# Patient Record
Sex: MALE | Race: WHITE | NOT HISPANIC OR LATINO | Employment: OTHER | URBAN - METROPOLITAN AREA
[De-identification: names, ages, dates, MRNs, and addresses within clinical notes are randomized per-mention and may not be internally consistent; named-entity substitution may affect disease eponyms.]

---

## 2018-10-29 ENCOUNTER — TELEPHONE (OUTPATIENT)
Dept: FAMILY MEDICINE CLINIC | Facility: CLINIC | Age: 48
End: 2018-10-29

## 2018-10-29 DIAGNOSIS — E11.9 CONTROLLED TYPE 2 DIABETES MELLITUS WITHOUT COMPLICATION, WITH LONG-TERM CURRENT USE OF INSULIN (HCC): Primary | ICD-10-CM

## 2018-10-29 DIAGNOSIS — Z79.4 CONTROLLED TYPE 2 DIABETES MELLITUS WITHOUT COMPLICATION, WITH LONG-TERM CURRENT USE OF INSULIN (HCC): Primary | ICD-10-CM

## 2018-10-30 RX ORDER — SYRINGE-NEEDLE,INSULIN,0.5 ML 31 GX5/16"
SYRINGE, EMPTY DISPOSABLE MISCELLANEOUS 2 TIMES DAILY
Qty: 100 EACH | Refills: 0 | Status: SHIPPED | OUTPATIENT
Start: 2018-10-30 | End: 2019-07-06 | Stop reason: ALTCHOICE

## 2018-10-30 RX ORDER — SYRINGE-NEEDLE,INSULIN,0.5 ML 31 GX5/16"
SYRINGE, EMPTY DISPOSABLE MISCELLANEOUS
Refills: 0 | COMMUNITY
Start: 2018-09-03 | End: 2018-10-30 | Stop reason: SDUPTHER

## 2018-10-31 NOTE — TELEPHONE ENCOUNTER
Called PT to verify what glucose strips PT is currently using and needs  PT did not answer, left VM

## 2019-02-04 ENCOUNTER — HOSPITAL ENCOUNTER (EMERGENCY)
Facility: HOSPITAL | Age: 49
Discharge: HOME | End: 2019-02-04
Attending: EMERGENCY MEDICINE
Payer: COMMERCIAL

## 2019-02-04 VITALS
SYSTOLIC BLOOD PRESSURE: 112 MMHG | HEART RATE: 83 BPM | RESPIRATION RATE: 18 BRPM | OXYGEN SATURATION: 97 % | TEMPERATURE: 98.1 F | WEIGHT: 190 LBS | DIASTOLIC BLOOD PRESSURE: 76 MMHG

## 2019-02-04 DIAGNOSIS — F41.9 ANXIETY: Primary | ICD-10-CM

## 2019-02-04 LAB
AMPHETAMINES SERPL QL SCN: NEGATIVE
BARBITURATES UR QL: NEGATIVE
BENZODIAZ UR QL: NEGATIVE
COCAINE UR QL: NEGATIVE
ETHANOL EXG-MCNC: 0 MG/DL
GLUCOSE SERPL-MCNC: 380 MG/DL (ref 65–140)
METHADONE UR QL: NEGATIVE
OPIATES UR QL SCN: NEGATIVE
PCP UR QL: NEGATIVE
THC UR QL: NEGATIVE

## 2019-02-04 PROCEDURE — 82948 REAGENT STRIP/BLOOD GLUCOSE: CPT

## 2019-02-04 PROCEDURE — 99285 EMERGENCY DEPT VISIT HI MDM: CPT

## 2019-02-04 PROCEDURE — 80307 DRUG TEST PRSMV CHEM ANLYZR: CPT | Performed by: EMERGENCY MEDICINE

## 2019-02-04 PROCEDURE — 82075 ASSAY OF BREATH ETHANOL: CPT | Performed by: EMERGENCY MEDICINE

## 2019-02-05 NOTE — ED PROVIDER NOTES
History  Chief Complaint   Patient presents with    Suicidal     pt states "im having suicidal thoughts", denies any recent stressors, pt reports he does not have a plan, denies HI/VH/AH, states "i want to go back to [facility]"     42-year-old male presenting for psych evaluation  Patient has psychiatric history and comes in voluntarily  He reports that he is depressed/reports a vague suicidal thought without any plan and is requesting inpatient treatment  He reports that he was last inpatient in November 2018  He denies any self-harm  Denies any alcohol or drug use  Denies any homicidal ideation  Denied any auditory or visual hallucinations  Offers no physical complaints  Patient is an insulin-dependent diabetic reports Accu-Cheks at home have been in the 100s, also history of asthma without exacerbation  Reports taking all his meds as prescribed  Patient has been calm and cooperative throughout his ED stay  He was evaluated by crisis and now denies any suicidal thoughts  He reports that earlier he was feeling overwhelmed and had a panic attack  He would like to be discharged home where he lives with his cousin  He feels safe there  He ultimately would like to move back to [place] where he was living with his uncle and has a doctor  He reports that he has all of his medications and does not need any refills  He denies any suicidal/homicidal ideation, auditory or visual hallucinations  Prior to Admission Medications   Prescriptions Last Dose Informant Patient Reported? Taking? B-D INS SYRINGE 0 5CC/31GX5/16 31G X 5/16" 0 5 ML MISC   No No   Sig: Inject under the skin 2 (two) times a day      Facility-Administered Medications: None       Past Medical History:   Diagnosis Date    Asthma     Bipolar 1 disorder ([facility] )     Diabetes mellitus ([facility] )     Psychiatric disorder        History reviewed  No pertinent surgical history  History reviewed  No pertinent family history    I have reviewed and agree with the history as documented  Social History   Substance Use Topics    Smoking status: Current Some Day Smoker    Smokeless tobacco: Not on file    Alcohol use No        Review of Systems   Constitutional: Negative for chills, fever and unexpected weight change  HENT: Negative for ear pain, rhinorrhea and sore throat  Eyes: Negative for pain and visual disturbance  Respiratory: Negative for cough and shortness of breath  Cardiovascular: Negative for chest pain and leg swelling  Gastrointestinal: Negative for abdominal pain, constipation, diarrhea, nausea and vomiting  Endocrine: Negative for polydipsia, polyphagia and polyuria  Genitourinary: Negative for dysuria, frequency, hematuria and urgency  Musculoskeletal: Negative for back pain, myalgias and neck pain  Skin: Negative for color change and rash  Allergic/Immunologic: Negative for environmental allergies and immunocompromised state  Neurological: Negative for dizziness, weakness, light-headedness, numbness and headaches  Hematological: Negative for adenopathy  Does not bruise/bleed easily  Psychiatric/Behavioral: Negative for agitation and confusion  All other systems reviewed and are negative  Physical Exam  Physical Exam   Constitutional: He is oriented to person, place, and time  He appears well-developed and well-nourished  HENT:   Head: Normocephalic and atraumatic  Nose: Nose normal    Mouth/Throat: Oropharynx is clear and moist    Eyes: Conjunctivae and EOM are normal    Neck: Normal range of motion  Neck supple  Cardiovascular: Normal rate, regular rhythm, normal heart sounds and intact distal pulses  Pulmonary/Chest: Effort normal and breath sounds normal  No stridor  No respiratory distress  He has no wheezes  He has no rales  He exhibits no tenderness  Abdominal: Soft  He exhibits no distension  There is no tenderness  There is no rebound and no guarding     Musculoskeletal: He exhibits no edema or deformity  Neurological: He is alert and oriented to person, place, and time  He exhibits normal muscle tone  Coordination normal    Skin: Skin is warm and dry  No rash noted  Psychiatric: He has a normal mood and affect  His speech is normal and behavior is normal  Judgment and thought content normal  He is not actively hallucinating  Thought content is not paranoid and not delusional  Cognition and memory are normal  He expresses no homicidal ideation  He expresses no suicidal plans and no homicidal plans  He is attentive  Nursing note and vitals reviewed        Vital Signs  ED Triage Vitals   Temperature Pulse Respirations Blood Pressure SpO2   02/04/19 1957 02/04/19 1957 02/04/19 1957 02/04/19 1957 02/04/19 1957   98 °F (36 7 °C) 98 18 155/84 98 %      Temp Source Heart Rate Source Patient Position - Orthostatic VS BP Location FiO2 (%)   02/04/19 2245 02/04/19 2245 02/04/19 2245 02/04/19 2245 --   Oral Monitor Sitting Right arm       Pain Score       02/04/19 1957       No Pain           Vitals:    02/04/19 1957 02/04/19 2245   BP: 155/84 112/76   Pulse: 98 83   Patient Position - Orthostatic VS:  Sitting       Visual Acuity      ED Medications  Medications - No data to display    Diagnostic Studies  Results Reviewed     Procedure Component Value Units Date/Time    Fingerstick Glucose (POCT) [[id number]]  (Abnormal) Collected:  02/04/19 2203    Lab Status:  Final result Updated:  02/04/19 2205     POC Glucose 380 (H) mg/dl     POCT alcohol breath test [[id number]]  (Normal) Resulted:  02/04/19 2124    Lab Status:  Final result Updated:  02/04/19 2124     EXTBreath Alcohol 0 00    Rapid drug screen, urine [[id number]]  (Normal) Collected:  02/04/19 2034    Lab Status:  Final result Specimen:  Urine from Urine, Clean Catch Updated:  02/04/19 2111     Amph/Meth UR Negative     Barbiturate Ur Negative     Benzodiazepine Urine Negative     Cocaine Urine Negative     Methadone Urine Negative Opiate Urine Negative     PCP Ur Negative     THC Urine Negative    Narrative:         FOR MEDICAL PURPOSES ONLY  IF CONFIRMATION NEEDED PLEASE CONTACT THE LAB WITHIN 5 DAYS  Drug Screen Cutoff Levels:  AMPHETAMINE/METHAMPHETAMINES  1000 ng/mL  BARBITURATES     200 ng/mL  BENZODIAZEPINES     200 ng/mL  COCAINE      300 ng/mL  METHADONE      300 ng/mL  OPIATES      300 ng/mL  PHENCYCLIDINE     25 ng/mL  THC       50 ng/mL                 No orders to display              Procedures  Procedures       Phone Contacts  ED Phone Contact    ED Course  ED Course as of Feb 06 2242 Mon Feb 04, 2019 2233 Patient was evaluated by Mallory Chauhan from crisis  He denies any suicidal, homicidal thoughts  He would like to go home  He reports that he will go home where he lives with his cousin right now  His ultimate plan is to get back to [place] and live with his uncle and see his doctor who was there  He reports that he has enough medications to take in the meantime  He feels safe with being discharged home                                MDM  Number of Diagnoses or Management Options  Anxiety:   Diagnosis management comments: 52 yo M presenting with what he describes as an anxiety attack/feeling overwhelmed, feels better now  Denies any SI/HI/AH/VH, wants to go home where he lives with his cousin  Ultimate plan is to go live with his uncle in [place] where his pyschiatrist/doctors are   Return precautions discussed and pt expressed understanding       Amount and/or Complexity of Data Reviewed  Tests in the medicine section of CPT®: ordered and reviewed  Discuss the patient with other providers: yes (Leobardo Mark))        Disposition  Final diagnoses:   Anxiety     Time reflects when diagnosis was documented in both MDM as applicable and the Disposition within this note     Time User Action Codes Description Comment    2/4/2019 10:35 PM Gabino Linton Add [F41 9] Anxiety       ED Disposition     ED Disposition Condition Date/Time Comment    Discharge  Mon Feb 4, 2019 10:35 PM Kylie Tyson discharge to home/self care  Condition at discharge: Stable        MD Documentation      Most Recent Value   Sending MD Dr Olena Mondragon up With Specialties Details Why Contact Info        Please follow up with your doctor in [place]  If you are unable to get back there, please use resources provided to you  Return to the ED/call 911 if any thoughts to harm yourself/others or other concerns          Discharge Medication List as of 2/4/2019 10:36 PM      CONTINUE these medications which have NOT CHANGED    Details   B-D INS SYRINGE 0 5CC/31GX5/16 31G X 5/16" 0 5 ML MISC Inject under the skin 2 (two) times a day, Starting Tue 10/30/2018, Print           No discharge procedures on file      ED Provider  Electronically Signed by           Jeancarlos Staton DO  02/06/19 4032

## 2019-02-05 NOTE — ED NOTES
ED crisis worker at bedside to review resources and discharge paperwork with patient        Celeste Stahl RN  02/04/19 0711

## 2019-02-05 NOTE — DISCHARGE INSTRUCTIONS
Anxiety   WHAT YOU NEED TO KNOW:   What do I need to know about anxiety? Anxiety is a condition that causes you to feel extremely worried or nervous  The feelings are so strong that they can cause problems with your daily activities or sleep  Anxiety may be triggered by something you fear, or it may happen without a cause  Family or work stress, smoking, caffeine, and alcohol can increase your risk for anxiety  Certain medicines or health conditions can also increase your risk  Anxiety can become a long-term condition if it is not managed or treated  What other common signs and symptoms may occur with anxiety? · Fatigue or muscle tightness     · Shaking, restlessness, or irritability     · Problems focusing     · Trouble sleeping     · Feeling jumpy, easily startled, or dizzy     · Rapid heartbeat or shortness of breath  What do I need to tell my healthcare provider about my anxiety? Tell your healthcare provider when your symptoms began and what triggers them  Tell your provider if anxiety affects your daily activities  Your provider will also ask about your medical history and if you have family members with a similar condition  Tell your provider about your past and present alcohol, nicotine, or drug use  What can I do to manage anxiety? You may get medicines to help you feel calm and relaxed, and to decrease your symptoms  Medicines are usually given together with therapy or other treatments  The following can help you manage anxiety:  · Talk to someone about your anxiety  Your healthcare provider may suggest counseling  Cognitive behavioral therapy can help you understand and change how you react to events that trigger your symptoms  You might feel more comfortable talking with a friend or family member about your anxiety  Choose someone you know will be supportive and encouraging  · Find ways to relax  Activities such as exercise, meditation, or listening to music can help you relax   Spend time with friends, or do things you enjoy  · Practice deep breathing  Deep breathing can help you relax when you feel anxious  Focus on taking slow, deep breaths several times a day, or during an anxiety attack  Breathe in through your nose and out through your mouth  · Create a regular sleep routine  Regular sleep can help you feel calmer during the day  Go to sleep and wake up at the same times every day  Do not watch television or use the computer right before bed  Your room should be comfortable, dark, and quiet  · Eat a variety of healthy foods  Healthy foods include fruits, vegetables, low-fat dairy products, lean meats, fish, whole-grain breads, and cooked beans  Healthy foods can help you feel less anxious and have more energy  · Exercise regularly  Exercise can increase your energy level  Exercise may also lift your mood and help you sleep better  Your healthcare provider can help you create an exercise plan  · Do not smoke  Nicotine and other chemicals in cigarettes and cigars can increase anxiety  Ask your healthcare provider for information if you currently smoke and need help to quit  E-cigarettes or smokeless tobacco still contain nicotine  Talk to your healthcare provider before you use these products  · Do not have caffeine  Caffeine can make your symptoms worse  Do not have foods or drinks that are meant to increase your energy level  · Limit or do not drink alcohol  Ask your healthcare provider if alcohol is safe for you  You may not be able to drink alcohol if you take certain anxiety or depression medicines  Limit alcohol to 1 drink per day if you are a woman  Limit alcohol to 2 drinks per day if you are a man  A drink of alcohol is 12 ounces of beer, 5 ounces of wine, or 1½ ounces of liquor  · Do not use drugs  Drugs can make your anxiety worse  It can also make anxiety hard to manage  Talk to your healthcare provider if you use drugs and want help to quit    Call 911 if:   · You have chest pain, tightness, or heaviness that may spread to your shoulders, arms, jaw, neck, or back  · You feel like hurting yourself or someone else  When should I contact my healthcare provider? · Your symptoms get worse or do not get better with treatment  · Your anxiety keeps you from doing your regular daily activities  · You have new symptoms since your last visit  · You have questions or concerns about your condition or care  CARE AGREEMENT:   You have the right to help plan your care  Learn about your health condition and how it may be treated  Discuss treatment options with your caregivers to decide what care you want to receive  You always have the right to refuse treatment  The above information is an  only  It is not intended as medical advice for individual conditions or treatments  Talk to your doctor, nurse or pharmacist before following any medical regimen to see if it is safe and effective for you  © 2017 [employer] Information is for End User's use only and may not be sold, redistributed or otherwise used for commercial purposes  All illustrations and images included in CareNotes® are the copyrighted property of A [employer]  or Joshua Ospina

## 2019-02-05 NOTE — ED NOTES
Patients belongings returned at this time  Patient showed to bathroom to change        Siri Nicole RN  02/04/19 3956

## 2019-02-05 NOTE — ED NOTES
Pt unsure of daily mediations  Pt states that he goes to the [facility] in [facility], unsure of which one  Dr Hazel Rosas aware        Venkatesh Ibarra, RN  02/04/19 2729

## 2019-02-05 NOTE — ED NOTES
Dr Mariposa Blancas at [facility], [address] [place] [address]  02/04/19 6261

## 2019-02-05 NOTE — ED NOTES
Patient states "I want to go back to [place]"  Patient states he has been depressed however denies suicidal and homicidal ideations  Patient denies any stressors and says he is safe at his cousins home and will talk to his uncle about getting back to [place] since his uncle lives there  Patient requesting discharge and  wants to go home

## 2019-02-05 NOTE — ED NOTES
Crisis worker Floyd Tang called, will be in department shortly        Miguelina Holland RN  02/04/19 2051

## 2019-05-29 ENCOUNTER — HOSPITAL ENCOUNTER (EMERGENCY)
Facility: HOSPITAL | Age: 49
Discharge: HOME | End: 2019-05-29
Attending: EMERGENCY MEDICINE | Admitting: EMERGENCY MEDICINE
Payer: COMMERCIAL

## 2019-05-29 VITALS
WEIGHT: 133.82 LBS | HEART RATE: 72 BPM | OXYGEN SATURATION: 99 % | DIASTOLIC BLOOD PRESSURE: 77 MMHG | RESPIRATION RATE: 20 BRPM | SYSTOLIC BLOOD PRESSURE: 112 MMHG | TEMPERATURE: 97.7 F

## 2019-05-29 DIAGNOSIS — F31.9 BIPOLAR DISORDER (HCC): Primary | ICD-10-CM

## 2019-05-29 DIAGNOSIS — R44.1 VISUAL HALLUCINATIONS: ICD-10-CM

## 2019-05-29 DIAGNOSIS — R44.0 AUDITORY HALLUCINATIONS: ICD-10-CM

## 2019-05-29 LAB
AMPHETAMINES SERPL QL SCN: NEGATIVE
BARBITURATES UR QL: NEGATIVE
BENZODIAZ UR QL: NEGATIVE
COCAINE UR QL: NEGATIVE
ETHANOL EXG-MCNC: 0 MG/DL
METHADONE UR QL: NEGATIVE
OPIATES UR QL SCN: NEGATIVE
PCP UR QL: NEGATIVE
THC UR QL: NEGATIVE

## 2019-05-29 PROCEDURE — 99283 EMERGENCY DEPT VISIT LOW MDM: CPT | Performed by: EMERGENCY MEDICINE

## 2019-05-29 PROCEDURE — 82075 ASSAY OF BREATH ETHANOL: CPT | Performed by: EMERGENCY MEDICINE

## 2019-05-29 PROCEDURE — 99284 EMERGENCY DEPT VISIT MOD MDM: CPT

## 2019-05-29 PROCEDURE — 80307 DRUG TEST PRSMV CHEM ANLYZR: CPT | Performed by: EMERGENCY MEDICINE

## 2019-05-30 ENCOUNTER — HOSPITAL ENCOUNTER (EMERGENCY)
Facility: HOSPITAL | Age: 49
Discharge: HOME | End: 2019-05-30
Attending: EMERGENCY MEDICINE
Payer: COMMERCIAL

## 2019-05-30 VITALS
OXYGEN SATURATION: 100 % | DIASTOLIC BLOOD PRESSURE: 78 MMHG | RESPIRATION RATE: 16 BRPM | HEART RATE: 59 BPM | TEMPERATURE: 96.9 F | SYSTOLIC BLOOD PRESSURE: 124 MMHG

## 2019-05-30 DIAGNOSIS — R07.89 ATYPICAL CHEST PAIN: Primary | ICD-10-CM

## 2019-05-30 DIAGNOSIS — F41.9 ANXIETY: ICD-10-CM

## 2019-05-30 LAB
ALBUMIN SERPL BCP-MCNC: 3.9 G/DL (ref 3–5.2)
ALP SERPL-CCNC: 119 U/L (ref 43–122)
ALT SERPL W P-5'-P-CCNC: 19 U/L (ref 9–52)
ANION GAP SERPL CALCULATED.3IONS-SCNC: 9 MMOL/L (ref 5–14)
AST SERPL W P-5'-P-CCNC: 19 U/L (ref 17–59)
ATRIAL RATE: 57 BPM
BASOPHILS # BLD AUTO: 0 THOUSANDS/ΜL (ref 0–0.1)
BASOPHILS NFR BLD AUTO: 1 % (ref 0–1)
BILIRUB SERPL-MCNC: 0.3 MG/DL
BUN SERPL-MCNC: 9 MG/DL (ref 5–25)
CALCIUM SERPL-MCNC: 9.1 MG/DL (ref 8.4–10.2)
CHLORIDE SERPL-SCNC: 102 MMOL/L (ref 97–108)
CO2 SERPL-SCNC: 30 MMOL/L (ref 22–30)
CREAT SERPL-MCNC: 0.71 MG/DL (ref 0.7–1.5)
EOSINOPHIL # BLD AUTO: 0.1 THOUSAND/ΜL (ref 0–0.4)
EOSINOPHIL NFR BLD AUTO: 3 % (ref 0–6)
ERYTHROCYTE [DISTWIDTH] IN BLOOD BY AUTOMATED COUNT: 12.9 %
GFR SERPL CREATININE-BSD FRML MDRD: 112 ML/MIN/1.73SQ M
GLUCOSE SERPL-MCNC: 308 MG/DL (ref 70–99)
HCT VFR BLD AUTO: 42.4 % (ref 41–53)
HGB BLD-MCNC: 14.4 G/DL (ref 13.5–17.5)
LIPASE SERPL-CCNC: 82 U/L (ref 23–300)
LYMPHOCYTES # BLD AUTO: 1.9 THOUSANDS/ΜL (ref 0.5–4)
LYMPHOCYTES NFR BLD AUTO: 37 % (ref 25–45)
MCH RBC QN AUTO: 29.7 PG (ref 26–34)
MCHC RBC AUTO-ENTMCNC: 33.9 G/DL (ref 31–36)
MCV RBC AUTO: 88 FL (ref 80–100)
MONOCYTES # BLD AUTO: 0.5 THOUSAND/ΜL (ref 0.2–0.9)
MONOCYTES NFR BLD AUTO: 11 % (ref 1–10)
NEUTROPHILS # BLD AUTO: 2.5 THOUSANDS/ΜL (ref 1.8–7.8)
NEUTS SEG NFR BLD AUTO: 49 % (ref 45–65)
P AXIS: 34 DEGREES
PLATELET # BLD AUTO: 153 THOUSANDS/UL (ref 150–450)
PMV BLD AUTO: 9.5 FL (ref 8.9–12.7)
POTASSIUM SERPL-SCNC: 3.8 MMOL/L (ref 3.6–5)
PR INTERVAL: 118 MS
PROT SERPL-MCNC: 6.9 G/DL (ref 5.9–8.4)
QRS AXIS: 68 DEGREES
QRSD INTERVAL: 94 MS
QT INTERVAL: 432 MS
QTC INTERVAL: 420 MS
RBC # BLD AUTO: 4.84 MILLION/UL (ref 4.5–5.9)
SODIUM SERPL-SCNC: 141 MMOL/L (ref 137–147)
T WAVE AXIS: 50 DEGREES
TROPONIN I SERPL-MCNC: <0.01 NG/ML (ref 0–0.03)
VALPROATE SERPL-MCNC: <10 UG/ML (ref 50–120)
VENTRICULAR RATE: 57 BPM
WBC # BLD AUTO: 5 THOUSAND/UL (ref 4.5–11)

## 2019-05-30 PROCEDURE — 83690 ASSAY OF LIPASE: CPT | Performed by: EMERGENCY MEDICINE

## 2019-05-30 PROCEDURE — 84484 ASSAY OF TROPONIN QUANT: CPT | Performed by: EMERGENCY MEDICINE

## 2019-05-30 PROCEDURE — 80053 COMPREHEN METABOLIC PANEL: CPT | Performed by: EMERGENCY MEDICINE

## 2019-05-30 PROCEDURE — 36415 COLL VENOUS BLD VENIPUNCTURE: CPT | Performed by: EMERGENCY MEDICINE

## 2019-05-30 PROCEDURE — 99283 EMERGENCY DEPT VISIT LOW MDM: CPT | Performed by: EMERGENCY MEDICINE

## 2019-05-30 PROCEDURE — 80164 ASSAY DIPROPYLACETIC ACD TOT: CPT | Performed by: EMERGENCY MEDICINE

## 2019-05-30 PROCEDURE — 99285 EMERGENCY DEPT VISIT HI MDM: CPT

## 2019-05-30 PROCEDURE — 93005 ELECTROCARDIOGRAM TRACING: CPT

## 2019-05-30 PROCEDURE — 85025 COMPLETE CBC W/AUTO DIFF WBC: CPT | Performed by: EMERGENCY MEDICINE

## 2019-05-30 PROCEDURE — 93010 ELECTROCARDIOGRAM REPORT: CPT | Performed by: INTERNAL MEDICINE

## 2019-05-30 RX ORDER — ACETAMINOPHEN 325 MG/1
650 TABLET ORAL ONCE
Status: DISCONTINUED | OUTPATIENT
Start: 2019-05-30 | End: 2019-05-30 | Stop reason: HOSPADM

## 2019-06-18 ENCOUNTER — HOSPITAL ENCOUNTER (EMERGENCY)
Facility: HOSPITAL | Age: 49
Discharge: HOME | End: 2019-06-18
Attending: EMERGENCY MEDICINE | Admitting: EMERGENCY MEDICINE
Payer: COMMERCIAL

## 2019-06-18 VITALS
WEIGHT: 133.82 LBS | DIASTOLIC BLOOD PRESSURE: 70 MMHG | RESPIRATION RATE: 20 BRPM | OXYGEN SATURATION: 98 % | HEART RATE: 105 BPM | SYSTOLIC BLOOD PRESSURE: 123 MMHG | TEMPERATURE: 98.1 F

## 2019-06-18 DIAGNOSIS — E86.0 DEHYDRATION: ICD-10-CM

## 2019-06-18 DIAGNOSIS — R81 GLUCOSURIA: ICD-10-CM

## 2019-06-18 DIAGNOSIS — F32.A DEPRESSION: Primary | ICD-10-CM

## 2019-06-18 DIAGNOSIS — R73.9 HYPERGLYCEMIA: ICD-10-CM

## 2019-06-18 DIAGNOSIS — F31.31 BIPOLAR AFFECTIVE DISORDER, CURRENTLY DEPRESSED, MILD (HCC): ICD-10-CM

## 2019-06-18 LAB
ALBUMIN SERPL BCP-MCNC: 4.7 G/DL (ref 3–5.2)
ALP SERPL-CCNC: 59 U/L (ref 43–122)
ALT SERPL W P-5'-P-CCNC: 15 U/L (ref 9–52)
AMPHETAMINES SERPL QL SCN: NEGATIVE
ANION GAP SERPL CALCULATED.3IONS-SCNC: 14 MMOL/L (ref 5–14)
AST SERPL W P-5'-P-CCNC: 17 U/L (ref 17–59)
BACTERIA UR QL AUTO: ABNORMAL /HPF
BARBITURATES UR QL: NEGATIVE
BASOPHILS # BLD AUTO: 0 THOUSANDS/ΜL (ref 0–0.1)
BASOPHILS NFR BLD AUTO: 1 % (ref 0–1)
BENZODIAZ UR QL: NEGATIVE
BILIRUB SERPL-MCNC: 0.7 MG/DL
BILIRUB UR QL STRIP: NEGATIVE
BUN SERPL-MCNC: 15 MG/DL (ref 5–25)
CALCIUM SERPL-MCNC: 9.7 MG/DL (ref 8.4–10.2)
CHLORIDE SERPL-SCNC: 101 MMOL/L (ref 97–108)
CLARITY UR: CLEAR
CO2 SERPL-SCNC: 27 MMOL/L (ref 22–30)
COCAINE UR QL: NEGATIVE
COLOR UR: ABNORMAL
CREAT SERPL-MCNC: 0.88 MG/DL (ref 0.7–1.5)
EOSINOPHIL # BLD AUTO: 0 THOUSAND/ΜL (ref 0–0.4)
EOSINOPHIL NFR BLD AUTO: 0 % (ref 0–6)
ERYTHROCYTE [DISTWIDTH] IN BLOOD BY AUTOMATED COUNT: 13.3 %
ETHANOL EXG-MCNC: NEGATIVE MG/DL
GFR SERPL CREATININE-BSD FRML MDRD: 102 ML/MIN/1.73SQ M
GLUCOSE SERPL-MCNC: 270 MG/DL (ref 70–99)
GLUCOSE UR STRIP-MCNC: ABNORMAL MG/DL
HCT VFR BLD AUTO: 49 % (ref 41–53)
HGB BLD-MCNC: 16.6 G/DL (ref 13.5–17.5)
HGB UR QL STRIP.AUTO: NEGATIVE
KETONES UR STRIP-MCNC: ABNORMAL MG/DL
LEUKOCYTE ESTERASE UR QL STRIP: NEGATIVE
LYMPHOCYTES # BLD AUTO: 1.7 THOUSANDS/ΜL (ref 0.5–4)
LYMPHOCYTES NFR BLD AUTO: 22 % (ref 25–45)
MAGNESIUM SERPL-MCNC: 1.6 MG/DL (ref 1.6–2.3)
MCH RBC QN AUTO: 29.2 PG (ref 26–34)
MCHC RBC AUTO-ENTMCNC: 33.9 G/DL (ref 31–36)
MCV RBC AUTO: 86 FL (ref 80–100)
METHADONE UR QL: NEGATIVE
MONOCYTES # BLD AUTO: 0.5 THOUSAND/ΜL (ref 0.2–0.9)
MONOCYTES NFR BLD AUTO: 7 % (ref 1–10)
MUCOUS THREADS UR QL AUTO: ABNORMAL
NEUTROPHILS # BLD AUTO: 5.6 THOUSANDS/ΜL (ref 1.8–7.8)
NEUTS SEG NFR BLD AUTO: 71 % (ref 45–65)
NITRITE UR QL STRIP: NEGATIVE
NON-SQ EPI CELLS URNS QL MICRO: ABNORMAL /HPF
OPIATES UR QL SCN: NEGATIVE
PCP UR QL: NEGATIVE
PH UR STRIP.AUTO: 5 [PH]
PLATELET # BLD AUTO: 219 THOUSANDS/UL (ref 150–450)
PMV BLD AUTO: 9.7 FL (ref 8.9–12.7)
POTASSIUM SERPL-SCNC: 4.2 MMOL/L (ref 3.6–5)
PROT SERPL-MCNC: 8.2 G/DL (ref 5.9–8.4)
PROT UR STRIP-MCNC: ABNORMAL MG/DL
RBC # BLD AUTO: 5.7 MILLION/UL (ref 4.5–5.9)
RBC #/AREA URNS AUTO: ABNORMAL /HPF
SODIUM SERPL-SCNC: 142 MMOL/L (ref 137–147)
SP GR UR STRIP.AUTO: 1.02 (ref 1–1.04)
THC UR QL: NEGATIVE
UROBILINOGEN UA: NEGATIVE MG/DL
VALPROATE SERPL-MCNC: <10 UG/ML (ref 50–120)
WBC # BLD AUTO: 7.9 THOUSAND/UL (ref 4.5–11)
WBC #/AREA URNS AUTO: ABNORMAL /HPF

## 2019-06-18 PROCEDURE — 99282 EMERGENCY DEPT VISIT SF MDM: CPT | Performed by: EMERGENCY MEDICINE

## 2019-06-18 PROCEDURE — 85025 COMPLETE CBC W/AUTO DIFF WBC: CPT | Performed by: EMERGENCY MEDICINE

## 2019-06-18 PROCEDURE — 81001 URINALYSIS AUTO W/SCOPE: CPT | Performed by: EMERGENCY MEDICINE

## 2019-06-18 PROCEDURE — 83735 ASSAY OF MAGNESIUM: CPT | Performed by: EMERGENCY MEDICINE

## 2019-06-18 PROCEDURE — 80164 ASSAY DIPROPYLACETIC ACD TOT: CPT | Performed by: EMERGENCY MEDICINE

## 2019-06-18 PROCEDURE — 36415 COLL VENOUS BLD VENIPUNCTURE: CPT | Performed by: EMERGENCY MEDICINE

## 2019-06-18 PROCEDURE — NC001 PR NO CHARGE: Performed by: EMERGENCY MEDICINE

## 2019-06-18 PROCEDURE — 80307 DRUG TEST PRSMV CHEM ANLYZR: CPT | Performed by: EMERGENCY MEDICINE

## 2019-06-18 PROCEDURE — 82075 ASSAY OF BREATH ETHANOL: CPT | Performed by: EMERGENCY MEDICINE

## 2019-06-18 PROCEDURE — 99283 EMERGENCY DEPT VISIT LOW MDM: CPT

## 2019-06-18 PROCEDURE — 80053 COMPREHEN METABOLIC PANEL: CPT | Performed by: EMERGENCY MEDICINE

## 2019-06-18 PROCEDURE — 93005 ELECTROCARDIOGRAM TRACING: CPT

## 2019-06-19 LAB
ATRIAL RATE: 90 BPM
P AXIS: 70 DEGREES
PR INTERVAL: 122 MS
QRS AXIS: 69 DEGREES
QRSD INTERVAL: 78 MS
QT INTERVAL: 350 MS
QTC INTERVAL: 428 MS
T WAVE AXIS: 48 DEGREES
VENTRICULAR RATE: 90 BPM

## 2019-06-19 PROCEDURE — 93010 ELECTROCARDIOGRAM REPORT: CPT | Performed by: INTERNAL MEDICINE

## 2019-06-26 LAB
LEFT EYE DIABETIC RETINOPATHY: NORMAL
RIGHT EYE DIABETIC RETINOPATHY: NORMAL

## 2019-06-29 ENCOUNTER — HOSPITAL ENCOUNTER (EMERGENCY)
Facility: HOSPITAL | Age: 49
Discharge: HOME | End: 2019-06-29
Attending: EMERGENCY MEDICINE | Admitting: EMERGENCY MEDICINE
Payer: COMMERCIAL

## 2019-06-29 VITALS
WEIGHT: 133.82 LBS | HEART RATE: 69 BPM | SYSTOLIC BLOOD PRESSURE: 84 MMHG | OXYGEN SATURATION: 97 % | DIASTOLIC BLOOD PRESSURE: 57 MMHG | TEMPERATURE: 97.8 F | RESPIRATION RATE: 20 BRPM

## 2019-06-29 DIAGNOSIS — R10.9 ABDOMINAL PAIN: Primary | ICD-10-CM

## 2019-06-29 DIAGNOSIS — K59.00 CONSTIPATION: ICD-10-CM

## 2019-06-29 LAB
ALBUMIN SERPL BCP-MCNC: 3.5 G/DL (ref 3.5–5)
ALP SERPL-CCNC: 87 U/L (ref 46–116)
ALT SERPL W P-5'-P-CCNC: 19 U/L (ref 12–78)
ANION GAP SERPL CALCULATED.3IONS-SCNC: 8 MMOL/L (ref 4–13)
AST SERPL W P-5'-P-CCNC: 17 U/L (ref 5–45)
BASOPHILS # BLD AUTO: 0.02 THOUSANDS/ΜL (ref 0–0.1)
BASOPHILS NFR BLD AUTO: 0 % (ref 0–1)
BILIRUB SERPL-MCNC: 0.22 MG/DL (ref 0.2–1)
BILIRUB UR QL STRIP: NEGATIVE
BUN SERPL-MCNC: 24 MG/DL (ref 5–25)
CALCIUM SERPL-MCNC: 9.2 MG/DL (ref 8.3–10.1)
CHLORIDE SERPL-SCNC: 104 MMOL/L (ref 100–108)
CLARITY UR: CLEAR
CLARITY, POC: CLEAR
CO2 SERPL-SCNC: 31 MMOL/L (ref 21–32)
COLOR UR: YELLOW
COLOR, POC: YELLOW
CREAT SERPL-MCNC: 1.06 MG/DL (ref 0.6–1.3)
EOSINOPHIL # BLD AUTO: 0.1 THOUSAND/ΜL (ref 0–0.61)
EOSINOPHIL NFR BLD AUTO: 1 % (ref 0–6)
ERYTHROCYTE [DISTWIDTH] IN BLOOD BY AUTOMATED COUNT: 12.3 % (ref 11.6–15.1)
GFR SERPL CREATININE-BSD FRML MDRD: 83 ML/MIN/1.73SQ M
GLUCOSE SERPL-MCNC: 287 MG/DL (ref 65–140)
GLUCOSE UR STRIP-MCNC: ABNORMAL MG/DL
HCT VFR BLD AUTO: 41.2 % (ref 36.5–49.3)
HGB BLD-MCNC: 13.8 G/DL (ref 12–17)
HGB UR QL STRIP.AUTO: NEGATIVE
IMM GRANULOCYTES # BLD AUTO: 0.01 THOUSAND/UL (ref 0–0.2)
IMM GRANULOCYTES NFR BLD AUTO: 0 % (ref 0–2)
KETONES UR STRIP-MCNC: NEGATIVE MG/DL
LEUKOCYTE ESTERASE UR QL STRIP: NEGATIVE
LIPASE SERPL-CCNC: 195 U/L (ref 73–393)
LYMPHOCYTES # BLD AUTO: 2.68 THOUSANDS/ΜL (ref 0.6–4.47)
LYMPHOCYTES NFR BLD AUTO: 39 % (ref 14–44)
MCH RBC QN AUTO: 29.4 PG (ref 26.8–34.3)
MCHC RBC AUTO-ENTMCNC: 33.5 G/DL (ref 31.4–37.4)
MCV RBC AUTO: 88 FL (ref 82–98)
MONOCYTES # BLD AUTO: 0.52 THOUSAND/ΜL (ref 0.17–1.22)
MONOCYTES NFR BLD AUTO: 8 % (ref 4–12)
NEUTROPHILS # BLD AUTO: 3.62 THOUSANDS/ΜL (ref 1.85–7.62)
NEUTS SEG NFR BLD AUTO: 52 % (ref 43–75)
NITRITE UR QL STRIP: NEGATIVE
NRBC BLD AUTO-RTO: 0 /100 WBCS
PH UR STRIP.AUTO: 7.5 [PH] (ref 4.5–8)
PLATELET # BLD AUTO: 191 THOUSANDS/UL (ref 149–390)
PMV BLD AUTO: 11.3 FL (ref 8.9–12.7)
POTASSIUM SERPL-SCNC: 3.7 MMOL/L (ref 3.5–5.3)
PROT SERPL-MCNC: 6.9 G/DL (ref 6.4–8.2)
PROT UR STRIP-MCNC: NEGATIVE MG/DL
RBC # BLD AUTO: 4.69 MILLION/UL (ref 3.88–5.62)
SODIUM SERPL-SCNC: 143 MMOL/L (ref 136–145)
SP GR UR STRIP.AUTO: 1.02 (ref 1–1.03)
UROBILINOGEN UR QL STRIP.AUTO: 4 E.U./DL
WBC # BLD AUTO: 6.95 THOUSAND/UL (ref 4.31–10.16)

## 2019-06-29 PROCEDURE — 81003 URINALYSIS AUTO W/O SCOPE: CPT

## 2019-06-29 PROCEDURE — 99283 EMERGENCY DEPT VISIT LOW MDM: CPT | Performed by: EMERGENCY MEDICINE

## 2019-06-29 PROCEDURE — 99284 EMERGENCY DEPT VISIT MOD MDM: CPT

## 2019-06-29 PROCEDURE — 83690 ASSAY OF LIPASE: CPT | Performed by: EMERGENCY MEDICINE

## 2019-06-29 PROCEDURE — 96360 HYDRATION IV INFUSION INIT: CPT

## 2019-06-29 PROCEDURE — 80053 COMPREHEN METABOLIC PANEL: CPT | Performed by: EMERGENCY MEDICINE

## 2019-06-29 PROCEDURE — 36415 COLL VENOUS BLD VENIPUNCTURE: CPT | Performed by: EMERGENCY MEDICINE

## 2019-06-29 PROCEDURE — 85025 COMPLETE CBC W/AUTO DIFF WBC: CPT | Performed by: EMERGENCY MEDICINE

## 2019-06-29 RX ORDER — POLYETHYLENE GLYCOL 3350 17 G/17G
17 POWDER, FOR SOLUTION ORAL DAILY
Qty: 14 EACH | Refills: 0 | Status: SHIPPED | OUTPATIENT
Start: 2019-06-29 | End: 2019-08-23 | Stop reason: ALTCHOICE

## 2019-06-29 RX ORDER — LISINOPRIL 10 MG/1
10 TABLET ORAL DAILY
Status: ON HOLD | COMMUNITY
Start: 2018-11-19 | End: 2019-10-13 | Stop reason: SDUPTHER

## 2019-06-29 RX ADMIN — SODIUM CHLORIDE 1000 ML: 0.9 INJECTION, SOLUTION INTRAVENOUS at 21:55

## 2019-06-30 ENCOUNTER — HOSPITAL ENCOUNTER (EMERGENCY)
Facility: HOSPITAL | Age: 49
Discharge: HOME | End: 2019-06-30
Attending: EMERGENCY MEDICINE | Admitting: EMERGENCY MEDICINE
Payer: COMMERCIAL

## 2019-06-30 VITALS
RESPIRATION RATE: 20 BRPM | DIASTOLIC BLOOD PRESSURE: 51 MMHG | SYSTOLIC BLOOD PRESSURE: 95 MMHG | TEMPERATURE: 97.8 F | OXYGEN SATURATION: 99 % | HEART RATE: 64 BPM

## 2019-06-30 DIAGNOSIS — F31.9 BIPOLAR DISORDER (HCC): Primary | ICD-10-CM

## 2019-06-30 LAB — ETHANOL EXG-MCNC: 0 MG/DL

## 2019-06-30 PROCEDURE — 99283 EMERGENCY DEPT VISIT LOW MDM: CPT | Performed by: EMERGENCY MEDICINE

## 2019-06-30 PROCEDURE — 99284 EMERGENCY DEPT VISIT MOD MDM: CPT

## 2019-06-30 PROCEDURE — 82075 ASSAY OF BREATH ETHANOL: CPT | Performed by: EMERGENCY MEDICINE

## 2019-06-30 NOTE — ED PROVIDER NOTES
History  Chief Complaint   Patient presents with    Abdominal Pain     patient reports sharp abd pain that began this morning on the left lower quadrant and radiates to the right lower quadrant  patient jessica any abdominal surgeries or kidney stones  History provided by:  Patient   used: No    Abdominal Pain   Pain location:  LLQ  Pain quality: aching    Pain radiates to:  Does not radiate  Pain severity:  Moderate  Onset quality:  Gradual  Duration:  12 hours  Timing:  Intermittent  Progression:  Waxing and waning  Chronicity:  New  Context: not previous surgeries    Relieved by:  Nothing  Worsened by:  Nothing  Ineffective treatments:  None tried  Associated symptoms: no chest pain, no chills, no cough, no diarrhea, no dysuria, no fever, no nausea, no shortness of breath, no sore throat and no vomiting    Risk factors: has not had multiple surgeries        Prior to Admission Medications   Prescriptions Last Dose Informant Patient Reported? Taking? B-D INS SYRINGE 0 5CC/31GX5/16 31G X 5/16" 0 5 ML MISC   No No   Sig: Inject under the skin 2 (two) times a day   insulin glargine (LANTUS SOLOSTAR) 100 units/mL injection pen   Yes Yes   Sig: Inject 40 Units under the skin   lisinopril (ZESTRIL) 10 mg tablet   Yes Yes   Sig: Take 10 mg by mouth daily   metFORMIN (GLUCOPHAGE) 1000 MG tablet   Yes Yes   Sig: Take 1,000 mg by mouth      Facility-Administered Medications: None       Past Medical History:   Diagnosis Date    Asthma     Bipolar 1 disorder ([facility] )     Diabetes mellitus ([facility] )     Psychiatric disorder        History reviewed  No pertinent surgical history  History reviewed  No pertinent family history  I have reviewed and agree with the history as documented      Social History     Tobacco Use    Smoking status: Former Smoker    Smokeless tobacco: Never Used   Substance Use Topics    Alcohol use: No    Drug use: No        Review of Systems   Constitutional: Negative for chills and fever  HENT: Negative for facial swelling, sore throat and trouble swallowing  Eyes: Negative for pain and visual disturbance  Respiratory: Negative for cough and shortness of breath  Cardiovascular: Negative for chest pain and leg swelling  Gastrointestinal: Positive for abdominal pain  Negative for blood in stool, diarrhea, nausea and vomiting  Genitourinary: Negative for dysuria and flank pain  Musculoskeletal: Negative for back pain, neck pain and neck stiffness  Skin: Negative for pallor and rash  Allergic/Immunologic: Negative for environmental allergies and immunocompromised state  Neurological: Negative for dizziness and headaches  Hematological: Negative for adenopathy  Does not bruise/bleed easily  Psychiatric/Behavioral: Negative for agitation and behavioral problems  All other systems reviewed and are negative  Physical Exam  Physical Exam   Constitutional: He is oriented to person, place, and time  He appears well-developed and well-nourished  No distress  HENT:   Head: Normocephalic and atraumatic  Eyes: EOM are normal    Neck: Normal range of motion  Neck supple  Cardiovascular: Normal rate, regular rhythm, normal heart sounds and intact distal pulses  Pulmonary/Chest: Effort normal and breath sounds normal    Abdominal: Soft  Bowel sounds are normal  There is tenderness (mild) in the left lower quadrant  There is no rebound and no guarding  Musculoskeletal: Normal range of motion  Neurological: He is alert and oriented to person, place, and time  Skin: Skin is warm and dry  Psychiatric: He has a normal mood and affect  Nursing note and vitals reviewed        Vital Signs  ED Triage Vitals   Temperature Pulse Respirations Blood Pressure SpO2   06/29/19 2123 06/29/19 2123 06/29/19 2123 06/29/19 2125 06/29/19 2123   97 8 °F (36 6 °C) 72 20 105/59 97 %      Temp Source Heart Rate Source Patient Position - Orthostatic VS BP Location FiO2 (%) 06/29/19 2123 06/29/19 2123 06/29/19 2123 06/29/19 2123 --   Oral Monitor Sitting Right arm       Pain Score       06/29/19 2123       Worst Possible Pain           Vitals:    06/29/19 2123 06/29/19 2125 06/29/19 2303   BP:  105/59 (!) 84/57   Pulse: 72  69   Patient Position - Orthostatic VS: Sitting  Lying         Visual Acuity      ED Medications  Medications   sodium chloride 0 9 % bolus 1,000 mL (0 mL Intravenous Stopped 6/29/19 2246)       Diagnostic Studies  Results Reviewed     Procedure Component Value Units Date/Time    Comprehensive metabolic panel [[id number]]  (Abnormal) Collected:  06/29/19 2153    Lab Status:  Final result Specimen:  Blood from Arm, Left Updated:  06/29/19 2230     Sodium 143 mmol/L      Potassium 3 7 mmol/L      Chloride 104 mmol/L      CO2 31 mmol/L      ANION GAP 8 mmol/L      BUN 24 mg/dL      Creatinine 1 06 mg/dL      Glucose 287 mg/dL      Calcium 9 2 mg/dL      AST 17 U/L      ALT 19 U/L      Alkaline Phosphatase 87 U/L      Total Protein 6 9 g/dL      Albumin 3 5 g/dL      Total Bilirubin 0 22 mg/dL      eGFR 83 ml/min/1 73sq m     Narrative:       [place] guidelines for Chronic Kidney Disease (CKD):     Stage 1 with normal or high GFR (GFR > 90 mL/min/1 73 square meters)    Stage 2 Mild CKD (GFR = 60-89 mL/min/1 73 square meters)    Stage 3A Moderate CKD (GFR = 45-59 mL/min/1 73 square meters)    Stage 3B Moderate CKD (GFR = 30-44 mL/min/1 73 square meters)    Stage 4 Severe CKD (GFR = 15-29 mL/min/1 73 square meters)    Stage 5 End Stage CKD (GFR <15 mL/min/1 73 square meters)  Note: GFR calculation is accurate only with a steady state creatinine    Lipase [[id number]]  (Normal) Collected:  06/29/19 2153    Lab Status:  Final result Specimen:  Blood from Arm, Left Updated:  06/29/19 2230     Lipase 195 u/L     CBC and differential [[id number]] Collected:  06/29/19 2153    Lab Status:  Final result Specimen:  Blood from Arm, Left Updated: 06/29/19 2212     WBC 6 95 Thousand/uL      RBC 4 69 Million/uL      Hemoglobin 13 8 g/dL      Hematocrit 41 2 %      MCV 88 fL      MCH 29 4 pg      MCHC 33 5 g/dL      RDW 12 3 %      MPV 11 3 fL      Platelets 718 Thousands/uL      nRBC 0 /100 WBCs      Neutrophils Relative 52 %      Immat GRANS % 0 %      Lymphocytes Relative 39 %      Monocytes Relative 8 %      Eosinophils Relative 1 %      Basophils Relative 0 %      Neutrophils Absolute 3 62 Thousands/µL      Immature Grans Absolute 0 01 Thousand/uL      Lymphocytes Absolute 2 68 Thousands/µL      Monocytes Absolute 0 52 Thousand/µL      Eosinophils Absolute 0 10 Thousand/µL      Basophils Absolute 0 02 Thousands/µL     POCT urinalysis dipstick [[id number]]  (Normal) Resulted:  06/29/19 2142    Lab Status:  Final result Specimen:  Urine Updated:  06/29/19 2142     Color, UA yellow     Clarity, UA clear    ED Urine Macroscopic [[id number]]  (Abnormal) Collected:  06/29/19 2150    Lab Status:  Final result Specimen:  Urine Updated:  06/29/19 2140     Color, UA Yellow     Clarity, UA Clear     pH, UA 7 5     Leukocytes, UA Negative     Nitrite, UA Negative     Protein, UA Negative mg/dl      Glucose,  (1/2%) mg/dl      Ketones, UA Negative mg/dl      Urobilinogen, UA 4 0 E U /dl      Bilirubin, UA Negative     Blood, UA Negative     Specific Gravity, UA 1 020    Narrative:       CLINITEK RESULT                 No orders to display              Procedures  Procedures       ED Course  ED Course as of Jun 29 2329   Sat Jun 29, 2019   2143 Leukocytes, UA: Negative   2143 Nitrite, UA: Negative   2143 Glucose, UA(!): 500 (1/2%)   2143 SL AMB POCT UROBILINOGEN(!): 4 0   2143 Urine negative for infection/blood  Blood, UA: Negative   2242 WBC: 6 95   2243 Hemoglobin: 13 8   2243 Sodium: 143   2243 Potassium: 3 7   2243 Labs reviewed, CBC, CMP, lipase show no acute abnormality     Lipase: 195   2250 Patient re-evaluated, sleeping comfortably; woke up, re-examined, abdomen soft, non-distended, mild LLQ tenderness, no RLQ tenderness; normal vitals, afebrile, normal labs; patient does report constipation; we will give Miralax, advised follow up with PCP, RTED for worsening symptoms  MDM  Number of Diagnoses or Management Options  Abdominal pain: new and requires workup  Constipation:   Diagnosis management comments: Patient is a 26-year-old male, history of diabetes, bipolar disorder, comes in with complaints of left lower abdominal pain, started today morning, denies nausea, vomiting, fever, diarrhea  On exam no acute distress:  Vital signs stable, abdomen is soft, mild tenderness left lower quadrant  Differential diagnosis:  Ureteric colic, diverticulitis, UTI  Will check labs, urine  Amount and/or Complexity of Data Reviewed  Clinical lab tests: ordered and reviewed  Tests in the medicine section of CPT®: reviewed and ordered        Disposition  Final diagnoses:   Abdominal pain   Constipation     Time reflects when diagnosis was documented in both MDM as applicable and the Disposition within this note     Time User Action Codes Description Comment    6/29/2019  9:43 PM Yennifer Pod Add [R10 9] Abdominal pain     6/29/2019 10:55 PM Yennifer Pod Add [K59 00] Constipation       ED Disposition     ED Disposition Condition Date/Time Comment    Discharge Stable Sat Jun 29, 2019 10:54 PM Elaine Garcia discharge to home/self care              Follow-up Information     Follow up With Specialties Details Why Contact Info Additional [address] [facility] [address] [facility] [address] Emergency Department Emergency Medicine  If symptoms worsen [place] [id number] [phone] [place] ED, [address] [facility] [place], [place], [address]          Discharge Medication List as of 6/29/2019 10:55 PM      CONTINUE these medications which have NOT CHANGED    Details   insulin glargine (LANTUS SOLOSTAR) 100 units/mL injection pen Inject 40 Units under the skin, Starting Fri 1/13/2017, Historical Med      lisinopril (ZESTRIL) 10 mg tablet Take 10 mg by mouth daily, Starting Mon 11/19/2018, Historical Med      metFORMIN (GLUCOPHAGE) 1000 MG tablet Take 1,000 mg by mouth, Starting Thu 10/27/2016, Historical Med      B-D INS SYRINGE 0 5CC/31GX5/16 31G X 5/16" 0 5 ML MISC Inject under the skin 2 (two) times a day, Starting Tue 10/30/2018, Print           No discharge procedures on file      ED Provider  Electronically Signed by           Faustino Richter MD  06/29/19 4420

## 2019-06-30 NOTE — ED NOTES
Voicemail left at weekend case management phone number  Patient safe for discharge per ED attending, however consult placed in hopes of arrange high utilizer services for the patient, in addition to assistance with his ICM/possible group home placement         Rigo Matthews RN  06/30/19 2016

## 2019-06-30 NOTE — ED NOTES
Patient was just seen in the Emergency Room for abdominal pain, when patient went home to lay down he states he started having thoughts of suicide but no plan  Patient has an ICM through   [facility], which he states he was unable to reach  Patient states he has been eating and drinking fine  Patient jessica auditory and visual hallucinations  Patient states his suicidal thoughts come and go  Patient states the thoughts are going away and he would like to go home  We discussed some coping skills the patient can use when he feels this way and he states he enjoys coloring  Patient also has an ICM worker  Patient states that he has not called them before coming to the emergency room  Patient currently denies suicidal ideation, homicidal ideation, auditory hallucinations, and visual hallucinations  Phone call placed to [facility] ICM hasmukh to inform them of patients multiple visits in the Emergency Department  The number for Hellen Cook is [phone]  Patient could be listed under [facility] or [facility] for their programs  Oncall worker could not confirm members participation in the program   As of February Patient's  was named Nora Ballesteros,  Patient also had/or may still have a supports coordinator through [employer]   Last known  was named Rodrick Kat

## 2019-07-03 DIAGNOSIS — Z71.89 COMPLEX CARE COORDINATION: Primary | ICD-10-CM

## 2019-07-06 ENCOUNTER — HOSPITAL ENCOUNTER (EMERGENCY)
Facility: HOSPITAL | Age: 49
Discharge: HOME | End: 2019-07-06
Attending: EMERGENCY MEDICINE | Admitting: EMERGENCY MEDICINE
Payer: COMMERCIAL

## 2019-07-06 VITALS
HEART RATE: 85 BPM | RESPIRATION RATE: 18 BRPM | WEIGHT: 130.4 LBS | SYSTOLIC BLOOD PRESSURE: 102 MMHG | DIASTOLIC BLOOD PRESSURE: 58 MMHG | OXYGEN SATURATION: 98 % | TEMPERATURE: 97.8 F

## 2019-07-06 DIAGNOSIS — F32.A DEPRESSION, UNSPECIFIED DEPRESSION TYPE: Primary | ICD-10-CM

## 2019-07-06 LAB
AMPHETAMINES SERPL QL SCN: NEGATIVE
BARBITURATES UR QL: NEGATIVE
BENZODIAZ UR QL: NEGATIVE
COCAINE UR QL: NEGATIVE
ETHANOL EXG-MCNC: 0 MG/DL
METHADONE UR QL: NEGATIVE
OPIATES UR QL SCN: NEGATIVE
PCP UR QL: NEGATIVE
THC UR QL: NEGATIVE
VALPROATE SERPL-MCNC: 49.1 UG/ML (ref 50–120)

## 2019-07-06 PROCEDURE — 82075 ASSAY OF BREATH ETHANOL: CPT | Performed by: EMERGENCY MEDICINE

## 2019-07-06 PROCEDURE — 80307 DRUG TEST PRSMV CHEM ANLYZR: CPT | Performed by: EMERGENCY MEDICINE

## 2019-07-06 PROCEDURE — 36415 COLL VENOUS BLD VENIPUNCTURE: CPT | Performed by: EMERGENCY MEDICINE

## 2019-07-06 PROCEDURE — 80164 ASSAY DIPROPYLACETIC ACD TOT: CPT | Performed by: EMERGENCY MEDICINE

## 2019-07-06 PROCEDURE — 99284 EMERGENCY DEPT VISIT MOD MDM: CPT

## 2019-07-06 PROCEDURE — 99283 EMERGENCY DEPT VISIT LOW MDM: CPT | Performed by: EMERGENCY MEDICINE

## 2019-07-06 RX ORDER — DIVALPROEX SODIUM 500 MG/1
500 TABLET, DELAYED RELEASE ORAL 2 TIMES DAILY
COMMUNITY
End: 2019-09-13

## 2019-07-06 RX ORDER — DIVALPROEX SODIUM 500 MG/1
500 TABLET, DELAYED RELEASE ORAL 2 TIMES DAILY
COMMUNITY
Start: 2019-07-05 | End: 2019-07-06 | Stop reason: SDUPTHER

## 2019-07-06 RX ORDER — HYDROXYZINE HYDROCHLORIDE 25 MG/1
25 TABLET, FILM COATED ORAL EVERY 8 HOURS PRN
Qty: 12 TABLET | Refills: 0 | Status: SHIPPED | OUTPATIENT
Start: 2019-07-06 | End: 2019-10-13 | Stop reason: HOSPADM

## 2019-07-06 NOTE — ED PROVIDER NOTES
History  Chief Complaint   Patient presents with    Depression     pt states it everything  pt states that he just cannot get anything right  pt states that he thought he could wait till monday when he saw his doctor  pt states that he needs more meds  pt denies SI or HI  pt denies AV or VH  pt stated that he want meds for his depression      Patient is a 50-year-old male with a history of bipolar depression who presents with vague suicide ideation  Patient was without any plan  No homicidal ideation no auditory visual hallucinations  Patient was just released from [facility] lumbar kick yesterday  States he want the pharmacy to  his medication but only his Depakote was available  Unknown when his other meds are going to be available per discharge summary of last visit  Patient has a psychiatrist appointment on 7/7 and then a med appointment on 07/17  Again per discharge summary patient was not on or prescribed any antidepressants  Only Depakote  Remaining meds for medical issues  Prior to Admission Medications   Prescriptions Last Dose Informant Patient Reported?  Taking?   divalproex sodium (DEPAKOTE) 500 mg EC tablet   Yes Yes   Sig: Take 500 mg by mouth 2 (two) times a day   insulin glargine (LANTUS SOLOSTAR) 100 units/mL injection pen Not Taking at Unknown time  Yes No   Sig: Inject 40 Units under the skin   lisinopril (ZESTRIL) 10 mg tablet Not Taking at Unknown time  Yes No   Sig: Take 10 mg by mouth daily   metFORMIN (GLUCOPHAGE) 1000 MG tablet Not Taking at Unknown time  Yes No   Sig: Take 1,000 mg by mouth   polyethylene glycol (MIRALAX) 17 g packet Not Taking at Unknown time  No No   Sig: Take 17 g by mouth daily   Patient not taking: Reported on 7/6/2019      Facility-Administered Medications: None       Past Medical History:   Diagnosis Date    Asthma     [facility]     Depression     [facility]     Psychiatric disorder        Past Surgical History:   Procedure Laterality Date    APPENDECTOMY         History reviewed  No pertinent family history  I have reviewed and agree with the history as documented  Social History     Tobacco Use    Smoking status: Former Smoker    Smokeless tobacco: Never Used   Substance Use Topics    Alcohol use: No    Drug use: No        Review of Systems   Constitutional: Negative  HENT: Negative  Eyes: Negative  Respiratory: Negative  Cardiovascular: Negative  Gastrointestinal: Negative  Endocrine: Negative  Genitourinary: Negative  Musculoskeletal: Negative  Skin: Negative  Allergic/Immunologic: Negative  Neurological: Negative  Hematological: Negative  Psychiatric/Behavioral: Positive for suicidal ideas  All other systems reviewed and are negative  Physical Exam  Physical Exam   Constitutional: He is oriented to person, place, and time  He appears well-developed and well-nourished  HENT:   Head: Normocephalic  Nose: Nose normal    Eyes: Pupils are equal, round, and reactive to light  Conjunctivae are normal    Neck: Normal range of motion  Neck supple  Cardiovascular: Normal rate, regular rhythm, normal heart sounds and intact distal pulses  Pulmonary/Chest: Effort normal and breath sounds normal    Abdominal: Soft  Bowel sounds are normal    Musculoskeletal: Normal range of motion  Neurological: He is alert and oriented to person, place, and time  Skin: Skin is warm and dry  Psychiatric: His speech is normal  His affect is blunt  He is slowed  Cognition and memory are normal  He expresses impulsivity  He expresses suicidal ideation  He expresses no suicidal plans  Nursing note and vitals reviewed        Vital Signs  ED Triage Vitals [07/06/19 0523]   Temperature Pulse Respirations Blood Pressure SpO2   97 8 °F (36 6 °C) 85 18 102/58 98 %      Temp Source Heart Rate Source Patient Position - Orthostatic VS BP Location FiO2 (%)   Tympanic Monitor Sitting Right arm --      Pain Score       --           Vitals:    07/06/19 0523   BP: 102/58   Pulse: 85   Patient Position - Orthostatic VS: Sitting         Visual Acuity      ED Medications  Medications - No data to display    Diagnostic Studies  Results Reviewed     Procedure Component Value Units Date/Time    Valproic acid level, total [[id number]]     Lab Status:  No result Specimen:  Blood     Rapid drug screen, urine [[id number]]     Lab Status:  No result Specimen:  Urine     POCT alcohol breath test [[id number]]     Lab Status:  No result                  No orders to display              Procedures  Procedures       ED Course  ED Course as of Jul 06 0637   Sat Jul 06, 2019   0607 Rapid drug screen, urine   0636 Calm comfortable  This time can discharge with p r n  Atarax follow-up as scheduled  MDM  Number of Diagnoses or Management Options  Depression, unspecified depression type:      Amount and/or Complexity of Data Reviewed  Review and summarize past medical records: yes        Disposition  Final diagnoses:   None     ED Disposition     None      Follow-up Information    None         Patient's Medications   Discharge Prescriptions    No medications on file     No discharge procedures on file      ED Provider  Electronically Signed by           Tere Toribio MD  07/06/19 6596

## 2019-07-07 ENCOUNTER — HOSPITAL ENCOUNTER (EMERGENCY)
Facility: HOSPITAL | Age: 49
Discharge: HOME | End: 2019-07-07
Attending: EMERGENCY MEDICINE
Payer: COMMERCIAL

## 2019-07-07 ENCOUNTER — HOSPITAL ENCOUNTER (EMERGENCY)
Facility: HOSPITAL | Age: 49
Discharge: HOME | End: 2019-07-07
Attending: EMERGENCY MEDICINE | Admitting: EMERGENCY MEDICINE
Payer: COMMERCIAL

## 2019-07-07 VITALS
WEIGHT: 134.8 LBS | SYSTOLIC BLOOD PRESSURE: 112 MMHG | HEART RATE: 84 BPM | RESPIRATION RATE: 16 BRPM | OXYGEN SATURATION: 95 % | TEMPERATURE: 97.2 F | DIASTOLIC BLOOD PRESSURE: 66 MMHG

## 2019-07-07 VITALS
HEART RATE: 77 BPM | RESPIRATION RATE: 18 BRPM | TEMPERATURE: 97.5 F | OXYGEN SATURATION: 99 % | DIASTOLIC BLOOD PRESSURE: 56 MMHG | WEIGHT: 136.4 LBS | SYSTOLIC BLOOD PRESSURE: 100 MMHG

## 2019-07-07 DIAGNOSIS — T14.8XXA ABRASION: Primary | ICD-10-CM

## 2019-07-07 DIAGNOSIS — Z76.0 MEDICATION REFILL: Primary | ICD-10-CM

## 2019-07-07 PROCEDURE — 99281 EMR DPT VST MAYX REQ PHY/QHP: CPT

## 2019-07-07 PROCEDURE — 99283 EMERGENCY DEPT VISIT LOW MDM: CPT | Performed by: EMERGENCY MEDICINE

## 2019-07-07 PROCEDURE — 99282 EMERGENCY DEPT VISIT SF MDM: CPT | Performed by: PHYSICIAN ASSISTANT

## 2019-07-07 PROCEDURE — 99283 EMERGENCY DEPT VISIT LOW MDM: CPT

## 2019-07-07 RX ORDER — HYDROXYZINE HYDROCHLORIDE 25 MG/1
25 TABLET, FILM COATED ORAL EVERY 6 HOURS
Qty: 12 TABLET | Refills: 0 | Status: SHIPPED | OUTPATIENT
Start: 2019-07-07 | End: 2019-10-13 | Stop reason: HOSPADM

## 2019-07-07 RX ORDER — BACITRACIN, NEOMYCIN, POLYMYXIN B 400; 3.5; 5 [USP'U]/G; MG/G; [USP'U]/G
1 OINTMENT TOPICAL ONCE
Status: COMPLETED | OUTPATIENT
Start: 2019-07-07 | End: 2019-07-07

## 2019-07-07 RX ADMIN — BACITRACIN ZINC, NEOMYCIN SULFATE, AND POLYMYXIN B SULFATE 1 SMALL APPLICATION: 400; 3.5; 5 OINTMENT TOPICAL at 15:41

## 2019-07-07 NOTE — ED PROVIDER NOTES
History  Chief Complaint   Patient presents with    Foot Pain     states that he scratched his rt  foot open becasue it was itchy        History provided by:  Patient   used: No    Medical Problem   Location:  Pt with right ankle itching and scratching   pt now with superficial open wound   Severity:  Mild  Onset quality:  Gradual  Duration:  1 day  Timing:  Constant  Progression:  Unchanged  Chronicity:  New  Associated symptoms: no abdominal pain, no chest pain, no congestion, no cough, no diarrhea, no ear pain, no fatigue, no fever, no headaches, no loss of consciousness, no myalgias, no nausea, no rash, no rhinorrhea, no shortness of breath, no sore throat, no vomiting and no wheezing        Prior to Admission Medications   Prescriptions Last Dose Informant Patient Reported? Taking?   divalproex sodium (DEPAKOTE) 500 mg EC tablet   Yes No   Sig: Take 500 mg by mouth 2 (two) times a day   hydrOXYzine HCL (ATARAX) 25 mg tablet   No No   Sig: Take 1 tablet (25 mg total) by mouth every 8 (eight) hours as needed for anxiety   hydrOXYzine HCL (ATARAX) 25 mg tablet   No No   Sig: Take 1 tablet (25 mg total) by mouth every 6 (six) hours   insulin glargine (LANTUS SOLOSTAR) 100 units/mL injection pen   Yes No   Sig: Inject 40 Units under the skin   lisinopril (ZESTRIL) 10 mg tablet   Yes No   Sig: Take 10 mg by mouth daily   metFORMIN (GLUCOPHAGE) 1000 MG tablet   Yes No   Sig: Take 1,000 mg by mouth   polyethylene glycol (MIRALAX) 17 g packet   No No   Sig: Take 17 g by mouth daily   Patient not taking: Reported on 7/6/2019      Facility-Administered Medications: None       Past Medical History:   Diagnosis Date    Asthma     [facility]     Depression     [facility] ([facility] )     Psychiatric disorder        Past Surgical History:   Procedure Laterality Date    APPENDECTOMY         History reviewed  No pertinent family history    I have reviewed and agree with the history as documented  Social History     Tobacco Use    Smoking status: Former Smoker    Smokeless tobacco: Never Used   Substance Use Topics    Alcohol use: No    Drug use: No        Review of Systems   Constitutional: Negative  Negative for fatigue and fever  HENT: Negative  Negative for congestion, ear pain, rhinorrhea and sore throat  Eyes: Negative  Respiratory: Negative  Negative for cough, shortness of breath and wheezing  Cardiovascular: Negative  Negative for chest pain  Gastrointestinal: Negative  Negative for abdominal pain, diarrhea, nausea and vomiting  Endocrine: Negative  Genitourinary: Negative  Musculoskeletal: Negative  Negative for myalgias  Skin: Negative  Negative for rash  Allergic/Immunologic: Negative  Neurological: Negative  Negative for loss of consciousness and headaches  Hematological: Negative  Psychiatric/Behavioral: Negative  All other systems reviewed and are negative  Physical Exam  Physical Exam   Constitutional: He is oriented to person, place, and time  He appears well-developed and well-nourished  HENT:   Head: Normocephalic and atraumatic  Right Ear: External ear normal    Left Ear: External ear normal    Nose: Nose normal    Mouth/Throat: Oropharynx is clear and moist    Eyes: Pupils are equal, round, and reactive to light  Conjunctivae and EOM are normal    Neck: Normal range of motion  Neck supple  Cardiovascular: Normal rate, regular rhythm and normal heart sounds  Pulmonary/Chest: Effort normal and breath sounds normal    Abdominal: Soft  Bowel sounds are normal    Musculoskeletal: Normal range of motion  Neurological: He is alert and oriented to person, place, and time  Skin: Skin is warm  Right ankle lateral malleolus  2cmx 1cm abrasion   Not tender    Psychiatric: He has a normal mood and affect  His behavior is normal    Nursing note and vitals reviewed        Vital Signs  ED Triage Vitals [07/07/19 1509] Temperature Pulse Respirations Blood Pressure SpO2   (!) 97 2 °F (36 2 °C) 84 16 112/66 95 %      Temp Source Heart Rate Source Patient Position - Orthostatic VS BP Location FiO2 (%)   Tympanic Monitor Lying Left arm --      Pain Score       --           Vitals:    07/07/19 1509   BP: 112/66   Pulse: 84   Patient Position - Orthostatic VS: Lying         Visual Acuity      ED Medications  Medications   neomycin-bacitracin-polymyxin b (NEOSPORIN) ointment 1 small application (1 small application Topical Given 7/7/19 1541)       Diagnostic Studies  Results Reviewed     None                 No orders to display              Procedures  Procedures       ED Course                               MDM    Disposition  Final diagnoses:   Abrasion     Time reflects when diagnosis was documented in both MDM as applicable and the Disposition within this note     Time User Action Codes Description Comment    7/7/2019  3:28 PM Raffaele Landrum [T14  8XXA] Abrasion       ED Disposition     ED Disposition Condition Date/Time Comment    Discharge Stable Sun Jul 7, 2019  3:28 PM Brooklynn Olguin discharge to home/self care              Follow-up Information     Follow up With Specialties Details Why [address]  [phone]            Discharge Medication List as of 7/7/2019  3:29 PM      CONTINUE these medications which have NOT CHANGED    Details   divalproex sodium (DEPAKOTE) 500 mg EC tablet Take 500 mg by mouth 2 (two) times a day, Historical Med      !! hydrOXYzine HCL (ATARAX) 25 mg tablet Take 1 tablet (25 mg total) by mouth every 8 (eight) hours as needed for anxiety, Starting Sat 7/6/2019, Print      !! hydrOXYzine HCL (ATARAX) 25 mg tablet Take 1 tablet (25 mg total) by mouth every 6 (six) hours, Starting Sun 7/7/2019, Normal      insulin glargine (LANTUS SOLOSTAR) 100 units/mL injection pen Inject 40 Units under the skin, Starting Fri 1/13/2017, Historical Med      lisinopril (ZESTRIL) 10 mg tablet Take 10 mg by mouth daily, Starting Mon 11/19/2018, Historical Med      metFORMIN (GLUCOPHAGE) 1000 MG tablet Take 1,000 mg by mouth, Starting Thu 10/27/2016, Historical Med      polyethylene glycol (MIRALAX) 17 g packet Take 17 g by mouth daily, Starting Sat 6/29/2019, Print       !! - Potential duplicate medications found  Please discuss with provider  No discharge procedures on file      ED Provider  Electronically Signed by           Daphne Elizalde PA-C  07/07/19 6430

## 2019-07-07 NOTE — ED PROVIDER NOTES
Patient having issues feeling his medication, patient brings in a prescription for Atarax which we was given History  Chief Complaint   Patient presents with    Medication Refill     pt states that he tried taking his rx to [facility] and told him they need to fax the rx over  pt states that emilia jim states that they would fill the rx but they would rather have the Rx faxed over      45-year-old male having issues obtaining medication from the pharmacy as he was told that the medication needs to be faxed in, he brings in a prescription for Atarax that he was given by previous provider during his evaluation yesterday  Patient requests this to be faxed to the pharmacy  Took the printed prescription and instead faxed the prescription to pharmacy as requested  Patient has no other complaints          Prior to Admission Medications   Prescriptions Last Dose Informant Patient Reported? Taking?   divalproex sodium (DEPAKOTE) 500 mg EC tablet   Yes No   Sig: Take 500 mg by mouth 2 (two) times a day   hydrOXYzine HCL (ATARAX) 25 mg tablet   No No   Sig: Take 1 tablet (25 mg total) by mouth every 8 (eight) hours as needed for anxiety   insulin glargine (LANTUS SOLOSTAR) 100 units/mL injection pen   Yes No   Sig: Inject 40 Units under the skin   lisinopril (ZESTRIL) 10 mg tablet   Yes No   Sig: Take 10 mg by mouth daily   metFORMIN (GLUCOPHAGE) 1000 MG tablet   Yes No   Sig: Take 1,000 mg by mouth   polyethylene glycol (MIRALAX) 17 g packet   No No   Sig: Take 17 g by mouth daily   Patient not taking: Reported on 7/6/2019      Facility-Administered Medications: None       Past Medical History:   Diagnosis Date    Asthma     [facility]     Depression     Diabetes mellitus ([facility] )     Psychiatric disorder        Past Surgical History:   Procedure Laterality Date    APPENDECTOMY         History reviewed  No pertinent family history  I have reviewed and agree with the history as documented      Social History Tobacco Use    Smoking status: Former Smoker    Smokeless tobacco: Never Used   Substance Use Topics    Alcohol use: No    Drug use: No        Review of Systems   Constitutional: Negative for chills and fever  HENT: Negative for rhinorrhea and sore throat  Respiratory: Negative for cough and shortness of breath  Cardiovascular: Negative for chest pain and palpitations  Gastrointestinal: Negative for abdominal pain, nausea and vomiting  Genitourinary: Negative for dysuria, frequency and urgency  Physical Exam  Physical Exam   Constitutional: He is oriented to person, place, and time  He appears well-developed and well-nourished  HENT:   Head: Normocephalic and atraumatic  Cardiovascular: Normal rate, regular rhythm and normal heart sounds  Pulmonary/Chest: Effort normal and breath sounds normal    Abdominal: Soft  He exhibits no distension  There is no tenderness  Neurological: He is alert and oriented to person, place, and time  Skin: Skin is warm and dry  Psychiatric: He has a normal mood and affect  Nursing note and vitals reviewed        Vital Signs  ED Triage Vitals [07/07/19 0145]   Temperature Pulse Respirations Blood Pressure SpO2   97 5 °F (36 4 °C) 77 18 100/56 99 %      Temp Source Heart Rate Source Patient Position - Orthostatic VS BP Location FiO2 (%)   Tympanic Monitor Sitting Left arm --      Pain Score       --           Vitals:    07/07/19 0145   BP: 100/56   Pulse: 77   Patient Position - Orthostatic VS: Sitting         Visual Acuity      ED Medications  Medications - No data to display    Diagnostic Studies  Results Reviewed     None                 No orders to display              Procedures  Procedures       ED Course                               MDM    Disposition  Final diagnoses:   Medication refill     Time reflects when diagnosis was documented in both MDM as applicable and the Disposition within this note     Time User Action Codes Description Comment    7/7/2019  1:48 AM [place] Mic Add [Z76 0] Medication refill       ED Disposition     ED Disposition Condition Date/Time Comment    Discharge Stable Sun Jul 7, 2019  1:48 AM Cricket Lang discharge to home/self care  Follow-up Information     Follow up With Specialties Details [facility] [facility] Emergency Medicine  If symptoms worsen [address] [address] [phone]          Discharge Medication List as of 7/7/2019  1:51 AM      START taking these medications    Details   !! hydrOXYzine HCL (ATARAX) 25 mg tablet Take 1 tablet (25 mg total) by mouth every 6 (six) hours, Starting Sun 7/7/2019, Normal       !! - Potential duplicate medications found  Please discuss with provider  CONTINUE these medications which have NOT CHANGED    Details   divalproex sodium (DEPAKOTE) 500 mg EC tablet Take 500 mg by mouth 2 (two) times a day, Historical Med      !! hydrOXYzine HCL (ATARAX) 25 mg tablet Take 1 tablet (25 mg total) by mouth every 8 (eight) hours as needed for anxiety, Starting Sat 7/6/2019, Print      insulin glargine (LANTUS SOLOSTAR) 100 units/mL injection pen Inject 40 Units under the skin, Starting Fri 1/13/2017, Historical Med      lisinopril (ZESTRIL) 10 mg tablet Take 10 mg by mouth daily, Starting Mon 11/19/2018, Historical Med      metFORMIN (GLUCOPHAGE) 1000 MG tablet Take 1,000 mg by mouth, Starting Thu 10/27/2016, Historical Med      polyethylene glycol (MIRALAX) 17 g packet Take 17 g by mouth daily, Starting Sat 6/29/2019, Print       !! - Potential duplicate medications found  Please discuss with provider  No discharge procedures on file      ED Provider  Electronically Signed by           Schuyler Tamez MD  07/07/19 7901

## 2019-07-08 ENCOUNTER — HOSPITAL ENCOUNTER (EMERGENCY)
Facility: HOSPITAL | Age: 49
Discharge: HOME | End: 2019-07-08
Attending: EMERGENCY MEDICINE
Payer: COMMERCIAL

## 2019-07-08 VITALS
OXYGEN SATURATION: 97 % | HEART RATE: 88 BPM | TEMPERATURE: 96.6 F | SYSTOLIC BLOOD PRESSURE: 114 MMHG | WEIGHT: 135.14 LBS | RESPIRATION RATE: 18 BRPM | DIASTOLIC BLOOD PRESSURE: 71 MMHG

## 2019-07-08 DIAGNOSIS — T14.8XXA ABRASION: Primary | ICD-10-CM

## 2019-07-08 PROCEDURE — 99282 EMERGENCY DEPT VISIT SF MDM: CPT | Performed by: EMERGENCY MEDICINE

## 2019-07-08 PROCEDURE — 99282 EMERGENCY DEPT VISIT SF MDM: CPT

## 2019-07-08 NOTE — ED PROVIDER NOTES
History  Chief Complaint   Patient presents with    Abrasion     Patient was seen in the ED yesterday for a scrape behind his right ankle  Patient states it keeps itching and it started to bleed again  Not currently bleeding  Patient is a 53year old male well known to me who presents with a continued abrasion to right lateral ankle  Was see yesterday for same  Bleeding again today  Wearing hospital socks which do not cover area and high top sneakers  No fever sweats or chills  No redness or streaking up the leg  Patient states has continued itching in b/l feet  Only took one atarax  No relief  No numbness or tingling  Walking makes it worse  Nothing makes it better  Prior to Admission Medications   Prescriptions Last Dose Informant Patient Reported? Taking?   divalproex sodium (DEPAKOTE) 500 mg EC tablet   Yes No   Sig: Take 500 mg by mouth 2 (two) times a day   hydrOXYzine HCL (ATARAX) 25 mg tablet   No No   Sig: Take 1 tablet (25 mg total) by mouth every 8 (eight) hours as needed for anxiety   hydrOXYzine HCL (ATARAX) 25 mg tablet   No No   Sig: Take 1 tablet (25 mg total) by mouth every 6 (six) hours   insulin glargine (LANTUS SOLOSTAR) 100 units/mL injection pen   Yes No   Sig: Inject 40 Units under the skin   lisinopril (ZESTRIL) 10 mg tablet   Yes No   Sig: Take 10 mg by mouth daily   metFORMIN (GLUCOPHAGE) 1000 MG tablet   Yes No   Sig: Take 1,000 mg by mouth   polyethylene glycol (MIRALAX) 17 g packet   No No   Sig: Take 17 g by mouth daily   Patient not taking: Reported on 7/6/2019      Facility-Administered Medications: None       Past Medical History:   Diagnosis Date    Asthma     [facility]     Depression     Diabetes mellitus ([facility] )     Psychiatric disorder        Past Surgical History:   Procedure Laterality Date    APPENDECTOMY         History reviewed  No pertinent family history  I have reviewed and agree with the history as documented      Social History     Tobacco Use    Smoking status: Former Smoker    Smokeless tobacco: Never Used   Substance Use Topics    Alcohol use: No    Drug use: No        Review of Systems   Constitutional: Negative  HENT: Negative  Eyes: Negative  Respiratory: Negative  Cardiovascular: Negative  Gastrointestinal: Negative  Endocrine: Negative  Genitourinary: Negative  Musculoskeletal: Negative  Skin: Positive for wound  Allergic/Immunologic: Negative  Neurological: Negative  Hematological: Negative  Psychiatric/Behavioral: Negative  All other systems reviewed and are negative  Physical Exam  Physical Exam   Constitutional: He is oriented to person, place, and time  He appears well-developed and well-nourished  Neck: Normal range of motion  Neck supple  Cardiovascular: Normal rate, regular rhythm, normal heart sounds and intact distal pulses  Pulmonary/Chest: Effort normal and breath sounds normal    Abdominal: Soft  Bowel sounds are normal    Musculoskeletal: Normal range of motion  Neurological: He is alert and oriented to person, place, and time  Skin: Skin is warm  2x3 cm abrasion superior to right lateral mal   No bleeding  No warmth, no drainage  Nursing note and vitals reviewed        Vital Signs  ED Triage Vitals [07/08/19 1517]   Temperature Pulse Respirations Blood Pressure SpO2   (!) 96 6 °F (35 9 °C) 88 18 114/71 97 %      Temp Source Heart Rate Source Patient Position - Orthostatic VS BP Location FiO2 (%)   Tympanic Monitor Sitting Left arm --      Pain Score       --           Vitals:    07/08/19 1517   BP: 114/71   Pulse: 88   Patient Position - Orthostatic VS: Sitting         Visual Acuity      ED Medications  Medications - No data to display    Diagnostic Studies  Results Reviewed     None                 No orders to display              Procedures  Procedures       ED Course                               MDM  Number of Diagnoses or Management Options  Abrasion:   Diagnosis management comments: Dressing applied by me, vasaline gauze, 4x4 and tristan  Amount and/or Complexity of Data Reviewed  Review and summarize past medical records: yes        Disposition  Final diagnoses:   Abrasion     Time reflects when diagnosis was documented in both MDM as applicable and the Disposition within this note     Time User Action Codes Description Comment    7/8/2019  3:27 PM Riaz Segura  8XXA] Abrasion       ED Disposition     ED Disposition Condition Date/Time Comment    Discharge Stable Mon Jul 8, 2019  3:27 PM Roseline Fajardo discharge to home/self care  Follow-up Information     Follow up With Specialties Details [facility]  [address]  [phone]            Patient's Medications   Discharge Prescriptions    No medications on file     No discharge procedures on file      ED Provider  Electronically Signed by           Letha Blum MD  07/08/19 3940

## 2019-07-10 ENCOUNTER — HOSPITAL ENCOUNTER (EMERGENCY)
Facility: HOSPITAL | Age: 49
Discharge: HOME | End: 2019-07-10
Attending: EMERGENCY MEDICINE | Admitting: EMERGENCY MEDICINE
Payer: COMMERCIAL

## 2019-07-10 VITALS
HEART RATE: 80 BPM | WEIGHT: 136.8 LBS | OXYGEN SATURATION: 97 % | SYSTOLIC BLOOD PRESSURE: 113 MMHG | DIASTOLIC BLOOD PRESSURE: 65 MMHG | RESPIRATION RATE: 16 BRPM

## 2019-07-10 DIAGNOSIS — R51.9 HEADACHE: Primary | ICD-10-CM

## 2019-07-10 PROCEDURE — 99283 EMERGENCY DEPT VISIT LOW MDM: CPT | Performed by: EMERGENCY MEDICINE

## 2019-07-10 PROCEDURE — 99283 EMERGENCY DEPT VISIT LOW MDM: CPT

## 2019-07-10 RX ORDER — ACETAMINOPHEN 325 MG/1
325 TABLET ORAL ONCE
Status: COMPLETED | OUTPATIENT
Start: 2019-07-10 | End: 2019-07-10

## 2019-07-10 RX ORDER — BUTALBITAL, ACETAMINOPHEN AND CAFFEINE 50; 325; 40 MG/1; MG/1; MG/1
2 TABLET ORAL ONCE
Status: COMPLETED | OUTPATIENT
Start: 2019-07-10 | End: 2019-07-10

## 2019-07-10 RX ADMIN — BUTALBITAL, ACETAMINOPHEN, AND CAFFEINE 2 TABLET: 50; 325; 40 TABLET ORAL at 03:02

## 2019-07-10 RX ADMIN — ACETAMINOPHEN 325 MG: 325 TABLET ORAL at 03:02
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
## 2019-08-06 ENCOUNTER — PATIENT OUTREACH (OUTPATIENT)
Dept: CASE MANAGEMENT | Facility: OTHER | Age: 49
End: 2019-08-06

## 2019-08-06 ENCOUNTER — PATIENT OUTREACH (OUTPATIENT)
Dept: FAMILY MEDICINE CLINIC | Facility: CLINIC | Age: 49
End: 2019-08-06

## 2019-08-09 ENCOUNTER — PATIENT OUTREACH (OUTPATIENT)
Dept: FAMILY MEDICINE CLINIC | Facility: CLINIC | Age: 49
End: 2019-08-09

## 2019-08-20 ENCOUNTER — PATIENT OUTREACH (OUTPATIENT)
Dept: FAMILY MEDICINE CLINIC | Facility: CLINIC | Age: 49
End: 2019-08-20

## 2019-08-23 ENCOUNTER — HOSPITAL ENCOUNTER (EMERGENCY)
Facility: HOSPITAL | Age: 49
Discharge: HOME | End: 2019-08-23
Attending: EMERGENCY MEDICINE
Payer: COMMERCIAL

## 2019-08-23 VITALS
WEIGHT: 136.69 LBS | DIASTOLIC BLOOD PRESSURE: 79 MMHG | SYSTOLIC BLOOD PRESSURE: 137 MMHG | TEMPERATURE: 97.8 F | OXYGEN SATURATION: 97 % | HEART RATE: 110 BPM | RESPIRATION RATE: 18 BRPM

## 2019-08-23 DIAGNOSIS — Z13.9 ENCOUNTER FOR MEDICAL SCREENING EXAMINATION: Primary | ICD-10-CM

## 2019-08-23 PROCEDURE — 99284 EMERGENCY DEPT VISIT MOD MDM: CPT

## 2019-08-23 PROCEDURE — 99283 EMERGENCY DEPT VISIT LOW MDM: CPT | Performed by: EMERGENCY MEDICINE
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
## 2019-08-27 ENCOUNTER — PATIENT OUTREACH (OUTPATIENT)
Dept: FAMILY MEDICINE CLINIC | Facility: CLINIC | Age: 49
End: 2019-08-27

## 2019-08-27 NOTE — LETTER
Date: 08/27/19    Dear Camelia Gonzalez,   My name is Bailey Almanzar; I am a registered nurse care manager working with [employer]  I have not been able to reach you and would like to set a time that I can talk with you over the phone or in-person  My work is to help patients that have complex medical conditions get the care they need  This includes patients who may have been in the hospital or emergency room  I have enclosed information for you  Please call me with any questions you may have  I look forward to meeting with you    Sincerely,  Felicia Guzman RN  Outpatient care manager  [phone]

## 2019-08-29 ENCOUNTER — HOSPITAL ENCOUNTER (EMERGENCY)
Facility: HOSPITAL | Age: 49
Discharge: HOME | End: 2019-08-29
Attending: EMERGENCY MEDICINE | Admitting: EMERGENCY MEDICINE
Payer: COMMERCIAL

## 2019-08-29 VITALS
DIASTOLIC BLOOD PRESSURE: 75 MMHG | RESPIRATION RATE: 20 BRPM | WEIGHT: 135.2 LBS | TEMPERATURE: 96.8 F | OXYGEN SATURATION: 99 % | HEART RATE: 80 BPM | SYSTOLIC BLOOD PRESSURE: 153 MMHG

## 2019-08-29 DIAGNOSIS — E11.9 DIABETES (HCC): ICD-10-CM

## 2019-08-29 DIAGNOSIS — R81 GLUCOSURIA: ICD-10-CM

## 2019-08-29 DIAGNOSIS — Z00.8 ENCOUNTER FOR PSYCHOLOGICAL EVALUATION: Primary | ICD-10-CM

## 2019-08-29 DIAGNOSIS — F41.9 ANXIETY: ICD-10-CM

## 2019-08-29 DIAGNOSIS — Z76.0 MEDICATION REFILL: ICD-10-CM

## 2019-08-29 LAB
AMPHETAMINES SERPL QL SCN: NEGATIVE
ANION GAP SERPL CALCULATED.3IONS-SCNC: 8 MMOL/L (ref 5–14)
BACTERIA UR QL AUTO: ABNORMAL /HPF
BARBITURATES UR QL: NEGATIVE
BENZODIAZ UR QL: NEGATIVE
BILIRUB UR QL STRIP: NEGATIVE
BUN SERPL-MCNC: 13 MG/DL (ref 5–25)
CALCIUM SERPL-MCNC: 9.4 MG/DL (ref 8.4–10.2)
CHLORIDE SERPL-SCNC: 101 MMOL/L (ref 97–108)
CLARITY UR: CLEAR
CO2 SERPL-SCNC: 31 MMOL/L (ref 22–30)
COCAINE UR QL: NEGATIVE
COLOR UR: YELLOW
CREAT SERPL-MCNC: 0.86 MG/DL (ref 0.7–1.5)
ETHANOL EXG-MCNC: 0 MG/DL
GFR SERPL CREATININE-BSD FRML MDRD: 103 ML/MIN/1.73SQ M
GLUCOSE SERPL-MCNC: 324 MG/DL (ref 65–140)
GLUCOSE SERPL-MCNC: 345 MG/DL (ref 70–99)
GLUCOSE UR STRIP-MCNC: ABNORMAL MG/DL
HGB UR QL STRIP.AUTO: NEGATIVE
KETONES UR STRIP-MCNC: NEGATIVE MG/DL
LEUKOCYTE ESTERASE UR QL STRIP: NEGATIVE
METHADONE UR QL: NEGATIVE
NITRITE UR QL STRIP: NEGATIVE
NON-SQ EPI CELLS URNS QL MICRO: ABNORMAL /HPF
OPIATES UR QL SCN: NEGATIVE
PCP UR QL: NEGATIVE
PH UR STRIP.AUTO: 7 [PH]
POTASSIUM SERPL-SCNC: 4.2 MMOL/L (ref 3.6–5)
PROT UR STRIP-MCNC: NEGATIVE MG/DL
RBC #/AREA URNS AUTO: ABNORMAL /HPF
SODIUM SERPL-SCNC: 140 MMOL/L (ref 137–147)
SP GR UR STRIP.AUTO: 1.01 (ref 1–1.04)
THC UR QL: NEGATIVE
UROBILINOGEN UA: 1 MG/DL
VALPROATE SERPL-MCNC: <10 UG/ML (ref 50–120)
WBC #/AREA URNS AUTO: ABNORMAL /HPF

## 2019-08-29 PROCEDURE — 80164 ASSAY DIPROPYLACETIC ACD TOT: CPT | Performed by: EMERGENCY MEDICINE

## 2019-08-29 PROCEDURE — 36415 COLL VENOUS BLD VENIPUNCTURE: CPT | Performed by: EMERGENCY MEDICINE

## 2019-08-29 PROCEDURE — 80307 DRUG TEST PRSMV CHEM ANLYZR: CPT | Performed by: EMERGENCY MEDICINE

## 2019-08-29 PROCEDURE — 81001 URINALYSIS AUTO W/SCOPE: CPT | Performed by: EMERGENCY MEDICINE

## 2019-08-29 PROCEDURE — 80048 BASIC METABOLIC PNL TOTAL CA: CPT | Performed by: EMERGENCY MEDICINE

## 2019-08-29 PROCEDURE — 99284 EMERGENCY DEPT VISIT MOD MDM: CPT | Performed by: EMERGENCY MEDICINE

## 2019-08-29 PROCEDURE — 99283 EMERGENCY DEPT VISIT LOW MDM: CPT

## 2019-08-29 PROCEDURE — 82948 REAGENT STRIP/BLOOD GLUCOSE: CPT

## 2019-08-29 PROCEDURE — 82075 ASSAY OF BREATH ETHANOL: CPT | Performed by: EMERGENCY MEDICINE

## 2019-08-29 RX ORDER — LORAZEPAM 0.5 MG/1
1 TABLET ORAL ONCE
Status: COMPLETED | OUTPATIENT
Start: 2019-08-29 | End: 2019-08-29

## 2019-08-29 RX ADMIN — LORAZEPAM 1 MG: 0.5 TABLET ORAL at 15:12

## 2019-08-29 NOTE — ED PROVIDER NOTES
History  Chief Complaint   Patient presents with    Psychiatric Evaluation     c/o of frustration with psych meds - ' they aren't working" was at [facility] yesterday for same  Has appt  on Sept 4th with psych - recently lowered a dose of one of his medications -      Patient is a 81-year-old male with a history of bipolar disorder, diabetes, depression and asthma coming in today requesting medication adjustment  Patient states that his psychiatrist had changed his medication approximately 1 month ago  He had decrease the dosage and patient feels unsteady on these medications  He has no current suicidal ideation, homicidal ideation, paranoid delusions  He has no visual hallucinations or auditory hallucinations  He denies any self-mutilation  He states he is just here to help with medication adjustment  Patient unsure the name of the medication      History provided by:  Patient   used: No    Psychiatric Evaluation   Presenting symptoms: no aggressive behavior, no depression, no disorganized speech, no disorganized thought process, no hallucinations, no paranoid behavior, no suicidal thoughts, no suicidal threats and no suicide attempt    Degree of incapacity (severity): Unable to specify  Onset quality:  Gradual  Timing:  Intermittent  Progression:  Waxing and waning  Chronicity:  Recurrent  Context: recent medication change    Context: not alcohol use, not drug abuse, not medication, not noncompliant and not stressful life event    Treatment compliance:   All of the time  Relieved by:  None tried  Worsened by:  Nothing  Ineffective treatments:  None tried  Associated symptoms: anxiety and irritability    Associated symptoms: no abdominal pain, no anhedonia, no chest pain, not distractible, no euphoric mood, no fatigue, no feelings of worthlessness, no hyperventilation, no insomnia, no school problems and no weight change    Risk factors: hx of mental illness        Prior to Admission Medications   Prescriptions Last Dose Informant Patient Reported? Taking?   divalproex sodium (DEPAKOTE) 500 mg EC tablet   Yes No   Sig: Take 500 mg by mouth 2 (two) times a day   hydrOXYzine HCL (ATARAX) 25 mg tablet   No No   Sig: Take 1 tablet (25 mg total) by mouth every 8 (eight) hours as needed for anxiety   hydrOXYzine HCL (ATARAX) 25 mg tablet   No No   Sig: Take 1 tablet (25 mg total) by mouth every 6 (six) hours   insulin glargine (LANTUS SOLOSTAR) 100 units/mL injection pen Not Taking at Unknown time  Yes No   Sig: Inject 40 Units under the skin   lisinopril (ZESTRIL) 10 mg tablet 8/29/2019 at Unknown time  Yes Yes   Sig: Take 10 mg by mouth daily   metFORMIN (GLUCOPHAGE) 1000 MG tablet 8/29/2019 at Unknown time  Yes Yes   Sig: Take 1,000 mg by mouth      Facility-Administered Medications: None       Past Medical History:   Diagnosis Date    Asthma     Bipolar 1 disorder ([place] [address] )     Depression     Diabetes mellitus ([place] [address] )     Psychiatric disorder        Past Surgical History:   Procedure Laterality Date    APPENDECTOMY         History reviewed  No pertinent family history  I have reviewed and agree with the history as documented  Social History     Tobacco Use    Smoking status: Current Some Day Smoker    Smokeless tobacco: Never Used   Substance Use Topics    Alcohol use: No    Drug use: No        Review of Systems   Constitutional: Positive for irritability  Negative for diaphoresis, fatigue and fever  HENT: Negative for ear pain and sore throat  Eyes: Negative for visual disturbance  Respiratory: Negative for chest tightness and shortness of breath  Cardiovascular: Negative for chest pain and palpitations  Gastrointestinal: Negative for abdominal pain, nausea and vomiting  Genitourinary: Negative for difficulty urinating and dysuria  Musculoskeletal: Negative for back pain and neck pain  Skin: Negative for rash  Neurological: Negative for weakness  Psychiatric/Behavioral: Negative for confusion, hallucinations, paranoia and suicidal ideas  The patient is nervous/anxious  The patient does not have insomnia  All other systems reviewed and are negative  Physical Exam  Physical Exam   Constitutional: He is oriented to person, place, and time  He appears well-developed  No distress  Patient appears older than stated age  HENT:   Head: Normocephalic and atraumatic  Mouth/Throat: Oropharynx is clear and moist    Patient maintaining airway maintaining secretions   Eyes: Pupils are equal, round, and reactive to light  Conjunctivae and EOM are normal    Neck: Normal range of motion  Neck supple  Cardiovascular: Normal rate, regular rhythm, normal heart sounds and intact distal pulses  No murmur heard  Pulmonary/Chest: Effort normal and breath sounds normal  No stridor  No respiratory distress  Abdominal: Soft  Bowel sounds are normal  He exhibits no distension  There is no tenderness  Musculoskeletal: Normal range of motion  He exhibits no edema  Neurological: He is alert and oriented to person, place, and time  No cranial nerve deficit  No slurred speech  No facial asymmetry  No ataxia  Skin: Skin is warm  Capillary refill takes less than 2 seconds  He is not diaphoretic  Psychiatric: His mood appears anxious  Thought content is not paranoid and not delusional  He expresses no homicidal and no suicidal ideation  He expresses no suicidal plans and no homicidal plans  Flat affect  Nursing note and vitals reviewed        Vital Signs  ED Triage Vitals [08/29/19 1400]   Temperature Pulse Respirations Blood Pressure SpO2   (!) 96 8 °F (36 °C) 94 16 131/66 99 %      Temp Source Heart Rate Source Patient Position - Orthostatic VS BP Location FiO2 (%)   Tympanic Monitor Sitting Left arm --      Pain Score       --           Vitals:    08/29/19 1400 08/29/19 1511   BP: 131/66 153/75   Pulse: 94 80   Patient Position - Orthostatic VS: Sitting Sitting         Visual Acuity      ED Medications  Medications   LORazepam (ATIVAN) tablet 1 mg (1 mg Oral Given 8/29/19 1512)       Diagnostic Studies  Results Reviewed     Procedure Component Value Units Date/Time    Valproic acid level, total [[id number]]  (Abnormal) Collected:  08/29/19 1445    Lab Status:  Final result Specimen:  Blood from Arm, Right Updated:  08/29/19 1516     Valproic Acid, Total <10 0 ug/mL     Rapid drug screen, urine [[id number]]  (Normal) Collected:  08/29/19 1436    Lab Status:  Final result Specimen:  Urine, Clean Catch Updated:  08/29/19 1514     Amph/Meth UR Negative     Barbiturate Ur Negative     Benzodiazepine Urine Negative     Cocaine Urine Negative     Methadone Urine Negative     Opiate Urine Negative     PCP Ur Negative     THC Urine Negative    Narrative:       FOR MEDICAL PURPOSES ONLY  IF CONFIRMATION NEEDED PLEASE CONTACT THE LAB WITHIN 5 DAYS      Drug Screen Cutoff Levels:  AMPHETAMINE/METHAMPHETAMINES  1000 ng/mL  BARBITURATES     200 ng/mL  BENZODIAZEPINES     200 ng/mL  COCAINE      300 ng/mL  METHADONE      300 ng/mL  OPIATES      300 ng/mL  PHENCYCLIDINE     25 ng/mL  THC       50 ng/mL      Basic metabolic panel [[id number]]  (Abnormal) Collected:  08/29/19 1445    Lab Status:  Final result Specimen:  Blood from Arm, Right Updated:  08/29/19 1513     Sodium 140 mmol/L      Potassium 4 2 mmol/L      Chloride 101 mmol/L      CO2 31 mmol/L      ANION GAP 8 mmol/L      BUN 13 mg/dL      Creatinine 0 86 mg/dL      Glucose 345 mg/dL      Calcium 9 4 mg/dL      eGFR 103 ml/min/1 73sq m     Narrative:       [place] guidelines for Chronic Kidney Disease (CKD):     Stage 1 with normal or high GFR (GFR > 90 mL/min/1 73 square meters)    Stage 2 Mild CKD (GFR = 60-89 mL/min/1 73 square meters)    Stage 3A Moderate CKD (GFR = 45-59 mL/min/1 73 square meters)    Stage 3B Moderate CKD (GFR = 30-44 mL/min/1 73 square meters)    Stage 4 Severe CKD (GFR = 15-29 mL/min/1 73 square meters)    Stage 5 End Stage CKD (GFR <15 mL/min/1 73 square meters)  Note: GFR calculation is accurate only with a steady state creatinine    UA w Reflex to Microscopic [[id number]]  (Abnormal) Collected:  08/29/19 1436    Lab Status:  Final result Specimen:  Urine, Clean Catch Updated:  08/29/19 1502     Color, UA Yellow     Clarity, UA Clear     Specific Gravity, UA 1 010     pH, UA 7 0     Leukocytes, UA Negative     Nitrite, UA Negative     Protein, UA Negative mg/dl      Glucose, UA >=1000 (1%) mg/dl      Ketones, UA Negative mg/dl      Bilirubin, UA Negative     Blood, UA Negative     UROBILINOGEN UA 1 0 mg/dL     Urine Microscopic [[id number]] Collected:  08/29/19 1436    Lab Status: In process Specimen:  Urine, Clean Catch Updated:  08/29/19 1456    Fingerstick Glucose (POCT) [[id number]]  (Abnormal) Collected:  08/29/19 1438    Lab Status:  Final result Updated:  08/29/19 1449     POC Glucose 324 mg/dl     POCT alcohol breath test [[id number]]  (Normal) Resulted:  08/29/19 1437    Lab Status:  Final result Updated:  08/29/19 1437     EXTBreath Alcohol 0 000                 No orders to display              Procedures  Procedures       ED Course  ED Course as of Aug 29 1529   Thu Aug 29, 2019   1412 Patient is a 42-year-old male coming in today with complaints that he feels "not steady" on his medications  Will check breath alcohol, urine drug screen as well as fingerstick  Will have crisis help assess patient evaluate for further medication adjustment  Portions of the record may have been created with voice recognition software  Occasional wrong word or "sound a like" substitutions may have occurred due to the inherent limitations of voice recognition software  Read the chart carefully and recognize, using context, where substitutions have occurred  1418 Patient unable to verify name of psychiatric medications   Patient uses [facility] [address] [facility] at  [phone] for verification  Last documented medication was Depakote - last documented level and our system was in July with level less than 30  At [facility] in the beginning of July was level less than 10  Will check BMP  Per pharmacist patient has been feeling prescription of Depakote 500 mg b i d  As well as hydroxyzine 25 mg  No other medications besides his diabetes, hypertension meds      1455 ICM called and message left by Crisis  Patient told crisis that he did call his ICM marker but then for got to recall her  I did review [place]  and no repeat doses of benzodiazepine him  Will give Ativan 1st dose here and discharged home with 3-4 doses of Ativan as needed  He was instructed to call his act worker as well as follow up  Patient does have appointment with his psychiatrist on September 4th  Patient can be safely discharged home is he is not suicidal, homicidal and he has good insight  1514 Patient does have glucose in his urine without any ketones  He does have elevated glucose in his labs without evidence of anion gap acidosis  MDM    Disposition  Final diagnoses:   Encounter for psychological evaluation   Medication refill   Anxiety   Diabetes ([facility] [address] )   Glucosuria     Time reflects when diagnosis was documented in both MDM as applicable and the Disposition within this note     Time User Action Codes Description Comment    8/29/2019  2:58 PM Thiago DIAL Add [Z00 8] Encounter for psychological evaluation     8/29/2019  2:58 PM Chris Oakes Add [Z76 0] Medication refill     8/29/2019  2:59 PM Akiko Villa Add [F41 9] Anxiety     8/29/2019  3:05 PM Rhonda Oakes Add [E11 9] Diabetes ([facility] [address] )     8/29/2019  3:05 PM Violette [address] [place] [address]       ED Disposition     ED Disposition Condition Date/Time Comment    Discharge Stable Thu Aug 29, 2019  2:58 PM Aubrey Monzon discharge to home/self care              Follow-up Information Follow up With Specialties Details Why [place] Schedule an appointment as soon as possible for a visit in 1 day  [place] [id number] [address]  [phone]            Discharge Medication List as of 8/29/2019  3:20 PM      CONTINUE these medications which have NOT CHANGED    Details   lisinopril (ZESTRIL) 10 mg tablet Take 10 mg by mouth daily, Starting Mon 11/19/2018, Historical Med      metFORMIN (GLUCOPHAGE) 1000 MG tablet Take 1,000 mg by mouth, Starting Thu 10/27/2016, Historical Med      divalproex sodium (DEPAKOTE) 500 mg EC tablet Take 500 mg by mouth 2 (two) times a day, Historical Med      !! hydrOXYzine HCL (ATARAX) 25 mg tablet Take 1 tablet (25 mg total) by mouth every 8 (eight) hours as needed for anxiety, Starting Sat 7/6/2019, Print      !! hydrOXYzine HCL (ATARAX) 25 mg tablet Take 1 tablet (25 mg total) by mouth every 6 (six) hours, Starting Sun 7/7/2019, Normal      insulin glargine (LANTUS SOLOSTAR) 100 units/mL injection pen Inject 40 Units under the skin, Starting Fri 1/13/2017, Historical Med       !! - Potential duplicate medications found  Please discuss with provider  No discharge procedures on file      ED Provider  Electronically Signed by           Cori Mukherjee DO  08/29/19 6841

## 2019-08-29 NOTE — ED NOTES
Patient will be discharged to outpatient with his current ICM services through [facility] [place]  Encouraged the patient to continue to try to reach his ICM and gave him the office number so he can reach an alternate worker to provide support

## 2019-08-29 NOTE — DISCHARGE INSTRUCTIONS
Make sure you call your act/ICM worker today for further assistance    Keep her appointment with her psychiatrist/psychologist on September 4th    You are supposed to be taking Depakote 500 mg twice a day  You have glucose in your urine    Please call your family doctor for further follow-up

## 2019-09-01 ENCOUNTER — APPOINTMENT (EMERGENCY)
Dept: CT IMAGING | Facility: HOSPITAL | Age: 49
End: 2019-09-01
Payer: COMMERCIAL

## 2019-09-01 ENCOUNTER — HOSPITAL ENCOUNTER (EMERGENCY)
Facility: HOSPITAL | Age: 49
Discharge: HOME | End: 2019-09-01
Admitting: EMERGENCY MEDICINE
Payer: COMMERCIAL

## 2019-09-01 VITALS
SYSTOLIC BLOOD PRESSURE: 114 MMHG | OXYGEN SATURATION: 99 % | HEART RATE: 88 BPM | TEMPERATURE: 97.6 F | DIASTOLIC BLOOD PRESSURE: 69 MMHG | RESPIRATION RATE: 12 BRPM | WEIGHT: 138.01 LBS

## 2019-09-01 DIAGNOSIS — R51.9 HEADACHE: Primary | ICD-10-CM

## 2019-09-01 DIAGNOSIS — R73.9 HYPERGLYCEMIA: ICD-10-CM

## 2019-09-01 LAB
ALBUMIN SERPL BCP-MCNC: 3.6 G/DL (ref 3.5–5)
ALP SERPL-CCNC: 84 U/L (ref 46–116)
ALT SERPL W P-5'-P-CCNC: 18 U/L (ref 12–78)
ANION GAP SERPL CALCULATED.3IONS-SCNC: 8 MMOL/L (ref 4–13)
AST SERPL W P-5'-P-CCNC: 20 U/L (ref 5–45)
BASOPHILS # BLD AUTO: 0.03 THOUSANDS/ΜL (ref 0–0.1)
BASOPHILS NFR BLD AUTO: 1 % (ref 0–1)
BILIRUB SERPL-MCNC: 0.32 MG/DL (ref 0.2–1)
BILIRUB UR QL STRIP: NEGATIVE
BUN SERPL-MCNC: 25 MG/DL (ref 5–25)
CALCIUM SERPL-MCNC: 9 MG/DL (ref 8.3–10.1)
CHLORIDE SERPL-SCNC: 103 MMOL/L (ref 100–108)
CLARITY UR: CLEAR
CLARITY, POC: CLEAR
CO2 SERPL-SCNC: 29 MMOL/L (ref 21–32)
COLOR UR: YELLOW
COLOR, POC: YELLOW
CREAT SERPL-MCNC: 1.19 MG/DL (ref 0.6–1.3)
EOSINOPHIL # BLD AUTO: 0.14 THOUSAND/ΜL (ref 0–0.61)
EOSINOPHIL NFR BLD AUTO: 2 % (ref 0–6)
ERYTHROCYTE [DISTWIDTH] IN BLOOD BY AUTOMATED COUNT: 12.5 % (ref 11.6–15.1)
GFR SERPL CREATININE-BSD FRML MDRD: 72 ML/MIN/1.73SQ M
GLUCOSE SERPL-MCNC: 317 MG/DL (ref 65–140)
GLUCOSE SERPL-MCNC: 392 MG/DL (ref 65–140)
GLUCOSE SERPL-MCNC: 417 MG/DL (ref 65–140)
GLUCOSE SERPL-MCNC: 421 MG/DL (ref 65–140)
GLUCOSE SERPL-MCNC: 432 MG/DL (ref 65–140)
GLUCOSE UR STRIP-MCNC: ABNORMAL MG/DL
HCT VFR BLD AUTO: 40.4 % (ref 36.5–49.3)
HGB BLD-MCNC: 13.5 G/DL (ref 12–17)
HGB UR QL STRIP.AUTO: NEGATIVE
IMM GRANULOCYTES # BLD AUTO: 0.01 THOUSAND/UL (ref 0–0.2)
IMM GRANULOCYTES NFR BLD AUTO: 0 % (ref 0–2)
KETONES UR STRIP-MCNC: NEGATIVE MG/DL
LEUKOCYTE ESTERASE UR QL STRIP: NEGATIVE
LYMPHOCYTES # BLD AUTO: 1.95 THOUSANDS/ΜL (ref 0.6–4.47)
LYMPHOCYTES NFR BLD AUTO: 32 % (ref 14–44)
MCH RBC QN AUTO: 30.5 PG (ref 26.8–34.3)
MCHC RBC AUTO-ENTMCNC: 33.4 G/DL (ref 31.4–37.4)
MCV RBC AUTO: 91 FL (ref 82–98)
MONOCYTES # BLD AUTO: 0.68 THOUSAND/ΜL (ref 0.17–1.22)
MONOCYTES NFR BLD AUTO: 11 % (ref 4–12)
NEUTROPHILS # BLD AUTO: 3.28 THOUSANDS/ΜL (ref 1.85–7.62)
NEUTS SEG NFR BLD AUTO: 54 % (ref 43–75)
NITRITE UR QL STRIP: NEGATIVE
NRBC BLD AUTO-RTO: 0 /100 WBCS
PH UR STRIP.AUTO: 5 [PH] (ref 4.5–8)
PLATELET # BLD AUTO: 211 THOUSANDS/UL (ref 149–390)
PMV BLD AUTO: 11.2 FL (ref 8.9–12.7)
POTASSIUM SERPL-SCNC: 3.9 MMOL/L (ref 3.5–5.3)
PROT SERPL-MCNC: 7.1 G/DL (ref 6.4–8.2)
PROT UR STRIP-MCNC: NEGATIVE MG/DL
RBC # BLD AUTO: 4.42 MILLION/UL (ref 3.88–5.62)
SODIUM SERPL-SCNC: 140 MMOL/L (ref 136–145)
SP GR UR STRIP.AUTO: <=1.005 (ref 1–1.03)
UROBILINOGEN UR QL STRIP.AUTO: 0.2 E.U./DL
WBC # BLD AUTO: 6.09 THOUSAND/UL (ref 4.31–10.16)

## 2019-09-01 PROCEDURE — 85025 COMPLETE CBC W/AUTO DIFF WBC: CPT | Performed by: PHYSICIAN ASSISTANT

## 2019-09-01 PROCEDURE — 96361 HYDRATE IV INFUSION ADD-ON: CPT

## 2019-09-01 PROCEDURE — 99284 EMERGENCY DEPT VISIT MOD MDM: CPT | Performed by: PHYSICIAN ASSISTANT

## 2019-09-01 PROCEDURE — 96374 THER/PROPH/DIAG INJ IV PUSH: CPT

## 2019-09-01 PROCEDURE — 80053 COMPREHEN METABOLIC PANEL: CPT | Performed by: PHYSICIAN ASSISTANT

## 2019-09-01 PROCEDURE — 36415 COLL VENOUS BLD VENIPUNCTURE: CPT | Performed by: PHYSICIAN ASSISTANT

## 2019-09-01 PROCEDURE — 99284 EMERGENCY DEPT VISIT MOD MDM: CPT

## 2019-09-01 PROCEDURE — 70450 CT HEAD/BRAIN W/O DYE: CPT

## 2019-09-01 PROCEDURE — 82948 REAGENT STRIP/BLOOD GLUCOSE: CPT

## 2019-09-01 PROCEDURE — 81003 URINALYSIS AUTO W/O SCOPE: CPT

## 2019-09-01 RX ORDER — DIPHENHYDRAMINE HYDROCHLORIDE 50 MG/ML
25 INJECTION INTRAMUSCULAR; INTRAVENOUS ONCE
Status: DISCONTINUED | OUTPATIENT
Start: 2019-09-01 | End: 2019-09-01 | Stop reason: HOSPADM

## 2019-09-01 RX ORDER — METOCLOPRAMIDE HYDROCHLORIDE 5 MG/ML
10 INJECTION INTRAMUSCULAR; INTRAVENOUS ONCE
Status: DISCONTINUED | OUTPATIENT
Start: 2019-09-01 | End: 2019-09-01 | Stop reason: HOSPADM

## 2019-09-01 RX ORDER — ACETAMINOPHEN 325 MG/1
650 TABLET ORAL ONCE
Status: COMPLETED | OUTPATIENT
Start: 2019-09-01 | End: 2019-09-01

## 2019-09-01 RX ADMIN — ACETAMINOPHEN 650 MG: 325 TABLET ORAL at 07:37

## 2019-09-01 RX ADMIN — INSULIN HUMAN 10 UNITS: 100 INJECTION, SOLUTION PARENTERAL at 08:49

## 2019-09-01 RX ADMIN — SODIUM CHLORIDE 1000 ML: 0.9 INJECTION, SOLUTION INTRAVENOUS at 07:07

## 2019-09-01 RX ADMIN — SODIUM CHLORIDE 1000 ML: 0.9 INJECTION, SOLUTION INTRAVENOUS at 07:38

## 2019-09-01 NOTE — ED NOTES
Asked pt to provide urine sample at this time    Pt was unable to void      Shantal Humphreysade [place]  09/01/19 4824

## 2019-09-01 NOTE — ED NOTES
White powder noted on pts face   Per pt " I had funnel cake at the fair last night      Marcia Torres RN  09/01/19 1922

## 2019-09-01 NOTE — DISCHARGE INSTRUCTIONS
DISCHARGE INSTRUCTIONS:    FOLLOW UP WITH YOUR PRIMARY CARE PROVIDER OR THE [address] [place] [address]  MAKE AN APPOINTMENT TO BE SEEN  TAKE TYLENOL OR MOTRIN FOR PAIN  REST AND DRINK PLENTY OF FLUIDS  IF SYMPTOMS WORSEN OR NEW SYMPTOMS ARISE, RETURN TO THE ER TO BE SEEN

## 2019-09-01 NOTE — ED PROVIDER NOTES
History  Chief Complaint   Patient presents with    Headache     pt reports headache since yesterday, worse today       48y  o male with PMH of anxiety, asthma, bipolar, depression and DM presents to the ER for headache since last night  Patient took Tylenol for pain without relief  He describes his pain as throbbing and radiating from the front of his head to the back of his head  He rates his pain 9/10 and states it comes and goes  He does not have a history of migraines  He denies fever, chills, vision changes, chest pain, dyspnea, N/V/D, abdominal pain, weakness, paresthesias, neck pain or back pain  History provided by:  Patient   used: No        Prior to Admission Medications   Prescriptions Last Dose Informant Patient Reported? Taking?   divalproex sodium (DEPAKOTE) 500 mg EC tablet   Yes No   Sig: Take 500 mg by mouth 2 (two) times a day   hydrOXYzine HCL (ATARAX) 25 mg tablet   No No   Sig: Take 1 tablet (25 mg total) by mouth every 8 (eight) hours as needed for anxiety   hydrOXYzine HCL (ATARAX) 25 mg tablet   No No   Sig: Take 1 tablet (25 mg total) by mouth every 6 (six) hours   insulin glargine (LANTUS SOLOSTAR) 100 units/mL injection pen   Yes No   Sig: Inject 40 Units under the skin   lisinopril (ZESTRIL) 10 mg tablet   Yes No   Sig: Take 10 mg by mouth daily   metFORMIN (GLUCOPHAGE) 1000 MG tablet   Yes No   Sig: Take 1,000 mg by mouth      Facility-Administered Medications: None       Past Medical History:   Diagnosis Date    Anxiety     Asthma     Bipolar 1 disorder ([facility] )     Depression     Diabetes mellitus ([facility] )     Psychiatric disorder        Past Surgical History:   Procedure Laterality Date    APPENDECTOMY         History reviewed  No pertinent family history  I have reviewed and agree with the history as documented      Social History     Tobacco Use    Smoking status: Current Some Day Smoker    Smokeless tobacco: Never Used   Substance Use Topics    Alcohol use: No    Drug use: No        Review of Systems   Constitutional: Negative for chills and fever  Eyes: Negative for photophobia and visual disturbance  Respiratory: Negative for shortness of breath  Cardiovascular: Negative for chest pain  Gastrointestinal: Negative for abdominal pain, diarrhea, nausea and vomiting  Musculoskeletal: Negative for back pain, neck pain and neck stiffness  Skin: Negative for rash  Allergic/Immunologic: Negative for food allergies  Neurological: Positive for headaches  Negative for weakness and numbness  Physical Exam  Physical Exam   Constitutional:  Non-toxic appearance  No distress  HENT:   Head: Normocephalic and atraumatic  Right Ear: Tympanic membrane, external ear and ear canal normal  No drainage, swelling or tenderness  No foreign bodies  Tympanic membrane is not erythematous  No hemotympanum  Left Ear: Tympanic membrane, external ear and ear canal normal  No drainage, swelling or tenderness  No foreign bodies  Tympanic membrane is not erythematous  No hemotympanum  Nose: Nose normal    Mouth/Throat: Uvula is midline and oropharynx is clear and moist  Mucous membranes are dry  No uvula swelling  No posterior oropharyngeal edema, posterior oropharyngeal erythema or tonsillar abscesses  No tonsillar exudate  Eyes: Pupils are equal, round, and reactive to light  Conjunctivae and EOM are normal    Neck: Normal range of motion and phonation normal  Neck supple  No tracheal deviation present  Cardiovascular: Normal rate, regular rhythm, S1 normal, S2 normal and normal heart sounds  Exam reveals no gallop and no friction rub  No murmur heard  Pulmonary/Chest: Effort normal and breath sounds normal  No respiratory distress  He has no decreased breath sounds  He has no wheezes  He has no rhonchi  He has no rales  He exhibits no tenderness  Musculoskeletal: Normal range of motion  He exhibits no edema or deformity          Cervical back: Normal  Thoracic back: Normal         Lumbar back: Normal    Neurological: He is alert  He has normal strength  No cranial nerve deficit or sensory deficit  He exhibits normal muscle tone  Gait normal  GCS eye subscore is 4  GCS verbal subscore is 5  GCS motor subscore is 6  Skin: Skin is warm and dry  No rash noted  Psychiatric: He has a normal mood and affect  Nursing note and vitals reviewed        Vital Signs  ED Triage Vitals   Temperature Pulse Respirations Blood Pressure SpO2   09/01/19 0550 09/01/19 0550 09/01/19 0550 09/01/19 0550 09/01/19 0550   97 6 °F (36 4 °C) 73 16 119/66 98 %      Temp Source Heart Rate Source Patient Position - Orthostatic VS BP Location FiO2 (%)   09/01/19 0550 09/01/19 0550 09/01/19 0550 09/01/19 0550 --   Oral Monitor Lying Right arm       Pain Score       09/01/19 0839       No Pain           Vitals:    09/01/19 0550 09/01/19 0839 09/01/19 0845   BP: 119/66 114/69 114/69   Pulse: 73 88    Patient Position - Orthostatic VS: Lying Lying          Visual Acuity      ED Medications  Medications   metoclopramide (REGLAN) injection 10 mg (0 mg Intravenous Hold 9/1/19 0700)   diphenhydrAMINE (BENADRYL) injection 25 mg (0 mg Intravenous Hold 9/1/19 0700)   sodium chloride 0 9 % bolus 1,000 mL (0 mL Intravenous Stopped 9/1/19 0807)   acetaminophen (TYLENOL) tablet 650 mg (650 mg Oral Given 9/1/19 0737)   sodium chloride 0 9 % bolus 1,000 mL (0 mL Intravenous Stopped 9/1/19 0838)   insulin regular (HumuLIN R,NovoLIN R) injection 10 Units (10 Units Intravenous Given 9/1/19 0849)       Diagnostic Studies  Results Reviewed     Procedure Component Value Units Date/Time    Fingerstick Glucose (POCT) [[id number]]  (Abnormal) Collected:  09/01/19 1003    Lab Status:  Final result Updated:  09/01/19 1004     POC Glucose 317 mg/dl     Fingerstick Glucose (POCT) [[id number]]  (Abnormal) Collected:  09/01/19 0912    Lab Status:  Final result Updated:  09/01/19 0913     POC Glucose 392 mg/dl     POCT urinalysis dipstick [[id number]]  (Normal) Resulted:  09/01/19 0836    Lab Status:  Final result Specimen:  Urine Updated:  09/01/19 0837     Color, UA yellow     Clarity, UA clear    Fingerstick Glucose (POCT) [[id number]]  (Abnormal) Collected:  09/01/19 0834    Lab Status:  Final result Updated:  09/01/19 0836     POC Glucose 432 mg/dl     ED Urine Macroscopic [[id number]]  (Abnormal) Collected:  09/01/19 0831    Lab Status:  Final result Specimen:  Urine Updated:  09/01/19 0832     Color, UA Yellow     Clarity, UA Clear     pH, UA 5 0     Leukocytes, UA Negative     Nitrite, UA Negative     Protein, UA Negative mg/dl      Glucose,  (1/2%) mg/dl      Ketones, UA Negative mg/dl      Urobilinogen, UA 0 2 E U /dl      Bilirubin, UA Negative     Blood, UA Negative     Specific Gravity, UA <=1 005    Narrative:       CLINITEK RESULT    Fingerstick Glucose (POCT) [[id number]]  (Abnormal) Collected:  09/01/19 0722    Lab Status:  Final result Updated:  09/01/19 0829     POC Glucose 417 mg/dl     Comprehensive metabolic panel [[id number]]  (Abnormal) Collected:  09/01/19 0706    Lab Status:  Final result Specimen:  Blood from Arm, Right Updated:  09/01/19 0730     Sodium 140 mmol/L      Potassium 3 9 mmol/L      Chloride 103 mmol/L      CO2 29 mmol/L      ANION GAP 8 mmol/L      BUN 25 mg/dL      Creatinine 1 19 mg/dL      Glucose 421 mg/dL      Calcium 9 0 mg/dL      AST 20 U/L      ALT 18 U/L      Alkaline Phosphatase 84 U/L      Total Protein 7 1 g/dL      Albumin 3 6 g/dL      Total Bilirubin 0 32 mg/dL      eGFR 72 ml/min/1 73sq m     Narrative:       [place] guidelines for Chronic Kidney Disease (CKD):     Stage 1 with normal or high GFR (GFR > 90 mL/min/1 73 square meters)    Stage 2 Mild CKD (GFR = 60-89 mL/min/1 73 square meters)    Stage 3A Moderate CKD (GFR = 45-59 mL/min/1 73 square meters)    Stage 3B Moderate CKD (GFR = 30-44 mL/min/1 73 square meters)    Stage 4 Severe CKD (GFR = 15-29 mL/min/1 73 square meters)    Stage 5 End Stage CKD (GFR <15 mL/min/1 73 square meters)  Note: GFR calculation is accurate only with a steady state creatinine    CBC and differential [[id number]] Collected:  09/01/19 0706    Lab Status:  Final result Specimen:  Blood from Arm, Right Updated:  09/01/19 0715     WBC 6 09 Thousand/uL      RBC 4 42 Million/uL      Hemoglobin 13 5 g/dL      Hematocrit 40 4 %      MCV 91 fL      MCH 30 5 pg      MCHC 33 4 g/dL      RDW 12 5 %      MPV 11 2 fL      Platelets 274 Thousands/uL      nRBC 0 /100 WBCs      Neutrophils Relative 54 %      Immat GRANS % 0 %      Lymphocytes Relative 32 %      Monocytes Relative 11 %      Eosinophils Relative 2 %      Basophils Relative 1 %      Neutrophils Absolute 3 28 Thousands/µL      Immature Grans Absolute 0 01 Thousand/uL      Lymphocytes Absolute 1 95 Thousands/µL      Monocytes Absolute 0 68 Thousand/µL      Eosinophils Absolute 0 14 Thousand/µL      Basophils Absolute 0 03 Thousands/µL                  CT head without contrast   Final Result by Eleanor Jesus DO (09/01 1784)   Mild bilateral maxillary sinus disease  No acute intracranial abnormality  Workstation performed: [id number]                    Procedures  Procedures       ED Course  ED Course as of Sep 01 1006   Sun Sep 01, 2019   9349 Awaiting urine  After fluid administration, will recheck glucose  4947 Will recheck sugars again  MDM  Number of Diagnoses or Management Options  Headache: new and requires workup  Hyperglycemia: new and requires workup  Diagnosis management comments: DDX consists of but not limited to: migraine, tension headache, intracranial abnormality, hyperglycemia, DKA    Will check CBC, CMP, urine and CT head  Will give fluids, Tylenol, Reglan and Benadryl  1728 Awaiting urine  After fluid administration, will recheck glucose  Glucose still elevated  No ketones in urine  Will give insulin  5232 Will recheck sugars again  Sugars are coming down  Will discharge  At discharge, I instructed the patient to:  -follow up with pcp  -take Tylenol or Motrin for pain  -rest and drink plenty of fluids  -return to the ER if symptoms worsened or new symptoms arose  Patient agreed to this plan and was stable at time of discharge  Amount and/or Complexity of Data Reviewed  Clinical lab tests: ordered and reviewed  Tests in the radiology section of CPT®: ordered and reviewed    Patient Progress  Patient progress: stable      Disposition  Final diagnoses:   Headache   Hyperglycemia     Time reflects when diagnosis was documented in both MDM as applicable and the Disposition within this note     Time User Action Codes Description Comment    9/1/2019 10:04 AM Julianna PIERRE Add [R51] Headache     9/1/2019 10:04 AM Julianna PIERRE Add [R73 9] Hyperglycemia       ED Disposition     ED Disposition Condition Date/Time Comment    Discharge Stable Sun Sep 1, 2019 10:04 AM Aubrey Monzon discharge to home/self care  Follow-up Information     Follow up With Specialties Details Why Contact Info Additional [address] [facility] Schedule an appointment as soon as possible for a visit   [address] [place] [address], [address] [facility] [id number]  [address] [place], [place], [address]          Patient's Medications   Discharge Prescriptions    No medications on file     No discharge procedures on file      ED Provider  Electronically Signed by           Gustavo Rueda PA-C  09/01/19 1011

## 2019-09-01 NOTE — ED NOTES
Per Kyree vick give pt tylenol and check for improvement before administering IV meds      Barbi Brandt RN  09/01/19 3594

## 2019-09-04 ENCOUNTER — APPOINTMENT (EMERGENCY)
Dept: RADIOLOGY | Facility: HOSPITAL | Age: 49
End: 2019-09-04
Payer: COMMERCIAL

## 2019-09-04 ENCOUNTER — HOSPITAL ENCOUNTER (EMERGENCY)
Facility: HOSPITAL | Age: 49
Discharge: HOME | End: 2019-09-04
Attending: EMERGENCY MEDICINE | Admitting: EMERGENCY MEDICINE
Payer: COMMERCIAL

## 2019-09-04 ENCOUNTER — APPOINTMENT (EMERGENCY)
Dept: CT IMAGING | Facility: HOSPITAL | Age: 49
End: 2019-09-04
Payer: COMMERCIAL

## 2019-09-04 VITALS
TEMPERATURE: 97 F | BODY MASS INDEX: 21.97 KG/M2 | HEIGHT: 66 IN | OXYGEN SATURATION: 98 % | DIASTOLIC BLOOD PRESSURE: 70 MMHG | SYSTOLIC BLOOD PRESSURE: 116 MMHG | RESPIRATION RATE: 16 BRPM | HEART RATE: 72 BPM | WEIGHT: 136.69 LBS

## 2019-09-04 DIAGNOSIS — R73.9 HYPERGLYCEMIA: ICD-10-CM

## 2019-09-04 DIAGNOSIS — R51.9 HEADACHE: Primary | ICD-10-CM

## 2019-09-04 DIAGNOSIS — Z91.14 NONCOMPLIANCE WITH MEDICATIONS: ICD-10-CM

## 2019-09-04 LAB
ALBUMIN SERPL BCP-MCNC: 4 G/DL (ref 3–5.2)
ALP SERPL-CCNC: 112 U/L (ref 43–122)
ALT SERPL W P-5'-P-CCNC: 21 U/L (ref 9–52)
ANION GAP SERPL CALCULATED.3IONS-SCNC: 7 MMOL/L (ref 5–14)
AST SERPL W P-5'-P-CCNC: 19 U/L (ref 17–59)
BASE EX.OXY STD BLDV CALC-SCNC: 94.6 %
BASE EXCESS BLDV CALC-SCNC: 2.3 MMOL/L (ref -2.1–2.1)
BASOPHILS # BLD AUTO: 0.1 THOUSANDS/ΜL (ref 0–0.1)
BASOPHILS NFR BLD AUTO: 1 % (ref 0–1)
BETA-HYDROXYBUTYRATE: 0.1 MMOL/L
BILIRUB SERPL-MCNC: 0.2 MG/DL
BUN SERPL-MCNC: 16 MG/DL (ref 5–25)
CALCIUM SERPL-MCNC: 9.1 MG/DL (ref 8.4–10.2)
CHLORIDE SERPL-SCNC: 102 MMOL/L (ref 97–108)
CO2 SERPL-SCNC: 30 MMOL/L (ref 22–30)
CREAT SERPL-MCNC: 0.77 MG/DL (ref 0.7–1.5)
EOSINOPHIL # BLD AUTO: 0.1 THOUSAND/ΜL (ref 0–0.4)
EOSINOPHIL NFR BLD AUTO: 2 % (ref 0–6)
ERYTHROCYTE [DISTWIDTH] IN BLOOD BY AUTOMATED COUNT: 13.5 %
GFR SERPL CREATININE-BSD FRML MDRD: 108 ML/MIN/1.73SQ M
GLUCOSE SERPL-MCNC: 157 MG/DL (ref 65–140)
GLUCOSE SERPL-MCNC: 368 MG/DL (ref 65–140)
GLUCOSE SERPL-MCNC: 470 MG/DL (ref 65–140)
GLUCOSE SERPL-MCNC: 477 MG/DL (ref 65–140)
GLUCOSE SERPL-MCNC: 481 MG/DL (ref 70–99)
HCO3 BLDV-SCNC: 28.8 MMOL/L (ref 23–28)
HCT VFR BLD AUTO: 39.6 % (ref 41–53)
HGB BLD-MCNC: 13.5 G/DL (ref 13.5–17.5)
LYMPHOCYTES # BLD AUTO: 1.4 THOUSANDS/ΜL (ref 0.5–4)
LYMPHOCYTES NFR BLD AUTO: 20 % (ref 25–45)
MCH RBC QN AUTO: 30.2 PG (ref 26–34)
MCHC RBC AUTO-ENTMCNC: 34.1 G/DL (ref 31–36)
MCV RBC AUTO: 89 FL (ref 80–100)
MONOCYTES # BLD AUTO: 0.7 THOUSAND/ΜL (ref 0.2–0.9)
MONOCYTES NFR BLD AUTO: 11 % (ref 1–10)
NEUTROPHILS # BLD AUTO: 4.7 THOUSANDS/ΜL (ref 1.8–7.8)
NEUTS SEG NFR BLD AUTO: 66 % (ref 45–65)
O2 CT BLDV-SCNC: 18.8 ML/DL
PCO2 BLDV: 51 MM HG (ref 41–51)
PH BLDV: 7.36 [PH] (ref 7.35–7.45)
PLATELET # BLD AUTO: 227 THOUSANDS/UL (ref 150–450)
PMV BLD AUTO: 9.3 FL (ref 8.9–12.7)
PO2 BLDV: 89 MM HG
POTASSIUM SERPL-SCNC: 3.9 MMOL/L (ref 3.6–5)
PROT SERPL-MCNC: 7.1 G/DL (ref 5.9–8.4)
RBC # BLD AUTO: 4.47 MILLION/UL (ref 4.5–5.9)
SODIUM SERPL-SCNC: 139 MMOL/L (ref 137–147)
TROPONIN I SERPL-MCNC: <0.01 NG/ML (ref 0–0.03)
VALPROATE SERPL-MCNC: <10 UG/ML (ref 50–120)
WBC # BLD AUTO: 7.1 THOUSAND/UL (ref 4.5–11)

## 2019-09-04 PROCEDURE — 82948 REAGENT STRIP/BLOOD GLUCOSE: CPT

## 2019-09-04 PROCEDURE — 36415 COLL VENOUS BLD VENIPUNCTURE: CPT | Performed by: EMERGENCY MEDICINE

## 2019-09-04 PROCEDURE — 96372 THER/PROPH/DIAG INJ SC/IM: CPT

## 2019-09-04 PROCEDURE — 99285 EMERGENCY DEPT VISIT HI MDM: CPT | Performed by: EMERGENCY MEDICINE

## 2019-09-04 PROCEDURE — 82805 BLOOD GASES W/O2 SATURATION: CPT | Performed by: EMERGENCY MEDICINE

## 2019-09-04 PROCEDURE — 71046 X-RAY EXAM CHEST 2 VIEWS: CPT

## 2019-09-04 PROCEDURE — 70498 CT ANGIOGRAPHY NECK: CPT

## 2019-09-04 PROCEDURE — 99285 EMERGENCY DEPT VISIT HI MDM: CPT

## 2019-09-04 PROCEDURE — 85025 COMPLETE CBC W/AUTO DIFF WBC: CPT | Performed by: EMERGENCY MEDICINE

## 2019-09-04 PROCEDURE — 80164 ASSAY DIPROPYLACETIC ACD TOT: CPT | Performed by: EMERGENCY MEDICINE

## 2019-09-04 PROCEDURE — 93005 ELECTROCARDIOGRAM TRACING: CPT

## 2019-09-04 PROCEDURE — 80053 COMPREHEN METABOLIC PANEL: CPT | Performed by: EMERGENCY MEDICINE

## 2019-09-04 PROCEDURE — 70496 CT ANGIOGRAPHY HEAD: CPT

## 2019-09-04 PROCEDURE — 82010 KETONE BODYS QUAN: CPT | Performed by: EMERGENCY MEDICINE

## 2019-09-04 PROCEDURE — 96360 HYDRATION IV INFUSION INIT: CPT

## 2019-09-04 PROCEDURE — 84484 ASSAY OF TROPONIN QUANT: CPT | Performed by: EMERGENCY MEDICINE

## 2019-09-04 RX ORDER — MECLIZINE HCL 12.5 MG/1
25 TABLET ORAL ONCE
Status: COMPLETED | OUTPATIENT
Start: 2019-09-04 | End: 2019-09-04

## 2019-09-04 RX ORDER — ONDANSETRON 4 MG/1
4 TABLET, ORALLY DISINTEGRATING ORAL ONCE
Status: COMPLETED | OUTPATIENT
Start: 2019-09-04 | End: 2019-09-04

## 2019-09-04 RX ORDER — ACETAMINOPHEN 325 MG/1
975 TABLET ORAL ONCE
Status: COMPLETED | OUTPATIENT
Start: 2019-09-04 | End: 2019-09-04

## 2019-09-04 RX ADMIN — SODIUM CHLORIDE 1000 ML: 0.9 INJECTION, SOLUTION INTRAVENOUS at 04:07

## 2019-09-04 RX ADMIN — INSULIN LISPRO 10 UNITS: 100 INJECTION, SOLUTION INTRAVENOUS; SUBCUTANEOUS at 02:45

## 2019-09-04 RX ADMIN — MECLIZINE HYDROCHLORIDE 25 MG: 12.5 TABLET ORAL at 01:20

## 2019-09-04 RX ADMIN — ACETAMINOPHEN 975 MG: 325 TABLET ORAL at 01:20

## 2019-09-04 RX ADMIN — IOHEXOL 100 ML: 350 INJECTION, SOLUTION INTRAVENOUS at 04:11

## 2019-09-04 RX ADMIN — ONDANSETRON 4 MG: 4 TABLET, ORALLY DISINTEGRATING ORAL at 01:20

## 2019-09-04 RX ADMIN — SODIUM CHLORIDE 1000 ML: 0.9 INJECTION, SOLUTION INTRAVENOUS at 01:39

## 2019-09-04 RX ADMIN — INSULIN HUMAN 10 UNITS: 100 INJECTION, SOLUTION PARENTERAL at 03:45

## 2019-09-04 NOTE — ED NOTES
Woke pt up at this time, asked him if he could urinate, pt opened eyes then closed them again not answering the question or moving       Dk Founds  09/04/19 7070

## 2019-09-04 NOTE — ED PROVIDER NOTES
History  Chief Complaint   Patient presents with    Dizziness     Headache and dizziness off and on since yesterday  27-year-old male past medical history bipolar anxiety, frequent ER visits presents for evaluation of  Dizziness and headache which is frontal, radiates to the back, squeezing associated with seeing stars  Patient states that he has been having the symptoms on and off for quite some time however this evening he was having trouble sleeping decided to walk around outside and started having the symptoms again  He states that he also is feeling dizziness which he describes as sensation of movement around him worse when he looks to the left  Patient was seen 7 times in the last 2 weeks form different complaints, 2 of which were for headache and dizziness with symptoms that he states are is exactly the same as what he is having today he had a full workup including  2 negative CT heads , EKG, blood work  He was diagnosed with BPPV  of was given meclizine which he did not take this evening  He does state that he followed up with his primary doctor but did not follow up with a neurologist   No new symptoms, no chest pain, no shortness of breath, no current blurry vision or double vision  Patient ambulatory to the emergency department  Did not take any medications prior to arrival          Prior to Admission Medications   Prescriptions Last Dose Informant Patient Reported?  Taking?   divalproex sodium (DEPAKOTE) 500 mg EC tablet   Yes No   Sig: Take 500 mg by mouth 2 (two) times a day   hydrOXYzine HCL (ATARAX) 25 mg tablet   No No   Sig: Take 1 tablet (25 mg total) by mouth every 8 (eight) hours as needed for anxiety   hydrOXYzine HCL (ATARAX) 25 mg tablet   No No   Sig: Take 1 tablet (25 mg total) by mouth every 6 (six) hours   insulin glargine (LANTUS SOLOSTAR) 100 units/mL injection pen   Yes No   Sig: Inject 40 Units under the skin   lisinopril (ZESTRIL) 10 mg tablet   Yes No   Sig: Take 10 mg by mouth daily   metFORMIN (GLUCOPHAGE) 1000 MG tablet   Yes No   Sig: Take 1,000 mg by mouth      Facility-Administered Medications: None       Past Medical History:   Diagnosis Date    Anxiety     Asthma     [facility]ipolar 1 disorder ([email or web address] )     Depression     Diabetes mellitus ([email or web address] 75 )     Psychiatric disorder        Past Surgical History:   Procedure Laterality Date    APPENDECTOMY         History reviewed  No pertinent family history  I have reviewed and agree with the history as documented  Social History     Tobacco Use    Smoking status: Current Some Day Smoker    Smokeless tobacco: Never Used   Substance Use Topics    Alcohol use: No    Drug use: No        Review of Systems   Constitutional: Negative for appetite change, chills and fever  HENT: Negative for rhinorrhea and sore throat  Eyes: Negative for photophobia and visual disturbance  Respiratory: Negative for cough and shortness of breath  Cardiovascular: Negative for chest pain and palpitations  Gastrointestinal: Negative for abdominal pain and diarrhea  Genitourinary: Negative for dysuria, frequency and urgency  Skin: Negative for rash  Neurological: Positive for dizziness and headaches  Negative for weakness  All other systems reviewed and are negative  Physical Exam  Physical Exam   Constitutional: He is oriented to person, place, and time  He appears well-developed and well-nourished  HENT:   Head: Normocephalic and atraumatic  Right Ear: External ear normal    Left Ear: External ear normal    Mouth/Throat: Oropharynx is clear and moist    Eyes: Pupils are equal, round, and reactive to light  Conjunctivae and EOM are normal    Pupils equal round and reactive, fundoscopic exam unremarkable   Neck: Normal range of motion  Neck supple  No JVD present  No tracheal deviation present  Cardiovascular: Normal rate, regular rhythm and normal heart sounds  Exam reveals no gallop and no friction rub     No murmur heard   Pulmonary/Chest: Effort normal  No stridor  No respiratory distress  He has no wheezes  He has no rales  Abdominal: Soft  He exhibits no distension and no mass  There is no tenderness  There is no rebound and no guarding  Musculoskeletal: Normal range of motion  He exhibits no edema  Neurological: He is alert and oriented to person, place, and time  No cranial nerve deficit  Normal speech, normal gait , extraocular movements intact without nystagmus    Patient somewhat unsteady on his feet only when tandem gait is tested though is able to right himself  Muscle strength 5/5 bilateral upper lower extremities   Skin: Skin is warm and dry  No rash noted  No erythema  No pallor  Psychiatric: He has a normal mood and affect  Nursing note and vitals reviewed        Vital Signs  ED Triage Vitals   Temperature Pulse Respirations Blood Pressure SpO2   09/04/19 0105 09/04/19 0105 09/04/19 0105 09/04/19 0105 09/04/19 0105   (!) 97 °F (36 1 °C) 79 18 105/79 97 %      Temp Source Heart Rate Source Patient Position - Orthostatic VS BP Location FiO2 (%)   09/04/19 0105 09/04/19 0105 09/04/19 0105 09/04/19 0105 --   Tympanic Monitor Sitting Right arm       Pain Score       09/04/19 0117       9           Vitals:    09/04/19 0105 09/04/19 0330 09/04/19 0405   BP: 105/79 112/64 115/66   Pulse: 79 73 69   Patient Position - Orthostatic VS: Sitting Lying Lying         Visual Acuity  Visual Acuity      Most Recent Value   L Pupil Size (mm)  2   R Pupil Size (mm)  2          ED Medications  Medications   acetaminophen (TYLENOL) tablet 975 mg (975 mg Oral Given 9/4/19 0120)   ondansetron (ZOFRAN-ODT) dispersible tablet 4 mg (4 mg Oral Given 9/4/19 0120)   meclizine (ANTIVERT) tablet 25 mg (25 mg Oral Given 9/4/19 0120)   sodium chloride 0 9 % bolus 1,000 mL (0 mL Intravenous Stopped 9/4/19 0215)   insulin lispro (HumaLOG) 100 units/mL subcutaneous injection 10 Units (10 Units Subcutaneous Given 9/4/19 0245)   insulin regular (HumuLIN R,NovoLIN R) injection 10 Units (10 Units Subcutaneous Given 9/4/19 0345)   sodium chloride 0 9 % bolus 1,000 mL (0 mL Intravenous Stopped 9/4/19 0449)   iohexol (OMNIPAQUE) 350 MG/ML injection (MULTI-DOSE) 100 mL (100 mL Intravenous Given 9/4/19 0411)       Diagnostic Studies  Results Reviewed     Procedure Component Value Units Date/Time    Fingerstick Glucose (POCT) [[id number]]  (Abnormal) Collected:  09/04/19 0452    Lab Status:  Final result Updated:  09/04/19 0453     POC Glucose 157 mg/dl     Fingerstick Glucose (POCT) [[id number]]  (Abnormal) Collected:  09/04/19 0333    Lab Status:  Final result Updated:  09/04/19 0335     POC Glucose 368 mg/dl     Fingerstick Glucose (POCT) [[id number]]  (Abnormal) Collected:  09/04/19 0233    Lab Status:  Final result Updated:  09/04/19 0332     POC Glucose 477 mg/dl     Troponin I [[id number]]  (Normal) Collected:  09/04/19 0134    Lab Status:  Final result Specimen:  Blood from Arm, Right Updated:  09/04/19 0203     Troponin I <0 01 ng/mL     Valproic acid level, total [[id number]]  (Abnormal) Collected:  09/04/19 0134    Lab Status:  Final result Specimen:  Blood from Arm, Right Updated:  09/04/19 0157     Valproic Acid, Total <10 0 ug/mL     Comprehensive metabolic panel [[id number]]  (Abnormal) Collected:  09/04/19 0134    Lab Status:  Final result Specimen:  Blood from Arm, Right Updated:  09/04/19 0151     Sodium 139 mmol/L      Potassium 3 9 mmol/L      Chloride 102 mmol/L      CO2 30 mmol/L      ANION GAP 7 mmol/L      BUN 16 mg/dL      Creatinine 0 77 mg/dL      Glucose 481 mg/dL      Calcium 9 1 mg/dL      AST 19 U/L      ALT 21 U/L      Alkaline Phosphatase 112 U/L      Total Protein 7 1 g/dL      Albumin 4 0 g/dL      Total Bilirubin 0 20 mg/dL      eGFR 108 ml/min/1 73sq m     Narrative:       [place] guidelines for Chronic Kidney Disease (CKD):     Stage 1 with normal or high GFR (GFR > 90 mL/min/1 73 square meters)    Stage 2 Mild CKD (GFR = 60-89 mL/min/1 73 square meters)    Stage 3A Moderate CKD (GFR = 45-59 mL/min/1 73 square meters)    Stage 3B Moderate CKD (GFR = 30-44 mL/min/1 73 square meters)    Stage 4 Severe CKD (GFR = 15-29 mL/min/1 73 square meters)    Stage 5 End Stage CKD (GFR <15 mL/min/1 73 square meters)  Note: GFR calculation is accurate only with a steady state creatinine    Beta Hydroxybutyrate [[id number]]  (Normal) Collected:  09/04/19 0134    Lab Status:  Final result Specimen:  Blood from Arm, Right Updated:  09/04/19 0148     BETA-HYDROXYBUTYRATE 0 1 mmol/L     Blood gas, venous [[id number]]  (Abnormal) Collected:  09/04/19 0134    Lab Status:  Final result Specimen:  Blood from Arm, Right Updated:  09/04/19 0148     pH, Yvon 7 360     pCO2, Yvon 51 0 mm Hg      pO2, Yvon 89 0 mm Hg      HCO3, Yvon 28 8 mmol/L      Base Excess, Yvon 2 3 mmol/L      O2 Content, Yvon 18 8 ml/dL      O2 HGB, VENOUS 94 6 %     CBC and differential [[id number]]  (Abnormal) Collected:  09/04/19 0134    Lab Status:  Final result Specimen:  Blood from Arm, Right Updated:  09/04/19 0148     WBC 7 10 Thousand/uL      RBC 4 47 Million/uL      Hemoglobin 13 5 g/dL      Hematocrit 39 6 %      MCV 89 fL      MCH 30 2 pg      MCHC 34 1 g/dL      RDW 13 5 %      MPV 9 3 fL      Platelets 649 Thousands/uL      Neutrophils Relative 66 %      Lymphocytes Relative 20 %      Monocytes Relative 11 %      Eosinophils Relative 2 %      Basophils Relative 1 %      Neutrophils Absolute 4 70 Thousands/µL      Lymphocytes Absolute 1 40 Thousands/µL      Monocytes Absolute 0 70 Thousand/µL      Eosinophils Absolute 0 10 Thousand/µL      Basophils Absolute 0 10 Thousands/µL     UA w Reflex to Microscopic w Reflex to Culture [[id number]]     Lab Status:  No result Specimen:  Urine     Rapid drug screen, urine [[id number]]     Lab Status:  No result Specimen:  Urine     Fingerstick Glucose (POCT) [[id number]]  (Abnormal) Collected:  09/04/19 0122 Lab Status:  Final result Updated:  09/04/19 0124     POC Glucose 470 mg/dl                  CTA head and neck with and without contrast   Final Result by Liam Kirby DO (09/04 0505)      No acute intracranial process is seen  No occlusion, severe stenosis or aneurysm of the major arteries of the head and neck  No significant stenosis within either internal carotid artery  Diminutive left vertebral artery, bilateral vertebral arteries are patent  Sinus disease as described and other findings as above  Findings discussed with Dr Ranjana Sampson by Dr Rachael Ramires at 5:00 AM on 9/4/2019  Workstation performed: [id number]         XR chest 2 views    (Results Pending)              Procedures  Procedures       ED Course  ED Course as of Sep 04 0510   Wed Sep 04, 2019   0127 Procedure Note: EKG  Date/Time: 09/04/19 1:27 AM   Performed by: Narinder Caceres  Authorized by: Narinder Caceres  Indications / Diagnosis: Dizziness  ECG reviewed by me, the ED Provider: yes   The EKG demonstrates:  Rhythm: normal sinus  Intervals: normal intervals  Axis: normal axis  QRS/Blocks: normal QRS  ST Changes: No acute ST Changes, no STD/DEJAH           0130 Patient is diabetic on metformin and insulin, will check lab work , will treat hyperglycemia   POC Glucose(!): 470   0455 Resting comfortably, no current complaints, blood sugar 157                                  MDM  Number of Diagnoses or Management Options  Diagnosis management comments: 60-year-old male, history of psychiatric disorder with frequent ER visits for multiple complaints presents for evaluation of Dizziness, headache  Of note this is patient's 7th visit in the last 2 week, mostly for psychiatric complaints however was also evaluated for headache and dizziness during this time at 2 separate visits  Patient had a normal CT head at both of those and counters, normal EKG, diagnosed with BPPV and discharged on meclizine       Will treat symptomatically and re-evaluate  Discussed importance of following up with Neurology for further care       Amount and/or Complexity of Data Reviewed  Review and summarize past medical records: yes        Disposition  Final diagnoses:   Headache   Hyperglycemia   Noncompliance with medications     Time reflects when diagnosis was documented in both MDM as applicable and the Disposition within this note     Time User Action Codes Description Comment    9/4/2019  5:06 AM Enrigue Marines Add [R51] Headache     9/4/2019  5:06 AM Enrigue Marines Add [R73 9] Hyperglycemia     9/4/2019  5:07 AM Enrigue Marines Add [Z91 14] Noncompliance with medications       ED Disposition     ED Disposition Condition Date/Time Comment    Discharge Stable Wed Sep 4, 2019  5:06 AM Daniela Snowden discharge to home/self care  Follow-up Information     Follow up With Specialties Details Why [facility] Emergency Department Emergency Medicine  If symptoms worsen [address]  [facility]  [address]  [address] [facility] Primary Family Medicine Schedule an appointment as soon as possible for a visit   [address] [facility] [address] [place] [address]            Patient's Medications   Discharge Prescriptions    No medications on file     No discharge procedures on file      ED Provider  Electronically Signed by           Alison Khan MD  09/04/19 9764

## 2019-09-04 NOTE — DISCHARGE INSTRUCTIONS
Your blood sugar was elevated today's visit, please follow-up with the primary care provider for re-evaluation of your current medication regiment    If you developed worsening symptoms please return to the emergency department for further care otherwise please follow-up with the primary care provider for further care

## 2019-09-04 NOTE — ED NOTES
Called pts name-will open eyes after a few times then close them again  Pt not answering questions at this time       Zhang Session  09/04/19 3853

## 2019-09-04 NOTE — ED NOTES
Patient transported to X-ray via wheel chair   Prior to xray pt informed a urine sample was needed and left with a urinal      Selalexey Granda  09/04/19 0143

## 2019-09-06 LAB
ATRIAL RATE: 63 BPM
P AXIS: 10 DEGREES
PR INTERVAL: 122 MS
QRS AXIS: 57 DEGREES
QRSD INTERVAL: 84 MS
QT INTERVAL: 416 MS
QTC INTERVAL: 425 MS
T WAVE AXIS: 48 DEGREES
VENTRICULAR RATE: 63 BPM

## 2019-09-06 PROCEDURE — 93010 ELECTROCARDIOGRAM REPORT: CPT | Performed by: INTERNAL MEDICINE

## 2019-09-08 ENCOUNTER — HOSPITAL ENCOUNTER (EMERGENCY)
Facility: HOSPITAL | Age: 49
Discharge: HOME | End: 2019-09-08
Attending: EMERGENCY MEDICINE
Payer: COMMERCIAL

## 2019-09-08 VITALS
SYSTOLIC BLOOD PRESSURE: 134 MMHG | TEMPERATURE: 97.6 F | OXYGEN SATURATION: 99 % | DIASTOLIC BLOOD PRESSURE: 76 MMHG | HEART RATE: 77 BPM | RESPIRATION RATE: 18 BRPM

## 2019-09-08 DIAGNOSIS — R42 VERTIGO: Primary | ICD-10-CM

## 2019-09-08 LAB
ATRIAL RATE: 75 BPM
P AXIS: 66 DEGREES
PR INTERVAL: 124 MS
QRS AXIS: 80 DEGREES
QRSD INTERVAL: 84 MS
QT INTERVAL: 376 MS
QTC INTERVAL: 419 MS
T WAVE AXIS: 39 DEGREES
VENTRICULAR RATE: 75 BPM

## 2019-09-08 PROCEDURE — 99284 EMERGENCY DEPT VISIT MOD MDM: CPT

## 2019-09-08 PROCEDURE — 93005 ELECTROCARDIOGRAM TRACING: CPT

## 2019-09-08 PROCEDURE — 99282 EMERGENCY DEPT VISIT SF MDM: CPT | Performed by: EMERGENCY MEDICINE

## 2019-09-08 PROCEDURE — 93010 ELECTROCARDIOGRAM REPORT: CPT | Performed by: INTERNAL MEDICINE

## 2019-09-08 RX ORDER — MECLIZINE HYDROCHLORIDE 25 MG/1
25 TABLET ORAL 3 TIMES DAILY PRN
Qty: 30 TABLET | Refills: 0 | Status: SHIPPED | OUTPATIENT
Start: 2019-09-08 | End: 2019-10-13 | Stop reason: HOSPADM

## 2019-09-08 RX ORDER — MECLIZINE HCL 12.5 MG/1
25 TABLET ORAL ONCE
Status: COMPLETED | OUTPATIENT
Start: 2019-09-08 | End: 2019-09-08

## 2019-09-08 RX ADMIN — MECLIZINE 25 MG: 12.5 TABLET ORAL at 03:22

## 2019-09-08 NOTE — ED PROVIDER NOTES
History  Chief Complaint   Patient presents with    Dizziness     Patient arrives after walking to police station to report that he feels lightheaded and dizzy  Worsens after opening eyes  Reports "chills"  No CP/SOB  Pt is a 53year old male with a PMH of Type II DM, Bipolar I, depression and anxiety presenting with dizziness  Pt states he constantly has dizziness that is worse with movements  He has been seen for this issue numerous times with negative CT 8/18/19 and negative CTA 9/4/19  He has not yet followed up  Denies fever, headache, changes in vision, lightheadedness, syncope, head injury, numbness, weakness, chest pain, SOB, n/v/d  Prior to Admission Medications   Prescriptions Last Dose Informant Patient Reported? Taking?   divalproex sodium (DEPAKOTE) 500 mg EC tablet   Yes No   Sig: Take 500 mg by mouth 2 (two) times a day   hydrOXYzine HCL (ATARAX) 25 mg tablet   No No   Sig: Take 1 tablet (25 mg total) by mouth every 8 (eight) hours as needed for anxiety   hydrOXYzine HCL (ATARAX) 25 mg tablet   No No   Sig: Take 1 tablet (25 mg total) by mouth every 6 (six) hours   insulin glargine (LANTUS SOLOSTAR) 100 units/mL injection pen   Yes No   Sig: Inject 40 Units under the skin   lisinopril (ZESTRIL) 10 mg tablet   Yes No   Sig: Take 10 mg by mouth daily   metFORMIN (GLUCOPHAGE) 1000 MG tablet   Yes No   Sig: Take 1,000 mg by mouth      Facility-Administered Medications: None       Past Medical History:   Diagnosis Date    Anxiety     Asthma     Bipolar 1 disorder ([facility] )     Depression     Diabetes mellitus ([facility] )     Psychiatric disorder        Past Surgical History:   Procedure Laterality Date    APPENDECTOMY         History reviewed  No pertinent family history  I have reviewed and agree with the history as documented      Social History     Tobacco Use    Smoking status: Current Some Day Smoker    Smokeless tobacco: Never Used   Substance Use Topics    Alcohol use: No    Drug use: No        Review of Systems   Constitutional: Negative for activity change, appetite change, chills, diaphoresis, fatigue and fever  Respiratory: Negative for cough, chest tightness and shortness of breath  Cardiovascular: Negative for chest pain  Gastrointestinal: Negative for abdominal pain, constipation, diarrhea, nausea and vomiting  Genitourinary: Negative for decreased urine volume and difficulty urinating  Neurological: Positive for dizziness  Negative for weakness, light-headedness and headaches  Physical Exam  Physical Exam   Constitutional: He is oriented to person, place, and time  He appears well-developed and well-nourished  No distress  HENT:   Head: Normocephalic and atraumatic  Right Ear: External ear normal    Left Ear: External ear normal    Nose: Nose normal    Mouth/Throat: Oropharynx is clear and moist  No oropharyngeal exudate  Eyes: Pupils are equal, round, and reactive to light  Conjunctivae and EOM are normal    Neck: Normal range of motion  Neck supple  Cardiovascular: Normal rate, regular rhythm, normal heart sounds and intact distal pulses  Pulmonary/Chest: Effort normal and breath sounds normal    Abdominal: Soft  Bowel sounds are normal  He exhibits no distension  There is no tenderness  Musculoskeletal: Normal range of motion  Neurological: He is alert and oriented to person, place, and time  He has normal strength  No cranial nerve deficit or sensory deficit  He exhibits normal muscle tone  He displays a negative Romberg sign  Coordination and gait normal  GCS eye subscore is 4  GCS verbal subscore is 5  GCS motor subscore is 6  Non-focal neuro exam    Skin: Skin is warm and dry  Capillary refill takes less than 2 seconds  He is not diaphoretic         Vital Signs  ED Triage Vitals [09/08/19 0248]   Temperature Pulse Respirations Blood Pressure SpO2   97 6 °F (36 4 °C) 77 18 134/76 99 %      Temp Source Heart Rate Source Patient Position - Orthostatic VS BP Location FiO2 (%)   Oral Monitor Lying Right arm --      Pain Score       No Pain           Vitals:    09/08/19 0248   BP: 134/76   Pulse: 77   Patient Position - Orthostatic VS: Lying         Visual Acuity      ED Medications  Medications   meclizine (ANTIVERT) tablet 25 mg (has no administration in time range)       Diagnostic Studies  Results Reviewed     None                 No orders to display              Procedures  ECG 12 Lead Documentation Only  Date/Time: 9/8/2019 3:18 AM  Performed by: Barbi Villafana PA-C  Authorized by: Barbi Villafana PA-C     ECG reviewed by me, the ED Provider: yes    Patient location:  ED  Previous ECG:     Previous ECG:  Compared to current    Similarity:  No change    Comparison to cardiac monitor: No    Interpretation:     Interpretation: normal    Rate:     ECG rate:  75    ECG rate assessment: normal    Rhythm:     Rhythm: sinus rhythm    Ectopy:     Ectopy: none    QRS:     QRS axis:  Normal    QRS intervals:  Normal  Conduction:     Conduction: normal    ST segments:     ST segments:  Normal  T waves:     T waves: normal             ED Course  ED Course as of Sep 08 0322   Sun Sep 08, 2019   0318 Patient having acute on chronic vertigo symptoms  He has been worked up numerous times in the ED for the same complaint  He has not followed up with anyone  He has had recent negative workup and CTA 9/4/19  His EKG is normal  He has no gait abnormalities  Non-focal neuro exam  Denies head injury  Educated on return precautions  Follow up with PT or neurology                                    MDM    Disposition  Final diagnoses:   Vertigo     Time reflects when diagnosis was documented in both MDM as applicable and the Disposition within this note     Time User Action Codes Description Comment    9/8/2019  3:19 AM Glen Dahl Add [R42] Vertigo       ED Disposition     ED Disposition Condition Date/Time Comment    Discharge Good Sun Sep 8, 2019  3:20 AM Aurora Cantor Strause discharge to home/self care  Follow-up Information     Follow up With Specialties Details Why Contact Info Additional [address] [facility] Family Medicine Schedule an appointment as soon as possible for a visit in 1 day  [address] [facility] [address]  [address] [facility],[address] Floor [place],   [address], [facility], [place], [address]    Neurology [facility] Neurology Schedule an appointment as soon as possible for a visit  As needed [address] [facility] [address] [facility] [address] [phone] Neurology [facility], [address] [place] [address], [address] [facility] Neurology Schedule an appointment as soon as possible for a visit  As needed [address] [place] [address]  [address] [facility], [facility], [address] [place] [address]    [employer]  Call in 1 day Physical therapy offices [phone]             Patient's Medications   Discharge Prescriptions    MECLIZINE (ANTIVERT) 25 MG TABLET    Take 1 tablet (25 mg total) by mouth 3 (three) times a day as needed for dizziness       Start Date: 9/8/2019  End Date: --       Order Dose: 25 mg       Quantity: 30 tablet    Refills: 0     No discharge procedures on file      ED Provider  Electronically Signed by           Bernadette Morin PA-C  09/08/19 5538

## 2019-09-10 ENCOUNTER — HOSPITAL ENCOUNTER (EMERGENCY)
Facility: HOSPITAL | Age: 49
Discharge: HOME | End: 2019-09-10
Attending: EMERGENCY MEDICINE | Admitting: EMERGENCY MEDICINE
Payer: COMMERCIAL

## 2019-09-10 ENCOUNTER — APPOINTMENT (EMERGENCY)
Dept: ULTRASOUND IMAGING | Facility: HOSPITAL | Age: 49
End: 2019-09-10
Payer: COMMERCIAL

## 2019-09-10 VITALS
TEMPERATURE: 96.1 F | DIASTOLIC BLOOD PRESSURE: 55 MMHG | HEART RATE: 58 BPM | OXYGEN SATURATION: 99 % | WEIGHT: 137 LBS | BODY MASS INDEX: 22.11 KG/M2 | RESPIRATION RATE: 12 BRPM | SYSTOLIC BLOOD PRESSURE: 123 MMHG

## 2019-09-10 DIAGNOSIS — R10.9 ABDOMINAL PAIN: Primary | ICD-10-CM

## 2019-09-10 DIAGNOSIS — R73.9 HYPERGLYCEMIA: ICD-10-CM

## 2019-09-10 LAB
ALBUMIN SERPL BCP-MCNC: 4.3 G/DL (ref 3–5.2)
ALP SERPL-CCNC: 146 U/L (ref 43–122)
ALT SERPL W P-5'-P-CCNC: 15 U/L (ref 9–52)
ANION GAP SERPL CALCULATED.3IONS-SCNC: 10 MMOL/L (ref 5–14)
AST SERPL W P-5'-P-CCNC: 24 U/L (ref 17–59)
BACTERIA UR QL AUTO: ABNORMAL /HPF
BASOPHILS # BLD AUTO: 0 THOUSANDS/ΜL (ref 0–0.1)
BASOPHILS NFR BLD AUTO: 1 % (ref 0–1)
BILIRUB SERPL-MCNC: 0.5 MG/DL
BILIRUB UR QL STRIP: NEGATIVE
BUN SERPL-MCNC: 15 MG/DL (ref 5–25)
CALCIUM SERPL-MCNC: 9.5 MG/DL (ref 8.4–10.2)
CHLORIDE SERPL-SCNC: 95 MMOL/L (ref 97–108)
CLARITY UR: CLEAR
CO2 SERPL-SCNC: 29 MMOL/L (ref 22–30)
COLOR UR: ABNORMAL
CREAT SERPL-MCNC: 0.74 MG/DL (ref 0.7–1.5)
EOSINOPHIL # BLD AUTO: 0.1 THOUSAND/ΜL (ref 0–0.4)
EOSINOPHIL NFR BLD AUTO: 2 % (ref 0–6)
ERYTHROCYTE [DISTWIDTH] IN BLOOD BY AUTOMATED COUNT: 13.6 %
GFR SERPL CREATININE-BSD FRML MDRD: 110 ML/MIN/1.73SQ M
GLUCOSE SERPL-MCNC: 287 MG/DL (ref 65–140)
GLUCOSE SERPL-MCNC: 391 MG/DL (ref 65–140)
GLUCOSE SERPL-MCNC: 526 MG/DL (ref 70–99)
GLUCOSE UR STRIP-MCNC: ABNORMAL MG/DL
HCT VFR BLD AUTO: 43.8 % (ref 41–53)
HGB BLD-MCNC: 15 G/DL (ref 13.5–17.5)
HGB UR QL STRIP.AUTO: NEGATIVE
KETONES UR STRIP-MCNC: NEGATIVE MG/DL
LEUKOCYTE ESTERASE UR QL STRIP: NEGATIVE
LIPASE SERPL-CCNC: 152 U/L (ref 23–300)
LYMPHOCYTES # BLD AUTO: 2.2 THOUSANDS/ΜL (ref 0.5–4)
LYMPHOCYTES NFR BLD AUTO: 33 % (ref 25–45)
MCH RBC QN AUTO: 30.7 PG (ref 26–34)
MCHC RBC AUTO-ENTMCNC: 34.2 G/DL (ref 31–36)
MCV RBC AUTO: 90 FL (ref 80–100)
MONOCYTES # BLD AUTO: 0.6 THOUSAND/ΜL (ref 0.2–0.9)
MONOCYTES NFR BLD AUTO: 9 % (ref 1–10)
NEUTROPHILS # BLD AUTO: 3.8 THOUSANDS/ΜL (ref 1.8–7.8)
NEUTS SEG NFR BLD AUTO: 56 % (ref 45–65)
NITRITE UR QL STRIP: NEGATIVE
NON-SQ EPI CELLS URNS QL MICRO: ABNORMAL /HPF
PH UR STRIP.AUTO: 6 [PH]
PLATELET # BLD AUTO: 237 THOUSANDS/UL (ref 150–450)
PMV BLD AUTO: 9.9 FL (ref 8.9–12.7)
POTASSIUM SERPL-SCNC: 4.5 MMOL/L (ref 3.6–5)
PROT SERPL-MCNC: 7.5 G/DL (ref 5.9–8.4)
PROT UR STRIP-MCNC: NEGATIVE MG/DL
RBC # BLD AUTO: 4.89 MILLION/UL (ref 4.5–5.9)
RBC #/AREA URNS AUTO: ABNORMAL /HPF
SODIUM SERPL-SCNC: 134 MMOL/L (ref 137–147)
SP GR UR STRIP.AUTO: 1.01 (ref 1–1.04)
UROBILINOGEN UA: NEGATIVE MG/DL
VALPROATE SERPL-MCNC: <10 UG/ML (ref 50–120)
WBC # BLD AUTO: 6.7 THOUSAND/UL (ref 4.5–11)
WBC #/AREA URNS AUTO: ABNORMAL /HPF

## 2019-09-10 PROCEDURE — 99283 EMERGENCY DEPT VISIT LOW MDM: CPT | Performed by: EMERGENCY MEDICINE

## 2019-09-10 PROCEDURE — 83690 ASSAY OF LIPASE: CPT | Performed by: EMERGENCY MEDICINE

## 2019-09-10 PROCEDURE — NC001 PR NO CHARGE: Performed by: EMERGENCY MEDICINE

## 2019-09-10 PROCEDURE — 96372 THER/PROPH/DIAG INJ SC/IM: CPT

## 2019-09-10 PROCEDURE — 80164 ASSAY DIPROPYLACETIC ACD TOT: CPT | Performed by: EMERGENCY MEDICINE

## 2019-09-10 PROCEDURE — 80053 COMPREHEN METABOLIC PANEL: CPT | Performed by: EMERGENCY MEDICINE

## 2019-09-10 PROCEDURE — 36415 COLL VENOUS BLD VENIPUNCTURE: CPT | Performed by: EMERGENCY MEDICINE

## 2019-09-10 PROCEDURE — 96361 HYDRATE IV INFUSION ADD-ON: CPT

## 2019-09-10 PROCEDURE — 85025 COMPLETE CBC W/AUTO DIFF WBC: CPT | Performed by: EMERGENCY MEDICINE

## 2019-09-10 PROCEDURE — 96374 THER/PROPH/DIAG INJ IV PUSH: CPT

## 2019-09-10 PROCEDURE — 81001 URINALYSIS AUTO W/SCOPE: CPT | Performed by: EMERGENCY MEDICINE

## 2019-09-10 PROCEDURE — 82948 REAGENT STRIP/BLOOD GLUCOSE: CPT

## 2019-09-10 PROCEDURE — 99284 EMERGENCY DEPT VISIT MOD MDM: CPT

## 2019-09-10 PROCEDURE — 76705 ECHO EXAM OF ABDOMEN: CPT

## 2019-09-10 PROCEDURE — 81003 URINALYSIS AUTO W/O SCOPE: CPT | Performed by: EMERGENCY MEDICINE

## 2019-09-10 RX ORDER — ACETAMINOPHEN 325 MG/1
975 TABLET ORAL ONCE
Status: COMPLETED | OUTPATIENT
Start: 2019-09-10 | End: 2019-09-10

## 2019-09-10 RX ORDER — SUCRALFATE ORAL 1 G/10ML
1 SUSPENSION ORAL 4 TIMES DAILY
Qty: 420 ML | Refills: 0 | Status: SHIPPED | OUTPATIENT
Start: 2019-09-10 | End: 2019-09-13

## 2019-09-10 RX ORDER — INSULIN GLARGINE 100 [IU]/ML
10 INJECTION, SOLUTION SUBCUTANEOUS ONCE
Status: DISCONTINUED | OUTPATIENT
Start: 2019-09-10 | End: 2019-09-10

## 2019-09-10 RX ORDER — DICYCLOMINE HCL 20 MG
20 TABLET ORAL 2 TIMES DAILY
Qty: 20 TABLET | Refills: 0 | Status: ON HOLD | OUTPATIENT
Start: 2019-09-10 | End: 2019-11-07 | Stop reason: SDUPTHER

## 2019-09-10 RX ORDER — FAMOTIDINE 20 MG/1
20 TABLET, FILM COATED ORAL 2 TIMES DAILY
Qty: 14 TABLET | Refills: 0 | Status: ON HOLD | OUTPATIENT
Start: 2019-09-10 | End: 2019-11-07 | Stop reason: SDUPTHER

## 2019-09-10 RX ORDER — DICYCLOMINE HCL 20 MG
20 TABLET ORAL ONCE
Status: COMPLETED | OUTPATIENT
Start: 2019-09-10 | End: 2019-09-10

## 2019-09-10 RX ORDER — MAGNESIUM HYDROXIDE/ALUMINUM HYDROXICE/SIMETHICONE 120; 1200; 1200 MG/30ML; MG/30ML; MG/30ML
30 SUSPENSION ORAL ONCE
Status: COMPLETED | OUTPATIENT
Start: 2019-09-10 | End: 2019-09-10

## 2019-09-10 RX ADMIN — ACETAMINOPHEN 975 MG: 325 TABLET ORAL at 03:55

## 2019-09-10 RX ADMIN — SODIUM CHLORIDE 1000 ML: 0.9 INJECTION, SOLUTION INTRAVENOUS at 04:55

## 2019-09-10 RX ADMIN — INSULIN HUMAN 10 UNITS: 100 INJECTION, SOLUTION PARENTERAL at 05:31

## 2019-09-10 RX ADMIN — INSULIN HUMAN 10 UNITS: 100 INJECTION, SOLUTION PARENTERAL at 06:30

## 2019-09-10 RX ADMIN — DICYCLOMINE HYDROCHLORIDE 20 MG: 20 TABLET ORAL at 03:55

## 2019-09-10 RX ADMIN — FAMOTIDINE 20 MG: 10 INJECTION, SOLUTION INTRAVENOUS at 03:56

## 2019-09-10 RX ADMIN — SODIUM CHLORIDE 1000 ML: 0.9 INJECTION, SOLUTION INTRAVENOUS at 03:59

## 2019-09-10 RX ADMIN — ALUMINUM HYDROXIDE, MAGNESIUM HYDROXIDE, AND SIMETHICONE 30 ML: 200; 200; 20 SUSPENSION ORAL at 03:55

## 2019-09-10 NOTE — ED PROVIDER NOTES
History  Chief Complaint   Patient presents with    Abdominal Pain     started yesterday  denies N/V/D       52-year-old male with multiple previous nonspecific ER visit presents for evaluation of 1 day of upper abdominal right upper quadrant abdominal pain without any associated nausea vomiting or diarrhea  There are no relieving exacerbating factors, the pain is not associated with food intake  No shortness of breath , chest pain, no diaphoresis  Patient sleeping comfortably on initial evaluation in no acute distress  Patient did not take any medications prior to arrival, no similar pain in the past, no history of abdominal surgeries, no history of kidney stones or gallbladder issues  Prior to Admission Medications   Prescriptions Last Dose Informant Patient Reported? Taking?   divalproex sodium (DEPAKOTE) 500 mg EC tablet   Yes No   Sig: Take 500 mg by mouth 2 (two) times a day   hydrOXYzine HCL (ATARAX) 25 mg tablet   No No   Sig: Take 1 tablet (25 mg total) by mouth every 8 (eight) hours as needed for anxiety   hydrOXYzine HCL (ATARAX) 25 mg tablet   No No   Sig: Take 1 tablet (25 mg total) by mouth every 6 (six) hours   insulin glargine (LANTUS SOLOSTAR) 100 units/mL injection pen   Yes No   Sig: Inject 40 Units under the skin   lisinopril (ZESTRIL) 10 mg tablet   Yes No   Sig: Take 10 mg by mouth daily   meclizine (ANTIVERT) 25 mg tablet   No No   Sig: Take 1 tablet (25 mg total) by mouth 3 (three) times a day as needed for dizziness   metFORMIN (GLUCOPHAGE) 1000 MG tablet   Yes No   Sig: Take 1,000 mg by mouth      Facility-Administered Medications: None       Past Medical History:   Diagnosis Date    Anxiety     Asthma     Bipolar 1 disorder ([facility] )     Depression     Diabetes mellitus ([facility] )     Psychiatric disorder        Past Surgical History:   Procedure Laterality Date    APPENDECTOMY         History reviewed  No pertinent family history    I have reviewed and agree with the history as documented  Social History     Tobacco Use    Smoking status: Former Smoker     Types: Cigarettes    Smokeless tobacco: Never Used   Substance Use Topics    Alcohol use: No    Drug use: No        Review of Systems   Constitutional: Negative for appetite change, chills and fever  HENT: Negative for rhinorrhea and sore throat  Eyes: Negative for photophobia and visual disturbance  Respiratory: Negative for cough and shortness of breath  Cardiovascular: Negative for chest pain and palpitations  Gastrointestinal: Positive for abdominal pain  Negative for diarrhea  Genitourinary: Negative for dysuria, frequency and urgency  Skin: Negative for rash  Neurological: Negative for dizziness and weakness  All other systems reviewed and are negative  Physical Exam  Physical Exam   Constitutional: He is oriented to person, place, and time  He appears well-developed and well-nourished  HENT:   Head: Normocephalic and atraumatic  Right Ear: External ear normal    Left Ear: External ear normal    Mouth/Throat: Oropharynx is clear and moist    Eyes: Pupils are equal, round, and reactive to light  Conjunctivae and EOM are normal    Neck: Normal range of motion  Neck supple  No JVD present  No tracheal deviation present  Cardiovascular: Normal rate, regular rhythm and normal heart sounds  Exam reveals no gallop and no friction rub  No murmur heard  Pulmonary/Chest: Effort normal  No stridor  No respiratory distress  He has no wheezes  He has no rales  Abdominal: Soft  He exhibits no distension and no mass  There is tenderness in the right upper quadrant and epigastric area  There is no rebound and no guarding  Musculoskeletal: Normal range of motion  He exhibits no edema  Neurological: He is alert and oriented to person, place, and time  No cranial nerve deficit  Skin: Skin is warm and dry  No rash noted  No erythema  No pallor  Psychiatric: He has a normal mood and affect     Nursing note and vitals reviewed        Vital Signs  ED Triage Vitals   Temperature Pulse Respirations Blood Pressure SpO2   09/10/19 0222 09/10/19 0222 09/10/19 0222 09/10/19 0222 09/10/19 0222   (!) 97 4 °F (36 3 °C) 73 14 121/83 99 %      Temp Source Heart Rate Source Patient Position - Orthostatic VS BP Location FiO2 (%)   09/10/19 0222 09/10/19 0222 09/10/19 0222 09/10/19 0222 --   Tympanic Monitor Sitting Right arm       Pain Score       09/10/19 0844       No Pain           Vitals:    09/10/19 0222 09/10/19 0844   BP: 121/83 123/55   Pulse: 73 58   Patient Position - Orthostatic VS: Sitting Lying         Visual Acuity      ED Medications  Medications   acetaminophen (TYLENOL) tablet 975 mg (975 mg Oral Given 9/10/19 0355)   famotidine (PEPCID) injection 20 mg (20 mg Intravenous Given 9/10/19 0356)   aluminum-magnesium hydroxide-simethicone (MYLANTA) 200-200-20 mg/5 mL oral suspension 30 mL (30 mL Oral Given 9/10/19 0355)   dicyclomine (BENTYL) tablet 20 mg (20 mg Oral Given 9/10/19 0355)   sodium chloride 0 9 % bolus 1,000 mL (0 mL Intravenous Stopped 9/10/19 0456)   sodium chloride 0 9 % bolus 1,000 mL (0 mL Intravenous Stopped 9/10/19 0600)   insulin regular (HumuLIN R,NovoLIN R) injection 10 Units (10 Units Subcutaneous Given 9/10/19 0531)   insulin regular (HumuLIN R,NovoLIN R) injection 10 Units (10 Units Subcutaneous Given 9/10/19 0630)       Diagnostic Studies  Results Reviewed     Procedure Component Value Units Date/Time    Fingerstick Glucose (POCT) [[id number]]  (Abnormal) Collected:  09/10/19 0721    Lab Status:  Final result Updated:  09/10/19 0723     POC Glucose 287 mg/dl     Fingerstick Glucose (POCT) [[id number]]  (Abnormal) Collected:  09/10/19 0617    Lab Status:  Final result Updated:  09/10/19 0618     POC Glucose 391 mg/dl     Comprehensive metabolic panel [[id number]]  (Abnormal) Collected:  09/10/19 0354    Lab Status:  Final result Specimen:  Blood from Line, Venous Updated:  09/10/19 2073 Sodium 134 mmol/L      Potassium 4 5 mmol/L      Chloride 95 mmol/L      CO2 29 mmol/L      ANION GAP 10 mmol/L      BUN 15 mg/dL      Creatinine 0 74 mg/dL      Glucose 526 mg/dL      Calcium 9 5 mg/dL      AST 24 U/L      ALT 15 U/L      Alkaline Phosphatase 146 U/L      Total Protein 7 5 g/dL      Albumin 4 3 g/dL      Total Bilirubin 0 50 mg/dL      eGFR 110 ml/min/1 73sq m     Narrative:       Hemolysis  National Kidney Disease Foundation guidelines for Chronic Kidney Disease (CKD):     Stage 1 with normal or high GFR (GFR > 90 mL/min/1 73 square meters)    Stage 2 Mild CKD (GFR = 60-89 mL/min/1 73 square meters)    Stage 3A Moderate CKD (GFR = 45-59 mL/min/1 73 square meters)    Stage 3B Moderate CKD (GFR = 30-44 mL/min/1 73 square meters)    Stage 4 Severe CKD (GFR = 15-29 mL/min/1 73 square meters)    Stage 5 End Stage CKD (GFR <15 mL/min/1 73 square meters)  Note: GFR calculation is accurate only with a steady state creatinine    Urine Microscopic [[id number]]  (Abnormal) Collected:  09/10/19 0416    Lab Status:  Final result Specimen:  Urine, Clean Catch Updated:  09/10/19 0444     RBC, UA None Seen /hpf      WBC, UA 0-1 /hpf      Epithelial Cells None Seen /hpf      Bacteria, UA None Seen /hpf     Lipase [[id number]]  (Normal) Collected:  09/10/19 0354    Lab Status:  Final result Specimen:  Blood from Line, Venous Updated:  09/10/19 0442     Lipase 152 u/L     Narrative:       Hemolysis    Valproic acid level, total [[id number]]  (Abnormal) Collected:  09/10/19 0354    Lab Status:  Final result Specimen:  Blood from Arm, Left Updated:  09/10/19 0442     Valproic Acid, Total <10 0 ug/mL     CBC and differential [[id number]]  (Normal) Collected:  09/10/19 0354    Lab Status:  Final result Specimen:  Blood from Line, Venous Updated:  09/10/19 0432     WBC 6 70 Thousand/uL      RBC 4 89 Million/uL      Hemoglobin 15 0 g/dL      Hematocrit 43 8 %      MCV 90 fL      MCH 30 7 pg      MCHC 34 2 g/dL      RDW 13 6 %      MPV 9 9 fL      Platelets 520 Thousands/uL      Neutrophils Relative 56 %      Lymphocytes Relative 33 %      Monocytes Relative 9 %      Eosinophils Relative 2 %      Basophils Relative 1 %      Neutrophils Absolute 3 80 Thousands/µL      Lymphocytes Absolute 2 20 Thousands/µL      Monocytes Absolute 0 60 Thousand/µL      Eosinophils Absolute 0 10 Thousand/µL      Basophils Absolute 0 00 Thousands/µL     UA w Reflex to Microscopic w Reflex to Culture [[id number]]  (Abnormal) Collected:  09/10/19 0416    Lab Status:  Final result Specimen:  Urine, Clean Catch Updated:  09/10/19 0429     Color, UA Straw     Clarity, UA Clear     Specific Gravity, UA 1 010     pH, UA 6 0     Leukocytes, UA Negative     Nitrite, UA Negative     Protein, UA Negative mg/dl      Glucose, UA >=1000 (1%) mg/dl      Ketones, UA Negative mg/dl      Bilirubin, UA Negative     Blood, UA Negative     UROBILINOGEN UA Negative mg/dL                  US gallbladder   Final Result by Alexia Amaya MD (09/10 0820)      Normal       Workstation performed: [id number]                    Procedures  Procedures       ED Course                               MDM  Number of Diagnoses or Management Options  Diagnosis management comments: 81-year-old male with upper abdominal pain without any obvious distress, exam only notable for mild tenderness in the right upper quadrant, epigastrium, no overlying skin lesions,  No nausea no vomiting, no fevers    Will check blood work, will treat symptomatically and re-evaluate      Disposition  Final diagnoses:   Abdominal pain   Hyperglycemia     Time reflects when diagnosis was documented in both MDM as applicable and the Disposition within this note     Time User Action Codes Description Comment    9/10/2019  6:31 AM Enrigue Marines Add [R10 9] Abdominal pain     9/10/2019  8:37 AM Lemond Elva Add [R73 9] Hyperglycemia       ED Disposition     ED Disposition Condition Date/Time Comment    Discharge Stable Tue Sep 10, 2019  8:37 AM Vianey Long discharge to home/self care              Follow-up Information     Follow up With Specialties Details Why 9 [facility] Emergency Medicine  If symptoms worsen [address] [id number] 252 [facility] Family Medicine Schedule an appointment as soon as possible for a visit   [address] [facility] [address] [facility]       [address]   [address]  [address] [facility]  [place] [address] [facility]  [phone]            Discharge Medication List as of 9/10/2019  8:38 AM      START taking these medications    Details   dicyclomine (BENTYL) 20 mg tablet Take 1 tablet (20 mg total) by mouth 2 (two) times a day, Starting Tue 9/10/2019, Print      famotidine (PEPCID) 20 mg tablet Take 1 tablet (20 mg total) by mouth 2 (two) times a day, Starting Tue 9/10/2019, Print      sucralfate (CARAFATE) 1 g/10 mL suspension Take 10 mL (1 g total) by mouth 4 (four) times a day, Starting Tue 9/10/2019, Print         CONTINUE these medications which have NOT CHANGED    Details   divalproex sodium (DEPAKOTE) 500 mg EC tablet Take 500 mg by mouth 2 (two) times a day, Historical Med      !! hydrOXYzine HCL (ATARAX) 25 mg tablet Take 1 tablet (25 mg total) by mouth every 8 (eight) hours as needed for anxiety, Starting Sat 7/6/2019, Print      !! hydrOXYzine HCL (ATARAX) 25 mg tablet Take 1 tablet (25 mg total) by mouth every 6 (six) hours, Starting Sun 7/7/2019, Normal      insulin glargine (LANTUS SOLOSTAR) 100 units/mL injection pen Inject 40 Units under the skin, Starting Fri 1/13/2017, Historical Med      lisinopril (ZESTRIL) 10 mg tablet Take 10 mg by mouth daily, Starting Mon 11/19/2018, Historical Med      meclizine (ANTIVERT) 25 mg tablet Take 1 tablet (25 mg total) by mouth 3 (three) times a day as needed for dizziness, Starting Sun 9/8/2019, Print metFORMIN (GLUCOPHAGE) 1000 MG tablet Take 1,000 mg by mouth, Starting Thu 10/27/2016, Historical Med       !! - Potential duplicate medications found  Please discuss with provider  No discharge procedures on file      ED Provider  Electronically Signed by           Stephanie Renee MD  09/10/19 2498

## 2019-09-10 NOTE — ED PROVIDER NOTES
History  Chief Complaint   Patient presents with    Abdominal Pain     started yesterday  denies N/V/D  HPI    Prior to Admission Medications   Prescriptions Last Dose Informant Patient Reported? Taking?   divalproex sodium (DEPAKOTE) 500 mg EC tablet   Yes No   Sig: Take 500 mg by mouth 2 (two) times a day   hydrOXYzine HCL (ATARAX) 25 mg tablet   No No   Sig: Take 1 tablet (25 mg total) by mouth every 8 (eight) hours as needed for anxiety   hydrOXYzine HCL (ATARAX) 25 mg tablet   No No   Sig: Take 1 tablet (25 mg total) by mouth every 6 (six) hours   insulin glargine (LANTUS SOLOSTAR) 100 units/mL injection pen   Yes No   Sig: Inject 40 Units under the skin   lisinopril (ZESTRIL) 10 mg tablet   Yes No   Sig: Take 10 mg by mouth daily   meclizine (ANTIVERT) 25 mg tablet   No No   Sig: Take 1 tablet (25 mg total) by mouth 3 (three) times a day as needed for dizziness   metFORMIN (GLUCOPHAGE) 1000 MG tablet   Yes No   Sig: Take 1,000 mg by mouth      Facility-Administered Medications: None       Past Medical History:   Diagnosis Date    Anxiety     Asthma     Bipolar 1 disorder ([facility] )     Depression     Diabetes mellitus ([facility] )     Psychiatric disorder        Past Surgical History:   Procedure Laterality Date    APPENDECTOMY         History reviewed  No pertinent family history  I have reviewed and agree with the history as documented      Social History     Tobacco Use    Smoking status: Former Smoker     Types: Cigarettes    Smokeless tobacco: Never Used   Substance Use Topics    Alcohol use: No    Drug use: No        Review of Systems    Physical Exam  Physical Exam    Vital Signs  ED Triage Vitals   Temperature Pulse Respirations Blood Pressure SpO2   09/10/19 0222 09/10/19 0222 09/10/19 0222 09/10/19 0222 09/10/19 0222   (!) 97 4 °F (36 3 °C) 73 14 121/83 99 %      Temp Source Heart Rate Source Patient Position - Orthostatic VS BP Location FiO2 (%)   09/10/19 0222 09/10/19 0222 09/10/19 0222 09/10/19 0222 --   Tympanic Monitor Sitting Right arm       Pain Score       09/10/19 0844       No Pain           Vitals:    09/10/19 0222 09/10/19 0844   BP: 121/83 123/55   Pulse: 73 58   Patient Position - Orthostatic VS: Sitting Lying         Visual Acuity      ED Medications  Medications   acetaminophen (TYLENOL) tablet 975 mg (975 mg Oral Given 9/10/19 0355)   famotidine (PEPCID) injection 20 mg (20 mg Intravenous Given 9/10/19 0356)   aluminum-magnesium hydroxide-simethicone (MYLANTA) 200-200-20 mg/5 mL oral suspension 30 mL (30 mL Oral Given 9/10/19 0355)   dicyclomine (BENTYL) tablet 20 mg (20 mg Oral Given 9/10/19 0355)   sodium chloride 0 9 % bolus 1,000 mL (0 mL Intravenous Stopped 9/10/19 0456)   sodium chloride 0 9 % bolus 1,000 mL (0 mL Intravenous Stopped 9/10/19 0600)   insulin regular (HumuLIN R,NovoLIN R) injection 10 Units (10 Units Subcutaneous Given 9/10/19 0531)   insulin regular (HumuLIN R,NovoLIN R) injection 10 Units (10 Units Subcutaneous Given 9/10/19 0630)       Diagnostic Studies  Results Reviewed     Procedure Component Value Units Date/Time    Fingerstick Glucose (POCT) [[id number]]  (Abnormal) Collected:  09/10/19 0721    Lab Status:  Final result Updated:  09/10/19 0723     POC Glucose 287 mg/dl     Fingerstick Glucose (POCT) [[id number]]  (Abnormal) Collected:  09/10/19 0617    Lab Status:  Final result Updated:  09/10/19 0618     POC Glucose 391 mg/dl     Comprehensive metabolic panel [[id number]]  (Abnormal) Collected:  09/10/19 0354    Lab Status:  Final result Specimen:  Blood from Line, Venous Updated:  09/10/19 0448     Sodium 134 mmol/L      Potassium 4 5 mmol/L      Chloride 95 mmol/L      CO2 29 mmol/L      ANION GAP 10 mmol/L      BUN 15 mg/dL      Creatinine 0 74 mg/dL      Glucose 526 mg/dL      Calcium 9 5 mg/dL      AST 24 U/L      ALT 15 U/L      Alkaline Phosphatase 146 U/L      Total Protein 7 5 g/dL      Albumin 4 3 g/dL      Total Bilirubin 0 50 mg/dL      eGFR 110 ml/min/1 73sq m     Narrative:       Hemolysis  National Kidney Disease Foundation guidelines for Chronic Kidney Disease (CKD):     Stage 1 with normal or high GFR (GFR > 90 mL/min/1 73 square meters)    Stage 2 Mild CKD (GFR = 60-89 mL/min/1 73 square meters)    Stage 3A Moderate CKD (GFR = 45-59 mL/min/1 73 square meters)    Stage 3B Moderate CKD (GFR = 30-44 mL/min/1 73 square meters)    Stage 4 Severe CKD (GFR = 15-29 mL/min/1 73 square meters)    Stage 5 End Stage CKD (GFR <15 mL/min/1 73 square meters)  Note: GFR calculation is accurate only with a steady state creatinine    Urine Microscopic [[id number]]  (Abnormal) Collected:  09/10/19 0416    Lab Status:  Final result Specimen:  Urine, Clean Catch Updated:  09/10/19 0444     RBC, UA None Seen /hpf      WBC, UA 0-1 /hpf      Epithelial Cells None Seen /hpf      Bacteria, UA None Seen /hpf     Lipase [[id number]]  (Normal) Collected:  09/10/19 0354    Lab Status:  Final result Specimen:  Blood from Line, Venous Updated:  09/10/19 0442     Lipase 152 u/L     Narrative:       Hemolysis    Valproic acid level, total [[id number]]  (Abnormal) Collected:  09/10/19 0354    Lab Status:  Final result Specimen:  Blood from Arm, Left Updated:  09/10/19 0442     Valproic Acid, Total <10 0 ug/mL     CBC and differential [[id number]]  (Normal) Collected:  09/10/19 0354    Lab Status:  Final result Specimen:  Blood from Line, Venous Updated:  09/10/19 0432     WBC 6 70 Thousand/uL      RBC 4 89 Million/uL      Hemoglobin 15 0 g/dL      Hematocrit 43 8 %      MCV 90 fL      MCH 30 7 pg      MCHC 34 2 g/dL      RDW 13 6 %      MPV 9 9 fL      Platelets 923 Thousands/uL      Neutrophils Relative 56 %      Lymphocytes Relative 33 %      Monocytes Relative 9 %      Eosinophils Relative 2 %      Basophils Relative 1 %      Neutrophils Absolute 3 80 Thousands/µL      Lymphocytes Absolute 2 20 Thousands/µL      Monocytes Absolute 0 60 Thousand/µL      Eosinophils Absolute 0 10 Thousand/µL      Basophils Absolute 0 00 Thousands/µL     UA w Reflex to Microscopic w Reflex to Culture [[id number]]  (Abnormal) Collected:  09/10/19 0416    Lab Status:  Final result Specimen:  Urine, Clean Catch Updated:  09/10/19 0429     Color, UA Straw     Clarity, UA Clear     Specific Gravity, UA 1 010     pH, UA 6 0     Leukocytes, UA Negative     Nitrite, UA Negative     Protein, UA Negative mg/dl      Glucose, UA >=1000 (1%) mg/dl      Ketones, UA Negative mg/dl      Bilirubin, UA Negative     Blood, UA Negative     UROBILINOGEN UA Negative mg/dL                  US gallbladder   Final Result by Nataliia Enciso MD (09/10 0820)      Normal       Workstation performed: [id number]                    Procedures  Procedures       ED Course  ED Course as of Sep 10 0846   Tue Sep 10, 2019   0834 Patient resting comfortably  One over results ultrasound negative  Her repeat Accu-Chek 287  Sugars down  Will discharge this time  Patient states he does have medicine and is taking  MDM  Number of Diagnoses or Management Options  Abdominal pain:   Hyperglycemia:      Amount and/or Complexity of Data Reviewed  Clinical lab tests: reviewed  Tests in the radiology section of CPT®: reviewed  Review and summarize past medical records: yes  Discuss the patient with other providers: yes        Disposition  Final diagnoses:   Abdominal pain   Hyperglycemia     Time reflects when diagnosis was documented in both MDM as applicable and the Disposition within this note     Time User Action Codes Description Comment    9/10/2019  6:31 AM Katelynn Monroe Add [R10 9] Abdominal pain     9/10/2019  8:37 AM Rosanna Borja Add [R73 9] Hyperglycemia       ED Disposition     ED Disposition Condition Date/Time Comment    Discharge Stable Tue Sep 10, 2019  8:37 AM Bulmaro Jovel discharge to home/self care              Follow-up Information     Follow up With Specialties Details Why [id number] [facility]  [facility] Emergency Department Emergency Medicine  If symptoms worsen [address] [address] [address] [facility] Primary Family Medicine Schedule an appointment as soon as possible for a visit   [address] [place] [address] [address]   [address]  1000 [facility]  Gurvinder DUFFY  49  [phone]            Patient's Medications   Discharge Prescriptions    DICYCLOMINE (BENTYL) 20 MG TABLET    Take 1 tablet (20 mg total) by mouth 2 (two) times a day       Start Date: 9/10/2019 End Date: --       Order Dose: 20 mg       Quantity: 20 tablet    Refills: 0    FAMOTIDINE (PEPCID) 20 MG TABLET    Take 1 tablet (20 mg total) by mouth 2 (two) times a day       Start Date: 9/10/2019 End Date: --       Order Dose: 20 mg       Quantity: 14 tablet    Refills: 0    SUCRALFATE (CARAFATE) 1 G/10 ML SUSPENSION    Take 10 mL (1 g total) by mouth 4 (four) times a day       Start Date: 9/10/2019 End Date: --       Order Dose: 1 g       Quantity: 420 mL    Refills: 0     No discharge procedures on file      ED Provider  Electronically Signed by           Niru Clemente MD  09/10/19 6697       Niru Clemente MD  09/10/19 5050

## 2019-09-10 NOTE — DISCHARGE INSTRUCTIONS
Please follow-up with your primary care provider for further care, if symptoms worsen please return to the emergency department

## 2019-09-13 ENCOUNTER — HOSPITAL ENCOUNTER (EMERGENCY)
Facility: HOSPITAL | Age: 49
Discharge: HOME | End: 2019-09-13
Attending: EMERGENCY MEDICINE
Payer: COMMERCIAL

## 2019-09-13 VITALS
SYSTOLIC BLOOD PRESSURE: 132 MMHG | BODY MASS INDEX: 21.79 KG/M2 | HEART RATE: 79 BPM | WEIGHT: 135 LBS | DIASTOLIC BLOOD PRESSURE: 96 MMHG | OXYGEN SATURATION: 98 % | RESPIRATION RATE: 16 BRPM | TEMPERATURE: 96.3 F

## 2019-09-13 DIAGNOSIS — F31.9 BIPOLAR DISORDER (HCC): Primary | ICD-10-CM

## 2019-09-13 LAB
ETHANOL EXG-MCNC: 0 MG/DL
GLUCOSE SERPL-MCNC: 279 MG/DL (ref 65–140)

## 2019-09-13 PROCEDURE — 82948 REAGENT STRIP/BLOOD GLUCOSE: CPT

## 2019-09-13 PROCEDURE — 99284 EMERGENCY DEPT VISIT MOD MDM: CPT

## 2019-09-13 PROCEDURE — 99283 EMERGENCY DEPT VISIT LOW MDM: CPT | Performed by: EMERGENCY MEDICINE

## 2019-09-13 PROCEDURE — 82075 ASSAY OF BREATH ETHANOL: CPT | Performed by: EMERGENCY MEDICINE

## 2019-09-13 RX ORDER — FLUTICASONE PROPIONATE 110 UG/1
1 AEROSOL, METERED RESPIRATORY (INHALATION) 2 TIMES DAILY
Status: ON HOLD | COMMUNITY
End: 2019-11-07 | Stop reason: SDUPTHER

## 2019-09-13 RX ORDER — TRAZODONE HYDROCHLORIDE 50 MG/1
TABLET ORAL
Status: ON HOLD | COMMUNITY
Start: 2019-07-17 | End: 2019-11-07 | Stop reason: SDUPTHER

## 2019-09-13 RX ORDER — THIOTHIXENE 1 MG/1
1 CAPSULE ORAL DAILY
COMMUNITY
End: 2019-11-01 | Stop reason: SDUPTHER

## 2019-09-13 RX ORDER — MECLIZINE HYDROCHLORIDE 25 MG/1
25 TABLET ORAL EVERY 8 HOURS PRN
COMMUNITY
Start: 2019-08-18 | End: 2019-10-13 | Stop reason: HOSPADM

## 2019-09-13 RX ORDER — FENOFIBRATE 48 MG/1
48 TABLET, COATED ORAL
Status: ON HOLD | COMMUNITY
Start: 2017-12-12 | End: 2019-11-07 | Stop reason: SDUPTHER

## 2019-09-13 NOTE — ED NOTES
Patient is eating  breakfast provided by [employer], and is resting quietly and comfortably  Patient was seen by Dr Ross Giles and Shaka Morejon, crisis worker  Patient can be discharged       Curly Barragan RN  09/13/19 8353

## 2019-09-13 NOTE — ED TRIAGE NOTES
Per patient he has had suicidal thoughts off and on for the past couple of days, no specific plan  Patient state's that he has been participating in his [employer], his day program and he called his mentor yesterday afternoon  Patient does not have a specific plan to hurt himself  His blood sugar at home was 135 by accu check

## 2019-09-13 NOTE — ED NOTES
Patient reports that he called his  yesterday afternoon who told him to come to the hospital if his symptoms got worse  Patient states that his suicidal thoughts have increased, but that he has no plan and no intention to act  Patient is cooperative with care measures and ready to follow instructions       Heron Mcelroy RN  09/13/19 0376

## 2019-09-13 NOTE — ED PROVIDER NOTES
History  Chief Complaint   Patient presents with    Psychiatric Evaluation     Patient brought in with [employer], having suicidal thoughts off and on for the past couple of days  Patient is a 55-year-old male with a history of bipolar as were all note was office apartment who presents via a PD was suicidal ideations for the last 4 days  Patient states feeling off and on suicidal thoughts for last 2 days but does not want to act upon them  States that he called his mentor yesterday afternoon was told that it feels worse she go to the hospital   Family decided to call 911 from the 07/11 tonight  Patient denies any homicidal ideations but states will see black shadows every once a while  States he is compliant with medication and goes to provide measures for his mental health  Denies any recent alcohol as set drug use  States his sugars been running well  Patient was recently seen for hyperglycemia  Prior to Admission Medications   Prescriptions Last Dose Informant Patient Reported?  Taking?   dicyclomine (BENTYL) 20 mg tablet   No No   Sig: Take 1 tablet (20 mg total) by mouth 2 (two) times a day   famotidine (PEPCID) 20 mg tablet   No No   Sig: Take 1 tablet (20 mg total) by mouth 2 (two) times a day   fenofibrate (TRICOR) 48 mg tablet   Yes Yes   Sig: Take 48 mg by mouth   fluticasone (FLOVENT HFA) 110 MCG/ACT inhaler   Yes No   Sig: Inhale 1 puff   hydrOXYzine HCL (ATARAX) 25 mg tablet   No No   Sig: Take 1 tablet (25 mg total) by mouth every 8 (eight) hours as needed for anxiety   hydrOXYzine HCL (ATARAX) 25 mg tablet   No No   Sig: Take 1 tablet (25 mg total) by mouth every 6 (six) hours   insulin glargine (LANTUS SOLOSTAR) 100 units/mL injection pen Not Taking at Unknown time  Yes No   Sig: Inject 40 Units under the skin   insulin regular (HUMULIN R) 100 units/mL injection   Yes Yes   Sig: Inject under the skin   lisinopril (ZESTRIL) 10 mg tablet   Yes No   Sig: Take 10 mg by mouth daily meclizine (ANTIVERT) 25 mg tablet   No No   Sig: Take 1 tablet (25 mg total) by mouth 3 (three) times a day as needed for dizziness   meclizine (ANTIVERT) 25 mg tablet   Yes Yes   Sig: Take 25 mg by mouth every 8 (eight) hours as needed   metFORMIN (GLUCOPHAGE) 1000 MG tablet   Yes No   Sig: Take 1,000 mg by mouth   thiothixene (NAVANE) 1 mg capsule   Yes No   Sig: Take 1 mg by mouth   traZODone (DESYREL) 50 mg tablet   Yes Yes   Sig: TAKE 1 TABLET BY MOUTH EVERYDAY AT BEDTIME      Facility-Administered Medications: None       Past Medical History:   Diagnosis Date    Anxiety     Asthma     Bipolar 1 disorder ([facility] )     Depression     Diabetes mellitus ([facility] )     Psychiatric disorder        Past Surgical History:   Procedure Laterality Date    APPENDECTOMY         History reviewed  No pertinent family history  I have reviewed and agree with the history as documented  Social History     Tobacco Use    Smoking status: Former Smoker     Types: Cigarettes    Smokeless tobacco: Never Used   Substance Use Topics    Alcohol use: No    Drug use: No        Review of Systems   Constitutional: Negative  HENT: Negative  Eyes: Negative  Respiratory: Negative  Cardiovascular: Negative  Gastrointestinal: Negative  Endocrine: Negative  Genitourinary: Negative  Musculoskeletal: Negative  Skin: Negative  Allergic/Immunologic: Negative  Neurological: Negative  Hematological: Negative  Psychiatric/Behavioral: Positive for suicidal ideas  All other systems reviewed and are negative  Physical Exam  Physical Exam   Constitutional: He is oriented to person, place, and time  He appears well-developed and well-nourished  HENT:   Head: Normocephalic  Right Ear: External ear normal    Left Ear: External ear normal    Nose: Nose normal    Eyes: Pupils are equal, round, and reactive to light  Conjunctivae are normal    Neck: Normal range of motion  Neck supple     Cardiovascular: Normal rate, regular rhythm, normal heart sounds and intact distal pulses  Pulmonary/Chest: Effort normal and breath sounds normal    Abdominal: Soft  Bowel sounds are normal    Musculoskeletal: Normal range of motion  Neurological: He is alert and oriented to person, place, and time  Skin: Skin is warm and dry  Capillary refill takes less than 2 seconds  Psychiatric: His speech is normal  Judgment normal  His affect is blunt  He is slowed and actively hallucinating  Cognition and memory are normal  He expresses suicidal ideation  He is attentive  Nursing note and vitals reviewed  Vital Signs  ED Triage Vitals [09/13/19 0526]   Temperature Pulse Respirations Blood Pressure SpO2   (!) 96 3 °F (35 7 °C) 79 16 132/96 98 %      Temp Source Heart Rate Source Patient Position - Orthostatic VS BP Location FiO2 (%)   Axillary Monitor Sitting Left arm --      Pain Score       No Pain           Vitals:    09/13/19 0526   BP: 132/96   Pulse: 79   Patient Position - Orthostatic VS: Sitting         Visual Acuity      ED Medications  Medications - No data to display    Diagnostic Studies  Results Reviewed     Procedure Component Value Units Date/Time    POCT alcohol breath test [[id number]]  (Normal) Resulted:  09/13/19 0536    Lab Status:  Final result Updated:  09/13/19 0536     EXTBreath Alcohol 0 000    Fingerstick Glucose (POCT) [[id number]]  (Abnormal) Collected:  09/13/19 0526    Lab Status:  Final result Updated:  09/13/19 0531     POC Glucose 279 mg/dl     Rapid drug screen, urine [[id number]]     Lab Status:  No result Specimen:  Urine                  No orders to display              Procedures  Procedures       ED Course  ED Course as of Sep 13 0611   Fri Sep 13, 2019   0610 Seen by crisis  Denies any actual suicide on sore right now  Will follow up as therapist on Monday                                    MDM  Number of Diagnoses or Management Options  Bipolar disorder [facility]):      Amount and/or Complexity of Data Reviewed  Obtain history from someone other than the patient: yes  Review and summarize past medical records: yes        Disposition  Final diagnoses:   Bipolar disorder [facility])     Time reflects when diagnosis was documented in both MDM as applicable and the Disposition within this note     Time User Action Codes Description Comment    9/13/2019  6:10 AM Angeilna Aden Add [F31 9] Bipolar disorder [facility])       ED Disposition     ED Disposition Condition Date/Time Comment    Discharge Stable Fri Sep 13, 2019  6:10 AM Daniella Reeves discharge to home/self care  Follow-up Information    None         Patient's Medications   Discharge Prescriptions    No medications on file     No discharge procedures on file      ED Provider  Electronically Signed by           Makenna Diaz MD  09/13/19 8871

## 2019-09-13 NOTE — ED NOTES
[facility]  Patient stated he was feeling suicidal but he does not want to hurt himself  Patient stated that he was at home just laying around and having racing thoughts about how he had applied for a lot of jobs and doesn't know what's going to happen  Patient walked through his day with crisis and patient realized that he has a lot of positive resources and is able to complete all aspects of his treatment plan on his own  Patient participates in therapy, last appt Tuesday  He has seen his psychiatrist for medications and medications are working well for him  Patient stated he wanted to contract for safety  When asked what that meant patient stated that he was not going to hurt himself  Patient was asked what his worker has said to him in the past during their sessions  Patient stated that they tell him not to go to the emergency room unless its an actual emergency  Patient stated he should have just waited until Monday to see his worker like they planned yesterday afternoon  Patient denied suicidal ideations, homicidal ideations, auditory and visual hallucinations  Patient was encouraged to rest for a little to help relieve his stress he was feeling   Doctor informed of conversation and patient will be discharged at [facility]

## 2019-09-13 NOTE — ED NOTES
Pt given meal tray and is currently resting with no other needs @ this time  All safety precautions are in place including 1:1 @ bedside       Elkin Swift  09/13/19 0606

## 2019-10-12 ENCOUNTER — HOSPITAL ENCOUNTER (INPATIENT)
Facility: HOSPITAL | Age: 49
LOS: 1 days | Discharge: HOME | DRG: 420 | End: 2019-10-13
Attending: EMERGENCY MEDICINE | Admitting: FAMILY MEDICINE
Payer: COMMERCIAL

## 2019-10-12 DIAGNOSIS — R51.9 HEADACHE: Primary | ICD-10-CM

## 2019-10-12 DIAGNOSIS — R53.1 WEAKNESS: ICD-10-CM

## 2019-10-12 DIAGNOSIS — E10.9 TYPE 1 DIABETES MELLITUS (HCC): ICD-10-CM

## 2019-10-12 DIAGNOSIS — I10 HTN (HYPERTENSION): ICD-10-CM

## 2019-10-12 DIAGNOSIS — R73.9 HYPERGLYCEMIA: ICD-10-CM

## 2019-10-12 PROBLEM — F32.9 MAJOR DEPRESSIVE DISORDER WITH CURRENT ACTIVE EPISODE: Status: ACTIVE | Noted: 2019-10-12

## 2019-10-12 PROBLEM — E11.01 HHNC (HYPERGLYCEMIC HYPEROSMOLAR NONKETOTIC COMA) (HCC): Status: ACTIVE | Noted: 2019-10-12

## 2019-10-12 PROBLEM — E78.5 HLD (HYPERLIPIDEMIA): Status: ACTIVE | Noted: 2019-10-12

## 2019-10-12 LAB
ANION GAP SERPL CALCULATED.3IONS-SCNC: 11 MMOL/L (ref 5–14)
BASOPHILS # BLD AUTO: 0 THOUSANDS/ΜL (ref 0–0.1)
BASOPHILS NFR BLD AUTO: 1 % (ref 0–1)
BUN SERPL-MCNC: 16 MG/DL (ref 5–25)
CALCIUM SERPL-MCNC: 8.8 MG/DL (ref 8.4–10.2)
CHLORIDE SERPL-SCNC: 96 MMOL/L (ref 97–108)
CO2 SERPL-SCNC: 27 MMOL/L (ref 22–30)
CREAT SERPL-MCNC: 0.88 MG/DL (ref 0.7–1.5)
EOSINOPHIL # BLD AUTO: 0.1 THOUSAND/ΜL (ref 0–0.4)
EOSINOPHIL NFR BLD AUTO: 2 % (ref 0–6)
ERYTHROCYTE [DISTWIDTH] IN BLOOD BY AUTOMATED COUNT: 12.6 %
EST. AVERAGE GLUCOSE BLD GHB EST-MCNC: 280 MG/DL
GFR SERPL CREATININE-BSD FRML MDRD: 102 ML/MIN/1.73SQ M
GLUCOSE SERPL-MCNC: 159 MG/DL (ref 65–140)
GLUCOSE SERPL-MCNC: 287 MG/DL (ref 65–140)
GLUCOSE SERPL-MCNC: 296 MG/DL (ref 65–140)
GLUCOSE SERPL-MCNC: 492 MG/DL (ref 65–140)
GLUCOSE SERPL-MCNC: 728 MG/DL (ref 70–99)
GLUCOSE SERPL-MCNC: >500 MG/DL (ref 65–140)
HBA1C MFR BLD: 11.4 % (ref 4.2–6.3)
HCT VFR BLD AUTO: 42.6 % (ref 41–53)
HGB BLD-MCNC: 14.1 G/DL (ref 13.5–17.5)
LACTATE SERPL-SCNC: 1.5 MMOL/L (ref 0.7–2)
LYMPHOCYTES # BLD AUTO: 1.5 THOUSANDS/ΜL (ref 0.5–4)
LYMPHOCYTES NFR BLD AUTO: 26 % (ref 25–45)
MCH RBC QN AUTO: 30.2 PG (ref 26–34)
MCHC RBC AUTO-ENTMCNC: 33.1 G/DL (ref 31–36)
MCV RBC AUTO: 91 FL (ref 80–100)
MONOCYTES # BLD AUTO: 0.5 THOUSAND/ΜL (ref 0.2–0.9)
MONOCYTES NFR BLD AUTO: 9 % (ref 1–10)
NEUTROPHILS # BLD AUTO: 3.6 THOUSANDS/ΜL (ref 1.8–7.8)
NEUTS SEG NFR BLD AUTO: 63 % (ref 45–65)
OSMOLALITY UR/SERPL-RTO: 308 MMOL/KG (ref 282–298)
OSMOLALITY UR/SERPL-RTO: 312 MMOL/KG (ref 282–298)
PLATELET # BLD AUTO: 210 THOUSANDS/UL (ref 150–450)
PLATELET # BLD AUTO: 224 THOUSANDS/UL (ref 150–450)
PMV BLD AUTO: 9.3 FL (ref 8.9–12.7)
POTASSIUM SERPL-SCNC: 4.1 MMOL/L (ref 3.6–5)
RBC # BLD AUTO: 4.67 MILLION/UL (ref 4.5–5.9)
SODIUM SERPL-SCNC: 134 MMOL/L (ref 137–147)
TSH SERPL DL<=0.05 MIU/L-ACNC: 2 UIU/ML (ref 0.47–4.68)
WBC # BLD AUTO: 5.8 THOUSAND/UL (ref 4.5–11)

## 2019-10-12 PROCEDURE — 84443 ASSAY THYROID STIM HORMONE: CPT | Performed by: EMERGENCY MEDICINE

## 2019-10-12 PROCEDURE — 99223 1ST HOSP IP/OBS HIGH 75: CPT | Performed by: FAMILY MEDICINE

## 2019-10-12 PROCEDURE — 96375 TX/PRO/DX INJ NEW DRUG ADDON: CPT

## 2019-10-12 PROCEDURE — 96361 HYDRATE IV INFUSION ADD-ON: CPT

## 2019-10-12 PROCEDURE — 99291 CRITICAL CARE FIRST HOUR: CPT | Performed by: EMERGENCY MEDICINE

## 2019-10-12 PROCEDURE — 80048 BASIC METABOLIC PNL TOTAL CA: CPT | Performed by: EMERGENCY MEDICINE

## 2019-10-12 PROCEDURE — 82948 REAGENT STRIP/BLOOD GLUCOSE: CPT

## 2019-10-12 PROCEDURE — 96374 THER/PROPH/DIAG INJ IV PUSH: CPT

## 2019-10-12 PROCEDURE — 83930 ASSAY OF BLOOD OSMOLALITY: CPT | Performed by: FAMILY MEDICINE

## 2019-10-12 PROCEDURE — 85049 AUTOMATED PLATELET COUNT: CPT | Performed by: NURSE PRACTITIONER

## 2019-10-12 PROCEDURE — 99285 EMERGENCY DEPT VISIT HI MDM: CPT

## 2019-10-12 PROCEDURE — 83930 ASSAY OF BLOOD OSMOLALITY: CPT | Performed by: NURSE PRACTITIONER

## 2019-10-12 PROCEDURE — 83605 ASSAY OF LACTIC ACID: CPT | Performed by: NURSE PRACTITIONER

## 2019-10-12 PROCEDURE — 93005 ELECTROCARDIOGRAM TRACING: CPT

## 2019-10-12 PROCEDURE — 85025 COMPLETE CBC W/AUTO DIFF WBC: CPT | Performed by: EMERGENCY MEDICINE

## 2019-10-12 PROCEDURE — 36415 COLL VENOUS BLD VENIPUNCTURE: CPT | Performed by: EMERGENCY MEDICINE

## 2019-10-12 PROCEDURE — 83036 HEMOGLOBIN GLYCOSYLATED A1C: CPT | Performed by: NURSE PRACTITIONER

## 2019-10-12 RX ORDER — HYDROXYZINE HYDROCHLORIDE 25 MG/1
25 TABLET, FILM COATED ORAL EVERY 8 HOURS PRN
Status: DISCONTINUED | OUTPATIENT
Start: 2019-10-12 | End: 2019-10-13 | Stop reason: HOSPADM

## 2019-10-12 RX ORDER — ACETAMINOPHEN 325 MG/1
650 TABLET ORAL EVERY 6 HOURS PRN
Status: DISCONTINUED | OUTPATIENT
Start: 2019-10-12 | End: 2019-10-13 | Stop reason: HOSPADM

## 2019-10-12 RX ORDER — THIOTHIXENE 1 MG/1
1 CAPSULE ORAL DAILY
Status: DISCONTINUED | OUTPATIENT
Start: 2019-10-12 | End: 2019-10-12

## 2019-10-12 RX ORDER — INSULIN GLARGINE 100 [IU]/ML
40 INJECTION, SOLUTION SUBCUTANEOUS
Status: DISCONTINUED | OUTPATIENT
Start: 2019-10-12 | End: 2019-10-12

## 2019-10-12 RX ORDER — INSULIN GLARGINE 100 [IU]/ML
15 INJECTION, SOLUTION SUBCUTANEOUS
COMMUNITY
End: 2019-10-13 | Stop reason: HOSPADM

## 2019-10-12 RX ORDER — DEXAMETHASONE SODIUM PHOSPHATE 4 MG/ML
10 INJECTION, SOLUTION INTRA-ARTICULAR; INTRALESIONAL; INTRAMUSCULAR; INTRAVENOUS; SOFT TISSUE ONCE
Status: COMPLETED | OUTPATIENT
Start: 2019-10-12 | End: 2019-10-12

## 2019-10-12 RX ORDER — FAMOTIDINE 20 MG/1
20 TABLET, FILM COATED ORAL 2 TIMES DAILY
Status: DISCONTINUED | OUTPATIENT
Start: 2019-10-12 | End: 2019-10-13 | Stop reason: HOSPADM

## 2019-10-12 RX ORDER — SODIUM CHLORIDE 9 MG/ML
125 INJECTION, SOLUTION INTRAVENOUS CONTINUOUS
Status: DISCONTINUED | OUTPATIENT
Start: 2019-10-12 | End: 2019-10-13

## 2019-10-12 RX ORDER — LISINOPRIL 10 MG/1
10 TABLET ORAL DAILY
Status: DISCONTINUED | OUTPATIENT
Start: 2019-10-12 | End: 2019-10-13 | Stop reason: HOSPADM

## 2019-10-12 RX ORDER — MAGNESIUM SULFATE HEPTAHYDRATE 40 MG/ML
2 INJECTION, SOLUTION INTRAVENOUS ONCE
Status: COMPLETED | OUTPATIENT
Start: 2019-10-12 | End: 2019-10-12

## 2019-10-12 RX ORDER — MECLIZINE HCL 12.5 MG/1
25 TABLET ORAL 3 TIMES DAILY PRN
Status: DISCONTINUED | OUTPATIENT
Start: 2019-10-12 | End: 2019-10-13 | Stop reason: HOSPADM

## 2019-10-12 RX ORDER — INSULIN GLARGINE 100 [IU]/ML
20 INJECTION, SOLUTION SUBCUTANEOUS
Status: DISCONTINUED | OUTPATIENT
Start: 2019-10-12 | End: 2019-10-13 | Stop reason: HOSPADM

## 2019-10-12 RX ORDER — FLUTICASONE PROPIONATE 110 UG/1
1 AEROSOL, METERED RESPIRATORY (INHALATION) EVERY 12 HOURS SCHEDULED
Status: DISCONTINUED | OUTPATIENT
Start: 2019-10-12 | End: 2019-10-13 | Stop reason: HOSPADM

## 2019-10-12 RX ORDER — FENOFIBRATE 48 MG/1
48 TABLET, COATED ORAL DAILY
Status: DISCONTINUED | OUTPATIENT
Start: 2019-10-12 | End: 2019-10-13 | Stop reason: HOSPADM

## 2019-10-12 RX ORDER — HEPARIN SODIUM 5000 [USP'U]/ML
5000 INJECTION, SOLUTION INTRAVENOUS; SUBCUTANEOUS EVERY 8 HOURS SCHEDULED
Status: DISCONTINUED | OUTPATIENT
Start: 2019-10-12 | End: 2019-10-13 | Stop reason: HOSPADM

## 2019-10-12 RX ORDER — DICYCLOMINE HCL 20 MG
20 TABLET ORAL 2 TIMES DAILY
Status: DISCONTINUED | OUTPATIENT
Start: 2019-10-12 | End: 2019-10-13 | Stop reason: HOSPADM

## 2019-10-12 RX ORDER — METOCLOPRAMIDE HYDROCHLORIDE 5 MG/ML
10 INJECTION INTRAMUSCULAR; INTRAVENOUS ONCE
Status: COMPLETED | OUTPATIENT
Start: 2019-10-12 | End: 2019-10-12

## 2019-10-12 RX ORDER — DIPHENHYDRAMINE HYDROCHLORIDE 50 MG/ML
25 INJECTION INTRAMUSCULAR; INTRAVENOUS ONCE
Status: COMPLETED | OUTPATIENT
Start: 2019-10-12 | End: 2019-10-12

## 2019-10-12 RX ADMIN — FAMOTIDINE 20 MG: 20 TABLET ORAL at 17:00

## 2019-10-12 RX ADMIN — METFORMIN HYDROCHLORIDE 1000 MG: 500 TABLET, FILM COATED ORAL at 08:44

## 2019-10-12 RX ADMIN — SODIUM CHLORIDE 125 ML/HR: 0.9 INJECTION, SOLUTION INTRAVENOUS at 10:56

## 2019-10-12 RX ADMIN — INSULIN LISPRO 10 UNITS: 100 INJECTION, SOLUTION INTRAVENOUS; SUBCUTANEOUS at 12:30

## 2019-10-12 RX ADMIN — DICYCLOMINE HYDROCHLORIDE 20 MG: 20 TABLET ORAL at 11:11

## 2019-10-12 RX ADMIN — HEPARIN SODIUM 5000 UNITS: 5000 INJECTION, SOLUTION INTRAVENOUS; SUBCUTANEOUS at 11:09

## 2019-10-12 RX ADMIN — HEPARIN SODIUM 5000 UNITS: 5000 INJECTION, SOLUTION INTRAVENOUS; SUBCUTANEOUS at 15:07

## 2019-10-12 RX ADMIN — LISINOPRIL 10 MG: 10 TABLET ORAL at 11:11

## 2019-10-12 RX ADMIN — FAMOTIDINE 20 MG: 20 TABLET ORAL at 11:12

## 2019-10-12 RX ADMIN — FENOFIBRATE 48 MG: 48 TABLET, FILM COATED ORAL at 11:12

## 2019-10-12 RX ADMIN — FLUTICASONE PROPIONATE 1 PUFF: 110 AEROSOL, METERED RESPIRATORY (INHALATION) at 21:30

## 2019-10-12 RX ADMIN — HEPARIN SODIUM 5000 UNITS: 5000 INJECTION, SOLUTION INTRAVENOUS; SUBCUTANEOUS at 22:31

## 2019-10-12 RX ADMIN — INSULIN LISPRO 3 UNITS: 100 INJECTION, SOLUTION INTRAVENOUS; SUBCUTANEOUS at 22:30

## 2019-10-12 RX ADMIN — METOCLOPRAMIDE 10 MG: 5 INJECTION, SOLUTION INTRAMUSCULAR; INTRAVENOUS at 05:39

## 2019-10-12 RX ADMIN — SODIUM CHLORIDE 1000 ML: 0.9 INJECTION, SOLUTION INTRAVENOUS at 06:45

## 2019-10-12 RX ADMIN — DEXAMETHASONE SODIUM PHOSPHATE 10 MG: 4 INJECTION, SOLUTION INTRAMUSCULAR; INTRAVENOUS at 05:39

## 2019-10-12 RX ADMIN — SODIUM CHLORIDE 1000 ML: 0.9 INJECTION, SOLUTION INTRAVENOUS at 05:41

## 2019-10-12 RX ADMIN — FLUTICASONE PROPIONATE 1 PUFF: 110 AEROSOL, METERED RESPIRATORY (INHALATION) at 11:12

## 2019-10-12 RX ADMIN — INSULIN HUMAN 10 UNITS: 100 INJECTION, SOLUTION PARENTERAL at 08:13

## 2019-10-12 RX ADMIN — INSULIN GLARGINE 20 UNITS: 100 INJECTION, SOLUTION SUBCUTANEOUS at 22:30

## 2019-10-12 RX ADMIN — SODIUM CHLORIDE 125 ML/HR: 0.9 INJECTION, SOLUTION INTRAVENOUS at 19:52

## 2019-10-12 RX ADMIN — INSULIN LISPRO 3 UNITS: 100 INJECTION, SOLUTION INTRAVENOUS; SUBCUTANEOUS at 12:28

## 2019-10-12 RX ADMIN — DICYCLOMINE HYDROCHLORIDE 20 MG: 20 TABLET ORAL at 17:00

## 2019-10-12 RX ADMIN — DIPHENHYDRAMINE HYDROCHLORIDE 25 MG: 50 INJECTION INTRAMUSCULAR; INTRAVENOUS at 05:40

## 2019-10-12 RX ADMIN — MAGNESIUM SULFATE IN WATER 2 G: 40 INJECTION, SOLUTION INTRAVENOUS at 10:57

## 2019-10-12 RX ADMIN — INSULIN LISPRO 1 UNITS: 100 INJECTION, SOLUTION INTRAVENOUS; SUBCUTANEOUS at 16:59

## 2019-10-12 NOTE — NURSING NOTE
Admitted to 411  with hyperglycemia  -SD2 level  Alert oriented  - cooperative  Placed on monitor - SR  No complaints at this time

## 2019-10-12 NOTE — ASSESSMENT & PLAN NOTE
No results found for: HGBA1C    Recent Labs     10/12/19  0635   POCGLU >500*       Blood Sugar Average: Last 72 hrs:    · Patient has a history of noncompliance  · Admission blood sugar 726  · Patient only complaint is feeling "jittery  He states he has not had any change in appetite, and denies any nausea or vomiting    · Patient given 10 units of regular insulin in the ER as well as Decadron 10 mg IV for headache  · Will recheck blood sugar now  · CM diet  · Will attempt to place patient on sliding scale coverage however patient may need an insulin drip  · Continue metformin 1000 mg daily  · Restart Lantus 40 units HS

## 2019-10-12 NOTE — PLAN OF CARE
Problem: Potential for Falls  Goal: Patient will remain free of falls  Description  INTERVENTIONS:  - Assess patient frequently for physical needs  -  Identify cognitive and physical deficits and behaviors that affect risk of falls  -  [place] fall precautions as indicated by assessment   - Educate patient/family on patient safety including physical limitations  - Instruct patient to call for assistance with activity based on assessment  - Modify environment to reduce risk of injury  - Consider OT/PT consult to assist with strengthening/mobility  Outcome: Progressing     Problem: Nutrition/Hydration-ADULT  Goal: Nutrient/Hydration intake appropriate for improving, restoring or maintaining nutritional needs  Description  Monitor and assess patient's nutrition/hydration status for malnutrition  Collaborate with interdisciplinary team and initiate plan and interventions as ordered  Monitor patient's weight and dietary intake as ordered or per policy  Utilize nutrition screening tool and intervene as necessary  Determine patient's food preferences and provide high-protein, high-caloric foods as appropriate       INTERVENTIONS:  - Monitor oral intake, urinary output, labs, and treatment plans  - Assess nutrition and hydration status and recommend course of action  - Evaluate amount of meals eaten  - Assist patient with eating if necessary   - Allow adequate time for meals  - Recommend/ encourage appropriate diets, oral nutritional supplements, and vitamin/mineral supplements  - Order, calculate, and assess calorie counts as needed  - Recommend, monitor, and adjust tube feedings and TPN/PPN based on assessed needs  - Assess need for intravenous fluids  - Provide specific nutrition/hydration education as appropriate  - Include patient/family/caregiver in decisions related to nutrition  Outcome: Progressing     Problem: NEUROSENSORY - ADULT  Goal: Achieves stable or improved neurological status  Description  INTERVENTIONS  - Monitor and report changes in neurological status  - Monitor vital signs such as temperature, blood pressure, glucose, and any other labs ordered   - Initiate measures to prevent increased intracranial pressure  - Monitor for seizure activity and implement precautions if appropriate      Outcome: Progressing  Goal: Remains free of injury related to seizures activity  Description  INTERVENTIONS  - Maintain airway, patient safety  and administer oxygen as ordered  - Monitor patient for seizure activity, document and report duration and description of seizure to physician/advanced practitioner  - If seizure occurs,  ensure patient safety during seizure  - Reorient patient post seizure  - Seizure pads on all 4 side rails  - Instruct patient/family to notify RN of any seizure activity including if an aura is experienced  - Instruct patient/family to call for assistance with activity based on nursing assessment  - Administer anti-seizure medications if ordered    Outcome: Progressing  Goal: Achieves maximal functionality and self care  Description  INTERVENTIONS  - Monitor swallowing and airway patency with patient fatigue and changes in neurological status  - Encourage and assist patient to increase activity and self care     - Encourage visually impaired, hearing impaired and aphasic patients to use assistive/communication devices  Outcome: Progressing     Problem: CARDIOVASCULAR - ADULT  Goal: Maintains optimal cardiac output and hemodynamic stability  Description  INTERVENTIONS:  - Monitor I/O, vital signs and rhythm  - Monitor for S/S and trends of decreased cardiac output  - Administer and titrate ordered vasoactive medications to optimize hemodynamic stability  - Assess quality of pulses, skin color and temperature  - Assess for signs of decreased coronary artery perfusion  - Instruct patient to report change in severity of symptoms  Outcome: Progressing  Goal: Absence of cardiac dysrhythmias or at baseline rhythm  Description  INTERVENTIONS:  - Continuous cardiac monitoring, vital signs, obtain 12 lead EKG if ordered  - Administer antiarrhythmic and heart rate control medications as ordered  - Monitor electrolytes and administer replacement therapy as ordered  Outcome: Progressing     Problem: METABOLIC, FLUID AND ELECTROLYTES - ADULT  Goal: Electrolytes maintained within normal limits  Description  INTERVENTIONS:  - Monitor labs and assess patient for signs and symptoms of electrolyte imbalances  - Administer electrolyte replacement as ordered  - Monitor response to electrolyte replacements, including repeat lab results as appropriate  - Instruct patient on fluid and nutrition as appropriate  Outcome: Progressing  Goal: Fluid balance maintained  Description  INTERVENTIONS:  - Monitor labs   - Monitor I/O and WT  - Instruct patient on fluid and nutrition as appropriate  - Assess for signs & symptoms of volume excess or deficit  Outcome: Progressing  Goal: Glucose maintained within target range  Description  INTERVENTIONS:  - Monitor Blood Glucose as ordered  - Assess for signs and symptoms of hyperglycemia and hypoglycemia  - Administer ordered medications to maintain glucose within target range  - Assess nutritional intake and initiate nutrition service referral as needed  Outcome: Progressing     Problem: DISCHARGE PLANNING - CARE MANAGEMENT  Goal: Discharge to post-acute care or home with appropriate resources  Description  INTERVENTIONS:  - Conduct assessment to determine patient/family and health care team treatment goals, and need for post-acute services based on payer coverage, community resources, and patient preferences, and barriers to discharge  - Address psychosocial, clinical, and financial barriers to discharge as identified in assessment in conjunction with the patient/family and health care team  - Arrange appropriate level of post-acute services according to patients   needs and preference and payer coverage in collaboration with the physician and health care team  - Communicate with and update the patient/family, physician, and health care team regarding progress on the discharge plan  - Arrange appropriate transportation to post-acute venues  Outcome: Progressing     Problem: SAFETY ADULT  Goal: Maintain or return to baseline ADL function  Description  INTERVENTIONS:  -  Assess patient's ability to carry out ADLs; assess patient's baseline for ADL function and identify physical deficits which impact ability to perform ADLs (bathing, care of mouth/teeth, toileting, grooming, dressing, etc )  - Assess/evaluate cause of self-care deficits   - Assess range of motion  - Assess patient's mobility; develop plan if impaired  - Assess patient's need for assistive devices and provide as appropriate  - Encourage maximum independence but intervene and supervise when necessary  - Involve family in performance of ADLs  - Assess for home care needs following discharge   - Consider OT consult to assist with ADL evaluation and planning for discharge  - Provide patient education as appropriate  Outcome: Progressing  Goal: Maintain or return mobility status to optimal level  Description  INTERVENTIONS:  - Assess patient's baseline mobility status (ambulation, transfers, stairs, etc )    - Identify cognitive and physical deficits and behaviors that affect mobility  - Identify mobility aids required to assist with transfers and/or ambulation (gait belt, sit-to-stand, lift, walker, cane, etc )  - [place] fall precautions as indicated by assessment  - Record patient progress and toleration of activity level on Mobility SBAR; progress patient to next Phase/Stage  - Instruct patient to call for assistance with activity based on assessment  - Consider rehabilitation consult to assist with strengthening/weightbearing, etc   Outcome: Progressing     Problem: Knowledge Deficit  Goal: Patient/family/caregiver demonstrates understanding of disease process, treatment plan, medications, and discharge instructions  Description  Complete learning assessment and assess knowledge base    Interventions:  - Provide teaching at level of understanding  - Provide teaching via preferred learning methods  Outcome: Progressing

## 2019-10-12 NOTE — ASSESSMENT & PLAN NOTE
No results found for: HGBA1C    Recent Labs     10/12/19  0635   POCGLU >500*       Blood Sugar Average: Last 72 hrs:    · Patient presented with sugar 726  He is a known type 1 diabetic with a history of noncompliance  He states he last took his insulin 24 hours ago but has been feeling weak and jittery" for the last 5 days  In the ER he complained of a headache and was given Decadron 10 mg IV  Patient was given 10 units of regular insulin in the ER  He denies any nausea or vomiting  · Will give CC DM diet  · Check blood glucose now  · Check hemoglobin A1c, serum Osmo and lactic acid  · If blood sugar continues to trend down this morning, initiate sliding-scale coverage  If patient does not eat or his blood sugar does not improve with the recheck blood sugar, the patient will require an insulin infusion      · Start Lantus 40 units HS  · Restart metformin 1000 mg daily per home medication list  · Continue to trend BMPs and monitor anion gap, currently no anion gap present

## 2019-10-12 NOTE — ED PROVIDER NOTES
History  Chief Complaint   Patient presents with    Headache     pt states that for 3 days he has had a HA, chills and bodyaches  pt denies taking anything OTC meds for this  HPI    This is a 23-year-old gentle presents to the emergency department with feeling tired fatigued mild headache, chills, body aches  Patient did not take any medications prior to arrival   Patient does have a history of having chronic headaches  Patient also has extensive psychiatric history specifically suicide attempts in the past bipolar disorder  Patient had a CT of the head on September 1, 2019 which showed no acute disease except some mild bilateral maxillary sinus disease  Patient has a history of headaches and vertigo and recently was seen in the emergency department 4th September 2019 where he had a CTA of the head and neck with and without contrast   Patient was found to have no intracranial process was seen  There was no occlusion or severe stenosis or aneurysm the major arteries in the head and neck  Given the patient's psychiatric history he was asked whether he is suicidal homicidal patient adamantly denies this  Prior to Admission Medications   Prescriptions Last Dose Informant Patient Reported?  Taking?   dicyclomine (BENTYL) 20 mg tablet   No No   Sig: Take 1 tablet (20 mg total) by mouth 2 (two) times a day   famotidine (PEPCID) 20 mg tablet   No No   Sig: Take 1 tablet (20 mg total) by mouth 2 (two) times a day   fenofibrate (TRICOR) 48 mg tablet   Yes No   Sig: Take 48 mg by mouth   fluticasone (FLOVENT HFA) 110 MCG/ACT inhaler   Yes No   Sig: Inhale 1 puff   hydrOXYzine HCL (ATARAX) 25 mg tablet   No No   Sig: Take 1 tablet (25 mg total) by mouth every 8 (eight) hours as needed for anxiety   hydrOXYzine HCL (ATARAX) 25 mg tablet   No No   Sig: Take 1 tablet (25 mg total) by mouth every 6 (six) hours   insulin glargine (LANTUS SOLOSTAR) 100 units/mL injection pen   Yes No   Sig: Inject 40 Units under the skin insulin regular (HUMULIN R) 100 units/mL injection   Yes No   Sig: Inject under the skin   lisinopril (ZESTRIL) 10 mg tablet   Yes No   Sig: Take 10 mg by mouth daily   meclizine (ANTIVERT) 25 mg tablet   No No   Sig: Take 1 tablet (25 mg total) by mouth 3 (three) times a day as needed for dizziness   meclizine (ANTIVERT) 25 mg tablet   Yes No   Sig: Take 25 mg by mouth every 8 (eight) hours as needed   metFORMIN (GLUCOPHAGE) 1000 MG tablet   Yes No   Sig: Take 1,000 mg by mouth   thiothixene (NAVANE) 1 mg capsule   Yes No   Sig: Take 1 mg by mouth   traZODone (DESYREL) 50 mg tablet   Yes No   Sig: TAKE 1 TABLET BY MOUTH EVERYDAY AT BEDTIME      Facility-Administered Medications: None       Past Medical History:   Diagnosis Date    Anxiety     Asthma     Bipolar 1 disorder ([facility] )     Depression     Diabetes mellitus ([place] [address] )     Psychiatric disorder        Past Surgical History:   Procedure Laterality Date    APPENDECTOMY         History reviewed  No pertinent family history  I have reviewed and agree with the history as documented  Social History     Tobacco Use    Smoking status: Former Smoker     Types: Cigarettes    Smokeless tobacco: Never Used   Substance Use Topics    Alcohol use: No    Drug use: No        Review of Systems   Constitutional: Negative  HENT: Negative  Eyes: Negative  Respiratory: Negative  Cardiovascular: Negative  Gastrointestinal: Negative  Endocrine: Negative  Genitourinary: Negative  Musculoskeletal: Negative  Allergic/Immunologic: Negative  Neurological: Negative  Hematological: Negative  Psychiatric/Behavioral: Negative  Physical Exam  Physical Exam   Constitutional: He appears well-developed and well-nourished  HENT:   Head: Normocephalic and atraumatic     Right Ear: External ear normal    Left Ear: External ear normal    Nose: Nose normal    Mouth/Throat: Oropharynx is clear and moist    Eyes: Pupils are equal, round, and reactive to light  Conjunctivae and EOM are normal    Neck: Normal range of motion  Neck supple  Cardiovascular: Normal rate, regular rhythm, normal heart sounds and intact distal pulses  Pulmonary/Chest: Effort normal    Abdominal: Soft  Bowel sounds are normal    Musculoskeletal: Normal range of motion  Neurological: He is alert  Skin: Skin is warm and dry  Capillary refill takes less than 2 seconds  Psychiatric: He has a normal mood and affect  Nursing note and vitals reviewed  Vital Signs  ED Triage Vitals [10/12/19 0437]   Temperature Pulse Respirations Blood Pressure SpO2   (!) 96 9 °F (36 1 °C) (!) 107 18 108/52 97 %      Temp Source Heart Rate Source Patient Position - Orthostatic VS BP Location FiO2 (%)   Tympanic Monitor Sitting Left arm --      Pain Score       3           Vitals:    10/12/19 0437 10/12/19 0649   BP: 108/52 122/71   Pulse: (!) 107 (!) 106   Patient Position - Orthostatic VS: Sitting Lying         Visual Acuity      ED Medications  Medications   insulin regular (HumuLIN R,NovoLIN R) injection 10 Units (has no administration in time range)   dexamethasone (DECADRON) injection 10 mg (10 mg Intravenous Given 10/12/19 0539)   sodium chloride 0 9 % bolus 1,000 mL (1,000 mL Intravenous New Bag 10/12/19 0541)   diphenhydrAMINE (BENADRYL) injection 25 mg (25 mg Intravenous Given 10/12/19 0540)   metoclopramide (REGLAN) injection 10 mg (10 mg Intravenous Given 10/12/19 0539)   sodium chloride 0 9 % bolus 1,000 mL (1,000 mL Intravenous New Bag 10/12/19 0645)       Diagnostic Studies  Results Reviewed     Procedure Component Value Units Date/Time    Osmolality-"If this is regarding a toxic alcohol, STOP  Test is not routinely indicated   Please consult medical  on call for further guidance " [[id number]]     Lab Status:  No result Specimen:  Blood     TSH [[id number]]  (Normal) Collected:  10/12/19 0537    Lab Status:  Final result Specimen:  Blood from Arm, Left Updated: 10/12/19 0640     TSH 3RD GENERATON 2 000 uIU/mL     Narrative:       Patients undergoing fluorescein dye angiography may retain small amounts of fluorescein in the body for 48-72 hours post procedure  Samples containing fluorescein can produce falsely depressed TSH values  If the patient had this procedure,a specimen should be resubmitted post fluorescein clearance        Fingerstick Glucose (POCT) [[id number]]  (Abnormal) Collected:  10/12/19 0635    Lab Status:  Final result Updated:  10/12/19 0636     POC Glucose >500 mg/dl     Basic metabolic panel [[id number]]  (Abnormal) Collected:  10/12/19 0537    Lab Status:  Final result Specimen:  Blood from Arm, Left Updated:  10/12/19 0618     Sodium 134 mmol/L      Potassium 4 1 mmol/L      Chloride 96 mmol/L      CO2 27 mmol/L      ANION GAP 11 mmol/L      BUN 16 mg/dL      Creatinine 0 88 mg/dL      Glucose 728 mg/dL      Calcium 8 8 mg/dL      eGFR 102 ml/min/1 73sq m     Narrative:       [place] guidelines for Chronic Kidney Disease (CKD):     Stage 1 with normal or high GFR (GFR > 90 mL/min/1 73 square meters)    Stage 2 Mild CKD (GFR = 60-89 mL/min/1 73 square meters)    Stage 3A Moderate CKD (GFR = 45-59 mL/min/1 73 square meters)    Stage 3B Moderate CKD (GFR = 30-44 mL/min/1 73 square meters)    Stage 4 Severe CKD (GFR = 15-29 mL/min/1 73 square meters)    Stage 5 End Stage CKD (GFR <15 mL/min/1 73 square meters)  Note: GFR calculation is accurate only with a steady state creatinine    CBC and differential [[id number]]  (Normal) Collected:  10/12/19 0537    Lab Status:  Final result Specimen:  Blood from Arm, Left Updated:  10/12/19 0556     WBC 5 80 Thousand/uL      RBC 4 67 Million/uL      Hemoglobin 14 1 g/dL      Hematocrit 42 6 %      MCV 91 fL      MCH 30 2 pg      MCHC 33 1 g/dL      RDW 12 6 %      MPV 9 3 fL      Platelets 340 Thousands/uL      Neutrophils Relative 63 %      Lymphocytes Relative 26 %      Monocytes Relative 9 %      Eosinophils Relative 2 %      Basophils Relative 1 %      Neutrophils Absolute 3 60 Thousands/µL      Lymphocytes Absolute 1 50 Thousands/µL      Monocytes Absolute 0 50 Thousand/µL      Eosinophils Absolute 0 10 Thousand/µL      Basophils Absolute 0 00 Thousands/µL                  No orders to display              Procedures  ECG 12 Lead Documentation Only  Date/Time: 10/12/2019 5:27 AM  Performed by: Amarilis Cutler III, DO  Authorized by: Amarilis Cutler III, DO     Indications / Diagnosis:  Weakness  Comments:      I personally reviewed this EKG is performed October 12, 2019 at 5:27 a m  EKG was performed at 5:24 a m   Normal sinus rhythm with a ventricular rate of 97 beats per minute  A p r n  Oval 140 milliseconds  QRS duration 82 milliseconds  The QT interval of 344 milliseconds with a QTC of 436 milliseconds  Please note there is no acute ST abnormalities  CriticalCare Time  Performed by: Jeovanny Alexander DO  Authorized by: Amarilis Cutler III, DO     Critical care provider statement:     Critical care time (minutes):  30    Critical care start time:  10/12/2019 5:47 AM    Critical care end time:  10/12/2019 7:47 AM           ED Course  ED Course as of Oct 12 0747   Sat Oct 12, 2019   3639 Lyle Justice paged  [address] with [place] Host, KRISH, will admit to hospitalist       3446 Patient is re-evaluated by the admitting staff  Patient's blood sugars trending downward therefore we will proceed with IV fluid hydration and we will start the patient on a sliding scale insulin instead of an insulin drip  MDM  Number of Diagnoses or Management Options  Headache:   Hyperglycemia:   Weakness:   Diagnosis management comments: This is a 50-year-old white male who has a history of diabetes presents with generalized weakness some mild headache which is chronic in nature    Patient noted to have a blood sugar greater than 700 without evidence of a gap   With 2 L of fluid patient's blood sugar came down to mid 500 range  Patient was initially to be started on insulin drip but after discussion with the hospital service is determine the patient can of sliding scale insulin  Patient be admitted to the hospital for observation secondary to severe hyperglycemia  medical non compliance  Disposition  Final diagnoses:   Headache   Weakness   Hyperglycemia     Time reflects when diagnosis was documented in both MDM as applicable and the Disposition within this note     Time User Action Codes Description Comment    10/12/2019  7:45 AM Koki Peña Add [R51] Headache     10/12/2019  7:45 AM Koki Peña Add [R53 1] Weakness     10/12/2019  7:46 AM Koki Peña Add [R73 9] Hyperglycemia       ED Disposition     ED Disposition Condition Date/Time Comment    Admit Stable Sat Oct 12, 2019  7:45 AM Case was discussed with KRISH Braun and the patient's admission status was agreed to be Admission Status: inpatient status to the service of Dr Winter Expose   Follow-up Information    None         Patient's Medications   Discharge Prescriptions    No medications on file     No discharge procedures on file      ED Provider  Electronically Signed by           Annette Jovel III, DO  10/12/19 8361

## 2019-10-12 NOTE — H&P
H&P- Mack Barthel 1970, 53 y o  male MRN: [id number]    Unit/Bed#: ED 03 Encounter: [id number]    Primary Care Provider: No primary care provider on file  Date and time admitted to hospital: 10/12/2019  4:33 AM        * HHNC (hyperglycemic hyperosmolar nonketotic coma) ([facility] )  Assessment & Plan  No results found for: HGBA1C    Recent Labs     10/12/19  0635   POCGLU >500*       Blood Sugar Average: Last 72 hrs:    · Patient presented with sugar 726  He is a known type 1 diabetic with a history of noncompliance  He states he last took his insulin 24 hours ago but has been feeling weak and jittery" for the last 5 days  In the ER he complained of a headache and was given Decadron 10 mg IV  Patient was given 10 units of regular insulin in the ER  He denies any nausea or vomiting  · Will give CC DM diet  · Check blood glucose now  · Check hemoglobin A1c, serum Osmo and lactic acid  · If blood sugar continues to trend down this morning, initiate sliding-scale coverage  If patient does not eat or his blood sugar does not improve with the recheck blood sugar, the patient will require an insulin infusion  · Start Lantus 40 units HS  · Restart metformin 1000 mg daily per home medication list  · Continue to trend BMPs and monitor anion gap, currently no anion gap present      Type 1 diabetes mellitus ([facility] 75 )  Assessment & Plan  No results found for: HGBA1C    Recent Labs     10/12/19  0635   POCGLU >500*       Blood Sugar Average: Last 72 hrs:    · Patient has a history of noncompliance  · Admission blood sugar 726  · Patient only complaint is feeling "jittery  He states he has not had any change in appetite, and denies any nausea or vomiting    · Patient given 10 units of regular insulin in the ER as well as Decadron 10 mg IV for headache  · Will recheck blood sugar now  · CM diet  · Will attempt to place patient on sliding scale coverage however patient may need an insulin drip  · Continue metformin 1000 mg daily  · Restart Lantus 40 units HS      HTN (hypertension)  Assessment & Plan  · Continue home dose lisinopril    HLD (hyperlipidemia)  Assessment & Plan  · Continue home dose of Tricor    Major depressive disorder with current active episode  Assessment & Plan  · Continue home dose of Navane      VTE Prophylaxis: Heparin  / sequential compression device   Code Status: Full code   POLST: POLST form is not discussed and not completed at this time  Discussion with family: no family present     Anticipated Length of Stay:  Patient will be admitted on an Inpatient basis with an anticipated length of stay of  Greater than 2 midnights  Justification for Hospital Stay: [facility]    Total Time for Visit, including Counseling / Coordination of Care: 30 minutes  Greater than 50% of this total time spent on direct patient counseling and coordination of care  Chief Complaint:   "I have a headache and felt jittery  I knew my sugar was high"    History of Present Illness:    Ravin Guevara is a 53 y o  male who presents with a past medical history of noncompliant type 1 diabetic, hypertension, hyperlipidemia and depression  He states he has been feeling jittery" for the last 5 days and is to was related to his blood sugar  He states his last dose of insulin was 10/11/2019 in the a m  And at that time he states his blood sugar was 138  He admits to eating dinner last night normally but he states he did not take any insulin when he ate  He denies any pain, nausea, or vomiting  He states his bowel and bladder have been normal   Although he does not currently have a place to live and has been staying with friends, he does have a job at the [employer]  In the ER, the patient was given Decadron 10 mg IV and his blood sugar was 726  No anion gap was present in the labs  He was given 2 L normal saline and 10 units regular insulin    On exam, patient was found sitting on the stretcher with no signs or symptoms of distress noted  He was hemodynamically stable and alert orient x4  He followed commands appropriately and denied any pain at that time including headache  When told that his blood sugar was 726, he stated that is crazy common I last to my insulin yesterday morning "  He states he is compliant with his other oral medications and follows with the clinic at [address]  Patient will be admitted to step-down level two and will require greater than 2 midnights  Review of Systems:    Review of Systems   Constitutional: Negative  HENT: Negative  Eyes: Negative  Respiratory: Negative  Cardiovascular: Negative  Gastrointestinal: Negative for abdominal pain, nausea and vomiting  Endocrine: Negative  Genitourinary: Negative  Musculoskeletal: Negative  Skin: Negative  Allergic/Immunologic: Negative  Neurological: Positive for headaches  Hematological: Negative  Psychiatric/Behavioral: Negative  Past Medical and Surgical History:     Past Medical History:   Diagnosis Date    Anxiety     Asthma     [facility] )     Depression     Diabetes mellitus [facility] )     Psychiatric disorder        Past Surgical History:   Procedure Laterality Date    APPENDECTOMY         Meds/Allergies:    Prior to Admission medications    Medication Sig Start Date End Date Taking?  Authorizing Provider   dicyclomine (BENTYL) 20 mg tablet Take 1 tablet (20 mg total) by mouth 2 (two) times a day 9/10/19   Navya Pugh MD   famotidine (PEPCID) 20 mg tablet Take 1 tablet (20 mg total) by mouth 2 (two) times a day 9/10/19   Navya Pugh MD   fenofibrate (TRICOR) 48 mg tablet Take 48 mg by mouth 12/12/17   Historical Provider, MD   fluticasone (FLOVENT HFA) 110 MCG/ACT inhaler Inhale 1 puff    Historical Provider, MD   hydrOXYzine HCL (ATARAX) 25 mg tablet Take 1 tablet (25 mg total) by mouth every 8 (eight) hours as needed for anxiety 7/6/19   Val Wallis MD   hydrOXYzine HCL (ATARAX) 25 mg tablet Take 1 tablet (25 mg total) by mouth every 6 (six) hours 7/7/19   Anderson Gates MD   insulin glargine (LANTUS SOLOSTAR) 100 units/mL injection pen Inject 40 Units under the skin 1/13/17   Historical Provider, MD   insulin regular (HUMULIN R) 100 units/mL injection Inject under the skin 11/19/18   Historical Provider, MD   lisinopril (ZESTRIL) 10 mg tablet Take 10 mg by mouth daily 11/19/18   Historical Provider, MD   meclizine (ANTIVERT) 25 mg tablet Take 1 tablet (25 mg total) by mouth 3 (three) times a day as needed for dizziness 9/8/19   Sukhdev Coronado PA-C   meclizine (ANTIVERT) 25 mg tablet Take 25 mg by mouth every 8 (eight) hours as needed 8/18/19   Aishwarya Provider, MD   metFORMIN (GLUCOPHAGE) 1000 MG tablet Take 1,000 mg by mouth 10/27/16   Historical Provider, MD   thiothixene (NAVANE) 1 mg capsule Take 1 mg by mouth    Historical Provider, MD   traZODone (DESYREL) 50 mg tablet TAKE 1 TABLET BY MOUTH EVERYDAY AT BEDTIME 7/17/19   Historical Provider, MD     I have reviewed home medications with patient personally  Allergies:    Allergies   Allergen Reactions    Ketorolac Other (See Comments)     Pt states he has mood disturbances, agitation if he takes too much      Toradol [Ketorolac Tromethamine]     Latex Rash       Social History:     Marital Status: Single     Substance Use History:   Social History     Substance and Sexual Activity   Alcohol Use No     Social History     Tobacco Use   Smoking Status Former Smoker    Types: Cigarettes   Smokeless Tobacco Never Used     Social History     Substance and Sexual Activity   Drug Use No       Family History:    non-contributory    Physical Exam:     Vitals:   Blood Pressure: 122/71 (10/12/19 0649)  Pulse: (!) 106 (10/12/19 0649)  Temperature: (!) 97 °F (36 1 °C) (10/12/19 0649)  Temp Source: Tympanic (10/12/19 0649)  Respirations: 18 (10/12/19 0437)  SpO2: 97 % (10/12/19 0649)    Physical Exam   Constitutional: He is oriented to person, place, and time  He appears well-developed and well-nourished  HENT:   Head: Normocephalic and atraumatic  Right Ear: External ear normal    Left Ear: External ear normal    Nose: Nose normal    Mouth/Throat: Oropharynx is clear and moist    Eyes: Pupils are equal, round, and reactive to light  Conjunctivae and EOM are normal    Neck: Normal range of motion  Neck supple  Cardiovascular: Normal rate, regular rhythm, normal heart sounds and intact distal pulses  Pulmonary/Chest: Effort normal    B/L breath sounds diminished    Abdominal: Soft  Bowel sounds are normal    Musculoskeletal: Normal range of motion  Neurological: He is alert and oriented to person, place, and time  Skin: Skin is warm and dry  Capillary refill takes less than 2 seconds  Psychiatric: He has a normal mood and affect  His behavior is normal        Additional Data:     Lab Results: I have personally reviewed pertinent reports  Results from last 7 days   Lab Units 10/12/19  0537   WBC Thousand/uL 5 80   HEMOGLOBIN g/dL 14 1   HEMATOCRIT % 42 6   PLATELETS Thousands/uL 210   NEUTROS PCT % 63   LYMPHS PCT % 26   MONOS PCT % 9   EOS PCT % 2     Results from last 7 days   Lab Units 10/12/19  0537   SODIUM mmol/L 134*   POTASSIUM mmol/L 4 1   CHLORIDE mmol/L 96*   CO2 mmol/L 27   BUN mg/dL 16   CREATININE mg/dL 0 88   ANION GAP mmol/L 11   CALCIUM mg/dL 8 8   GLUCOSE RANDOM mg/dL 728*         Results from last 7 days   Lab Units 10/12/19  0635   POC GLUCOSE mg/dl >500*               Imaging: I have personally reviewed pertinent reports        No orders to display       EKG, Pathology, and Other Studies Reviewed on Admission:   · EKG: NSR     Allscripts / Epic Records Reviewed: Yes     ** Please Note: This note has been constructed using a voice recognition syste

## 2019-10-13 VITALS
RESPIRATION RATE: 20 BRPM | BODY MASS INDEX: 22.04 KG/M2 | HEIGHT: 66 IN | TEMPERATURE: 97.6 F | SYSTOLIC BLOOD PRESSURE: 96 MMHG | HEART RATE: 76 BPM | OXYGEN SATURATION: 96 % | DIASTOLIC BLOOD PRESSURE: 60 MMHG | WEIGHT: 137.13 LBS

## 2019-10-13 LAB
ANION GAP SERPL CALCULATED.3IONS-SCNC: 4 MMOL/L (ref 5–14)
ATRIAL RATE: 97 BPM
BUN SERPL-MCNC: 14 MG/DL (ref 5–25)
CALCIUM SERPL-MCNC: 8.2 MG/DL (ref 8.4–10.2)
CHLORIDE SERPL-SCNC: 107 MMOL/L (ref 97–108)
CO2 SERPL-SCNC: 27 MMOL/L (ref 22–30)
CREAT SERPL-MCNC: 0.7 MG/DL (ref 0.7–1.5)
GFR SERPL CREATININE-BSD FRML MDRD: 112 ML/MIN/1.73SQ M
GLUCOSE SERPL-MCNC: 184 MG/DL (ref 65–140)
GLUCOSE SERPL-MCNC: 217 MG/DL (ref 65–140)
GLUCOSE SERPL-MCNC: 220 MG/DL (ref 70–99)
P AXIS: 61 DEGREES
POTASSIUM SERPL-SCNC: 3.6 MMOL/L (ref 3.6–5)
PR INTERVAL: 140 MS
QRS AXIS: 52 DEGREES
QRSD INTERVAL: 82 MS
QT INTERVAL: 344 MS
QTC INTERVAL: 436 MS
SODIUM SERPL-SCNC: 138 MMOL/L (ref 137–147)
T WAVE AXIS: 46 DEGREES
VENTRICULAR RATE: 97 BPM

## 2019-10-13 PROCEDURE — 99239 HOSP IP/OBS DSCHRG MGMT >30: CPT | Performed by: FAMILY MEDICINE

## 2019-10-13 PROCEDURE — 93010 ELECTROCARDIOGRAM REPORT: CPT | Performed by: INTERNAL MEDICINE

## 2019-10-13 PROCEDURE — 82948 REAGENT STRIP/BLOOD GLUCOSE: CPT

## 2019-10-13 PROCEDURE — 80048 BASIC METABOLIC PNL TOTAL CA: CPT | Performed by: FAMILY MEDICINE

## 2019-10-13 RX ORDER — LISINOPRIL 10 MG/1
5 TABLET ORAL DAILY
Refills: 0 | Status: ON HOLD
Start: 2019-10-13 | End: 2019-11-07 | Stop reason: SDUPTHER

## 2019-10-13 RX ADMIN — FAMOTIDINE 20 MG: 20 TABLET ORAL at 08:29

## 2019-10-13 RX ADMIN — SODIUM CHLORIDE 125 ML/HR: 0.9 INJECTION, SOLUTION INTRAVENOUS at 03:42

## 2019-10-13 RX ADMIN — FLUTICASONE PROPIONATE 1 PUFF: 110 AEROSOL, METERED RESPIRATORY (INHALATION) at 08:29

## 2019-10-13 RX ADMIN — INSULIN LISPRO 2 UNITS: 100 INJECTION, SOLUTION INTRAVENOUS; SUBCUTANEOUS at 07:17

## 2019-10-13 RX ADMIN — FENOFIBRATE 48 MG: 48 TABLET, FILM COATED ORAL at 08:28

## 2019-10-13 RX ADMIN — INSULIN LISPRO 10 UNITS: 100 INJECTION, SOLUTION INTRAVENOUS; SUBCUTANEOUS at 11:31

## 2019-10-13 RX ADMIN — INSULIN LISPRO 1 UNITS: 100 INJECTION, SOLUTION INTRAVENOUS; SUBCUTANEOUS at 11:30

## 2019-10-13 RX ADMIN — HEPARIN SODIUM 5000 UNITS: 5000 INJECTION, SOLUTION INTRAVENOUS; SUBCUTANEOUS at 05:06

## 2019-10-13 RX ADMIN — INSULIN LISPRO 10 UNITS: 100 INJECTION, SOLUTION INTRAVENOUS; SUBCUTANEOUS at 07:18

## 2019-10-13 RX ADMIN — DICYCLOMINE HYDROCHLORIDE 20 MG: 20 TABLET ORAL at 08:29

## 2019-10-13 NOTE — PLAN OF CARE
Problem: Potential for Falls  Goal: Patient will remain free of falls  Description  INTERVENTIONS:  - Assess patient frequently for physical needs  -  Identify cognitive and physical deficits and behaviors that affect risk of falls  -  [place] fall precautions as indicated by assessment   - Educate patient/family on patient safety including physical limitations  - Instruct patient to call for assistance with activity based on assessment  - Modify environment to reduce risk of injury  - Consider OT/PT consult to assist with strengthening/mobility  Outcome: Progressing     Problem: Nutrition/Hydration-ADULT  Goal: Nutrient/Hydration intake appropriate for improving, restoring or maintaining nutritional needs  Description  Monitor and assess patient's nutrition/hydration status for malnutrition  Collaborate with interdisciplinary team and initiate plan and interventions as ordered  Monitor patient's weight and dietary intake as ordered or per policy  Utilize nutrition screening tool and intervene as necessary  Determine patient's food preferences and provide high-protein, high-caloric foods as appropriate       INTERVENTIONS:  - Monitor oral intake, urinary output, labs, and treatment plans  - Assess nutrition and hydration status and recommend course of action  - Evaluate amount of meals eaten  - Assist patient with eating if necessary   - Allow adequate time for meals  - Recommend/ encourage appropriate diets, oral nutritional supplements, and vitamin/mineral supplements  - Order, calculate, and assess calorie counts as needed  - Recommend, monitor, and adjust tube feedings and TPN/PPN based on assessed needs  - Assess need for intravenous fluids  - Provide specific nutrition/hydration education as appropriate  - Include patient/family/caregiver in decisions related to nutrition  Outcome: Progressing     Problem: NEUROSENSORY - ADULT  Goal: Achieves stable or improved neurological status  Description  INTERVENTIONS  - Monitor and report changes in neurological status  - Monitor vital signs such as temperature, blood pressure, glucose, and any other labs ordered   - Initiate measures to prevent increased intracranial pressure  - Monitor for seizure activity and implement precautions if appropriate      Outcome: Progressing  Goal: Remains free of injury related to seizures activity  Description  INTERVENTIONS  - Maintain airway, patient safety  and administer oxygen as ordered  - Monitor patient for seizure activity, document and report duration and description of seizure to physician/advanced practitioner  - If seizure occurs,  ensure patient safety during seizure  - Reorient patient post seizure  - Seizure pads on all 4 side rails  - Instruct patient/family to notify RN of any seizure activity including if an aura is experienced  - Instruct patient/family to call for assistance with activity based on nursing assessment  - Administer anti-seizure medications if ordered    Outcome: Progressing  Goal: Achieves maximal functionality and self care  Description  INTERVENTIONS  - Monitor swallowing and airway patency with patient fatigue and changes in neurological status  - Encourage and assist patient to increase activity and self care     - Encourage visually impaired, hearing impaired and aphasic patients to use assistive/communication devices  Outcome: Progressing     Problem: CARDIOVASCULAR - ADULT  Goal: Maintains optimal cardiac output and hemodynamic stability  Description  INTERVENTIONS:  - Monitor I/O, vital signs and rhythm  - Monitor for S/S and trends of decreased cardiac output  - Administer and titrate ordered vasoactive medications to optimize hemodynamic stability  - Assess quality of pulses, skin color and temperature  - Assess for signs of decreased coronary artery perfusion  - Instruct patient to report change in severity of symptoms  Outcome: Progressing  Goal: Absence of cardiac dysrhythmias or at baseline rhythm  Description  INTERVENTIONS:  - Continuous cardiac monitoring, vital signs, obtain 12 lead EKG if ordered  - Administer antiarrhythmic and heart rate control medications as ordered  - Monitor electrolytes and administer replacement therapy as ordered  Outcome: Progressing     Problem: METABOLIC, FLUID AND ELECTROLYTES - ADULT  Goal: Electrolytes maintained within normal limits  Description  INTERVENTIONS:  - Monitor labs and assess patient for signs and symptoms of electrolyte imbalances  - Administer electrolyte replacement as ordered  - Monitor response to electrolyte replacements, including repeat lab results as appropriate  - Instruct patient on fluid and nutrition as appropriate  Outcome: Progressing  Goal: Fluid balance maintained  Description  INTERVENTIONS:  - Monitor labs   - Monitor I/O and WT  - Instruct patient on fluid and nutrition as appropriate  - Assess for signs & symptoms of volume excess or deficit  Outcome: Progressing  Goal: Glucose maintained within target range  Description  INTERVENTIONS:  - Monitor Blood Glucose as ordered  - Assess for signs and symptoms of hyperglycemia and hypoglycemia  - Administer ordered medications to maintain glucose within target range  - Assess nutritional intake and initiate nutrition service referral as needed  Outcome: Progressing     Problem: DISCHARGE PLANNING - CARE MANAGEMENT  Goal: Discharge to post-acute care or home with appropriate resources  Description  INTERVENTIONS:  - Conduct assessment to determine patient/family and health care team treatment goals, and need for post-acute services based on payer coverage, community resources, and patient preferences, and barriers to discharge  - Address psychosocial, clinical, and financial barriers to discharge as identified in assessment in conjunction with the patient/family and health care team  - Arrange appropriate level of post-acute services according to patients   needs and preference and payer coverage in collaboration with the physician and health care team  - Communicate with and update the patient/family, physician, and health care team regarding progress on the discharge plan  - Arrange appropriate transportation to post-acute venues  Outcome: Progressing     Problem: SAFETY ADULT  Goal: Maintain or return to baseline ADL function  Description  INTERVENTIONS:  -  Assess patient's ability to carry out ADLs; assess patient's baseline for ADL function and identify physical deficits which impact ability to perform ADLs (bathing, care of mouth/teeth, toileting, grooming, dressing, etc )  - Assess/evaluate cause of self-care deficits   - Assess range of motion  - Assess patient's mobility; develop plan if impaired  - Assess patient's need for assistive devices and provide as appropriate  - Encourage maximum independence but intervene and supervise when necessary  - Involve family in performance of ADLs  - Assess for home care needs following discharge   - Consider OT consult to assist with ADL evaluation and planning for discharge  - Provide patient education as appropriate  Outcome: Progressing  Goal: Maintain or return mobility status to optimal level  Description  INTERVENTIONS:  - Assess patient's baseline mobility status (ambulation, transfers, stairs, etc )    - Identify cognitive and physical deficits and behaviors that affect mobility  - Identify mobility aids required to assist with transfers and/or ambulation (gait belt, sit-to-stand, lift, walker, cane, etc )  - [place] fall precautions as indicated by assessment  - Record patient progress and toleration of activity level on Mobility SBAR; progress patient to next Phase/Stage  - Instruct patient to call for assistance with activity based on assessment  - Consider rehabilitation consult to assist with strengthening/weightbearing, etc   Outcome: Progressing     Problem: Knowledge Deficit  Goal: Patient/family/caregiver demonstrates understanding of disease process, treatment plan, medications, and discharge instructions  Description  Complete learning assessment and assess knowledge base    Interventions:  - Provide teaching at level of understanding  - Provide teaching via preferred learning methods  Outcome: Progressing

## 2019-10-13 NOTE — NURSING NOTE
Pt discharged, IV removed, belongings accounted for  AVS reviewed with pt, questions answered and understanding stated  Pt ambulated out with PCA

## 2019-10-13 NOTE — ASSESSMENT & PLAN NOTE
Blood pressure is on the low marginal side decrease his lisinopril as an outpatient to 5 mg p o  Daily

## 2019-10-13 NOTE — ASSESSMENT & PLAN NOTE
Lab Results   Component Value Date    HGBA1C 11 4 (H) 10/12/2019       Recent Labs     10/12/19  1055 10/12/19  1635 10/12/19  2014 10/13/19  0541   POCGLU 287* 159* 296* 217*       Blood Sugar Average: Last 72 hrs:  (P) 290 2   It has resolved  I did do adjustment of insulin yesterday I will continue home regular insulin 10 units t i d  With meals with holding of less than 160 or he is not eating 100% of his meal   Increase the Lantus at night to 22 units  He did not utilize all the 10 units yesterday secondary to being transferred in and out of the unit  A discontinue IV fluids discussed with him to record all his sugars to follow up with his primary care physician at [facility] this too weak to do any further adjustment as needed patient is not on any p o  Medications as he is presumed to be type 1 diabetic

## 2019-10-13 NOTE — ASSESSMENT & PLAN NOTE
Lab Results   Component Value Date    HGBA1C 11 4 (H) 10/12/2019       Recent Labs     10/12/19  1055 10/12/19  1635 10/12/19  2014 10/13/19  0541   POCGLU 287* 159* 296* 217*       Blood Sugar Average: Last 72 hrs:  (P) 290 2   · Patient is Lantus 15 units at night and regular insulin 7 units t i d  As reviewed his PCPs notes presumed to be a type 1 diabetic and that is why all p o  Medications were discontinued outpatient basis  Sugars have been improved continue his adjusted dose of regular insulin 10 units t i d  With holding if less than 160 and not eating 100% of meals also continue Lantus but increased to 22 units at night record his sugars and follow up with his primary care physician at [facility] to see if there is any further adjustments as needed

## 2019-10-13 NOTE — DISCHARGE SUMMARY
Discharge- Martinez Mccauley 1970, 53 y o  male MRN: [id number]    Unit/Bed#: 7T [facility] 711-01 Encounter: [id number]    Primary Care Provider: No primary care provider on file  Date and time admitted to hospital: 10/12/2019  4:33 AM        Major depressive disorder with current active episode  Assessment & Plan  · Resume home medications an outpatient    HLD (hyperlipidemia)  Assessment & Plan  · Continue Tricor    Type 1 diabetes mellitus ([address] )  Assessment & Plan  Lab Results   Component Value Date    HGBA1C 11 4 (H) 10/12/2019       Recent Labs     10/12/19  1055 10/12/19  1635 10/12/19  2014 10/13/19  0541   POCGLU 287* 159* 296* 217*       Blood Sugar Average: Last 72 hrs:  (P) 290 2   · Patient is Lantus 15 units at night and regular insulin 7 units t i d  As reviewed his PCPs notes presumed to be a type 1 diabetic and that is why all p o  Medications were discontinued outpatient basis  Sugars have been improved continue his adjusted dose of regular insulin 10 units t i d  With holding if less than 160 and not eating 100% of meals also continue Lantus but increased to 22 units at night record his sugars and follow up with his primary care physician at [facility] to see if there is any further adjustments as needed  HTN (hypertension)  Assessment & Plan  Blood pressure is on the low marginal side decrease his lisinopril as an outpatient to 5 mg p o  Daily  * HHNC (hyperglycemic hyperosmolar nonketotic coma) [facility])  Assessment & Plan  Lab Results   Component Value Date    HGBA1C 11 4 (H) 10/12/2019       Recent Labs     10/12/19  1055 10/12/19  1635 10/12/19  2014 10/13/19  0541   POCGLU 287* 159* 296* 217*       Blood Sugar Average: Last 72 hrs:  (P) 290 2   It has resolved  I did do adjustment of insulin yesterday I will continue home regular insulin 10 units t i d  With meals with holding of less than 160 or he is not eating 100% of his meal   Increase the Lantus at night to 22 units    He did not utilize all the 10 units yesterday secondary to being transferred in and out of the unit  A discontinue IV fluids discussed with him to record all his sugars to follow up with his primary care physician at [facility] this too weak to do any further adjustment as needed patient is not on any p o  Medications as he is presumed to be type 1 diabetic  Discharging Physician / Practitioner: Gerard Anna MD  PCP: No primary care provider on file  Admission Date:   Admission Orders (From admission, onward)     Ordered        10/12/19 0641  Inpatient Admission (expected length of stay for this patient Order details is greater than two midnights)  Once                   Discharge Date: 10/13/19    Resolved Problems  Date Reviewed: 10/13/2019    None          Consultations During Hospital Stay:  · None    Procedures Performed:   · None    Significant Findings / Test Results:   · Sugar greater than 700    Incidental Findings:   · None     Test Results Pending at Discharge (will require follow up): · None     Outpatient Tests Requested:  · None    Complications:  None    Reason for Admission:  [facility] as with a headache and lightheadedness    Hospital Course:     Michael Sparks is a 53 y o  male patient who originally presented to the hospital on 10/12/2019 due to having recurrent headache with dizziness  He was also found to be in [facility] as his serum osmolarity was 308 and sugars were greater than 700  He had no gap and was not acidotic  He was never started on IV insulin because he was provided with subcutaneous insulin and and IV fluid to the improvement  Patient's insulin regiment was started and increased he will be discharged Lantus 22 units at night and regular insulin 10 units t i d  He has a presumed type 1 diabetic as reviewed primary care physicians noted he has p o  Medications have been discontinued apparently he is taking medications at home    After IV hydration and sugar stabilization his dizziness and headache have resolved  Patient is deemed medically clear to be discharged home I decreased his lisinopril to 5 mg p o  Daily to avoid any hypotension  The patient, initially admitted to the hospital as inpatient, was discharged earlier than expected given the following: Improved earlier than expected  Please see above list of diagnoses and related plan for additional information  Condition at Discharge: stable     Discharge Day Visit / Exam:     Subjective:  Patient seen and examined eating breakfast denies any chest pain or shortness of breath asking to take off his SCDs  Denies any dizziness denies any headache  Vitals: Blood Pressure: 96/60 (10/13/19 0709)  Pulse: 76 (10/13/19 0709)  Temperature: 97 6 °F (36 4 °C) (10/13/19 0709)  Temp Source: Temporal (10/13/19 0709)  Respirations: 20 (10/13/19 0709)  Height: 5' 6" (167 6 cm) (10/12/19 0901)  Weight - Scale: 62 2 kg (137 lb 2 oz) (10/12/19 0901)  SpO2: 96 % (10/13/19 0709)  Exam:   Physical Exam   Constitutional: He is oriented to person, place, and time  He appears well-developed and well-nourished  HENT:   Head: Normocephalic and atraumatic  Eyes: Pupils are equal, round, and reactive to light  EOM are normal    Neck: Normal range of motion  Cardiovascular: Normal rate, regular rhythm and normal heart sounds  Pulmonary/Chest: Effort normal and breath sounds normal    Abdominal: Soft  Bowel sounds are normal    Musculoskeletal: Normal range of motion  Neurological: He is alert and oriented to person, place, and time  He has normal reflexes  cranial nerve 2-12 are normal   Normal neurological exam   Skin: Skin is warm  Psychiatric: He has a normal mood and affect  Discussion with Family:  Patient    Discharge instructions/Information to patient and family:   See after visit summary for information provided to patient and family        Provisions for Follow-Up Care:  See after visit summary for information related to follow-up care and any pertinent home health orders  Disposition:     Home    For Discharges to [facility] SNF:   · Not Applicable to this Patient - Not Applicable to this Patient    Planned Readmission: no     Discharge Statement:  I spent >35 minutes discharging the patient  This time was spent on the day of discharge  I had direct contact with the patient on the day of discharge  Greater than 50% of the total time was spent examining patient, answering all patient questions, arranging and discussing plan of care with patient as well as directly providing post-discharge instructions  Additional time then spent on discharge activities  Discharge Medications:  See after visit summary for reconciled discharge medications provided to patient and family        ** Please Note: This note has been constructed using a voice recognition system **

## 2019-10-14 NOTE — UTILIZATION REVIEW
Initial Clinical Review    Admission: Date/Time/Statement: Inpatient Admission Orders (From admission, onward)     Ordered        10/12/19 0641  Inpatient Admission (expected length of stay for this patient Order details is greater than two midnights)  Once                   Orders Placed This Encounter   Procedures    Inpatient Admission (expected length of stay for this patient Order details is greater than two midnights)     Standing Status:   Standing     Number of Occurrences:   1     Order Specific Question:   Admitting Physician     Answer:   Yoly Gunter [[id number]]     Order Specific Question:   Level of Care     Answer:   Level 1 Stepdown [13]     Order Specific Question:   Estimated length of stay     Answer:   More than 2 Midnights     Order Specific Question:   Certification     Answer:   I certify that inpatient services are medically necessary for this patient for a duration of greater than two midnights  See H&P and MD Progress Notes for additional information about the patient's course of treatment  ED Arrival Information     Expected Arrival Acuity Means of Arrival Escorted By Service Admission Type    - 10/12/2019 04:33 Urgent [facility] ([address]) General Medicine Urgent    Arrival Complaint    Cold Symptoms         Chief Complaint   Patient presents with    Headache     pt states that for 3 days he has had a HA, chills and bodyaches  pt denies taking anything OTC meds for this  Assessment/Plan: 54 y/o male presents to ED from home by EMS with fatigue, headache, chills, body aches  Also c/o feeling jittery  Hx diabetes with noncompliance  ED blood glucose 726  Received 10 units regular insulin and IV decadron for headache in ED  On exam, somnolent but arousable, falls asleep during conversation  Admitted as inpatient to Level 1 Stepdown due to [facility], DM1  Continue IVF  Continue accuchecks, SSI  Has continued headache, lightheadedness    IV Mag sulfate for headache  10/13 per attending, the patient, initially admitted to the hospital as inpatient, was discharged earlier than expected given the following: Improved earlier than expected      ED Triage Vitals [10/12/19 0437]   Temperature Pulse Respirations Blood Pressure SpO2   (!) 96 9 °F (36 1 °C) (!) 107 18 108/52 97 %      Temp Source Heart Rate Source Patient Position - Orthostatic VS BP Location FiO2 (%)   Tympanic Monitor Sitting Left arm --      Pain Score       3        Wt Readings from Last 1 Encounters:   10/12/19 62 2 kg (137 lb 2 oz)     Additional Vital Signs:   10/13/19 0709 97 6 °F (36 4 °C) 76 20 96/60 96 % None (Room air)   10/12/19 2305 97 9 °F (36 6 °C) 60 15 93/64 97 % None (Room air)   10/12/19 1400  74 14 95/58     10/12/19 1200  88 15 117/78     10/12/19 1111    114/70     10/12/19 1100  68 13 114/70     10/12/19 1000  68 15 111/66 97 %    10/12/19 0901 97 6 °F (36 4 °C) 76  125/68  None (Room air)   10/12/19 0807  86 16 123/81 98 % None (Room air)   10/12/19 0649 97 °F (36 1 °C)  106Abnormal   122/71 97 % None (Room air)       Pertinent Labs/Diagnostic Test Results:   Results from last 7 days   Lab Units 10/12/19  0928 10/12/19  0537   WBC Thousand/uL  --  5 80   HEMOGLOBIN g/dL  --  14 1   HEMATOCRIT %  --  42 6   PLATELETS Thousands/uL 224 210   NEUTROS ABS Thousands/µL  --  3 60         Results from last 7 days   Lab Units 10/13/19  0513 10/12/19  0537   SODIUM mmol/L 138 134*   POTASSIUM mmol/L 3 6 4 1   CHLORIDE mmol/L 107 96*   CO2 mmol/L 27 27   ANION GAP mmol/L 4* 11   BUN mg/dL 14 16   CREATININE mg/dL 0 70 0 88   EGFR ml/min/1 73sq m 112 102   CALCIUM mg/dL 8 2* 8 8         Results from last 7 days   Lab Units 10/13/19  1113 10/13/19  0541 10/12/19  2014 10/12/19  1635 10/12/19  1055 10/12/19  0753 10/12/19  0635   POC GLUCOSE mg/dl 184* 217* 296* 159* 287* 492* >500*     Results from last 7 days   Lab Units 10/13/19  0513 10/12/19  0537   GLUCOSE RANDOM mg/dL 220* 728*     Results from last 7 days   Lab Units 10/12/19  0928 10/12/19  0803   OSMOLALITY, SERUM mmol/* 312*     Results from last 7 days   Lab Units 10/12/19  0928   HEMOGLOBIN A1C % 11 4*   EAG mg/dl 280     Results from last 7 days   Lab Units 10/12/19  0537   TSH 3RD GENERATON uIU/mL 2 000         Results from last 7 days   Lab Units 10/12/19  0803   LACTIC ACID mmol/L 1 5     10/12 EKG:  Normal sinus rhythm    ED Treatment:   Medication Administration from 10/12/2019 0433 to 10/12/2019 0859       Date/Time Order Dose Route Action     10/12/2019 0539 dexamethasone (DECADRON) injection 10 mg 10 mg Intravenous Given     10/12/2019 0541 sodium chloride 0 9 % bolus 1,000 mL 1,000 mL Intravenous New Bag     10/12/2019 0540 diphenhydrAMINE (BENADRYL) injection 25 mg 25 mg Intravenous Given     10/12/2019 0539 metoclopramide (REGLAN) injection 10 mg 10 mg Intravenous Given     10/12/2019 0645 sodium chloride 0 9 % bolus 1,000 mL 1,000 mL Intravenous New Bag     10/12/2019 0813 insulin regular (HumuLIN R,NovoLIN R) injection 10 Units 10 Units Subcutaneous Given     10/12/2019 0844 metFORMIN (GLUCOPHAGE) tablet 1,000 mg 1,000 mg Oral Given        Past Medical History:   Diagnosis Date    Anxiety     Asthma     Bipolar 1 disorder ([place] [address] )     Depression     Diabetes mellitus ([place] [address] )     Psychiatric disorder      Present on Admission:   HHNC (hyperglycemic hyperosmolar nonketotic coma) ([place] [address] )   HTN (hypertension)   Type 1 diabetes mellitus ([place] [address] )   HLD (hyperlipidemia)      Admitting Diagnosis: Weakness [R53 1]  Hyperglycemia [R73 9]  Headache [R51]  Headache [R51]  Age/Sex: 53 y o  male     Admission Orders:  insulin glargine (LANTUS) subcutaneous injection 20 Units 0 2 mL   Dose: 20 Units  Freq: Daily at bedtime Route: SC  Start: 10/12/19 2200 End: 10/13/19 1516  insulin lispro (HumaLOG) 100 units/mL subcutaneous injection 1-5 Units   Dose: 1-5 Units  Freq: Daily at bedtime Route: SC  Start: 10/12/19 2200 End: 10/13/19 1516  insulin lispro (HumaLOG) 100 units/mL subcutaneous injection 1-5 Units   Dose: 1-5 Units  Freq: 3 times daily before meals Route: SC  Start: 10/12/19 1130 End: 10/13/19 1516  insulin lispro (HumaLOG) 100 units/mL subcutaneous injection 10 Units   Dose: 10 Units  Freq: 3 times daily with meals Route: SC  Start: 10/12/19 1200 End: 10/13/19 1516  sodium chloride 0 9 % infusion   Rate: 125 mL/hr Dose: 125 mL/hr  Freq: Continuous Route: IV  Last Dose: Stopped (10/13/19 0914)  Start: 10/12/19 1000 End: 10/13/19 0847      Diabetic diet  Serum osmolality  Accuchecks  SCDs  VS    Network Utilization Review Department  Phone: [phone]; Fax [phone]  [email or web address]  ATTENTION: Please call with any questions or concerns to [phone]  and carefully listen to the prompts so that you are directed to the right person  Send all requests for admission clinical reviews, approved or denied determinations and any other requests to fax [phone]   All voicemails are confidential

## 2019-10-14 NOTE — UTILIZATION REVIEW
Notification of Discharge  This is a Notification of Discharge from our facility [address]  Please be advised that this patient has been discharge from our facility  Below you will find the admission and discharge date and time including the patients disposition  PRESENTATION DATE: 10/12/2019  4:33 AM    IP ADMISSION DATE: 10/12/19 0640   DISCHARGE DATE: 10/13/2019  1:16 PM  DISPOSITION: Home/Self Care Home/Self Care   Admission Orders listed below:  Admission Orders (From admission, onward)     Ordered        10/12/19 0641  Inpatient Admission (expected length of stay for this patient Order details is greater than two midnights)  Once                   Please contact the UR Department if additional information is required to close this patient's authorization/case  [address]  Utilization Review Department  Phone: [phone]; Fax [phone]  [email or web address]  org  ATTENTION: Please call with any questions or concerns to [phone]  and carefully listen to the prompts so that you are directed to the right person  Send all requests for admission clinical reviews, approved or denied determinations and any other requests to fax [phone]   All voicemails are confidential

## 2019-10-15 ENCOUNTER — HOSPITAL ENCOUNTER (EMERGENCY)
Facility: HOSPITAL | Age: 49
Discharge: HOME | End: 2019-10-15
Attending: EMERGENCY MEDICINE
Payer: COMMERCIAL

## 2019-10-15 ENCOUNTER — HOSPITAL ENCOUNTER (EMERGENCY)
Facility: HOSPITAL | Age: 49
Discharge: HOME | End: 2019-10-16
Attending: EMERGENCY MEDICINE | Admitting: EMERGENCY MEDICINE
Payer: COMMERCIAL

## 2019-10-15 VITALS
TEMPERATURE: 96 F | RESPIRATION RATE: 16 BRPM | HEART RATE: 72 BPM | DIASTOLIC BLOOD PRESSURE: 71 MMHG | SYSTOLIC BLOOD PRESSURE: 130 MMHG | WEIGHT: 143.3 LBS | BODY MASS INDEX: 23.13 KG/M2 | OXYGEN SATURATION: 97 %

## 2019-10-15 DIAGNOSIS — F32.A DEPRESSION: Primary | ICD-10-CM

## 2019-10-15 DIAGNOSIS — R11.0 NAUSEA: ICD-10-CM

## 2019-10-15 DIAGNOSIS — E11.65 DIABETES MELLITUS WITH HYPERGLYCEMIA (HCC): ICD-10-CM

## 2019-10-15 DIAGNOSIS — R10.84 GENERALIZED ABDOMINAL PAIN: Primary | ICD-10-CM

## 2019-10-15 LAB
ALBUMIN SERPL BCP-MCNC: 3.5 G/DL (ref 3.5–5)
ALP SERPL-CCNC: 94 U/L (ref 46–116)
ALT SERPL W P-5'-P-CCNC: 16 U/L (ref 12–78)
AMPHETAMINES SERPL QL SCN: NEGATIVE
AMPHETAMINES SERPL QL SCN: NEGATIVE
ANION GAP SERPL CALCULATED.3IONS-SCNC: 6 MMOL/L (ref 5–14)
ANION GAP SERPL CALCULATED.3IONS-SCNC: 8 MMOL/L (ref 4–13)
APTT PPP: 25 SECONDS (ref 23–37)
AST SERPL W P-5'-P-CCNC: 17 U/L (ref 5–45)
BACTERIA UR QL AUTO: NORMAL /HPF
BARBITURATES UR QL: NEGATIVE
BARBITURATES UR QL: NEGATIVE
BASOPHILS # BLD AUTO: 0 THOUSANDS/ΜL (ref 0–0.1)
BASOPHILS # BLD AUTO: 0.02 THOUSANDS/ΜL (ref 0–0.1)
BASOPHILS NFR BLD AUTO: 0 % (ref 0–1)
BASOPHILS NFR BLD AUTO: 1 % (ref 0–1)
BENZODIAZ UR QL: NEGATIVE
BENZODIAZ UR QL: NEGATIVE
BILIRUB SERPL-MCNC: 0.3 MG/DL (ref 0.2–1)
BILIRUB UR QL STRIP: NEGATIVE
BILIRUB UR QL STRIP: NEGATIVE
BUN SERPL-MCNC: 13 MG/DL (ref 5–25)
BUN SERPL-MCNC: 13 MG/DL (ref 5–25)
CALCIUM SERPL-MCNC: 8.8 MG/DL (ref 8.3–10.1)
CALCIUM SERPL-MCNC: 9 MG/DL (ref 8.4–10.2)
CHLORIDE SERPL-SCNC: 101 MMOL/L (ref 97–108)
CHLORIDE SERPL-SCNC: 104 MMOL/L (ref 100–108)
CLARITY UR: CLEAR
CLARITY UR: CLEAR
CLARITY, POC: CLEAR
CO2 SERPL-SCNC: 29 MMOL/L (ref 22–30)
CO2 SERPL-SCNC: 30 MMOL/L (ref 21–32)
COCAINE UR QL: NEGATIVE
COCAINE UR QL: NEGATIVE
COLOR UR: ABNORMAL
COLOR UR: YELLOW
COLOR, POC: YELLOW
CREAT SERPL-MCNC: 0.66 MG/DL (ref 0.7–1.5)
CREAT SERPL-MCNC: 1.01 MG/DL (ref 0.6–1.3)
EOSINOPHIL # BLD AUTO: 0.06 THOUSAND/ΜL (ref 0–0.61)
EOSINOPHIL # BLD AUTO: 0.1 THOUSAND/ΜL (ref 0–0.4)
EOSINOPHIL NFR BLD AUTO: 1 % (ref 0–6)
EOSINOPHIL NFR BLD AUTO: 2 % (ref 0–6)
ERYTHROCYTE [DISTWIDTH] IN BLOOD BY AUTOMATED COUNT: 11.9 % (ref 11.6–15.1)
ERYTHROCYTE [DISTWIDTH] IN BLOOD BY AUTOMATED COUNT: 12.9 %
ETHANOL SERPL-MCNC: <10 MG/DL (ref 0–10)
GFR SERPL CREATININE-BSD FRML MDRD: 115 ML/MIN/1.73SQ M
GFR SERPL CREATININE-BSD FRML MDRD: 88 ML/MIN/1.73SQ M
GLUCOSE SERPL-MCNC: 287 MG/DL (ref 65–140)
GLUCOSE SERPL-MCNC: 295 MG/DL (ref 65–140)
GLUCOSE SERPL-MCNC: 300 MG/DL (ref 65–140)
GLUCOSE SERPL-MCNC: 420 MG/DL (ref 70–99)
GLUCOSE UR STRIP-MCNC: ABNORMAL MG/DL
GLUCOSE UR STRIP-MCNC: ABNORMAL MG/DL
HCT VFR BLD AUTO: 40.8 % (ref 41–53)
HCT VFR BLD AUTO: 41.7 % (ref 36.5–49.3)
HGB BLD-MCNC: 13.8 G/DL (ref 13.5–17.5)
HGB BLD-MCNC: 14.1 G/DL (ref 12–17)
HGB UR QL STRIP.AUTO: NEGATIVE
HGB UR QL STRIP.AUTO: NEGATIVE
IMM GRANULOCYTES # BLD AUTO: 0.02 THOUSAND/UL (ref 0–0.2)
IMM GRANULOCYTES NFR BLD AUTO: 0 % (ref 0–2)
INR PPP: 0.97 (ref 0.84–1.19)
KETONES UR STRIP-MCNC: NEGATIVE MG/DL
KETONES UR STRIP-MCNC: NEGATIVE MG/DL
LACTATE SERPL-SCNC: 1 MMOL/L (ref 0.5–2)
LEUKOCYTE ESTERASE UR QL STRIP: NEGATIVE
LEUKOCYTE ESTERASE UR QL STRIP: NEGATIVE
LIPASE SERPL-CCNC: 150 U/L (ref 73–393)
LYMPHOCYTES # BLD AUTO: 2 THOUSANDS/ΜL (ref 0.5–4)
LYMPHOCYTES # BLD AUTO: 2.49 THOUSANDS/ΜL (ref 0.6–4.47)
LYMPHOCYTES NFR BLD AUTO: 33 % (ref 14–44)
LYMPHOCYTES NFR BLD AUTO: 33 % (ref 25–45)
MAGNESIUM SERPL-MCNC: 1.7 MG/DL (ref 1.6–2.6)
MCH RBC QN AUTO: 30.1 PG (ref 26–34)
MCH RBC QN AUTO: 30.3 PG (ref 26.8–34.3)
MCHC RBC AUTO-ENTMCNC: 33.8 G/DL (ref 31.4–37.4)
MCHC RBC AUTO-ENTMCNC: 34 G/DL (ref 31–36)
MCV RBC AUTO: 89 FL (ref 80–100)
MCV RBC AUTO: 90 FL (ref 82–98)
METHADONE UR QL: NEGATIVE
METHADONE UR QL: NEGATIVE
MONOCYTES # BLD AUTO: 0.5 THOUSAND/ΜL (ref 0.2–0.9)
MONOCYTES # BLD AUTO: 0.71 THOUSAND/ΜL (ref 0.17–1.22)
MONOCYTES NFR BLD AUTO: 10 % (ref 4–12)
MONOCYTES NFR BLD AUTO: 9 % (ref 1–10)
NEUTROPHILS # BLD AUTO: 3.4 THOUSANDS/ΜL (ref 1.8–7.8)
NEUTROPHILS # BLD AUTO: 4.19 THOUSANDS/ΜL (ref 1.85–7.62)
NEUTS SEG NFR BLD AUTO: 56 % (ref 43–75)
NEUTS SEG NFR BLD AUTO: 56 % (ref 45–65)
NITRITE UR QL STRIP: NEGATIVE
NITRITE UR QL STRIP: NEGATIVE
NON-SQ EPI CELLS URNS QL MICRO: NORMAL /HPF
NRBC BLD AUTO-RTO: 0 /100 WBCS
OPIATES UR QL SCN: NEGATIVE
OPIATES UR QL SCN: NEGATIVE
PCP UR QL: NEGATIVE
PCP UR QL: NEGATIVE
PH UR STRIP.AUTO: 7 [PH]
PH UR STRIP.AUTO: 8 [PH] (ref 4.5–8)
PLATELET # BLD AUTO: 201 THOUSANDS/UL (ref 150–450)
PLATELET # BLD AUTO: 225 THOUSANDS/UL (ref 149–390)
PMV BLD AUTO: 11 FL (ref 8.9–12.7)
PMV BLD AUTO: 9.1 FL (ref 8.9–12.7)
POTASSIUM SERPL-SCNC: 3.9 MMOL/L (ref 3.6–5)
POTASSIUM SERPL-SCNC: 4.1 MMOL/L (ref 3.5–5.3)
PROT SERPL-MCNC: 6.8 G/DL (ref 6.4–8.2)
PROT UR STRIP-MCNC: NEGATIVE MG/DL
PROT UR STRIP-MCNC: NEGATIVE MG/DL
PROTHROMBIN TIME: 13 SECONDS (ref 11.6–14.5)
RBC # BLD AUTO: 4.59 MILLION/UL (ref 4.5–5.9)
RBC # BLD AUTO: 4.66 MILLION/UL (ref 3.88–5.62)
RBC #/AREA URNS AUTO: NORMAL /HPF
SODIUM SERPL-SCNC: 136 MMOL/L (ref 137–147)
SODIUM SERPL-SCNC: 142 MMOL/L (ref 136–145)
SP GR UR STRIP.AUTO: 1.01 (ref 1–1.03)
SP GR UR STRIP.AUTO: 1.01 (ref 1–1.04)
THC UR QL: NEGATIVE
THC UR QL: NEGATIVE
UROBILINOGEN UA: NEGATIVE MG/DL
UROBILINOGEN UR QL STRIP.AUTO: 0.2 E.U./DL
WBC # BLD AUTO: 6 THOUSAND/UL (ref 4.5–11)
WBC # BLD AUTO: 7.49 THOUSAND/UL (ref 4.31–10.16)
WBC #/AREA URNS AUTO: NORMAL /HPF

## 2019-10-15 PROCEDURE — 96361 HYDRATE IV INFUSION ADD-ON: CPT

## 2019-10-15 PROCEDURE — 93005 ELECTROCARDIOGRAM TRACING: CPT

## 2019-10-15 PROCEDURE — 96372 THER/PROPH/DIAG INJ SC/IM: CPT

## 2019-10-15 PROCEDURE — 81003 URINALYSIS AUTO W/O SCOPE: CPT

## 2019-10-15 PROCEDURE — 85025 COMPLETE CBC W/AUTO DIFF WBC: CPT | Performed by: EMERGENCY MEDICINE

## 2019-10-15 PROCEDURE — 99284 EMERGENCY DEPT VISIT MOD MDM: CPT

## 2019-10-15 PROCEDURE — 81003 URINALYSIS AUTO W/O SCOPE: CPT | Performed by: EMERGENCY MEDICINE

## 2019-10-15 PROCEDURE — 36415 COLL VENOUS BLD VENIPUNCTURE: CPT | Performed by: NURSE PRACTITIONER

## 2019-10-15 PROCEDURE — 80320 DRUG SCREEN QUANTALCOHOLS: CPT | Performed by: EMERGENCY MEDICINE

## 2019-10-15 PROCEDURE — 99284 EMERGENCY DEPT VISIT MOD MDM: CPT | Performed by: EMERGENCY MEDICINE

## 2019-10-15 PROCEDURE — 81001 URINALYSIS AUTO W/SCOPE: CPT | Performed by: EMERGENCY MEDICINE

## 2019-10-15 PROCEDURE — 83605 ASSAY OF LACTIC ACID: CPT | Performed by: NURSE PRACTITIONER

## 2019-10-15 PROCEDURE — 85610 PROTHROMBIN TIME: CPT | Performed by: NURSE PRACTITIONER

## 2019-10-15 PROCEDURE — 85025 COMPLETE CBC W/AUTO DIFF WBC: CPT | Performed by: NURSE PRACTITIONER

## 2019-10-15 PROCEDURE — 80053 COMPREHEN METABOLIC PANEL: CPT | Performed by: NURSE PRACTITIONER

## 2019-10-15 PROCEDURE — 99284 EMERGENCY DEPT VISIT MOD MDM: CPT | Performed by: NURSE PRACTITIONER

## 2019-10-15 PROCEDURE — 85730 THROMBOPLASTIN TIME PARTIAL: CPT | Performed by: NURSE PRACTITIONER

## 2019-10-15 PROCEDURE — 96374 THER/PROPH/DIAG INJ IV PUSH: CPT

## 2019-10-15 PROCEDURE — 80048 BASIC METABOLIC PNL TOTAL CA: CPT | Performed by: EMERGENCY MEDICINE

## 2019-10-15 PROCEDURE — 83690 ASSAY OF LIPASE: CPT | Performed by: NURSE PRACTITIONER

## 2019-10-15 PROCEDURE — 80307 DRUG TEST PRSMV CHEM ANLYZR: CPT | Performed by: NURSE PRACTITIONER

## 2019-10-15 PROCEDURE — 96360 HYDRATION IV INFUSION INIT: CPT

## 2019-10-15 PROCEDURE — 83735 ASSAY OF MAGNESIUM: CPT | Performed by: NURSE PRACTITIONER

## 2019-10-15 PROCEDURE — 82948 REAGENT STRIP/BLOOD GLUCOSE: CPT

## 2019-10-15 RX ORDER — ONDANSETRON 2 MG/ML
4 INJECTION INTRAMUSCULAR; INTRAVENOUS ONCE
Status: COMPLETED | OUTPATIENT
Start: 2019-10-15 | End: 2019-10-15

## 2019-10-15 RX ADMIN — SODIUM CHLORIDE 1000 ML: 0.9 INJECTION, SOLUTION INTRAVENOUS at 01:30

## 2019-10-15 RX ADMIN — INSULIN HUMAN 2 UNITS: 100 INJECTION, SOLUTION PARENTERAL at 01:45

## 2019-10-15 RX ADMIN — SODIUM CHLORIDE 1000 ML: 0.9 INJECTION, SOLUTION INTRAVENOUS at 23:58

## 2019-10-15 RX ADMIN — ONDANSETRON 4 MG: 2 INJECTION INTRAMUSCULAR; INTRAVENOUS at 01:32

## 2019-10-15 RX ADMIN — INSULIN HUMAN 10 UNITS: 100 INJECTION, SOLUTION PARENTERAL at 23:58

## 2019-10-15 NOTE — ED PROVIDER NOTES
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 who has a PMH of DMm anxiety and psychiatric history who comes to the ED with c/o that his stomach doesn't feel well  He states he has had diarrhea yesterday along with stomachache  He states he had spaghetti Monday and ate all of it  Denies vomiting   + nausea  He denies taking anything for pain  He states he checked his blood sugar today and was 138  He states he took all his meds as prescribed  Pt walked to ED       History provided by:  Medical records and patient  History limited by:  Psychiatric disorder   used: No        Prior to Admission Medications   Prescriptions Last Dose Informant Patient Reported?  Taking?   dicyclomine (BENTYL) 20 mg tablet   No Yes   Sig: Take 1 tablet (20 mg total) by mouth 2 (two) times a day   famotidine (PEPCID) 20 mg tablet   No Yes   Sig: Take 1 tablet (20 mg total) by mouth 2 (two) times a day   fenofibrate (TRICOR) 48 mg tablet   Yes Yes   Sig: Take 48 mg by mouth   fluticasone (FLOVENT HFA) 110 MCG/ACT inhaler   Yes Yes   Sig: Inhale 1 puff   insulin glargine (LANTUS SOLOSTAR) 100 units/mL injection pen   No Yes   Sig: Inject 22 Units under the skin every morning   insulin regular (HUMULIN R) 100 units/mL injection   No Yes   Sig: Inject 0 1 mL (10 Units total) under the skin 3 (three) times a day with meals Hold if sugars <160   lisinopril (ZESTRIL) 10 mg tablet   No Yes   Sig: Take 0 5 tablets (5 mg total) by mouth daily   thiothixene (NAVANE) 1 mg capsule   Yes Yes   Sig: Take 1 mg by mouth   traZODone (DESYREL) 50 mg tablet   Yes Yes   Sig: TAKE 1 TABLET BY MOUTH EVERYDAY AT BEDTIME      Facility-Administered Medications: None       Past Medical History:   Diagnosis Date    Anxiety     Asthma     Bipolar 1 disorder (HCC)     Depression     Diabetes mellitus ([address] )     Psychiatric disorder Past Surgical History:   Procedure Laterality Date    APPENDECTOMY         History reviewed  No pertinent family history  I have reviewed and agree with the history as documented  Social History     Tobacco Use    Smoking status: Former Smoker     Packs/day: 0 00     Types: Cigarettes    Smokeless tobacco: Never Used   Substance Use Topics    Alcohol use: Not Currently     Binge frequency: Never    Drug use: No        Review of Systems   Constitutional: Negative  HENT: Negative  Eyes: Negative  Respiratory: Negative  Cardiovascular: Negative  Gastrointestinal: Positive for abdominal pain, diarrhea and nausea  Endocrine: Negative  Genitourinary: Negative  Musculoskeletal: Negative  Skin: Negative  Allergic/Immunologic: Negative  Neurological: Negative  Hematological: Negative  Psychiatric/Behavioral: Negative  Physical Exam  Physical Exam   Constitutional: He is oriented to person, place, and time  He appears well-developed and well-nourished  Non-toxic appearance  He does not appear ill  No distress  HENT:   Head: Normocephalic and atraumatic  Mouth/Throat: Oropharynx is clear and moist  No oropharyngeal exudate  Eyes: Pupils are equal, round, and reactive to light  EOM are normal    Cardiovascular: Normal rate, regular rhythm and normal heart sounds  Pulmonary/Chest: Effort normal and breath sounds normal    Abdominal: Soft  Normal appearance  There is generalized tenderness  Neurological: He is alert and oriented to person, place, and time  Skin: Skin is warm and dry  Capillary refill takes less than 2 seconds  Psychiatric: He has a normal mood and affect  His behavior is normal    Nursing note and vitals reviewed        Vital Signs  ED Triage Vitals [10/15/19 0101]   Temperature Pulse Respirations Blood Pressure SpO2   (!) 96 °F (35 6 °C) 84 18 107/62 95 %      Temp Source Heart Rate Source Patient Position - Orthostatic VS BP Location FiO2 (%)   Temporal Monitor Sitting Right arm --      Pain Score       Worst Possible Pain           Vitals:    10/15/19 0101 10/15/19 0301   BP: 107/62 130/71   Pulse: 84 72   Patient Position - Orthostatic VS: Sitting Lying         Visual Acuity      ED Medications  Medications   sodium chloride 0 9 % bolus 1,000 mL (0 mL Intravenous Stopped 10/15/19 0237)   ondansetron (ZOFRAN) injection 4 mg (4 mg Intravenous Given 10/15/19 0132)   insulin regular (HumuLIN R,NovoLIN R) injection 2 Units (2 Units Subcutaneous Given 10/15/19 0145)       Diagnostic Studies  Results Reviewed     Procedure Component Value Units Date/Time    Fingerstick Glucose (POCT) [[id number]]  (Abnormal) Collected:  10/15/19 0259    Lab Status:  Final result Updated:  10/15/19 0301     POC Glucose 287 mg/dl     Lactic acid, plasma [[id number]]  (Normal) Collected:  10/15/19 0128    Lab Status:  Final result Specimen:  Blood from Arm, Right Updated:  10/15/19 0155     LACTIC ACID 1 0 mmol/L     Narrative:       Result may be elevated if tourniquet was used during collection      Comprehensive metabolic panel [[id number]]  (Abnormal) Collected:  10/15/19 0128    Lab Status:  Final result Specimen:  Blood from Arm, Right Updated:  10/15/19 0153     Sodium 142 mmol/L      Potassium 4 1 mmol/L      Chloride 104 mmol/L      CO2 30 mmol/L      ANION GAP 8 mmol/L      BUN 13 mg/dL      Creatinine 1 01 mg/dL      Glucose 295 mg/dL      Calcium 8 8 mg/dL      AST 17 U/L      ALT 16 U/L      Alkaline Phosphatase 94 U/L      Total Protein 6 8 g/dL      Albumin 3 5 g/dL      Total Bilirubin 0 30 mg/dL      eGFR 88 ml/min/1 73sq m     Narrative:       [place] guidelines for Chronic Kidney Disease (CKD):     Stage 1 with normal or high GFR (GFR > 90 mL/min/1 73 square meters)    Stage 2 Mild CKD (GFR = 60-89 mL/min/1 73 square meters)    Stage 3A Moderate CKD (GFR = 45-59 mL/min/1 73 square meters)    Stage 3B Moderate CKD (GFR = 30-44 mL/min/1 73 square meters)    Stage 4 Severe CKD (GFR = 15-29 mL/min/1 73 square meters)    Stage 5 End Stage CKD (GFR <15 mL/min/1 73 square meters)  Note: GFR calculation is accurate only with a steady state creatinine    Magnesium [[id number]]  (Normal) Collected:  10/15/19 0128    Lab Status:  Final result Specimen:  Blood from Arm, Right Updated:  10/15/19 0153     Magnesium 1 7 mg/dL     Lipase [[id number]]  (Normal) Collected:  10/15/19 0128    Lab Status:  Final result Specimen:  Blood from Arm, Right Updated:  10/15/19 0153     Lipase 150 u/L     Rapid drug screen, urine [[id number]]  (Normal) Collected:  10/15/19 0127    Lab Status:  Final result Specimen:  Urine, Clean Catch Updated:  10/15/19 0153     Amph/Meth UR Negative     Barbiturate Ur Negative     Benzodiazepine Urine Negative     Cocaine Urine Negative     Methadone Urine Negative     Opiate Urine Negative     PCP Ur Negative     THC Urine Negative    Narrative:       FOR MEDICAL PURPOSES ONLY  IF CONFIRMATION NEEDED PLEASE CONTACT THE LAB WITHIN 5 DAYS      Drug Screen Cutoff Levels:  AMPHETAMINE/METHAMPHETAMINES  1000 ng/mL  BARBITURATES     200 ng/mL  BENZODIAZEPINES     200 ng/mL  COCAINE      300 ng/mL  METHADONE      300 ng/mL  OPIATES      300 ng/mL  PHENCYCLIDINE     25 ng/mL  THC       50 ng/mL      Protime-INR [[id number]]  (Normal) Collected:  10/15/19 0128    Lab Status:  Final result Specimen:  Blood from Arm, Right Updated:  10/15/19 0146     Protime 13 0 seconds      INR 0 97    APTT [[id number]]  (Normal) Collected:  10/15/19 0128    Lab Status:  Final result Specimen:  Blood from Arm, Right Updated:  10/15/19 0146     PTT 25 seconds     CBC and differential [[id number]] Collected:  10/15/19 0128    Lab Status:  Final result Specimen:  Blood from Arm, Right Updated:  10/15/19 0136     WBC 7 49 Thousand/uL      RBC 4 66 Million/uL      Hemoglobin 14 1 g/dL      Hematocrit 41 7 %      MCV 90 fL      MCH 30 3 pg      MCHC 33 8 g/dL RDW 11 9 %      MPV 11 0 fL      Platelets 582 Thousands/uL      nRBC 0 /100 WBCs      Neutrophils Relative 56 %      Immat GRANS % 0 %      Lymphocytes Relative 33 %      Monocytes Relative 10 %      Eosinophils Relative 1 %      Basophils Relative 0 %      Neutrophils Absolute 4 19 Thousands/µL      Immature Grans Absolute 0 02 Thousand/uL      Lymphocytes Absolute 2 49 Thousands/µL      Monocytes Absolute 0 71 Thousand/µL      Eosinophils Absolute 0 06 Thousand/µL      Basophils Absolute 0 02 Thousands/µL     POCT urinalysis dipstick [[id number]]  (Normal) Resulted:  10/15/19 0127    Lab Status:  Final result Specimen:  Urine Updated:  10/15/19 0127     Color, UA yellow     Clarity, UA clear    ED Urine Macroscopic [[id number]]  (Abnormal) Collected:  10/15/19 0124    Lab Status:  Final result Specimen:  Urine Updated:  10/15/19 0126     Color, UA Yellow     Clarity, UA Clear     pH, UA 8 0     Leukocytes, UA Negative     Nitrite, UA Negative     Protein, UA Negative mg/dl      Glucose,  (1/2%) mg/dl      Ketones, UA Negative mg/dl      Urobilinogen, UA 0 2 E U /dl      Bilirubin, UA Negative     Blood, UA Negative     Specific Gravity, UA 1 015    Narrative:       CLINITEK RESULT    Fingerstick Glucose (POCT) [[id number]]  (Abnormal) Collected:  10/15/19 0118    Lab Status:  Final result Updated:  10/15/19 0123     POC Glucose 300 mg/dl                  No orders to display              Procedures  Procedures       ED Course  ED Course as of Oct 15 0542   Tue Oct 15, 2019   0137  will give 2 units SQ regular insulin  0158 LA 1 0 mg 1 7 Labs reviewed and discussed with pt  Pt states he is feeling better  Labs unremarkable - no need for CT at this time  Will let pt rest and reassess  0230 Cr 1 01 GFR 88 - flag popped for JOSH  Baseline 0 8 to 1         0300   Pt has follow up and insulin at home    Will try po challenge if pt passes will dc home       0321 Pt ate a cracker with out difficulty  Pt verbalizes understanding of dc instructions and follow up  MDM  Number of Diagnoses or Management Options  Diagnosis management comments: Differential diagnosis:  DKA, HHS, HHNK  Gastroenteritis  Colitis  Enteritis  Food poisoning     Plan  Labs  Urine  IVF  zofran         Amount and/or Complexity of Data Reviewed  Clinical lab tests: ordered and reviewed  Review and summarize past medical records: yes        Disposition  Final diagnoses:   Generalized abdominal pain   Nausea   Diabetes mellitus with hyperglycemia ([address] )     Time reflects when diagnosis was documented in both MDM as applicable and the Disposition within this note     Time User Action Codes Description Comment    10/15/2019  2:32 AM Power Formica Add [R10 84] Generalized abdominal pain     10/15/2019  2:32 AM Power Formica Add [R11 0] Nausea     10/15/2019  3:02 AM Power Formica Add [E11 65] Diabetes mellitus with hyperglycemia [facility])       ED Disposition     ED Disposition Condition Date/Time Comment    Discharge Stable Tue Oct 15, 2019  3:02 AM Maria Luisa Lorenzo discharge to home/self care              Follow-up Information    None         Discharge Medication List as of 10/15/2019  3:21 AM      CONTINUE these medications which have NOT CHANGED    Details   dicyclomine (BENTYL) 20 mg tablet Take 1 tablet (20 mg total) by mouth 2 (two) times a day, Starting Tue 9/10/2019, Print      famotidine (PEPCID) 20 mg tablet Take 1 tablet (20 mg total) by mouth 2 (two) times a day, Starting Tue 9/10/2019, Print      fenofibrate (TRICOR) 48 mg tablet Take 48 mg by mouth, Starting Tue 12/12/2017, Historical Med      fluticasone (FLOVENT HFA) 110 MCG/ACT inhaler Inhale 1 puff, Historical Med      insulin glargine (LANTUS SOLOSTAR) 100 units/mL injection pen Inject 22 Units under the skin every morning, Starting Sun 10/13/2019, No Print      insulin regular (HUMULIN R) 100 units/mL injection Inject 0 1 mL (10 Units total) under the skin 3 (three) times a day with meals Hold if sugars <160, Starting Sun 10/13/2019, No Print      lisinopril (ZESTRIL) 10 mg tablet Take 0 5 tablets (5 mg total) by mouth daily, Starting Sun 10/13/2019, No Print      thiothixene (NAVANE) 1 mg capsule Take 1 mg by mouth, Historical Med      traZODone (DESYREL) 50 mg tablet TAKE 1 TABLET BY MOUTH EVERYDAY AT BEDTIME, Historical Med           No discharge procedures on file      ED Provider  Electronically Signed by           Nay Mancia  10/15/19 8956

## 2019-10-15 NOTE — DISCHARGE INSTRUCTIONS
Your testing today was negative for an acute process  Your blood sugar was 287  You are to follow up with your PCP at [address]  Take all your medications as prescribed

## 2019-10-16 VITALS
BODY MASS INDEX: 22.95 KG/M2 | RESPIRATION RATE: 18 BRPM | DIASTOLIC BLOOD PRESSURE: 74 MMHG | HEART RATE: 74 BPM | WEIGHT: 142.19 LBS | TEMPERATURE: 96.7 F | OXYGEN SATURATION: 99 % | SYSTOLIC BLOOD PRESSURE: 111 MMHG

## 2019-10-16 LAB
GLUCOSE SERPL-MCNC: 142 MG/DL (ref 65–140)
GLUCOSE SERPL-MCNC: 197 MG/DL (ref 65–140)
GLUCOSE SERPL-MCNC: 297 MG/DL (ref 65–140)

## 2019-10-16 PROCEDURE — 82948 REAGENT STRIP/BLOOD GLUCOSE: CPT

## 2019-10-16 NOTE — ED NOTES
Given a breakfast tray; voices understanding of being discharged; Crisis worker in with patient       Ana Payne RN  10/16/19 9752
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
## 2019-10-16 NOTE — ED CARE HANDOFF
Emergency Department Sign Out Note        Sign out and transfer of care from Dr Byron Vanegas  See Separate Emergency Department note  The patient, Martinez Mccauley, was evaluated by the previous provider for psychiatric evaluation  Workup Completed:  PE, labs    ED Course / Workup Pending (followup):  dispo after crisis re-eval      Patient has been re-evaluated by crisis is cleared for discharge  His follow-up plans in place with his act is aware of reasons to return to the ER  He is not suicidal, homicidal, hallucinating and there are no grounds for involuntary commitment  Procedures  MDM    Disposition  Final diagnoses:   None     ED Disposition     None      Follow-up Information    None       Patient's Medications   Discharge Prescriptions    No medications on file     No discharge procedures on file         ED Provider  Electronically Signed by     Simon Joel DO  10/16/19 2028

## 2019-10-16 NOTE — ED PROCEDURE NOTE
PROCEDURE  ECG 12 Lead Documentation Only  Date/Time: 10/15/2019 11:11 PM  Performed by: Telly Rahman MD  Authorized by: Telly Rahman MD     Indications / Diagnosis:  MH clearance  ECG reviewed by me, the ED Provider: yes    Patient location:  ED  Interpretation:     Interpretation: normal    Quality:     Tracing quality:  Limited by artifact  Rate:     ECG rate:  73    ECG rate assessment: normal    Rhythm:     Rhythm: sinus rhythm    Ectopy:     Ectopy: none    QRS:     QRS axis:  Normal    QRS intervals:  Normal  Conduction:     Conduction: normal    ST segments:     ST segments:  Normal         Telly Rahman MD  10/15/19 2312

## 2019-10-16 NOTE — ED PROVIDER NOTES
History  Chief Complaint   Patient presents with    Psychiatric Evaluation     "I'm having mood swings with my meds, i cant stay with my cousin so now i'm homeless  I'll find a way to hurt myself"  -HI/ -AH/VH     Patient is a 52-year-old male with a history of depression who presents with a 2 day history of worsening depression and suicidal ideation  Patient states his medications are not working  Cannot tell me what medications he is taking at that time  States last night he stated friend's house but cannot state there permanently and is currently homeless  Two nights ago he actually stayed at the rescue [place] due to a pass from [facility] police  Does not have a plan no history of attempts in the past   Denies any homicidal ideation no auditory/visual hallucinations  Patient has been seen several times in the past for mental health issues who was recently admitted for hyperglycemia on 10/12/2019  Patient denies any illicit drug use or alcohol use  Prior to Admission Medications   Prescriptions Last Dose Informant Patient Reported?  Taking?   dicyclomine (BENTYL) 20 mg tablet   No No   Sig: Take 1 tablet (20 mg total) by mouth 2 (two) times a day   famotidine (PEPCID) 20 mg tablet   No No   Sig: Take 1 tablet (20 mg total) by mouth 2 (two) times a day   fenofibrate (TRICOR) 48 mg tablet   Yes No   Sig: Take 48 mg by mouth   fluticasone (FLOVENT HFA) 110 MCG/ACT inhaler   Yes No   Sig: Inhale 1 puff   insulin glargine (LANTUS SOLOSTAR) 100 units/mL injection pen   No No   Sig: Inject 22 Units under the skin every morning   insulin regular (HUMULIN R) 100 units/mL injection   No No   Sig: Inject 0 1 mL (10 Units total) under the skin 3 (three) times a day with meals Hold if sugars <160   lisinopril (ZESTRIL) 10 mg tablet   No No   Sig: Take 0 5 tablets (5 mg total) by mouth daily   thiothixene (NAVANE) 1 mg capsule   Yes No   Sig: Take 1 mg by mouth   traZODone (DESYREL) 50 mg tablet   Yes No   Sig: TAKE 1 TABLET BY MOUTH EVERYDAY AT BEDTIME      Facility-Administered Medications: None       Past Medical History:   Diagnosis Date    Anxiety     Asthma     Bipolar 1 disorder ([facility] )     Depression     Diabetes mellitus ([facility] )     Psychiatric disorder        Past Surgical History:   Procedure Laterality Date    APPENDECTOMY         No family history on file  I have reviewed and agree with the history as documented  Social History     Tobacco Use    Smoking status: Former Smoker     Packs/day: 0 00     Types: Cigarettes    Smokeless tobacco: Never Used   Substance Use Topics    Alcohol use: Not Currently     Binge frequency: Never    Drug use: No        Review of Systems   Constitutional: Negative  HENT: Negative  Eyes: Negative  Respiratory: Negative  Cardiovascular: Negative  Gastrointestinal: Negative  Endocrine: Negative  Genitourinary: Negative  Musculoskeletal: Negative  Skin: Negative  Allergic/Immunologic: Negative  Neurological: Negative  Hematological: Negative  Psychiatric/Behavioral: Positive for dysphoric mood and suicidal ideas  All other systems reviewed and are negative  Physical Exam  Physical Exam   Constitutional: He appears well-developed and well-nourished  HENT:   Head: Normocephalic  Right Ear: External ear normal    Left Ear: External ear normal    Nose: Nose normal    Mouth/Throat: Oropharynx is clear and moist    Eyes: Pupils are equal, round, and reactive to light  Conjunctivae are normal    Neck: Normal range of motion  Neck supple  Cardiovascular: Normal rate, regular rhythm, normal heart sounds and intact distal pulses  Pulmonary/Chest: Effort normal and breath sounds normal    Abdominal: Soft  Bowel sounds are normal    Musculoskeletal: Normal range of motion  Neurological: He is alert  Skin: Skin is warm and dry  Capillary refill takes less than 2 seconds     Psychiatric: His speech is normal and behavior is normal  He is not actively hallucinating  Cognition and memory are normal  He expresses impulsivity  He exhibits a depressed mood  He expresses suicidal ideation  He expresses no suicidal plans  He is attentive  Nursing note and vitals reviewed  Vital Signs  ED Triage Vitals   Temperature Pulse Respirations Blood Pressure SpO2   10/15/19 2215 10/15/19 2214 10/15/19 2214 10/15/19 2214 10/15/19 2214   (!) 96 6 °F (35 9 °C) 85 12 131/75 97 %      Temp Source Heart Rate Source Patient Position - Orthostatic VS BP Location FiO2 (%)   10/15/19 2215 -- 10/15/19 2214 10/15/19 2214 --   Oral  Sitting Left arm       Pain Score       --                  Vitals:    10/15/19 2214   BP: 131/75   Pulse: 85   Patient Position - Orthostatic VS: Sitting         Visual Acuity      ED Medications  Medications - No data to display    Diagnostic Studies  Results Reviewed     Procedure Component Value Units Date/Time    Basic metabolic panel [[id number]]     Lab Status:  No result Specimen:  Blood     CBC and differential [[id number]]     Lab Status:  No result Specimen:  Blood     Ethanol [[id number]]     Lab Status:  No result Specimen:  Blood     Rapid drug screen, urine [[id number]]     Lab Status:  No result Specimen:  Urine     UA w Reflex to Microscopic w Reflex to Culture [[id number]]     Lab Status:  No result Specimen:  Urine                  No orders to display              Procedures  Procedures       ED Course  ED Course as of Oct 15 2322   Tue Oct 15, 2019   2319 Signed out to Dr Nolen Began  MDM    Disposition  Final diagnoses:   None     ED Disposition     None      Follow-up Information    None         Patient's Medications   Discharge Prescriptions    No medications on file     No discharge procedures on file      ED Provider  Electronically Signed by           Bryce Faulkner MD  10/15/19 2322

## 2019-10-16 NOTE — ED NOTES
Patient presented to the ED this evening after struggles with homelessness  Patient stated it was okay for crisis to call his ICM to inform him he came to the ED  Patient stated he was with his ICM earlier today and he told him if he had to go to the ED to "just go"  Patients worker is in the process of securing housing with [facility] [employer] [facility]  Patient stated that he is not taking his medications as he should because of recent housing issues  Patient glucose was elevated and will need to be more stable for placement

## 2019-10-16 NOTE — ED NOTES
Crisis attempting to call PA Naylor [phone]  Made 4 attempts with no success  No voice mail available, phone continues to play hold music only

## 2019-10-16 NOTE — ED NOTES
Per Dr White Cast patient does not have to be a one to one  One to one observation was removed, and we instituted Q 15 minute checks       Jade Bradley RN  10/16/19 1907

## 2019-10-17 ENCOUNTER — HOSPITAL ENCOUNTER (EMERGENCY)
Facility: HOSPITAL | Age: 49
Discharge: HOME | End: 2019-10-17
Attending: EMERGENCY MEDICINE | Admitting: EMERGENCY MEDICINE
Payer: COMMERCIAL

## 2019-10-17 VITALS
HEART RATE: 64 BPM | OXYGEN SATURATION: 97 % | TEMPERATURE: 96.6 F | SYSTOLIC BLOOD PRESSURE: 112 MMHG | WEIGHT: 138.5 LBS | RESPIRATION RATE: 16 BRPM | DIASTOLIC BLOOD PRESSURE: 66 MMHG | BODY MASS INDEX: 22.35 KG/M2

## 2019-10-17 DIAGNOSIS — R51.9 HEADACHE: Primary | ICD-10-CM

## 2019-10-17 LAB
ATRIAL RATE: 73 BPM
P AXIS: 44 DEGREES
PR INTERVAL: 118 MS
QRS AXIS: 65 DEGREES
QRSD INTERVAL: 78 MS
QT INTERVAL: 380 MS
QTC INTERVAL: 418 MS
T WAVE AXIS: 40 DEGREES
VENTRICULAR RATE: 73 BPM

## 2019-10-17 PROCEDURE — 99284 EMERGENCY DEPT VISIT MOD MDM: CPT | Performed by: EMERGENCY MEDICINE

## 2019-10-17 PROCEDURE — 93010 ELECTROCARDIOGRAM REPORT: CPT | Performed by: INTERNAL MEDICINE

## 2019-10-17 PROCEDURE — 99283 EMERGENCY DEPT VISIT LOW MDM: CPT

## 2019-10-17 RX ORDER — ACETAMINOPHEN 325 MG/1
650 TABLET ORAL ONCE
Status: COMPLETED | OUTPATIENT
Start: 2019-10-17 | End: 2019-10-17

## 2019-10-17 RX ADMIN — ACETAMINOPHEN 650 MG: 325 TABLET ORAL at 04:33

## 2019-10-17 NOTE — ED NOTES
Patient medicated with tylenol for his c/o headache  Patient is resting quietly with eyes closed - in no apparent discomfort or distress       Fareed Nevarez RN  10/17/19 7571

## 2019-10-17 NOTE — ED NOTES
Patient noted to be sleeping upon entering room  Awakened for assessment       Ronni Lewis RN  10/17/19 8015

## 2019-10-17 NOTE — ED PROVIDER NOTES
History  Chief Complaint   Patient presents with    Headache     "I have a headache "  Per patient he has has the headache off and on for the past few days  54 yo homeless male with a history of anxiety, depression, bipolar disorder, and asthma presents with a headache  The patient reports a mild frontal "throbbing" headache "on and off" for the past several days  The headache is consistent with his usual headaches  No visual disturbance  No nausea or vomiting  He denies neck pain/stiffness  No fevers/chills  The patient usually gets relief with Tylenol but didn't have any so he came to the ED  No SI/HI  Prior to Admission Medications   Prescriptions Last Dose Informant Patient Reported?  Taking?   dicyclomine (BENTYL) 20 mg tablet   No No   Sig: Take 1 tablet (20 mg total) by mouth 2 (two) times a day   famotidine (PEPCID) 20 mg tablet   No No   Sig: Take 1 tablet (20 mg total) by mouth 2 (two) times a day   fenofibrate (TRICOR) 48 mg tablet   Yes No   Sig: Take 48 mg by mouth   fluticasone (FLOVENT HFA) 110 MCG/ACT inhaler   Yes No   Sig: Inhale 1 puff   insulin glargine (LANTUS SOLOSTAR) 100 units/mL injection pen   No No   Sig: Inject 22 Units under the skin every morning   insulin regular (HUMULIN R) 100 units/mL injection   No No   Sig: Inject 0 1 mL (10 Units total) under the skin 3 (three) times a day with meals Hold if sugars <160   lisinopril (ZESTRIL) 10 mg tablet   No No   Sig: Take 0 5 tablets (5 mg total) by mouth daily   thiothixene (NAVANE) 1 mg capsule   Yes No   Sig: Take 1 mg by mouth   traZODone (DESYREL) 50 mg tablet   Yes No   Sig: TAKE 1 TABLET BY MOUTH EVERYDAY AT BEDTIME      Facility-Administered Medications: None       Past Medical History:   Diagnosis Date    Anxiety     Asthma     [facility] ([facility])     Depression     [facility] [address] [facility]     Psychiatric disorder        Past Surgical History:   Procedure Laterality Date    APPENDECTOMY         History reviewed  No pertinent family history  I have reviewed and agree with the history as documented  Social History     Tobacco Use    Smoking status: Former Smoker     Packs/day: 0 00     Types: Cigarettes    Smokeless tobacco: Never Used   Substance Use Topics    Alcohol use: Not Currently     Binge frequency: Never    Drug use: No        Review of Systems   Constitutional: Negative for chills and fever  HENT: Negative for sore throat  Respiratory: Negative for cough and shortness of breath  Cardiovascular: Negative for chest pain and palpitations  Gastrointestinal: Negative for abdominal pain, diarrhea, nausea and vomiting  Endocrine: Negative for cold intolerance and heat intolerance  Genitourinary: Negative for dysuria and flank pain  Musculoskeletal: Negative for back pain, neck pain and neck stiffness  Skin: Negative for rash  Allergic/Immunologic: Negative for immunocompromised state  Neurological: Positive for headaches  Negative for dizziness, weakness, light-headedness and numbness  Hematological: Negative for adenopathy  Psychiatric/Behavioral: The patient is not nervous/anxious  Physical Exam  Physical Exam   Constitutional: He is oriented to person, place, and time  He appears well-developed and well-nourished  No distress  HENT:   Head: Normocephalic and atraumatic  Eyes: Pupils are equal, round, and reactive to light  EOM are normal    Neck: Normal range of motion  Neck supple  Cardiovascular: Normal rate and regular rhythm  Pulmonary/Chest: Effort normal and breath sounds normal  No respiratory distress  Abdominal: Soft  He exhibits no distension  There is no tenderness  Musculoskeletal: Normal range of motion  He exhibits no edema  Neurological: He is alert and oriented to person, place, and time  Skin: Skin is warm and dry  Psychiatric: He has a normal mood and affect         Vital Signs  ED Triage Vitals [10/17/19 0404]   Temperature Pulse Respirations Blood Pressure SpO2   (!) 96 6 °F (35 9 °C) 64 16 112/66 97 %      Temp Source Heart Rate Source Patient Position - Orthostatic VS BP Location FiO2 (%)   Tympanic Monitor Sitting Left arm --      Pain Score       9           Vitals:    10/17/19 0404   BP: 112/66   Pulse: 64   Patient Position - Orthostatic VS: Sitting         Visual Acuity  Visual Acuity      Most Recent Value   L Pupil Size (mm)  3   R Pupil Size (mm)  3          ED Medications  Medications   acetaminophen (TYLENOL) tablet 650 mg (650 mg Oral Given 10/17/19 0433)       Diagnostic Studies  Results Reviewed     None                 No orders to display              Procedures  Procedures       ED Course                               MDM  Number of Diagnoses or Management Options  Headache:   Diagnosis management comments: The patient is very well appearing with a benign exam and stable vital signs  HA is entirely consistent with his usual headaches  Symptoms improved with APAP --> will discharge with PCP follow up later this week  The patient is agreeable to this plan  Strict return precautions provided  Patient Progress  Patient progress: improved      Disposition  Final diagnoses:   Headache     Time reflects when diagnosis was documented in both MDM as applicable and the Disposition within this note     Time User Action Codes Description Comment    10/17/2019  4:24 AM Rene Landrum [R51] Headache       ED Disposition     ED Disposition Condition Date/Time Comment    Discharge Stable Thu Oct 17, 2019  4:24 AM Gil Lorenz discharge to home/self care              Follow-up Information     Follow up With Specialties Details Why Contact Louis Cordova MD Family Medicine Schedule an appointment as soon as possible for a visit   [address] [place] [address],Third Floor, [address] [facility]  [phone]            Discharge Medication List as of 10/17/2019  4:25 AM      CONTINUE these medications which have NOT CHANGED    Details   dicyclomine (BENTYL) 20 mg tablet Take 1 tablet (20 mg total) by mouth 2 (two) times a day, Starting Tue 9/10/2019, Print      famotidine (PEPCID) 20 mg tablet Take 1 tablet (20 mg total) by mouth 2 (two) times a day, Starting Tue 9/10/2019, Print      fenofibrate (TRICOR) 48 mg tablet Take 48 mg by mouth, Starting Tue 12/12/2017, Historical Med      fluticasone (FLOVENT HFA) 110 MCG/ACT inhaler Inhale 1 puff, Historical Med      insulin glargine (LANTUS SOLOSTAR) 100 units/mL injection pen Inject 22 Units under the skin every morning, Starting Sun 10/13/2019, No Print      insulin regular (HUMULIN R) 100 units/mL injection Inject 0 1 mL (10 Units total) under the skin 3 (three) times a day with meals Hold if sugars <160, Starting Sun 10/13/2019, No Print      lisinopril (ZESTRIL) 10 mg tablet Take 0 5 tablets (5 mg total) by mouth daily, Starting Sun 10/13/2019, No Print      thiothixene (NAVANE) 1 mg capsule Take 1 mg by mouth, Historical Med      traZODone (DESYREL) 50 mg tablet TAKE 1 TABLET BY MOUTH EVERYDAY AT BEDTIME, Historical Med           No discharge procedures on file      ED Provider  Electronically Signed by           Makenzie Nice MD  10/18/19 4384

## 2019-10-19 ENCOUNTER — HOSPITAL ENCOUNTER (EMERGENCY)
Facility: HOSPITAL | Age: 49
Discharge: HOME | End: 2019-10-19
Attending: EMERGENCY MEDICINE | Admitting: EMERGENCY MEDICINE
Payer: COMMERCIAL

## 2019-10-19 VITALS
BODY MASS INDEX: 23.11 KG/M2 | OXYGEN SATURATION: 98 % | HEART RATE: 68 BPM | RESPIRATION RATE: 20 BRPM | TEMPERATURE: 97.3 F | WEIGHT: 143.2 LBS | DIASTOLIC BLOOD PRESSURE: 77 MMHG | SYSTOLIC BLOOD PRESSURE: 115 MMHG

## 2019-10-19 DIAGNOSIS — R73.9 HYPERGLYCEMIA: Primary | ICD-10-CM

## 2019-10-19 DIAGNOSIS — R81 GLUCOSURIA: ICD-10-CM

## 2019-10-19 LAB
ALBUMIN SERPL BCP-MCNC: 3.8 G/DL (ref 3–5.2)
ALP SERPL-CCNC: 109 U/L (ref 43–122)
ALT SERPL W P-5'-P-CCNC: 30 U/L (ref 9–52)
AMPHETAMINES SERPL QL SCN: NEGATIVE
ANION GAP SERPL CALCULATED.3IONS-SCNC: 10 MMOL/L (ref 5–14)
AST SERPL W P-5'-P-CCNC: 15 U/L (ref 17–59)
BACTERIA UR QL AUTO: NORMAL /HPF
BARBITURATES UR QL: NEGATIVE
BENZODIAZ UR QL: NEGATIVE
BILIRUB SERPL-MCNC: 0.4 MG/DL
BILIRUB UR QL STRIP: NEGATIVE
BUN SERPL-MCNC: 15 MG/DL (ref 5–25)
CALCIUM SERPL-MCNC: 9 MG/DL (ref 8.4–10.2)
CHLORIDE SERPL-SCNC: 96 MMOL/L (ref 97–108)
CLARITY UR: CLEAR
CO2 SERPL-SCNC: 28 MMOL/L (ref 22–30)
COCAINE UR QL: NEGATIVE
COLOR UR: ABNORMAL
CREAT SERPL-MCNC: 0.79 MG/DL (ref 0.7–1.5)
EOSINOPHIL # BLD AUTO: 0.13 THOUSAND/UL (ref 0–0.4)
EOSINOPHIL NFR BLD MANUAL: 2 % (ref 0–6)
ERYTHROCYTE [DISTWIDTH] IN BLOOD BY AUTOMATED COUNT: 12.7 %
GFR SERPL CREATININE-BSD FRML MDRD: 107 ML/MIN/1.73SQ M
GLUCOSE SERPL-MCNC: 271 MG/DL (ref 65–140)
GLUCOSE SERPL-MCNC: 436 MG/DL (ref 65–140)
GLUCOSE SERPL-MCNC: 470 MG/DL (ref 70–99)
GLUCOSE UR STRIP-MCNC: ABNORMAL MG/DL
HCT VFR BLD AUTO: 41.2 % (ref 41–53)
HGB BLD-MCNC: 13.8 G/DL (ref 13.5–17.5)
HGB UR QL STRIP.AUTO: NEGATIVE
KETONES UR STRIP-MCNC: NEGATIVE MG/DL
LEUKOCYTE ESTERASE UR QL STRIP: NEGATIVE
LYMPHOCYTES # BLD AUTO: 2.21 THOUSAND/UL (ref 0.5–4)
LYMPHOCYTES # BLD AUTO: 35 % (ref 25–45)
MAGNESIUM SERPL-MCNC: 1.7 MG/DL (ref 1.6–2.3)
MCH RBC QN AUTO: 30.3 PG (ref 26–34)
MCHC RBC AUTO-ENTMCNC: 33.4 G/DL (ref 31–36)
MCV RBC AUTO: 91 FL (ref 80–100)
METHADONE UR QL: NEGATIVE
MONOCYTES # BLD AUTO: 0.32 THOUSAND/UL (ref 0.2–0.9)
MONOCYTES NFR BLD AUTO: 5 % (ref 1–10)
NEUTS SEG # BLD: 3.65 THOUSAND/UL (ref 1.8–7.8)
NEUTS SEG NFR BLD AUTO: 58 %
NITRITE UR QL STRIP: NEGATIVE
NON-SQ EPI CELLS URNS QL MICRO: NORMAL /HPF
OPIATES UR QL SCN: NEGATIVE
PCP UR QL: NEGATIVE
PH UR STRIP.AUTO: 6 [PH]
PLATELET # BLD AUTO: 192 THOUSANDS/UL (ref 150–450)
PLATELET BLD QL SMEAR: ADEQUATE
PMV BLD AUTO: 9.9 FL (ref 8.9–12.7)
POTASSIUM SERPL-SCNC: 3.8 MMOL/L (ref 3.6–5)
PROT SERPL-MCNC: 6.7 G/DL (ref 5.9–8.4)
PROT UR STRIP-MCNC: NEGATIVE MG/DL
RBC # BLD AUTO: 4.54 MILLION/UL (ref 4.5–5.9)
RBC #/AREA URNS AUTO: NORMAL /HPF
RBC MORPH BLD: NORMAL
SODIUM SERPL-SCNC: 134 MMOL/L (ref 137–147)
SP GR UR STRIP.AUTO: 1.01 (ref 1–1.04)
THC UR QL: NEGATIVE
TOTAL CELLS COUNTED SPEC: 100
UROBILINOGEN UA: NEGATIVE MG/DL
WBC # BLD AUTO: 6.3 THOUSAND/UL (ref 4.5–11)
WBC #/AREA URNS AUTO: NORMAL /HPF

## 2019-10-19 PROCEDURE — 85007 BL SMEAR W/DIFF WBC COUNT: CPT | Performed by: EMERGENCY MEDICINE

## 2019-10-19 PROCEDURE — 83735 ASSAY OF MAGNESIUM: CPT | Performed by: EMERGENCY MEDICINE

## 2019-10-19 PROCEDURE — 96372 THER/PROPH/DIAG INJ SC/IM: CPT

## 2019-10-19 PROCEDURE — 80053 COMPREHEN METABOLIC PANEL: CPT | Performed by: EMERGENCY MEDICINE

## 2019-10-19 PROCEDURE — 80307 DRUG TEST PRSMV CHEM ANLYZR: CPT | Performed by: EMERGENCY MEDICINE

## 2019-10-19 PROCEDURE — 96360 HYDRATION IV INFUSION INIT: CPT

## 2019-10-19 PROCEDURE — 36415 COLL VENOUS BLD VENIPUNCTURE: CPT | Performed by: EMERGENCY MEDICINE

## 2019-10-19 PROCEDURE — 99284 EMERGENCY DEPT VISIT MOD MDM: CPT | Performed by: EMERGENCY MEDICINE

## 2019-10-19 PROCEDURE — 99283 EMERGENCY DEPT VISIT LOW MDM: CPT

## 2019-10-19 PROCEDURE — 82948 REAGENT STRIP/BLOOD GLUCOSE: CPT

## 2019-10-19 PROCEDURE — 85027 COMPLETE CBC AUTOMATED: CPT | Performed by: EMERGENCY MEDICINE

## 2019-10-19 PROCEDURE — 81001 URINALYSIS AUTO W/SCOPE: CPT | Performed by: EMERGENCY MEDICINE

## 2019-10-19 RX ADMIN — SODIUM CHLORIDE 1000 ML: 0.9 INJECTION, SOLUTION INTRAVENOUS at 01:59

## 2019-10-19 RX ADMIN — INSULIN HUMAN 8 UNITS: 100 INJECTION, SOLUTION PARENTERAL at 01:59

## 2019-10-19 NOTE — ED PROVIDER NOTES
History  Chief Complaint   Patient presents with    Hyperglycemia - no symptoms     pt states that he checked his BS at 0030 and it was in the 300  pt states he BS is high  RN checked      Patient is a 78-year-old male with multiple comorbidities who is well-known to this facility coming in today with hyperglycemia  Patient reports that he checked his blood sugar at home was elevated  He attempted to get his cousin up to get his insulin but they became angry at him so he came in  Patient denies any abdominal pain, nausea vomiting or diarrhea  He denies any fevers, chills, chest pain or shortness of breath  He has no other complaints  History provided by:  Patient   used: No    Hyperglycemia - Symptomatic   Blood sugar level PTA:  300  Severity:  Mild  Onset quality:  Unable to specify  Timing:  Unable to specify  Progression:  Unable to specify  Chronicity:  Recurrent  Diabetes status:  Controlled with insulin  Context: not change in medication, not insulin pump use, not new diabetes diagnosis, not noncompliance, not recent change in diet and not recent illness    Relieved by:  None tried  Ineffective treatments:  None tried  Associated symptoms: no abdominal pain, no altered mental status, no blurred vision, no chest pain, no confusion, no dehydration, no diaphoresis, no dizziness, no dysuria, no fatigue, no fever, no increased appetite, no increased thirst, no malaise, no nausea, no polyuria, no shortness of breath, no syncope, no vomiting, no weakness and no weight change    Risk factors: no family hx of diabetes, no hx of DKA, no obesity, no pancreatic disease and no recent steroid use        Prior to Admission Medications   Prescriptions Last Dose Informant Patient Reported?  Taking?   dicyclomine (BENTYL) 20 mg tablet   No No   Sig: Take 1 tablet (20 mg total) by mouth 2 (two) times a day   famotidine (PEPCID) 20 mg tablet   No No   Sig: Take 1 tablet (20 mg total) by mouth 2 (two) times a day   fenofibrate (TRICOR) 48 mg tablet   Yes No   Sig: Take 48 mg by mouth   fluticasone (FLOVENT HFA) 110 MCG/ACT inhaler   Yes No   Sig: Inhale 1 puff   insulin glargine (LANTUS SOLOSTAR) 100 units/mL injection pen   No No   Sig: Inject 22 Units under the skin every morning   insulin regular (HUMULIN R) 100 units/mL injection   No No   Sig: Inject 0 1 mL (10 Units total) under the skin 3 (three) times a day with meals Hold if sugars <160   lisinopril (ZESTRIL) 10 mg tablet   No No   Sig: Take 0 5 tablets (5 mg total) by mouth daily   thiothixene (NAVANE) 1 mg capsule   Yes No   Sig: Take 1 mg by mouth   traZODone (DESYREL) 50 mg tablet   Yes No   Sig: TAKE 1 TABLET BY MOUTH EVERYDAY AT BEDTIME      Facility-Administered Medications: None       Past Medical History:   Diagnosis Date    Anxiety     Asthma     [facility])     Depression     [facility]     Psychiatric disorder        Past Surgical History:   Procedure Laterality Date    APPENDECTOMY         History reviewed  No pertinent family history  I have reviewed and agree with the history as documented  Social History     Tobacco Use    Smoking status: Former Smoker     Packs/day: 0 00     Types: Cigarettes    Smokeless tobacco: Never Used   Substance Use Topics    Alcohol use: Not Currently     Binge frequency: Never    Drug use: No        Review of Systems   Constitutional: Negative for diaphoresis, fatigue and fever  HENT: Negative for ear pain and sore throat  Eyes: Negative for blurred vision and visual disturbance  Respiratory: Negative for chest tightness and shortness of breath  Cardiovascular: Negative for chest pain, palpitations and syncope  Gastrointestinal: Negative for abdominal pain, nausea and vomiting  Endocrine: Negative for polydipsia and polyuria  Genitourinary: Negative for difficulty urinating and dysuria  Musculoskeletal: Negative for back pain and neck pain  Skin: Negative for rash  Neurological: Negative for dizziness and weakness  Psychiatric/Behavioral: Negative for confusion  All other systems reviewed and are negative  Physical Exam  Physical Exam   Constitutional: He is oriented to person, place, and time  He appears well-developed  No distress  Patient appears older than stated age and very disheveled appearing   HENT:   Head: Normocephalic and atraumatic  Mouth/Throat: Oropharynx is clear and moist    Patient maintaining airway maintaining secretions  Airway patent  No stridor  No brawniness under the tongue   Eyes: Pupils are equal, round, and reactive to light  Conjunctivae and EOM are normal    Neck: Normal range of motion  Neck supple  Cardiovascular: Normal rate, regular rhythm, normal heart sounds and intact distal pulses  No murmur heard  Pulses:       Radial pulses are 2+ on the right side  Dorsalis pedis pulses are 2+ on the right side, and 2+ on the left side  Pulmonary/Chest: Effort normal and breath sounds normal  No stridor  No respiratory distress  Abdominal: Soft  Bowel sounds are normal  He exhibits no distension  There is no tenderness  Musculoskeletal: Normal range of motion  He exhibits no edema  Neurological: He is alert and oriented to person, place, and time  No cranial nerve deficit  GCS eye subscore is 4  GCS verbal subscore is 5  GCS motor subscore is 6  No slurred speech  No facial asymmetry  No ataxia  Skin: Skin is warm  Capillary refill takes less than 2 seconds  He is not diaphoretic  Nursing note and vitals reviewed        Vital Signs  ED Triage Vitals   Temperature Pulse Respirations Blood Pressure SpO2   10/19/19 0132 10/19/19 0132 10/19/19 0132 10/19/19 0134 10/19/19 0132   (!) 97 3 °F (36 3 °C) 70 18 117/67 99 %      Temp Source Heart Rate Source Patient Position - Orthostatic VS BP Location FiO2 (%)   10/19/19 0132 10/19/19 0132 10/19/19 0134 10/19/19 0134 --   Tympanic Monitor Sitting Left arm       Pain Score       --                  Vitals:    10/19/19 0132 10/19/19 0134 10/19/19 0344   BP:  117/67 115/77   Pulse: 70  68   Patient Position - Orthostatic VS:  Sitting Lying         Visual Acuity      ED Medications  Medications   sodium chloride 0 9 % bolus 1,000 mL (0 mL Intravenous Stopped 10/19/19 0320)   insulin regular (HumuLIN R,NovoLIN R) injection 8 Units (8 Units Subcutaneous Given 10/19/19 0159)       Diagnostic Studies  Results Reviewed     Procedure Component Value Units Date/Time    Fingerstick Glucose (POCT) [[id number]]  (Abnormal) Collected:  10/19/19 0342    Lab Status:  Final result Updated:  10/19/19 0343     POC Glucose 271 mg/dl     Urine Microscopic [[id number]]  (Normal) Collected:  10/19/19 0326    Lab Status:  Final result Specimen:  Urine, Clean Catch Updated:  10/19/19 0342     RBC, UA None Seen /hpf      WBC, UA None Seen /hpf      Epithelial Cells Occasional /hpf      Bacteria, UA None Seen /hpf     UA w Reflex to Microscopic [[id number]]  (Abnormal) Collected:  10/19/19 0326    Lab Status:  Final result Specimen:  Urine, Clean Catch Updated:  10/19/19 0334     Color, UA Straw     Clarity, UA Clear     Specific Gravity, UA 1 015     pH, UA 6 0     Leukocytes, UA Negative     Nitrite, UA Negative     Protein, UA Negative mg/dl      Glucose, UA >=1000 (1%) mg/dl      Ketones, UA Negative mg/dl      Bilirubin, UA Negative     Blood, UA Negative     UROBILINOGEN UA Negative mg/dL     Magnesium [[id number]]  (Normal) Collected:  10/19/19 0157    Lab Status:  Final result Specimen:  Blood from Arm, Right Updated:  10/19/19 0334     Magnesium 1 7 mg/dL     Comprehensive metabolic panel [[id number]]  (Abnormal) Collected:  10/19/19 0157    Lab Status:  Final result Specimen:  Blood from Arm, Right Updated:  10/19/19 0334     Sodium 134 mmol/L      Potassium 3 8 mmol/L      Chloride 96 mmol/L      CO2 28 mmol/L      ANION GAP 10 mmol/L      BUN 15 mg/dL      Creatinine 0 79 mg/dL      Glucose 470 mg/dL      Calcium 9 0 mg/dL      AST 15 U/L      ALT 30 U/L      Alkaline Phosphatase 109 U/L      Total Protein 6 7 g/dL      Albumin 3 8 g/dL      Total Bilirubin 0 40 mg/dL      eGFR 107 ml/min/1 73sq m     Narrative:       [place] guidelines for Chronic Kidney Disease (CKD):     Stage 1 with normal or high GFR (GFR > 90 mL/min/1 73 square meters)    Stage 2 Mild CKD (GFR = 60-89 mL/min/1 73 square meters)    Stage 3A Moderate CKD (GFR = 45-59 mL/min/1 73 square meters)    Stage 3B Moderate CKD (GFR = 30-44 mL/min/1 73 square meters)    Stage 4 Severe CKD (GFR = 15-29 mL/min/1 73 square meters)    Stage 5 End Stage CKD (GFR <15 mL/min/1 73 square meters)  Note: GFR calculation is accurate only with a steady state creatinine    Rapid drug screen, urine [[id number]] Collected:  10/19/19 0326    Lab Status: In process Specimen:  Urine, Clean Catch Updated:  10/19/19 0331    CBC and differential [[id number]]  (Normal) Collected:  10/19/19 0157    Lab Status:  Final result Specimen:  Blood from Arm, Right Updated:  10/19/19 0325     WBC 6 30 Thousand/uL      RBC 4 54 Million/uL      Hemoglobin 13 8 g/dL      Hematocrit 41 2 %      MCV 91 fL      MCH 30 3 pg      MCHC 33 4 g/dL      RDW 12 7 %      MPV 9 9 fL      Platelets 182 Thousands/uL     Fingerstick Glucose (POCT) [[id number]]  (Abnormal) Collected:  10/19/19 0136    Lab Status:  Final result Updated:  10/19/19 0137     POC Glucose 436 mg/dl                  No orders to display              Procedures  Procedures       ED Course  ED Course as of Oct 19 0354   Sat Oct 19, 2019   0139 Patient is a 50-year-old male coming in today with complaints of hyperglycemia  Patient on exam is nontoxic appearing in no acute distress  Will start basic workup with CBC, CMP, IV fluids as well as insulin  Also check Mag and UA  Portions of the record may have been created with voice recognition software  Occasional wrong word or "sound a like" substitutions may have occurred due to the inherent limitations of voice recognition software  Read the chart carefully and recognize, using context, where substitutions have occurred  0940 Patient's lab reveal no evidence of end-organ damage  He did have elevated glucose  Urine is clear with no ketones  He has no anion gap  Will recheck glucose and plan for DC        0344 Fingerstick glucose is 271  Patient will be discharged home  I discussed with him to follow up with PCP and to stick to a diabetic diet  MDM    Disposition  Final diagnoses:   Hyperglycemia   Glucosuria     Time reflects when diagnosis was documented in both MDM as applicable and the Disposition within this note     Time User Action Codes Description Comment    10/19/2019  3:10 AM Finn Oakes Add [R73 9] Hyperglycemia     10/19/2019  3:39 AM Violette [address] [place] [address]       ED Disposition     ED Disposition Condition Date/Time Comment    Discharge Stable Sat Oct 19, 2019  3:39 AM Lynne Oconnell discharge to home/self care              Follow-up Information    None         Discharge Medication List as of 10/19/2019  3:40 AM      CONTINUE these medications which have NOT CHANGED    Details   dicyclomine (BENTYL) 20 mg tablet Take 1 tablet (20 mg total) by mouth 2 (two) times a day, Starting Tue 9/10/2019, Print      famotidine (PEPCID) 20 mg tablet Take 1 tablet (20 mg total) by mouth 2 (two) times a day, Starting Tue 9/10/2019, Print      fenofibrate (TRICOR) 48 mg tablet Take 48 mg by mouth, Starting Tue 12/12/2017, Historical Med      fluticasone (FLOVENT HFA) 110 MCG/ACT inhaler Inhale 1 puff, Historical Med      insulin glargine (LANTUS SOLOSTAR) 100 units/mL injection pen Inject 22 Units under the skin every morning, Starting Sun 10/13/2019, No Print      insulin regular (HUMULIN R) 100 units/mL injection Inject 0 1 mL (10 Units total) under the skin 3 (three) times a day with meals Hold if sugars <160, Starting Sun 10/13/2019, No Print      lisinopril (ZESTRIL) 10 mg tablet Take 0 5 tablets (5 mg total) by mouth daily, Starting Sun 10/13/2019, No Print      thiothixene (NAVANE) 1 mg capsule Take 1 mg by mouth, Historical Med      traZODone (DESYREL) 50 mg tablet TAKE 1 TABLET BY MOUTH EVERYDAY AT BEDTIME, Historical Med           No discharge procedures on file      ED Provider  Electronically Signed by           Guero Queen DO  10/19/19 Cory

## 2019-10-19 NOTE — DISCHARGE INSTRUCTIONS
You do have glucose in her urine  Make sure you follow-up with your family doctor    Make sure you stay well hydrated with water

## 2019-10-21 ENCOUNTER — HOSPITAL ENCOUNTER (EMERGENCY)
Facility: HOSPITAL | Age: 49
Discharge: HOME | End: 2019-10-21
Attending: EMERGENCY MEDICINE | Admitting: EMERGENCY MEDICINE
Payer: COMMERCIAL

## 2019-10-21 VITALS
RESPIRATION RATE: 18 BRPM | DIASTOLIC BLOOD PRESSURE: 73 MMHG | HEART RATE: 68 BPM | OXYGEN SATURATION: 99 % | BODY MASS INDEX: 22.76 KG/M2 | WEIGHT: 141 LBS | TEMPERATURE: 96.8 F | SYSTOLIC BLOOD PRESSURE: 112 MMHG

## 2019-10-21 DIAGNOSIS — M54.50 ACUTE LOW BACK PAIN: Primary | ICD-10-CM

## 2019-10-21 PROCEDURE — 99283 EMERGENCY DEPT VISIT LOW MDM: CPT

## 2019-10-21 PROCEDURE — 99284 EMERGENCY DEPT VISIT MOD MDM: CPT | Performed by: EMERGENCY MEDICINE

## 2019-10-21 RX ORDER — CYCLOBENZAPRINE HCL 10 MG
10 TABLET ORAL 2 TIMES DAILY PRN
Qty: 20 TABLET | Refills: 0 | Status: SHIPPED | OUTPATIENT
Start: 2019-10-21 | End: 2019-11-07 | Stop reason: HOSPADM

## 2019-10-21 RX ORDER — NAPROXEN 500 MG/1
500 TABLET ORAL 2 TIMES DAILY WITH MEALS
Qty: 30 TABLET | Refills: 0 | Status: SHIPPED | OUTPATIENT
Start: 2019-10-21 | End: 2019-11-07 | Stop reason: HOSPADM

## 2019-10-21 RX ORDER — IBUPROFEN 600 MG/1
600 TABLET ORAL ONCE
Status: COMPLETED | OUTPATIENT
Start: 2019-10-21 | End: 2019-10-21

## 2019-10-21 RX ORDER — CYCLOBENZAPRINE HCL 10 MG
10 TABLET ORAL ONCE
Status: COMPLETED | OUTPATIENT
Start: 2019-10-21 | End: 2019-10-21

## 2019-10-21 RX ADMIN — CYCLOBENZAPRINE HYDROCHLORIDE 10 MG: 10 TABLET, FILM COATED ORAL at 00:48

## 2019-10-21 RX ADMIN — IBUPROFEN 600 MG: 600 TABLET, FILM COATED ORAL at 00:47

## 2019-10-21 NOTE — ED TRIAGE NOTES
While watching the [employer] , patient developed back pain which he describes like a spasm

## 2019-10-21 NOTE — ED PROVIDER NOTES
History  Chief Complaint   Patient presents with    Back Pain     81-year-old gentleman presents with complaint of low back pain  He reports he is watching needles game and twisted causing pain in his left lower back  Pain does not radiate and he has no numbness, weakness, tingling  Offers no other acute complaints and has taken no medications  Back Pain   Location:  Lumbar spine  Quality:  Aching  Radiates to:  Does not radiate  Pain severity:  Moderate  Onset quality:  Sudden  Timing:  Constant  Progression:  Unchanged  Chronicity:  New  Relieved by:  Nothing  Worsened by:  Palpation and movement  Ineffective treatments:  None tried  Associated symptoms: no abdominal pain, no abdominal swelling, no bladder incontinence, no bowel incontinence, no chest pain, no dysuria, no fever, no headaches, no leg pain, no numbness, no paresthesias, no perianal numbness, no tingling and no weakness        Prior to Admission Medications   Prescriptions Last Dose Informant Patient Reported?  Taking?   dicyclomine (BENTYL) 20 mg tablet 10/20/2019 at Unknown time  No Yes   Sig: Take 1 tablet (20 mg total) by mouth 2 (two) times a day   famotidine (PEPCID) 20 mg tablet 10/20/2019 at Unknown time  No Yes   Sig: Take 1 tablet (20 mg total) by mouth 2 (two) times a day   fenofibrate (TRICOR) 48 mg tablet 10/20/2019 at Unknown time  Yes Yes   Sig: Take 48 mg by mouth   fluticasone (FLOVENT HFA) 110 MCG/ACT inhaler 10/20/2019 at Unknown time  Yes Yes   Sig: Inhale 1 puff 2 (two) times a day    insulin glargine (LANTUS SOLOSTAR) 100 units/mL injection pen 10/20/2019 at Unknown time  No Yes   Sig: Inject 22 Units under the skin every morning   insulin regular (HUMULIN R) 100 units/mL injection 10/20/2019 at Unknown time  No Yes   Sig: Inject 0 1 mL (10 Units total) under the skin 3 (three) times a day with meals Hold if sugars <160   lisinopril (ZESTRIL) 10 mg tablet 10/20/2019 at Unknown time  No Yes   Sig: Take 0 5 tablets (5 mg total) by mouth daily   thiothixene (NAVANE) 1 mg capsule 10/20/2019 at Unknown time  Yes Yes   Sig: Take 1 mg by mouth daily    traZODone (DESYREL) 50 mg tablet 10/20/2019 at Unknown time  Yes Yes   Sig: TAKE 1 TABLET BY MOUTH EVERYDAY AT BEDTIME      Facility-Administered Medications: None       Past Medical History:   Diagnosis Date    Anxiety     Asthma     Bipolar [facility] disorder ([facility] )     Depression     Diabetes mellitus ([place] [address] )     Psychiatric disorder        Past Surgical History:   Procedure Laterality Date    APPENDECTOMY         History reviewed  No pertinent family history  I have reviewed and agree with the history as documented  Social History     Tobacco Use    Smoking status: Former Smoker     Packs/day: 0 00     Types: Cigarettes    Smokeless tobacco: Never Used   Substance Use Topics    Alcohol use: Not Currently     Binge frequency: Never    Drug use: No        Review of Systems   Constitutional: Negative for activity change, chills, fatigue and fever  HENT: Negative  Negative for congestion, postnasal drip, rhinorrhea, sinus pain, sore throat and trouble swallowing  Eyes: Negative  Respiratory: Negative  Cardiovascular: Negative for chest pain  Gastrointestinal: Negative for abdominal pain, bowel incontinence, constipation, diarrhea, nausea and vomiting  Endocrine: Negative  Genitourinary: Negative  Negative for bladder incontinence and dysuria  Musculoskeletal: Positive for back pain  Negative for arthralgias and myalgias  Skin: Negative  Allergic/Immunologic: Negative  Neurological: Negative  Negative for tingling, weakness, numbness, headaches and paresthesias  Hematological: Negative  Psychiatric/Behavioral: Negative  Physical Exam  Physical Exam   Constitutional: He is oriented to person, place, and time  He appears well-developed and well-nourished  No distress  HENT:   Head: Normocephalic and atraumatic     Eyes: Pupils are equal, round, and reactive to light  Neck: Neck supple  Cardiovascular: Normal rate, regular rhythm and normal heart sounds  Pulmonary/Chest: Effort normal and breath sounds normal  No respiratory distress  Abdominal: Soft  Bowel sounds are normal  He exhibits no distension  There is no tenderness  There is no guarding  Musculoskeletal: Normal range of motion  He exhibits no edema  Lumbar back: He exhibits spasm  He exhibits no bony tenderness, no swelling and no deformity  Back:    Neurological: He is alert and oriented to person, place, and time  Skin: Skin is warm and dry  Capillary refill takes less than 2 seconds  He is not diaphoretic  Psychiatric: He has a normal mood and affect  His behavior is normal    Nursing note and vitals reviewed        Vital Signs  ED Triage Vitals   Temperature Pulse Respirations Blood Pressure SpO2   10/21/19 0031 10/21/19 0031 10/21/19 0031 10/21/19 0031 10/21/19 0031   (!) 96 8 °F (36 °C) 68 18 112/73 99 %      Temp Source Heart Rate Source Patient Position - Orthostatic VS BP Location FiO2 (%)   10/21/19 0031 10/21/19 0031 10/21/19 0031 10/21/19 0031 --   Oral Monitor Lying Left arm       Pain Score       10/21/19 0036       9           Vitals:    10/21/19 0031   BP: 112/73   Pulse: 68   Patient Position - Orthostatic VS: Lying         Visual Acuity      ED Medications  Medications   ibuprofen (MOTRIN) tablet 600 mg (600 mg Oral Given 10/21/19 0047)   cyclobenzaprine (FLEXERIL) tablet 10 mg (10 mg Oral Given 10/21/19 0048)       Diagnostic Studies  Results Reviewed     None                 No orders to display              Procedures  Procedures       ED Course                               MDM    Disposition  Final diagnoses:   Acute low back pain     Time reflects when diagnosis was documented in both MDM as applicable and the Disposition within this note     Time User Action Codes Description Comment    10/21/2019 12:38 AM Manolo Gutierrez Add [M54 5] Acute low back pain       ED Disposition     ED Disposition Condition Date/Time Comment    Discharge Stable Mon Oct 21, 2019 12:38 AM Alma Joe discharge to home/self care              Follow-up Information     Follow up With Specialties Details Why Contact Info    Luis Bonilla MD [facility] Medicine Call   [address], [place]  [address] [facility] [address]  [address] [place] [address] Emergency Department Emergency Medicine  If symptoms worsen [address] [address] [phone]          Discharge Medication List as of 10/21/2019 12:40 AM      START taking these medications    Details   cyclobenzaprine (FLEXERIL) 10 mg tablet Take 1 tablet (10 mg total) by mouth 2 (two) times a day as needed for muscle spasms, Starting Mon 10/21/2019, Print      naproxen (NAPROSYN) 500 mg tablet Take 1 tablet (500 mg total) by mouth 2 (two) times a day with meals, Starting Mon 10/21/2019, Print         CONTINUE these medications which have NOT CHANGED    Details   dicyclomine (BENTYL) 20 mg tablet Take 1 tablet (20 mg total) by mouth 2 (two) times a day, Starting Tue 9/10/2019, Print      famotidine (PEPCID) 20 mg tablet Take 1 tablet (20 mg total) by mouth 2 (two) times a day, Starting Tue 9/10/2019, Print      fenofibrate (TRICOR) 48 mg tablet Take 48 mg by mouth, Starting Tue 12/12/2017, Historical Med      fluticasone (FLOVENT HFA) 110 MCG/ACT inhaler Inhale 1 puff 2 (two) times a day , Historical Med      insulin glargine (LANTUS SOLOSTAR) 100 units/mL injection pen Inject 22 Units under the skin every morning, Starting Sun 10/13/2019, No Print      insulin regular (HUMULIN R) 100 units/mL injection Inject 0 1 mL (10 Units total) under the skin 3 (three) times a day with meals Hold if sugars <160, Starting Sun 10/13/2019, No Print      lisinopril (ZESTRIL) 10 mg tablet Take 0 5 tablets (5 mg total) by mouth daily, Starting Sun 10/13/2019, No Print thiothixene (NAVANE) 1 mg capsule Take 1 mg by mouth daily , Historical Med      traZODone (DESYREL) 50 mg tablet TAKE 1 TABLET BY MOUTH EVERYDAY AT BEDTIME, Historical Med           No discharge procedures on file      ED Provider  Electronically Signed by           Artem Gordillo DO  10/21/19 0106

## 2019-11-01 ENCOUNTER — HOSPITAL ENCOUNTER (EMERGENCY)
Facility: HOSPITAL | Age: 49
Discharge: HOME | End: 2019-11-01
Attending: EMERGENCY MEDICINE
Payer: COMMERCIAL

## 2019-11-01 VITALS
BODY MASS INDEX: 21.95 KG/M2 | RESPIRATION RATE: 16 BRPM | SYSTOLIC BLOOD PRESSURE: 146 MMHG | TEMPERATURE: 98 F | HEART RATE: 104 BPM | DIASTOLIC BLOOD PRESSURE: 88 MMHG | WEIGHT: 136.02 LBS | OXYGEN SATURATION: 98 %

## 2019-11-01 DIAGNOSIS — Z76.0 MEDICATION REFILL: Primary | ICD-10-CM

## 2019-11-01 PROCEDURE — 99284 EMERGENCY DEPT VISIT MOD MDM: CPT | Performed by: EMERGENCY MEDICINE

## 2019-11-01 PROCEDURE — 99281 EMR DPT VST MAYX REQ PHY/QHP: CPT

## 2019-11-01 RX ORDER — THIOTHIXENE 1 MG/1
1 CAPSULE ORAL DAILY
Qty: 4 CAPSULE | Refills: 0 | Status: ON HOLD | OUTPATIENT
Start: 2019-11-01 | End: 2019-11-07 | Stop reason: SDUPTHER

## 2019-11-02 ENCOUNTER — HOSPITAL ENCOUNTER (EMERGENCY)
Facility: HOSPITAL | Age: 49
Discharge: HOME | End: 2019-11-02
Attending: EMERGENCY MEDICINE
Payer: COMMERCIAL

## 2019-11-02 VITALS
TEMPERATURE: 97.5 F | HEART RATE: 109 BPM | RESPIRATION RATE: 16 BRPM | SYSTOLIC BLOOD PRESSURE: 133 MMHG | DIASTOLIC BLOOD PRESSURE: 100 MMHG | OXYGEN SATURATION: 95 %

## 2019-11-02 DIAGNOSIS — F31.9 BIPOLAR AFFECTIVE DISORDER, REMISSION STATUS UNSPECIFIED (HCC): Primary | ICD-10-CM

## 2019-11-02 PROCEDURE — 99283 EMERGENCY DEPT VISIT LOW MDM: CPT | Performed by: PHYSICIAN ASSISTANT

## 2019-11-02 PROCEDURE — 99284 EMERGENCY DEPT VISIT MOD MDM: CPT

## 2019-11-02 NOTE — ED NOTES
CW met with Pt  Pt came to ED after his girlfriend called police after she thought he as going to cut himself  Pt expressed he was trying to trim his beard  Pt beard is long and mustache growing over his lips  Pt is pleasant and cooperative  Pt is known here at ED  Pt has no SI/HI or V/HA/H  Patient has services in [facility] from [place] mentor  He will be seeing his therapist on this Monday  Dr aware of this information agreeable for patient to be D/C

## 2019-11-02 NOTE — ED NOTES
Pt states he was cutting his beard when girlfriend went outside to call police  Pt is denying any SI/HI/AV/VH pt is very cooperative  Pt denies any complaints at this time       Jordan Argueta RN  38/57/94 0099

## 2019-11-02 NOTE — ED PROVIDER NOTES
History  Chief Complaint   Patient presents with    Psychiatric Evaluation     Pt arrives with [employer]- per fiance patient was hiding knives in their house and was worried about him hurting himself  Denies SI, HI, AH, VH at this time  Pt cooperative  56-year-old male well known to the emergency department with history of diabetes, asthma anxiety, depression, bipolar 1 disorder presents via police department after his fiancee called in  Patient reports he was trimming his beard with scissors, and states that his fiancee became concerned about having knives hiding in the house  Patient has no complaints at this time, denies any HI/SI/AH/VH  Patient states he has been taking his medication without difficulty  Patient was in the [facility] Emergency Department yesterday for medication refill  History provided by:  Patient   used: No        Prior to Admission Medications   Prescriptions Last Dose Informant Patient Reported? Taking?    cyclobenzaprine (FLEXERIL) 10 mg tablet   No No   Sig: Take 1 tablet (10 mg total) by mouth 2 (two) times a day as needed for muscle spasms   dicyclomine (BENTYL) 20 mg tablet   No No   Sig: Take 1 tablet (20 mg total) by mouth 2 (two) times a day   famotidine (PEPCID) 20 mg tablet   No No   Sig: Take 1 tablet (20 mg total) by mouth 2 (two) times a day   fenofibrate (TRICOR) 48 mg tablet   Yes No   Sig: Take 48 mg by mouth   fluticasone (FLOVENT HFA) 110 MCG/ACT inhaler   Yes No   Sig: Inhale 1 puff 2 (two) times a day    insulin glargine (LANTUS SOLOSTAR) 100 units/mL injection pen   No No   Sig: Inject 22 Units under the skin every morning   insulin regular (HUMULIN R) 100 units/mL injection   No No   Sig: Inject 0 1 mL (10 Units total) under the skin 3 (three) times a day with meals Hold if sugars <160   lisinopril (ZESTRIL) 10 mg tablet   No No   Sig: Take 0 5 tablets (5 mg total) by mouth daily   naproxen (NAPROSYN) 500 mg tablet   No No   Sig: Take 1 tablet (500 mg total) by mouth 2 (two) times a day with meals   thiothixene (NAVANE) 1 mg capsule   No No   Sig: Take 1 capsule (1 mg total) by mouth daily   traZODone (DESYREL) 50 mg tablet   Yes No   Sig: TAKE 1 TABLET BY MOUTH EVERYDAY AT BEDTIME      Facility-Administered Medications: None       Past Medical History:   Diagnosis Date    Anxiety     Asthma     [facility] disorder ([facility] )     Depression     Diabetes mellitus ([place] [address] )     Psychiatric disorder        Past Surgical History:   Procedure Laterality Date    APPENDECTOMY         History reviewed  No pertinent family history  I have reviewed and agree with the history as documented  Social History     Tobacco Use    Smoking status: Current Every Day Smoker     Packs/day: 0 00     Types: Cigarettes    Smokeless tobacco: Never Used   Substance Use Topics    Alcohol use: Not Currently     Binge frequency: Never    Drug use: No        Review of Systems   Constitutional: Negative for chills and fever  HENT: Negative for congestion and sore throat  Respiratory: Negative for cough and shortness of breath  Cardiovascular: Negative for chest pain  Gastrointestinal: Negative for abdominal pain, diarrhea, nausea and vomiting  Genitourinary: Negative for dysuria, frequency and urgency  Skin: Negative for rash  Neurological: Negative for weakness and headaches  All other systems reviewed and are negative  Physical Exam  Physical Exam   Constitutional: He is oriented to person, place, and time  Vital signs are normal  He appears well-developed and well-nourished  He does not appear ill  No distress  HENT:   Head: Normocephalic and atraumatic  Right Ear: Hearing and external ear normal    Left Ear: Hearing and external ear normal    Nose: Nose normal    Mouth/Throat: Mucous membranes are normal    Eyes: Conjunctivae are normal    Neck: Full passive range of motion without pain  Neck supple     Cardiovascular: Normal rate, regular rhythm, S1 normal, S2 normal and normal heart sounds  No murmur heard  Pulmonary/Chest: Effort normal and breath sounds normal  He has no decreased breath sounds  He has no wheezes  Abdominal: Soft  Bowel sounds are normal  There is no tenderness  There is no rebound and no guarding  Musculoskeletal: Normal range of motion  Moves all four limbs without difficulty, crepitus, swelling, or deformity  Neurological: He is alert and oriented to person, place, and time  Skin: Skin is warm, dry and intact  Capillary refill takes less than 2 seconds  No rash noted  Psychiatric: He has a normal mood and affect  His speech is normal and behavior is normal  Judgment and thought content normal    Nursing note and vitals reviewed  Vital Signs  ED Triage Vitals   Temperature Pulse Respirations Blood Pressure SpO2   11/02/19 1209 11/02/19 1154 11/02/19 1154 11/02/19 1154 11/02/19 1154   97 5 °F (36 4 °C) (!) 109 16 133/100 95 %      Temp Source Heart Rate Source Patient Position - Orthostatic VS BP Location FiO2 (%)   11/02/19 1209 11/02/19 1154 11/02/19 1154 11/02/19 1154 --   Oral Monitor Sitting Right arm       Pain Score       11/02/19 1154       No Pain           Vitals:    11/02/19 1154   BP: 133/100   Pulse: (!) 109   Patient Position - Orthostatic VS: Sitting         Visual Acuity      ED Medications  Medications - No data to display    Diagnostic Studies  Results Reviewed     None                 No orders to display              Procedures  Procedures       ED Course  ED Course as of Nov 02 1434   Sat Nov 02, 2019   1248 Speaking to stas  She states she witness seeing patient with scissors and knives hold against his throat  Patient reports attempting to cut simpson  Stas states she then witnessed him attempt to cut his arm with a knife  1304 Crisis to evaluate patient      1 Crisis keen on discharge; patient has psychiatric follow up on Monday  MDM  Number of Diagnoses or Management Options  Bipolar affective disorder, remission status unspecified [facility]):   Diagnosis management comments: 43-year-old male well known to the emergency department with history of diabetes, asthma anxiety, depression, bipolar 1 disorder presents via police department after his fiancee called in  Patient reports he was trimming his beard with scissors, and states that his fiancee became concerned about having knives hiding in the house  Patient has no complaints at this time  Crisis evaluated patient, who deemed him stable for discharge  Patient has an appointment with his psychiatrist on Monday  Patient was apprised of red flag symptoms and return to emergency department precautions for any new or worsening symptoms  Patient verbalized understanding and all questions were answered  Disposition  Final diagnoses:   Bipolar affective disorder, remission status unspecified ([facility] [address] )     Time reflects when diagnosis was documented in both MDM as applicable and the Disposition within this note     Time User Action Codes Description Comment    11/2/2019  1:16 PM Moises Duty Add [F31 9] Bipolar affective disorder, remission status unspecified [facility])       ED Disposition     ED Disposition Condition Date/Time Comment    Discharge Stable Sat Nov 2, 2019  1:16 PM Mat Seats discharge to home/self care              Follow-up Information     Follow up With Specialties Details Why Contact Info    Andrea Motta MD [facility] Medicine Schedule an appointment as soon as possible for a visit in 2 days  [address] [place] [address],Third Floor, [address] [facility]  [phone]            Discharge Medication List as of 11/2/2019  1:16 PM      CONTINUE these medications which have NOT CHANGED    Details   cyclobenzaprine (FLEXERIL) 10 mg tablet Take 1 tablet (10 mg total) by mouth 2 (two) times a day as needed for muscle spasms, Starting Mon 10/21/2019, Print      dicyclomine (BENTYL) 20 mg tablet Take 1 tablet (20 mg total) by mouth 2 (two) times a day, Starting Tue 9/10/2019, Print      famotidine (PEPCID) 20 mg tablet Take 1 tablet (20 mg total) by mouth 2 (two) times a day, Starting Tue 9/10/2019, Print      fenofibrate (TRICOR) 48 mg tablet Take 48 mg by mouth, Starting Tue 12/12/2017, Historical Med      fluticasone (FLOVENT HFA) 110 MCG/ACT inhaler Inhale 1 puff 2 (two) times a day , Historical Med      insulin glargine (LANTUS SOLOSTAR) 100 units/mL injection pen Inject 22 Units under the skin every morning, Starting Sun 10/13/2019, No Print      insulin regular (HUMULIN R) 100 units/mL injection Inject 0 1 mL (10 Units total) under the skin 3 (three) times a day with meals Hold if sugars <160, Starting Sun 10/13/2019, No Print      lisinopril (ZESTRIL) 10 mg tablet Take 0 5 tablets (5 mg total) by mouth daily, Starting Sun 10/13/2019, No Print      naproxen (NAPROSYN) 500 mg tablet Take 1 tablet (500 mg total) by mouth 2 (two) times a day with meals, Starting Mon 10/21/2019, Print      thiothixene (NAVANE) 1 mg capsule Take 1 capsule (1 mg total) by mouth daily, Starting Fri 11/1/2019, Print      traZODone (DESYREL) 50 mg tablet TAKE 1 TABLET BY MOUTH EVERYDAY AT BEDTIME, Historical Med           No discharge procedures on file      ED Provider  Electronically Signed by           Albina Blas PA-C  11/02/19 4585

## 2019-11-02 NOTE — ED PROVIDER NOTES
History  Chief Complaint   Patient presents with    Medication Refill     Pt requesting refills for medications, has appointment for 11/4  Pt denies SI/HI/AH/VH  Patient is a 60-year-old male with a history of bipolar who is very well known to me from previous visits who presents stating that he is out of his bipolar medication for last 2 days  Patient states that he cannot exactly remember the name of the medication but knows he is out for last 2 days  States has plenty of his trazodone for nighttime  States he does have an appointment with his psychiatrist on 11/04/2019  Currently patient denies any suicidal homicidal ideation  No auditory visual hallucinations  States otherwise feels well  Prior to Admission Medications   Prescriptions Last Dose Informant Patient Reported? Taking?    cyclobenzaprine (FLEXERIL) 10 mg tablet   No No   Sig: Take 1 tablet (10 mg total) by mouth 2 (two) times a day as needed for muscle spasms   dicyclomine (BENTYL) 20 mg tablet   No No   Sig: Take 1 tablet (20 mg total) by mouth 2 (two) times a day   famotidine (PEPCID) 20 mg tablet   No No   Sig: Take 1 tablet (20 mg total) by mouth 2 (two) times a day   fenofibrate (TRICOR) 48 mg tablet   Yes No   Sig: Take 48 mg by mouth   fluticasone (FLOVENT HFA) 110 MCG/ACT inhaler   Yes No   Sig: Inhale 1 puff 2 (two) times a day    insulin glargine (LANTUS SOLOSTAR) 100 units/mL injection pen   No No   Sig: Inject 22 Units under the skin every morning   insulin regular (HUMULIN R) 100 units/mL injection   No No   Sig: Inject 0 1 mL (10 Units total) under the skin 3 (three) times a day with meals Hold if sugars <160   lisinopril (ZESTRIL) 10 mg tablet   No No   Sig: Take 0 5 tablets (5 mg total) by mouth daily   naproxen (NAPROSYN) 500 mg tablet   No No   Sig: Take 1 tablet (500 mg total) by mouth 2 (two) times a day with meals   thiothixene (NAVANE) 1 mg capsule   Yes No   Sig: Take 1 mg by mouth daily    thiothixene (NAVANE) 1 mg capsule   No No   Sig: Take 1 capsule (1 mg total) by mouth daily   traZODone (DESYREL) 50 mg tablet   Yes No   Sig: TAKE 1 TABLET BY MOUTH EVERYDAY AT BEDTIME      Facility-Administered Medications: None       Past Medical History:   Diagnosis Date    Anxiety     Asthma     Bipolar 1 disorder ([facility] )     Depression     Diabetes mellitus ([place] [address] )     Psychiatric disorder        Past Surgical History:   Procedure Laterality Date    APPENDECTOMY         History reviewed  No pertinent family history  I have reviewed and agree with the history as documented  Social History     Tobacco Use    Smoking status: Former Smoker     Packs/day: 0 00     Types: Cigarettes    Smokeless tobacco: Never Used   Substance Use Topics    Alcohol use: Not Currently     Binge frequency: Never    Drug use: No        Review of Systems   Constitutional: Negative  HENT: Negative  Eyes: Negative  Respiratory: Negative  Cardiovascular: Negative  Gastrointestinal: Negative  Endocrine: Negative  Genitourinary: Negative  Musculoskeletal: Negative  Skin: Negative  Allergic/Immunologic: Negative  Neurological: Negative  Hematological: Negative  Psychiatric/Behavioral: Negative  All other systems reviewed and are negative  Physical Exam  Physical Exam   Constitutional: He is oriented to person, place, and time  He appears well-developed and well-nourished  Patient looks well put together compared to multiple previous visits  HENT:   Head: Normocephalic and atraumatic  Neck: Normal range of motion  Neck supple  Cardiovascular: Normal rate, regular rhythm, normal heart sounds and intact distal pulses  Pulmonary/Chest: Effort normal and breath sounds normal    Abdominal: Soft  Bowel sounds are normal    Musculoskeletal: Normal range of motion  Neurological: He is alert and oriented to person, place, and time  Skin: Skin is warm and dry   Capillary refill takes less than 2 seconds  Psychiatric: He has a normal mood and affect  His speech is normal and behavior is normal  Thought content normal  Cognition and memory are normal  He is attentive  Nursing note and vitals reviewed  Vital Signs  ED Triage Vitals [11/01/19 1951]   Temperature Pulse Respirations Blood Pressure SpO2   98 °F (36 7 °C) 104 16 146/88 98 %      Temp Source Heart Rate Source Patient Position - Orthostatic VS BP Location FiO2 (%)   Tympanic Monitor Sitting Left arm --      Pain Score       No Pain           Vitals:    11/01/19 1951   BP: 146/88   Pulse: 104   Patient Position - Orthostatic VS: Sitting         Visual Acuity      ED Medications  Medications - No data to display    Diagnostic Studies  Results Reviewed     None                 No orders to display              Procedures  Procedures       ED Course                               MDM  Number of Diagnoses or Management Options  Medication refill:      Amount and/or Complexity of Data Reviewed  Review and summarize past medical records: yes        Disposition  Final diagnoses:   Medication refill     Time reflects when diagnosis was documented in both MDM as applicable and the Disposition within this note     Time User Action Codes Description Comment    11/1/2019  7:54 PM Jase Walker Add [Z76 0] Medication refill       ED Disposition     ED Disposition Condition Date/Time Comment    Discharge Stable Fri Nov 1, 2019  7:54 PM [place] [facility] discharge to home/self care  Follow-up Information     Follow up With Specialties Details Why Contact Info    Hunter Duffy MD Family Medicine   [facility] [place],Third Floor, [address]  [phone]          Please follow up with your psychiatrist on November 4, 2019 as scheduled            Discharge Medication List as of 11/1/2019  7:55 PM      CONTINUE these medications which have CHANGED    Details   thiothixene (NAVANE) 1 mg capsule Take 1 capsule (1 mg total) by mouth daily, Starting Fri 11/1/2019, Print         CONTINUE these medications which have NOT CHANGED    Details   cyclobenzaprine (FLEXERIL) 10 mg tablet Take 1 tablet (10 mg total) by mouth 2 (two) times a day as needed for muscle spasms, Starting Mon 10/21/2019, Print      dicyclomine (BENTYL) 20 mg tablet Take 1 tablet (20 mg total) by mouth 2 (two) times a day, Starting Tue 9/10/2019, Print      famotidine (PEPCID) 20 mg tablet Take 1 tablet (20 mg total) by mouth 2 (two) times a day, Starting Tue 9/10/2019, Print      fenofibrate (TRICOR) 48 mg tablet Take 48 mg by mouth, Starting Tue 12/12/2017, Historical Med      fluticasone (FLOVENT HFA) 110 MCG/ACT inhaler Inhale 1 puff 2 (two) times a day , Historical Med      insulin glargine (LANTUS SOLOSTAR) 100 units/mL injection pen Inject 22 Units under the skin every morning, Starting Sun 10/13/2019, No Print      insulin regular (HUMULIN R) 100 units/mL injection Inject 0 1 mL (10 Units total) under the skin 3 (three) times a day with meals Hold if sugars <160, Starting Sun 10/13/2019, No Print      lisinopril (ZESTRIL) 10 mg tablet Take 0 5 tablets (5 mg total) by mouth daily, Starting Sun 10/13/2019, No Print      naproxen (NAPROSYN) 500 mg tablet Take 1 tablet (500 mg total) by mouth 2 (two) times a day with meals, Starting Mon 10/21/2019, Print      traZODone (DESYREL) 50 mg tablet TAKE 1 TABLET BY MOUTH EVERYDAY AT BEDTIME, Historical Med           No discharge procedures on file      ED Provider  Electronically Signed by           Sonia Lyons MD  11/01/19 0474

## 2019-11-04 ENCOUNTER — HOSPITAL ENCOUNTER (EMERGENCY)
Facility: HOSPITAL | Age: 49
Discharge: HOME | End: 2019-11-04
Attending: EMERGENCY MEDICINE | Admitting: EMERGENCY MEDICINE
Payer: COMMERCIAL

## 2019-11-04 VITALS
SYSTOLIC BLOOD PRESSURE: 153 MMHG | TEMPERATURE: 98.1 F | BODY MASS INDEX: 21.31 KG/M2 | HEART RATE: 86 BPM | DIASTOLIC BLOOD PRESSURE: 89 MMHG | WEIGHT: 132 LBS | OXYGEN SATURATION: 99 % | RESPIRATION RATE: 16 BRPM

## 2019-11-04 DIAGNOSIS — Z76.0 MEDICATION REFILL: Primary | ICD-10-CM

## 2019-11-04 LAB — GLUCOSE SERPL-MCNC: 327 MG/DL (ref 65–140)

## 2019-11-04 PROCEDURE — 99281 EMR DPT VST MAYX REQ PHY/QHP: CPT | Performed by: EMERGENCY MEDICINE

## 2019-11-04 PROCEDURE — 99282 EMERGENCY DEPT VISIT SF MDM: CPT

## 2019-11-04 PROCEDURE — 82948 REAGENT STRIP/BLOOD GLUCOSE: CPT

## 2019-11-04 NOTE — ED NOTES
Pt states he does not want to be treated for his blood sugar of 327, states "I just want a new meter (glucometer) and then I want to go home   I feel fine"     John Morgan RN  11/04/19 4914

## 2019-11-04 NOTE — ED PROVIDER NOTES
History  Chief Complaint   Patient presents with    Medication Refill     pt requesting prescription for glucometer - states his broke 1 month ago - no physical complaints     Patient is a 13-year-old male very well known to the be in this department for multiple ER visits and recently seen by me on 2019 for a medication refill here again requesting a medication refill  Patient states that he needs a new Accu-Chek  glucometer  States he has been out for approximately a month  States his sister called and said the glass was broken  Patient cannot give a clear coherent story about why her how it is out but just asking for script  No other complaints this time  Prior to Admission Medications   Prescriptions Last Dose Informant Patient Reported? Taking?    cyclobenzaprine (FLEXERIL) 10 mg tablet   No No   Sig: Take 1 tablet (10 mg total) by mouth 2 (two) times a day as needed for muscle spasms   dicyclomine (BENTYL) 20 mg tablet   No No   Sig: Take 1 tablet (20 mg total) by mouth 2 (two) times a day   famotidine (PEPCID) 20 mg tablet   No No   Sig: Take 1 tablet (20 mg total) by mouth 2 (two) times a day   fenofibrate (TRICOR) 48 mg tablet   Yes No   Sig: Take 48 mg by mouth   fluticasone (FLOVENT HFA) 110 MCG/ACT inhaler   Yes No   Sig: Inhale 1 puff 2 (two) times a day    insulin glargine (LANTUS SOLOSTAR) 100 units/mL injection pen   No No   Sig: Inject 22 Units under the skin every morning   insulin regular (HUMULIN R) 100 units/mL injection   No No   Sig: Inject 0 1 mL (10 Units total) under the skin 3 (three) times a day with meals Hold if sugars <160   lisinopril (ZESTRIL) 10 mg tablet   No No   Sig: Take 0 5 tablets (5 mg total) by mouth daily   naproxen (NAPROSYN) 500 mg tablet   No No   Sig: Take 1 tablet (500 mg total) by mouth 2 (two) times a day with meals   thiothixene (NAVANE) 1 mg capsule   No No   Sig: Take 1 capsule (1 mg total) by mouth daily   traZODone (DESYREL) 50 mg tablet Yes No   Sig: TAKE 1 TABLET BY MOUTH EVERYDAY AT BEDTIME      Facility-Administered Medications: None       Past Medical History:   Diagnosis Date    Anxiety     Asthma     Bipolar 1 disorder ([facility] )     Depression     Diabetes mellitus ([facility] )     Psychiatric disorder        Past Surgical History:   Procedure Laterality Date    APPENDECTOMY         History reviewed  No pertinent family history  I have reviewed and agree with the history as documented  Social History     Tobacco Use    Smoking status: Former Smoker     Packs/day: 0 00     Types: Cigarettes     Last attempt to quit: 11/1/2019    Smokeless tobacco: Never Used   Substance Use Topics    Alcohol use: Not Currently     Binge frequency: Never    Drug use: No        Review of Systems   Constitutional: Negative  HENT: Negative  Eyes: Negative  Respiratory: Negative  Cardiovascular: Negative  Gastrointestinal: Negative  Endocrine: Negative  Genitourinary: Negative  Musculoskeletal: Negative  Skin: Negative  Allergic/Immunologic: Negative  Neurological: Negative  Hematological: Negative  Psychiatric/Behavioral: Negative  All other systems reviewed and are negative  Physical Exam  Physical Exam   Constitutional: He is oriented to person, place, and time  He appears well-developed and well-nourished  Neck: Normal range of motion  Neck supple  Cardiovascular: Normal rate, regular rhythm, normal heart sounds and intact distal pulses  Pulmonary/Chest: Effort normal and breath sounds normal    Abdominal: Soft  Bowel sounds are normal    Musculoskeletal: Normal range of motion  Neurological: He is alert and oriented to person, place, and time  Skin: Skin is warm and dry  Capillary refill takes less than 2 seconds  Nursing note and vitals reviewed        Vital Signs  ED Triage Vitals [11/04/19 1759]   Temperature Pulse Respirations Blood Pressure SpO2   98 1 °F (36 7 °C) 86 16 153/89 99 %      Temp Source Heart Rate Source Patient Position - Orthostatic VS BP Location FiO2 (%)   Tympanic Monitor Lying Right arm --      Pain Score       No Pain           Vitals:    11/04/19 1759   BP: 153/89   Pulse: 86   Patient Position - Orthostatic VS: Lying         Visual Acuity      ED Medications  Medications - No data to display    Diagnostic Studies  Results Reviewed     Procedure Component Value Units Date/Time    Fingerstick Glucose (POCT) [[id number]]  (Abnormal) Collected:  11/04/19 1804    Lab Status:  Final result Updated:  11/04/19 1806     POC Glucose 327 mg/dl                  No orders to display              Procedures  Procedures       ED Course                               MDM    Disposition  Final diagnoses:   Medication refill     Time reflects when diagnosis was documented in both MDM as applicable and the Disposition within this note     Time User Action Codes Description Comment    11/4/2019  6:22 PM Fidel Landrum [Z76 0] Medication refill       ED Disposition     ED Disposition Condition Date/Time Comment    Discharge Stable Mon Nov 4, 2019  6:22 PM [place] Hodges discharge to home/self care              Follow-up Information     Follow up With Specialties Details Why Contact Info    Alejandra Thompson MD Family Medicine   [address] [place] [address],[address]  [phone]            Discharge Medication List as of 11/4/2019  6:24 PM      CONTINUE these medications which have NOT CHANGED    Details   cyclobenzaprine (FLEXERIL) 10 mg tablet Take 1 tablet (10 mg total) by mouth 2 (two) times a day as needed for muscle spasms, Starting Mon 10/21/2019, Print      dicyclomine (BENTYL) 20 mg tablet Take 1 tablet (20 mg total) by mouth 2 (two) times a day, Starting Tue 9/10/2019, Print      famotidine (PEPCID) 20 mg tablet Take 1 tablet (20 mg total) by mouth 2 (two) times a day, Starting Tue 9/10/2019, Print      fenofibrate (TRICOR) 48 mg tablet Take 48 mg by mouth, Starting Tue 12/12/2017, Historical Med      fluticasone (FLOVENT HFA) 110 MCG/ACT inhaler Inhale 1 puff 2 (two) times a day , Historical Med      insulin glargine (LANTUS SOLOSTAR) 100 units/mL injection pen Inject 22 Units under the skin every morning, Starting Sun 10/13/2019, No Print      insulin regular (HUMULIN R) 100 units/mL injection Inject 0 1 mL (10 Units total) under the skin 3 (three) times a day with meals Hold if sugars <160, Starting Sun 10/13/2019, No Print      lisinopril (ZESTRIL) 10 mg tablet Take 0 5 tablets (5 mg total) by mouth daily, Starting Sun 10/13/2019, No Print      naproxen (NAPROSYN) 500 mg tablet Take 1 tablet (500 mg total) by mouth 2 (two) times a day with meals, Starting Mon 10/21/2019, Print      thiothixene (NAVANE) 1 mg capsule Take 1 capsule (1 mg total) by mouth daily, Starting Fri 11/1/2019, Print      traZODone (DESYREL) 50 mg tablet TAKE 1 TABLET BY MOUTH EVERYDAY AT BEDTIME, Historical Med           Outpatient Discharge Orders   Glucometer       ED Provider  Electronically Signed by           Asia Quinones MD  11/04/19 3449

## 2019-11-06 ENCOUNTER — HOSPITAL ENCOUNTER (INPATIENT)
Facility: HOSPITAL | Age: 49
LOS: 1 days | Discharge: HOME | DRG: 753 | End: 2019-11-07
Attending: EMERGENCY MEDICINE | Admitting: HOSPITALIST
Payer: COMMERCIAL

## 2019-11-06 DIAGNOSIS — F31.9 BIPOLAR 1 DISORDER (HCC): ICD-10-CM

## 2019-11-06 DIAGNOSIS — Z76.0 MEDICATION REFILL: ICD-10-CM

## 2019-11-06 DIAGNOSIS — E10.9 TYPE 1 DIABETES MELLITUS (HCC): ICD-10-CM

## 2019-11-06 DIAGNOSIS — R10.9 ABDOMINAL PAIN: ICD-10-CM

## 2019-11-06 DIAGNOSIS — E11.9 DIABETES (HCC): ICD-10-CM

## 2019-11-06 DIAGNOSIS — R45.850 HOMICIDAL THOUGHTS: Primary | ICD-10-CM

## 2019-11-06 DIAGNOSIS — F31.9 BIPOLAR DISORDER (HCC): ICD-10-CM

## 2019-11-06 DIAGNOSIS — R73.9 HYPERGLYCEMIA: ICD-10-CM

## 2019-11-06 DIAGNOSIS — I10 HTN (HYPERTENSION): ICD-10-CM

## 2019-11-06 LAB
AMPHETAMINES SERPL QL SCN: NEGATIVE
ANION GAP SERPL CALCULATED.3IONS-SCNC: 9 MMOL/L (ref 4–13)
BACTERIA UR QL AUTO: ABNORMAL /HPF
BARBITURATES UR QL: NEGATIVE
BASE EX.OXY STD BLDV CALC-SCNC: 62.3 % (ref 60–80)
BASE EXCESS BLDV CALC-SCNC: 0.7 MMOL/L
BASOPHILS # BLD AUTO: 0.03 THOUSANDS/ΜL (ref 0–0.1)
BASOPHILS NFR BLD AUTO: 0 % (ref 0–1)
BENZODIAZ UR QL: NEGATIVE
BILIRUB UR QL STRIP: NEGATIVE
BUN SERPL-MCNC: 17 MG/DL (ref 5–25)
CALCIUM SERPL-MCNC: 9.7 MG/DL (ref 8.3–10.1)
CHLORIDE SERPL-SCNC: 93 MMOL/L (ref 100–108)
CLARITY UR: CLEAR
CO2 SERPL-SCNC: 30 MMOL/L (ref 21–32)
COCAINE UR QL: NEGATIVE
COLOR UR: YELLOW
CREAT SERPL-MCNC: 1.3 MG/DL (ref 0.6–1.3)
EOSINOPHIL # BLD AUTO: 0.06 THOUSAND/ΜL (ref 0–0.61)
EOSINOPHIL NFR BLD AUTO: 1 % (ref 0–6)
ERYTHROCYTE [DISTWIDTH] IN BLOOD BY AUTOMATED COUNT: 11.5 % (ref 11.6–15.1)
ETHANOL EXG-MCNC: 0 MG/DL
GFR SERPL CREATININE-BSD FRML MDRD: 65 ML/MIN/1.73SQ M
GLUCOSE SERPL-MCNC: 360 MG/DL (ref 65–140)
GLUCOSE SERPL-MCNC: 370 MG/DL (ref 65–140)
GLUCOSE SERPL-MCNC: 629 MG/DL (ref 65–140)
GLUCOSE SERPL-MCNC: >500 MG/DL (ref 65–140)
GLUCOSE UR STRIP-MCNC: ABNORMAL MG/DL
HCO3 BLDV-SCNC: 27.9 MMOL/L (ref 24–30)
HCT VFR BLD AUTO: 46 % (ref 36.5–49.3)
HGB BLD-MCNC: 15.4 G/DL (ref 12–17)
HGB UR QL STRIP.AUTO: ABNORMAL
IMM GRANULOCYTES # BLD AUTO: 0.02 THOUSAND/UL (ref 0–0.2)
IMM GRANULOCYTES NFR BLD AUTO: 0 % (ref 0–2)
KETONES UR STRIP-MCNC: NEGATIVE MG/DL
LEUKOCYTE ESTERASE UR QL STRIP: ABNORMAL
LYMPHOCYTES # BLD AUTO: 2.04 THOUSANDS/ΜL (ref 0.6–4.47)
LYMPHOCYTES NFR BLD AUTO: 25 % (ref 14–44)
MCH RBC QN AUTO: 29.8 PG (ref 26.8–34.3)
MCHC RBC AUTO-ENTMCNC: 33.5 G/DL (ref 31.4–37.4)
MCV RBC AUTO: 89 FL (ref 82–98)
METHADONE UR QL: NEGATIVE
MONOCYTES # BLD AUTO: 0.55 THOUSAND/ΜL (ref 0.17–1.22)
MONOCYTES NFR BLD AUTO: 7 % (ref 4–12)
NEUTROPHILS # BLD AUTO: 5.48 THOUSANDS/ΜL (ref 1.85–7.62)
NEUTS SEG NFR BLD AUTO: 67 % (ref 43–75)
NITRITE UR QL STRIP: NEGATIVE
NON-SQ EPI CELLS URNS QL MICRO: ABNORMAL /HPF
NRBC BLD AUTO-RTO: 0 /100 WBCS
O2 CT BLDV-SCNC: 13.8 ML/DL
OPIATES UR QL SCN: NEGATIVE
PCO2 BLDV: 54.7 MM HG (ref 42–50)
PCP UR QL: NEGATIVE
PH BLDV: 7.33 [PH] (ref 7.3–7.4)
PH UR STRIP.AUTO: 6 [PH]
PLATELET # BLD AUTO: 265 THOUSANDS/UL (ref 149–390)
PMV BLD AUTO: 11.3 FL (ref 8.9–12.7)
PO2 BLDV: 34.1 MM HG (ref 35–45)
POTASSIUM SERPL-SCNC: 4.4 MMOL/L (ref 3.5–5.3)
PROT UR STRIP-MCNC: NEGATIVE MG/DL
RBC # BLD AUTO: 5.17 MILLION/UL (ref 3.88–5.62)
RBC #/AREA URNS AUTO: ABNORMAL /HPF
SODIUM SERPL-SCNC: 132 MMOL/L (ref 136–145)
SP GR UR STRIP.AUTO: <=1.005 (ref 1–1.03)
THC UR QL: NEGATIVE
UROBILINOGEN UR QL STRIP.AUTO: 0.2 E.U./DL
WBC # BLD AUTO: 8.18 THOUSAND/UL (ref 4.31–10.16)
WBC #/AREA URNS AUTO: ABNORMAL /HPF

## 2019-11-06 PROCEDURE — 82075 ASSAY OF BREATH ETHANOL: CPT | Performed by: EMERGENCY MEDICINE

## 2019-11-06 PROCEDURE — 96360 HYDRATION IV INFUSION INIT: CPT

## 2019-11-06 PROCEDURE — 99284 EMERGENCY DEPT VISIT MOD MDM: CPT | Performed by: EMERGENCY MEDICINE

## 2019-11-06 PROCEDURE — 80307 DRUG TEST PRSMV CHEM ANLYZR: CPT | Performed by: EMERGENCY MEDICINE

## 2019-11-06 PROCEDURE — 36415 COLL VENOUS BLD VENIPUNCTURE: CPT | Performed by: EMERGENCY MEDICINE

## 2019-11-06 PROCEDURE — 82948 REAGENT STRIP/BLOOD GLUCOSE: CPT

## 2019-11-06 PROCEDURE — 85025 COMPLETE CBC W/AUTO DIFF WBC: CPT | Performed by: EMERGENCY MEDICINE

## 2019-11-06 PROCEDURE — 99285 EMERGENCY DEPT VISIT HI MDM: CPT

## 2019-11-06 PROCEDURE — 80048 BASIC METABOLIC PNL TOTAL CA: CPT | Performed by: EMERGENCY MEDICINE

## 2019-11-06 PROCEDURE — 81001 URINALYSIS AUTO W/SCOPE: CPT | Performed by: PHYSICIAN ASSISTANT

## 2019-11-06 PROCEDURE — 99223 1ST HOSP IP/OBS HIGH 75: CPT | Performed by: PHYSICIAN ASSISTANT

## 2019-11-06 PROCEDURE — 82805 BLOOD GASES W/O2 SATURATION: CPT | Performed by: EMERGENCY MEDICINE

## 2019-11-06 RX ORDER — ACETAMINOPHEN 325 MG/1
650 TABLET ORAL EVERY 6 HOURS PRN
Status: DISCONTINUED | OUTPATIENT
Start: 2019-11-06 | End: 2019-11-07 | Stop reason: HOSPADM

## 2019-11-06 RX ORDER — TRAZODONE HYDROCHLORIDE 50 MG/1
50 TABLET ORAL
Status: DISCONTINUED | OUTPATIENT
Start: 2019-11-06 | End: 2019-11-07 | Stop reason: HOSPADM

## 2019-11-06 RX ORDER — FENOFIBRATE 48 MG/1
48 TABLET, COATED ORAL DAILY
Status: DISCONTINUED | OUTPATIENT
Start: 2019-11-07 | End: 2019-11-07 | Stop reason: HOSPADM

## 2019-11-06 RX ORDER — INSULIN GLARGINE 100 [IU]/ML
22 INJECTION, SOLUTION SUBCUTANEOUS
Status: DISCONTINUED | OUTPATIENT
Start: 2019-11-06 | End: 2019-11-07 | Stop reason: HOSPADM

## 2019-11-06 RX ORDER — ONDANSETRON 2 MG/ML
4 INJECTION INTRAMUSCULAR; INTRAVENOUS EVERY 6 HOURS PRN
Status: DISCONTINUED | OUTPATIENT
Start: 2019-11-06 | End: 2019-11-07 | Stop reason: HOSPADM

## 2019-11-06 RX ORDER — FLUTICASONE PROPIONATE 110 UG/1
1 AEROSOL, METERED RESPIRATORY (INHALATION) 2 TIMES DAILY
Status: DISCONTINUED | OUTPATIENT
Start: 2019-11-06 | End: 2019-11-07 | Stop reason: HOSPADM

## 2019-11-06 RX ORDER — SODIUM CHLORIDE 9 MG/ML
125 INJECTION, SOLUTION INTRAVENOUS CONTINUOUS
Status: DISCONTINUED | OUTPATIENT
Start: 2019-11-06 | End: 2019-11-07 | Stop reason: HOSPADM

## 2019-11-06 RX ORDER — LISINOPRIL 5 MG/1
5 TABLET ORAL DAILY
Status: DISCONTINUED | OUTPATIENT
Start: 2019-11-07 | End: 2019-11-07 | Stop reason: HOSPADM

## 2019-11-06 RX ADMIN — FLUTICASONE PROPIONATE 1 PUFF: 110 AEROSOL, METERED RESPIRATORY (INHALATION) at 22:34

## 2019-11-06 RX ADMIN — INSULIN LISPRO 4 UNITS: 100 INJECTION, SOLUTION INTRAVENOUS; SUBCUTANEOUS at 22:33

## 2019-11-06 RX ADMIN — SODIUM CHLORIDE 1000 ML: 0.9 INJECTION, SOLUTION INTRAVENOUS at 18:26

## 2019-11-06 RX ADMIN — SODIUM CHLORIDE 125 ML/HR: 0.9 INJECTION, SOLUTION INTRAVENOUS at 21:09

## 2019-11-06 RX ADMIN — INSULIN GLARGINE 22 UNITS: 100 INJECTION, SOLUTION SUBCUTANEOUS at 22:33

## 2019-11-06 RX ADMIN — SODIUM CHLORIDE 1000 ML: 0.9 INJECTION, SOLUTION INTRAVENOUS at 20:59

## 2019-11-06 RX ADMIN — TRAZODONE HYDROCHLORIDE 50 MG: 50 TABLET ORAL at 23:07

## 2019-11-06 NOTE — ED NOTES
Pt states he has not taken any of his home medications for the past x1 month       Bobby Jaffe RN  11/06/19 4398

## 2019-11-06 NOTE — ED PROVIDER NOTES
History  Chief Complaint   Patient presents with    Psychiatric Evaluation     Reports being out of psych meds for 2 months  States has attempted to call psychiatrist and left multiple messages and is not getting a phone call back  Denies SI, HI, AH, VH  However, wife states that she is willing to sign a 302  Per wife, "he said that he wanted to kill a "        History provided by:  Patient and significant other  Psychiatric Evaluation   Presenting symptoms: agitation and homicidal ideas (threatened to kill a , which he denies  g/f reports threat)    Presenting symptoms: no hallucinations and no suicidal thoughts    Patient accompanied by: gf   Degree of incapacity (severity): Unable to specify  Onset quality:  Unable to specify  Timing:  Unable to specify  Progression:  Unable to specify  Context: noncompliance    Treatment compliance:  Untreated  Time since last psychoactive medication taken:  1 month  Relieved by:  Nothing  Worsened by:  Nothing  Associated symptoms: insomnia    Associated symptoms: no abdominal pain, no appetite change, no chest pain and no fatigue    Risk factors: hx of mental illness and hx of suicide attempts        Prior to Admission Medications   Prescriptions Last Dose Informant Patient Reported? Taking?    cyclobenzaprine (FLEXERIL) 10 mg tablet More than a month at Unknown time  No No   Sig: Take 1 tablet (10 mg total) by mouth 2 (two) times a day as needed for muscle spasms   dicyclomine (BENTYL) 20 mg tablet More than a month at Unknown time  No No   Sig: Take 1 tablet (20 mg total) by mouth 2 (two) times a day   famotidine (PEPCID) 20 mg tablet More than a month at Unknown time  No No   Sig: Take 1 tablet (20 mg total) by mouth 2 (two) times a day   fenofibrate (TRICOR) 48 mg tablet More than a month at Unknown time  Yes No   Sig: Take 48 mg by mouth   fluticasone (FLOVENT HFA) 110 MCG/ACT inhaler More than a month at Unknown time  Yes No   Sig: Inhale 1 puff 2 (two) times a day    insulin glargine (LANTUS SOLOSTAR) 100 units/mL injection pen More than a month at Unknown time  No No   Sig: Inject 22 Units under the skin every morning   insulin regular (HUMULIN R) 100 units/mL injection More than a month at Unknown time  No No   Sig: Inject 0 1 mL (10 Units total) under the skin 3 (three) times a day with meals Hold if sugars <160   lisinopril (ZESTRIL) 10 mg tablet More than a month at Unknown time  No No   Sig: Take 0 5 tablets (5 mg total) by mouth daily   naproxen (NAPROSYN) 500 mg tablet More than a month at Unknown time  No No   Sig: Take 1 tablet (500 mg total) by mouth 2 (two) times a day with meals   thiothixene (NAVANE) 1 mg capsule More than a month at Unknown time  No No   Sig: Take 1 capsule (1 mg total) by mouth daily   traZODone (DESYREL) 50 mg tablet More than a month at Unknown time  Yes No   Sig: TAKE 1 TABLET BY MOUTH EVERYDAY AT BEDTIME      Facility-Administered Medications: None       Past Medical History:   Diagnosis Date    Anxiety     Asthma     Bipolar 1 disorder ([facility])     Depression     Diabetes mellitus ([facility] [address] )     Psychiatric disorder        Past Surgical History:   Procedure Laterality Date    APPENDECTOMY         History reviewed  No pertinent family history  I have reviewed and agree with the history as documented  Social History     Tobacco Use    Smoking status: Current Some Day Smoker     Packs/day: 0 00     Types: Cigarettes     Last attempt to quit: 2019     Years since quittin 0    Smokeless tobacco: Never Used   Substance Use Topics    Alcohol use: Not Currently     Binge frequency: Never    Drug use: No        Review of Systems   Constitutional: Negative for activity change, appetite change, fatigue and fever  HENT: Negative for congestion, dental problem, ear pain, rhinorrhea and sore throat  Eyes: Negative for pain and redness  Respiratory: Negative for chest tightness, shortness of breath and wheezing  Cardiovascular: Negative for chest pain and palpitations  Gastrointestinal: Negative for abdominal pain, blood in stool, constipation, diarrhea, nausea and vomiting  Endocrine: Negative for cold intolerance and heat intolerance  Genitourinary: Negative for dysuria, frequency and hematuria  Musculoskeletal: Negative for arthralgias and myalgias  Skin: Negative for color change, pallor and rash  Neurological: Negative for weakness and numbness  Hematological: Does not bruise/bleed easily  Psychiatric/Behavioral: Positive for agitation and homicidal ideas (threatened to kill a , which he denies  g/f reports threat)  Negative for hallucinations and suicidal ideas  The patient has insomnia  Physical Exam  Physical Exam   Constitutional: He is oriented to person, place, and time  He appears well-developed and well-nourished  HENT:   Mouth/Throat: No oropharyngeal exudate  TMs normal bilaterally no pharyngeal erythema no rhinorrhea nontender palpation of sinuses, normal looking turbinates   Eyes: Conjunctivae and EOM are normal    Neck: Normal range of motion  Neck supple  No meningeal signs   Cardiovascular: Normal rate, regular rhythm, normal heart sounds and intact distal pulses  Pulmonary/Chest: Effort normal and breath sounds normal  No respiratory distress  He has no wheezes  He has no rales  He exhibits no tenderness  Abdominal: Soft  Bowel sounds are normal  He exhibits no distension and no mass  There is no tenderness  No hernia  No cvat   Musculoskeletal: Normal range of motion  He exhibits no edema  Lymphadenopathy:     He has no cervical adenopathy  Neurological: He is alert and oriented to person, place, and time  No cranial nerve deficit  Skin: No rash noted  No erythema  No edema   Psychiatric: He has a normal mood and affect  His behavior is normal    Nursing note and vitals reviewed        Vital Signs  ED Triage Vitals   Temperature Pulse Respirations Blood Pressure SpO2   11/06/19 1549 11/06/19 1549 11/06/19 1549 11/06/19 1549 11/06/19 1549   98 2 °F (36 8 °C) 83 18 132/87 97 %      Temp Source Heart Rate Source Patient Position - Orthostatic VS BP Location FiO2 (%)   11/06/19 1549 11/06/19 1549 11/06/19 1549 11/06/19 1549 --   Temporal Monitor Sitting Right arm       Pain Score       11/06/19 1859       No Pain           Vitals:    11/06/19 1549 11/06/19 1758 11/06/19 1859   BP: 132/87 147/84 125/80   Pulse: 83 86 89   Patient Position - Orthostatic VS: Sitting Sitting Lying         Visual Acuity      ED Medications  Medications   insulin regular (HumuLIN R,NovoLIN R) 1 Units/mL in sodium chloride 0 9 % 100 mL infusion (has no administration in time range)   sodium chloride 0 9 % bolus 1,000 mL (0 mL Intravenous Stopped 11/6/19 1910)       Diagnostic Studies  Results Reviewed     Procedure Component Value Units Date/Time    Urinalysis with microscopic [[id number]]     Lab Status:  No result Specimen:  Urine     Basic metabolic panel [[id number]]  (Abnormal) Collected:  11/06/19 1826    Lab Status:  Final result Specimen:  Blood from Arm, Right Updated:  11/06/19 1931     Sodium 132 mmol/L      Potassium 4 4 mmol/L      Chloride 93 mmol/L      CO2 30 mmol/L      ANION GAP 9 mmol/L      BUN 17 mg/dL      Creatinine 1 30 mg/dL      Glucose 629 mg/dL      Calcium 9 7 mg/dL      eGFR 65 ml/min/1 73sq m     Narrative:       [place] guidelines for Chronic Kidney Disease (CKD):     Stage 1 with normal or high GFR (GFR > 90 mL/min/1 73 square meters)    Stage 2 Mild CKD (GFR = 60-89 mL/min/1 73 square meters)    Stage 3A Moderate CKD (GFR = 45-59 mL/min/1 73 square meters)    Stage 3B Moderate CKD (GFR = 30-44 mL/min/1 73 square meters)    Stage 4 Severe CKD (GFR = 15-29 mL/min/1 73 square meters)    Stage 5 End Stage CKD (GFR <15 mL/min/1 73 square meters)  Note: GFR calculation is accurate only with a steady state creatinine    CBC and differential [[id number]]  (Abnormal) Collected:  11/06/19 1826    Lab Status:  Final result Specimen:  Blood from Arm, Right Updated:  11/06/19 1856     WBC 8 18 Thousand/uL      RBC 5 17 Million/uL      Hemoglobin 15 4 g/dL      Hematocrit 46 0 %      MCV 89 fL      MCH 29 8 pg      MCHC 33 5 g/dL      RDW 11 5 %      MPV 11 3 fL      Platelets 897 Thousands/uL      nRBC 0 /100 WBCs      Neutrophils Relative 67 %      Immat GRANS % 0 %      Lymphocytes Relative 25 %      Monocytes Relative 7 %      Eosinophils Relative 1 %      Basophils Relative 0 %      Neutrophils Absolute 5 48 Thousands/µL      Immature Grans Absolute 0 02 Thousand/uL      Lymphocytes Absolute 2 04 Thousands/µL      Monocytes Absolute 0 55 Thousand/µL      Eosinophils Absolute 0 06 Thousand/µL      Basophils Absolute 0 03 Thousands/µL     Blood gas, venous [[id number]]  (Abnormal) Collected:  11/06/19 1826    Lab Status:  Final result Specimen:  Blood from Arm, Right Updated:  11/06/19 1856     pH, Yvon 7 326     pCO2, Yvon 54 7 mm Hg      pO2, Yvon 34 1 mm Hg      HCO3, Yvon 27 9 mmol/L      Base Excess, Yvon 0 7 mmol/L      O2 Content, Yvon 13 8 ml/dL      O2 HGB, VENOUS 62 3 %     Fingerstick Glucose (POCT) [[id number]]  (Abnormal) Collected:  11/06/19 1808    Lab Status:  Final result Updated:  11/06/19 1809     POC Glucose >500 mg/dl     Rapid drug screen, urine [[id number]]  (Normal) Collected:  11/06/19 1719    Lab Status:  Final result Specimen:  Urine, Clean Catch Updated:  11/06/19 1745     Amph/Meth UR Negative     Barbiturate Ur Negative     Benzodiazepine Urine Negative     Cocaine Urine Negative     Methadone Urine Negative     Opiate Urine Negative     PCP Ur Negative     THC Urine Negative    Narrative:       FOR MEDICAL PURPOSES ONLY  IF CONFIRMATION NEEDED PLEASE CONTACT THE LAB WITHIN 5 DAYS      Drug Screen Cutoff Levels:  AMPHETAMINE/METHAMPHETAMINES  1000 ng/mL  BARBITURATES     200 ng/mL  BENZODIAZEPINES     200 ng/mL  COCAINE      300 ng/mL  METHADONE      300 ng/mL  OPIATES      300 ng/mL  PHENCYCLIDINE     25 ng/mL  THC       50 ng/mL      POCT alcohol breath test [[id number]]  (Normal) Resulted:  11/06/19 1714    Lab Status:  Final result Updated:  11/06/19 1714     EXTBreath Alcohol 0 00                 No orders to display              Procedures  Procedures       ED Course  ED Course as of Nov 06 2002 Wed Nov 06, 2019   1818 Patient's blood sugar greater than 500  Do labs, IV fluids, medical admission  1931 Glucose, Random(!!): 629                               MDM  Number of Diagnoses or Management Options  Homicidal thoughts:   Diagnosis management comments: Homicidal threats-will consult crisis for mental health evaluation/treatment  Disposition  Final diagnoses:   Homicidal thoughts   Hyperglycemia   Diabetes ([facility] )     Time reflects when diagnosis was documented in both MDM as applicable and the Disposition within this note     Time User Action Codes Description Comment    11/6/2019  4:46 PM Easton Coffman Add [R45 850] Homicidal thoughts     11/6/2019  7:49 PM Vishal Jackson Add [E10 9] Type 1 diabetes mellitus ([facility] [address] )     11/6/2019  7:54 PM Christine Byrd Add [R73 9] Hyperglycemia     11/6/2019  7:54 PM Christine Byrd Add [E11 9] Diabetes [facility])       ED Disposition     ED Disposition Condition Date/Time Comment    Admit Stable Wed Nov 6, 2019  8:01 PM Admit to slim         MD Documentation      Most Recent Value   Sending MD [facility]       Follow-up Information    None         Patient's Medications   Discharge Prescriptions    No medications on file     No discharge procedures on file      ED Provider  Electronically Signed by           Judi Luna MD  11/06/19 Ashleigh Childs MD  11/06/19 1956       Judi Luna MD  11/06/19 2002

## 2019-11-07 VITALS
WEIGHT: 134.7 LBS | HEIGHT: 66 IN | BODY MASS INDEX: 21.65 KG/M2 | OXYGEN SATURATION: 97 % | HEART RATE: 76 BPM | DIASTOLIC BLOOD PRESSURE: 72 MMHG | RESPIRATION RATE: 16 BRPM | TEMPERATURE: 97.4 F | SYSTOLIC BLOOD PRESSURE: 117 MMHG

## 2019-11-07 LAB
ANION GAP SERPL CALCULATED.3IONS-SCNC: 9 MMOL/L (ref 4–13)
BUN SERPL-MCNC: 14 MG/DL (ref 5–25)
CALCIUM SERPL-MCNC: 8.5 MG/DL (ref 8.3–10.1)
CHLORIDE SERPL-SCNC: 107 MMOL/L (ref 100–108)
CO2 SERPL-SCNC: 27 MMOL/L (ref 21–32)
CREAT SERPL-MCNC: 0.74 MG/DL (ref 0.6–1.3)
GFR SERPL CREATININE-BSD FRML MDRD: 110 ML/MIN/1.73SQ M
GLUCOSE SERPL-MCNC: 112 MG/DL (ref 65–140)
GLUCOSE SERPL-MCNC: 116 MG/DL (ref 65–140)
GLUCOSE SERPL-MCNC: 165 MG/DL (ref 65–140)
GLUCOSE SERPL-MCNC: 201 MG/DL (ref 65–140)
GLUCOSE SERPL-MCNC: 83 MG/DL (ref 65–140)
POTASSIUM SERPL-SCNC: 2.9 MMOL/L (ref 3.5–5.3)
SODIUM SERPL-SCNC: 143 MMOL/L (ref 136–145)

## 2019-11-07 PROCEDURE — 82948 REAGENT STRIP/BLOOD GLUCOSE: CPT

## 2019-11-07 PROCEDURE — 99239 HOSP IP/OBS DSCHRG MGMT >30: CPT | Performed by: HOSPITALIST

## 2019-11-07 PROCEDURE — 80048 BASIC METABOLIC PNL TOTAL CA: CPT | Performed by: PHYSICIAN ASSISTANT

## 2019-11-07 PROCEDURE — 99222 1ST HOSP IP/OBS MODERATE 55: CPT | Performed by: NURSE PRACTITIONER

## 2019-11-07 RX ORDER — THIOTHIXENE 1 MG/1
1 CAPSULE ORAL DAILY
Qty: 10 CAPSULE | Refills: 0 | Status: SHIPPED | OUTPATIENT
Start: 2019-11-07 | End: 2019-11-16 | Stop reason: SDUPTHER

## 2019-11-07 RX ORDER — FENOFIBRATE 48 MG/1
48 TABLET, COATED ORAL DAILY
Qty: 20 TABLET | Refills: 0 | Status: SHIPPED | OUTPATIENT
Start: 2019-11-07 | End: 2019-11-16 | Stop reason: SDUPTHER

## 2019-11-07 RX ORDER — DICYCLOMINE HCL 20 MG
20 TABLET ORAL 2 TIMES DAILY
Qty: 20 TABLET | Refills: 0 | Status: SHIPPED | OUTPATIENT
Start: 2019-11-07 | End: 2019-11-16 | Stop reason: SDUPTHER

## 2019-11-07 RX ORDER — FLUTICASONE PROPIONATE 110 UG/1
1 AEROSOL, METERED RESPIRATORY (INHALATION) 2 TIMES DAILY
Qty: 12 G | Refills: 0 | Status: SHIPPED | OUTPATIENT
Start: 2019-11-07 | End: 2019-11-16 | Stop reason: SDUPTHER

## 2019-11-07 RX ORDER — LISINOPRIL 10 MG/1
5 TABLET ORAL DAILY
Qty: 30 TABLET | Refills: 0 | Status: SHIPPED | OUTPATIENT
Start: 2019-11-07 | End: 2019-11-16 | Stop reason: SDUPTHER

## 2019-11-07 RX ORDER — POTASSIUM CHLORIDE 20 MEQ/1
40 TABLET, EXTENDED RELEASE ORAL 2 TIMES DAILY
Status: DISCONTINUED | OUTPATIENT
Start: 2019-11-07 | End: 2019-11-07 | Stop reason: HOSPADM

## 2019-11-07 RX ORDER — TRAZODONE HYDROCHLORIDE 50 MG/1
50 TABLET ORAL
Qty: 10 TABLET | Refills: 0 | Status: SHIPPED | OUTPATIENT
Start: 2019-11-07 | End: 2019-11-16 | Stop reason: SDUPTHER

## 2019-11-07 RX ORDER — FAMOTIDINE 20 MG/1
20 TABLET, FILM COATED ORAL 2 TIMES DAILY
Qty: 14 TABLET | Refills: 0 | Status: SHIPPED | OUTPATIENT
Start: 2019-11-07 | End: 2019-11-16 | Stop reason: SDUPTHER

## 2019-11-07 RX ADMIN — INSULIN LISPRO 10 UNITS: 100 INJECTION, SOLUTION INTRAVENOUS; SUBCUTANEOUS at 07:55

## 2019-11-07 RX ADMIN — INSULIN LISPRO 10 UNITS: 100 INJECTION, SOLUTION INTRAVENOUS; SUBCUTANEOUS at 17:30

## 2019-11-07 RX ADMIN — INSULIN LISPRO 1 UNITS: 100 INJECTION, SOLUTION INTRAVENOUS; SUBCUTANEOUS at 17:30

## 2019-11-07 RX ADMIN — FENOFIBRATE 48 MG: 48 TABLET, FILM COATED ORAL at 09:20

## 2019-11-07 RX ADMIN — SODIUM CHLORIDE 125 ML/HR: 0.9 INJECTION, SOLUTION INTRAVENOUS at 02:46

## 2019-11-07 RX ADMIN — FLUTICASONE PROPIONATE 1 PUFF: 110 AEROSOL, METERED RESPIRATORY (INHALATION) at 17:26

## 2019-11-07 RX ADMIN — INSULIN LISPRO 10 UNITS: 100 INJECTION, SOLUTION INTRAVENOUS; SUBCUTANEOUS at 12:17

## 2019-11-07 RX ADMIN — POTASSIUM CHLORIDE 40 MEQ: 1500 TABLET, EXTENDED RELEASE ORAL at 17:25

## 2019-11-07 RX ADMIN — LISINOPRIL 5 MG: 5 TABLET ORAL at 08:07

## 2019-11-07 RX ADMIN — FLUTICASONE PROPIONATE 1 PUFF: 110 AEROSOL, METERED RESPIRATORY (INHALATION) at 08:08

## 2019-11-07 NOTE — CONSULTS
Consultation - [id number] [facility] [place] 53 y o  male MRN: [id number]  Unit/Bed#: [place] 2 -01 Encounter: [id number]    Assessment/Plan     Assessment:  Bipolar I disorder, unspecified (F31 9)    Asaf Ramos is a 72-year-old  male who presented to the hospital after being out of his psychiatric medications for approximately 1 month  Patient reported his psychiatrist at [facility] has been on vacation out of the country providing him to from getting a refill on his necessary medications  It was noted that patient had made a homicidal statement asking about killing a , however, patient stated that he did not understand with the word homicide meant and was asking if something like that would be an example of what the word meant  Patient reported increased it agitation due to not being on medications for the past month  Patient has a previous psychiatric diagnosis of bipolar I disorder  Upon psychiatric assessment, patient was pleasant cooperative upon approach displayed good eye contact throughout the encounter with a bright noted  Patient has a previous psychiatric diagnosis of bipolar disorder  Patient is reporting no symptoms at this time and none were noted at time of encounter  Patient is denying any depression or anxiety  Patient is denying any suicidal/homicidal ideations or auditory/visual hallucinations and does not appear to be responding to internal stimuli at this time  Patient appears to be mildly limited  Patient is reporting good sleep and appetite  He has been compliant with his medications since being admitted to the hospital and states he feels much better now he is back on his regular medication regimen  Patient stated the comment made about homicide was a misunderstanding as he did not understand what that word meant but everyone keeps asking him if he is having those thoughts      Patient has previous medication trials including:  Navane, Abilify, trazodone and Ativan  Patient has not been trialed on injectable medications  Patient lists being out of medications for approximately 1 month as his primary stressors that led him to be admitted to the hospital   [facility] stated [facility], Sophy Pina, and he was out of the country on vacation for several weeks resulting in patient being unable to get a refill on his medications  Patient stated his psychiatrist is back in the office and his medications will be refilled timely  Patient had a previous inpatient admit at [facility] in September of 2019 for suicidal thoughts with no plan and increased agitation  Patient also has an ICM through [facility] Dheeraj Bustamante and also attends the [employer]  Patient is currently employed on a part-time basis at [employer] on [employer] in [place]  Patient currently lives with his cousin Magalie Haley and states she is very supportive  Upon administering a mini-mental examination patient was able to successfully complete the majority of the assessment with very little difficulty  Patient did have some difficulty with with abstract thinking  Recommendation: At this time, there is no indication that patient is a danger to self or others and does not meet criteria for inpatient behavioral health admission  Patient can continue to follow up with his outpatient psychiatric providers upon discharge  Plan:   1  Follow up with current outpatient psychiatric providers upon discharge  2  Continue currently prescribed psychiatric medications  3  1:1 observation has been discontinued as patient is not a danger to self or others  4  Psychiatry will sign off at this time  Risks, benefits and possible side effects of Medications:   Risks, benefits, and possible side effects of medications explained to patient and patient verbalizes understanding        Chief Complaint:  Out of psychiatric medications x1 month    Per H&P: Misha Zimmerman is a 53 y o  male who presents with pmh of bipolar d/o 1, IDDM, htn, hld coming to hospital med refill  Pt reports he has been out of all of his meds x 1 mo  He was hospitalized at [facility] from 10/12/13 through 10/13/19 for HHNK w/bs > 600  He was d/c'd w/lantus 22 iu and humalog 10 iu tid ac  In the ED crisis worker was noted as pt's significant other noted that pt was being verbally agitated  He also allegedly had homicidal ideation stating he 'wanted to kill a  '  The pt however denies this and notes no si/hi/sib  He notes he 'asked what homicide meant' as he is 'asked this with everyone' and wanted to know if it is was 'like killing a  '  Pt is willing to sign a 201  Pt reports agitation verbally but no physical aggression  He reports he sees his psychiatrist balwinder at [phone] [facility] but has not been able to see his psychiatrist who is out of the country on vacation  He reports he has been out of his psych meds x 1 mo  Of note pt was voluntarily admitted for mood lability and SI in 09/28/19 at [facility]  He was d/c'd on abilify 10mg po daily which has since been changed navane but his psychiatrist     History of Present Illness   Physician Requesting Consult: An Ramírez DO  Reason for Consult / Principal Problem:  Schizoaffective disorder     Psychosocial Stressors:  Medication management      Inpatient consult to Psychiatry  Consult performed by: KRISH Sharma  Consult ordered by: David Mendosa PA-C      Psychiatric Review Of Systems:  sleep: no  appetite changes: no  weight changes: no  energy/anergy: no  interest/pleasure/anhedonia: no  somatic symptoms: no  anxiety/panic: no  asad: no  guilty/hopeless: no  self injurious behavior/risky behavior: no    Historical Information   Past Psychiatric History:   Therapy, Out Patient at [facility] and In Patient at [facility] in September 2019  Currently in treatment with:  [facility] and [facility] [place]  Past Suicide attempts:  Denies  Past Violent behavior:  Can be verbally aggressive  Past Psychiatric medication trial:  Wes[place], Abilify, trazodone, Ativan    Substance Abuse History:  Denies  Use of Alcohol: denied    Family Psychiatric History:  Unknown    Social History  Education: high school diploma/GED  Learning Disabilities: Denies  Marital history: single  Living arrangement, social support: Lives with cousin Margaret Burt who he states is very supportive  Occupational History:  [employer]du[employer] on [employer] [place] [facility] [place] [employer]: good support system  Other Pertinent History: None  Access to firearms:  Denies    Traumatic History:   Abuse: Denies  Other Traumatic Events: Denies    Past Medical History:   Diagnosis Date    Anxiety     Asthma     Bipolar 1 disorder ([facility] [address] )     Depression     Diabetes mellitus ([facility] )     Psychiatric disorder        Medical Review Of Systems:  Review of Systems   Constitutional: Negative for activity change, appetite change, fatigue and unexpected weight change  Psychiatric/Behavioral: Negative for agitation, behavioral problems, confusion, decreased concentration, dysphoric mood, hallucinations, self-injury, sleep disturbance and suicidal ideas  The patient is not nervous/anxious and is not hyperactive          Meds/Allergies   all current active meds have been reviewed and current meds:   Current Facility-Administered Medications   Medication Dose Route Frequency    acetaminophen (TYLENOL) tablet 650 mg  650 mg Oral Q6H PRN    fenofibrate (TRICOR) tablet 48 mg  48 mg Oral Daily    fluticasone (FLOVENT HFA) 110 MCG/ACT inhaler 1 puff  1 puff Inhalation BID    insulin glargine (LANTUS) subcutaneous injection 22 Units 0 22 mL  22 Units Subcutaneous HS    insulin lispro (HumaLOG) 100 units/mL subcutaneous injection 1-5 Units  1-5 Units Subcutaneous 4x Daily (AC & HS)    insulin lispro (HumaLOG) 100 units/mL subcutaneous injection 10 Units  10 Units Subcutaneous TID With Meals    lisinopril (ZESTRIL) tablet 5 mg  5 mg Oral Daily    nicotine (NICODERM CQ) 7 mg/24hr TD 24 hr patch 1 patch  1 patch Transdermal Daily    ondansetron (ZOFRAN) injection 4 mg  4 mg Intravenous Q6H PRN    potassium chloride (K-DUR,KLOR-CON) CR tablet 40 mEq  40 mEq Oral BID    sodium chloride 0 9 % infusion  125 mL/hr Intravenous Continuous    traZODone (DESYREL) tablet 50 mg  50 mg Oral HS     Allergies   Allergen Reactions    Ketorolac Other (See Comments)     Pt states he has mood disturbances, agitation if he takes too much      Toradol [Ketorolac Tromethamine]     Latex Rash       Objective     Vital signs in last 24 hours:  Vitals:    11/06/19 2129 11/07/19 0658 11/07/19 0736 11/07/19 0813   BP: 131/80  (!) 84/54 117/72   TempSrc: Temporal      Pulse: 77 82 76    Resp: 19 18 16    Patient Position - Orthostatic VS: Lying   Sitting   Temp: 98 1 °F (36 7 °C)  (!) 97 4 °F (36 3 °C)          Intake/Output Summary (Last 24 hours) at 11/7/2019 1454  Last data filed at 11/7/2019 0813  Gross per 24 hour   Intake 3772 91 ml   Output    Net 3772 91 ml       Mental Status Evaluation:  Appearance:  age appropriate   Behavior:  normal   Speech:  normal pitch and normal volume   Mood:  normal   Affect:  mood-congruent and Bright   Language: naming objects and repeating phrases   Thought Process:  goal directed   Thought Content:  normal   Perceptual Disturbances: None   Risk Potential: Suicidal Ideations none, Homicidal Ideations none and Potential for Aggression No   Sensorium:  person, place, time/date, situation, day of week, month of year and year   Cognition:  grossly intact   Consciousness:  alert and awake    Attention: attention span and concentration were age appropriate   Intellect: within normal limits   Fund of Knowledge: Awareness of current and past events   Insight:  age appropriate   Judgment: age appropriate   Muscle Strength and Tone: Not examined   Gait/Station: normal gait/station and normal balance   Motor Activity: no abnormal movements     Lab results:   I have personally reviewed all pertinent laboratory/tests results    Most Recent Labs:   Lab Results   Component Value Date    WBC 8 18 11/06/2019    RBC 5 17 11/06/2019    HGB 15 4 11/06/2019    HCT 46 0 11/06/2019     11/06/2019    RDW 11 5 (L) 11/06/2019    NEUTROABS 5 48 11/06/2019    SODIUM 143 11/07/2019    K 2 9 (L) 11/07/2019     11/07/2019    CO2 27 11/07/2019    BUN 14 11/07/2019    CREATININE 0 74 11/07/2019    GLUC 112 11/07/2019    CALCIUM 8 5 11/07/2019    AST 15 (L) 10/19/2019    ALT 30 10/19/2019    ALKPHOS 109 10/19/2019    TP 6 7 10/19/2019    ALB 3 8 10/19/2019    TBILI 0 40 10/19/2019    VALPROICTOT <10 0 (L) 09/10/2019    [id number] 2 000 10/12/2019    HGBA1C 11 4 (H) 10/12/2019     10/12/2019     Admission Labs:   Admission on 11/06/2019   Component Date Value    Amph/Meth UR 11/06/2019 Negative     Barbiturate Ur 11/06/2019 Negative     Benzodiazepine Urine 11/06/2019 Negative     Cocaine Urine 11/06/2019 Negative     Methadone Urine 11/06/2019 Negative     Opiate Urine 11/06/2019 Negative     PCP Ur 11/06/2019 Negative     THC Urine 11/06/2019 Negative     EXTBreath Alcohol 11/06/2019 0 00     POC Glucose 11/06/2019 >500*    Sodium 11/06/2019 132*    Potassium 11/06/2019 4 4     Chloride 11/06/2019 93*    CO2 11/06/2019 30     ANION GAP 11/06/2019 9     BUN 11/06/2019 17     Creatinine 11/06/2019 1 30     Glucose 11/06/2019 629*    Calcium 11/06/2019 9 7     eGFR 11/06/2019 65     pH, Yvon 11/06/2019 7 326     pCO2, Yvon 11/06/2019 54 7*    pO2, Yvon 11/06/2019 34 1*    HCO3, Yvon 11/06/2019 27 9     Base Excess, Yvon 11/06/2019 0 7     O2 Content, Yvon 11/06/2019 13 8     O2 HGB, VENOUS 11/06/2019 62 3     WBC 11/06/2019 8 18     RBC 11/06/2019 5 17     Hemoglobin 11/06/2019 15 4     Hematocrit 11/06/2019 46 0     MCV 11/06/2019 89     MCH 11/06/2019 29 8     MCHC 11/06/2019 33 5     RDW 11/06/2019 11 5*    MPV 11/06/2019 11 3     Platelets 66/74/4526 265     nRBC 11/06/2019 0     Neutrophils Relative 11/06/2019 67     Immat GRANS % 11/06/2019 0     Lymphocytes Relative 11/06/2019 25     Monocytes Relative 11/06/2019 7     Eosinophils Relative 11/06/2019 1     Basophils Relative 11/06/2019 0     Neutrophils Absolute 11/06/2019 5 48     Immature Grans Absolute 11/06/2019 0 02     Lymphocytes Absolute 11/06/2019 2 04     Monocytes Absolute 11/06/2019 0 55     Eosinophils Absolute 11/06/2019 0 06     Basophils Absolute 11/06/2019 0 03     Clarity, UA 11/06/2019 Clear     Color, UA 11/06/2019 Yellow     Specific Gravity, UA 11/06/2019 <=1 005     pH, UA 11/06/2019 6 0     Glucose, UA 11/06/2019 >=1000 (1%)*    Ketones, UA 11/06/2019 Negative     Blood, UA 11/06/2019 Moderate*    Protein, UA 11/06/2019 Negative     Nitrite, UA 11/06/2019 Negative     Bilirubin, UA 11/06/2019 Negative     Urobilinogen, UA 11/06/2019 0 2     Leukocytes, UA 11/06/2019 Elevated glucose may cause decreased leukocyte values   See urine microscopic for [facility] result/*    WBC, UA 11/06/2019 None Seen     RBC, UA 11/06/2019 1-2*    Bacteria, UA 11/06/2019 None Seen     Epithelial Cells 11/06/2019 None Seen     POC Glucose 11/06/2019 370*    POC Glucose 11/06/2019 360*    Sodium 11/07/2019 143     Potassium 11/07/2019 2 9*    Chloride 11/07/2019 107     CO2 11/07/2019 27     ANION GAP 11/07/2019 9     BUN 11/07/2019 14     Creatinine 11/07/2019 0 74     Glucose 11/07/2019 112     Calcium 11/07/2019 8 5     eGFR 11/07/2019 110     POC Glucose 11/07/2019 165*    POC Glucose 11/07/2019 116     POC Glucose 11/07/2019 83      CBC:   Lab Results   Component Value Date    WBC 8 18 11/06/2019    RBC 5 17 11/06/2019    HGB 15 4 11/06/2019    HCT 46 0 11/06/2019    MCV 89 11/06/2019     11/06/2019    MCH 29 8 11/06/2019    MCHC 33 5 11/06/2019    RDW 11 5 (L) 11/06/2019    MPV 11 3 11/06/2019    NRBC 0 11/06/2019    NEUTROABS 5 48 11/06/2019     BMP:   Lab Results   Component Value Date    SODIUM 143 11/07/2019    K 2 9 (L) 11/07/2019     11/07/2019    CO2 27 11/07/2019    AGAP 9 11/07/2019    BUN 14 11/07/2019    CREATININE 0 74 11/07/2019    GLUC 112 11/07/2019    CALCIUM 8 5 11/07/2019    EGFR 110 11/07/2019     CMP:   Lab Results   Component Value Date    SODIUM 143 11/07/2019    K 2 9 (L) 11/07/2019     11/07/2019    CO2 27 11/07/2019    AGAP 9 11/07/2019    BUN 14 11/07/2019    CREATININE 0 74 11/07/2019    GLUC 112 11/07/2019    CALCIUM 8 5 11/07/2019    AST 15 (L) 10/19/2019    ALT 30 10/19/2019    ALKPHOS 109 10/19/2019    TP 6 7 10/19/2019    ALB 3 8 10/19/2019    TBILI 0 40 10/19/2019    EGFR 110 11/07/2019     Lipid Profile: No results found for: CHOLESTEROL, HDL, TRIG, LDLCALC, NONHDLC  Liver Enzymes:   Lab Results   Component Value Date    AST 15 (L) 10/19/2019    ALT 30 10/19/2019    ALKPHOS 109 10/19/2019    TP 6 7 10/19/2019    ALB 3 8 10/19/2019    TBILI 0 40 10/19/2019     Thyroid Studies:   Lab Results   Component Value Date    [id number] 2 000 10/12/2019     RPR: No results found for: RPR  Hemoglobin A1C/EST AVG Glucose   Lab Results   Component Value Date    HGBA1C 11 4 (H) 10/12/2019     10/12/2019     Drug Screen:   Lab Results   Component Value Date    AMPMETHUR Negative 11/06/2019    BARBTUR Negative 11/06/2019    BDZUR Negative 11/06/2019    THCUR Negative 11/06/2019    COCAINEUR Negative 11/06/2019    METHADONEUR Negative 11/06/2019    OPIATEUR Negative 11/06/2019    PCPUR Negative 11/06/2019     Vitamin D Level No results found for: [id number], [id number]  Vitamin B12 No results found for: [id number]  Iron Study No results found for: RETIC, RETICCTPCT, FERRITIN, CONCFE, TIBC, PRIMIDONE, FOLATEHEMO, IRON  Folate No results found for: FOLATE  Magnesium   Lab Results   Component Value Date    MG 1 7 10/19/2019     Phosphorus No results found for: PHOS  Potassium   Lab Results   Component Value Date    K 2 9 (L) 11/07/2019     Prolactin No results found for: PROLACTIN  Urinalysis   Lab Results   Component Value Date    COLORU Yellow 11/06/2019    CLARITYU Clear 11/06/2019    SPECGRAV <=1 005 11/06/2019    PHUR 6 0 11/06/2019    LEUKOCYTESUR (A) 11/06/2019     Elevated glucose may cause decreased leukocyte values  See urine microscopic for [facility] result/    NITRITE Negative 11/06/2019    PROTEIN UA Negative 11/06/2019    GLUCOSEU >=1000 (1%) (A) 11/06/2019    KETONESU Negative 11/06/2019    UROBILINOGEN 0 2 11/06/2019    BILIRUBINUR Negative 11/06/2019    BLOODU Moderate (A) 11/06/2019    RBCUA 1-2 (A) 11/06/2019    WBCUA None Seen 11/06/2019    EPIS None Seen 11/06/2019    BACTERIA None Seen 11/06/2019     Ext Breath Alcohol   Lab Results   Component Value Date    BREATHALC 0 00 11/06/2019     EKG   Lab Results   Component Value Date    VENTRATE 73 10/15/2019    ATRIALRATE 73 10/15/2019    PRINT 118 10/15/2019    QRSDINT 78 10/15/2019    QTINT 380 10/15/2019    QTCINT 418 10/15/2019    PAXIS 44 10/15/2019    QRSAXIS 65 10/15/2019    TWAVEAXIS 40 10/15/2019     Imaging Studies: No results found  Recent Imaging Studies: No results found  Code Status: Level 1 - Full Code      Portions of the record may have been created with voice recognition software  Occasional wrong word or "sound a like" substitutions may have occurred due to the inherent limitations of voice recognition software  Read the chart carefully and recognize, using context, where substitutions have occurred

## 2019-11-07 NOTE — ASSESSMENT & PLAN NOTE
Recently admitted at [facility] and placed on abilify 10mg po daily than changed to navane 1mg po daily  Unfortunately has been out of his meds x 1 mo as his allegedly his psychiatrist [facility] [address] [facility] has been on vacation and he has been out of his meds x 1 mo  -d/w crisis worker, pt's significant other wanted to 302 him as the pt had made a passing comment regarding homicidal ideation  Pt denies this stating he was asking what homicide is as he is asked this often with his mental health issue and asked if it included 'killing a  ' but denies SI/HI/plan or SIB  -has verbal agitation but is undergoing counseling     -will continue on continual observation for now  Consult psych  Pt wants to sign 201 but unclear if pt qualifies at this time  At the least will benefit from regimen recommendations given nonformulary for navane

## 2019-11-07 NOTE — H&P
H&P- Rucker Michel 1970, 53 y o  male MRN: [id number]    Unit/Bed#: ED 32 Encounter: [id number]    Primary Care Provider: Polo Prieto MD   Date and time admitted to hospital: 11/6/2019  3:49 PM        * Type 1 diabetes mellitus [facility])  Assessment & Plan  Lab Results   Component Value Date    HGBA1C 11 4 (H) 10/12/2019       Recent Labs     11/04/19  1804 11/06/19  1808 11/06/19  2038   POCGLU 327* >500* 370*       Blood Sugar Average: Last 72 hrs:  (P) 370   Questionable type 1 DM per [facility] pcp  On lantus 22 iu and humalog 10 iu tid ac from recent admission  Pt has not been compliant w/f/u with pcp for med refills  BS was >600 on admission but no HHNK or DKA  After IVF hydration improved to 370  Restart lantus 22 iu qhs and humalog 7 iu tid ac  Will need new rx for insulin upon d/c    Bipolar 1 disorder ([facility] [address] )  Assessment & Plan  Recently admitted at [facility] and placed on abilify 10mg po daily than changed to navane 1mg po daily  Unfortunately has been out of his meds x 1 mo as his allegedly his psychiatrist [facility] at [address] has been on vacation and he has been out of his meds x 1 mo  -d/w crisis worker, pt's significant other wanted to 302 him as the pt had made a passing comment regarding homicidal ideation  Pt denies this stating he was asking what homicide is as he is asked this often with his mental health issue and asked if it included 'killing a  ' but denies SI/HI/plan or SIB  -has verbal agitation but is undergoing counseling     -will continue on continual observation for now  Consult psych  Pt wants to sign 201 but unclear if pt qualifies at this time  At the least will benefit from regimen recommendations given nonformulary for navane      HLD (hyperlipidemia)  Assessment & Plan  Restart tricor    HTN (hypertension)  Assessment & Plan  Restart lisinopril        VTE Prophylaxis:      None by vte screen  Code Status: fc  POLST: There is no POLST form on file for this patient (pre-hospital)  Discussion with family:     Anticipated Length of Stay:  Patient will be admitted on an Inpatient basis with an anticipated length of stay of  Greater than 2 midnights  Justification for Hospital Stay: hyperglycemia     Total Time for Visit, including Counseling / Coordination of Care: 45 minutes  Greater than 50% of this total time spent on direct patient counseling and coordination of care  Chief Complaint:   Out of his meds x 1 mo    History of Present Illness:    Verito Tate is a 53 y o  male who presents with pmh of bipolar d/o 1, IDDM, htn, hld coming to hospital med refill  Pt reports he has been out of all of his meds x 1 mo  He was hospitalized at [facility] from 10/12/13 through 10/13/19 for HHNK w/bs > 600  He was d/c'd w/lantus 22 iu and humalog 10 iu tid ac  In the ED crisis worker was noted as pt's significant other noted that pt was being verbally agitated  He also allegedly had homicidal ideation stating he 'wanted to kill a  '  The pt however denies this and notes no si/hi/sib  He notes he 'asked what homicide meant' as he is 'asked this with everyone' and wanted to know if it is was 'like killing a  '  Pt is willing to sign a 201  Pt reports agitation verbally but no physical aggression  He reports he sees his psychiatrist balwinder at [phone] but has not been able to see his psychiatrist who is out of the country on vacation  He reports he has been out of his psych meds x 1 mo  Of note pt was voluntarily admitted for mood lability and SI in 09/28/19 at [facility]  He was d/c'd on abilify 10mg po daily which has since been changed navane but his psychiatrist       Review of Systems:    Review of Systems   Constitutional: Negative for appetite change, chills and fever  Respiratory: Negative for shortness of breath  Psychiatric/Behavioral: Positive for agitation   Negative for self-injury and suicidal ideas  Past Medical and Surgical History:     Past Medical History:   Diagnosis Date    Anxiety     Asthma     Bipolar 1 disorder ([facility] [address] )     Depression     Diabetes mellitus ([facility] )     Psychiatric disorder        Past Surgical History:   Procedure Laterality Date    APPENDECTOMY         Meds/Allergies:    Prior to Admission medications    Medication Sig Start Date End Date Taking? Authorizing Provider   cyclobenzaprine (FLEXERIL) 10 mg tablet Take 1 tablet (10 mg total) by mouth 2 (two) times a day as needed for muscle spasms 10/21/19   Katharina Francis DO   dicyclomine (BENTYL) 20 mg tablet Take 1 tablet (20 mg total) by mouth 2 (two) times a day 9/10/19   Yannick Jacob MD   famotidine (PEPCID) 20 mg tablet Take 1 tablet (20 mg total) by mouth 2 (two) times a day 9/10/19   Yannick Jacob MD   fenofibrate (TRICOR) 48 mg tablet Take 48 mg by mouth 12/12/17   Historical Provider, MD   fluticasone (FLOVENT HFA) 110 MCG/ACT inhaler Inhale 1 puff 2 (two) times a day     Historical Provider, MD   insulin glargine (LANTUS SOLOSTAR) 100 units/mL injection pen Inject 22 Units under the skin every morning 10/13/19   Elissa Ware MD   insulin regular (HUMULIN R) 100 units/mL injection Inject 0 1 mL (10 Units total) under the skin 3 (three) times a day with meals Hold if sugars <160 10/13/19   Elissa Ware MD   lisinopril (ZESTRIL) 10 mg tablet Take 0 5 tablets (5 mg total) by mouth daily 10/13/19   Elissa Ware MD   naproxen (NAPROSYN) 500 mg tablet Take 1 tablet (500 mg total) by mouth 2 (two) times a day with meals 10/21/19   Katharina Francis DO   thiothixene (NAVANE) 1 mg capsule Take 1 capsule (1 mg total) by mouth daily 11/1/19   Ric Rocha MD   traZODone (DESYREL) 50 mg tablet TAKE 1 TABLET BY MOUTH EVERYDAY AT BEDTIME 7/17/19   Historical Provider, MD SALCEDO have reviewed home medications with patient personally  Allergies:    Allergies   Allergen Reactions    Ketorolac Other (See Comments)     Pt states he has mood disturbances, agitation if he takes too much      Toradol [Ketorolac Tromethamine]     Latex Rash       Social History:     Marital Status: Single   Occupation:   Patient Pre-hospital Living Situation:   Patient Pre-hospital Level of Mobility:   Patient Pre-hospital Diet Restrictions:   Substance Use History:   Social History     Substance and Sexual Activity   Alcohol Use Not Currently    Binge frequency: Never     Social History     Tobacco Use   Smoking Status Current Some Day Smoker    Packs/day: 0 00    Types: Cigarettes    Last attempt to quit: 2019    Years since quittin 0   Smokeless Tobacco Never Used     Social History     Substance and Sexual Activity   Drug Use No       Family History:    History reviewed  No pertinent family history  Physical Exam:     Vitals:   Blood Pressure: 116/76 (19)  Pulse: 79 (19)  Temperature: 97 6 °F (36 4 °C) (19)  Temp Source: Oral (19)  Respirations: 20 (19)  Weight - Scale: 61 3 kg (135 lb 2 3 oz) (19 1545)  SpO2: 95 % (19)    Physical Exam   Constitutional: He appears well-developed and well-nourished  No distress  HENT:   Head: Normocephalic and atraumatic  Right Ear: External ear normal    Left Ear: External ear normal    Eyes: Conjunctivae are normal    Neck: Normal range of motion  Cardiovascular: Normal rate, regular rhythm and normal heart sounds  Exam reveals no gallop and no friction rub  No murmur heard  Pulmonary/Chest: Effort normal  No respiratory distress  He has no wheezes  He has no rales  Abdominal: Soft  He exhibits no distension and no mass  There is no tenderness  There is no guarding  Musculoskeletal: He exhibits no edema  Neurological: He is alert  Skin: Skin is warm and dry  He is not diaphoretic  Psychiatric:   Intermittent stuttering speech  Normal rate  Normal volume  No agitation    No eye avoidance or stereotypical behaviors such as hand wringing    No si/hi   Vitals reviewed  (  Be Sure to Include Physical Exam: Delete this entire line when you have entered your exam)    Additional Data:     Lab Results: I have personally reviewed pertinent reports  Results from last 7 days   Lab Units 11/06/19  1826   WBC Thousand/uL 8 18   HEMOGLOBIN g/dL 15 4   HEMATOCRIT % 46 0   PLATELETS Thousands/uL 265   NEUTROS PCT % 67   LYMPHS PCT % 25   MONOS PCT % 7   EOS PCT % 1     Results from last 7 days   Lab Units 11/06/19  1826   SODIUM mmol/L 132*   POTASSIUM mmol/L 4 4   CHLORIDE mmol/L 93*   CO2 mmol/L 30   BUN mg/dL 17   CREATININE mg/dL 1 30   ANION GAP mmol/L 9   CALCIUM mg/dL 9 7   GLUCOSE RANDOM mg/dL 629*         Results from last 7 days   Lab Units 11/06/19  2038 11/06/19  1808 11/04/19  1804   POC GLUCOSE mg/dl 370* >500* 327*               Imaging: I have personally reviewed pertinent reports  No orders to display       EKG, Pathology, and Other Studies Reviewed on Admission:   · EKG:     Allscripts / Epic Records Reviewed: Yes     ** Please Note: This note has been constructed using a voice recognition system   **

## 2019-11-07 NOTE — UTILIZATION REVIEW
Initial Clinical Review    Admission: Date/Time/Statement: Inpatient Admission Orders (From admission, onward)     Ordered        11/06/19 1956  Inpatient Admission (expected length of stay for this patient Order details is greater than two midnights)  Once                   Orders Placed This Encounter   Procedures    Inpatient Admission (expected length of stay for this patient Order details is greater than two midnights)     Standing Status:   Standing     Number of Occurrences:   1     Order Specific Question:   Admitting Physician     Answer:   Megan Sutton     Order Specific Question:   Level of Care     Answer:   Med Surg [16]     Order Specific Question:   Estimated length of stay     Answer:   More than 2 Midnights     Order Specific Question:   Certification     Answer:   I certify that inpatient services are medically necessary for this patient for a duration of greater than two midnights  See H&P and MD Progress Notes for additional information about the patient's course of treatment  ED Arrival Information     Expected Arrival Acuity Means of Arrival Escorted By Service Admission Type    - 11/6/2019 15:37 Emergent Walk-In Family Member Hospitalist Emergency    Arrival Complaint    Psych Eval        Chief Complaint   Patient presents with   Nikita Tyson Psychiatric Evaluation     Reports being out of psych meds for 2 months  States has attempted to call psychiatrist and left multiple messages and is not getting a phone call back  Denies SI, HI, AH, VH  However, wife states that she is willing to sign a 302  Per wife, "he said that he wanted to kill a "      Assessment/Plan: 52 yo male to ED from home admitted Inpatient d/t Type 1 DM with hx of bipolar  Presented  With glu >600  On lantus 22 iu and humalog 10 iu tid ac from recent admission  has not been compliant w/f/u with pcp for med refills  IVF, lantus, humalog         ED Triage Vitals   Temperature Pulse Respirations Blood Pressure SpO2   11/06/19 1549 11/06/19 1549 11/06/19 1549 11/06/19 1549 11/06/19 1549   98 2 °F (36 8 °C) 83 18 132/87 97 %      Temp Source Heart Rate Source Patient Position - Orthostatic VS BP Location FiO2 (%)   11/06/19 1549 11/06/19 1549 11/06/19 1549 11/06/19 1549 --   Temporal Monitor Sitting Right arm       Pain Score       11/06/19 1859       No Pain        Wt Readings from Last 1 Encounters:   11/06/19 61 1 kg (134 lb 11 2 oz)     Additional Vital Signs:   11/07/19 0813        117/72        Sitting   11/07/19 07:36:33  97 4 °F (36 3 °C)Abnormal   76  16  84/54Abnormal   64  97 %                                             11/06/19 2129  98 1 °F (36 7 °C)  77  19  131/80    97 %  None (Room air)  Lying   11/06/19 2038    79  20  116/76    95 %  None (Room air)  Lying   11/06/19 1859    89  18  125/80    98 %  None (Room air)  Lying   11/06/19 1758  97 6 °F (36 4 °C)  86  18  147/84    99 %  None (Room air)  Sitting   11/06/19 1549  98 2 °F (36 8 °C)  83  18  132/87    97 %  None (Room air)  Sitting       Pertinent Labs/Diagnostic Test Results:   Results from last 7 days   Lab Units 11/06/19  1826   WBC Thousand/uL 8 18   HEMOGLOBIN g/dL 15 4   HEMATOCRIT % 46 0   PLATELETS Thousands/uL 265   NEUTROS ABS Thousands/µL 5 48         Results from last 7 days   Lab Units 11/07/19  0507 11/06/19  1826   SODIUM mmol/L 143 132*   POTASSIUM mmol/L 2 9* 4 4   CHLORIDE mmol/L 107 93*   CO2 mmol/L 27 30   ANION GAP mmol/L 9 9   BUN mg/dL 14 17   CREATININE mg/dL 0 74 1 30   EGFR ml/min/1 73sq m 110 65   CALCIUM mg/dL 8 5 9 7         Results from last 7 days   Lab Units 11/07/19  0740 11/07/19  0617 11/06/19  2225 11/06/19  2038 11/06/19  1808 11/04/19  1804   POC GLUCOSE mg/dl 116 165* 360* 370* >500* 327*     Results from last 7 days   Lab Units 11/07/19  0507 11/06/19  1826   GLUCOSE RANDOM mg/dL 112 629*           Results from last 7 days   Lab Units 11/06/19  1826   PH ANKUR  7 326   PCO2 ANKUR mm Hg 54 7*   PO2 ANKUR mm Hg 34 1*   HCO3 ANKUR mmol/L 27 9   BASE EXC ANKUR mmol/L 0 7   O2 CONTENT ANKUR ml/dL 13 8   O2 HGB, VENOUS % 62 3       Results from last 7 days   Lab Units 11/06/19  1719   CLARITY UA  Clear   COLOR UA  Yellow   SPEC GRAV UA  <=1 005   PH UA  6 0   GLUCOSE UA mg/dl >=1000 (1%)*   KETONES UA mg/dl Negative   BLOOD UA  Moderate*   PROTEIN UA mg/dl Negative   NITRITE UA  Negative   BILIRUBIN UA  Negative   UROBILINOGEN UA E U /dl 0 2   LEUKOCYTES UA  Elevated glucose may cause decreased leukocyte values   See urine microscopic for [facility] result/*   WBC UA /hpf None Seen   RBC UA /hpf 1-2*   BACTERIA UA /hpf None Seen   EPITHELIAL CELLS WET PREP /hpf None Seen         Results from last 7 days   Lab Units 11/06/19  1719   AMPH/METH  Negative   BARBITURATE UR  Negative   BENZODIAZEPINE UR  Negative   COCAINE UR  Negative   METHADONE URINE  Negative   OPIATE UR  Negative   PCP UR  Negative   THC UR  Negative       ED Treatment:   Medication Administration from 11/06/2019 1537 to 11/06/2019 2117       Date/Time Order Dose Route Action                  11/06/2019 1826 sodium chloride 0 9 % bolus 1,000 mL 1,000 mL Intravenous New Bag       11/06/2019 2052 insulin regular (HumuLIN R,NovoLIN R) 1 Units/mL in sodium chloride 0 9 % 100 mL infusion 0 Units/hr Intravenous Hold                  11/06/2019 2059 sodium chloride 0 9 % bolus 1,000 mL 1,000 mL Intravenous New Bag       11/06/2019 2109 sodium chloride 0 9 % infusion 125 mL/hr Intravenous New Bag          Past Medical History:   Diagnosis Date    Anxiety     Asthma     Bipolar 1 disorder ([place] [address] )     Depression     Diabetes mellitus ([place] [address] )     Psychiatric disorder      Present on Admission:   HLD (hyperlipidemia)   HTN (hypertension)   Type 1 diabetes mellitus ([place] [address] )      Admitting Diagnosis: Diabetes ([place] [address] ) [E11 9]  Hyperglycemia [R73 9]  Bipolar disorder ([place] [address] ) [F31 9]  Type 1 diabetes mellitus ([place] [address] ) [E10 9]  Encounter for psychological evaluation [Z00 8]  Homicidal thoughts [I33 864]  Age/Sex: 53 y o  male  Admission Orders:  Scheduled Medications:    Medications:  fenofibrate 48 mg Oral Daily   fluticasone 1 puff Inhalation BID   insulin glargine 22 Units Subcutaneous HS   insulin lispro 1-5 Units Subcutaneous 4x Daily (AC & HS)   insulin lispro 10 Units Subcutaneous TID With Meals   lisinopril 5 mg Oral Daily   nicotine 1 patch Transdermal Daily   traZODone 50 mg Oral HS     Continuous IV Infusions:    sodium chloride 125 mL/hr Intravenous Continuous     PRN Meds:    acetaminophen 650 mg Oral Q6H PRN   ondansetron 4 mg Intravenous Q6H PRN     Continual observation  Cons carb diet  oob  Suicide precautions    IP CONSULT TO ED CRISIS WORKER  IP CONSULT TO PSYCHIATRY    Network Utilization Review Department  [email or web address]  ATTENTION: Please call with any questions or concerns to [phone] and carefully listen to the prompts so that you are directed to the right person  All voicemails are confidential   Destiney Leroy all requests for admission clinical reviews, approved or denied determinations and any other requests to dedicated fax number below belonging to the campus where the patient is receiving treatment    FACILITY NAME UR FAX NUMBER   ADMISSION DENIALS (Administrative/Medical Necessity) [address] [facility] (Maternity/NICU/Pediatrics) [phone]   [facility] [address] [facility] [phone]   [address] [place] [address] 15[address] [facility] [phone]   [facility] [address] [phone]

## 2019-11-07 NOTE — DISCHARGE SUMMARY
Discharge- Cade Murphy 1970, 53 y o  male MRN: [id number]    Unit/Bed#: Estela Panchal [address] 217-01 Encounter: [id number]    Primary Care Provider: Oscar Ribeiro MD   Date and time admitted to hospital: 11/6/2019  3:49 PM        Bipolar 1 disorder [facility])  Assessment & Plan  Has run out of him medications  I refilled them  He was cleared by psychiatry to go home  * Type 1 diabetes mellitus [facility])  Assessment & Plan  Lab Results   Component Value Date    HGBA1C 11 4 (H) 10/12/2019       Recent Labs     11/06/19  2225 11/07/19  0617 11/07/19  0740 11/07/19  1109   POCGLU 360* 165* 116 83       Blood Sugar Average: Last 72 hrs:  (P) 218 8       Has been out of his insulin for one month  I explained that it is very important that he be compliant with his insulin  I gave him refills of all of his meds through   [facility]  Physician / Practitioner: Gio Rueda DO  PCP: Oscar Ribeiro MD  Admission Date:   Admission Orders (From admission, onward)     Ordered        11/06/19 1956  Inpatient Admission (expected length of stay for this patient Order details is greater than two midnights)  Once                   Discharge Date: 11/07/19    Resolved Problems  Date Reviewed: 11/6/2019    None            Consultations During Hospital Stay:  · psychiatry        Reason for Admission: ran out of meds      Hospital Course:     Cade Murphy is a 53 y o  male patient who originally presented to the hospital on 11/6/2019 due to running out of medications  He was hyperglycemic with diabetes mellitus type 1  He has been out of his insulin for 1 month  This quickly resolved with giving him insulin  Agreement gave him a prescription for insulin through the home start pharmacy so he will actually get the medications prior to leaving  Also stressed that is why only important that he be compliant with all his medications and that he not run out           There is also some concerns about home aside all thoughts  He was seen by Psychiatry  They did not think he needed commitment and they felt he was okay to go home  Patient contracts for safety    Please see above list of diagnoses and related plan for additional information  Condition at Discharge: good       Discharge Day Visit / Exam:     Subjective:  Feels well  Eating and drinking well  Wants to go home  Denies suicidal thoughts  Denies homocidal thoughts  Vitals: Blood Pressure: 117/72 (11/07/19 0813)  Pulse: 76 (11/07/19 0736)  Temperature: (!) 97 4 °F (36 3 °C) (11/07/19 0736)  Temp Source: Temporal (11/06/19 2129)  Respirations: 16 (11/07/19 0736)  Height: 5' 6" (167 6 cm) (11/06/19 2130)  Weight - Scale: 61 1 kg (134 lb 11 2 oz) (11/06/19 2130)  SpO2: 97 % (11/07/19 0736)    Exam:     Physical Exam   HENT:   Head: Normocephalic and atraumatic  Eyes: Pupils are equal, round, and reactive to light  EOM are normal    Cardiovascular: Normal rate and regular rhythm  Exam reveals no gallop and no friction rub  No murmur heard  Pulmonary/Chest: Effort normal and breath sounds normal  He has no wheezes  He has no rales  Abdominal: Soft  Bowel sounds are normal  There is no tenderness  Musculoskeletal: He exhibits no edema  Nursing note and vitals reviewed            Discharge instructions/Information to patient and family:   See after visit summary for information provided to patient and family  Provisions for Follow-Up Care:  See after visit summary for information related to follow-up care and any pertinent home health orders  Disposition:     Home       Discharge Statement:  I spent 37 minutes discharging the patient  This time was spent on the day of discharge  I had direct contact with the patient on the day of discharge   Greater than 50% of the total time was spent examining patient, answering all patient questions, arranging and discussing plan of care with patient as well as directly providing post-discharge instructions  Additional time then spent on discharge activities  Discharge Medications:  See after visit summary for reconciled discharge medications provided to patient and family        ** Please Note: This note has been constructed using a voice recognition system **

## 2019-11-07 NOTE — ASSESSMENT & PLAN NOTE
Lab Results   Component Value Date    HGBA1C 11 4 (H) 10/12/2019       Recent Labs     11/04/19  1804 11/06/19  1808 11/06/19 2038   POCGLU 327* >500* 370*       Blood Sugar Average: Last 72 hrs:  (P) 370   Questionable type 1 DM per [facility] pcp  On lantus 22 iu and humalog 7 iu tid ac from recent admission  Pt has not been compliant w/f/u with pcp for med refills  BS was >600 on admission but no HHNK or DKA    After IVF hydration improved to 370  Restart lantus 22 iu qhs and humalog 7 iu tid ac  Will need new rx for insulin upon d/c

## 2019-11-07 NOTE — PLAN OF CARE
Problem: COPING  Goal: Pt/Family able to verbalize concerns and demonstrate effective coping strategies  Description  INTERVENTIONS:  - Assist patient/family to identify coping skills, available support systems and cultural and spiritual values  - Provide emotional support, including active listening and acknowledgement of concerns of patient and caregivers  - Reduce environmental stimuli, as able  - Provide patient education  - Assess for spiritual pain/suffering and initiate spiritual care, including notification of Pastoral Care or yang based community as needed  - Assess effectiveness of coping strategies  Outcome: Progressing  Goal: Will report anxiety at manageable levels  Description  INTERVENTIONS:  - Administer medication as ordered  - Teach and encourage coping skills  - Provide emotional support  - Assess patient/family for anxiety and ability to cope  Outcome: Progressing     Problem: DECISION MAKING  Goal: Pt/Family able to effectively weigh alternatives and participate in decision making related to treatment and care  Description  INTERVENTIONS:  - Identify decision maker  - Determine when there are differences among patient's view, family's view, and healthcare provider's view of patient condition and care goals  - Facilitate patient/family articulation of goals for care  - Help patient/family identify pros/cons of alternative solutions  - Provide information as requested by patient/family  - Respect patient/family rights related to privacy and sharing information   - Serve as a liaison between patient, family and health care team  - Initiate consults as appropriate (Ethics Team, Palliative Care, Family Care Conference, etc )  Outcome: Progressing     Problem: CONFUSION/THOUGHT DISTURBANCE  Goal: Thought disturbances (confusion, delirium, depression, dementia or psychosis) are managed to maintain or return to baseline mental status and functional level  Description  INTERVENTIONS:  - Assess for possible contributors to  thought disturbance, including but not limited to medications, infection, impaired vision or hearing, underlying metabolic abnormalities, dehydration, respiratory compromise,  psychiatric diagnoses and notify attending PHYSICAN/AP  - Monitor and intervene to maintain adequate nutrition, hydration, elimination, sleep and activity  - Decrease environmental stimuli, including noise as appropriate  - Provide frequent contacts to provide refocusing, direction and reassurance as needed  Approach patient calmly with eye contact and at their level  - [place] high risk fall precautions, aspiration precautions and other safety measures, as indicated  - If delirium suspected, notify physician/AP of change in condition and request immediate in-person evaluation  - Pursue consults as appropriate including Geriatric (campus dependent), OT for cognitive evaluation/activity planning, psychiatric, pastoral care, etc   Outcome: Progressing     Problem: BEHAVIOR  Goal: Pt/Family maintain appropriate behavior and adhere to behavioral management agreement, if implemented  Description  INTERVENTIONS:  - Assess the family dynamic   - Encourage verbalization of thoughts and concerns in a socially appropriate manner  - Assess patient/family's coping skills and non-compliant behavior (including use of illegal substances)  - Utilize positive, consistent limit setting strategies supporting safety of patient, staff and others  - Initiate consult with Case Management, Spiritual Care or other ancillary services as appropriate  - If a patient's/visitor's behavior jeopardizes the safety of the patient, staff, or others, refer to organization procedure     - Notify Security of behavior or suspected illegal substances which indicate the need for search of the patient and/or belongings  - Encourage participation in the decision making process about a behavioral management agreement; implement if patient meets criteria  Outcome: Progressing

## 2019-11-07 NOTE — NURSING NOTE
Reviewed DC instructions and medications no questions at this time  Iv removed Belongings returned   Pt was walked off unit in stable condition with PCA [FreeTextEntry1] : here for Dm and HTN f/u [de-identified] : pt just returned from a month long trip to [place] and [place]. had a nice time. walked /no falls or accidents. took his meds . but was not checking his FS.\par s/p shoulder injection..no lasting benefit..s/p MRI..due to f/u ortho\par chronic LBP ..occas calf cramp ..in bed usu in morning when he stretches. not with ambulation. no LE weakness or paresthesia or incont.

## 2019-11-07 NOTE — PROGRESS NOTES
Patient stating continuous fluids causing "pain"; requesting them to be stopped  Will hold fluids for now  Dr James Hernandez notified  Will await new orders  Patient resting in bed with 1:1 at bedside  Will continue to monitor      Tyler Garza RN 11/7/2019 10:22 AM

## 2019-11-07 NOTE — ED NOTES
Pt is a 53 y o  male who was brought to the ED with   Chief Complaint   Patient presents with   [facility]     Reports being out of psych meds for 2 months  States has attempted to call psychiatrist and left multiple messages and is not getting a phone call back  Denies SI, HI, AH, VH  However, wife states that she is willing to sign a 302  Per wife, "he said that he wanted to kill a "    Pt brought to the ED by his wife with complaints of increased anger and irritablity, Pt wife reports that pt has H/I towards police, Pt reports that he did make a statement about hurting police, but reports that he would not to it  Pt reports that he has been off his meds for the past month  Pt reports that he sees a psych Dr and therapist through [facility]  but he has not been able to get his medication refilled due to the Dr being on vacation  Pt admits to H/I, Pt denies S/I,,A/H,V/H    Intake Assessment completed, Safety risk Assessment completed, CW was informed by ED nurse that due to pt medical condition, pt will be admitted to [address] [place] [address]

## 2019-11-07 NOTE — PLAN OF CARE
Problem: COPING  Goal: Pt/Family able to verbalize concerns and demonstrate effective coping strategies  Description  INTERVENTIONS:  - Assist patient/family to identify coping skills, available support systems and cultural and spiritual values  - Provide emotional support, including active listening and acknowledgement of concerns of patient and caregivers  - Reduce environmental stimuli, as able  - Provide patient education  - Assess for spiritual pain/suffering and initiate spiritual care, including notification of Pastoral Care or yang based community as needed  - Assess effectiveness of coping strategies  Outcome: Progressing  Goal: Will report anxiety at manageable levels  Description  INTERVENTIONS:  - Administer medication as ordered  - Teach and encourage coping skills  - Provide emotional support  - Assess patient/family for anxiety and ability to cope  Outcome: Progressing     Problem: DECISION MAKING  Goal: Pt/Family able to effectively weigh alternatives and participate in decision making related to treatment and care  Description  INTERVENTIONS:  - Identify decision maker  - Determine when there are differences among patient's view, family's view, and healthcare provider's view of patient condition and care goals  - Facilitate patient/family articulation of goals for care  - Help patient/family identify pros/cons of alternative solutions  - Provide information as requested by patient/family  - Respect patient/family rights related to privacy and sharing information   - Serve as a liaison between patient, family and health care team  - Initiate consults as appropriate (Ethics Team, Palliative Care, Family Care Conference, etc )  Outcome: Progressing     Problem: CONFUSION/THOUGHT DISTURBANCE  Goal: Thought disturbances (confusion, delirium, depression, dementia or psychosis) are managed to maintain or return to baseline mental status and functional level  Description  INTERVENTIONS:  - Assess for possible contributors to  thought disturbance, including but not limited to medications, infection, impaired vision or hearing, underlying metabolic abnormalities, dehydration, respiratory compromise,  psychiatric diagnoses and notify attending PHYSICAN/AP  - Monitor and intervene to maintain adequate nutrition, hydration, elimination, sleep and activity  - Decrease environmental stimuli, including noise as appropriate  - Provide frequent contacts to provide refocusing, direction and reassurance as needed  Approach patient calmly with eye contact and at their level  - [place] high risk fall precautions, aspiration precautions and other safety measures, as indicated  - If delirium suspected, notify physician/AP of change in condition and request immediate in-person evaluation  - Pursue consults as appropriate including Geriatric (campus dependent), OT for cognitive evaluation/activity planning, psychiatric, pastoral care, etc   Outcome: Progressing     Problem: BEHAVIOR  Goal: Pt/Family maintain appropriate behavior and adhere to behavioral management agreement, if implemented  Description  INTERVENTIONS:  - Assess the family dynamic   - Encourage verbalization of thoughts and concerns in a socially appropriate manner  - Assess patient/family's coping skills and non-compliant behavior (including use of illegal substances)  - Utilize positive, consistent limit setting strategies supporting safety of patient, staff and others  - Initiate consult with Case Management, Spiritual Care or other ancillary services as appropriate  - If a patient's/visitor's behavior jeopardizes the safety of the patient, staff, or others, refer to organization procedure     - Notify Security of behavior or suspected illegal substances which indicate the need for search of the patient and/or belongings  - Encourage participation in the decision making process about a behavioral management agreement; implement if patient meets criteria  Outcome: Progressing     Problem: Potential for Falls  Goal: Patient will remain free of falls  Description  INTERVENTIONS:  - Assess patient frequently for physical needs  -  Identify cognitive and physical deficits and behaviors that affect risk of falls    -  [place] fall precautions as indicated by assessment   - Educate patient/family on patient safety including physical limitations  - Instruct patient to call for assistance with activity based on assessment  - Modify environment to reduce risk of injury  - Consider OT/PT consult to assist with strengthening/mobility  Outcome: Progressing     Problem: SELF HARM/SUICIDALITY  Goal: Will have no self-injury during hospital stay  Description  INTERVENTIONS:  - Q 15 MINUTES: Routine safety checks  - Q WAKING SHIFT & PRN: Assess risk to determine if routine checks are adequate to maintain patient safety  - Encourage patient to participate actively in care by formulating a plan to combat response to suicidal ideation, identify supports and resources  Outcome: Progressing     Problem: MARCIN  Goal: Will exhibit normal sleep and speech and no impulsivity  Description  INTERVENTIONS:  - Administer medication as ordered  - Set limits on impulsive behavior  - Make attempts to decrease external stimuli as possible  Outcome: Progressing     Problem: PSYCHOSIS  Goal: Will report no hallucinations or delusions  Description  Interventions:  - Administer medication as  ordered  - Every waking shifts and PRN assess for the presence of hallucinations and or delusions  - Assist with reality testing to support increasing orientation  - Assess if patient's hallucinations or delusions are encouraging self-harm or harm to others and intervene as appropriate  Outcome: Progressing     Problem: SELF CARE DEFICIT  Goal: Return ADL status to a safe level of function  Description  INTERVENTIONS:  - Administer medication as ordered  - Assess ADL deficits and provide assistive devices as needed  - Obtain PT/OT consults as needed  - Assist and instruct patient to increase activity and self care as tolerated  Outcome: Progressing     Problem: METABOLIC, FLUID AND ELECTROLYTES - ADULT  Goal: Electrolytes maintained within normal limits  Description  INTERVENTIONS:  - Monitor labs and assess patient for signs and symptoms of electrolyte imbalances  - Administer electrolyte replacement as ordered  - Monitor response to electrolyte replacements, including repeat lab results as appropriate  - Instruct patient on fluid and nutrition as appropriate  Outcome: Progressing  Goal: Fluid balance maintained  Description  INTERVENTIONS:  - Monitor labs   - Monitor I/O and WT  - Instruct patient on fluid and nutrition as appropriate  - Assess for signs & symptoms of volume excess or deficit  Outcome: Progressing  Goal: Glucose maintained within target range  Description  INTERVENTIONS:  - Monitor Blood Glucose as ordered  - Assess for signs and symptoms of hyperglycemia and hypoglycemia  - Administer ordered medications to maintain glucose within target range  - Assess nutritional intake and initiate nutrition service referral as needed  Outcome: Progressing     Problem: PAIN - ADULT  Goal: Verbalizes/displays adequate comfort level or baseline comfort level  Description  Interventions:  - Encourage patient to monitor pain and request assistance  - Assess pain using appropriate pain scale  - Administer analgesics based on type and severity of pain and evaluate response  - Implement non-pharmacological measures as appropriate and evaluate response  - Consider cultural and social influences on pain and pain management  - Notify physician/advanced practitioner if interventions unsuccessful or patient reports new pain  Outcome: Progressing     Problem: INFECTION - ADULT  Goal: Absence or prevention of progression during hospitalization  Description  INTERVENTIONS:  - Assess and monitor for signs and symptoms of infection  - Monitor lab/diagnostic results  - Monitor all insertion sites, i e  indwelling lines, tubes, and drains  - Monitor endotracheal if appropriate and nasal secretions for changes in amount and color  - [place] appropriate cooling/warming therapies per order  - Administer medications as ordered  - Instruct and encourage patient and family to use good hand hygiene technique  - Identify and instruct in appropriate isolation precautions for identified infection/condition  Outcome: Progressing     Problem: SAFETY ADULT  Goal: Maintain or return to baseline ADL function  Description  INTERVENTIONS:  -  Assess patient's ability to carry out ADLs; assess patient's baseline for ADL function and identify physical deficits which impact ability to perform ADLs (bathing, care of mouth/teeth, toileting, grooming, dressing, etc )  - Assess/evaluate cause of self-care deficits   - Assess range of motion  - Assess patient's mobility; develop plan if impaired  - Assess patient's need for assistive devices and provide as appropriate  - Encourage maximum independence but intervene and supervise when necessary  - Involve family in performance of ADLs  - Assess for home care needs following discharge   - Consider OT consult to assist with ADL evaluation and planning for discharge  - Provide patient education as appropriate  Outcome: Progressing  Goal: Maintain or return mobility status to optimal level  Description  INTERVENTIONS:  - Assess patient's baseline mobility status (ambulation, transfers, stairs, etc )    - Identify cognitive and physical deficits and behaviors that affect mobility  - Identify mobility aids required to assist with transfers and/or ambulation (gait belt, sit-to-stand, lift, walker, cane, etc )  - [place] fall precautions as indicated by assessment  - Record patient progress and toleration of activity level on Mobility SBAR; progress patient to next Phase/Stage  - Instruct patient to call for assistance with activity based on assessment  - Consider rehabilitation consult to assist with strengthening/weightbearing, etc   Outcome: Progressing     Problem: DISCHARGE PLANNING  Goal: Discharge to home or other facility with appropriate resources  Description  INTERVENTIONS:  - Identify barriers to discharge w/patient and caregiver  - Arrange for needed discharge resources and transportation as appropriate  - Identify discharge learning needs (meds, wound care, etc )  - Arrange for interpretive services to assist at discharge as needed  - Refer to Case Management Department for coordinating discharge planning if the patient needs post-hospital services based on physician/advanced practitioner order or complex needs related to functional status, cognitive ability, or social support system  Outcome: Progressing     Problem: Knowledge Deficit  Goal: Patient/family/caregiver demonstrates understanding of disease process, treatment plan, medications, and discharge instructions  Description  Complete learning assessment and assess knowledge base    Interventions:  - Provide teaching at level of understanding  - Provide teaching via preferred learning methods  Outcome: Progressing

## 2019-11-07 NOTE — ASSESSMENT & PLAN NOTE
Lab Results   Component Value Date    HGBA1C 11 4 (H) 10/12/2019       Recent Labs     11/06/19  2225 11/07/19  0617 11/07/19  0740 11/07/19  1109   POCGLU 360* 165* 116 83       Blood Sugar Average: Last 72 hrs:  (P) 218 8       Has been out of his insulin for one month     I explained that it is very important that he be compliant with his insulin  I gave him refills of all of his meds through [phone]

## 2019-11-13 ENCOUNTER — PATIENT OUTREACH (OUTPATIENT)
Dept: CASE MANAGEMENT | Facility: OTHER | Age: 49
End: 2019-11-13

## 2019-11-13 DIAGNOSIS — Z71.89 COMPLEX CARE COORDINATION: Primary | ICD-10-CM

## 2019-11-13 NOTE — PROGRESS NOTES
Outpatient Care Management: Rising Risk  I called the patient's phone # & it was not in service at this time  I then called his contact, Adalgisa Martines & left a VM with an introduction and my phone #      [phone]  I then called [facility] and spoke with Afua Serrano who was unable to give me information  She transferred me to Sole Villegas at Intake and I placed a referral for OP services  I included an introduction & my contact information  I have also placed a referral to Ambulatory Referral to Social Work Care Management to assist in identifying appropriate resources to help this patient

## 2019-11-16 ENCOUNTER — HOSPITAL ENCOUNTER (EMERGENCY)
Facility: HOSPITAL | Age: 49
Discharge: HOME | End: 2019-11-16
Attending: EMERGENCY MEDICINE | Admitting: EMERGENCY MEDICINE
Payer: COMMERCIAL

## 2019-11-16 ENCOUNTER — APPOINTMENT (EMERGENCY)
Dept: RADIOLOGY | Facility: HOSPITAL | Age: 49
End: 2019-11-16
Payer: COMMERCIAL

## 2019-11-16 VITALS
TEMPERATURE: 97.8 F | RESPIRATION RATE: 16 BRPM | WEIGHT: 139.99 LBS | BODY MASS INDEX: 22.6 KG/M2 | SYSTOLIC BLOOD PRESSURE: 133 MMHG | DIASTOLIC BLOOD PRESSURE: 76 MMHG | OXYGEN SATURATION: 96 % | HEART RATE: 74 BPM

## 2019-11-16 DIAGNOSIS — M54.50 ACUTE LOW BACK PAIN: Primary | ICD-10-CM

## 2019-11-16 DIAGNOSIS — Z76.0 MEDICATION REFILL: ICD-10-CM

## 2019-11-16 DIAGNOSIS — E10.9 TYPE 1 DIABETES MELLITUS (HCC): ICD-10-CM

## 2019-11-16 DIAGNOSIS — F31.9 BIPOLAR 1 DISORDER (HCC): ICD-10-CM

## 2019-11-16 DIAGNOSIS — R10.9 ABDOMINAL PAIN: ICD-10-CM

## 2019-11-16 DIAGNOSIS — I10 HTN (HYPERTENSION): ICD-10-CM

## 2019-11-16 LAB — GLUCOSE SERPL-MCNC: 360 MG/DL (ref 65–140)

## 2019-11-16 PROCEDURE — 72100 X-RAY EXAM L-S SPINE 2/3 VWS: CPT

## 2019-11-16 PROCEDURE — 99283 EMERGENCY DEPT VISIT LOW MDM: CPT

## 2019-11-16 PROCEDURE — 82948 REAGENT STRIP/BLOOD GLUCOSE: CPT

## 2019-11-16 PROCEDURE — 99283 EMERGENCY DEPT VISIT LOW MDM: CPT | Performed by: EMERGENCY MEDICINE

## 2019-11-16 RX ORDER — FLUTICASONE PROPIONATE 110 UG/1
1 AEROSOL, METERED RESPIRATORY (INHALATION) 2 TIMES DAILY
Qty: 12 G | Refills: 0 | Status: SHIPPED | OUTPATIENT
Start: 2019-11-16 | End: 2019-12-03

## 2019-11-16 RX ORDER — LISINOPRIL 10 MG/1
5 TABLET ORAL DAILY
Qty: 7 TABLET | Refills: 0 | Status: ON HOLD | OUTPATIENT
Start: 2019-11-16 | End: 2019-12-10

## 2019-11-16 RX ORDER — TRAZODONE HYDROCHLORIDE 50 MG/1
50 TABLET ORAL
Qty: 14 TABLET | Refills: 0 | Status: SHIPPED | OUTPATIENT
Start: 2019-11-16 | End: 2019-12-10 | Stop reason: HOSPADM

## 2019-11-16 RX ORDER — DICYCLOMINE HCL 20 MG
20 TABLET ORAL 2 TIMES DAILY
Qty: 28 TABLET | Refills: 0 | Status: SHIPPED | OUTPATIENT
Start: 2019-11-16 | End: 2019-12-03

## 2019-11-16 RX ORDER — FENOFIBRATE 48 MG/1
48 TABLET, COATED ORAL DAILY
Qty: 14 TABLET | Refills: 0 | Status: SHIPPED | OUTPATIENT
Start: 2019-11-16 | End: 2019-12-03

## 2019-11-16 RX ORDER — THIOTHIXENE 1 MG/1
1 CAPSULE ORAL DAILY
Qty: 14 CAPSULE | Refills: 0 | Status: SHIPPED | OUTPATIENT
Start: 2019-11-16 | End: 2019-12-10 | Stop reason: HOSPADM

## 2019-11-16 RX ORDER — FAMOTIDINE 20 MG/1
20 TABLET, FILM COATED ORAL 2 TIMES DAILY
Qty: 28 TABLET | Refills: 0 | Status: SHIPPED | OUTPATIENT
Start: 2019-11-16 | End: 2019-12-03

## 2019-11-16 NOTE — ED PROVIDER NOTES
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  Taking?   dicyclomine (BENTYL) 20 mg tablet   No Yes   Sig: Take 1 tablet (20 mg total) by mouth 2 (two) times a day   dicyclomine (BENTYL) 20 mg tablet   No No   Sig: Take 1 tablet (20 mg total) by mouth 2 (two) times a day for 14 days   famotidine (PEPCID) 20 mg tablet   No Yes   Sig: Take 1 tablet (20 mg total) by mouth 2 (two) times a day   famotidine (PEPCID) 20 mg tablet   No No   Sig: Take 1 tablet (20 mg total) by mouth 2 (two) times a day for 14 days   fenofibrate (TRICOR) 48 mg tablet   No Yes   Sig: Take 1 tablet (48 mg total) by mouth daily   fenofibrate (TRICOR) 48 mg tablet   No No   Sig: Take 1 tablet (48 mg total) by mouth daily for 14 days   fluticasone (FLOVENT HFA) 110 MCG/ACT inhaler   No Yes   Sig: Inhale 1 puff 2 (two) times a day   fluticasone (FLOVENT HFA) 110 MCG/ACT inhaler   No No   Sig: Inhale 1 puff 2 (two) times a day for 14 days   insulin glargine (LANTUS SOLOSTAR) 100 units/mL injection pen   No Yes   Sig: Inject 22 Units under the skin every morning   insulin regular (HUMULIN R) 100 units/mL injection   No Yes   Sig: Inject 0 1 mL (10 Units total) under the skin 3 (three) times a day with meals Hold if sugars <160   insulin regular (HUMULIN R) 100 units/mL injection   No No   Sig: Inject 0 1 mL (10 Units total) under the skin 3 (three) times a day with meals Hold if sugars <160   lisinopril (ZESTRIL) 10 mg tablet   No Yes   Sig: Take 0 5 tablets (5 mg total) by mouth daily   lisinopril (ZESTRIL) 10 mg tablet   No No   Sig: Take 0 5 tablets (5 mg total) by mouth daily for 14 days   thiothixene (NAVANE) 1 mg capsule   No Yes   Sig: Take 1 capsule (1 mg total) by mouth daily   thiothixene (NAVANE) 1 mg capsule   No No   Sig: Take 1 capsule (1 mg total) by mouth daily for 14 days   traZODone (DESYREL) 50 mg tablet   No Yes   Sig: Take 1 tablet (50 mg total) by mouth daily at bedtime   traZODone (DESYREL) 50 mg tablet   No No   Sig: Take 1 tablet (50 mg total) by mouth daily at bedtime for 14 days      Facility-Administered Medications: None       Past Medical History:   Diagnosis Date    Anxiety     Asthma     Bipolar 1 disorder ([facility])     Depression     Diabetes mellitus ([facility] [address] )     Psychiatric disorder        Past Surgical History:   Procedure Laterality Date    APPENDECTOMY         History reviewed  No pertinent family history  I have reviewed and agree with the history as documented  Social History     Tobacco Use    Smoking status: Former Smoker     Packs/day: 0 00     Types: Cigarettes     Last attempt to quit: 2019     Years since quittin 0    Smokeless tobacco: Never Used   Substance Use Topics    Alcohol use: Not Currently     Binge frequency: Never    Drug use: No        Review of Systems   Constitutional: Negative for chills and fever  HENT: Negative for congestion, ear pain and sore throat  Eyes: Negative for pain and visual disturbance  Respiratory: Negative for cough, shortness of breath and wheezing  Cardiovascular: Negative for chest pain and leg swelling     Gastrointestinal: Negative for abdominal pain, diarrhea, nausea and vomiting  Genitourinary: Negative for dysuria, frequency, hematuria and urgency  Musculoskeletal: Negative for neck pain and neck stiffness  Skin: Negative for rash and wound  Neurological: Negative for weakness, numbness and headaches  Psychiatric/Behavioral: Negative for agitation and confusion  All other systems reviewed and are negative  Physical Exam  Physical Exam   Constitutional: He is oriented to person, place, and time  He appears well-developed and well-nourished  HENT:   Head: Normocephalic and atraumatic  Eyes: Pupils are equal, round, and reactive to light  EOM are normal    Neck: Normal range of motion  Neck supple  Cardiovascular: Normal rate and regular rhythm  Pulmonary/Chest: Effort normal and breath sounds normal    Abdominal: Soft  Bowel sounds are normal    No cva tenderness    Musculoskeletal: Normal range of motion  +midline L spine tenderness  No bony stepoffs  Neurological: He is alert and oriented to person, place, and time  No focal deficits   Skin: Skin is warm and dry  Nursing note and vitals reviewed        Vital Signs  ED Triage Vitals [11/16/19 1522]   Temperature Pulse Respirations Blood Pressure SpO2   97 8 °F (36 6 °C) 74 16 133/76 96 %      Temp Source Heart Rate Source Patient Position - Orthostatic VS BP Location FiO2 (%)   Oral Monitor Sitting Right arm --      Pain Score       9           Vitals:    11/16/19 1522   BP: 133/76   Pulse: 74   Patient Position - Orthostatic VS: Sitting         Visual Acuity      ED Medications  Medications - No data to display    Diagnostic Studies  Results Reviewed     Procedure Component Value Units Date/Time    Fingerstick Glucose (POCT) [[id number]]  (Abnormal) Collected:  11/16/19 1524    Lab Status:  Final result Updated:  11/16/19 1525     POC Glucose 360 mg/dl                  XR lumbar spine 2 or 3 views    (Results Pending)              Procedures  Procedures       ED Course MDM  Number of Diagnoses or Management Options  Acute low back pain: new and requires workup  Diagnosis management comments: Patient here for back pain and med refill  Will refill meds and get xray to r/o fracture  Patient reevaluated and feels improved  Patient updated on results of tests  Discharge instructions given including medications, follow-up, and return precautions  Patient demonstrates verbal understanding and agrees with plan         Amount and/or Complexity of Data Reviewed  Clinical lab tests: ordered and reviewed  Tests in the radiology section of CPT®: ordered and reviewed  Tests in the medicine section of CPT®: ordered and reviewed  Discussion of test results with the performing providers: yes  Decide to obtain previous medical records or to obtain history from someone other than the patient: yes  Obtain history from someone other than the patient: yes  Review and summarize past medical records: yes  Discuss the patient with other providers: yes  Independent visualization of images, tracings, or specimens: yes    Patient Progress  Patient progress: improved      Disposition  Final diagnoses:   Acute low back pain     Time reflects when diagnosis was documented in both MDM as applicable and the Disposition within this note     Time User Action Codes Description Comment    11/16/2019  5:00 PM Arthur Hayes Add [M54 5] Acute low back pain     11/16/2019  5:00 PM Arthur Hayes Add [R10 9] Abdominal pain     11/16/2019  5:01 PM Arthur Hayes Add [Z76 0] Medication refill     11/16/2019  5:01 PM Freddy, [address] Add [E10 9] Type 1 diabetes mellitus ([place] [address] )     11/16/2019  5:01 PM Arhtur Hayes Modify [E10 9] Type 1 diabetes mellitus ([facility] )     11/16/2019  5:01 PM Arthur Hayes Modify [E10 9] Type 1 diabetes mellitus ([place] [address] )     11/16/2019  5:01 PM Arthur Hayes Modify [E10 9] Type 1 diabetes mellitus ([facility] )     11/16/2019 5:01 PM Junior Hayes Add [I10] HTN (hypertension)     11/16/2019  5:02 PM Junior Hayes Modify [E10 9] Type 1 diabetes mellitus ([place] [address] )     11/16/2019  5:02 PM Junior Hayes Modify [I10] HTN (hypertension)     11/16/2019  5:02 PM Junior Hayes Modify [E10 9] Type 1 diabetes mellitus ([place] [address] )     11/16/2019  5:02 PM Junior Hayes A Modify [I10] HTN (hypertension)     11/16/2019  5:02 PM Junior Hayes Modify [E10 9] Type 1 diabetes mellitus ([place] [address] )     11/16/2019  5:02 PM Junior Hayes Modify [I10] HTN (hypertension)     11/16/2019  5:02 PM Junior Aparicio Add [F31 9] Bipolar 1 disorder ([place] [address] )     11/16/2019  5:02 PM Junior Hayes Modify [E10 9] Type 1 diabetes mellitus ([place] [address] )     11/16/2019  5:02 PM Junior Hayes Modify [I10] HTN (hypertension)     11/16/2019  5:02 PM Junior Hayes Modify [F31 9] Bipolar 1 disorder [facility])       ED Disposition     ED Disposition Condition Date/Time Comment    Discharge Stable Sat Nov 16, 2019  5:00 PM Diane Cullen discharge to home/self care  Follow-up Information     Follow up With Specialties Details Why Contact Info Additional Information    Clover Victoria MD Family Medicine Call in 1 day For follow-up within 2-3 days   [address], [address] [facility] [address]  [address] [facility] Emergency Medicine Go to  Immediately for any new or worsening symptoms [facility] [address]  [place] [address] ED, [address], [place], [address]          Discharge Medication List as of 11/16/2019  5:03 PM      CONTINUE these medications which have NOT CHANGED    Details   insulin glargine (LANTUS SOLOSTAR) 100 units/mL injection pen Inject 22 Units under the skin every morning, Starting Thu 11/7/2019, Normal      dicyclomine (BENTYL) 20 mg tablet Take 1 tablet (20 mg total) by mouth 2 (two) times a day, Starting Thu 11/7/2019, Normal      famotidine (PEPCID) 20 mg tablet Take 1 tablet (20 mg total) by mouth 2 (two) times a day, Starting Thu 11/7/2019, Normal      fenofibrate (TRICOR) 48 mg tablet Take 1 tablet (48 mg total) by mouth daily, Starting Thu 11/7/2019, Normal      fluticasone (FLOVENT HFA) 110 MCG/ACT inhaler Inhale 1 puff 2 (two) times a day, Starting Thu 11/7/2019, Normal      insulin regular (HUMULIN R) 100 units/mL injection Inject 0 1 mL (10 Units total) under the skin 3 (three) times a day with meals Hold if sugars <160, Starting Thu 11/7/2019, Normal      lisinopril (ZESTRIL) 10 mg tablet Take 0 5 tablets (5 mg total) by mouth daily, Starting Thu 11/7/2019, Normal      thiothixene (NAVANE) 1 mg capsule Take 1 capsule (1 mg total) by mouth daily, Starting Thu 11/7/2019, Normal      traZODone (DESYREL) 50 mg tablet Take 1 tablet (50 mg total) by mouth daily at bedtime, Starting Thu 11/7/2019, Normal           No discharge procedures on file      ED Provider  Electronically Signed by           Juliane Tony DO  11/16/19 4718

## 2019-11-22 ENCOUNTER — PATIENT OUTREACH (OUTPATIENT)
Dept: CASE MANAGEMENT | Facility: OTHER | Age: 49
End: 2019-11-22

## 2019-11-22 NOTE — PROGRESS NOTES
OPCM SW received message from [employer] CM  Will work with [facility] in identifying resources for patient  Will continue tofollow and be available as needed

## 2019-11-24 ENCOUNTER — HOSPITAL ENCOUNTER (EMERGENCY)
Facility: HOSPITAL | Age: 49
Discharge: HOME | End: 2019-11-24
Attending: EMERGENCY MEDICINE | Admitting: EMERGENCY MEDICINE
Payer: COMMERCIAL

## 2019-11-24 VITALS
WEIGHT: 142.86 LBS | OXYGEN SATURATION: 99 % | RESPIRATION RATE: 18 BRPM | HEART RATE: 86 BPM | DIASTOLIC BLOOD PRESSURE: 60 MMHG | SYSTOLIC BLOOD PRESSURE: 113 MMHG | TEMPERATURE: 97.4 F | BODY MASS INDEX: 23.06 KG/M2

## 2019-11-24 DIAGNOSIS — E11.65 HYPERGLYCEMIA DUE TO TYPE 2 DIABETES MELLITUS (HCC): Primary | ICD-10-CM

## 2019-11-24 LAB
ANION GAP SERPL CALCULATED.3IONS-SCNC: 4 MMOL/L (ref 4–13)
BUN SERPL-MCNC: 20 MG/DL (ref 5–25)
CALCIUM SERPL-MCNC: 9.2 MG/DL (ref 8.3–10.1)
CHLORIDE SERPL-SCNC: 102 MMOL/L (ref 100–108)
CO2 SERPL-SCNC: 30 MMOL/L (ref 21–32)
CREAT SERPL-MCNC: 1.32 MG/DL (ref 0.6–1.3)
GFR SERPL CREATININE-BSD FRML MDRD: 63 ML/MIN/1.73SQ M
GLUCOSE SERPL-MCNC: 115 MG/DL (ref 65–140)
GLUCOSE SERPL-MCNC: 404 MG/DL (ref 65–140)
GLUCOSE SERPL-MCNC: 409 MG/DL (ref 65–140)
POTASSIUM SERPL-SCNC: 4.1 MMOL/L (ref 3.5–5.3)
SODIUM SERPL-SCNC: 136 MMOL/L (ref 136–145)

## 2019-11-24 PROCEDURE — 99284 EMERGENCY DEPT VISIT MOD MDM: CPT | Performed by: EMERGENCY MEDICINE

## 2019-11-24 PROCEDURE — 82948 REAGENT STRIP/BLOOD GLUCOSE: CPT

## 2019-11-24 PROCEDURE — 80048 BASIC METABOLIC PNL TOTAL CA: CPT | Performed by: EMERGENCY MEDICINE

## 2019-11-24 PROCEDURE — 99283 EMERGENCY DEPT VISIT LOW MDM: CPT

## 2019-11-24 PROCEDURE — 96374 THER/PROPH/DIAG INJ IV PUSH: CPT

## 2019-11-24 PROCEDURE — 96361 HYDRATE IV INFUSION ADD-ON: CPT

## 2019-11-24 PROCEDURE — 36415 COLL VENOUS BLD VENIPUNCTURE: CPT | Performed by: EMERGENCY MEDICINE

## 2019-11-24 RX ADMIN — INSULIN HUMAN 8 UNITS: 100 INJECTION, SOLUTION PARENTERAL at 22:39

## 2019-11-24 RX ADMIN — SODIUM CHLORIDE 1000 ML: 0.9 INJECTION, SOLUTION INTRAVENOUS at 22:16

## 2019-11-25 NOTE — DISCHARGE INSTRUCTIONS
Type 2 Diabetes in Adults   WHAT YOU NEED TO KNOW:   Type 2 diabetes is a disease that affects how your body uses glucose (sugar)  Normally, when the blood sugar level increases, the pancreas makes more insulin  Insulin helps move sugar out of the blood so it can be used for energy  Type 2 diabetes develops because either the body cannot make enough insulin, or it cannot use the insulin correctly  After many years, your pancreas may stop making insulin  DISCHARGE INSTRUCTIONS:   Call 911 for any of the following: You have any of the following signs of a stroke:      Numbness or drooping on one side of your face     Weakness in an arm or leg    Confusion or difficulty speaking    Dizziness, a severe headache, or vision loss    You have any of the following signs of a heart attack:      Squeezing, pressure, or pain in your chest that lasts longer than 5 minutes or returns    Discomfort or pain in your back, neck, jaw, stomach, or arm     Trouble breathing    Nausea or vomiting    Lightheadedness or a sudden cold sweat, especially with chest pain or trouble breathing  Return to the emergency department if:   You have severe abdominal pain, or the pain spreads to your back  You may also be vomiting  You have trouble staying awake or focusing  You are shaking or sweating  You have blurred or double vision  Your breath has a fruity, sweet smell  Your breathing is deep and labored, or rapid and shallow  Your heartbeat is fast and weak  Contact your healthcare provider if:   You are vomiting or have diarrhea  You have an upset stomach and cannot eat the foods on your meal plan  You feel weak or more tired than usual      You feel dizzy, have headaches, or are easily irritated  Your skin is red, warm, dry, or swollen  You have a wound that does not heal      You have numbness in your arms or legs  You have trouble coping with your illness, or you feel anxious or depressed  You have questions or concerns about your condition or care  Keep track of carbohydrates (sugar and starchy foods)  Your blood sugar level can get too high if you eat too many carbohydrates  Your dietitian will help you plan meals and snacks that have the right amount of carbohydrates  Eat low-fat foods , such as skinless chicken and low-fat milk  Eat less sodium (salt)  Limit high-sodium foods, such as soy sauce, potato chips, and soup  Do not add salt to food you cook  Limit your use of table salt  You should have less than 2,300 mg of sodium per day  Eat high-fiber foods , such as vegetables, whole grain breads, and beans  Limit alcohol  Alcohol affects your blood sugar level and can make it harder to manage your diabetes  Limit alcohol to 1 drink a day if you are a woman  Limit alcohol to 2 drinks a day if you are a man  A drink of alcohol is 12 ounces of beer, 5 ounces of wine, or 1½ ounces of liquor  Check your blood pressure as directed:  Ask your healthcare provider what your blood pressure should be  Most adults with diabetes and high blood pressure should have a systolic blood pressure (first number) less than 140  Your diastolic blood pressure (second number) should be less than 90

## 2019-11-25 NOTE — ED PROVIDER NOTES
History  Chief Complaint   Patient presents with    Hyperglycemia - no symptoms     checked sugar tonight 478 "my humalog is making it go up"      50 yo male with IDDM, checked his blood sugar tonight and it was 478  He is asymptomatic but he is concerned because his blood sugars have high ever since he started using humalog in instead of lantus last week due to an insurance, and he think the insulin is making his blood go up   History provided by:  Patient  Hyperglycemia - no symptoms   Blood sugar level PTA:  478  Onset quality:  Gradual  Timing:  Sporadic  Progression:  Waxing and waning  Diabetes status:  Controlled with insulin  Current diabetic therapy:  Humalog  Context: change in medication    Associated symptoms: no abdominal pain, no chest pain, no dizziness, no dysuria, no fever, no nausea, no shortness of breath, no vomiting and no weakness        Prior to Admission Medications   Prescriptions Last Dose Informant Patient Reported?  Taking?   dicyclomine (BENTYL) 20 mg tablet   No No   Sig: Take 1 tablet (20 mg total) by mouth 2 (two) times a day for 14 days   famotidine (PEPCID) 20 mg tablet 11/24/2019 at Unknown time  No Yes   Sig: Take 1 tablet (20 mg total) by mouth 2 (two) times a day for 14 days   fenofibrate (TRICOR) 48 mg tablet 11/24/2019 at Unknown time  No Yes   Sig: Take 1 tablet (48 mg total) by mouth daily for 14 days   fluticasone (FLOVENT HFA) 110 MCG/ACT inhaler   No No   Sig: Inhale 1 puff 2 (two) times a day for 14 days   insulin glargine (LANTUS SOLOSTAR) 100 units/mL injection pen 11/24/2019 at Unknown time  No Yes   Sig: Inject 22 Units under the skin every morning   insulin regular (HUMULIN R) 100 units/mL injection 11/24/2019 at Unknown time  No Yes   Sig: Inject 0 1 mL (10 Units total) under the skin 3 (three) times a day with meals Hold if sugars <160   lisinopril (ZESTRIL) 10 mg tablet 11/24/2019 at Unknown time  No Yes   Sig: Take 0 5 tablets (5 mg total) by mouth daily for 14 days   thiothixene (NAVANE) 1 mg capsule 2019 at Unknown time  No Yes   Sig: Take 1 capsule (1 mg total) by mouth daily for 14 days   traZODone (DESYREL) 50 mg tablet 2019 at Unknown time  No Yes   Sig: Take 1 tablet (50 mg total) by mouth daily at bedtime for 14 days      Facility-Administered Medications: None       Past Medical History:   Diagnosis Date    Anxiety     Asthma     Bipolar 1 disorder ([place] [address] )     Depression     Diabetes mellitus ([place] [address] )     Psychiatric disorder        Past Surgical History:   Procedure Laterality Date    APPENDECTOMY         History reviewed  No pertinent family history  I have reviewed and agree with the history as documented  Social History     Tobacco Use    Smoking status: Former Smoker     Packs/day: 0 00     Types: Cigarettes     Last attempt to quit: 2019     Years since quittin 0    Smokeless tobacco: Never Used   Substance Use Topics    Alcohol use: Not Currently     Binge frequency: Never    Drug use: No        Review of Systems   Constitutional: Negative for appetite change, chills and fever  HENT: Negative for sore throat  Respiratory: Negative for cough, shortness of breath and wheezing  Cardiovascular: Negative for chest pain and palpitations  Gastrointestinal: Negative for abdominal pain, diarrhea, nausea and vomiting  Genitourinary: Negative for dysuria and hematuria  Musculoskeletal: Negative for neck pain  Skin: Negative for rash  Neurological: Negative for dizziness, weakness and headaches  Psychiatric/Behavioral: Negative for suicidal ideas  All other systems reviewed and are negative  Physical Exam  Physical Exam   Constitutional: He is oriented to person, place, and time  He appears well-developed and well-nourished  Non-toxic appearance  HENT:   Head: Normocephalic     Right Ear: Tympanic membrane and external ear normal    Left Ear: External ear normal    Nose: Nose normal    Mouth/Throat: Oropharynx is clear and moist    Eyes: Pupils are equal, round, and reactive to light  Conjunctivae, EOM and lids are normal    Neck: Normal range of motion  Neck supple  No JVD present  No Brudzinski's sign and no Kernig's sign noted  Cardiovascular: Normal rate, regular rhythm and normal heart sounds  No murmur heard  Pulmonary/Chest: Effort normal and breath sounds normal  No accessory muscle usage  No tachypnea  No respiratory distress  He has no wheezes  Abdominal: Soft  Normal appearance and bowel sounds are normal  He exhibits no distension and no mass  There is no tenderness  There is no rigidity, no rebound and no guarding  Musculoskeletal: Normal range of motion  Lymphadenopathy:        Head (right side): No submental, no submandibular, no preauricular and no posterior auricular adenopathy present  Head (left side): No submental, no submandibular, no preauricular and no posterior auricular adenopathy present  He has no cervical adenopathy  Neurological: He is alert and oriented to person, place, and time  He has normal strength and normal reflexes  No cranial nerve deficit or sensory deficit  Coordination normal  GCS eye subscore is 4  GCS verbal subscore is 5  GCS motor subscore is 6  Skin: Skin is warm and dry  Capillary refill takes less than 2 seconds  No rash noted  He is not diaphoretic  Psychiatric: He has a normal mood and affect  His speech is normal and behavior is normal  Thought content normal  Cognition and memory are normal    Nursing note and vitals reviewed        Vital Signs  ED Triage Vitals   Temperature Pulse Respirations Blood Pressure SpO2   11/24/19 2122 11/24/19 2122 11/24/19 2122 11/24/19 2123 11/24/19 2122   (!) 97 4 °F (36 3 °C) 78 16 103/63 97 %      Temp Source Heart Rate Source Patient Position - Orthostatic VS BP Location FiO2 (%)   11/24/19 2122 11/24/19 2324 11/24/19 2122 11/24/19 2122 --   Temporal Monitor Sitting Right arm       Pain Score 11/24/19 2122       No Pain           Vitals:    11/24/19 2122 11/24/19 2123 11/24/19 2324   BP:  103/63 113/60   Pulse: 78  86   Patient Position - Orthostatic VS: Sitting Sitting Sitting         Visual Acuity      ED Medications  Medications   sodium chloride 0 9 % bolus 1,000 mL (0 mL Intravenous Stopped 11/24/19 2256)   insulin regular (HumuLIN R,NovoLIN R) injection 8 Units (8 Units Intravenous Given 11/24/19 2239)       Diagnostic Studies  Results Reviewed     Procedure Component Value Units Date/Time    Fingerstick Glucose (POCT) [[id number]]  (Normal) Collected:  11/24/19 2329    Lab Status:  Final result Updated:  11/24/19 2330     POC Glucose 115 mg/dl     Basic metabolic panel [[id number]]  (Abnormal) Collected:  11/24/19 2216    Lab Status:  Final result Specimen:  Blood from Arm, Right Updated:  11/24/19 2231     Sodium 136 mmol/L      Potassium 4 1 mmol/L      Chloride 102 mmol/L      CO2 30 mmol/L      ANION GAP 4 mmol/L      BUN 20 mg/dL      Creatinine 1 32 mg/dL      Glucose 404 mg/dL      Calcium 9 2 mg/dL      eGFR 63 ml/min/1 73sq m     Narrative:       [place] guidelines for Chronic Kidney Disease (CKD):     Stage 1 with normal or high GFR (GFR > 90 mL/min/1 73 square meters)    Stage 2 Mild CKD (GFR = 60-89 mL/min/1 73 square meters)    Stage 3A Moderate CKD (GFR = 45-59 mL/min/1 73 square meters)    Stage 3B Moderate CKD (GFR = 30-44 mL/min/1 73 square meters)    Stage 4 Severe CKD (GFR = 15-29 mL/min/1 73 square meters)    Stage 5 End Stage CKD (GFR <15 mL/min/1 73 square meters)  Note: GFR calculation is accurate only with a steady state creatinine    Fingerstick Glucose (POCT) [[id number]]  (Abnormal) Collected:  11/24/19 2126    Lab Status:  Final result Updated:  11/24/19 2128     POC Glucose 409 mg/dl                  No orders to display              Procedures  Procedures       ED Course  ED Course as of Nov 25 0012   Sun Nov 24, 2019   2331 Glucose responded well to fluids and insulin  The drop was perhaps more rapid than I expected, so I am going to make sure he knows to keep track of it, especially if he is asymptomatic  MDM    Disposition  Final diagnoses:   Hyperglycemia due to type 2 diabetes mellitus ([address] )     Time reflects when diagnosis was documented in both MDM as applicable and the Disposition within this note     Time User Action Codes Description Comment    11/24/2019 11:33 PM Jessy DIAL Add [E11 65] Hyperglycemia due to type 2 diabetes mellitus [facility])       ED Disposition     ED Disposition Condition Date/Time Comment    Discharge Good Sun Nov 24, 2019 11:33 PM Carmelina Steven discharge to home/self care              Follow-up Information     Follow up With Specialties Details Why Contact Info    Everardo Santos MD Family Medicine Go to  For followup [address] [place] [address] Street,[address] Floor, [address] [address] [facility]  [phone]            Discharge Medication List as of 11/24/2019 11:34 PM      CONTINUE these medications which have NOT CHANGED    Details   famotidine (PEPCID) 20 mg tablet Take 1 tablet (20 mg total) by mouth 2 (two) times a day for 14 days, Starting Sat 11/16/2019, Until Sat 11/30/2019, Print      fenofibrate (TRICOR) 48 mg tablet Take 1 tablet (48 mg total) by mouth daily for 14 days, Starting Sat 11/16/2019, Until Sat 11/30/2019, Print      insulin glargine (LANTUS SOLOSTAR) 100 units/mL injection pen Inject 22 Units under the skin every morning, Starting Thu 11/7/2019, Normal      insulin regular (HUMULIN R) 100 units/mL injection Inject 0 1 mL (10 Units total) under the skin 3 (three) times a day with meals Hold if sugars <160, Starting Sat 11/16/2019, Print      lisinopril (ZESTRIL) 10 mg tablet Take 0 5 tablets (5 mg total) by mouth daily for 14 days, Starting Sat 11/16/2019, Until Sat 11/30/2019, Print      thiothixene (NAVANE) 1 mg capsule Take 1 capsule (1 mg total) by mouth daily for 14 days, Starting Sat 11/16/2019, Until Sat 11/30/2019, Print      traZODone (DESYREL) 50 mg tablet Take 1 tablet (50 mg total) by mouth daily at bedtime for 14 days, Starting Sat 11/16/2019, Until Sat 11/30/2019, Print      dicyclomine (BENTYL) 20 mg tablet Take 1 tablet (20 mg total) by mouth 2 (two) times a day for 14 days, Starting Sat 11/16/2019, Until Sat 11/30/2019, Print      fluticasone (FLOVENT HFA) 110 MCG/ACT inhaler Inhale 1 puff 2 (two) times a day for 14 days, Starting Sat 11/16/2019, Until Sat 11/30/2019, Print           No discharge procedures on file      ED Provider  Electronically Signed by           Juany Bhakta MD  11/25/19 0716

## 2019-12-03 ENCOUNTER — HOSPITAL ENCOUNTER (EMERGENCY)
Facility: HOSPITAL | Age: 49
End: 2019-12-04
Attending: EMERGENCY MEDICINE
Payer: COMMERCIAL

## 2019-12-03 ENCOUNTER — HOSPITAL ENCOUNTER (EMERGENCY)
Facility: HOSPITAL | Age: 49
Discharge: HOME | End: 2019-12-03
Attending: EMERGENCY MEDICINE
Payer: COMMERCIAL

## 2019-12-03 VITALS
DIASTOLIC BLOOD PRESSURE: 64 MMHG | WEIGHT: 138.23 LBS | SYSTOLIC BLOOD PRESSURE: 143 MMHG | HEART RATE: 83 BPM | RESPIRATION RATE: 18 BRPM | BODY MASS INDEX: 22.31 KG/M2 | TEMPERATURE: 98.7 F | OXYGEN SATURATION: 97 %

## 2019-12-03 DIAGNOSIS — E10.9 TYPE 1 DIABETES MELLITUS WITHOUT COMPLICATION (HCC): Primary | ICD-10-CM

## 2019-12-03 DIAGNOSIS — F32.A DEPRESSION WITH SUICIDAL IDEATION: Primary | ICD-10-CM

## 2019-12-03 DIAGNOSIS — F31.9 BIPOLAR DISORDER (HCC): ICD-10-CM

## 2019-12-03 DIAGNOSIS — R45.851 DEPRESSION WITH SUICIDAL IDEATION: Primary | ICD-10-CM

## 2019-12-03 DIAGNOSIS — R45.89 SUICIDAL BEHAVIOR: ICD-10-CM

## 2019-12-03 DIAGNOSIS — Z00.8 MEDICAL CLEARANCE FOR PSYCHIATRIC ADMISSION: Primary | ICD-10-CM

## 2019-12-03 DIAGNOSIS — Z76.0 MEDICATION REFILL: ICD-10-CM

## 2019-12-03 PROCEDURE — 82075 ASSAY OF BREATH ETHANOL: CPT | Performed by: EMERGENCY MEDICINE

## 2019-12-03 PROCEDURE — 80307 DRUG TEST PRSMV CHEM ANLYZR: CPT | Performed by: EMERGENCY MEDICINE

## 2019-12-03 PROCEDURE — 99285 EMERGENCY DEPT VISIT HI MDM: CPT | Performed by: EMERGENCY MEDICINE

## 2019-12-03 PROCEDURE — 99285 EMERGENCY DEPT VISIT HI MDM: CPT

## 2019-12-03 PROCEDURE — 99283 EMERGENCY DEPT VISIT LOW MDM: CPT | Performed by: PHYSICIAN ASSISTANT

## 2019-12-03 PROCEDURE — 99281 EMR DPT VST MAYX REQ PHY/QHP: CPT

## 2019-12-03 RX ORDER — ACETAMINOPHEN 325 MG/1
650 TABLET ORAL EVERY 4 HOURS PRN
Status: CANCELLED | OUTPATIENT
Start: 2019-12-03

## 2019-12-03 RX ORDER — FENOFIBRATE 48 MG/1
48 TABLET, COATED ORAL DAILY
Status: ON HOLD | COMMUNITY
End: 2019-12-10 | Stop reason: SDUPTHER

## 2019-12-03 RX ORDER — INSULIN GLARGINE 100 [IU]/ML
22 INJECTION, SOLUTION SUBCUTANEOUS EVERY MORNING
Qty: 10 ML | Refills: 0 | Status: ON HOLD | OUTPATIENT
Start: 2019-12-03 | End: 2019-12-10 | Stop reason: SDUPTHER

## 2019-12-03 RX ORDER — OLANZAPINE 10 MG/1
5 INJECTION, POWDER, LYOPHILIZED, FOR SOLUTION INTRAMUSCULAR
Status: CANCELLED | OUTPATIENT
Start: 2019-12-03

## 2019-12-03 RX ORDER — TRAZODONE HYDROCHLORIDE 50 MG/1
50 TABLET ORAL ONCE
Status: COMPLETED | OUTPATIENT
Start: 2019-12-03 | End: 2019-12-03

## 2019-12-03 RX ORDER — HYDROXYZINE 50 MG/1
50 TABLET, FILM COATED ORAL EVERY 4 HOURS PRN
Status: CANCELLED | OUTPATIENT
Start: 2019-12-03

## 2019-12-03 RX ORDER — HALOPERIDOL 5 MG/ML
5 INJECTION INTRAMUSCULAR EVERY 6 HOURS PRN
Status: CANCELLED | OUTPATIENT
Start: 2019-12-03

## 2019-12-03 RX ORDER — RISPERIDONE 1 MG/1
1 TABLET, ORALLY DISINTEGRATING ORAL
Status: CANCELLED | OUTPATIENT
Start: 2019-12-03

## 2019-12-03 RX ORDER — MAGNESIUM HYDROXIDE/ALUMINUM HYDROXICE/SIMETHICONE 120; 1200; 1200 MG/30ML; MG/30ML; MG/30ML
30 SUSPENSION ORAL EVERY 4 HOURS PRN
Status: CANCELLED | OUTPATIENT
Start: 2019-12-03

## 2019-12-03 RX ORDER — TRAZODONE HYDROCHLORIDE 50 MG/1
50 TABLET ORAL
Status: CANCELLED | OUTPATIENT
Start: 2019-12-03

## 2019-12-03 RX ORDER — ACETAMINOPHEN 325 MG/1
975 TABLET ORAL EVERY 6 HOURS PRN
Status: CANCELLED | OUTPATIENT
Start: 2019-12-03

## 2019-12-03 RX ORDER — ACETAMINOPHEN 325 MG/1
650 TABLET ORAL EVERY 6 HOURS PRN
Status: CANCELLED | OUTPATIENT
Start: 2019-12-03

## 2019-12-03 RX ORDER — BENZTROPINE MESYLATE 1 MG/ML
1 INJECTION INTRAMUSCULAR; INTRAVENOUS
Status: CANCELLED | OUTPATIENT
Start: 2019-12-03

## 2019-12-03 RX ADMIN — TRAZODONE HYDROCHLORIDE 50 MG: 50 TABLET ORAL at 23:08

## 2019-12-03 NOTE — ED PROVIDER NOTES
History  Chief Complaint   Patient presents with    Medication Refill     Patient states "I need a refill on all of my medications  I need a refill to last me a month " Denies any medical complaints at this time  52-year-old male presents emergency department seeking medication refill for his psychiatric medications as well as a refill for his diabetic medications  Patient states he only needs a refill for his diabetic insulin pen  He says that he is looking for a refill of his medications for 1 month  He states he was seen earlier today at [facility] for same  He also apparently has an appointment tomorrow with his psychiatric provider  He appears well with a positive mood and affect  He is friendly and interactive  He denies any SI or HI at this time and states he generally feels well  Allergies reviewed              Prior to Admission Medications   Prescriptions Last Dose Informant Patient Reported? Taking?   insulin regular (HUMULIN R) 100 units/mL injection   No No   Sig: Inject 0 1 mL (10 Units total) under the skin 3 (three) times a day with meals Hold if sugars <160   lisinopril (ZESTRIL) 10 mg tablet   No No   Sig: Take 0 5 tablets (5 mg total) by mouth daily for 14 days   thiothixene (NAVANE) 1 mg capsule   No No   Sig: Take 1 capsule (1 mg total) by mouth daily for 14 days   traZODone (DESYREL) 50 mg tablet   No No   Sig: Take 1 tablet (50 mg total) by mouth daily at bedtime for 14 days      Facility-Administered Medications: None       Past Medical History:   Diagnosis Date    Anxiety     Asthma     Bipolar 1 disorder ([facility] )     Depression     Diabetes mellitus ([facility] )     Psychiatric disorder        Past Surgical History:   Procedure Laterality Date    APPENDECTOMY         History reviewed  No pertinent family history  I have reviewed and agree with the history as documented      Social History     Tobacco Use    Smoking status: Former Smoker     Packs/day: 0 00 Types: Cigarettes     Last attempt to quit: 2019     Years since quittin 0    Smokeless tobacco: Never Used   Substance Use Topics    Alcohol use: Not Currently     Binge frequency: Never    Drug use: No        Review of Systems   All other systems reviewed and are negative  Physical Exam  Physical Exam   Constitutional: He is oriented to person, place, and time  Vital signs are normal  He appears well-developed and well-nourished  He is cooperative  He does not appear ill  No distress  HENT:   Head: Normocephalic and atraumatic  Right Ear: External ear normal    Left Ear: External ear normal    Nose: Nose normal    Mouth/Throat: Oropharynx is clear and moist    Eyes: Pupils are equal, round, and reactive to light  EOM are normal    Neck: Normal range of motion  Neck supple  Cardiovascular: Normal rate, regular rhythm, normal heart sounds and intact distal pulses  Exam reveals no gallop and no friction rub  No murmur heard  Pulmonary/Chest: Effort normal and breath sounds normal  No stridor  No respiratory distress  He has no wheezes  He has no rales  Abdominal: Soft  Bowel sounds are normal  He exhibits no distension  There is no tenderness  There is no guarding  Musculoskeletal: Normal range of motion  He exhibits no tenderness  Neurological: He is alert and oriented to person, place, and time  Skin: Skin is warm  Capillary refill takes less than 2 seconds  Nursing note and vitals reviewed        Vital Signs  ED Triage Vitals [19 1825]   Temperature Pulse Respirations Blood Pressure SpO2   98 7 °F (37 1 °C) 83 18 143/64 97 %      Temp Source Heart Rate Source Patient Position - Orthostatic VS BP Location FiO2 (%)   Temporal Monitor Sitting Right arm --      Pain Score       No Pain           Vitals:    19 1825   BP: 143/64   Pulse: 83   Patient Position - Orthostatic VS: Sitting         Visual Acuity      ED Medications  Medications - No data to display    Diagnostic Studies  Results Reviewed     None                 No orders to display              Procedures  Procedures         ED Course                               MDM  Number of Diagnoses or Management Options  Bipolar disorder [facility]):   Medication refill:   Type 1 diabetes mellitus without complication [facility]): minor  Diagnosis management comments: Insulin pen refill provided  Patient states he is following up with a psychiatrist tomorrow and will have his psychiatric medications refilled at that visit  He was seen earlier today at [facility] for same  At that visit they encouraged him to follow up with his psychiatric provider as well  Patient was encouraged to keep his visit with his psychiatrist tomorrow  infolink information provided as he was inquiring about obtaining a new PCP  He denies any SI or HI at this time  He is pleasant and well appearing at the time of discharge  Patient given return and follow-up instructions  Patient is understanding and in agreement with the treatment plan  There are no questions at the time of discharge         Amount and/or Complexity of Data Reviewed  Decide to obtain previous medical records or to obtain history from someone other than the patient: yes  Review and summarize past medical records: yes    Risk of Complications, Morbidity, and/or Mortality  Presenting problems: low  Diagnostic procedures: low  Management options: low    Patient Progress  Patient progress: stable        Disposition  Final diagnoses:   Type 1 diabetes mellitus without complication ([facility] )   Medication refill   Bipolar disorder ([facility] )     Time reflects when diagnosis was documented in both MDM as applicable and the Disposition within this note     Time User Action Codes Description Comment    12/3/2019  6:46 PM Jackie Bliss Add [E10 9] Type 1 diabetes mellitus without complication ([facility] )     29/7/9098  6:46 PM Jackie Bliss Modify [E10 9] Type 1 diabetes mellitus without complication ([address] )     15/5/7859  6:48 PM Eric Duarte Add [Z76 0] Medication refill     12/3/2019  6:49 PM Eric Duarte Add [F31 9] Bipolar disorder [facility])       ED Disposition     ED Disposition Condition Date/Time Comment    Discharge Stable Tue Dec 3, 2019  6:48 PM [place] Tiffany discharge to home/self care  Follow-up Information     Follow up With Specialties Details Why Contact Info    [employer]  Schedule an appointment as soon as possible for a visit  Please call to schedule an appointment with a Primary Care Physician    [phone]            Discharge Medication List as of 12/3/2019  6:51 PM      START taking these medications    Details   insulin glargine (LANTUS) 100 units/mL subcutaneous injection Inject 22 Units under the skin every morning, Starting Tue 12/3/2019, Normal         CONTINUE these medications which have NOT CHANGED    Details   insulin regular (HUMULIN R) 100 units/mL injection Inject 0 1 mL (10 Units total) under the skin 3 (three) times a day with meals Hold if sugars <160, Starting Sat 11/16/2019, Print      lisinopril (ZESTRIL) 10 mg tablet Take 0 5 tablets (5 mg total) by mouth daily for 14 days, Starting Sat 11/16/2019, Until Sat 11/30/2019, Print      thiothixene (NAVANE) 1 mg capsule Take 1 capsule (1 mg total) by mouth daily for 14 days, Starting Sat 11/16/2019, Until Sat 11/30/2019, Print      traZODone (DESYREL) 50 mg tablet Take 1 tablet (50 mg total) by mouth daily at bedtime for 14 days, Starting Sat 11/16/2019, Until Sat 11/30/2019, Print      dicyclomine (BENTYL) 20 mg tablet Take 1 tablet (20 mg total) by mouth 2 (two) times a day for 14 days, Starting Sat 11/16/2019, Until Sat 11/30/2019, Print      famotidine (PEPCID) 20 mg tablet Take 1 tablet (20 mg total) by mouth 2 (two) times a day for 14 days, Starting Sat 11/16/2019, Until Sat 11/30/2019, Print      fenofibrate (TRICOR) 48 mg tablet Take 1 tablet (48 mg total) by mouth daily for 14 days, Starting Sat 11/16/2019, Until Sat 11/30/2019, Print      fluticasone (FLOVENT HFA) 110 MCG/ACT inhaler Inhale 1 puff 2 (two) times a day for 14 days, Starting Sat 11/16/2019, Until Sat 11/30/2019, Print      insulin glargine (LANTUS SOLOSTAR) 100 units/mL injection pen Inject 22 Units under the skin every morning, Starting Thu 11/7/2019, Normal           No discharge procedures on file      ED Provider  Electronically Signed by           Tee Garcia PA-C  12/04/19 0126

## 2019-12-04 ENCOUNTER — HOSPITAL ENCOUNTER (INPATIENT)
Facility: HOSPITAL | Age: 49
LOS: 6 days | Discharge: HOME | DRG: 753 | End: 2019-12-10
Attending: PSYCHIATRY & NEUROLOGY | Admitting: PSYCHIATRY & NEUROLOGY
Payer: COMMERCIAL

## 2019-12-04 VITALS
WEIGHT: 138.23 LBS | OXYGEN SATURATION: 98 % | RESPIRATION RATE: 16 BRPM | DIASTOLIC BLOOD PRESSURE: 75 MMHG | HEART RATE: 69 BPM | BODY MASS INDEX: 22.31 KG/M2 | TEMPERATURE: 98.4 F | SYSTOLIC BLOOD PRESSURE: 109 MMHG

## 2019-12-04 DIAGNOSIS — I10 HTN (HYPERTENSION): ICD-10-CM

## 2019-12-04 DIAGNOSIS — J45.909 ASTHMA: ICD-10-CM

## 2019-12-04 DIAGNOSIS — E10.9 TYPE 1 DIABETES MELLITUS WITHOUT COMPLICATION (HCC): Primary | ICD-10-CM

## 2019-12-04 DIAGNOSIS — F31.9 BIPOLAR 1 DISORDER (HCC): ICD-10-CM

## 2019-12-04 DIAGNOSIS — Z00.8 MEDICAL CLEARANCE FOR PSYCHIATRIC ADMISSION: ICD-10-CM

## 2019-12-04 DIAGNOSIS — E78.5 HLD (HYPERLIPIDEMIA): ICD-10-CM

## 2019-12-04 PROBLEM — F31.32 BIPOLAR AFFECTIVE DISORDER, CURRENTLY DEPRESSED, MODERATE (HCC): Status: ACTIVE | Noted: 2019-10-12

## 2019-12-04 LAB
GLUCOSE SERPL-MCNC: 180 MG/DL (ref 65–140)
GLUCOSE SERPL-MCNC: 277 MG/DL (ref 65–140)
GLUCOSE SERPL-MCNC: 312 MG/DL (ref 65–140)
GLUCOSE SERPL-MCNC: 377 MG/DL (ref 65–140)

## 2019-12-04 PROCEDURE — 99222 1ST HOSP IP/OBS MODERATE 55: CPT | Performed by: PSYCHIATRY & NEUROLOGY

## 2019-12-04 PROCEDURE — 99254 IP/OBS CNSLTJ NEW/EST MOD 60: CPT | Performed by: INTERNAL MEDICINE

## 2019-12-04 PROCEDURE — 82948 REAGENT STRIP/BLOOD GLUCOSE: CPT

## 2019-12-04 PROCEDURE — 99251 PR INITL INPATIENT CONSULT NEW/ESTAB PT 20 MIN: CPT | Performed by: PHYSICIAN ASSISTANT

## 2019-12-04 RX ORDER — TRAZODONE HYDROCHLORIDE 50 MG/1
50 TABLET ORAL
Status: DISCONTINUED | OUTPATIENT
Start: 2019-12-04 | End: 2019-12-10 | Stop reason: HOSPADM

## 2019-12-04 RX ORDER — INSULIN GLARGINE 100 [IU]/ML
22 INJECTION, SOLUTION SUBCUTANEOUS EVERY MORNING
Status: DISCONTINUED | OUTPATIENT
Start: 2019-12-04 | End: 2019-12-04

## 2019-12-04 RX ORDER — BENZTROPINE MESYLATE 1 MG/ML
1 INJECTION INTRAMUSCULAR; INTRAVENOUS
Status: DISCONTINUED | OUTPATIENT
Start: 2019-12-04 | End: 2019-12-10 | Stop reason: HOSPADM

## 2019-12-04 RX ORDER — HYDROXYZINE 50 MG/1
50 TABLET, FILM COATED ORAL EVERY 4 HOURS PRN
Status: DISCONTINUED | OUTPATIENT
Start: 2019-12-04 | End: 2019-12-10 | Stop reason: HOSPADM

## 2019-12-04 RX ORDER — ARIPIPRAZOLE 5 MG/1
5 TABLET ORAL DAILY
Status: DISCONTINUED | OUTPATIENT
Start: 2019-12-04 | End: 2019-12-06

## 2019-12-04 RX ORDER — HALOPERIDOL 5 MG/ML
5 INJECTION INTRAMUSCULAR EVERY 6 HOURS PRN
Status: DISCONTINUED | OUTPATIENT
Start: 2019-12-04 | End: 2019-12-10 | Stop reason: HOSPADM

## 2019-12-04 RX ORDER — RISPERIDONE 1 MG/1
1 TABLET, ORALLY DISINTEGRATING ORAL
Status: DISCONTINUED | OUTPATIENT
Start: 2019-12-04 | End: 2019-12-10 | Stop reason: HOSPADM

## 2019-12-04 RX ORDER — FENOFIBRATE 48 MG/1
48 TABLET, COATED ORAL DAILY
Status: DISCONTINUED | OUTPATIENT
Start: 2019-12-04 | End: 2019-12-10 | Stop reason: HOSPADM

## 2019-12-04 RX ORDER — ACETAMINOPHEN 325 MG/1
650 TABLET ORAL EVERY 4 HOURS PRN
Status: DISCONTINUED | OUTPATIENT
Start: 2019-12-04 | End: 2019-12-10 | Stop reason: HOSPADM

## 2019-12-04 RX ORDER — FLUTICASONE PROPIONATE 110 UG/1
1 AEROSOL, METERED RESPIRATORY (INHALATION) EVERY 12 HOURS SCHEDULED
Status: DISCONTINUED | OUTPATIENT
Start: 2019-12-04 | End: 2019-12-10 | Stop reason: HOSPADM

## 2019-12-04 RX ORDER — ACETAMINOPHEN 325 MG/1
650 TABLET ORAL EVERY 6 HOURS PRN
Status: DISCONTINUED | OUTPATIENT
Start: 2019-12-04 | End: 2019-12-10 | Stop reason: HOSPADM

## 2019-12-04 RX ORDER — LISINOPRIL 5 MG/1
5 TABLET ORAL DAILY
Status: DISCONTINUED | OUTPATIENT
Start: 2019-12-04 | End: 2019-12-10 | Stop reason: HOSPADM

## 2019-12-04 RX ORDER — MAGNESIUM HYDROXIDE/ALUMINUM HYDROXICE/SIMETHICONE 120; 1200; 1200 MG/30ML; MG/30ML; MG/30ML
30 SUSPENSION ORAL EVERY 4 HOURS PRN
Status: DISCONTINUED | OUTPATIENT
Start: 2019-12-04 | End: 2019-12-10 | Stop reason: HOSPADM

## 2019-12-04 RX ORDER — ACETAMINOPHEN 325 MG/1
975 TABLET ORAL EVERY 6 HOURS PRN
Status: DISCONTINUED | OUTPATIENT
Start: 2019-12-04 | End: 2019-12-10 | Stop reason: HOSPADM

## 2019-12-04 RX ORDER — INSULIN GLARGINE 100 [IU]/ML
22 INJECTION, SOLUTION SUBCUTANEOUS EVERY MORNING
Status: DISCONTINUED | OUTPATIENT
Start: 2019-12-04 | End: 2019-12-10 | Stop reason: HOSPADM

## 2019-12-04 RX ORDER — OLANZAPINE 10 MG/1
5 INJECTION, POWDER, LYOPHILIZED, FOR SOLUTION INTRAMUSCULAR
Status: DISCONTINUED | OUTPATIENT
Start: 2019-12-04 | End: 2019-12-10 | Stop reason: HOSPADM

## 2019-12-04 RX ADMIN — FLUTICASONE PROPIONATE 1 PUFF: 110 AEROSOL, METERED RESPIRATORY (INHALATION) at 21:16

## 2019-12-04 RX ADMIN — INSULIN LISPRO 4 UNITS: 100 INJECTION, SOLUTION INTRAVENOUS; SUBCUTANEOUS at 17:10

## 2019-12-04 RX ADMIN — INSULIN GLARGINE 22 UNITS: 100 INJECTION, SOLUTION SUBCUTANEOUS at 09:02

## 2019-12-04 RX ADMIN — INSULIN LISPRO 3 UNITS: 100 INJECTION, SOLUTION INTRAVENOUS; SUBCUTANEOUS at 13:20

## 2019-12-04 RX ADMIN — LISINOPRIL 5 MG: 5 TABLET ORAL at 13:21

## 2019-12-04 RX ADMIN — ARIPIPRAZOLE 5 MG: 5 TABLET ORAL at 13:16

## 2019-12-04 RX ADMIN — INSULIN LISPRO 4 UNITS: 100 INJECTION, SOLUTION INTRAVENOUS; SUBCUTANEOUS at 13:20

## 2019-12-04 RX ADMIN — FLUTICASONE PROPIONATE 1 PUFF: 110 AEROSOL, METERED RESPIRATORY (INHALATION) at 13:16

## 2019-12-04 RX ADMIN — INSULIN LISPRO 1 UNITS: 100 INJECTION, SOLUTION INTRAVENOUS; SUBCUTANEOUS at 17:10

## 2019-12-04 RX ADMIN — FENOFIBRATE 48 MG: 48 TABLET ORAL at 13:16

## 2019-12-04 NOTE — PROGRESS NOTES
Held 9am insulin because order was not avail at that time  Started with 11:30 meal insulin  4u with meal and 3 Sliding Scale  Pt calm, pleasant

## 2019-12-04 NOTE — ED NOTES
Refer to paper behavioral health observation record for q15 minute checks       Milvia Chavarria RN  12/03/19 5864

## 2019-12-04 NOTE — EMTALA/ACUTE CARE TRANSFER
[address] [facility]   [address] [facility] [address]  Dept: [phone]      EMTALA TRANSFER CONSENT    NAME Dayna Marx                                         1970                              MRN [id number]    I have been informed of my rights regarding examination, treatment, and transfer   by Dr Marina Contreras DO    Benefits: Specialized equipment and/or services available at the receiving facility (Include comment)________________________(psych facility)    Risks: Increased discomfort during transfer      Consent for Transfer:  I acknowledge that my medical condition has been evaluated and explained to me by the emergency department physician or other qualified medical person and/or my attending physician, who has recommended that I be transferred to the service of  Accepting Physician: Dr David Loza at [address] [place] [address] Name, [facility] 41 : [address]  The above potential benefits of such transfer, the potential risks associated with such transfer, and the probable risks of not being transferred have been explained to me, and I fully understand them  The doctor has explained that, in my case, the benefits of transfer outweigh the risks  I agree to be transferred  I authorize the performance of emergency medical procedures and treatments upon me in both transit and upon arrival at the receiving facility  Additionally, I authorize the release of any and all medical records to the receiving facility and request they be transported with me, if possible  I understand that the safest mode of transportation during a medical emergency is an ambulance and that the Hospital advocates the use of this mode of transport  Risks of traveling to the receiving facility by car, including absence of medical control, life sustaining equipment, such as oxygen, and medical personnel has been explained to me and I fully understand them      (MARIO CORRECT BOX BELOW)  [ x ] I consent to the stated transfer and to be transported by ambulance/helicopter  [  ]  I consent to the stated transfer, but refuse transportation by ambulance and accept full responsibility for my transportation by car  I understand the risks of non-ambulance transfers and I exonerate the Hospital and its staff from any deterioration in my condition that results from this refusal     X___________________________________________    DATE  19  TIME________  Signature of patient or legally responsible individual signing on patient behalf           RELATIONSHIP TO PATIENT_________________________          Provider Certification    NAME Lanette Jimenez                                         1970                              MRN [id number]    A medical screening exam was performed on the above named patient  Based on the examination:    Condition Necessitating Transfer The primary encounter diagnosis was Depression with suicidal ideation  A diagnosis of Suicidal behavior was also pertinent to this visit      Patient Condition: The patient has been stabilized such that within reasonable medical probability, no material deterioration of the patient condition or the condition of the unborn child(aida) is likely to result from the transfer    Reason for Transfer: Level of Care needed not available at this facility    Transfer Requirements: [address]   · Space available and qualified personnel available for treatment as acknowledged by Marce Alston  [phone]  · Agreed to accept transfer and to provide appropriate medical treatment as acknowledged by       Dr Sophy Dwyer  · Appropriate medical records of the examination and treatment of the patient are provided at the time of transfer   [address] [facility], [address] _______  · Transfer will be performed by qualified personnel from [facility]  and appropriate transfer equipment as required, including the use of necessary and appropriate life support measures  Provider Certification: I have examined the patient and explained the following risks and benefits of being transferred/refusing transfer to the patient/family:  General risk, such as traffic hazards, adverse weather conditions, rough terrain or turbulence, possible failure of equipment (including vehicle or aircraft), or consequences of actions of persons outside the control of the transport personnel      Based on these reasonable risks and benefits to the patient and/or the unborn child(aida), and based upon the information available at the time of the patients examination, I certify that the medical benefits reasonably to be expected from the provision of appropriate medical treatments at another medical facility outweigh the increasing risks, if any, to the individuals medical condition, and in the case of labor to the unborn child, from effecting the transfer      X____________________________________________ DATE 12/04/19        TIME_______      ORIGINAL - SEND TO MEDICAL RECORDS   COPY - SEND WITH PATIENT DURING TRANSFER

## 2019-12-04 NOTE — ED NOTES
CW EVS'd pt and per EVS pt is MA eligible- [facility] as his primary insurance      [address] [place] [address]

## 2019-12-04 NOTE — CASE MANAGEMENT
CM outreached to [place] to advise of pt's dc  Pt's ICM will call this writer back  CM outreached to pt's cousin who he lives with  Yennifer Amezcua confirmed that pt struggles with being compliant with appointments  She reports that pt is no longer able to return to [facility] due to missed appointments but he may return home at dc  Yennifer Goldie reports that pt also struggles to keep on top of his diabetes management  CM will explore WRT options for pt prior to dc- maybe to [place]  CM will continue to follow
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
## 2019-12-04 NOTE — ED NOTES
Assumed care of pt at this time; pt resting in room comfortably; pt being monitored by a ED tech on a 1-1 visual observation which is being recorded on ED behavior health observation record       Guillaume Murphy RN  12/04/19 9018

## 2019-12-04 NOTE — ED NOTES
CW started bed search, CW spoke with Macey Penny  who informed me that pt clinical can be presented to [address] for possible admission, 201 has been faxed       [address] [facility]

## 2019-12-04 NOTE — ED NOTES
Pt is a 51 y o  male who was brought to the ED with   Chief Complaint   Patient presents with    Suicidal     Patient reports sudden depression came over him and he wanted to jump in front a moving car  Patient denies HI/AH/VH  Pt brought to the ED via [employer] with complaints of S/I with plan to run in front of a car, Pt reports increased depression, due to his mother death last year in december, Pt reports that he is not taking his medication, Pt reports feeling hopeless helpless, Pt admits S/I , pt denies H/I,A/H,V/H  Intake Assessment completed, Safety risk Assessment completed  CW met with pt and discussed the process of a BHU admission, Pt has agreed and signed a 201, CW discussed this case and pt plan with ED Physician who is in agreement with this plan    CW will start bed search and complete the Pre-Cert        [place] Crisis Worker

## 2019-12-04 NOTE — PROGRESS NOTES
Polite during interaction  Present in milieu, social with peers and attending groups  Disheveled appearance  Denies SI/HI  Elevated depression  Good appetite, patient was encouraged to hold off on breakfast until diabetic medications were ordered but continued to eat banana, drink milk and drink coffee with splenda  Compliant with medications and received diabetic breakfast tray  Reports he has been noncompliant with diabetic medications since at least 12/2  Patient notified his [place] Katty Champion that he was hospitalized and requested they fax a medication list to [facility]

## 2019-12-04 NOTE — PROGRESS NOTES
Belongings brought with pt upon arrival;    To Room;  -black tshirt  -wrangler omar button up shirt  -jeans  -1 pair socks    To Locker;  -wallet  -PA DL  -health insurance card  -knowles (laces)

## 2019-12-04 NOTE — PROGRESS NOTES
201 from [facility] for impulsively of running into traffic in front of the police  Pt states that he has never made a suicide attempt in the past & makes a safety contract now, not to harm self, as well as to alert staff if feeling suicidal  Red [place]oma admits to increasing depression, re: to his Mom demise last December  Per the ER Staff, pt  Is seen frequently in the ED & is not compliant and/or does not understand his medication regime  Pt is an insulin dependent diabetic, but it is unclear when/how he takes his medications, as when seen in the hospital at the beginning of November, he had a BS in the 400's  (was 277 prior to coming to unit [facility]) Er Nurse reports that he is always coming to the ED for medication refills, even when refills would ordinarily be ready to be refilled  His UDS is negative & BAT=0 00 (AUDIT=1) Pt also has asthma, HTN, & a hx  of Bipolar D/O  Red Lipoma currently lives with his cousin & is gainfully employed putting parts together for [employer]  He also has a fiance, that he also considers a support  When asked what his strengths are, answers, "I listen to the radio, watch TV & play Wii, as if he has difficulty processing  Is calm, cooperative & listens intently to direction & of tour of the unit, asking pertinent questions  Had a sandwich, several juices prior to retiring, but due to time of day, joined a peer in the dayroom instead  Did retire to bed within a half an hour & is sleeping at present  Will continue to monitor

## 2019-12-04 NOTE — ED NOTES
Patient changed into paper scrubs at this time  Belongings placed in locker 13       Hipolito Zabala RN  12/03/19 5231

## 2019-12-04 NOTE — ED NOTES
Patient accepted at [facility] by Dr Irish MILTON to transport around 0400  EMTALA complete  Spoke with Stacy Dahl to confirm transport time and ask for nurse to nurse report prior to transport to  at [phone]

## 2019-12-04 NOTE — ED NOTES
Per Dana from crisis, transport will not be arranged for  until at least 0330        Trip Lomas RN  12/03/19 1178

## 2019-12-04 NOTE — ED NOTES
Insurance Authorization for admission:   Phone call placed to [employer] number: [phone]  Spoke to Terrell HENDRICKS     3 days approved  Level of care: acute inpatient mental health  Review on 3rd day after admission   Authorization # will be provided upon  Admission notification        EVS (Eligibility Verification System) called - [phone]    Automated system indicates: **    Insurance Authorization for Transportation:  Not needed, [facility] transporting from [address] to [facility]

## 2019-12-04 NOTE — ED PROVIDER NOTES
History  Chief Complaint   Patient presents with    Suicidal     Patient reports sudden depression came over him and he wanted to jump in front a moving car  Patient denies HI/AH/VH  50 yo male with depression who has been grieving the loss of his mother last year in December "I think it's starting to catch up to me"  Pt has had worsening depression but also out of meds since his birthday [birth year]  Came in earlier for a refill on meds and states "I thought I could do it on my own" but returns after trying to jump out in front of a car to end his life in front of   History provided by:  Patient   used: No    Depression   Presenting symptoms: depression, suicidal thoughts and suicide attempt    Patient accompanied by:  Law enforcement  Degree of incapacity (severity): Moderate  Onset quality:  Gradual  Progression:  Waxing and waning  Chronicity:  Recurrent  Context: noncompliance and stressful life event    Time since last psychoactive medication taken:  3 weeks  Relieved by:  Nothing  Associated symptoms: anxiety, feelings of worthlessness and poor judgment    Associated symptoms: no abdominal pain, no chest pain and no headaches    Risk factors: hx of mental illness        Prior to Admission Medications   Prescriptions Last Dose Informant Patient Reported?  Taking?   fenofibrate (TRICOR) 48 mg tablet Past Month at Unknown time  Yes Yes   Sig: Take 48 mg by mouth daily   insulin glargine (LANTUS) 100 units/mL subcutaneous injection 12/3/2019 at Unknown time  No Yes   Sig: Inject 22 Units under the skin every morning   insulin regular (HUMULIN R) 100 units/mL injection 12/3/2019 at Unknown time  No Yes   Sig: Inject 0 1 mL (10 Units total) under the skin 3 (three) times a day with meals Hold if sugars <160   lisinopril (ZESTRIL) 10 mg tablet Past Month at Unknown time  No Yes   Sig: Take 0 5 tablets (5 mg total) by mouth daily for 14 days   thiothixene (NAVANE) 1 mg capsule Past Month at Unknown time  No Yes   Sig: Take 1 capsule (1 mg total) by mouth daily for 14 days   traZODone (DESYREL) 50 mg tablet Past Month at Unknown time  No Yes   Sig: Take 1 tablet (50 mg total) by mouth daily at bedtime for 14 days      Facility-Administered Medications: None       Past Medical History:   Diagnosis Date    Anxiety     Asthma     Bipolar 1 disorder ([facility] )     Depression     Diabetes mellitus ([place] [address] )     Psychiatric disorder        Past Surgical History:   Procedure Laterality Date    APPENDECTOMY         History reviewed  No pertinent family history  I have reviewed and agree with the history as documented  Social History     Tobacco Use    Smoking status: Former Smoker     Packs/day: 0 00     Types: Cigarettes     Last attempt to quit: 2019     Years since quittin 0    Smokeless tobacco: Never Used   Substance Use Topics    Alcohol use: Not Currently     Binge frequency: Never    Drug use: No        Review of Systems   Constitutional: Negative for chills and fever  HENT: Negative for congestion and sore throat  Eyes: Negative for visual disturbance  Respiratory: Negative for shortness of breath and wheezing  Cardiovascular: Negative for chest pain and palpitations  Gastrointestinal: Negative for abdominal pain, diarrhea, nausea and vomiting  Genitourinary: Negative for dysuria  Musculoskeletal: Negative for neck pain and neck stiffness  Skin: Negative for pallor and rash  Neurological: Negative for headaches  Psychiatric/Behavioral: Positive for depression and suicidal ideas  Negative for confusion  The patient is nervous/anxious  All other systems reviewed and are negative  Physical Exam  Physical Exam   Constitutional: He is oriented to person, place, and time  He appears well-developed and well-nourished  No distress  HENT:   Head: Normocephalic and atraumatic     Mouth/Throat: Oropharynx is clear and moist    Eyes: EOM are normal    Neck: Neck supple  Cardiovascular: Normal rate and regular rhythm  No murmur heard  Pulmonary/Chest: Effort normal and breath sounds normal    Abdominal: Soft  Bowel sounds are normal  He exhibits no distension  There is no tenderness  Musculoskeletal: Normal range of motion  He exhibits no edema  Neurological: He is alert and oriented to person, place, and time  Skin: Skin is warm  No rash noted  No pallor  Psychiatric:   Flat affect, suicidal - attempted to jump out in front of moving vehicle to end his life   Nursing note and vitals reviewed  Vital Signs  ED Triage Vitals [12/03/19 2110]   Temperature Pulse Respirations Blood Pressure SpO2   98 4 °F (36 9 °C) 89 20 138/70 98 %      Temp Source Heart Rate Source Patient Position - Orthostatic VS BP Location FiO2 (%)   Oral Monitor Sitting Right arm --      Pain Score       No Pain           Vitals:    12/03/19 2110 12/04/19 0111   BP: 138/70 109/75   Pulse: 89 69   Patient Position - Orthostatic VS: Sitting Lying         Visual Acuity      ED Medications  Medications   traZODone (DESYREL) tablet 50 mg (50 mg Oral Given 12/3/19 2308)       Diagnostic Studies  Results Reviewed     Procedure Component Value Units Date/Time    Rapid drug screen, urine [[id number]]  (Normal) Collected:  12/03/19 2124    Lab Status:  Final result Specimen:  Urine, Clean Catch Updated:  12/03/19 2150     Amph/Meth UR Negative     Barbiturate Ur Negative     Benzodiazepine Urine Negative     Cocaine Urine Negative     Methadone Urine Negative     Opiate Urine Negative     PCP Ur Negative     THC Urine Negative    Narrative:       FOR MEDICAL PURPOSES ONLY  IF CONFIRMATION NEEDED PLEASE CONTACT THE LAB WITHIN 5 DAYS      Drug Screen Cutoff Levels:  AMPHETAMINE/METHAMPHETAMINES  1000 ng/mL  BARBITURATES     200 ng/mL  BENZODIAZEPINES     200 ng/mL  COCAINE      300 ng/mL  METHADONE      300 ng/mL  OPIATES      300 ng/mL  PHENCYCLIDINE     25 ng/mL  THC       50 ng/mL      POCT alcohol breath test [[id number]]  (Normal) Resulted:  12/03/19 2114    Lab Status:  Final result Updated:  12/03/19 2114     EXTBreath Alcohol 0 00                 No orders to display              Procedures  Procedures         ED Course  ED Course as of Dec 04 0257   Tue Dec 03, 2019   2110 Pt seen and examined  52 yo male with depression who has been grieving the loss of his mother last year in December "I think it's starting to catch up to me"  Pt has had worsening depression but also out of meds since his birthday [birth year]  Came in earlier for a refill on meds and states "I thought I could do it on my own" but returns after trying to jump out in front of a car to end his life in front of   Denies drugs or ETOH  Will check BAT, RUDS and have ED crisis consult  Willing to sign 201       2240 Pt signed 201, given nightly dose of trazodone  SLQ reviewing his case  MDM      Disposition  Final diagnoses:   Depression with suicidal ideation   Suicidal behavior     Time reflects when diagnosis was documented in both MDM as applicable and the Disposition within this note     Time User Action Codes Description Comment    12/3/2019 10:40 PM Aparna Landrum [F32 9,  R42 851] Depression with suicidal ideation     12/3/2019 10:40 PM Aparna Landrum [R46 89] Suicidal behavior       ED Disposition     ED Disposition Condition Date/Time Comment    Transfer to [facility] Dec 3, 2019 10:40 PM Ravin Guevara should be transferred out to psych facility and has been medically cleared          MD Documentation      Most Recent Value   Patient Condition  The patient has been stabilized such that within reasonable medical probability, no material deterioration of the patient condition or the condition of the unborn child(aida) is likely to result from the transfer   Reason for Transfer  Level of Care needed not available at this facility   Benefits of Transfer  Specialized equipment and/or services available at the receiving facility (Include comment)________________________ [facility] facility]   Risks of Transfer  Increased discomfort during transfer   Accepting Physician  Dr Terry Dodson Name, Baldev Earl    (Name & Tel number)  [place]   [phone]   Transported by Anisa Crowley and Unit #)  Luther Sanderson   Sending MD Estela Balderas   Provider Certification  General risk, such as traffic hazards, adverse weather conditions, rough terrain or turbulence, possible failure of equipment (including vehicle or aircraft), or consequences of actions of persons outside the control of the transport personnel      RN Documentation      [address] Name, Baldev Earl    (Name & Tel number)  [place]   [phone]   Transported by (Company and Unit #)  [facility]      Follow-up Information    None         Patient's Medications   Discharge Prescriptions    No medications on file     No discharge procedures on file      ED Provider  Electronically Signed by           Anahi Hickman DO  12/04/19 9246

## 2019-12-04 NOTE — CONSULTS
Patient seen and examined  Patient had recent admission on 11/06/2019 [facility] [place] [facility]"" for homicidal thoughts  Presented overnight to [facility] and transferred here to [facility] for psychiatric admission for bipolar affective disorder  He denies any changes to his medical health since recent admission  I find no changes since recent admission  Continue home medications and monitor blood glucose  Medically stable for inpatient psychiatric evaluation and treatment      Merlinda Noble, PA-C  12/4/2019

## 2019-12-04 NOTE — ED NOTES
Crisis worker called [facility] earlier attempting to schedule transport for this patient  Spoke with Sina Cortez, who took the number and said he would call back when he knew when transport was available  The patient was accepted to [facility], but the unit cannot accept him until 4am or later  Just called [facility] again and there was no answer  Will try again in a few minutes

## 2019-12-04 NOTE — CONSULTS
Consultation - Paresh Pabon 51 y o  male MRN: [id number]    Unit/Bed#: [facility] 251-01 Encounter: [id number]      Assessment/Plan     Assessment/Plan:  Type 2 diabetes with hyperglycemia:  Continue Lantus 22 units every morning  Will add Humalog 4 units with each meal   Continue scale for correction  Will continue to follow and adjust regimen as needed  Monitor closely for hypoglycemia  CC: Diabetes Consult    History of Present Illness     HPI: Paresh Pabon is a 51y o  year old male with type 2 diabetes which was just recently diagnosed and started on insulin  No known complications at this time  He denied polyuria, polydipsia, nocturia to me  Does report adequate appetite  He is admitted to the behavioral health unit with worsening depression as well as suicidal ideations  Consults    Review of Systems   Constitutional: Positive for unexpected weight change (loss)  Negative for appetite change  HENT: Negative for congestion and trouble swallowing  Eyes: Negative for visual disturbance  Respiratory: Negative for shortness of breath  Cardiovascular: Negative for palpitations and leg swelling  Gastrointestinal: Negative for abdominal pain, nausea and vomiting  Endocrine: Positive for polyphagia  Negative for polydipsia and polyuria  Genitourinary: Negative for difficulty urinating and frequency  Musculoskeletal: Negative for arthralgias  Skin: Negative for rash  Neurological: Negative for dizziness and weakness     Psychiatric/Behavioral:        See h and p       Historical Information   Past Medical History:   Diagnosis Date    Anxiety     Asthma     Bipolar 1 disorder ([place] [address] )     Depression     Diabetes mellitus ([place] [address] )     Psychiatric disorder      Past Surgical History:   Procedure Laterality Date    APPENDECTOMY       Social History   Social History     Substance and Sexual Activity   Alcohol Use Not Currently    Frequency: Monthly or less    Drinks per session: 1 or 2    Binge frequency: Never    Comment: States has had no alcohol in at least a year     Social History     Substance and Sexual Activity   Drug Use No     Social History     Tobacco Use   Smoking Status Current Every Day Smoker    Packs/day: 0 25    Types: Cigarettes    Last attempt to quit: 2019    Years since quittin 0   Smokeless Tobacco Never Used   Tobacco Comment    Smokes less radha 1/4 pack/day Would like to quit     Family History: History reviewed  No pertinent family history      Meds/Allergies   Current Facility-Administered Medications   Medication Dose Route Frequency Provider Last Rate Last Dose    acetaminophen (TYLENOL) tablet 650 mg  650 mg Oral Q6H PRGORDO Medrano MD        acetaminophen (TYLENOL) tablet 650 mg  650 mg Oral Q4H AMBROSE Medrano MD        acetaminophen (TYLENOL) tablet 975 mg  975 mg Oral Q6H PRGORDO Medrano MD        aluminum-magnesium hydroxide-simethicone (MYLANTA) 200-200-20 mg/5 mL oral suspension 30 mL  30 mL Oral Q4H AMBROSE Medrano MD        ARIPiprazole (ABILIFY) tablet 5 mg  5 mg Oral Daily Blue Bronson        benztropine (COGENTIN) injection 1 mg  1 mg Intramuscular Q1H PRGORDO Medrano MD        fenofibrate (TRICOR) tablet 48 mg  48 mg Oral Daily Uriel Johnston PA-C        fluticasone (FLOVENT HFA) 110 MCG/ACT inhaler 1 puff  1 puff Inhalation Q12H [address] Uriel Johnston PA-C        haloperidol lactate (HALDOL) injection 5 mg  5 mg Intramuscular Q6H PRGORDO Medrano MD        hydrOXYzine HCL (ATARAX) tablet 50 mg  50 mg Oral Q4H PRGORDO Medrano MD        insulin glargine (LANTUS) subcutaneous injection 22 Units 0 22 mL  22 Units Subcutaneous QAM Uriel Johnston PA-C   22 Units at 19 0902    insulin lispro (HumaLOG) 100 units/mL subcutaneous injection 1-5 Units  1-5 Units Subcutaneous TID With Meals Uriel Johnston PA-C        lisinopril (ZESTRIL) tablet 5 mg  5 mg Oral Daily Uriel Johnston JASVIR        magnesium hydroxide (MILK OF MAGNESIA) 400 mg/5 mL oral suspension 30 mL  30 mL Oral Daily PRN Main Tomas MD        OLANZapine (ZyPREXA) IM injection 5 mg  5 mg Intramuscular Q3H PRN Yessi Alfredo MD        risperiDONE (RisperDAL M-TABS) dispersible tablet 1 mg  1 mg Oral Q3H PRN Yessi Alfredo MD        traZODone (DESYREL) tablet 50 mg  50 mg Oral HS PRN Yessi Alfredo MD         Allergies   Allergen Reactions    Ketorolac Other (See Comments)     Pt states he has mood disturbances, agitation if he takes too much      Toradol [Ketorolac Tromethamine]     Latex Rash       Objective   Vitals: Blood pressure 111/71, pulse 75, temperature (!) 97 1 °F (36 2 °C), temperature source Tympanic, resp  rate 18, height 5' 6" (1 676 m), weight 62 2 kg (137 lb 2 oz), SpO2 96 %  No intake or output data in the 24 hours ending 12/04/19 1226  Invasive Devices     None                 Physical Exam   Constitutional: He is oriented to person, place, and time  He appears well-developed and well-nourished  No distress  HENT:   Head: Normocephalic and atraumatic  Neck: Normal range of motion  Neck supple  Cardiovascular: Normal rate and regular rhythm  Pulmonary/Chest: Effort normal and breath sounds normal  No respiratory distress  Abdominal: Soft  Bowel sounds are normal    Musculoskeletal: Normal range of motion  He exhibits no edema  Neurological: He is alert and oriented to person, place, and time  He exhibits normal muscle tone  Skin: Skin is warm and dry  No rash noted  He is not diaphoretic  Psychiatric: He has a normal mood and affect  His behavior is normal    Vitals reviewed  The history was obtained from the review of the chart, patient      Lab Results:       Lab Results   Component Value Date    WBC 8 18 11/06/2019    HGB 15 4 11/06/2019    HCT 46 0 11/06/2019    MCV 89 11/06/2019     11/06/2019     Lab Results   Component Value Date/Time    BUN 20 11/24/2019 10:16 PM    K 4 1 11/24/2019 10:16 PM     11/24/2019 10:16 PM    CO2 30 11/24/2019 10:16 PM    CREATININE 1 32 (H) 11/24/2019 10:16 PM    AST 15 (L) 10/19/2019 01:57 AM    ALT 30 10/19/2019 01:57 AM    ALB 3 8 10/19/2019 01:57 AM     No results for input(s): CHOL, HDL, LDL, TRIG, VLDL in the last 72 hours  No results found for: Bryn Yoder  POC Glucose (mg/dl)   Date Value   12/04/2019 312 (H)   12/04/2019 377 (H)   12/04/2019 277 (H)   11/24/2019 115   11/24/2019 409 (H)   11/16/2019 360 (H)   11/07/2019 201 (H)   11/07/2019 83   11/07/2019 116   11/07/2019 165 (H)       Imaging Studies: I have personally reviewed pertinent reports  XR lumbar spine 2 or 3 views [[id number]] Collected: 11/17/19 1121   Order Status: Completed Updated: 11/17/19 1150   Narrative:     LUMBAR SPINE    INDICATION:   r/o fracture   Patient presents for medication refill and back pain   Hit in back one day ago with a forklift   Denies falling  COMPARISON:  None    VIEWS:  XR SPINE LUMBAR 2 OR 3 VIEWS INJURY  Images: 4    FINDINGS:  There are 5 non rib bearing lumbar vertebral bodies  There is no evidence of acute fracture or destructive osseous lesion   Old ununited fracture right transverse process L1  Alignment is unremarkable  Age-appropriate lumbar degenerative changes are seen  The pedicles appear intact  Soft tissues are unremarkable  Impression:     Mild degenerative changes  Portions of the record may have been created with voice recognition software  Occasional wrong word or "sound a like" substitutions may have occurred due to the inherent limitations of voice recognition software  Read the chart carefully and recognize, using context, where substitutions have occurred

## 2019-12-04 NOTE — CASE MANAGEMENT
CM met with pt to discuss reasons for admission, dc planning and treatment goals  Pt is a [place] from [address] [place] [address] for  with plan to walk in front of traffic  Pt cites stressors as being the anniversary of his mother's death in December of 2018  Pt states that he is currently employed at [employer]  Pt reports that he is in treatment with [place] [facility] and [facility]  Next appointments are in the new year  CM will bring forward these appointments before dc  Pt reports that he uses [employer] [place] [employer] and [employer] to travel to work and to get to appointments  Pt received SSI and gets approximately $750  Pt lives with his cousin and has stable housing  Pt reports that he has a 15year old daughter who is taken care of by his Aunt  Pt reports that he raised her most of his life after the daughter's mother walked out on the relationship  Pt reports that her daughter is well  Pt reports that he has had several inpatient hospitalizations over the last several years including [facility] as well [facility]  Pt has also had numerous encounters in the ED  CM will investigate a possible referral to [facility]      Pt denies legal hx  [facility]  Pt states that he has family members who are still alive and they are supportive  Pt signed UZIEL's for the following people:    Thien Fried (cousin) [phone]; [facility] ([phone]), [facility] ([phone])

## 2019-12-04 NOTE — TREATMENT PLAN
TREATMENT PLAN REVIEW - [facility] Ynes 51 y o  1970 male MRN: [id number]    [address] [place] [address] Room / Bed: [facility] 251/[facility] 251-01 Encounter: [id number]        Admit Date/Time:  12/4/2019  4:36 AM    Treatment Team: Attending Provider: Brittany Frausto; Charge Nurse: Radha Christine RN; Patient Care Technician: Melissa Hardin; Registered Nurse: Yas Elizabeth RN; Care Manager: Jonah Llamas RN; Charge Nurse: Connor Hudson RN; Patient Care Technician: Joeann Bosworth;  Patient Care Technician: Jazmin Israel; Medications RN: Melyssa Valle RN; Occupational Therapy Assistant: LUCILLE Funez; : Dank Merlos    Diagnosis: Principal Problem:    Bipolar affective disorder, currently depressed, moderate ([facility] )  Active Problems:    HTN (hypertension)    Type 1 diabetes mellitus ([facility] )    HLD (hyperlipidemia)    Patient Strengths/Assets: cooperative, good past treatment response, motivation for treatment/growth, patient is on a voluntary commitment      Patient Barriers/Limitations: limited motivation, noncompliant with medication    Short Term Goals: decrease in depressive symptoms, decrease in anxiety symptoms, decrease in suicidal thoughts    Long Term Goals: improvement in depression, improvement in anxiety, stabilization of mood, free of suicidal thoughts, acceptance of need for psychiatric medications, acceptance of need for psychiatric treatment, acceptance of need for psychiatric follow up after discharge    Progress Towards Goals: starting psychiatric medications as prescribed    Recommended Treatment: medication management, patient medication education, group therapy, milieu therapy, continued Behavioral Health psychiatric evaluation/assessment process     Treatment Frequency: daily medication monitoring, group and milieu therapy daily, monitoring through interdisciplinary rounds, monitoring through weekly patient care conferences    Expected Discharge Date: 7 days - 12/11/2019    Discharge Plan: referral for outpatient medication management with a psychiatrist, referral for outpatient psychotherapy    Treatment Plan Created/Updated By: Darlene Sy

## 2019-12-05 LAB
GLUCOSE SERPL-MCNC: 145 MG/DL (ref 65–140)
GLUCOSE SERPL-MCNC: 192 MG/DL (ref 65–140)
GLUCOSE SERPL-MCNC: 228 MG/DL (ref 65–140)

## 2019-12-05 PROCEDURE — 82948 REAGENT STRIP/BLOOD GLUCOSE: CPT

## 2019-12-05 PROCEDURE — 99232 SBSQ HOSP IP/OBS MODERATE 35: CPT | Performed by: PSYCHIATRY & NEUROLOGY

## 2019-12-05 RX ADMIN — INSULIN LISPRO 1 UNITS: 100 INJECTION, SOLUTION INTRAVENOUS; SUBCUTANEOUS at 11:35

## 2019-12-05 RX ADMIN — FLUTICASONE PROPIONATE 1 PUFF: 110 AEROSOL, METERED RESPIRATORY (INHALATION) at 20:33

## 2019-12-05 RX ADMIN — ARIPIPRAZOLE 5 MG: 5 TABLET ORAL at 09:08

## 2019-12-05 RX ADMIN — INSULIN GLARGINE 22 UNITS: 100 INJECTION, SOLUTION SUBCUTANEOUS at 09:07

## 2019-12-05 RX ADMIN — INSULIN LISPRO 4 UNITS: 100 INJECTION, SOLUTION INTRAVENOUS; SUBCUTANEOUS at 09:06

## 2019-12-05 RX ADMIN — FLUTICASONE PROPIONATE 1 PUFF: 110 AEROSOL, METERED RESPIRATORY (INHALATION) at 09:08

## 2019-12-05 RX ADMIN — INSULIN LISPRO 4 UNITS: 100 INJECTION, SOLUTION INTRAVENOUS; SUBCUTANEOUS at 11:35

## 2019-12-05 RX ADMIN — FENOFIBRATE 48 MG: 48 TABLET ORAL at 09:07

## 2019-12-05 RX ADMIN — INSULIN LISPRO 2 UNITS: 100 INJECTION, SOLUTION INTRAVENOUS; SUBCUTANEOUS at 09:06

## 2019-12-05 RX ADMIN — INSULIN LISPRO 4 UNITS: 100 INJECTION, SOLUTION INTRAVENOUS; SUBCUTANEOUS at 16:52

## 2019-12-05 NOTE — PROGRESS NOTES
Progress Note - [id number] [facility] [place] 51 y o  male MRN: [id number]  Unit/Bed#: [facility] 251-01 Encounter: [id number]    Assessment/Plan   Principal Problem:    Bipolar affective disorder, currently depressed, moderate ([facility] [address] )  Active Problems:    HTN (hypertension)    Type 1 diabetes mellitus ([facility] [address] )    HLD (hyperlipidemia)    Subjective:  Patient is compliant with medications with no acute side effects  Patient is showing gradual improvement in mood and anxiety with reduction and depression and passive death wishes  Patient talked about death of his mother 1 year ago close to [place]  Patient acknowledged the grief process but understand that medications are helping him with underlying mood symptoms but understand the importance of participating in therapy on discharge  Patient was seen out in milieu today attending groups and is currently consenting for safety      Current Medications:    Current Facility-Administered Medications:  acetaminophen 650 mg Oral Q6H PRN Deric Reeder MD   acetaminophen 650 mg Oral Q4H PRN Main Tomas MD   acetaminophen 975 mg Oral Q6H PRN Main Tomas MD   aluminum-magnesium hydroxide-simethicone 30 mL Oral Q4H PRN Deric Reeder MD   ARIPiprazole 5 mg Oral Daily [facility]   benztropine 1 mg Intramuscular Q1H PRN Deric Reeder MD   fenofibrate 48 mg Oral Daily Caitlin Dillon PA-C   fluticasone 1 puff Inhalation Q12H [address] Caitlin Dillon PA-C   haloperidol lactate 5 mg Intramuscular Q6H PRN Main Tomas MD   hydrOXYzine HCL 50 mg Oral Q4H PRN Main Tomas MD   insulin glargine 22 Units Subcutaneous QAM Caitlin Dillon PA-C   insulin lispro 1-5 Units Subcutaneous TID With Meals Caitlin Dillon PA-C   insulin lispro 4 Units Subcutaneous TID With Meals Andrea Kilpatrick DO   lisinopril 5 mg Oral Daily Caitlin Dillon PA-C   magnesium hydroxide 30 mL Oral Daily PRN Main Tomas MD   OLANZapine 5 mg Intramuscular Q3H PRN Asad Chin MD   risperiDONE 1 mg Oral Q3H PRN Asad Chin MD   traZODone 50 mg Oral HS PRN Asad Chin MD       Behavioral Health Medications: all current active meds have been reviewed  Vital signs in last 24 hours:  Temp:  [97 °F (36 1 °C)-97 5 °F (36 4 °C)] 97 5 °F (36 4 °C)  HR:  [75-97] 97  Resp:  [16-18] 16  BP: ()/(57-71) 95/57    Laboratory results:    I have personally reviewed all pertinent laboratory/tests results  Labs in last 72 hours: No results for input(s): WBC, RBC, HGB, HCT, PLT, RDW, NEUTROABS, SODIUM, K, CL, CO2, BUN, CREATININE, GLUCOSE, GLUC, GLUF, CALCIUM, AST, ALT, ALKPHOS, TP, ALB, TBILI, CHOLESTEROL, HDL, TRIG, LDLCALC, VALPROICTOT, CARBAMAZEPIN, LITHIUM, AMMONIA, [id number], FREET4, T3FREE, PREGTESTUR, PREGSERUM, HCG, HCGQUANT, RPR in the last 72 hours      Invalid input(s):  RBC  Admission Labs:   Admission on 12/04/2019   Component Date Value    POC Glucose 12/04/2019 377*    POC Glucose 12/04/2019 312*    POC Glucose 12/04/2019 180*    POC Glucose 12/05/2019 228*       Psychiatric Review of Systems:  Behavior over the last 24 hours:  Slow improvement  Sleep: normal  Appetite: normal  Medication side effects: No  ROS: no complaints    Mental Status Evaluation:  Appearance:  casually dressed   Behavior:  guarded   Speech:  soft   Mood:  anxious and depressed   Affect:  constricted   Language naming objects   Thought Process:  circumstantial   Thought Content:  obsessions   Perceptual Disturbances: None   Risk Potential: Suicidal Ideations without plan, Homicidal Ideations none and Potential for Aggression No   Sensorium:  person, place and time/date   Cognition:  grossly intact   Consciousness:  awake    Attention: attention span appeared shorter than expected for age   Insight:  limited   Judgment: limited   Intellect fair   Gait/Station: normal gait/station   Motor Activity: no abnormal movements     Memory: Short and long term memory  fair     Progress Toward Goals: progressing    Recommended Treatment:   Continue with group therapy, milieu therapy and occupational therapy  Continue following current medications:   Current Facility-Administered Medications:  acetaminophen 650 mg Oral Q6H PRN Deric Reeder MD   acetaminophen 650 mg Oral Q4H PRN Deric Reeder MD   acetaminophen 975 mg Oral Q6H PRN Main Tomas MD   aluminum-magnesium hydroxide-simethicone 30 mL Oral Q4H PRN Deric Reeder MD   ARIPiprazole 5 mg Oral Daily [facility]   benztropine 1 mg Intramuscular Q1H PRN Deric Reeder MD   fenofibrate 48 mg Oral Daily Caitlin Dillon PA-C   fluticasone 1 puff Inhalation Q12H [address] Caitlin Dillon PA-C   haloperidol lactate 5 mg Intramuscular Q6H PRN Deric Reeder MD   hydrOXYzine HCL 50 mg Oral Q4H PRN Main Tomas MD   insulin glargine 22 Units Subcutaneous QAM Caitlin Dillon PA-C   insulin lispro 1-5 Units Subcutaneous TID With Meals Caitlin Dillon PA-C   insulin lispro 4 Units Subcutaneous TID With Meals Andrea Kilpatrick DO   lisinopril 5 mg Oral Daily Caitlin Dillon PA-C   magnesium hydroxide 30 mL Oral Daily PRN Main Tomas MD   OLANZapine 5 mg Intramuscular Q3H PRN Main Tomas MD   risperiDONE 1 mg Oral Q3H PRN Deric Reeder MD   traZODone 50 mg Oral HS PRN Deric Reeder MD       Risks, benefits and possible side effects of Medications:   Risks, benefits, and possible side effects of medications explained to patient and patient verbalizes understanding  This note has been constructed using a voice recognition system  There may be translation, syntax,  or grammatical errors  If you have any questions, please contact the dictating provider

## 2019-12-05 NOTE — PROGRESS NOTES
Pt childlike  Walking halls joking with staff and peers  Pt telling staff multiple times that his cousin misses him at home  Called cousin to tell her what he ate today  Pt said that physician told him he could leave next week and asked writer if physician sticks to his word  Reassured pt  Pt denies SI  Compliant with meds throughout day and attended groups

## 2019-12-05 NOTE — PROGRESS NOTES
Pt pleasant and with bright affect this evening  Visible on unit and social with peers and joking with staff  Attended wrap up group and participated appropriately  Denies thoughts of self harm

## 2019-12-05 NOTE — PROGRESS NOTES
12/05/19 0734   Team Meeting   Meeting Type Daily Rounds   Team Members Present   Team Members Present Physician;Nurse;;Occupational Therapist   Physician Team Member Dr Roselia Carcamo, NO   Nursing Team Member [facility] Management Team Member Luis Eduardo/ [address]   OT Team Member Kristopher   Patient/Family Present   Patient Present No   Patient's Family Present No     Pt quite childlike  Reports depression but denies SI  Abilify started  [place] will be visiting sometime this week  Can return to live with his cousin  Endo came yesterday to see pt

## 2019-12-05 NOTE — PROGRESS NOTES
12/05/19 8451   Team Meeting   Meeting Type Tx Team Meeting   Team Members Present   Team Members Present Physician;Nurse;   Physician Team Member Dr Marietta Noe Team Member [facility] Management Team Member Luis Eduardo   Patient/Family Present   Patient Present Yes   Patient's Family Present No     TX team discussed diagnosis of Bipolar affective disorder  Pt is on Abilify  Likely dc early next week  Plan to return to [place]  Pt cannot return to [employer] due to non compliance  Will need outpatient provider  Pt identifies good family support, stable housing and a job he enjoys as his primary strengths

## 2019-12-05 NOTE — PROGRESS NOTES
Pt is pleasant and polite  Childlike  Slowly wandering the hallway  Pt denies SI/HI/AVH  Denies anxiety and depression  Says he is happy with planned discharge date of next Monday/Tuesday  He states that someone will help him with his insulin following discharge and is happy about it  Plans to attend groups today  Enjoyed seeing therapy dogs yesterday

## 2019-12-06 LAB
GLUCOSE SERPL-MCNC: 130 MG/DL (ref 65–140)
GLUCOSE SERPL-MCNC: 190 MG/DL (ref 65–140)
GLUCOSE SERPL-MCNC: 69 MG/DL (ref 65–140)
GLUCOSE SERPL-MCNC: 90 MG/DL (ref 65–140)

## 2019-12-06 PROCEDURE — 99232 SBSQ HOSP IP/OBS MODERATE 35: CPT | Performed by: PSYCHIATRY & NEUROLOGY

## 2019-12-06 PROCEDURE — 82948 REAGENT STRIP/BLOOD GLUCOSE: CPT

## 2019-12-06 RX ORDER — ARIPIPRAZOLE 10 MG/1
10 TABLET ORAL DAILY
Status: DISCONTINUED | OUTPATIENT
Start: 2019-12-06 | End: 2019-12-10 | Stop reason: HOSPADM

## 2019-12-06 RX ADMIN — FENOFIBRATE 48 MG: 48 TABLET ORAL at 09:28

## 2019-12-06 RX ADMIN — FLUTICASONE PROPIONATE 1 PUFF: 110 AEROSOL, METERED RESPIRATORY (INHALATION) at 09:30

## 2019-12-06 RX ADMIN — INSULIN LISPRO 4 UNITS: 100 INJECTION, SOLUTION INTRAVENOUS; SUBCUTANEOUS at 09:35

## 2019-12-06 RX ADMIN — INSULIN LISPRO 1 UNITS: 100 INJECTION, SOLUTION INTRAVENOUS; SUBCUTANEOUS at 12:49

## 2019-12-06 RX ADMIN — INSULIN LISPRO 4 UNITS: 100 INJECTION, SOLUTION INTRAVENOUS; SUBCUTANEOUS at 12:49

## 2019-12-06 RX ADMIN — ARIPIPRAZOLE 10 MG: 10 TABLET ORAL at 09:28

## 2019-12-06 RX ADMIN — FLUTICASONE PROPIONATE 1 PUFF: 110 AEROSOL, METERED RESPIRATORY (INHALATION) at 21:07

## 2019-12-06 RX ADMIN — INSULIN GLARGINE 22 UNITS: 100 INJECTION, SOLUTION SUBCUTANEOUS at 09:33

## 2019-12-06 NOTE — PROGRESS NOTES
12/06/19 0714   Team Meeting   Meeting Type Daily Rounds   Team Members Present   Team Members Present Physician;Nurse;;Occupational Therapist   Physician Team Member Dr David Bhat, Dr Audrey MOTTAC   Nursing Team Member [facility] Management Team Member Luis Eduardo/ [address]   OT Team Member Kristopher   Patient/Family Present   Patient Present No   Patient's Family Present No     Pleasant  Expected dc of Tuesday  Blood sugar is 130  Abilify will be increased to 10 mg due pt being hypomanic

## 2019-12-06 NOTE — PROGRESS NOTES
Pt pleasant on approach  Awake and walking halls at start of shift  Pt denies SI  Denies all symptoms  Reports doing well on medications  Slept well overnight  No questions/concerns

## 2019-12-06 NOTE — PROGRESS NOTES
Progress Note - [address] [facility] [place] 51 y o  male MRN: [id number]  Unit/Bed#: [facility] 251-01 Encounter: [id number]    Assessment/Plan   Principal Problem:    Bipolar affective disorder, currently depressed, moderate ([facility] [address] )  Active Problems:    HTN (hypertension)    Type 1 diabetes mellitus ([facility] [address] )    HLD (hyperlipidemia)      Subjective: Patient is compliant with psychiatric and medical medications  Denies any Medication side effects  Sleeping at night and eating well during the day  On interview affect is labile and patient goes from elated and smiling to tearful at times  Abilify increased to 10mg daily to provide additional mood stabilization  Participates in groups and is looking forward to discharge  Will continue to monitor for mood stability over the weekend and continue planning for discharge        Current Medications:    Current Facility-Administered Medications:  acetaminophen 650 mg Oral Q6H PRN Deric Reeder MD   acetaminophen 650 mg Oral Q4H PRN Main Tomas MD   acetaminophen 975 mg Oral Q6H PRN Main Toams MD   aluminum-magnesium hydroxide-simethicone 30 mL Oral Q4H PRN Deric Reeder MD   ARIPiprazole 10 mg Oral Daily [facility]   benztropine 1 mg Intramuscular Q1H PRN Deric Reeder MD   fenofibrate 48 mg Oral Daily Caitlin Dillon PA-C   fluticasone 1 puff Inhalation Q12H [address] Caitlin Dillon PA-C   haloperidol lactate 5 mg Intramuscular Q6H PRN Main Tomas MD   hydrOXYzine HCL 50 mg Oral Q4H PRN Main Tomas MD   insulin glargine 22 Units Subcutaneous QAM Caitlin Dillon PA-C   insulin lispro 1-5 Units Subcutaneous TID With Meals Caitlin Dillon PA-C   insulin lispro 4 Units Subcutaneous TID With Meals Andrea Kilpatrick DO   lisinopril 5 mg Oral Daily Caitlin Dillon PA-C   magnesium hydroxide 30 mL Oral Daily PRN Main Tomas MD   OLANZapine 5 mg Intramuscular Q3H PRN Deric Reeder MD   risperiDONE 1 mg Oral Q3H PRN Main PIERRE MD Genoveva   traZODone 50 mg Oral HS PRN Chirag Coronado MD       Behavioral Health Medications: all current active meds have been reviewed  Vital signs in last 24 hours:  Temp:  [97 7 °F (36 5 °C)] 97 7 °F (36 5 °C)  HR:  [65-96] 93  Resp:  [16-17] 16  BP: ()/(53-68) 105/55    Laboratory results:  I have personally reviewed all pertinent laboratory/tests results  Psychiatric Review of Systems:  Behavior over the last 24 hours:  improved  Sleep: normal  Appetite: normal  Medication side effects: No  ROS: no complaints    Mental Status Evaluation:  Appearance:  older than stated age   Behavior:  restless and fidgety   Speech:  normal pitch and normal volume   Mood:  euphoric   Affect:  labile   Language naming objects   Thought Process:  normal   Thought Content:  normal   Perceptual Disturbances: None   Risk Potential: Suicidal Ideations none   Sensorium:  person, place and time/date   Cognition:  grossly intact   Consciousness:  alert    Attention: attention span and concentration were age appropriate   Insight:  limited   Judgment: limited   Intellect    Gait/Station: normal gait/station   Motor Activity: no abnormal movements     Memory: Short and long term memory  Intact     Progress Toward Goals: Significant improvements in mood, monitor for emergence of manic/hypomanic symptoms  Recommended Treatment:     Continue with group therapy, milieu therapy and occupational therapy      Continue following current medications:   Current Facility-Administered Medications:  acetaminophen 650 mg Oral Q6H PRN Main Tomas MD   acetaminophen 650 mg Oral Q4H PRN Main Tomas MD   acetaminophen 975 mg Oral Q6H PRN Main Tomas MD   aluminum-magnesium hydroxide-simethicone 30 mL Oral Q4H PRN Main Tomas MD   ARIPiprazole 10 mg Oral Daily [facility]   benztropine 1 mg Intramuscular Q1H PRN Chirag Coronado MD   fenofibrate 48 mg Oral Daily Jose Esteban PA-C   fluticasone 1 puff Inhalation Q12H [facility] Millicent Schultz PA-C   haloperidol lactate 5 mg Intramuscular Q6H PRN Stormy Dandy, MD   hydrOXYzine HCL 50 mg Oral Q4H PRN Main Tomas MD   insulin glargine 22 Units Subcutaneous QAM Millicent Schultz PA-C   insulin lispro 1-5 Units Subcutaneous TID With Meals Millicent Schultz PA-C   insulin lispro 4 Units Subcutaneous TID With Meals Madyson First, DO   lisinopril 5 mg Oral Daily Millicent Schultz PA-C   magnesium hydroxide 30 mL Oral Daily PRN Main Tomas MD   OLANZapine 5 mg Intramuscular Q3H PRN Main Tomas MD   risperiDONE 1 mg Oral Q3H PRN Stormy Dandy, MD   traZODone 50 mg Oral HS PRN Stormy Dandy, MD       Risks, benefits and possible side effects of Medications:   Risks, benefits, and possible side effects of medications explained to patient and patient verbalizes understanding

## 2019-12-07 LAB
GLUCOSE SERPL-MCNC: 107 MG/DL (ref 65–140)
GLUCOSE SERPL-MCNC: 118 MG/DL (ref 65–140)
GLUCOSE SERPL-MCNC: 143 MG/DL (ref 65–140)

## 2019-12-07 PROCEDURE — 99232 SBSQ HOSP IP/OBS MODERATE 35: CPT | Performed by: PSYCHIATRY & NEUROLOGY

## 2019-12-07 PROCEDURE — 82948 REAGENT STRIP/BLOOD GLUCOSE: CPT

## 2019-12-07 RX ADMIN — INSULIN LISPRO 4 UNITS: 100 INJECTION, SOLUTION INTRAVENOUS; SUBCUTANEOUS at 16:50

## 2019-12-07 RX ADMIN — INSULIN GLARGINE 22 UNITS: 100 INJECTION, SOLUTION SUBCUTANEOUS at 09:10

## 2019-12-07 RX ADMIN — INSULIN LISPRO 4 UNITS: 100 INJECTION, SOLUTION INTRAVENOUS; SUBCUTANEOUS at 12:12

## 2019-12-07 RX ADMIN — FLUTICASONE PROPIONATE 1 PUFF: 110 AEROSOL, METERED RESPIRATORY (INHALATION) at 22:04

## 2019-12-07 RX ADMIN — FENOFIBRATE 48 MG: 48 TABLET ORAL at 09:08

## 2019-12-07 RX ADMIN — ARIPIPRAZOLE 10 MG: 10 TABLET ORAL at 08:21

## 2019-12-07 RX ADMIN — INSULIN LISPRO 4 UNITS: 100 INJECTION, SOLUTION INTRAVENOUS; SUBCUTANEOUS at 09:11

## 2019-12-07 RX ADMIN — FLUTICASONE PROPIONATE 1 PUFF: 110 AEROSOL, METERED RESPIRATORY (INHALATION) at 09:08

## 2019-12-07 NOTE — PROGRESS NOTES
Pt lingering near RN station this AM, asks when doctor will be here but denies having any specific concerns  Childlike, pleasant  No SI  Reports doing well on medicines  No questions/concerns

## 2019-12-07 NOTE — PROGRESS NOTES
Pt calm and cooperative  Child-like  At times, hangs around the nurse's desk  Easily redirectable  Denies SI, HI and AVH

## 2019-12-07 NOTE — PROGRESS NOTES
Progress Note - [address] [facility] [place] 51 y o  male MRN: [id number]  Unit/Bed#: [facility] 251-01 Encounter: [id number]    Assessment/Plan   Principal Problem:    Bipolar affective disorder, currently depressed, moderate ([facility] [address] )  Active Problems:    HTN (hypertension)    Type 1 diabetes mellitus ([facility] [address] )    HLD (hyperlipidemia)    Subjective:  Patient is compliant with medications with no acute side effects  Patient is showing gradual improvement in mood and anxiety symptoms with reduction in hypomanic symptoms  Patient is less intrusive and more easily redirectable  No signs of side effects from increased dose of Abilify noted  Patient is child-like during evaluation but is currently consenting for safety on the unit  He agreed with plan of not titrating medications dosing further and awaiting for medication response and planning disposition accordingly      Current Medications:    Current Facility-Administered Medications:  acetaminophen 650 mg Oral Q6H PRN Domo Esparza MD   acetaminophen 650 mg Oral Q4H PRN Main Tomas MD   acetaminophen 975 mg Oral Q6H PRN Main Tomas MD   aluminum-magnesium hydroxide-simethicone 30 mL Oral Q4H PRN Domo Esparza MD   ARIPiprazole 10 mg Oral Daily [facility]   benztropine 1 mg Intramuscular Q1H PRN Domo Esparza MD   fenofibrate 48 mg Oral Daily Isabel Sanderson PA-C   fluticasone 1 puff Inhalation Q12H [facility] Isabel Sanderson PA-C   haloperidol lactate 5 mg Intramuscular Q6H PRN Main Tomsa MD   hydrOXYzine HCL 50 mg Oral Q4H PRN Main Tomas MD   insulin glargine 22 Units Subcutaneous QAM Isabel Sanderson PA-C   insulin lispro 1-5 Units Subcutaneous TID With Meals Isabel Sanderson PA-C   insulin lispro 4 Units Subcutaneous TID With Meals Roseline Rothman DO   lisinopril 5 mg Oral Daily Isabel Sanderson PA-C   magnesium hydroxide 30 mL Oral Daily PRN Main Tomas MD   OLANZapine 5 mg Intramuscular Q3H PRN Main PIERRE MD Genove[place]   risperiDONE 1 mg Oral Q3H PRN Vipul Arechiga MD   traZODone 50 mg Oral HS PRN Vipul Arechiga MD       Behavioral Health Medications: all current active meds have been reviewed  Vital signs in last 24 hours:  Temp:  [96 7 °F (35 9 °C)-97 4 °F (36 3 °C)] 97 4 °F (36 3 °C)  HR:  [72-77] 77  Resp:  [16-17] 17  BP: ()/(55-66) 94/55    Laboratory results:    I have personally reviewed all pertinent laboratory/tests results  Labs in last 72 hours: No results for input(s): WBC, RBC, HGB, HCT, PLT, RDW, NEUTROABS, SODIUM, K, CL, CO2, BUN, CREATININE, GLUCOSE, GLUC, GLUF, CALCIUM, AST, ALT, ALKPHOS, TP, ALB, TBILI, CHOLESTEROL, HDL, TRIG, LDLCALC, VALPROICTOT, CARBAMAZEPIN, LITHIUM, AMMONIA, [id number], FREET4, T3FREE, PREGTESTUR, PREGSERUM, HCG, HCGQUANT, RPR in the last 72 hours      Invalid input(s):  RBC  Admission Labs:   Admission on 12/04/2019   Component Date Value    POC Glucose 12/04/2019 377*    POC Glucose 12/04/2019 312*    POC Glucose 12/04/2019 180*    POC Glucose 12/05/2019 228*    POC Glucose 12/05/2019 192*    POC Glucose 12/05/2019 145*    POC Glucose 12/06/2019 130     POC Glucose 12/06/2019 190*    POC Glucose 12/06/2019 69     POC Glucose 12/06/2019 90     POC Glucose 12/07/2019 118        Psychiatric Review of Systems:  Behavior over the last 24 hours:  improving  Sleep: normal  Appetite: normal  Medication side effects: No  ROS: no complaints    Mental Status Evaluation:  Appearance:  casually dressed   Behavior:  guarded   Speech:  soft   Mood:  anxious   Affect:  increased in range   Language naming objects   Thought Process:  circumstantial   Thought Content:  obsessions   Perceptual Disturbances: None   Risk Potential: Suicidal Ideations without plan, Homicidal Ideations none and Potential for Aggression No   Sensorium:  person and place   Cognition:  grossly intact   Consciousness:  awake    Attention: attention span appeared shorter than expected for age Insight:  limited   Judgment: limited   Intellect limited   Gait/Station: normal gait/station   Motor Activity: no abnormal movements     Memory: Short and long term memory  fair     Progress Toward Goals: progressing    Recommended Treatment:     Continue with group therapy, milieu therapy and occupational therapy  Continue following current medications:   Current Facility-Administered Medications:  acetaminophen 650 mg Oral Q6H PRN Asad Chin MD   acetaminophen 650 mg Oral Q4H PRN Asad Chin MD   acetaminophen 975 mg Oral Q6H PRN Main Tomas MD   aluminum-magnesium hydroxide-simethicone 30 mL Oral Q4H PRN Asad Chin MD   ARIPiprazole 10 mg Oral Daily [facility]   benztropine 1 mg Intramuscular Q1H PRN Asad Chin MD   fenofibrate 48 mg Oral Daily Mishel Eric, PA-C   fluticasone 1 puff Inhalation Q12H [address] Mishel Eric, PA-C   haloperidol lactate 5 mg Intramuscular Q6H PRN Asad Chin MD   hydrOXYzine HCL 50 mg Oral Q4H PRN Main Tomas MD   insulin glargine 22 Units Subcutaneous QAM Mishelfrancisco Reyes, PA-C   insulin lispro 1-5 Units Subcutaneous TID With Meals Mishel Reyes, PA-C   insulin lispro 4 Units Subcutaneous TID With Meals Charis Ng DO   lisinopril 5 mg Oral Daily Mishel Eric, PA-C   magnesium hydroxide 30 mL Oral Daily PRN Main Tomas MD   OLANZapine 5 mg Intramuscular Q3H PRN Main Tomas MD   risperiDONE 1 mg Oral Q3H PRN Asad Chin MD   traZODone 50 mg Oral HS PRN Asad Chin MD       Risks, benefits and possible side effects of Medications:   Risks, benefits, and possible side effects of medications explained to patient and patient verbalizes understanding  This note has been constructed using a voice recognition system  There may be translation, syntax,  or grammatical errors  If you have any questions, please contact the dictating provider

## 2019-12-07 NOTE — PROGRESS NOTES
Pt calm, pleasant, and cooperative, denying symptoms  Pt hopeful for discharge soon  Out in milieu for much of shift

## 2019-12-08 LAB
GLUCOSE SERPL-MCNC: 121 MG/DL (ref 65–140)
GLUCOSE SERPL-MCNC: 131 MG/DL (ref 65–140)
GLUCOSE SERPL-MCNC: 174 MG/DL (ref 65–140)

## 2019-12-08 PROCEDURE — 99232 SBSQ HOSP IP/OBS MODERATE 35: CPT | Performed by: PSYCHIATRY & NEUROLOGY

## 2019-12-08 PROCEDURE — 82948 REAGENT STRIP/BLOOD GLUCOSE: CPT

## 2019-12-08 RX ADMIN — ARIPIPRAZOLE 10 MG: 10 TABLET ORAL at 08:20

## 2019-12-08 RX ADMIN — FENOFIBRATE 48 MG: 48 TABLET ORAL at 08:21

## 2019-12-08 RX ADMIN — INSULIN GLARGINE 22 UNITS: 100 INJECTION, SOLUTION SUBCUTANEOUS at 08:22

## 2019-12-08 RX ADMIN — INSULIN LISPRO 4 UNITS: 100 INJECTION, SOLUTION INTRAVENOUS; SUBCUTANEOUS at 08:24

## 2019-12-08 RX ADMIN — INSULIN LISPRO 1 UNITS: 100 INJECTION, SOLUTION INTRAVENOUS; SUBCUTANEOUS at 13:01

## 2019-12-08 RX ADMIN — INSULIN LISPRO 4 UNITS: 100 INJECTION, SOLUTION INTRAVENOUS; SUBCUTANEOUS at 16:02

## 2019-12-08 RX ADMIN — FLUTICASONE PROPIONATE 1 PUFF: 110 AEROSOL, METERED RESPIRATORY (INHALATION) at 08:22

## 2019-12-08 RX ADMIN — INSULIN LISPRO 4 UNITS: 100 INJECTION, SOLUTION INTRAVENOUS; SUBCUTANEOUS at 13:01

## 2019-12-08 RX ADMIN — LISINOPRIL 5 MG: 5 TABLET ORAL at 09:22

## 2019-12-08 RX ADMIN — FLUTICASONE PROPIONATE 1 PUFF: 110 AEROSOL, METERED RESPIRATORY (INHALATION) at 21:29

## 2019-12-08 NOTE — PROGRESS NOTES
Pt awake at start of shift  Walking halls  Pleasant and smiling  Reports sleeping well  Denies all symptoms  Looks forward to upcoming discharge  No questions/concerns

## 2019-12-08 NOTE — PROGRESS NOTES
Progress Note - [address] [facility] [place] 51 y o  male MRN: [id number]  Unit/Bed#: [facility] 251-01 Encounter: [id number]    Assessment/Plan   Principal Problem:    Bipolar affective disorder, currently depressed, moderate ([facility] [address] )  Active Problems:    HTN (hypertension)    Type 1 diabetes mellitus ([facility] [address] )    HLD (hyperlipidemia)    Subjective:  Patient is compliant with medications with no acute side effects  He is tolerating recent increase in Abilify dosing with no acute side effects  He does report slow improvement in mood and anxiety symptoms with patient more easily redirectable, less labile and improvement in euphoric affect  Patient remained child-like but agreed with importance of taking medications on discharge and importance of outpatient follow-up once stable  He is seen more out in milieu attending groups and is currently consenting for safety on the unit      Current Medications:    Current Facility-Administered Medications:  acetaminophen 650 mg Oral Q6H PRN Thien Gonzalez MD   acetaminophen 650 mg Oral Q4H PRN Main Tomas MD   acetaminophen 975 mg Oral Q6H PRN Main Tomas MD   aluminum-magnesium hydroxide-simethicone 30 mL Oral Q4H PRN Thien Gonzalez MD   ARIPiprazole 10 mg Oral Daily Blue Bronson   benztropine 1 mg Intramuscular Q1H PRN Thien Gonzalez MD   fenofibrate 48 mg Oral Daily Carrie Ann PA-C   fluticasone 1 puff Inhalation Q12H [address] Carrie Ann PA-C   haloperidol lactate 5 mg Intramuscular Q6H PRN Main Tomas MD   hydrOXYzine HCL 50 mg Oral Q4H PRN Main Tomas MD   insulin glargine 22 Units Subcutaneous QAM Carrie Ann PA-C   insulin lispro 1-5 Units Subcutaneous TID With Meals Carrie Ann PA-C   insulin lispro 4 Units Subcutaneous TID With Meals Es Cabral DO   lisinopril 5 mg Oral Daily Carrie Ann PA-C   magnesium hydroxide 30 mL Oral Daily PRN Main Tomas MD   OLANZapine 5 mg Intramuscular Q3H PRN Tracey Santos MD   risperiDONE 1 mg Oral Q3H PRN Tracey Santos MD   traZODone 50 mg Oral HS PRN Tracey Santos MD       Behavioral Health Medications: all current active meds have been reviewed  Vital signs in last 24 hours:  Temp:  [96 5 °F (35 8 °C)-97 3 °F (36 3 °C)] 96 5 °F (35 8 °C)  HR:  [91-96] 96  Resp:  [17] 17  BP: (91-96)/(51-61) 91/61    Laboratory results:    I have personally reviewed all pertinent laboratory/tests results  Labs in last 72 hours: No results for input(s): WBC, RBC, HGB, HCT, PLT, RDW, NEUTROABS, SODIUM, K, CL, CO2, BUN, CREATININE, GLUCOSE, GLUC, GLUF, CALCIUM, AST, ALT, ALKPHOS, TP, ALB, TBILI, CHOLESTEROL, HDL, TRIG, LDLCALC, VALPROICTOT, CARBAMAZEPIN, LITHIUM, AMMONIA, [id number], FREET4, T3FREE, PREGTESTUR, PREGSERUM, HCG, HCGQUANT, RPR in the last 72 hours      Invalid input(s):  RBC  Admission Labs:   Admission on 12/04/2019   Component Date Value    POC Glucose 12/04/2019 377*    POC Glucose 12/04/2019 312*    POC Glucose 12/04/2019 180*    POC Glucose 12/05/2019 228*    POC Glucose 12/05/2019 192*    POC Glucose 12/05/2019 145*    POC Glucose 12/06/2019 130     POC Glucose 12/06/2019 190*    POC Glucose 12/06/2019 69     POC Glucose 12/06/2019 90     POC Glucose 12/07/2019 118     POC Glucose 12/07/2019 143*    POC Glucose 12/07/2019 107     POC Glucose 12/08/2019 131        Psychiatric Review of Systems:  Behavior over the last 24 hours:  Slow improvement  Sleep: normal  Appetite: normal  Medication side effects: No  ROS: no complaints    Mental Status Evaluation:  Appearance:  casually dressed   Behavior:  guarded   Speech:  soft   Mood:  anxious   Affect:  increased in range   Language naming objects   Thought Process:  circumstantial   Thought Content:  obsessions   Perceptual Disturbances: None   Risk Potential: Suicidal Ideations without plan, Homicidal Ideations none and Potential for Aggression No   Sensorium:  person, place and time/date Cognition:  grossly intact   Consciousness:  awake    Attention: attention span appeared shorter than expected for age   Insight:  limited   Judgment: limited   Intellect fair   Gait/Station: normal gait/station and normal balance   Motor Activity: no abnormal movements     Memory: Short and long term memory  fair     Progress Toward Goals: slow progress    Recommended Treatment:   Continue with group therapy, milieu therapy and occupational therapy  Continue following current medications:   Current Facility-Administered Medications:  acetaminophen 650 mg Oral Q6H PRN Montse Painting MD   acetaminophen 650 mg Oral Q4H PRN Montse Painting MD   acetaminophen 975 mg Oral Q6H PRN Main Tomas MD   aluminum-magnesium hydroxide-simethicone 30 mL Oral Q4H PRN Montse Painting MD   ARIPiprazole 10 mg Oral Daily [facility]   benztropine 1 mg Intramuscular Q1H PRN Montse Painting MD   fenofibrate 48 mg Oral Daily Samule Cons, PA-C   fluticasone 1 puff Inhalation Q12H [address] Samule Cons, PA-C   haloperidol lactate 5 mg Intramuscular Q6H PRN Montse Painting MD   hydrOXYzine HCL 50 mg Oral Q4H PRN Main Tomas MD   insulin glargine 22 Units Subcutaneous QAM Samule Cons, PA-C   insulin lispro 1-5 Units Subcutaneous TID With Meals Samule Cons, PA-C   insulin lispro 4 Units Subcutaneous TID With Meals Almeta Raring, DO   lisinopril 5 mg Oral Daily Samule Cons, PA-C   magnesium hydroxide 30 mL Oral Daily PRN Main Tomas MD   OLANZapine 5 mg Intramuscular Q3H PRN Main Tomas MD   risperiDONE 1 mg Oral Q3H PRN Montse Painting MD   traZODone 50 mg Oral HS PRN Montse Painting MD       Risks, benefits and possible side effects of Medications:   Risks, benefits, and possible side effects of medications explained to patient and patient verbalizes understanding  This note has been constructed using a voice recognition system      There may be translation, syntax,  or grammatical errors  If you have any questions, please contact the dictating provider

## 2019-12-09 LAB
GLUCOSE SERPL-MCNC: 142 MG/DL (ref 65–140)
GLUCOSE SERPL-MCNC: 172 MG/DL (ref 65–140)
GLUCOSE SERPL-MCNC: 260 MG/DL (ref 65–140)

## 2019-12-09 PROCEDURE — 99232 SBSQ HOSP IP/OBS MODERATE 35: CPT | Performed by: PSYCHIATRY & NEUROLOGY

## 2019-12-09 PROCEDURE — 82948 REAGENT STRIP/BLOOD GLUCOSE: CPT

## 2019-12-09 RX ADMIN — FLUTICASONE PROPIONATE 1 PUFF: 110 AEROSOL, METERED RESPIRATORY (INHALATION) at 21:48

## 2019-12-09 RX ADMIN — INSULIN LISPRO 2 UNITS: 100 INJECTION, SOLUTION INTRAVENOUS; SUBCUTANEOUS at 16:07

## 2019-12-09 RX ADMIN — ARIPIPRAZOLE 10 MG: 10 TABLET ORAL at 08:17

## 2019-12-09 RX ADMIN — INSULIN LISPRO 4 UNITS: 100 INJECTION, SOLUTION INTRAVENOUS; SUBCUTANEOUS at 16:05

## 2019-12-09 RX ADMIN — INSULIN LISPRO 4 UNITS: 100 INJECTION, SOLUTION INTRAVENOUS; SUBCUTANEOUS at 12:33

## 2019-12-09 RX ADMIN — FLUTICASONE PROPIONATE 1 PUFF: 110 AEROSOL, METERED RESPIRATORY (INHALATION) at 08:15

## 2019-12-09 RX ADMIN — INSULIN LISPRO 4 UNITS: 100 INJECTION, SOLUTION INTRAVENOUS; SUBCUTANEOUS at 08:20

## 2019-12-09 RX ADMIN — INSULIN LISPRO 1 UNITS: 100 INJECTION, SOLUTION INTRAVENOUS; SUBCUTANEOUS at 08:20

## 2019-12-09 RX ADMIN — FENOFIBRATE 48 MG: 48 TABLET ORAL at 08:16

## 2019-12-09 RX ADMIN — INSULIN GLARGINE 22 UNITS: 100 INJECTION, SOLUTION SUBCUTANEOUS at 08:18

## 2019-12-09 NOTE — PROGRESS NOTES
12/09/19 0702   Team Meeting   Meeting Type Daily Rounds   Team Members Present   Team Members Present Physician;Nurse;;Occupational Therapist   Physician Team Member Dr Driss Hewitt, Dr Jesús Padron PA-C   Nursing Team Member [facility] Management Team Member [place]   OT Team Member Kristopher   Patient/Family Present   Patient Present No   Patient's Family Present No     Pt attending groups  Taking medications  Slept well  No concerns  DC tomorrow

## 2019-12-09 NOTE — PROGRESS NOTES
Pt calm, pleasant, and cooperative, denying all symptoms or concerns  Pt is looking forward to discharge and reports he feels ready  Out in milieu throughout shift

## 2019-12-09 NOTE — PROGRESS NOTES
Pt appeared to have slept throughout the night without difficulty  Woke early, remains pleasant and calm

## 2019-12-09 NOTE — PROGRESS NOTES
Pt is pleasant and polite during interaction  Denies SI  Expresses readiness for planned discharge tomorrow  Pt is looking forward to playing Wii with his cousin when he returns home  Denies having any questions or concerns

## 2019-12-09 NOTE — PROGRESS NOTES
Progress Note - [id number] [facility] [place] 51 y o  male MRN: [id number]  Unit/Bed#: [email or web address] 251-01 Encounter: [id number]    Assessment/Plan   Principal Problem:    Bipolar affective disorder, currently depressed, moderate ([facility] [address] )  Active Problems:    HTN (hypertension)    Type 1 diabetes mellitus ([facility] [address] )    HLD (hyperlipidemia)    Subjective:  Patient is compliant with medication with no acute side effects  Patient is showing gradual improvement in mood and anxiety with reduction in passive death wishes  Patient is showing improvement in euphoric affect and is more easily redirectable  Patient in agreement with not titrating medication dosing further and initiating discharge planning for tomorrow, if he continues to show signs of improvement  Patient asked appropriate questions regarding medication and outpatient follow-up  He has also seen out in milieu today attending groups and is currently consenting for safety on the unit      Current Medications:    Current Facility-Administered Medications:  acetaminophen 650 mg Oral Q6H PRN Jeremy Sorto MD   acetaminophen 650 mg Oral Q4H PRN Main Tomas MD   acetaminophen 975 mg Oral Q6H PRN Main Tomas MD   aluminum-magnesium hydroxide-simethicone 30 mL Oral Q4H PRN Jeremy Sorto MD   ARIPiprazole 10 mg Oral Daily [facility]   benztropine 1 mg Intramuscular Q1H PRN Jeremy Sorto MD   fenofibrate 48 mg Oral Daily [facility], PA-C   fluticasone 1 puff Inhalation Q12H [address] [facility], PA-C   haloperidol lactate 5 mg Intramuscular Q6H PRN Main Tomas MD   hydrOXYzine HCL 50 mg Oral Q4H PRN Main Tomas MD   insulin glargine 22 Units Subcutaneous QAM [facility], PA-C   insulin lispro 1-5 Units Subcutaneous TID With Meals [facility], PA-C   insulin lispro 4 Units Subcutaneous TID With Meals Marin Jarvis DO   lisinopril 5 mg Oral Daily [facility], PA-C   magnesium hydroxide 30 mL Oral Daily PRN Tri Chavez MD   OLANZapine 5 mg Intramuscular Q3H PRN Tri Chavez MD   risperiDONE 1 mg Oral Q3H PRN Tri Chavez MD   traZODone 50 mg Oral HS PRN Tri Chavez MD       Behavioral Health Medications: all current active meds have been reviewed  Vital signs in last 24 hours:  Temp:  [97 °F (36 1 °C)-97 3 °F (36 3 °C)] 97 3 °F (36 3 °C)  HR:  [63-93] 89  Resp:  [16-17] 16  BP: ()/(50-58) 92/50    Laboratory results:    I have personally reviewed all pertinent laboratory/tests results  Labs in last 72 hours: No results for input(s): WBC, RBC, HGB, HCT, PLT, RDW, NEUTROABS, SODIUM, K, CL, CO2, BUN, CREATININE, GLUCOSE, GLUC, GLUF, CALCIUM, AST, ALT, ALKPHOS, TP, ALB, TBILI, CHOLESTEROL, HDL, TRIG, LDLCALC, VALPROICTOT, CARBAMAZEPIN, LITHIUM, AMMONIA, [id number], FREET4, T3FREE, PREGTESTUR, PREGSERUM, HCG, HCGQUANT, RPR in the last 72 hours      Invalid input(s):  RBC  Admission Labs:   Admission on 12/04/2019   Component Date Value    POC Glucose 12/04/2019 377*    POC Glucose 12/04/2019 312*    POC Glucose 12/04/2019 180*    POC Glucose 12/05/2019 228*    POC Glucose 12/05/2019 192*    POC Glucose 12/05/2019 145*    POC Glucose 12/06/2019 130     POC Glucose 12/06/2019 190*    POC Glucose 12/06/2019 69     POC Glucose 12/06/2019 90     POC Glucose 12/07/2019 118     POC Glucose 12/07/2019 143*    POC Glucose 12/07/2019 107     POC Glucose 12/08/2019 131     POC Glucose 12/08/2019 174*    POC Glucose 12/08/2019 121     POC Glucose 12/09/2019 172*       Psychiatric Review of Systems:  Behavior over the last 24 hours:  improving  Sleep: normal  Appetite: normal  Medication side effects: No  ROS: no complaints    Mental Status Evaluation:  Appearance:  casually dressed   Behavior:  guarded   Speech:  soft   Mood:  anxious and depressed   Affect:  constricted   Language naming objects and repeating phrases   Thought Process:  circumstantial   Thought Content:  normal Perceptual Disturbances: None   Risk Potential: Suicidal Ideations none, Homicidal Ideations none and Potential for Aggression No   Sensorium:  person and place   Cognition:  grossly intact   Consciousness:  awake    Attention: attention span and concentration were age appropriate   Insight:  fair   Judgment: fair   Intellect fair   Gait/Station: normal gait/station   Motor Activity: no abnormal movements     Memory: Short and long term memory  fair     Progress Toward Goals: progressing    Recommended Treatment:   Initiate disposition planning  Continue with group therapy, milieu therapy and occupational therapy      Continue following current medications:   Current Facility-Administered Medications:  acetaminophen 650 mg Oral Q6H PRN Wendi Pascual MD   acetaminophen 650 mg Oral Q4H PRN Wendi Pascual MD   acetaminophen 975 mg Oral Q6H PRN Main Tomas MD   aluminum-magnesium hydroxide-simethicone 30 mL Oral Q4H PRN Wendi Pascual MD   ARIPiprazole 10 mg Oral Daily Blue Bronson   benztropine 1 mg Intramuscular Q1H PRN Wendi Pascual MD   fenofibrate 48 mg Oral Daily Angelica Branham PA-C   fluticasone 1 puff Inhalation Q12H [facility] Angelica Branham PA-C   haloperidol lactate 5 mg Intramuscular Q6H PRN Wendi Pascual MD   hydrOXYzine HCL 50 mg Oral Q4H PRN Main Tomas MD   insulin glargine 22 Units Subcutaneous QAM Angelica Branham PA-C   insulin lispro 1-5 Units Subcutaneous TID With Meals Angelica Branham PA-C   insulin lispro 4 Units Subcutaneous TID With Meals Jean Claude Sandoval DO   lisinopril 5 mg Oral Daily Angelica Branham PA-C   magnesium hydroxide 30 mL Oral Daily PRN Main Tomas MD   OLANZapine 5 mg Intramuscular Q3H PRN Main Tomas MD   risperiDONE 1 mg Oral Q3H PRN Wendi Pascual MD   traZODone 50 mg Oral HS PRN Wendi Pascual MD       Risks, benefits and possible side effects of Medications:   Risks, benefits, and possible side effects of medications explained to patient and patient verbalizes understanding  This note has been constructed using a voice recognition system  There may be translation, syntax,  or grammatical errors  If you have any questions, please contact the dictating provider

## 2019-12-10 VITALS
HEART RATE: 91 BPM | SYSTOLIC BLOOD PRESSURE: 103 MMHG | BODY MASS INDEX: 21.54 KG/M2 | WEIGHT: 134.04 LBS | RESPIRATION RATE: 16 BRPM | DIASTOLIC BLOOD PRESSURE: 56 MMHG | HEIGHT: 66 IN | OXYGEN SATURATION: 97 % | TEMPERATURE: 97.2 F

## 2019-12-10 LAB — GLUCOSE SERPL-MCNC: 153 MG/DL (ref 65–140)

## 2019-12-10 PROCEDURE — 99239 HOSP IP/OBS DSCHRG MGMT >30: CPT | Performed by: PSYCHIATRY & NEUROLOGY

## 2019-12-10 PROCEDURE — 82948 REAGENT STRIP/BLOOD GLUCOSE: CPT

## 2019-12-10 RX ORDER — FLUTICASONE PROPIONATE 110 UG/1
1 AEROSOL, METERED RESPIRATORY (INHALATION) EVERY 12 HOURS SCHEDULED
Qty: 1 INHALER | Refills: 0 | Status: SHIPPED | OUTPATIENT
Start: 2019-12-10 | End: 2020-11-28 | Stop reason: SDUPTHER

## 2019-12-10 RX ORDER — ARIPIPRAZOLE 10 MG/1
10 TABLET ORAL DAILY
Qty: 30 TABLET | Refills: 1 | Status: SHIPPED | OUTPATIENT
Start: 2019-12-11 | End: 2019-12-17 | Stop reason: SDUPTHER

## 2019-12-10 RX ORDER — INSULIN LISPRO 100 [IU]/ML
4 INJECTION, SOLUTION INTRAVENOUS; SUBCUTANEOUS
Qty: 5 PEN | Refills: 1 | Status: ON HOLD | OUTPATIENT
Start: 2019-12-10 | End: 2022-02-04 | Stop reason: SDUPTHER

## 2019-12-10 RX ORDER — FENOFIBRATE 48 MG/1
48 TABLET, COATED ORAL DAILY
Qty: 30 TABLET | Refills: 0 | Status: SHIPPED | OUTPATIENT
Start: 2019-12-10 | End: 2019-12-17 | Stop reason: SDUPTHER

## 2019-12-10 RX ORDER — LISINOPRIL 5 MG/1
5 TABLET ORAL DAILY
Qty: 30 TABLET | Refills: 0 | Status: SHIPPED | OUTPATIENT
Start: 2019-12-10 | End: 2019-12-17 | Stop reason: SDUPTHER

## 2019-12-10 RX ORDER — INSULIN GLARGINE 100 [IU]/ML
22 INJECTION, SOLUTION SUBCUTANEOUS EVERY MORNING
Qty: 10 ML | Refills: 0 | Status: SHIPPED | OUTPATIENT
Start: 2019-12-10 | End: 2019-12-10 | Stop reason: HOSPADM

## 2019-12-10 RX ADMIN — INSULIN LISPRO 4 UNITS: 100 INJECTION, SOLUTION INTRAVENOUS; SUBCUTANEOUS at 09:22

## 2019-12-10 RX ADMIN — FENOFIBRATE 48 MG: 48 TABLET ORAL at 08:24

## 2019-12-10 RX ADMIN — FLUTICASONE PROPIONATE 1 PUFF: 110 AEROSOL, METERED RESPIRATORY (INHALATION) at 08:25

## 2019-12-10 RX ADMIN — INSULIN GLARGINE 22 UNITS: 100 INJECTION, SOLUTION SUBCUTANEOUS at 09:02

## 2019-12-10 RX ADMIN — ARIPIPRAZOLE 10 MG: 10 TABLET ORAL at 08:24

## 2019-12-10 RX ADMIN — INSULIN LISPRO 1 UNITS: 100 INJECTION, SOLUTION INTRAVENOUS; SUBCUTANEOUS at 09:03

## 2019-12-10 NOTE — PROGRESS NOTES
Pt appears to have slept throughout night, waking briefly to use bathroom  Early awake, pleasant social, currently in Day Room

## 2019-12-10 NOTE — DISCHARGE INSTR - OTHER ORDERS
[facility]  [address], [place] [address]  Tel: [phone]  Fax:([phone])    [facility] Crisis    Crisis Intervention: [address] is a confidential 7 days/week telephone support service manned by trained mental health consumers   [employer] operates daily but is not able to [address]   · [facility] provides support, a listening ear and can provide information about available services       · [facility] specializes in the concerns of mental health consumers, their families and friends   However, we are also here for anyone who has a mental health concern, is confused about or just doesn't know anything about mental health or where to get information       To reach [place], call [phone] accepts calls between 6:00 AM to 10:00 AM and from 4:00 PM to 96:49 AM or click on the link to view additional information  The [employer] Family-to-Family Education Program is a free 12-week (2 1/2 hours/week) course for families of individuals with severe brain disorders (mental illnesses)  The classes are taught by trained family members  All course materials are furnished at no cost to you  Below are some details  To register, e-mail [email or web address] or call [phone]  The curriculum focuses on schizophrenia, bipolar disorder (manic depression), clinical depression, panic disorders and obsessive-compulsive disorder (OCD)  The course discusses the clinical treatment of these illnesses and teaches the knowledge and skills that family members need to cope more effectively    Topics Include:  Learning about feelings, learning about facts   Schizophrenia, major depression and asad: diagnosis and dealing with critical periods   Subtypes of depression and bipolar disorder, panic disorder and OCD; diagnosis and causes; sharing our stories   The biology of the brain/new research   Problem solving workshops   Medication review   Empathy workshop  what its like to have a brain disorder   Communication skills workshop   Self-care and relative groups   Rehabilitation, services available   Advocacy: fighting stigma   Review and certification ceremony    Jwbg-my-Dwnz Education Course  The [employer] Education class is a ten week  two hours per week  experiential education course on the topic of recovery for any person with serious mental illness who is interested in establishing and maintaining wellness  The course uses a combination of lecture, interactive exercises, and structural group processes  The diversity of experience among participants affords for a lively dynamic that moves the course along  [facility]s Ftqc-aa-Boak Education class is offered free of charge to people who experience mental illness  You do not need to be a member of [facility] to take the course  Courses are taught by teams of trained mentors/peer-teachers who are themselves experienced at living well with mental illness  Below are some details  To register, call [phone] or e-mail [email or web address]  Sign up today! [facility] group is for family members, caregivers, and loved ones of individuals living with the everyday challenges of mental illness  The leaders are family members in the same situation  Sessions take place in an intimate, confidential setting to allow families to share openly with each other  These support groups allow participants to learn from the experiences of other group members, share coping strategies, and offer each other encouragement and understanding  Zina Pena know that you are not alone  Drop inno registration is necessary  Here are the times and locations    [place]  Monthly: 3rd Monday, 7:00-8:30 pm  [facility] [place]  Monthly: 4th Tuesday, 7:00-8:30 pm  [address] [facility]  Monthly: 1st Monday, 7:00-8:30 pm  [facility]  [address] [facility], [address] [place] [address]         Monthly Support for Persons with Mental Illness  The Peer Support Group is a monthly meeting for individuals facing the challenges of recovering from severe and persistent mental illness  Depression, manic depression, schizophrenia, and general anxiety disorder are only a few of the diagnoses of individuals who have found a supportive place at our meetings  Our [place]  We are a fellowship of individuals who share a common goal of recovery and the ability to maintain mental and emotional stability  We help others and ourselves through sharing our experiences, strength and hope with each other  No matter how traumatic our past or how despairing our present may be, there is hope for a new day  Sessions take place in an intimate, confidential setting to allow individuals to share openly with each other  H[place] Smoker know that you are not alone  Drop inno registration is necessary  Here are the times and locations    JUAN  Monthly: 1st Monday, 7:00-8:30 pm  [facility]  [address] [facility] [place], [employer]   TRYCOPMUI  Monthly: 3rd Monday, 7:00-8:30 pm  [address]  [address] [facility]

## 2019-12-10 NOTE — BH TRANSITION RECORD
Contact Information: If you have any questions, concerns, pended studies, tests and/or procedures, or emergencies regarding your inpatient behavioral health visit  Please contact [place] behavioral health [facility] [phone] and ask to speak to a , nurse or physician  A contact is available 24 hours/ 7 days a week at this number  Summary of Procedures Performed During your Stay:  Below is a list of major procedures performed during your hospital stay and a summary of results:  - No major procedures performed  Pending Studies (From admission, onward)    None        If studies are pending at discharge, follow up with your PCP and/or referring provider

## 2019-12-10 NOTE — DISCHARGE SUMMARY
Discharge Summary - [id number] [facility] [place] 51 y o  male MRN: [id number]  Unit/Bed#: -01 Encounter: [id number]     Admission Date:   Admission Orders (From admission, onward)     Ordered        12/04/19 0447  DISCHARGE READMIT Admit Patient to [address] [place] [address] (use with Discharge Readmit Navigator in Sandip Barry 1154 Discharge Readmit scenario including from any IP unit or different campus ED to [facility])  Nurse to release order when pt  arrives to [facility] Unit  Once                         Discharge Date: 12/10/19    Attending Psychiatrist: Bhavik Nichols MD    Reason for Admission/HPI:   History of Present Illness     Patient is a 28-year-old male who presented to [facility] ED for a medication refill where he was then discharged  A few hours later patient came back and said he had sudden depression where he jumped in front of a moving car  Police saw this and brought him back to the hospital   He stated biggest stressor is grieving the loss of his mother who he lost last December  Additionally patient struggles to take prescribed medications, including insulin, outside of hospital   On initial psychiatric evaluation patient appeared to be a poor historian and needed frequent questioning to elaborate any to his symptoms  He denied symptoms of asad, psychosis, and reported increased depression recently  Reported depressive symptoms of poor sleep, lack of appetite, lack of energy, hopelessness, lack of motivation, and suicidal thoughts  He did report increased anxiety without panic attacks  Patient does have history of manic episodes  He denied delusional material   Patient has prior inpatient psychiatric hospitalizations, denied prior suicide attempts, and is in treatment with his PCP  Psychosocial Stressors: poor support system, medication noncompliance, prolonged grief      Hospital Course:   Behavioral Health Medications:   current meds:   Current Facility-Administered Medications   Medication Dose Route Frequency    acetaminophen (TYLENOL) tablet 650 mg  650 mg Oral Q6H PRN    acetaminophen (TYLENOL) tablet 650 mg  650 mg Oral Q4H PRN    acetaminophen (TYLENOL) tablet 975 mg  975 mg Oral Q6H PRN    aluminum-magnesium hydroxide-simethicone (MYLANTA) 200-200-20 mg/5 mL oral suspension 30 mL  30 mL Oral Q4H PRN    ARIPiprazole (ABILIFY) tablet 10 mg  10 mg Oral Daily    benztropine (COGENTIN) injection 1 mg  1 mg Intramuscular Q1H PRN    fenofibrate (TRICOR) tablet 48 mg  48 mg Oral Daily    fluticasone (FLOVENT HFA) 110 MCG/ACT inhaler 1 puff  1 puff Inhalation Q12H [facility]    haloperidol lactate (HALDOL) injection 5 mg  5 mg Intramuscular Q6H PRN    hydrOXYzine HCL (ATARAX) tablet 50 mg  50 mg Oral Q4H PRN    insulin glargine (LANTUS) subcutaneous injection 22 Units 0 22 mL  22 Units Subcutaneous QAM    insulin lispro (HumaLOG) 100 units/mL subcutaneous injection 1-5 Units  1-5 Units Subcutaneous TID With Meals    insulin lispro (HumaLOG) 100 units/mL subcutaneous injection 4 Units  4 Units Subcutaneous TID With Meals    lisinopril (ZESTRIL) tablet 5 mg  5 mg Oral Daily    magnesium hydroxide (MILK OF MAGNESIA) 400 mg/5 mL oral suspension 30 mL  30 mL Oral Daily PRN    OLANZapine (ZyPREXA) IM injection 5 mg  5 mg Intramuscular Q3H PRN    risperiDONE (RisperDAL M-TABS) dispersible tablet 1 mg  1 mg Oral Q3H PRN    traZODone (DESYREL) tablet 50 mg  50 mg Oral HS PRN       Patient was admitted to [address] [place] [address] inpatient psychiatric unit on voluntary 201 commitment for safety and stabilization  On admission patient was placed on Abilify 5mg QD for mood stabilization and had endocrine consult for insulin management  Abilify was titrated to 10mg QD  He did not require additional titration of medications  Trazodone 50mg HS PRN was used once for insomnia  He did not require additional PRN psychiatric medications    He tolerated medications with no acute side effects  His mood brightened over the course of his treatment, and he was seen in [facility] interacting appropriately with peers  He did not demonstrate dangerous behavior to self or others during his inpatient stay  On day of discharge patient had reduced depression, controllable anxiety, denied psychosis, did not show signs of asad, and denied suicidal/homicidal ideations  Mental Status at time of Discharge:     Appearance:  casually dressed   Behavior:  cooperative   Speech:  normal pitch and normal volume   Mood:  euthymic   Affect:  mood-congruent   Thought Process:  goal directed and logical   Thought Content:  normal   Perceptual Disturbances: None   Risk Potential: Denied SI/HI  Potential for aggression: No   Sensorium:  person, place and time/date   Cognition:  grossly intact   Consciousness:  alert and awake    Attention: attention span appeared shorter than expected for age   Insight:  fair   Judgment: fair   Gait/Station: normal gait/station and normal balance   Motor Activity: no abnormal movements       Discharge Diagnosis:   Bipolar affective disorder, currently depressed, moderate      Discharge Medications:  See after visit summary for reconciled discharge medications provided to patient and family  Discharge instructions/Information to patient and family:   See after visit summary for information provided to patient and family  Provisions for Follow-Up Care:  See after visit summary for information related to follow-up care and any pertinent home health orders  Discharge Statement   I spent 34 minutes discharging the patient  This time was spent on the day of discharge  I had direct contact with the patient on the day of discharge  On day of discharge patient had mental status exam performed, discharge instructions/medications reviewed, and outpatient planning discussed  He was given 1 month plus 1 refill of scripts        Maximo Livingston PA-C

## 2019-12-10 NOTE — PROGRESS NOTES
Pt calm, pleasant, smiling  Pt denies all symptoms and reports feeling ready for discharge  Discussed importance of medication compliance and attending outpatient appointments  No questions/concerns at this time

## 2019-12-10 NOTE — DISCHARGE INSTRUCTIONS
Bipolar Disorder   WHAT YOU NEED TO KNOW:   Bipolar disorder is a long-term chemical imbalance that causes rapid changes in mood and behavior  High moods are called asad  Low moods are called depression  Sometimes you will feel manic and sometimes you will feel depressed  You can have alternating episodes of asad and depression  This is called a mixed bipolar state  DISCHARGE INSTRUCTIONS:   Call 911 if:   · You think about hurting yourself or someone else  Contact your healthcare provider or psychiatrist if:   · You are having trouble managing your bipolar disorder  · You cannot sleep, or are sleeping all the time  · You cannot eat, or are eating more than usual     · You feel dizzy or your stomach is upset  · You cannot make it to your next meeting  · You have questions or concerns about your condition or care  Medicines:   · Medicines  may be given to help keep your mood stable, or to help you sleep  Changes in medicine are often needed as your bipolar disorder changes  · Take your medicine as directed  Contact your healthcare provider if you think your medicine is not helping or if you have side effects  Tell him or her if you are allergic to any medicine  Keep a list of the medicines, vitamins, and herbs you take  Include the amounts, and when and why you take them  Bring the list or the pill bottles to follow-up visits  Carry your medicine list with you in case of an emergency  Follow up with your healthcare provider or psychiatrist as directed:  Write down your questions so you remember to ask them during your visits  Manage bipolar disorder:  Watch for triggers of bipolar disorder symptoms, such as stress  Learn new ways to relax, such as deep breathing, to manage your stress  Tell someone if you feel a manic or depressive period might be coming on  Ask a friend or family member to help watch you for bipolar symptoms   Work to develop skills that will help you manage bipolar disorder  You may need to make lifestyle changes  Ask your healthcare provider or psychiatrist for resources  For support and more information:   · [address] [facility]), [address] [facility], [address] [facility], [address]  Karin [place] [address]   Phone: [phone]  Phone: [phone]  Web Address: [facility]  · Depression and [address] (DBSA)  [address] [place] [address], [address] [place] [address] , [address]  Phone: [phone]  Web Address: [employer]  org   © 2017 [facility] Information is for End User's use only and may not be sold, redistributed or otherwise used for commercial purposes  All illustrations and images included in CareNotes® are the copyrighted property of [employer]  or Joshua Ospina  The above information is an  only  It is not intended as medical advice for individual conditions or treatments  Talk to your doctor, nurse or pharmacist before following any medical regimen to see if it is safe and effective for you

## 2019-12-10 NOTE — PROGRESS NOTES
12/10/19 0723   Team Meeting   Meeting Type Daily Rounds   Team Members Present   Team Members Present Physician;Nurse;;Occupational Therapist   Physician Team Member Dr Sammi Bradford, Dr Rose Marie Sawant PA-C   Nursing Team Member [facility] Management Team Member Luis Eduardo/ [address]   OT Team Member Kristopher   Patient/Family Present   Patient Present No   Patient's Family Present No     Pt is for dc today  Pt will continue with [facility] [employer] [facility] [employer] in [facility]  No concerns  Pt will return to live with cousin at time of dc  Pt is on the van at 6

## 2019-12-10 NOTE — CASE MANAGEMENT
CM outreached to [facility] in [facility]  Pt has an intake appointment scheduled for 12/16/19 @ [phone]  [facility] [place]  [facility] Rahat Beaver ([phone]) to advise of this appointment and dc for today  Pt reports readiness for dc today  Pt states that he will follow up with ICM today at [facility]  Pt denies SI/HI  No further needs anticipated at time of dc  Pt will go home on the van @ 11 this morning

## 2019-12-12 ENCOUNTER — PATIENT OUTREACH (OUTPATIENT)
Dept: CASE MANAGEMENT | Facility: OTHER | Age: 49
End: 2019-12-12

## 2019-12-12 NOTE — PROGRESS NOTES
OPCM SW reviewed chart and will discuss with RN KASIA Mcguire  Patient has an [address] in [place] on 12/16/19  Patient is involved with [facility] [place] and his mentor is Flor Sorensen at [phone]  Patient does have an ICM unsure of who it is at this time  Lastly, patient will attend [facility]  Will reach out to [employer] and let her know updates

## 2019-12-17 ENCOUNTER — HOSPITAL ENCOUNTER (EMERGENCY)
Facility: HOSPITAL | Age: 49
Discharge: HOME | End: 2019-12-17
Attending: EMERGENCY MEDICINE | Admitting: EMERGENCY MEDICINE
Payer: COMMERCIAL

## 2019-12-17 VITALS
BODY MASS INDEX: 22.1 KG/M2 | DIASTOLIC BLOOD PRESSURE: 78 MMHG | HEART RATE: 110 BPM | OXYGEN SATURATION: 98 % | TEMPERATURE: 98.6 F | WEIGHT: 136.91 LBS | SYSTOLIC BLOOD PRESSURE: 139 MMHG | RESPIRATION RATE: 20 BRPM

## 2019-12-17 DIAGNOSIS — I10 HTN (HYPERTENSION): ICD-10-CM

## 2019-12-17 DIAGNOSIS — Z76.0 ENCOUNTER FOR MEDICATION REFILL: Primary | ICD-10-CM

## 2019-12-17 DIAGNOSIS — F31.9 BIPOLAR 1 DISORDER (HCC): ICD-10-CM

## 2019-12-17 DIAGNOSIS — E78.5 HLD (HYPERLIPIDEMIA): ICD-10-CM

## 2019-12-17 PROCEDURE — 99283 EMERGENCY DEPT VISIT LOW MDM: CPT | Performed by: EMERGENCY MEDICINE

## 2019-12-17 PROCEDURE — 99281 EMR DPT VST MAYX REQ PHY/QHP: CPT

## 2019-12-17 RX ORDER — ARIPIPRAZOLE 10 MG/1
10 TABLET ORAL DAILY
Qty: 7 TABLET | Refills: 0 | Status: SHIPPED | OUTPATIENT
Start: 2019-12-17 | End: 2020-02-23 | Stop reason: SDUPTHER

## 2019-12-17 RX ORDER — FENOFIBRATE 48 MG/1
48 TABLET, COATED ORAL DAILY
Qty: 7 TABLET | Refills: 0 | Status: SHIPPED | OUTPATIENT
Start: 2019-12-17 | End: 2020-03-26

## 2019-12-17 RX ORDER — THIOTHIXENE 1 MG/1
1 CAPSULE ORAL DAILY
Qty: 7 CAPSULE | Refills: 0 | Status: SHIPPED | OUTPATIENT
Start: 2019-12-17 | End: 2020-03-09 | Stop reason: HOSPADM

## 2019-12-17 RX ORDER — DICYCLOMINE HCL 20 MG
20 TABLET ORAL 2 TIMES DAILY
Qty: 14 TABLET | Refills: 0 | Status: SHIPPED | OUTPATIENT
Start: 2019-12-17 | End: 2020-01-09 | Stop reason: SDUPTHER

## 2019-12-17 RX ORDER — LISINOPRIL 5 MG/1
5 TABLET ORAL DAILY
Qty: 7 TABLET | Refills: 0 | Status: SHIPPED | OUTPATIENT
Start: 2019-12-17 | End: 2020-03-26

## 2019-12-18 NOTE — ED PROVIDER NOTES
History  Chief Complaint   Patient presents with    Medication Refill     d/c'd from [facility] last week  Is not out of meds yet but will be soon  Has appointment with pcp on Monday  25-year-old gentleman presents requesting refills of his medications  He reports that he was discharged from the hospital last week and has follow-up scheduled on Monday  He reports that he will run out of several is medications before then  Presents with original prescription bottles and denies any acute psychiatric or medical concerns  Medication Refill   Reason for request:  Medications ran out  Medications taken before: yes - see home medications    Patient has complete original prescription information: yes        Prior to Admission Medications   Prescriptions Last Dose Informant Patient Reported? Taking? ARIPiprazole (ABILIFY) 10 mg tablet   No No   Sig: Take 1 tablet (10 mg total) by mouth daily At 9am   ARIPiprazole (ABILIFY) 10 mg tablet   No No   Sig: Take 1 tablet (10 mg total) by mouth daily for 7 days At 9am   fenofibrate (TRICOR) 48 mg tablet   No No   Sig: Take 1 tablet (48 mg total) by mouth daily At 9am   fenofibrate (TRICOR) 48 mg tablet   No No   Sig: Take 1 tablet (48 mg total) by mouth daily for 7 days At 9am   fluticasone (FLOVENT HFA) 110 MCG/ACT inhaler   No No   Sig: Inhale 1 puff every 12 (twelve) hours Rinse mouth after use  insulin glargine (LANTUS SOLOSTAR) 100 units/mL injection pen   No No   Sig: Inject 22 Units under the skin daily Every morning     insulin lispro (HUMALOG KWIKPEN) 100 units/mL injection pen   No No   Sig: Inject 4 Units under the skin 3 (three) times a day with meals   lisinopril (ZESTRIL) 5 mg tablet   No No   Sig: Take 1 tablet (5 mg total) by mouth daily At 9am   lisinopril (ZESTRIL) 5 mg tablet   No No   Sig: Take 1 tablet (5 mg total) by mouth daily for 7 days At 9am      Facility-Administered Medications: None       Past Medical History:   Diagnosis Date    Anxiety     Asthma     Bipolar 1 disorder ([facility] )     Depression     Diabetes mellitus ([facility] )     Psychiatric disorder        Past Surgical History:   Procedure Laterality Date    APPENDECTOMY         History reviewed  No pertinent family history  I have reviewed and agree with the history as documented  Social History     Tobacco Use    Smoking status: Current Every Day Smoker     Packs/day: 0 25     Types: Cigarettes     Last attempt to quit: 2019     Years since quittin 1    Smokeless tobacco: Never Used    Tobacco comment: Smokes less radha 1/4 pack/day Would like to quit   Substance Use Topics    Alcohol use: Not Currently     Frequency: Monthly or less     Drinks per session: 1 or 2     Binge frequency: Never     Comment: States has had no alcohol in at least a year    Drug use: No        Review of Systems   Constitutional: Negative for activity change, chills, fatigue and fever  HENT: Negative  Negative for congestion, postnasal drip, rhinorrhea, sinus pain, sore throat and trouble swallowing  Eyes: Negative  Respiratory: Negative  Cardiovascular: Negative for chest pain  Gastrointestinal: Negative for abdominal pain, constipation, diarrhea, nausea and vomiting  Endocrine: Negative  Genitourinary: Negative  Musculoskeletal: Negative  Negative for arthralgias, back pain and myalgias  Skin: Negative  Allergic/Immunologic: Negative  Neurological: Negative  Hematological: Negative  Psychiatric/Behavioral: Negative  Physical Exam  Physical Exam   Constitutional: He is oriented to person, place, and time  He appears well-developed and well-nourished  No distress  HENT:   Head: Normocephalic and atraumatic  Mouth/Throat: Oropharynx is clear and moist    Eyes: Pupils are equal, round, and reactive to light  Conjunctivae are normal    Neck: Neck supple  Cardiovascular: Normal rate, regular rhythm and normal heart sounds     Pulmonary/Chest: Effort normal and breath sounds normal  No respiratory distress  Abdominal: Soft  Bowel sounds are normal  He exhibits no distension  There is no tenderness  There is no guarding  Musculoskeletal: Normal range of motion  He exhibits no edema  Neurological: He is alert and oriented to person, place, and time  Skin: Skin is warm and dry  Capillary refill takes less than 2 seconds  He is not diaphoretic  Psychiatric: He has a normal mood and affect  His behavior is normal    Nursing note and vitals reviewed        Vital Signs  ED Triage Vitals [12/17/19 2054]   Temperature Pulse Respirations Blood Pressure SpO2   98 6 °F (37 °C) (!) 110 20 139/78 98 %      Temp Source Heart Rate Source Patient Position - Orthostatic VS BP Location FiO2 (%)   Tympanic Monitor Sitting Right arm --      Pain Score       No Pain           Vitals:    12/17/19 2054   BP: 139/78   Pulse: (!) 110   Patient Position - Orthostatic VS: Sitting         Visual Acuity      ED Medications  Medications - No data to display    Diagnostic Studies  Results Reviewed     None                 No orders to display              Procedures  Procedures         ED Course                               MDM      Disposition  Final diagnoses:   Encounter for medication refill     Time reflects when diagnosis was documented in both MDM as applicable and the Disposition within this note     Time User Action Codes Description Comment    12/17/2019  9:11 PM Glena Primus Add [Z76 0] Encounter for medication refill     12/17/2019  9:11 PM Glena Primus Add [F31 9] Bipolar 1 disorder ([facility] )     12/17/2019  9:12 PM Yasmin Dart [F31 9] Bipolar 1 disorder ([facility] )     12/17/2019  9:12 PM Glena Primus Modify [F31 9] Bipolar 1 disorder ([facility] 75 )     12/17/2019  9:12 PM Glena Primus Add [I10] HTN (hypertension)     12/17/2019  9:12 PM Glena Primus Modify [F31 9] Bipolar 1 disorder ([facility] 75 )     12/17/2019  9:12 PM Glena Primus Modify [I10] HTN (hypertension)     12/17/2019  9:13 PM Peggi Radish Modify [F31 9] Bipolar 1 disorder ([facility] 75 )     12/17/2019  9:13 PM Peggi Radish Modify [I10] HTN (hypertension)     12/17/2019  9:13 PM Peggi Radish Add [E78 5] HLD (hyperlipidemia)     12/17/2019  9:14 PM Peggi Radish Modify [F31 9] Bipolar 1 disorder ([facility] 75 )     12/17/2019  9:14 PM Peggi Radish Modify [I10] HTN (hypertension)     12/17/2019  9:14 PM Peggi Radish Modify [E78 5] HLD (hyperlipidemia)       ED Disposition     ED Disposition Condition Date/Time Comment    Discharge Stable Tue Dec 17, 2019  9:11 PM Juve Cantor discharge to home/self care  Follow-up Information    None         Patient's Medications   Discharge Prescriptions    DICYCLOMINE (BENTYL) 20 MG TABLET    Take 1 tablet (20 mg total) by mouth 2 (two) times a day for 7 days       Start Date: 12/17/2019End Date: 12/24/2019       Order Dose: 20 mg       Quantity: 14 tablet    Refills: 0    THIOTHIXENE (NAVANE) 1 MG CAPSULE    Take 1 capsule (1 mg total) by mouth daily for 7 days       Start Date: 12/17/2019End Date: 12/24/2019       Order Dose: 1 mg       Quantity: 7 capsule    Refills: 0     No discharge procedures on file      ED Provider  Electronically Signed by           Elizabeth Maxwell DO  12/17/19 1241

## 2019-12-24 ENCOUNTER — HOSPITAL ENCOUNTER (EMERGENCY)
Facility: HOSPITAL | Age: 49
Discharge: HOME | End: 2019-12-24
Attending: EMERGENCY MEDICINE | Admitting: EMERGENCY MEDICINE
Payer: COMMERCIAL

## 2019-12-24 VITALS
HEART RATE: 75 BPM | WEIGHT: 138.45 LBS | BODY MASS INDEX: 22.25 KG/M2 | DIASTOLIC BLOOD PRESSURE: 63 MMHG | OXYGEN SATURATION: 97 % | TEMPERATURE: 96.9 F | SYSTOLIC BLOOD PRESSURE: 107 MMHG | HEIGHT: 66 IN | RESPIRATION RATE: 18 BRPM

## 2019-12-24 DIAGNOSIS — M54.50 ACUTE EXACERBATION OF CHRONIC LOW BACK PAIN: Primary | ICD-10-CM

## 2019-12-24 DIAGNOSIS — R81 GLUCOSURIA: ICD-10-CM

## 2019-12-24 DIAGNOSIS — G89.29 ACUTE EXACERBATION OF CHRONIC LOW BACK PAIN: Primary | ICD-10-CM

## 2019-12-24 LAB
BACTERIA UR QL AUTO: NORMAL /HPF
BILIRUB UR QL STRIP: NEGATIVE
CLARITY UR: CLEAR
COLOR UR: ABNORMAL
GLUCOSE UR STRIP-MCNC: ABNORMAL MG/DL
HGB UR QL STRIP.AUTO: NEGATIVE
KETONES UR STRIP-MCNC: NEGATIVE MG/DL
LEUKOCYTE ESTERASE UR QL STRIP: NEGATIVE
NITRITE UR QL STRIP: NEGATIVE
NON-SQ EPI CELLS URNS QL MICRO: NORMAL /HPF
PH UR STRIP.AUTO: 7 [PH]
PROT UR STRIP-MCNC: NEGATIVE MG/DL
RBC #/AREA URNS AUTO: NORMAL /HPF
SP GR UR STRIP.AUTO: 1.01 (ref 1–1.04)
UROBILINOGEN UA: NEGATIVE MG/DL
WBC #/AREA URNS AUTO: NORMAL /HPF

## 2019-12-24 PROCEDURE — 81001 URINALYSIS AUTO W/SCOPE: CPT | Performed by: EMERGENCY MEDICINE

## 2019-12-24 PROCEDURE — 99284 EMERGENCY DEPT VISIT MOD MDM: CPT | Performed by: EMERGENCY MEDICINE

## 2019-12-24 PROCEDURE — 51798 US URINE CAPACITY MEASURE: CPT

## 2019-12-24 PROCEDURE — 99283 EMERGENCY DEPT VISIT LOW MDM: CPT

## 2019-12-24 RX ORDER — METHOCARBAMOL 500 MG/1
500 TABLET, FILM COATED ORAL 2 TIMES DAILY
Qty: 8 TABLET | Refills: 0 | Status: SHIPPED | OUTPATIENT
Start: 2019-12-24 | End: 2020-01-09 | Stop reason: ALTCHOICE

## 2019-12-24 RX ORDER — ACETAMINOPHEN 325 MG/1
650 TABLET ORAL ONCE
Status: COMPLETED | OUTPATIENT
Start: 2019-12-24 | End: 2019-12-24

## 2019-12-24 RX ORDER — LIDOCAINE 50 MG/G
1 PATCH TOPICAL DAILY
Qty: 4 PATCH | Refills: 0 | Status: SHIPPED | OUTPATIENT
Start: 2019-12-24 | End: 2020-01-09 | Stop reason: ALTCHOICE

## 2019-12-24 RX ORDER — LIDOCAINE 50 MG/G
1 PATCH TOPICAL ONCE
Status: DISCONTINUED | OUTPATIENT
Start: 2019-12-24 | End: 2019-12-24 | Stop reason: HOSPADM

## 2019-12-24 RX ORDER — METHOCARBAMOL 500 MG/1
500 TABLET, FILM COATED ORAL ONCE
Status: COMPLETED | OUTPATIENT
Start: 2019-12-24 | End: 2019-12-24

## 2019-12-24 RX ADMIN — METHOCARBAMOL TABLETS 500 MG: 500 TABLET, COATED ORAL at 04:40

## 2019-12-24 RX ADMIN — ACETAMINOPHEN 650 MG: 325 TABLET ORAL at 04:40

## 2019-12-24 RX ADMIN — LIDOCAINE 1 PATCH: 50 PATCH TOPICAL at 04:40

## 2019-12-24 NOTE — DISCHARGE INSTRUCTIONS
You must follow up with the orthopedic doctor regarding your lumbar spine  Below are exercises to complete  You have not followed up with Orthopedics please do so      BACK EXERCISES  Back exercises help treat and prevent back injuries  The goal of back exercises is to increase the strength of your abdominal and back muscles and the flexibility of your back  These exercises should be started when you no longer have back pain  Back exercises include:    Pelvic Tilt  Lie on your back with your knees bent  Tilt your pelvis until the lower part of your back is against the floor  Hold this position 5 to 10 sec and repeat 5 to 10 times  Knee to Chest  Pull first 1 knee up against your chest and hold for 20 to 30 seconds, repeat this with the other knee, and then both knees  This may be done with the other leg straight or bent, whichever feels better  Sit-Ups or Curl-Ups  Bend your knees 90 degrees  Start with tilting your pelvis, and do a partial, slow sit-up, lifting your trunk only 30 to 45 degrees off the floor  Take at least 2 to 3 seconds for each sit-up  Do not do sit-ups with your knees out straight  If partial sit-ups are difficult, simply do the above but with only tightening your abdominal muscles and holding it as directed  Hip-Lift  Lie on your back with your knees flexed 90 degrees  Push down with your feet and shoulders as you raise your hips a couple inches off the floor; hold for 10 seconds, repeat 5 to 10 times  Back arches  Lie on your stomach, propping yourself up on bent elbows  Slowly press on your hands, causing an arch in your low back  Repeat 3 to 5 times  Any initial stiffness and discomfort should lessen with repetition over time  Shoulder-Lifts  Lie face down with arms beside your body  Keep hips and torso pressed to floor as you slowly lift your head and shoulders off the floor  Do not overdo your exercises, especially in the beginning       Exercises may cause you some mild back discomfort which lasts for a few minutes; however, if the pain is more severe, or lasts for more than 15 minutes, do not continue exercises until you see your caregiver  Improvement with exercise therapy for back problems is slow   See your caregivers for assistance with developing a proper back exercise program

## 2019-12-24 NOTE — ED PROVIDER NOTES
History  Chief Complaint   Patient presents with    Back Pain     Patient is a 67-year-old male known to this facility coming in today with back pain  This was nontraumatic in nature going on for several months  Patient states he took Tylenol without relief  Patient denies any fevers, chills, nausea or vomiting  He denies any IV drug abuse, history of cancer  Patient denies any urinary retention, loss of bowel or bladder  Denies any saddle anesthesia  Patient denies any weakness throughout the bilateral lower extremities  Denies any paresthesias throughout the bilateral lower extremities      History provided by:  Patient   used: No    Back Pain   Location:  Lumbar spine  Quality:  Aching and stiffness  Stiffness is present:  Unable to specify  Radiates to:  Does not radiate  Pain severity:  Mild  Pain is:  Unable to specify  Onset quality:  Gradual  Timing:  Intermittent  Progression:  Waxing and waning  Chronicity:  Chronic  Context: not emotional stress, not falling, not jumping from heights, not lifting heavy objects, not MCA, not MVA, not occupational injury, not pedestrian accident, not physical stress, not recent illness, not recent injury and not twisting    Relieved by:  Nothing  Worsened by:  Nothing  Ineffective treatments: Acetaminophen  Associated symptoms: no abdominal pain, no abdominal swelling, no bladder incontinence, no bowel incontinence, no chest pain, no dysuria, no fever, no headaches, no leg pain, no numbness, no paresthesias, no pelvic pain, no perianal numbness, no tingling, no weakness and no weight loss    Risk factors: no hx of cancer, no hx of osteoporosis, no lack of exercise, not obese, no recent surgery, no steroid use and no vascular disease        Prior to Admission Medications   Prescriptions Last Dose Informant Patient Reported? Taking?    ARIPiprazole (ABILIFY) 10 mg tablet   No No   Sig: Take 1 tablet (10 mg total) by mouth daily for 7 days At 9am dicyclomine (BENTYL) 20 mg tablet   No No   Sig: Take 1 tablet (20 mg total) by mouth 2 (two) times a day for 7 days   fenofibrate (TRICOR) 48 mg tablet   No No   Sig: Take 1 tablet (48 mg total) by mouth daily for 7 days At 9am   fluticasone (FLOVENT HFA) 110 MCG/ACT inhaler   No No   Sig: Inhale 1 puff every 12 (twelve) hours Rinse mouth after use  insulin glargine (LANTUS SOLOSTAR) 100 units/mL injection pen   No No   Sig: Inject 22 Units under the skin daily Every morning  insulin lispro (HUMALOG KWIKPEN) 100 units/mL injection pen   No No   Sig: Inject 4 Units under the skin 3 (three) times a day with meals   lisinopril (ZESTRIL) 5 mg tablet   No No   Sig: Take 1 tablet (5 mg total) by mouth daily for 7 days At 9am   thiothixene (NAVANE) 1 mg capsule   No No   Sig: Take 1 capsule (1 mg total) by mouth daily for 7 days      Facility-Administered Medications: None       Past Medical History:   Diagnosis Date    Anxiety     Asthma     [facility]ipolar 1 disorder ([facility] )     Depression     Diabetes mellitus ([facility] )     Psychiatric disorder        Past Surgical History:   Procedure Laterality Date    APPENDECTOMY         History reviewed  No pertinent family history  I have reviewed and agree with the history as documented  Social History     Tobacco Use    Smoking status: Current Every Day Smoker     Packs/day: 0 25     Types: Cigarettes     Last attempt to quit: 2019     Years since quittin 1    Smokeless tobacco: Never Used    Tobacco comment: Smokes less radha 1/4 pack/day Would like to quit   Substance Use Topics    Alcohol use: Not Currently     Frequency: Monthly or less     Drinks per session: 1 or 2     Binge frequency: Never     Comment: States has had no alcohol in at least a year    Drug use: No        Review of Systems   Constitutional: Negative for fever and weight loss  Cardiovascular: Negative for chest pain  Gastrointestinal: Negative for abdominal pain and bowel incontinence  Genitourinary: Negative for bladder incontinence, dysuria and pelvic pain  Musculoskeletal: Positive for back pain  Neurological: Negative for tingling, weakness, numbness, headaches and paresthesias  Physical Exam  Physical Exam   Constitutional: He is oriented to person, place, and time  He appears well-developed and well-nourished  No distress  HENT:   Head: Normocephalic and atraumatic  Mouth/Throat: Oropharynx is clear and moist    Eyes: Pupils are equal, round, and reactive to light  Conjunctivae and EOM are normal    Neck: Normal range of motion  Neck supple  Pulmonary/Chest: Effort normal  No stridor  No respiratory distress  Abdominal: Soft  Bowel sounds are normal  He exhibits no distension  There is no tenderness  Musculoskeletal: Normal range of motion  He exhibits no edema  Cervical back: Normal         Thoracic back: Normal         Lumbar back: Normal         Back:    Neurological: He is alert and oriented to person, place, and time  No cranial nerve deficit  GCS eye subscore is 4  GCS verbal subscore is 5  GCS motor subscore is 6  Neuro intact with no focal deficits  GCS 15  No slurred speech  No facial asymmetry  No ataxia     Skin: Skin is warm  Capillary refill takes less than 2 seconds  He is not diaphoretic  Psychiatric: He has a normal mood and affect  Nursing note and vitals reviewed        Vital Signs  ED Triage Vitals   Temperature Pulse Respirations Blood Pressure SpO2   12/24/19 0428 12/24/19 0428 12/24/19 0428 12/24/19 0428 12/24/19 0428   (!) 96 9 °F (36 1 °C) 78 20 107/63 100 %      Temp Source Heart Rate Source Patient Position - Orthostatic VS BP Location FiO2 (%)   12/24/19 0428 12/24/19 0504 12/24/19 0428 12/24/19 0428 --   Tympanic Monitor Sitting Left arm       Pain Score       12/24/19 0428       Worst Possible Pain           Vitals:    12/24/19 0428 12/24/19 0504   BP: 107/63    Pulse: 78 75   Patient Position - Orthostatic VS: Sitting Sitting Visual Acuity      ED Medications  Medications   lidocaine (LIDODERM) 5 % patch 1 patch (1 patch Topical Medication Applied 12/24/19 0440)   acetaminophen (TYLENOL) tablet 650 mg (650 mg Oral Given 12/24/19 0440)   methocarbamol (ROBAXIN) tablet 500 mg (500 mg Oral Given 12/24/19 0440)       Diagnostic Studies  Results Reviewed     Procedure Component Value Units Date/Time    Urine Microscopic [[id number]]  (Normal) Collected:  12/24/19 0439    Lab Status:  Final result Specimen:  Urine, Clean Catch Updated:  12/24/19 0454     RBC, UA None Seen /hpf      WBC, UA None Seen /hpf      Epithelial Cells Occasional /hpf      Bacteria, UA None Seen /hpf     UA (URINE) with reflex to Scope [[id number]]  (Abnormal) Collected:  12/24/19 0439    Lab Status:  Final result Specimen:  Urine, Clean Catch Updated:  12/24/19 0446     Color, UA Straw     Clarity, UA Clear     Specific Gravity, UA 1 010     pH, UA 7 0     Leukocytes, UA Negative     Nitrite, UA Negative     Protein, UA Negative mg/dl      Glucose, UA >=1000 (1%) mg/dl      Ketones, UA Negative mg/dl      Bilirubin, UA Negative     Blood, UA Negative     UROBILINOGEN UA Negative mg/dL                  No orders to display              Procedures  Procedures         ED Course  ED Course as of Dec 24 0508   Tue Dec 24, 2019   0427 Patient is a 51year old male coming in today for continued back pain  On exam he is neuro intact with no focal deficits and neurologically intact  Will review chart, obtain urine, PVR as well as give a seated benefit, Lidoderm and Robaxin      Portions of the record may have been created with voice recognition software  Occasional wrong word or "sound a like" substitutions may have occurred due to the inherent limitations of voice recognition software  Read the chart carefully and recognize, using context, where substitutions have occurred  9601 Patient did have an xray 11/2019 of lumbar spine which was did show DJD    He also had x-rays in August in 2019 at [facility] which showed the same  Patient states he followed up with a specialist but I see no documentation of such      9822 Patient did urinate  PVR 10cc      0457 Urine shows glucose otherwise stable  Will DC home  MDM  Number of Diagnoses or Management Options  Diagnosis management comments:   DDx including but not limited to: sciatica, herniated disc, arthritis, spinal stenosis, strain, sprain, fracture, cauda equina syndrome, epidural abscess, AAA  Amount and/or Complexity of Data Reviewed  Clinical lab tests: ordered  Tests in the medicine section of CPT®: ordered          Disposition  Final diagnoses:   Acute exacerbation of chronic low back pain   Glucosuria     Time reflects when diagnosis was documented in both MDM as applicable and the Disposition within this note     Time User Action Codes Description Comment    12/24/2019  4:41 AM Finn Oakes Add [M54 5,  G89 29] Acute exacerbation of chronic low back pain     12/24/2019  4:57 AM Katie Santos Add [R81] Glucosuria       ED Disposition     ED Disposition Condition Date/Time Comment    Discharge Stable Tue Dec 24, 2019  4:41 AM Lynne Oconnell discharge to home/self care              Follow-up Information     Follow up With Specialties Details Why Contact Info    Lorena Rea MD Family Medicine Schedule an appointment as soon as possible for a visit in 1 week  [address] [place] [address],Third Floor, [address] Schedule an appointment as soon as possible for a visit in 1 week  [address]  [address] [facility]  [place] [facility]       Everlina Lefort, MD Orthopedic Surgery Schedule an appointment as soon as possible for a visit in 1 week  [facility] [address] [facility]            Patient's Medications   Discharge Prescriptions    LIDOCAINE ([address]) 7 %    Apply 1 patch topically daily for 4 days Remove & Discard patch within 12 hours or as directed by MD       Start Date: 12/24/2019End Date: 12/28/2019       Order Dose: 1 patch       Quantity: 4 patch    Refills: 0    METHOCARBAMOL (ROBAXIN) 500 MG TABLET    Take 1 tablet (500 mg total) by mouth 2 (two) times a day for 4 days       Start Date: 12/24/2019End Date: 12/28/2019       Order Dose: 500 mg       Quantity: 8 tablet    Refills: 0     No discharge procedures on file      ED Provider  Electronically Signed by           Sarika Puentes DO  12/24/19 5088

## 2020-01-08 ENCOUNTER — HOSPITAL ENCOUNTER (EMERGENCY)
Facility: HOSPITAL | Age: 50
Discharge: HOME | End: 2020-01-08
Attending: EMERGENCY MEDICINE | Admitting: EMERGENCY MEDICINE
Payer: COMMERCIAL

## 2020-01-08 VITALS
RESPIRATION RATE: 18 BRPM | DIASTOLIC BLOOD PRESSURE: 67 MMHG | OXYGEN SATURATION: 99 % | BODY MASS INDEX: 21.84 KG/M2 | HEART RATE: 66 BPM | SYSTOLIC BLOOD PRESSURE: 119 MMHG | WEIGHT: 135.31 LBS | TEMPERATURE: 97.2 F

## 2020-01-08 DIAGNOSIS — R11.0 NAUSEA: Primary | ICD-10-CM

## 2020-01-08 DIAGNOSIS — R10.9 ABDOMINAL PAIN: ICD-10-CM

## 2020-01-08 LAB
ALBUMIN SERPL BCP-MCNC: 4.4 G/DL (ref 3–5.2)
ALP SERPL-CCNC: 51 U/L (ref 43–122)
ALT SERPL W P-5'-P-CCNC: 20 U/L (ref 9–52)
ANION GAP SERPL CALCULATED.3IONS-SCNC: 9 MMOL/L (ref 5–14)
AST SERPL W P-5'-P-CCNC: 26 U/L (ref 17–59)
BASOPHILS # BLD AUTO: 0 THOUSANDS/ΜL (ref 0–0.1)
BASOPHILS NFR BLD AUTO: 1 % (ref 0–1)
BILIRUB SERPL-MCNC: 0.3 MG/DL
BILIRUB UR QL STRIP: NEGATIVE
BUN SERPL-MCNC: 20 MG/DL (ref 5–25)
CALCIUM SERPL-MCNC: 9.7 MG/DL (ref 8.4–10.2)
CHLORIDE SERPL-SCNC: 103 MMOL/L (ref 97–108)
CLARITY UR: ABNORMAL
CO2 SERPL-SCNC: 31 MMOL/L (ref 22–30)
COLOR UR: ABNORMAL
CREAT SERPL-MCNC: 1.06 MG/DL (ref 0.7–1.5)
EOSINOPHIL # BLD AUTO: 0 THOUSAND/ΜL (ref 0–0.4)
EOSINOPHIL NFR BLD AUTO: 1 % (ref 0–6)
ERYTHROCYTE [DISTWIDTH] IN BLOOD BY AUTOMATED COUNT: 13.6 %
GFR SERPL CREATININE-BSD FRML MDRD: 83 ML/MIN/1.73SQ M
GLUCOSE SERPL-MCNC: 143 MG/DL (ref 70–99)
GLUCOSE UR STRIP-MCNC: NEGATIVE MG/DL
HCT VFR BLD AUTO: 41.3 % (ref 41–53)
HGB BLD-MCNC: 14.1 G/DL (ref 13.5–17.5)
HGB UR QL STRIP.AUTO: NEGATIVE
KETONES UR STRIP-MCNC: NEGATIVE MG/DL
LEUKOCYTE ESTERASE UR QL STRIP: NEGATIVE
LYMPHOCYTES # BLD AUTO: 2.2 THOUSANDS/ΜL (ref 0.5–4)
LYMPHOCYTES NFR BLD AUTO: 32 % (ref 25–45)
MCH RBC QN AUTO: 30.2 PG (ref 26–34)
MCHC RBC AUTO-ENTMCNC: 34 G/DL (ref 31–36)
MCV RBC AUTO: 89 FL (ref 80–100)
MONOCYTES # BLD AUTO: 0.5 THOUSAND/ΜL (ref 0.2–0.9)
MONOCYTES NFR BLD AUTO: 8 % (ref 1–10)
NEUTROPHILS # BLD AUTO: 4 THOUSANDS/ΜL (ref 1.8–7.8)
NEUTS SEG NFR BLD AUTO: 58 % (ref 45–65)
NITRITE UR QL STRIP: NEGATIVE
PH UR STRIP.AUTO: 7 [PH]
PLATELET # BLD AUTO: 270 THOUSANDS/UL (ref 150–450)
PMV BLD AUTO: 8.8 FL (ref 8.9–12.7)
POTASSIUM SERPL-SCNC: 3.7 MMOL/L (ref 3.6–5)
PROT SERPL-MCNC: 7.6 G/DL (ref 5.9–8.4)
PROT UR STRIP-MCNC: NEGATIVE MG/DL
RBC # BLD AUTO: 4.65 MILLION/UL (ref 4.5–5.9)
SODIUM SERPL-SCNC: 143 MMOL/L (ref 137–147)
SP GR UR STRIP.AUTO: 1.01 (ref 1–1.04)
UROBILINOGEN UA: NEGATIVE MG/DL
WBC # BLD AUTO: 6.9 THOUSAND/UL (ref 4.5–11)

## 2020-01-08 PROCEDURE — 80053 COMPREHEN METABOLIC PANEL: CPT | Performed by: EMERGENCY MEDICINE

## 2020-01-08 PROCEDURE — 96374 THER/PROPH/DIAG INJ IV PUSH: CPT

## 2020-01-08 PROCEDURE — 36415 COLL VENOUS BLD VENIPUNCTURE: CPT | Performed by: EMERGENCY MEDICINE

## 2020-01-08 PROCEDURE — 99284 EMERGENCY DEPT VISIT MOD MDM: CPT | Performed by: EMERGENCY MEDICINE

## 2020-01-08 PROCEDURE — 99285 EMERGENCY DEPT VISIT HI MDM: CPT

## 2020-01-08 PROCEDURE — 85025 COMPLETE CBC W/AUTO DIFF WBC: CPT | Performed by: EMERGENCY MEDICINE

## 2020-01-08 PROCEDURE — 96361 HYDRATE IV INFUSION ADD-ON: CPT

## 2020-01-08 RX ORDER — DICYCLOMINE HCL 20 MG
20 TABLET ORAL 2 TIMES DAILY
Qty: 10 TABLET | Refills: 0 | Status: SHIPPED | OUTPATIENT
Start: 2020-01-08 | End: 2020-03-06

## 2020-01-08 RX ORDER — ONDANSETRON 2 MG/ML
4 INJECTION INTRAMUSCULAR; INTRAVENOUS ONCE
Status: COMPLETED | OUTPATIENT
Start: 2020-01-08 | End: 2020-01-08

## 2020-01-08 RX ORDER — ONDANSETRON 4 MG/1
4 TABLET, ORALLY DISINTEGRATING ORAL EVERY 8 HOURS PRN
Qty: 20 TABLET | Refills: 0 | Status: SHIPPED | OUTPATIENT
Start: 2020-01-08 | End: 2020-03-09 | Stop reason: HOSPADM

## 2020-01-08 RX ADMIN — SODIUM CHLORIDE 1000 ML: 0.9 INJECTION, SOLUTION INTRAVENOUS at 22:09

## 2020-01-08 RX ADMIN — ONDANSETRON 4 MG: 2 INJECTION, SOLUTION INTRAMUSCULAR; INTRAVENOUS at 22:11

## 2020-01-09 ENCOUNTER — APPOINTMENT (EMERGENCY)
Dept: CT IMAGING | Facility: HOSPITAL | Age: 50
End: 2020-01-09
Payer: COMMERCIAL

## 2020-01-09 ENCOUNTER — HOSPITAL ENCOUNTER (EMERGENCY)
Facility: HOSPITAL | Age: 50
Discharge: HOME | End: 2020-01-10
Attending: EMERGENCY MEDICINE | Admitting: EMERGENCY MEDICINE
Payer: COMMERCIAL

## 2020-01-09 DIAGNOSIS — R19.7 DIARRHEA: ICD-10-CM

## 2020-01-09 DIAGNOSIS — R10.30 LOWER ABDOMINAL PAIN: Primary | ICD-10-CM

## 2020-01-09 LAB
ALBUMIN SERPL BCP-MCNC: 4.7 G/DL (ref 3–5.2)
ALP SERPL-CCNC: 51 U/L (ref 43–122)
ALT SERPL W P-5'-P-CCNC: 26 U/L (ref 9–52)
ANION GAP SERPL CALCULATED.3IONS-SCNC: 11 MMOL/L (ref 5–14)
AST SERPL W P-5'-P-CCNC: 30 U/L (ref 17–59)
BASOPHILS # BLD AUTO: 0.1 THOUSANDS/ΜL (ref 0–0.1)
BASOPHILS NFR BLD AUTO: 1 % (ref 0–1)
BILIRUB SERPL-MCNC: 0.3 MG/DL
BUN SERPL-MCNC: 16 MG/DL (ref 5–25)
CALCIUM SERPL-MCNC: 9.7 MG/DL (ref 8.4–10.2)
CHLORIDE SERPL-SCNC: 102 MMOL/L (ref 97–108)
CO2 SERPL-SCNC: 30 MMOL/L (ref 22–30)
CREAT SERPL-MCNC: 1 MG/DL (ref 0.7–1.5)
EOSINOPHIL # BLD AUTO: 0.1 THOUSAND/ΜL (ref 0–0.4)
EOSINOPHIL NFR BLD AUTO: 1 % (ref 0–6)
ERYTHROCYTE [DISTWIDTH] IN BLOOD BY AUTOMATED COUNT: 13.4 %
GFR SERPL CREATININE-BSD FRML MDRD: 89 ML/MIN/1.73SQ M
GLUCOSE SERPL-MCNC: 92 MG/DL (ref 70–99)
HCT VFR BLD AUTO: 42.2 % (ref 41–53)
HGB BLD-MCNC: 14.3 G/DL (ref 13.5–17.5)
LIPASE SERPL-CCNC: 103 U/L (ref 23–300)
LYMPHOCYTES # BLD AUTO: 2.8 THOUSANDS/ΜL (ref 0.5–4)
LYMPHOCYTES NFR BLD AUTO: 31 % (ref 25–45)
MCH RBC QN AUTO: 30.2 PG (ref 26–34)
MCHC RBC AUTO-ENTMCNC: 33.9 G/DL (ref 31–36)
MCV RBC AUTO: 89 FL (ref 80–100)
MONOCYTES # BLD AUTO: 0.6 THOUSAND/ΜL (ref 0.2–0.9)
MONOCYTES NFR BLD AUTO: 7 % (ref 1–10)
NEUTROPHILS # BLD AUTO: 5.5 THOUSANDS/ΜL (ref 1.8–7.8)
NEUTS SEG NFR BLD AUTO: 61 % (ref 45–65)
PLATELET # BLD AUTO: 303 THOUSANDS/UL (ref 150–450)
PMV BLD AUTO: 8.7 FL (ref 8.9–12.7)
POTASSIUM SERPL-SCNC: 3.5 MMOL/L (ref 3.6–5)
PROT SERPL-MCNC: 8 G/DL (ref 5.9–8.4)
RBC # BLD AUTO: 4.73 MILLION/UL (ref 4.5–5.9)
SODIUM SERPL-SCNC: 143 MMOL/L (ref 137–147)
WBC # BLD AUTO: 9.1 THOUSAND/UL (ref 4.5–11)

## 2020-01-09 PROCEDURE — 96361 HYDRATE IV INFUSION ADD-ON: CPT

## 2020-01-09 PROCEDURE — 83690 ASSAY OF LIPASE: CPT | Performed by: EMERGENCY MEDICINE

## 2020-01-09 PROCEDURE — 96374 THER/PROPH/DIAG INJ IV PUSH: CPT

## 2020-01-09 PROCEDURE — 36415 COLL VENOUS BLD VENIPUNCTURE: CPT | Performed by: EMERGENCY MEDICINE

## 2020-01-09 PROCEDURE — 99285 EMERGENCY DEPT VISIT HI MDM: CPT

## 2020-01-09 PROCEDURE — 93005 ELECTROCARDIOGRAM TRACING: CPT

## 2020-01-09 PROCEDURE — 85025 COMPLETE CBC W/AUTO DIFF WBC: CPT | Performed by: EMERGENCY MEDICINE

## 2020-01-09 PROCEDURE — 80053 COMPREHEN METABOLIC PANEL: CPT | Performed by: EMERGENCY MEDICINE

## 2020-01-09 PROCEDURE — 74177 CT ABD & PELVIS W/CONTRAST: CPT

## 2020-01-09 PROCEDURE — 99284 EMERGENCY DEPT VISIT MOD MDM: CPT | Performed by: EMERGENCY MEDICINE

## 2020-01-09 PROCEDURE — 84484 ASSAY OF TROPONIN QUANT: CPT | Performed by: EMERGENCY MEDICINE

## 2020-01-09 RX ORDER — DICYCLOMINE HCL 20 MG
20 TABLET ORAL ONCE
Status: COMPLETED | OUTPATIENT
Start: 2020-01-09 | End: 2020-01-09

## 2020-01-09 RX ORDER — ONDANSETRON 2 MG/ML
4 INJECTION INTRAMUSCULAR; INTRAVENOUS ONCE
Status: COMPLETED | OUTPATIENT
Start: 2020-01-09 | End: 2020-01-09

## 2020-01-09 RX ADMIN — IOHEXOL 100 ML: 350 INJECTION, SOLUTION INTRAVENOUS at 23:58

## 2020-01-09 RX ADMIN — SODIUM CHLORIDE 1000 ML: 0.9 INJECTION, SOLUTION INTRAVENOUS at 23:26

## 2020-01-09 RX ADMIN — DICYCLOMINE HYDROCHLORIDE 20 MG: 20 TABLET ORAL at 23:34

## 2020-01-09 RX ADMIN — ONDANSETRON 4 MG: 2 INJECTION, SOLUTION INTRAMUSCULAR; INTRAVENOUS at 23:30

## 2020-01-09 NOTE — ED PROVIDER NOTES
History  Chief Complaint   Patient presents with    Abdominal Pain     Pt reports abdomnal pain that started today  Pt reports nausea without vomiting or diarrhea  Pt also states "my legs feel like rubber at times   Weakness - Generalized     52-year-old gentleman presents with complaint of generalized abdominal discomfort along with nausea  He has had no vomiting or diarrhea  He denies fever/chills, chest pain, difficulty breathing, or other acute issues or concerns  He has taken no medications for his symptoms  Nausea   The primary symptoms include abdominal pain and nausea  Primary symptoms do not include fever, fatigue, vomiting, diarrhea, myalgias or arthralgias  The illness began today  The onset was gradual  The problem has not changed since onset  The illness is also significant for anorexia  The illness does not include chills, constipation or back pain  Prior to Admission Medications   Prescriptions Last Dose Informant Patient Reported? Taking? ARIPiprazole (ABILIFY) 10 mg tablet   No No   Sig: Take 1 tablet (10 mg total) by mouth daily for 7 days At 9am   dicyclomine (BENTYL) 20 mg tablet   No No   Sig: Take 1 tablet (20 mg total) by mouth 2 (two) times a day for 7 days   fenofibrate (TRICOR) 48 mg tablet   No No   Sig: Take 1 tablet (48 mg total) by mouth daily for 7 days At 9am   fluticasone (FLOVENT HFA) 110 MCG/ACT inhaler   No No   Sig: Inhale 1 puff every 12 (twelve) hours Rinse mouth after use  insulin glargine (LANTUS SOLOSTAR) 100 units/mL injection pen   No No   Sig: Inject 22 Units under the skin daily Every morning     insulin lispro (HUMALOG KWIKPEN) 100 units/mL injection pen   No No   Sig: Inject 4 Units under the skin 3 (three) times a day with meals   lidocaine (LIDODERM) 5 %   No No   Sig: Apply 1 patch topically daily for 4 days Remove & Discard patch within 12 hours or as directed by MD   lisinopril (ZESTRIL) 5 mg tablet   No No   Sig: Take 1 tablet (5 mg total) by mouth daily for 7 days At 9am   methocarbamol (ROBAXIN) 500 mg tablet   No No   Sig: Take 1 tablet (500 mg total) by mouth 2 (two) times a day for 4 days   thiothixene (NAVANE) 1 mg capsule   No No   Sig: Take 1 capsule (1 mg total) by mouth daily for 7 days      Facility-Administered Medications: None       Past Medical History:   Diagnosis Date    Anxiety     Asthma     Bipolar 1 disorder ([facility] )     Depression     Diabetes mellitus ([place] [address] )     Psychiatric disorder        Past Surgical History:   Procedure Laterality Date    APPENDECTOMY         History reviewed  No pertinent family history  I have reviewed and agree with the history as documented  Social History     Tobacco Use    Smoking status: Current Every Day Smoker     Packs/day: 0 20     Types: Cigarettes     Last attempt to quit: 2019     Years since quittin 1    Smokeless tobacco: Never Used    Tobacco comment: Smokes less radha 1/4 pack/day Would like to quit   Substance Use Topics    Alcohol use: Not Currently     Frequency: Monthly or less     Drinks per session: 1 or 2     Binge frequency: Never     Comment: States has had no alcohol in at least a year    Drug use: No        Review of Systems   Constitutional: Negative for activity change, chills, fatigue and fever  HENT: Negative  Negative for congestion, postnasal drip, rhinorrhea, sinus pain, sore throat and trouble swallowing  Eyes: Negative  Respiratory: Negative  Cardiovascular: Negative for chest pain  Gastrointestinal: Positive for abdominal pain, anorexia and nausea  Negative for constipation, diarrhea and vomiting  Endocrine: Negative  Genitourinary: Negative  Musculoskeletal: Negative  Negative for arthralgias, back pain and myalgias  Skin: Negative  Allergic/Immunologic: Negative  Neurological: Negative  Hematological: Negative  Psychiatric/Behavioral: Negative          Physical Exam  Physical Exam   Constitutional: He is oriented to person, place, and time  He appears well-developed and well-nourished  Non-toxic appearance  He does not appear ill  No distress  HENT:   Head: Normocephalic and atraumatic  Mouth/Throat: Oropharynx is clear and moist  No oropharyngeal exudate  Eyes: Pupils are equal, round, and reactive to light  Neck: Neck supple  Cardiovascular: Normal rate, regular rhythm and normal heart sounds  Pulmonary/Chest: Effort normal and breath sounds normal  No respiratory distress  Abdominal: Soft  Bowel sounds are normal  He exhibits no distension  There is tenderness (mild, diffuse)  There is no guarding  Musculoskeletal: Normal range of motion  He exhibits no edema  Neurological: He is alert and oriented to person, place, and time  Skin: Skin is warm and dry  Capillary refill takes less than 2 seconds  He is not diaphoretic  Psychiatric: He has a normal mood and affect  His behavior is normal    Nursing note and vitals reviewed        Vital Signs  ED Triage Vitals [01/08/20 2142]   Temperature Pulse Respirations Blood Pressure SpO2   (!) 93 °F (33 9 °C) 77 18 119/66 99 %      Temp Source Heart Rate Source Patient Position - Orthostatic VS BP Location FiO2 (%)   Tympanic Monitor Sitting Left arm --      Pain Score       Worst Possible Pain           Vitals:    01/08/20 2142   BP: 119/66   Pulse: 77   Patient Position - Orthostatic VS: Sitting         Visual Acuity      ED Medications  Medications   sodium chloride 0 9 % bolus 1,000 mL (0 mL Intravenous Stopped 1/8/20 2253)   ondansetron (ZOFRAN) injection 4 mg (4 mg Intravenous Given 1/8/20 2211)       Diagnostic Studies  Results Reviewed     Procedure Component Value Units Date/Time    UA (URINE) with reflex to Scope [[id number]] Collected:  01/08/20 2337    Lab Status:  No result Specimen:  Urine, Other     Comprehensive metabolic panel [[id number]]  (Abnormal) Collected:  01/08/20 2208    Lab Status:  Final result Specimen:  Blood from Arm, Right Updated:  01/08/20 2227     Sodium 143 mmol/L      Potassium 3 7 mmol/L      Chloride 103 mmol/L      CO2 31 mmol/L      ANION GAP 9 mmol/L      BUN 20 mg/dL      Creatinine 1 06 mg/dL      Glucose 143 mg/dL      Calcium 9 7 mg/dL      AST 26 U/L      ALT 20 U/L      Alkaline Phosphatase 51 U/L      Total Protein 7 6 g/dL      Albumin 4 4 g/dL      Total Bilirubin 0 30 mg/dL      eGFR 83 ml/min/1 73sq m     Narrative:       National Kidney Disease Foundation guidelines for Chronic Kidney Disease (CKD):     Stage 1 with normal or high GFR (GFR > 90 mL/min/1 73 square meters)    Stage 2 Mild CKD (GFR = 60-89 mL/min/1 73 square meters)    Stage 3A Moderate CKD (GFR = 45-59 mL/min/1 73 square meters)    Stage 3B Moderate CKD (GFR = 30-44 mL/min/1 73 square meters)    Stage 4 Severe CKD (GFR = 15-29 mL/min/1 73 square meters)    Stage 5 End Stage CKD (GFR <15 mL/min/1 73 square meters)  Note: GFR calculation is accurate only with a steady state creatinine    CBC and differential [[id number]]  (Abnormal) Collected:  01/08/20 2208    Lab Status:  Final result Specimen:  Blood from Arm, Right Updated:  01/08/20 2218     WBC 6 90 Thousand/uL      RBC 4 65 Million/uL      Hemoglobin 14 1 g/dL      Hematocrit 41 3 %      MCV 89 fL      MCH 30 2 pg      MCHC 34 0 g/dL      RDW 13 6 %      MPV 8 8 fL      Platelets 407 Thousands/uL      Neutrophils Relative 58 %      Lymphocytes Relative 32 %      Monocytes Relative 8 %      Eosinophils Relative 1 %      Basophils Relative 1 %      Neutrophils Absolute 4 00 Thousands/µL      Lymphocytes Absolute 2 20 Thousands/µL      Monocytes Absolute 0 50 Thousand/µL      Eosinophils Absolute 0 00 Thousand/µL      Basophils Absolute 0 00 Thousands/µL                  No orders to display              Procedures  Procedures         ED Course                               MDM  Number of Diagnoses or Management Options  Abdominal pain:   Nausea:   Diagnosis management comments: 56-year-old gentleman presents with complaint of nausea and abdominal cramping  After receiving IV fluids and medications, his symptoms improved  Any acute issues or concerns  Discussed treatment plan, expected clinical course, need for follow-up, and reasons return to the ER  Amount and/or Complexity of Data Reviewed  Clinical lab tests: ordered and reviewed  Tests in the medicine section of CPT®: reviewed and ordered          Disposition  Final diagnoses:   Nausea   Abdominal pain     Time reflects when diagnosis was documented in both MDM as applicable and the Disposition within this note     Time User Action Codes Description Comment    1/8/2020 10:30 PM Manolo Breech Add [R11 0] Nausea     1/8/2020 10:30 PM Manolo Breech Add [R10 9] Abdominal pain       ED Disposition     ED Disposition Condition Date/Time Comment    Discharge Stable Wed Jan 8, 2020 10:30 PM Alma Pradoine discharge to home/self care  Follow-up Information     Follow up With Specialties Details Why Contact Info    Luis Bonilla MD [facility] Medicine Call   [address] [facility],[address] Floor, [address] [facility] [address] [phone]      [facility] Emergency Department Emergency Medicine  If symptoms worsen [address] [address] [phone]          Patient's Medications   Discharge Prescriptions    DICYCLOMINE (BENTYL) 20 MG TABLET    Take 1 tablet (20 mg total) by mouth 2 (two) times a day       Start Date: 1/8/2020  End Date: --       Order Dose: 20 mg       Quantity: 10 tablet    Refills: 0    ONDANSETRON (ZOFRAN-ODT) 4 MG DISINTEGRATING TABLET    Take 1 tablet (4 mg total) by mouth every 8 (eight) hours as needed for nausea or vomiting       Start Date: 1/8/2020  End Date: --       Order Dose: 4 mg       Quantity: 20 tablet    Refills: 0     No discharge procedures on file      ED Provider  Electronically Signed by           Obi Rose DO  01/08/20 7150

## 2020-01-10 ENCOUNTER — HOSPITAL ENCOUNTER (EMERGENCY)
Facility: HOSPITAL | Age: 50
Discharge: HOME | End: 2020-01-10
Attending: EMERGENCY MEDICINE
Payer: COMMERCIAL

## 2020-01-10 VITALS
RESPIRATION RATE: 18 BRPM | SYSTOLIC BLOOD PRESSURE: 117 MMHG | DIASTOLIC BLOOD PRESSURE: 72 MMHG | WEIGHT: 136.91 LBS | HEART RATE: 72 BPM | BODY MASS INDEX: 22.1 KG/M2 | OXYGEN SATURATION: 97 % | TEMPERATURE: 97.5 F

## 2020-01-10 VITALS
OXYGEN SATURATION: 98 % | HEIGHT: 66 IN | DIASTOLIC BLOOD PRESSURE: 73 MMHG | TEMPERATURE: 97.6 F | BODY MASS INDEX: 21.75 KG/M2 | HEART RATE: 62 BPM | WEIGHT: 135.36 LBS | RESPIRATION RATE: 20 BRPM | SYSTOLIC BLOOD PRESSURE: 124 MMHG

## 2020-01-10 VITALS
DIASTOLIC BLOOD PRESSURE: 71 MMHG | TEMPERATURE: 96.1 F | BODY MASS INDEX: 22.29 KG/M2 | OXYGEN SATURATION: 98 % | HEART RATE: 62 BPM | RESPIRATION RATE: 18 BRPM | HEIGHT: 66 IN | SYSTOLIC BLOOD PRESSURE: 118 MMHG | WEIGHT: 138.67 LBS

## 2020-01-10 DIAGNOSIS — R10.9 ABDOMINAL PAIN: ICD-10-CM

## 2020-01-10 DIAGNOSIS — F31.9 BIPOLAR DISORDER (HCC): Primary | ICD-10-CM

## 2020-01-10 DIAGNOSIS — R11.0 NAUSEA: Primary | ICD-10-CM

## 2020-01-10 LAB — TROPONIN I SERPL-MCNC: <0.01 NG/ML (ref 0–0.03)

## 2020-01-10 PROCEDURE — 99283 EMERGENCY DEPT VISIT LOW MDM: CPT

## 2020-01-10 PROCEDURE — 99282 EMERGENCY DEPT VISIT SF MDM: CPT

## 2020-01-10 PROCEDURE — 99282 EMERGENCY DEPT VISIT SF MDM: CPT | Performed by: EMERGENCY MEDICINE

## 2020-01-10 PROCEDURE — 99284 EMERGENCY DEPT VISIT MOD MDM: CPT | Performed by: EMERGENCY MEDICINE

## 2020-01-10 RX ORDER — ONDANSETRON 4 MG/1
4 TABLET, ORALLY DISINTEGRATING ORAL ONCE
Status: COMPLETED | OUTPATIENT
Start: 2020-01-10 | End: 2020-01-10

## 2020-01-10 RX ADMIN — ONDANSETRON 4 MG: 4 TABLET, ORALLY DISINTEGRATING ORAL at 07:10

## 2020-01-10 NOTE — ED PROVIDER NOTES
History  Chief Complaint   Patient presents with    Abdominal Pain     states he is still having abd pain after being seen for the same earlier     43-year-old gentleman presents again with complaints of abdominal discomfort and nausea  This is his third visit in the past two days for this complaint  After being seen the first time he had not filled any of his prescriptions and he did have symptoms  At that time after receiving Zofran and fluids he reported complete resolution of symptoms  Initially on my questioning the patient he states that he did fill his prescriptions but then when questioned further does note that he had not filled them  I evaluated bag prescriptions at the patient had on him and it appears as though he filled one of his two prescriptions given here  He has not yet taken the Bentyl which was filled in states that he again began to have some abdominal cramping  He denies any fevers/chills, vomiting, diarrhea, chest pain, shortness of breath, or other acute issues or concerns  When evaluated on the second occasion, he had a CT in addition to repeat labs all of which were unremarkable  Abdominal Pain   Pain location:  Generalized  Pain quality: aching    Pain radiates to:  Does not radiate  Pain severity:  Mild  Onset quality:  Gradual  Timing:  Intermittent  Progression:  Waxing and waning  Relieved by:  Nothing  Worsened by:  Nothing  Ineffective treatments:  None tried  Associated symptoms: nausea    Associated symptoms: no anorexia, no chest pain, no chills, no constipation, no cough, no diarrhea, no dysuria, no fatigue, no fever, no flatus, no hematemesis, no hematochezia, no hematuria, no melena, no shortness of breath, no sore throat and no vomiting        Prior to Admission Medications   Prescriptions Last Dose Informant Patient Reported? Taking?    ARIPiprazole (ABILIFY) 10 mg tablet   No No   Sig: Take 1 tablet (10 mg total) by mouth daily for 7 days At 9am   dicyclomine (BENTYL) 20 mg tablet   No No   Sig: Take 1 tablet (20 mg total) by mouth 2 (two) times a day   Patient not taking: Reported on 1/9/2020   fenofibrate (TRICOR) 48 mg tablet   No No   Sig: Take 1 tablet (48 mg total) by mouth daily for 7 days At 9am   fluticasone (FLOVENT HFA) 110 MCG/ACT inhaler   No No   Sig: Inhale 1 puff every 12 (twelve) hours Rinse mouth after use  insulin glargine (LANTUS SOLOSTAR) 100 units/mL injection pen   No No   Sig: Inject 22 Units under the skin daily Every morning  insulin lispro (HUMALOG KWIKPEN) 100 units/mL injection pen   No No   Sig: Inject 4 Units under the skin 3 (three) times a day with meals   lisinopril (ZESTRIL) 5 mg tablet   No No   Sig: Take 1 tablet (5 mg total) by mouth daily for 7 days At 9am   ondansetron (ZOFRAN-ODT) 4 mg disintegrating tablet   No No   Sig: Take 1 tablet (4 mg total) by mouth every 8 (eight) hours as needed for nausea or vomiting   Patient not taking: Reported on 1/9/2020   thiothixene (NAVANE) 1 mg capsule   No No   Sig: Take 1 capsule (1 mg total) by mouth daily for 7 days      Facility-Administered Medications: None       Past Medical History:   Diagnosis Date    Anxiety     Asthma     Bipolar 1 disorder ([facility] )     Depression     Diabetes mellitus ([facility] )     Psychiatric disorder        Past Surgical History:   Procedure Laterality Date    APPENDECTOMY         History reviewed  No pertinent family history  I have reviewed and agree with the history as documented  Social History     Tobacco Use    Smoking status: Current Every Day Smoker     Packs/day: 0 20     Types: Cigarettes    Smokeless tobacco: Never Used   Substance Use Topics    Alcohol use: Not Currently     Frequency: Monthly or less     Drinks per session: 1 or 2     Binge frequency: Never     Comment: States has had no alcohol in at least a year    Drug use: No        Review of Systems   Constitutional: Negative for activity change, chills, fatigue and fever     HENT: Negative  Negative for congestion, postnasal drip, rhinorrhea, sinus pain, sore throat and trouble swallowing  Eyes: Negative  Respiratory: Negative  Negative for cough and shortness of breath  Cardiovascular: Negative for chest pain  Gastrointestinal: Positive for abdominal pain and nausea  Negative for anorexia, constipation, diarrhea, flatus, hematemesis, hematochezia, melena and vomiting  Endocrine: Negative  Genitourinary: Negative  Negative for dysuria and hematuria  Musculoskeletal: Negative  Negative for arthralgias, back pain and myalgias  Skin: Negative  Allergic/Immunologic: Negative  Neurological: Negative  Hematological: Negative  Psychiatric/Behavioral: Negative  Physical Exam  Physical Exam   Constitutional: He is oriented to person, place, and time  He appears well-developed and well-nourished  Non-toxic appearance  He does not appear ill  No distress  HENT:   Head: Normocephalic and atraumatic  Mouth/Throat: Oropharynx is clear and moist  No oropharyngeal exudate  Eyes: Pupils are equal, round, and reactive to light  Neck: Neck supple  Cardiovascular: Normal rate, regular rhythm and normal heart sounds  Pulmonary/Chest: Effort normal and breath sounds normal  No respiratory distress  Abdominal: Soft  Bowel sounds are normal  He exhibits no distension  There is generalized tenderness (Mild, diffuse)  There is no guarding  Musculoskeletal: Normal range of motion  He exhibits no edema  Neurological: He is alert and oriented to person, place, and time  Skin: Skin is warm and dry  Capillary refill takes less than 2 seconds  He is not diaphoretic  Psychiatric: He has a normal mood and affect  His behavior is normal    Nursing note and vitals reviewed        Vital Signs  ED Triage Vitals [01/10/20 0648]   Temperature Pulse Respirations Blood Pressure SpO2   97 5 °F (36 4 °C) 72 18 117/72 97 %      Temp Source Heart Rate Source Patient Position - Orthostatic VS BP Location FiO2 (%)   Tympanic Monitor Sitting Left arm --      Pain Score       Worst Possible Pain           Vitals:    01/10/20 0648   BP: 117/72   Pulse: 72   Patient Position - Orthostatic VS: Sitting         Visual Acuity      ED Medications  Medications   ondansetron (ZOFRAN-ODT) dispersible tablet 4 mg (has no administration in time range)       Diagnostic Studies  Results Reviewed     None                 No orders to display              Procedures  Procedures         ED Course                               MDM  Number of Diagnoses or Management Options  Abdominal pain:   Nausea:   Diagnosis management comments: 81-year-old gentleman presents for his third visit with complaint of abdominal discomfort  On examination he is in no acute distress, on my arrival in the room he is reclined on the litter watching TV and appears comfortable  He has not been taking medications as prescribed and has done no attempted treatment at home  I discussed the need to take the medications as prescribed to him  Had minimal abdominal discomfort diffusely on examination  Again he has had labs performed twice in the past two days and also had imaging in the form of his CT scan  I explained that the patient must be drinking plenty of fluids and resting at home  He was also take his medications as prescribed  There are no red flag symptoms that would necessitate repeat workup at this point in time  He is aware with the need for outpatient follow-up along with reasons to return to the ER         Amount and/or Complexity of Data Reviewed  Clinical lab tests: reviewed  Tests in the radiology section of CPT®: reviewed  Tests in the medicine section of CPT®: reviewed          Disposition  Final diagnoses:   Nausea   Abdominal pain     Time reflects when diagnosis was documented in both MDM as applicable and the Disposition within this note     Time User Action Codes Description Comment    1/10/2020 7:03 AM Ballesteros Leda Add [R11 0] Nausea     1/10/2020  7:03 AM Ballesteros Leda Add [R10 9] Abdominal pain       ED Disposition     ED Disposition Condition Date/Time Comment    Discharge Stable Fri Francisco Javier 10, 2020  7:03 AM Dayna Marx discharge to home/self care  Follow-up Information     Follow up With Specialties Details Why Contact Info    Cherry Ma MD [facility] Call   [address], [address] [place] [address]  [phone]            Patient's Medications   Discharge Prescriptions    No medications on file     No discharge procedures on file      ED Provider  Electronically Signed by           Yash Murdock DO  01/10/20 0405

## 2020-01-10 NOTE — ED PROVIDER NOTES
History  Chief Complaint   Patient presents with    Abdominal Pain     Lightheaded, nausea and abdominal pain     52 yo male with a complicated past medical history including bipolar disorder, depression, anxiety, DM, and asthma presents with multiple complaints including abdominal pain, nausea, diarrhea, and lightheadedness  The patient was seen in the ED yesterday for these complaints --> workup was unremarkable and symptoms improved with IVFs/Zofran  He did not fill his scripts for Bentyl or Zofran  No chest pain or shortness of breath  He denies vomiting  No dark or bloody stools  No falls or syncopal events  No other specific complaints  Prior to Admission Medications   Prescriptions Last Dose Informant Patient Reported? Taking? ARIPiprazole (ABILIFY) 10 mg tablet 1/9/2020 at Unknown time  No Yes   Sig: Take 1 tablet (10 mg total) by mouth daily for 7 days At 9am   dicyclomine (BENTYL) 20 mg tablet Not Taking at Unknown time  No No   Sig: Take 1 tablet (20 mg total) by mouth 2 (two) times a day   Patient not taking: Reported on 1/9/2020   fenofibrate (TRICOR) 48 mg tablet 1/9/2020 at Unknown time  No Yes   Sig: Take 1 tablet (48 mg total) by mouth daily for 7 days At 9am   fluticasone (FLOVENT HFA) 110 MCG/ACT inhaler 1/9/2020 at Unknown time  No Yes   Sig: Inhale 1 puff every 12 (twelve) hours Rinse mouth after use  insulin glargine (LANTUS SOLOSTAR) 100 units/mL injection pen 1/9/2020 at Unknown time  No Yes   Sig: Inject 22 Units under the skin daily Every morning     insulin lispro (HUMALOG KWIKPEN) 100 units/mL injection pen 1/9/2020 at Unknown time  No Yes   Sig: Inject 4 Units under the skin 3 (three) times a day with meals   lisinopril (ZESTRIL) 5 mg tablet 1/9/2020 at Unknown time  No Yes   Sig: Take 1 tablet (5 mg total) by mouth daily for 7 days At 9am   ondansetron (ZOFRAN-ODT) 4 mg disintegrating tablet Not Taking at Unknown time  No No   Sig: Take 1 tablet (4 mg total) by mouth every 8 (eight) hours as needed for nausea or vomiting   Patient not taking: Reported on 2020   thiothixene (NAVANE) 1 mg capsule 2020 at Unknown time  No Yes   Sig: Take 1 capsule (1 mg total) by mouth daily for 7 days      Facility-Administered Medications: None       Past Medical History:   Diagnosis Date    Anxiety     Asthma     Bipolar 1 disorder ([place] [address] )     Depression     Diabetes mellitus ([place] [address] )     Psychiatric disorder        Past Surgical History:   Procedure Laterality Date    APPENDECTOMY         History reviewed  No pertinent family history  I have reviewed and agree with the history as documented  Social History     Tobacco Use    Smoking status: Current Every Day Smoker     Packs/day: 0 20     Types: Cigarettes     Last attempt to quit: 2019     Years since quittin 1    Smokeless tobacco: Never Used    Tobacco comment: Smokes less radha 1/4 pack/day Would like to quit   Substance Use Topics    Alcohol use: Not Currently     Frequency: Monthly or less     Drinks per session: 1 or 2     Binge frequency: Never     Comment: States has had no alcohol in at least a year    Drug use: No        Review of Systems   Constitutional: Negative for chills and fever  HENT: Negative for congestion, rhinorrhea and sore throat  Respiratory: Negative for cough and shortness of breath  Cardiovascular: Negative for chest pain and palpitations  Gastrointestinal: Positive for abdominal pain, diarrhea and nausea  Negative for vomiting  Endocrine: Negative for cold intolerance and heat intolerance  Genitourinary: Negative for dysuria and flank pain  Musculoskeletal: Negative for back pain  Skin: Negative for rash  Allergic/Immunologic: Negative for immunocompromised state  Neurological: Positive for light-headedness  Negative for syncope and headaches  Hematological: Negative for adenopathy  Psychiatric/Behavioral: The patient is not nervous/anxious          Physical Exam  Physical Exam   Constitutional: He is oriented to person, place, and time  He appears well-developed and well-nourished  No distress  HENT:   Head: Normocephalic and atraumatic  Eyes: Pupils are equal, round, and reactive to light  EOM are normal    Neck: Normal range of motion  Neck supple  Cardiovascular: Normal rate and regular rhythm  Pulmonary/Chest: Effort normal and breath sounds normal  No respiratory distress  Abdominal: Soft  He exhibits no distension  Bowel sounds are increased  There is tenderness in the right lower quadrant, suprapubic area and left lower quadrant  There is no rebound, no guarding, no CVA tenderness, no tenderness at McBurney's point and negative Flor's sign  Musculoskeletal: Normal range of motion  He exhibits no edema  Neurological: He is alert and oriented to person, place, and time  Skin: Skin is warm and dry  Psychiatric: He has a normal mood and affect         Vital Signs  ED Triage Vitals   Temperature Pulse Respirations Blood Pressure SpO2   01/09/20 2301 01/09/20 2301 01/09/20 2301 01/09/20 2301 01/09/20 2301   (!) 96 2 °F (35 7 °C) 76 18 112/75 100 %      Temp Source Heart Rate Source Patient Position - Orthostatic VS BP Location FiO2 (%)   01/09/20 2301 01/09/20 2301 01/09/20 2336 01/09/20 2336 --   Tympanic Monitor Lying Left arm       Pain Score       01/09/20 2301       Worst Possible Pain           Vitals:    01/09/20 2301 01/09/20 2336   BP: 112/75 112/77   Pulse: 76 63   Patient Position - Orthostatic VS:  Lying         Visual Acuity      ED Medications  Medications   sodium chloride 0 9 % bolus 1,000 mL (1,000 mL Intravenous New Bag 1/9/20 2326)   ondansetron (ZOFRAN) injection 4 mg (4 mg Intravenous Given 1/9/20 2330)   dicyclomine (BENTYL) tablet 20 mg (20 mg Oral Given 1/9/20 2334)       Diagnostic Studies  Results Reviewed     Procedure Component Value Units Date/Time    Comprehensive metabolic panel [[id number]]  (Abnormal) Collected: 01/09/20 2330    Lab Status:  Final result Specimen:  Blood from Arm, Right Updated:  01/09/20 2350     Sodium 143 mmol/L      Potassium 3 5 mmol/L      Chloride 102 mmol/L      CO2 30 mmol/L      ANION GAP 11 mmol/L      BUN 16 mg/dL      Creatinine 1 00 mg/dL      Glucose 92 mg/dL      Calcium 9 7 mg/dL      AST 30 U/L      ALT 26 U/L      Alkaline Phosphatase 51 U/L      Total Protein 8 0 g/dL      Albumin 4 7 g/dL      Total Bilirubin 0 30 mg/dL      eGFR 89 ml/min/1 73sq m     Narrative:       National Kid[employer] guidelines for Chronic Kidney Disease (CKD):     Stage 1 with normal or high GFR (GFR > 90 mL/min/1 73 square meters)    Stage 2 Mild CKD (GFR = 60-89 mL/min/1 73 square meters)    Stage 3A Moderate CKD (GFR = 45-59 mL/min/1 73 square meters)    Stage 3B Moderate CKD (GFR = 30-44 mL/min/1 73 square meters)    Stage 4 Severe CKD (GFR = 15-29 mL/min/1 73 square meters)    Stage 5 End Stage CKD (GFR <15 mL/min/1 73 square meters)  Note: GFR calculation is accurate only with a steady state creatinine    Lipase [[id number]]  (Normal) Collected:  01/09/20 2330    Lab Status:  Final result Specimen:  Blood from Arm, Right Updated:  01/09/20 2350     Lipase 103 u/L     CBC and differential [[id number]]  (Abnormal) Collected:  01/09/20 2330    Lab Status:  Final result Specimen:  Blood from Arm, Right Updated:  01/09/20 2342     WBC 9 10 Thousand/uL      RBC 4 73 Million/uL      Hemoglobin 14 3 g/dL      Hematocrit 42 2 %      MCV 89 fL      MCH 30 2 pg      MCHC 33 9 g/dL      RDW 13 4 %      MPV 8 7 fL      Platelets 780 Thousands/uL      Neutrophils Relative 61 %      Lymphocytes Relative 31 %      Monocytes Relative 7 %      Eosinophils Relative 1 %      Basophils Relative 1 %      Neutrophils Absolute 5 50 Thousands/µL      Lymphocytes Absolute 2 80 Thousands/µL      Monocytes Absolute 0 60 Thousand/µL      Eosinophils Absolute 0 10 Thousand/µL      Basophils Absolute 0 10 Thousands/µL Troponin I [[id number]] Collected:  01/09/20 2330    Lab Status: In process Specimen:  Blood from Arm, Right Updated:  01/09/20 2341    Rapid drug screen, urine [[id number]]     Lab Status:  No result Specimen:  Urine     UA (URINE) with reflex to Scope [[id number]]     Lab Status:  No result Specimen:  Urine                  CT abdomen pelvis with contrast    (Results Pending)              Procedures  Procedures         ED Course                               MDM  Number of Diagnoses or Management Options  Diarrhea:   Lower abdominal pain:   Diagnosis management comments: The patient is well appearing with stable vital signs  (+) Abdomen diffusely tender in the lower quadrants  Will check EKG, basic labs, lipase, and CT A/P  IVFs/Zofran/Bentyl administered, will continue to monitor in the ED     00:55 Initial workup unremarkable but CT read still pending  The patient says he feels "much better" and is requesting discharge  Plan for supportive care and PCP follow up as needed if imaging negative  Care signed out to Dr Christopher Hannah with final CT read pending    Amount and/or Complexity of Data Reviewed  Clinical lab tests: ordered and reviewed  Tests in the radiology section of CPT®: ordered and reviewed  Tests in the medicine section of CPT®: ordered and reviewed    Patient Progress  Patient progress: improved        Disposition  Final diagnoses:   None     ED Disposition     None      Follow-up Information    None         Patient's Medications   Discharge Prescriptions    No medications on file     No discharge procedures on file      ED Provider  Electronically Signed by           Makenzie Nice MD  01/10/20 6948

## 2020-01-11 NOTE — ED PROVIDER NOTES
History  Chief Complaint   Patient presents with    Psychiatric Evaluation     I have been hearing voices all day since I went up to see my girl  Patient is a 52-year-old male well known to me with history of bipolar who presents today with hearing voices  Patient states he went on knee today and made all his appointments  Voices began getting worse after he saw his girlfriend who was in the inpatient behavioral health unit here at [facility] who told him that he did not look too good  Patient denies any suicidal ideation  No homicidal ideations  Did not want to hurt himself at this time in any way shape or form  States he is compliant with his meds and denies any drugs or alcohol  Patient states that he did call as ICM and specifically states that when he was a hobby today he made a point to try and stay out of the hospital   Patient is declining any inpatient [place] this time  Would like to go home and working on his coping mechanisms which include coloring  Prior to Admission Medications   Prescriptions Last Dose Informant Patient Reported? Taking? ARIPiprazole (ABILIFY) 10 mg tablet   No No   Sig: Take 1 tablet (10 mg total) by mouth daily for 7 days At 9am   dicyclomine (BENTYL) 20 mg tablet   No No   Sig: Take 1 tablet (20 mg total) by mouth 2 (two) times a day   Patient not taking: Reported on 1/9/2020   fenofibrate (TRICOR) 48 mg tablet   No No   Sig: Take 1 tablet (48 mg total) by mouth daily for 7 days At 9am   fluticasone (FLOVENT HFA) 110 MCG/ACT inhaler   No No   Sig: Inhale 1 puff every 12 (twelve) hours Rinse mouth after use  insulin glargine (LANTUS SOLOSTAR) 100 units/mL injection pen   No No   Sig: Inject 22 Units under the skin daily Every morning     insulin lispro (HUMALOG KWIKPEN) 100 units/mL injection pen   No No   Sig: Inject 4 Units under the skin 3 (three) times a day with meals   lisinopril (ZESTRIL) 5 mg tablet   No No   Sig: Take 1 tablet (5 mg total) by mouth daily for 7 days At 9am   ondansetron (ZOFRAN-ODT) 4 mg disintegrating tablet   No No   Sig: Take 1 tablet (4 mg total) by mouth every 8 (eight) hours as needed for nausea or vomiting   Patient not taking: Reported on 1/9/2020   thiothixene (NAVANE) 1 mg capsule   No No   Sig: Take 1 capsule (1 mg total) by mouth daily for 7 days      Facility-Administered Medications: None       Past Medical History:   Diagnosis Date    Anxiety     Asthma     Bipolar [facility] disorder ([facility] )     Depression     Diabetes mellitus ([place] [address] )     Psychiatric disorder        Past Surgical History:   Procedure Laterality Date    APPENDECTOMY         History reviewed  No pertinent family history  I have reviewed and agree with the history as documented  Social History     Tobacco Use    Smoking status: Current Every Day Smoker     Packs/day: 0 20     Types: Cigarettes    Smokeless tobacco: Never Used   Substance Use Topics    Alcohol use: Not Currently     Frequency: Monthly or less     Drinks per session: 1 or 2     Binge frequency: Never     Comment: States has had no alcohol in at least a year    Drug use: No        Review of Systems   Constitutional: Negative  HENT: Negative  Eyes: Negative  Respiratory: Negative  Cardiovascular: Negative  Gastrointestinal: Negative  Endocrine: Negative  Genitourinary: Negative  Musculoskeletal: Negative  Skin: Negative  Allergic/Immunologic: Negative  Neurological: Negative  Hematological: Negative  Psychiatric/Behavioral: Positive for hallucinations  All other systems reviewed and are negative  Physical Exam  Physical Exam   Constitutional: He is oriented to person, place, and time  He appears well-developed and well-nourished  HENT:   Head: Normocephalic  Nose: Nose normal    Mouth/Throat: Oropharynx is clear and moist    Eyes: Pupils are equal, round, and reactive to light  Conjunctivae are normal    Neck: Normal range of motion   Neck supple  Cardiovascular: Normal rate, regular rhythm, normal heart sounds and intact distal pulses  Pulmonary/Chest: Effort normal and breath sounds normal    Abdominal: Soft  Bowel sounds are normal    Musculoskeletal: Normal range of motion  Neurological: He is alert and oriented to person, place, and time  Skin: Skin is warm and dry  Psychiatric: He has a normal mood and affect  His speech is normal and behavior is normal  Judgment and thought content normal  He is actively hallucinating  He is not agitated, not aggressive and not hyperactive  Cognition and memory are normal  He expresses no homicidal and no suicidal ideation  He expresses no suicidal plans and no homicidal plans  He is attentive  Nursing note and vitals reviewed        Vital Signs  ED Triage Vitals [01/10/20 2258]   Temperature Pulse Respirations Blood Pressure SpO2   (!) 96 1 °F (35 6 °C) 62 18 118/71 98 %      Temp Source Heart Rate Source Patient Position - Orthostatic VS BP Location FiO2 (%)   Tympanic Monitor Sitting Left arm --      Pain Score       No Pain           Vitals:    01/10/20 2258   BP: 118/71   Pulse: 62   Patient Position - Orthostatic VS: Sitting         Visual Acuity      ED Medications  Medications - No data to display    Diagnostic Studies  Results Reviewed     None                 No orders to display              Procedures  Procedures         ED Course                               MDM  Number of Diagnoses or Management Options  Bipolar disorder [facility]):      Amount and/or Complexity of Data Reviewed  Review and summarize past medical records: yes          Disposition  Final diagnoses:   Bipolar disorder ([address] )     Time reflects when diagnosis was documented in both MDM as applicable and the Disposition within this note     Time User Action Codes Description Comment    1/10/2020 11:10 PM Carol Macias Add [F31 9] Bipolar disorder [facility])       ED Disposition     ED Disposition Condition Date/Time Comment Discharge Stable Fri Francisco Javier 10, 2020 11:09 PM Fatuma Sales discharge to home/self care  Follow-up Information     Follow up With Specialties Details Why Contact Info    Daniela Trotter MD Family Medicine   [address], [address]  [phone]            Discharge Medication List as of 1/10/2020 11:10 PM      CONTINUE these medications which have NOT CHANGED    Details   ARIPiprazole (ABILIFY) 10 mg tablet Take 1 tablet (10 mg total) by mouth daily for 7 days At 9am, Starting Tue 12/17/2019, Until Thu 1/9/2020, Print      dicyclomine (BENTYL) 20 mg tablet Take 1 tablet (20 mg total) by mouth 2 (two) times a day, Starting Wed 1/8/2020, Print      fenofibrate (TRICOR) 48 mg tablet Take 1 tablet (48 mg total) by mouth daily for 7 days At 9am, Starting Tue 12/17/2019, Until Thu 1/9/2020, Print      fluticasone (FLOVENT HFA) 110 MCG/ACT inhaler Inhale 1 puff every 12 (twelve) hours Rinse mouth after use , Starting Tue 12/10/2019, Print      insulin glargine (LANTUS SOLOSTAR) 100 units/mL injection pen Inject 22 Units under the skin daily Every morning , Starting Tue 12/10/2019, Print      insulin lispro (HUMALOG KWIKPEN) 100 units/mL injection pen Inject 4 Units under the skin 3 (three) times a day with meals, Starting Tue 12/10/2019, Print      lisinopril (ZESTRIL) 5 mg tablet Take 1 tablet (5 mg total) by mouth daily for 7 days At 9am, Starting Tue 12/17/2019, Until Thu 1/9/2020, Print      ondansetron (ZOFRAN-ODT) 4 mg disintegrating tablet Take 1 tablet (4 mg total) by mouth every 8 (eight) hours as needed for nausea or vomiting, Starting Wed 1/8/2020, Print      thiothixene (NAVANE) 1 mg capsule Take 1 capsule (1 mg total) by mouth daily for 7 days, Starting Tue 12/17/2019, Until Thu 1/9/2020, Print           No discharge procedures on file      ED Provider  Electronically Signed by           Levon Lopez MD  01/10/20 4278

## 2020-01-12 LAB
ATRIAL RATE: 63 BPM
P AXIS: 59 DEGREES
PR INTERVAL: 124 MS
QRS AXIS: 64 DEGREES
QRSD INTERVAL: 82 MS
QT INTERVAL: 406 MS
QTC INTERVAL: 415 MS
T WAVE AXIS: 55 DEGREES
VENTRICULAR RATE: 63 BPM

## 2020-01-12 PROCEDURE — 93010 ELECTROCARDIOGRAM REPORT: CPT | Performed by: INTERNAL MEDICINE

## 2020-01-18 ENCOUNTER — HOSPITAL ENCOUNTER (EMERGENCY)
Facility: HOSPITAL | Age: 50
End: 2020-01-18
Attending: EMERGENCY MEDICINE | Admitting: EMERGENCY MEDICINE
Payer: COMMERCIAL

## 2020-01-18 VITALS
RESPIRATION RATE: 17 BRPM | WEIGHT: 136.47 LBS | HEART RATE: 77 BPM | TEMPERATURE: 98.7 F | DIASTOLIC BLOOD PRESSURE: 54 MMHG | BODY MASS INDEX: 22.03 KG/M2 | OXYGEN SATURATION: 98 % | SYSTOLIC BLOOD PRESSURE: 102 MMHG

## 2020-01-18 DIAGNOSIS — R45.851 SUICIDAL IDEATIONS: Primary | ICD-10-CM

## 2020-01-18 PROCEDURE — 99285 EMERGENCY DEPT VISIT HI MDM: CPT | Performed by: EMERGENCY MEDICINE

## 2020-01-18 PROCEDURE — 99285 EMERGENCY DEPT VISIT HI MDM: CPT

## 2020-01-18 PROCEDURE — 80307 DRUG TEST PRSMV CHEM ANLYZR: CPT | Performed by: EMERGENCY MEDICINE

## 2020-01-18 PROCEDURE — 82075 ASSAY OF BREATH ETHANOL: CPT | Performed by: EMERGENCY MEDICINE

## 2020-01-18 RX ORDER — ACETAMINOPHEN 325 MG/1
975 TABLET ORAL ONCE
Status: COMPLETED | OUTPATIENT
Start: 2020-01-18 | End: 2020-01-18

## 2020-01-18 RX ADMIN — ACETAMINOPHEN 975 MG: 325 TABLET ORAL at 11:17

## 2020-01-18 NOTE — ED NOTES
Patient is accepted at [facility]   Patient is accepted by Claude Edwards, MD      Transportation is scheduled with Katherine Ring  Transportation pickup is 13:30  Emery Miller in [facility] Admissions is aware  Nurse report is not required

## 2020-01-18 NOTE — ED NOTES
Call placed to [facility] to check status  Osiris De Santiago stated his day shift did not know about the referral  He was able to retrieve the faxed information and will have the case reviewed

## 2020-01-18 NOTE — ED PROVIDER NOTES
History  Chief Complaint   Patient presents with    Psychiatric Evaluation     Patient is talking about seeing dark spots tonight and having suicidal/homocidal thoughts, does not have a specific plan  Patient is a 56-year-old male well known to me in this department for multiple previous visits to the emergency department who presents today saying that he is seen dark shadows in his nightmares and has suicidal ideation  Patient states his plan is to cut himself although when asked he does not have access to a knife nor does he know where he could get 1  Denies any homicidal ideation  Has baseline history of auditory hallucinations  Denies any drugs or alcohol  States he is compliant with medications  States also that he did talk to his act team   Reminded patient of our conversation last week where he told me that he promised his team that he would trying keep himself on the hospital   Patient still stating that he would like to sign himself in  States that his girlfriend who was in patient is now out of the hospital             Prior to Admission Medications   Prescriptions Last Dose Informant Patient Reported? Taking? ARIPiprazole (ABILIFY) 10 mg tablet   No No   Sig: Take 1 tablet (10 mg total) by mouth daily for 7 days At 9am   dicyclomine (BENTYL) 20 mg tablet   No No   Sig: Take 1 tablet (20 mg total) by mouth 2 (two) times a day   Patient not taking: Reported on 1/9/2020   fenofibrate (TRICOR) 48 mg tablet   No No   Sig: Take 1 tablet (48 mg total) by mouth daily for 7 days At 9am   fluticasone (FLOVENT HFA) 110 MCG/ACT inhaler   No No   Sig: Inhale 1 puff every 12 (twelve) hours Rinse mouth after use  insulin glargine (LANTUS SOLOSTAR) 100 units/mL injection pen   No No   Sig: Inject 22 Units under the skin daily Every morning     insulin lispro (HUMALOG KWIKPEN) 100 units/mL injection pen   No No   Sig: Inject 4 Units under the skin 3 (three) times a day with meals   lisinopril (ZESTRIL) 5 mg tablet   No No   Sig: Take 1 tablet (5 mg total) by mouth daily for 7 days At 9am   ondansetron (ZOFRAN-ODT) 4 mg disintegrating tablet   No No   Sig: Take 1 tablet (4 mg total) by mouth every 8 (eight) hours as needed for nausea or vomiting   Patient not taking: Reported on 1/9/2020   thiothixene (NAVANE) 1 mg capsule   No No   Sig: Take 1 capsule (1 mg total) by mouth daily for 7 days      Facility-Administered Medications: None       Past Medical History:   Diagnosis Date    Anxiety     Asthma     Bipolar 1 disorder ([facility] )     Depression     Diabetes mellitus ([place] [address] )     Psychiatric disorder        Past Surgical History:   Procedure Laterality Date    APPENDECTOMY         History reviewed  No pertinent family history  I have reviewed and agree with the history as documented  Social History     Tobacco Use    Smoking status: Current Every Day Smoker     Packs/day: 0 20     Types: Cigarettes    Smokeless tobacco: Never Used   Substance Use Topics    Alcohol use: Not Currently     Frequency: Monthly or less     Drinks per session: 1 or 2     Binge frequency: Never     Comment: States has had no alcohol in at least a year    Drug use: No        Review of Systems   Constitutional: Negative  HENT: Negative  Eyes: Negative  Respiratory: Negative  Cardiovascular: Negative  Gastrointestinal: Negative  Endocrine: Negative  Genitourinary: Negative  Musculoskeletal: Negative  Skin: Negative  Allergic/Immunologic: Negative  Neurological: Negative  Hematological: Negative  Psychiatric/Behavioral: Positive for dysphoric mood, hallucinations and suicidal ideas  All other systems reviewed and are negative  Physical Exam  Physical Exam   Constitutional: He is oriented to person, place, and time  He appears well-developed and well-nourished  HENT:   Head: Normocephalic  Nose: Nose normal    Eyes: Pupils are equal, round, and reactive to light   Conjunctivae are normal  Neck: Normal range of motion  Neck supple  Cardiovascular: Normal rate, regular rhythm, normal heart sounds and intact distal pulses  Pulmonary/Chest: Effort normal and breath sounds normal    Abdominal: Soft  Bowel sounds are normal    Musculoskeletal: Normal range of motion  Neurological: He is alert and oriented to person, place, and time  Skin: Skin is warm and dry  Capillary refill takes less than 2 seconds  Psychiatric: His speech is normal  Judgment normal  His affect is blunt  He is slowed and actively hallucinating  Cognition and memory are normal  He expresses suicidal ideation  He expresses suicidal plans  He is attentive  Nursing note and vitals reviewed  Vital Signs  ED Triage Vitals   Temperature Pulse Respirations Blood Pressure SpO2   01/18/20 0412 01/18/20 0411 01/18/20 0411 01/18/20 0411 01/18/20 0411   98 7 °F (37 1 °C) 80 16 123/75 100 %      Temp Source Heart Rate Source Patient Position - Orthostatic VS BP Location FiO2 (%)   01/18/20 0412 01/18/20 0411 01/18/20 0411 01/18/20 0411 --   Tympanic Monitor Sitting Left arm       Pain Score       01/18/20 0411       No Pain           Vitals:    01/18/20 0411 01/18/20 0803 01/18/20 1258   BP: 123/75 138/76 102/54   Pulse: 80 86 77   Patient Position - Orthostatic VS: Sitting Sitting Lying         Visual Acuity      ED Medications  Medications   acetaminophen (TYLENOL) tablet 975 mg (975 mg Oral Given 1/18/20 1117)       Diagnostic Studies  Results Reviewed     Procedure Component Value Units Date/Time    Rapid drug screen, urine [[id number]]  (Normal) Collected:  01/18/20 0500    Lab Status:  Final result Specimen:  Urine, Clean Catch Updated:  01/18/20 0524     Amph/Meth UR Negative     Barbiturate Ur Negative     Benzodiazepine Urine Negative     Cocaine Urine Negative     Methadone Urine Negative     Opiate Urine Negative     PCP Ur Negative     THC Urine Negative    Narrative:       FOR MEDICAL PURPOSES ONLY     IF CONFIRMATION NEEDED PLEASE CONTACT THE LAB WITHIN 5 DAYS  Drug Screen Cutoff Levels:  AMPHETAMINE/METHAMPHETAMINES  1000 ng/mL  BARBITURATES     200 ng/mL  BENZODIAZEPINES     200 ng/mL  COCAINE      300 ng/mL  METHADONE      300 ng/mL  OPIATES      300 ng/mL  PHENCYCLIDINE     25 ng/mL  THC       50 ng/mL      POCT alcohol breath test [[id number]]  (Normal) Resulted:  01/18/20 0500    Lab Status:  Final result Updated:  01/18/20 0501     EXTBreath Alcohol 0 000                 No orders to display              Procedures  Procedures         ED Course  ED Course as of Jan 22 1702   Sat Jan 18, 2020   0705 Patient medically cleared for inpatient psychiatry                                  MDM      Disposition  Final diagnoses:   Suicidal ideations     Time reflects when diagnosis was documented in both MDM as applicable and the Disposition within this note     Time User Action Codes Description Comment    1/18/2020  1:24 PM Brett Luca Add [V78 552] Suicidal ideations       ED Disposition     ED Disposition Condition Date/Time Comment    Transfer to [facility] Jan 18, 2020  1:24 PM Mack Barthel should be transferred out to [place] and has been medically cleared          MD Documentation      Most Recent Value   Patient Condition  The patient has been stabilized such that within reasonable medical probability, no material deterioration of the patient condition or the condition of the unborn child(aida) is likely to result from the transfer   Reason for Transfer  No bed available at level of patient's needs   Benefits of Transfer  Continuity of care   Risks of Transfer  Potential for delay in receiving treatment   Accepting Physician  Arlette Calix MD   Accepting Facility Name, [facility], [address] [place] [id number]    (Name & Tel number)  Bee Noriega/ [phone]   Transported by [facility] and Unit #)  Bulmaro Rodrigez ([place])/ [phone]   Sending MD Jennifer Ignacio, DO   Provider Certification  General risk, such as traffic hazards, adverse weather conditions, rough terrain or turbulence, possible failure of equipment (including vehicle or aircraft), or consequences of actions of persons outside the control of the transport personnel      RN Documentation      [facility], [facility], [address], [facility] [address]    (Name & Tel number)  Bee Chankaden/ [phone]  [facility]   Transported by [employer] and Unit #)  Bulmaro Rodrigez ([place])/ [phone]   Level of Care  Basic life support   Copies of Medical Records Sent  History and Physical, Progress note, Transfer form, Nursing note, EKG, Labs, Other (comment) [Original 201]   Patient Belongings Disposition  Sent with patient   Transfer Date  01/18/20   Transfer Time  1330      Follow-up Information    None         Discharge Medication List as of 1/18/2020  1:25 PM      CONTINUE these medications which have NOT CHANGED    Details   ARIPiprazole (ABILIFY) 10 mg tablet Take 1 tablet (10 mg total) by mouth daily for 7 days At 9am, Starting Tue 12/17/2019, Until Thu 1/9/2020, Print      dicyclomine (BENTYL) 20 mg tablet Take 1 tablet (20 mg total) by mouth 2 (two) times a day, Starting Wed 1/8/2020, Print      fenofibrate (TRICOR) 48 mg tablet Take 1 tablet (48 mg total) by mouth daily for 7 days At 9am, Starting Tue 12/17/2019, Until Thu 1/9/2020, Print      fluticasone (FLOVENT HFA) 110 MCG/ACT inhaler Inhale 1 puff every 12 (twelve) hours Rinse mouth after use , Starting Tue 12/10/2019, Print      insulin glargine (LANTUS SOLOSTAR) 100 units/mL injection pen Inject 22 Units under the skin daily Every morning , Starting Tue 12/10/2019, Print      insulin lispro (HUMALOG KWIKPEN) 100 units/mL injection pen Inject 4 Units under the skin 3 (three) times a day with meals, Starting Tue 12/10/2019, Print      lisinopril (ZESTRIL) 5 mg tablet Take 1 tablet (5 mg total) by mouth daily for 7 days At 9am, Starting Tue 12/17/2019, Until Thu 1/9/2020, Print      ondansetron (ZOFRAN-ODT) 4 mg disintegrating tablet Take 1 tablet (4 mg total) by mouth every 8 (eight) hours as needed for nausea or vomiting, Starting Wed 1/8/2020, Print      thiothixene (NAVANE) 1 mg capsule Take 1 capsule (1 mg total) by mouth daily for 7 days, Starting Tue 12/17/2019, Until Thu 1/9/2020, Print           No discharge procedures on file      ED Provider  Electronically Signed by           Trinity Cha MD  01/22/20 3638

## 2020-01-18 NOTE — ED NOTES
Psychiatric consult ordered due to length of stay  We expect  Delays in securing transport due to snow conditions

## 2020-01-18 NOTE — ED NOTES
Insurance Authorization for admission:   Phone call placed to [facility] Velez  Phone number: [phone]  Spoke to [facility]      3 days approved  Level of care: IP  Review on 1/20/20  Authorization # Accepting facility to call for authorization number  *[facility] was concerned about this patient having frequent [facility] admissions and not being truthful during the assessment  Per [facility], Patient was recently discharged from [address] [place] [address] on 1/8/2020  Patient was supposed to connect with an ACT Team through [facility]  He was also discharged from treatment at [facility] for being noncompliant  EVS (Eligibility Verification System) called - [phone]  Automated system indicates: Patient not on file  Insurance Authorization for Transportation:    Phone call placed to **  Phone number **  Spoke to **     Authorization #: **

## 2020-01-18 NOTE — LETTER
Section I - General Information    Name of Patient: Gil Lorenz                 : 1970    Medicare #:____________________  Transport Date: 20 (PCS is valid for round trips on this date and for all repetitive trips in the 60-day range as noted below )  Origin: [address] [place] [address]                                                         Destination:_[facility], [address] [facility], [place] [address]____________________________  Is the pt's stay covered under Medicare Part A (PPS/DRG)     (_) YES  (X) NO  Closest appropriate facility? (X) YES  (_) NO  If no, why is transport to more distant facility required?________________________  If hosp-hosp transfer, describe services needed at 2nd facility not available at 1st facility? _________________________________  If hospice pt, is this transport related to pt's terminal illness? (_) YES (_) NO Describe____________________________________    Section II - Medical Necessity Questionnaire  Ambulance transportation is medically necessary only if other means of transport are contraindicated or would be potentially harmful to the patient  To meet this requirement, the patient must either be "bed confined" or suffer from a condition such that transport by means other than ambulance is contraindicated by the patient's condition   The following questions must be answered by the medical professional signing below for this form to be valid:    1)  Describe the MEDICAL CONDITION (physical and/or mental) of this patient AT [address] [place] [address] that requires the patient to be transported in an ambulance and why transport by other means is contraindicated by the patient's condition:_Bipolar Disorder with Psychosis and Suicidal Ideation_________________________    2) Is the patient "bed confined" as defined below?     (_) YES  (X) NO  To be "be confined" the patient must satisfy all three of the following conditions: (1) unable to get up from bed without Assistance; AND (2) unable to ambulate; AND (3) unable to sit in a chair or wheelchair  3) Can this patient safely be transported by car or wheelchair [place] (i e , seated during transport without a medical attendant or monitoring)?   (_) YES  (X) NO    4) In addition to completing questions 1-3 above, please check any of the following conditions that apply*:  *Note: supporting documentation for any boxes checked must be maintained in the patient's medical records  (_)Contractures   (_)Non-Healed Fractures  (_)Patient is confused (_)Patient is comatose (_)Moderate/severe pain on movement (X)Danger to self/others  (_)IV meds/fluids required (_)Patient is combative(_)Need or possible need for restraints (_)DVT requires elevation of lower extremity  (X)Medical attendant required (_)Requires oxygen-unable to self administer (_)Special handling/isolation/infection control precautions required (_)Unable to tolerate seated position for time needed to transport (_)Hemodynamic monitoring required en route (_)Unable to sit in a chair or wheelchair due to decubitus ulcers or other wounds (_)Cardiac monitoring required en route (_)Morbid obesity requires additional personnel/equipment to safely handle patient (_)Orthopedic device (backboard, halo, pins, traction, brace, wedge, etc,) requiring special handling during transport (_)Other(specify)_______________________________________________    Section III - Signature of Physician or Healthcare Professional  I certify that the above information is true and correct based on my evaluation of this patient, and represent that the patient requires transport by ambulance and that other forms of transport are contraindicated   I understand that this information will be used by the Centers for Medicare and Medicaid Services (CMS) to support the determination of medical necessity for ambulance services, and I represent that I have personal knowledge of the patient's condition at time of transport  (_) If this box is checked, I also certify that the patient is physically or mentally incapable of signing the ambulance service's claim and that the institution with which I am affiliated has furnished care, services, or assistance to the patient  My signature below is made on behalf of the patient pursuant to 42 CFR §424 36(b)(4)  In accordance with 42 CFR §424 37, the specific reason(s) that the patient is physically or mentally incapable of signing the claim form is as follows: _________________________________________________________________________________________________________      Signature of Physician* or Healthcare Professional______________________________________________________________  Signature Date 01/18/20 (For scheduled repetitive transports, this form is not valid for transports performed more than 60 days after this date)    Printed Name & Credentials of Physician or Healthcare Professional (MD, DO, RN, etc )_Danial Frederica Harada, DO  *Form must be signed by patient's attending physician for scheduled, repetitive transports   For non-repetitive, unscheduled ambulance transports, if unable to obtain the signature of the attending physician, any of the following may sign (choose appropriate option below)  (_) Physician Assistant (_)  Clinical Nurse Specialist (_)  Registered Nurse  (_)  Nurse Practitioner  (_) Discharge Planner

## 2020-01-18 NOTE — ED NOTES
BED SEARCH:    1  No in-network beds  2   [facility] - reviewing  Faxed to admissions

## 2020-01-18 NOTE — ED NOTES
Call placed to [phone]  Spoke with Dontae Bernal, on-call for [facility]  She stated she has no record of the patient, and that he may be a new sign-on

## 2020-01-18 NOTE — LETTER
[facility] EMERGENCY DEPARTMENT  [address] [place] [id number]  [phone]  Dept: 200 [facility] CONSENT    NAME Martinez Mccauley DOB 1970                              MRN [id number]    I have been informed of my rights regarding examination, treatment, and transfer   by Aubrey Dela Cruz DO  Benefits: Continuity of care    Risks: Potential for delay in receiving treatment      { ED EMTALA TRANSFER CHOICES:[id number]}    I authorize the performance of emergency medical procedures and treatments upon me in both transit and upon arrival at the receiving facility  Additionally, I authorize the release of any and all medical records to the receiving facility and request they be transported with me, if possible  I understand that the safest mode of transportation during a medical emergency is an ambulance and that the Hospital advocates the use of this mode of transport  Risks of traveling to the receiving facility by car, including absence of medical control, life sustaining equipment, such as oxygen, and medical personnel has been explained to me and I fully understand them  (MARIO CORRECT BOX BELOW)  [ Lito Rosen  I consent to the stated transfer and to be transported by ambulance/helicopter  [  ]  I consent to the stated transfer, but refuse transportation by ambulance and accept full responsibility for my transportation by car    I understand the risks of non-ambulance transfers and I exonerate the Hospital and its staff from any deterioration in my condition that results from this refusal     X___________________________________________    DATE  20  TIME_12:30_______  Signature of patient or legally responsible individual signing on patient behalf           RELATIONSHIP TO PATIENT_Self________________________          Provider Certification    NAME Martinez Mccauley                                        MIMI 1970                              MRN [id number]    A medical screening exam was performed on the above named patient  Based on the examination: The patient has been medically cleared for transfer to inpatient psychiatry  Condition Necessitating Transfer: Bipolar Disorder with Psychosis and Suicidal Thoughts    Patient Condition: The patient has been stabilized such that within reasonable medical probability, no material deterioration of the patient condition or the condition of the unborn child(aida) is likely to result from the transfer    Reason for Transfer: No bed available at level of patient's needs    Transfer Requirements: [facility], [address], [id number] [place] [address]   · Space available and qualified personnel available for treatment as acknowledged by Bart Saravia [phone]  · Agreed to accept transfer and to provide appropriate medical treatment as acknowledged by       Yefri Avitia MD  · Appropriate medical records of the examination and treatment of the patient are provided at the time of transfer   [address] [place] [address] [address] _cm[address]______  · Transfer will be performed by qualified personnel from [facility]/ [phone]  and appropriate transfer equipment as required, including the use of necessary and appropriate life support measures      Provider Certification: I have examined the patient and explained the following risks and benefits of being transferred/refusing transfer to the patient/family:  General risk, such as traffic hazards, adverse weather conditions, rough terrain or turbulence, possible failure of equipment (including vehicle or aircraft), or consequences of actions of persons outside the control of the transport personnel      Based on these reasonable risks and benefits to the patient and/or the unborn child(aida), and based upon the information available at the time of the patients examination, I certify that the medical benefits reasonably to be expected from the provision of appropriate medical treatments at another medical facility outweigh the increasing risks, if any, to the individuals medical condition, and in the case of labor to the unborn child, from effecting the transfer      X____________________________________________ DATE 01/18/20        TIME_12:30______      ORIGINAL - SEND TO MEDICAL RECORDS   COPY - SEND WITH PATIENT DURING TRANSFER

## 2020-01-18 NOTE — ED NOTES
Patient presents to the ED with suicidal ideations with a plan to cut himself  Possibly with glass  He is seeing black shadows and hearing voices telling him to kill himself  He has been having nightmares and the voices wake him up out of his sleep, but he tries "to not pay any attention to them"  Patient denies any His  Has no access to weapons  Patient is oriented x4  Patient's affect and speech are normal   Mood seemed anxious  Patient reports good judgment, impulse control, attention and memory  Reports no appetite or sleep problems besides the nightmares  Patient sleeps about 8 hours per night  Patient's appearance is disheveled  Patient requested to sign a 201, that he feels he cannot contract to safety in fear he will hurt himself  201 signed  Crisis Intake and Safety Assessment completed  06:31:  Spoke to Johny Salinas again and asked about his most current hospitalization at [facility] 1 week ago  He told me "he forgot to mention it"  I confirmed his new ACT worker is through [employer]  He hasn't met her yet, the paperwork is at home

## 2020-01-19 NOTE — ED NOTES
Call placed to [address] [place] [address] for ambulance transport, [phone]  Office is closed during non-business hours

## 2020-01-20 NOTE — ED NOTES
Ambulance authorization - insurance is closed weekend and holiday    No authorization can be obtained at this time

## 2020-01-21 NOTE — ED NOTES
I  Insurance Authorization for Transportation:    Phone call placed to [facility] number [phone] to [address] [place] [address] Ne #: [phone]

## 2020-02-17 ENCOUNTER — HOSPITAL ENCOUNTER (EMERGENCY)
Facility: HOSPITAL | Age: 50
Discharge: HOME | End: 2020-02-17
Attending: EMERGENCY MEDICINE
Payer: COMMERCIAL

## 2020-02-17 ENCOUNTER — HOSPITAL ENCOUNTER (EMERGENCY)
Facility: HOSPITAL | Age: 50
Discharge: HOME | End: 2020-02-17
Attending: EMERGENCY MEDICINE | Admitting: EMERGENCY MEDICINE
Payer: COMMERCIAL

## 2020-02-17 VITALS
HEART RATE: 77 BPM | RESPIRATION RATE: 18 BRPM | DIASTOLIC BLOOD PRESSURE: 61 MMHG | SYSTOLIC BLOOD PRESSURE: 108 MMHG | TEMPERATURE: 98 F | OXYGEN SATURATION: 100 %

## 2020-02-17 VITALS
HEART RATE: 81 BPM | RESPIRATION RATE: 18 BRPM | DIASTOLIC BLOOD PRESSURE: 62 MMHG | OXYGEN SATURATION: 99 % | WEIGHT: 133.38 LBS | BODY MASS INDEX: 21.53 KG/M2 | SYSTOLIC BLOOD PRESSURE: 121 MMHG | TEMPERATURE: 98.5 F

## 2020-02-17 DIAGNOSIS — R44.0 AUDITORY HALLUCINATIONS: Primary | ICD-10-CM

## 2020-02-17 DIAGNOSIS — R19.7 DIARRHEA: ICD-10-CM

## 2020-02-17 DIAGNOSIS — M79.10 MYALGIA: ICD-10-CM

## 2020-02-17 DIAGNOSIS — R51.9 HEADACHE: ICD-10-CM

## 2020-02-17 DIAGNOSIS — R09.89 RUNNY NOSE: ICD-10-CM

## 2020-02-17 DIAGNOSIS — R68.89 FLU-LIKE SYMPTOMS: Primary | ICD-10-CM

## 2020-02-17 DIAGNOSIS — R05.9 COUGH: ICD-10-CM

## 2020-02-17 PROCEDURE — 99284 EMERGENCY DEPT VISIT MOD MDM: CPT | Performed by: EMERGENCY MEDICINE

## 2020-02-17 PROCEDURE — 99284 EMERGENCY DEPT VISIT MOD MDM: CPT

## 2020-02-17 PROCEDURE — 99283 EMERGENCY DEPT VISIT LOW MDM: CPT

## 2020-02-17 PROCEDURE — 94640 AIRWAY INHALATION TREATMENT: CPT

## 2020-02-17 RX ORDER — ALBUTEROL SULFATE 90 UG/1
2 AEROSOL, METERED RESPIRATORY (INHALATION) EVERY 4 HOURS PRN
Qty: 1 INHALER | Refills: 0 | Status: SHIPPED | OUTPATIENT
Start: 2020-02-17 | End: 2020-11-28 | Stop reason: SDUPTHER

## 2020-02-17 RX ORDER — NAPROXEN 250 MG/1
500 TABLET ORAL ONCE
Status: COMPLETED | OUTPATIENT
Start: 2020-02-17 | End: 2020-02-17

## 2020-02-17 RX ORDER — ACETAMINOPHEN 325 MG/1
650 TABLET ORAL ONCE
Status: COMPLETED | OUTPATIENT
Start: 2020-02-17 | End: 2020-02-17

## 2020-02-17 RX ORDER — METOCLOPRAMIDE 10 MG/1
10 TABLET ORAL EVERY 6 HOURS
Qty: 10 TABLET | Refills: 0 | Status: SHIPPED | OUTPATIENT
Start: 2020-02-17 | End: 2020-03-09 | Stop reason: HOSPADM

## 2020-02-17 RX ORDER — ARIPIPRAZOLE 10 MG/1
10 TABLET ORAL ONCE
Status: COMPLETED | OUTPATIENT
Start: 2020-02-17 | End: 2020-02-17

## 2020-02-17 RX ORDER — ARIPIPRAZOLE 10 MG/1
10 TABLET, ORALLY DISINTEGRATING ORAL DAILY
Qty: 7 TABLET | Refills: 0 | Status: SHIPPED | OUTPATIENT
Start: 2020-02-17 | End: 2020-03-09 | Stop reason: HOSPADM

## 2020-02-17 RX ORDER — METOCLOPRAMIDE 10 MG/1
10 TABLET ORAL ONCE
Status: COMPLETED | OUTPATIENT
Start: 2020-02-17 | End: 2020-02-17

## 2020-02-17 RX ORDER — NAPROXEN 500 MG/1
500 TABLET ORAL 2 TIMES DAILY WITH MEALS
Qty: 10 TABLET | Refills: 0 | Status: SHIPPED | OUTPATIENT
Start: 2020-02-17 | End: 2020-03-06

## 2020-02-17 RX ORDER — ALBUTEROL SULFATE 2.5 MG/3ML
5 SOLUTION RESPIRATORY (INHALATION) ONCE
Status: COMPLETED | OUTPATIENT
Start: 2020-02-17 | End: 2020-02-17

## 2020-02-17 RX ADMIN — ARIPIPRAZOLE 10 MG: 10 TABLET ORAL at 19:19

## 2020-02-17 RX ADMIN — NAPROXEN 500 MG: 250 TABLET ORAL at 15:52

## 2020-02-17 RX ADMIN — IPRATROPIUM BROMIDE 0.5 MG: 0.5 SOLUTION RESPIRATORY (INHALATION) at 15:53

## 2020-02-17 RX ADMIN — ACETAMINOPHEN 650 MG: 325 TABLET ORAL at 15:52

## 2020-02-17 RX ADMIN — METOCLOPRAMIDE 10 MG: 10 TABLET ORAL at 15:52

## 2020-02-17 RX ADMIN — ALBUTEROL SULFATE 5 MG: 2.5 SOLUTION RESPIRATORY (INHALATION) at 15:53

## 2020-02-17 NOTE — ED NOTES
Patient states that all home medication should be updated in the computer      Barrera Hernandez RN  02/17/20 0081

## 2020-02-17 NOTE — ED NOTES
Patient denies SI or HI at this time; patient also denies AH/VH at this time; patient states that he wants a refill for his Abilify;       Madelin Stauffer RN  02/17/20 2751

## 2020-02-17 NOTE — ED PROVIDER NOTES
History  Chief Complaint   Patient presents with    Psychiatric Evaluation     Pt  reports hearing voices about 15 minutes ago while he was at dinner with his family  Pt  reports the voices were telling him to kill himself by cutting his wrists  Pt  denies wanting to be dead  Pt  just reports hearing the voices  HPI   81-year-old presenting for auditory hallucinations  Patient was recently admitted to [facility] for auditory hallucinations  He was started on aripiprazole, which improved his symptoms  He was discharged with a brief supply and scheduled to follow-up with his outpatient psychiatrist and therapist on 02/24  Patient says he ran out of aripiprazole yesterday  Since then the auditory hallucinations have become more prominent  They tell him to "cut "  Patient says he has no thoughts of acting on them  He is not suicidal   No thoughts of harming others  No drugs or alcohol today  History of psychiatric hospitalization:  Yes  History of suicide attempt(s):  No  Currently receiving outpatient mental health care:  Yes  Current psychotropic medications:  Abilify  Use of drugs or alcohol today:  No    Prior to Admission Medications   Prescriptions Last Dose Informant Patient Reported? Taking? ARIPiprazole (ABILIFY) 10 mg tablet   No No   Sig: Take 1 tablet (10 mg total) by mouth daily for 7 days At 9am   albuterol (PROVENTIL HFA,VENTOLIN HFA) 90 mcg/act inhaler   No No   Sig: Inhale 2 puffs every 4 (four) hours as needed for wheezing   dicyclomine (BENTYL) 20 mg tablet   No No   Sig: Take 1 tablet (20 mg total) by mouth 2 (two) times a day   Patient not taking: Reported on 1/9/2020   fenofibrate (TRICOR) 48 mg tablet   No No   Sig: Take 1 tablet (48 mg total) by mouth daily for 7 days At 9am   fluticasone (FLOVENT HFA) 110 MCG/ACT inhaler   No No   Sig: Inhale 1 puff every 12 (twelve) hours Rinse mouth after use     insulin glargine (LANTUS SOLOSTAR) 100 units/mL injection pen   No No   Sig: Inject 22 Units under the skin daily Every morning  insulin lispro (HUMALOG KWIKPEN) 100 units/mL injection pen   No No   Sig: Inject 4 Units under the skin 3 (three) times a day with meals   lisinopril (ZESTRIL) 5 mg tablet   No No   Sig: Take 1 tablet (5 mg total) by mouth daily for 7 days At 9am   metoclopramide (REGLAN) 10 mg tablet   No No   Sig: Take 1 tablet (10 mg total) by mouth every 6 (six) hours   naproxen (NAPROSYN) 500 mg tablet   No No   Sig: Take 1 tablet (500 mg total) by mouth 2 (two) times a day with meals   ondansetron (ZOFRAN-ODT) 4 mg disintegrating tablet   No No   Sig: Take 1 tablet (4 mg total) by mouth every 8 (eight) hours as needed for nausea or vomiting   Patient not taking: Reported on 1/9/2020   thiothixene (NAVANE) 1 mg capsule   No No   Sig: Take 1 capsule (1 mg total) by mouth daily for 7 days      Facility-Administered Medications: None       Past Medical History:   Diagnosis Date    Anxiety     Asthma     Bipolar 1 disorder ([facility] )     Depression     Diabetes mellitus ([place] [address] )     Psychiatric disorder        Past Surgical History:   Procedure Laterality Date    APPENDECTOMY         History reviewed  No pertinent family history  I have reviewed and agree with the history as documented  Social History     Tobacco Use    Smoking status: Current Every Day Smoker     Packs/day: 0 20     Types: Cigarettes    Smokeless tobacco: Never Used   Substance Use Topics    Alcohol use: Not Currently     Frequency: Monthly or less     Drinks per session: 1 or 2     Binge frequency: Never     Comment: States has had no alcohol in at least a year    Drug use: No        Review of Systems   Constitutional: Negative for chills and fever  Respiratory: Negative for shortness of breath  Cardiovascular: Negative for chest pain  Gastrointestinal: Negative for abdominal pain, nausea and vomiting  Psychiatric/Behavioral: Positive for hallucinations   Negative for agitation, confusion, self-injury and suicidal ideas  The patient is not nervous/anxious  All other systems reviewed and are negative  Physical Exam  ED Triage Vitals [02/17/20 1828]   Temperature Pulse Respirations Blood Pressure SpO2   98 5 °F (36 9 °C) 81 18 121/62 99 %      Temp Source Heart Rate Source Patient Position - Orthostatic VS BP Location FiO2 (%)   Temporal Monitor Sitting Right arm --      Pain Score       No Pain             Orthostatic Vital Signs  Vitals:    02/17/20 1828   BP: 121/62   Pulse: 81   Patient Position - Orthostatic VS: Sitting       Physical Exam   Constitutional: He is oriented to person, place, and time  He appears well-developed and well-nourished  No distress  HENT:   Head: Normocephalic and atraumatic  Eyes: Pupils are equal, round, and reactive to light  Conjunctivae and EOM are normal  No scleral icterus  Neck: Normal range of motion  Neck supple  Cardiovascular: Normal rate and regular rhythm  Exam reveals no gallop and no friction rub  No murmur heard  Pulmonary/Chest: Breath sounds normal  He has no wheezes  He has no rales  Abdominal: Soft  He exhibits no distension  There is no tenderness  There is no rebound and no guarding  Musculoskeletal: Normal range of motion  He exhibits no edema or tenderness  Neurological: He is alert and oriented to person, place, and time  No cranial nerve deficit or sensory deficit  He exhibits normal muscle tone  Skin: Skin is warm and dry  He is not diaphoretic  No erythema  No pallor  Psychiatric: He has a normal mood and affect  His behavior is normal    Patient is talkative but appropriate  Well-kempt  Nursing note and vitals reviewed        ED Medications  Medications   ARIPiprazole (ABILIFY) tablet 10 mg (10 mg Oral Given 2/17/20 1919)       Diagnostic Studies  Results Reviewed     None                 No orders to display         Procedures  Procedures      ED Course         MDM  Number of Diagnoses or Management Options  Auditory hallucinations: established and worsening     Amount and/or Complexity of Data Reviewed  Discuss the patient with other providers: yes    Patient Progress  Patient progress: improved    71-year-old with history of psychiatric illness presenting for worsening auditory hallucinations  Recent psychiatric hospitalization for the same  Patient had improvement on Abilify  Reviewed by ED crisis worker Mushtaq Esteban, who agrees with patient and me that this can be managed on an outpatient basis given demonstrated ability to care for himself, no thoughts of harming self/others  Will give a dose of Abilify here and discharge with prescription for a week-long course until he is able to follow up with a psychiatrist and therapist on Monday as scheduled  Patient instructed to return to the emergency department if the hallucinations worsen or he has thoughts of harming himself or others  Disposition  Final diagnoses: Auditory hallucinations     Time reflects when diagnosis was documented in both MDM as applicable and the Disposition within this note     Time User Action Codes Description Comment    2/17/2020  7:09 PM Cori Challenger Add [R44 0] Auditory hallucinations       ED Disposition     ED Disposition Condition Date/Time Comment    Discharge Good Mon Feb 17, 2020  7:09 PM Milvia Guallpa discharge to home/self care              MD Documentation      Most Recent Value   Sending MD  DR Bernie Dominguez       Follow-up Information     Follow up With Specialties Details Why Contact Info Additional Information    Norbert Coyle MD Family Medicine Call  As needed [address], [address] [facility] [address] [facility] Emergency Medicine Go to  If symptoms worsen [facility] [address]  [address], [address] [facility] [address]  , [place], [place], [address]          Discharge Medication List as of 2/17/2020 7:17 PM      START taking these medications    Details   ARIPiprazole (ABILIFY DISCMELT) 10 mg dispersible tablet Take 1 tablet (10 mg total) by mouth daily for 7 days, Starting Mon 2/17/2020, Until Mon 2/24/2020, Normal         CONTINUE these medications which have NOT CHANGED    Details   albuterol (PROVENTIL HFA,VENTOLIN HFA) 90 mcg/act inhaler Inhale 2 puffs every 4 (four) hours as needed for wheezing, Starting Mon 2/17/2020, Print      ARIPiprazole (ABILIFY) 10 mg tablet Take 1 tablet (10 mg total) by mouth daily for 7 days At 9am, Starting Tue 12/17/2019, Until Thu 1/9/2020, Print      dicyclomine (BENTYL) 20 mg tablet Take 1 tablet (20 mg total) by mouth 2 (two) times a day, Starting Wed 1/8/2020, Print      fenofibrate (TRICOR) 48 mg tablet Take 1 tablet (48 mg total) by mouth daily for 7 days At 9am, Starting Tue 12/17/2019, Until Thu 1/9/2020, Print      fluticasone (FLOVENT HFA) 110 MCG/ACT inhaler Inhale 1 puff every 12 (twelve) hours Rinse mouth after use , Starting Tue 12/10/2019, Print      insulin glargine (LANTUS SOLOSTAR) 100 units/mL injection pen Inject 22 Units under the skin daily Every morning , Starting Tue 12/10/2019, Print      insulin lispro (HUMALOG KWIKPEN) 100 units/mL injection pen Inject 4 Units under the skin 3 (three) times a day with meals, Starting Tue 12/10/2019, Print      lisinopril (ZESTRIL) 5 mg tablet Take 1 tablet (5 mg total) by mouth daily for 7 days At 9am, Starting Tue 12/17/2019, Until Thu 1/9/2020, Print      metoclopramide (REGLAN) 10 mg tablet Take 1 tablet (10 mg total) by mouth every 6 (six) hours, Starting Mon 2/17/2020, Print      naproxen (NAPROSYN) 500 mg tablet Take 1 tablet (500 mg total) by mouth 2 (two) times a day with meals, Starting Mon 2/17/2020, Print      ondansetron (ZOFRAN-ODT) 4 mg disintegrating tablet Take 1 tablet (4 mg total) by mouth every 8 (eight) hours as needed for nausea or vomiting, Starting Wed 1/8/2020, Print      thiothixene (NAVANE) 1 mg capsule Take 1 capsule (1 mg total) by mouth daily for 7 days, Starting Tue 12/17/2019, Until Thu 1/9/2020, Print           No discharge procedures on file  ED Provider  Attending physically available and evaluated Ravin Guevara I managed the patient along with the ED Attending      Electronically Signed by         Garrick Barrios MD  02/18/20 0861

## 2020-02-17 NOTE — ED PROVIDER NOTES
History  Chief Complaint   Patient presents with    Flu Symptoms     patient states feelihgn sick and that legs feel like rubber last several days  woke up today and states could barely walk  cough and body aches  cousin sick with same symptoms, live together     51 y o  M w/h/o bipolar, depression, asthma, DM p/w flu-like symptoms  Pt states his cousin got him sick  Having cough, wheezing, headache, myalgia, runny nose, and diarrhea  Taking Robitussin with no relief  History provided by:  Patient   used: No    Flu Symptoms   Presenting symptoms: cough, diarrhea, fatigue, headache, myalgias and rhinorrhea    Presenting symptoms: no fever, no nausea, no shortness of breath, no sore throat and no vomiting    Chronicity:  New  Relieved by:  Nothing  Worsened by:  Nothing  Ineffective treatments: Robitussin  Associated symptoms: no chills, no ear pain, no congestion and no neck stiffness    Risk factors: sick contacts        Prior to Admission Medications   Prescriptions Last Dose Informant Patient Reported? Taking? ARIPiprazole (ABILIFY) 10 mg tablet   No No   Sig: Take 1 tablet (10 mg total) by mouth daily for 7 days At 9am   dicyclomine (BENTYL) 20 mg tablet   No No   Sig: Take 1 tablet (20 mg total) by mouth 2 (two) times a day   Patient not taking: Reported on 1/9/2020   fenofibrate (TRICOR) 48 mg tablet   No No   Sig: Take 1 tablet (48 mg total) by mouth daily for 7 days At 9am   fluticasone (FLOVENT HFA) 110 MCG/ACT inhaler   No No   Sig: Inhale 1 puff every 12 (twelve) hours Rinse mouth after use  insulin glargine (LANTUS SOLOSTAR) 100 units/mL injection pen   No No   Sig: Inject 22 Units under the skin daily Every morning     insulin lispro (HUMALOG KWIKPEN) 100 units/mL injection pen   No No   Sig: Inject 4 Units under the skin 3 (three) times a day with meals   lisinopril (ZESTRIL) 5 mg tablet   No No   Sig: Take 1 tablet (5 mg total) by mouth daily for 7 days At 9am ondansetron (ZOFRAN-ODT) 4 mg disintegrating tablet   No No   Sig: Take 1 tablet (4 mg total) by mouth every 8 (eight) hours as needed for nausea or vomiting   Patient not taking: Reported on 1/9/2020   thiothixene (NAVANE) 1 mg capsule   No No   Sig: Take 1 capsule (1 mg total) by mouth daily for 7 days      Facility-Administered Medications: None       Past Medical History:   Diagnosis Date    Anxiety     Asthma     [facility]ipolar 1 disorder ([facility] )     Depression     Diabetes mellitus ([place] [address] )     Psychiatric disorder        Past Surgical History:   Procedure Laterality Date    APPENDECTOMY         History reviewed  No pertinent family history  I have reviewed and agree with the history as documented  Social History     Tobacco Use    Smoking status: Current Every Day Smoker     Packs/day: 0 20     Types: Cigarettes    Smokeless tobacco: Never Used   Substance Use Topics    Alcohol use: Not Currently     Frequency: Monthly or less     Drinks per session: 1 or 2     Binge frequency: Never     Comment: States has had no alcohol in at least a year    Drug use: No       Review of Systems   Constitutional: Positive for fatigue  Negative for chills and fever  HENT: Positive for rhinorrhea  Negative for congestion, ear discharge, ear pain, postnasal drip, sinus pressure, sneezing, sore throat and trouble swallowing  Eyes: Negative for discharge and redness  Respiratory: Positive for cough and wheezing  Negative for shortness of breath  Cardiovascular: Negative for chest pain  Gastrointestinal: Positive for diarrhea  Negative for abdominal pain, nausea and vomiting  Musculoskeletal: Positive for myalgias  Negative for neck stiffness  Skin: Negative for rash  Neurological: Positive for headaches  All other systems reviewed and are negative  Physical Exam  Physical Exam   Constitutional: He is oriented to person, place, and time  He appears well-developed and well-nourished    Non-toxic appearance  He does not have a sickly appearance  He does not appear ill  HENT:   Head: Normocephalic and atraumatic  Right Ear: Tympanic membrane normal  No drainage  Tympanic membrane is not injected, not erythematous and not bulging  No middle ear effusion  Left Ear: Tympanic membrane normal  No drainage  Tympanic membrane is not injected, not erythematous and not bulging  No middle ear effusion  Nose: Nose normal    Mouth/Throat: Oropharynx is clear and moist and mucous membranes are normal  No oropharyngeal exudate  No tonsillar exudate  Eyes: Conjunctivae are normal  Right eye exhibits no discharge  Left eye exhibits no discharge  Right conjunctiva is not injected  Left conjunctiva is not injected  Neck: Normal range of motion  Neck supple  No neck rigidity  Cardiovascular: Normal rate, regular rhythm, normal heart sounds and intact distal pulses  Exam reveals no gallop and no friction rub  No murmur heard  Pulmonary/Chest: Effort normal and breath sounds normal  No stridor  No respiratory distress  He has no wheezes  He has no rales  Abdominal: Soft  Bowel sounds are normal  He exhibits no distension  There is no tenderness  There is no rebound and no guarding  Lymphadenopathy:     He has no cervical adenopathy  Neurological: He is alert and oriented to person, place, and time  Skin: Skin is warm and dry  No rash noted  No pallor  Nursing note and vitals reviewed        Vital Signs  ED Triage Vitals [02/17/20 1432]   Temperature Pulse Respirations Blood Pressure SpO2   98 °F (36 7 °C) 77 18 108/61 100 %      Temp Source Heart Rate Source Patient Position - Orthostatic VS BP Location FiO2 (%)   Oral Monitor Sitting Right arm --      Pain Score       9           Vitals:    02/17/20 1432   BP: 108/61   Pulse: 77   Patient Position - Orthostatic VS: Sitting         Visual Acuity      ED Medications  Medications   albuterol inhalation solution 5 mg (5 mg Nebulization Given 2/17/20 7419) ipratropium (ATROVENT) 0 02 % inhalation solution 0 5 mg (0 5 mg Nebulization Given 2/17/20 1553)   metoclopramide (REGLAN) tablet 10 mg (10 mg Oral Given 2/17/20 1552)   naproxen (NAPROSYN) tablet 500 mg (500 mg Oral Given 2/17/20 1552)   acetaminophen (TYLENOL) tablet 650 mg (650 mg Oral Given 2/17/20 1552)       Diagnostic Studies  Results Reviewed     None                 No orders to display              Procedures  Procedures         ED Course  ED Course as of Feb 17 1559 Mon Feb 17, 2020   1553 Pt given meds                                  MDM      Disposition  Final diagnoses:   Flu-like symptoms   Cough   Myalgia   Diarrhea   Runny nose   Headache     Time reflects when diagnosis was documented in both MDM as applicable and the Disposition within this note     Time User Action Codes Description Comment    2/17/2020  3:37 PM Madhu Ground [R68 89] Flu-like symptoms     2/17/2020  3:37 PM Obey [address] Add [R05] Cough     2/17/2020  3:37 PM Obey, [address] Add [M79 10] Myalgia     2/17/2020  3:37 PM Chucky Barnhart 48 [R19 7] Diarrhea     2/17/2020  3:38 PM Obey, [address] [R09 89] Runny nose     2/17/2020  3:38 PM Obey, [address] Headache       ED Disposition     ED Disposition Condition Date/Time Comment    Discharge Stable Mon Feb 17, 2020  3:39 PM Salomón Flor discharge to home/self care              Follow-up Information    None         Patient's Medications   Discharge Prescriptions    ALBUTEROL (PROVENTIL HFA,VENTOLIN HFA) 90 MCG/ACT INHALER    Inhale 2 puffs every 4 (four) hours as needed for wheezing       Start Date: 2/17/2020 End Date: --       Order Dose: 2 puffs       Quantity: 1 Inhaler    Refills: 0    METOCLOPRAMIDE (REGLAN) 10 MG TABLET    Take 1 tablet (10 mg total) by mouth every 6 (six) hours       Start Date: 2/17/2020 End Date: --       Order Dose: 10 mg       Quantity: 10 tablet    Refills: 0    NAPROXEN (NAPROSYN) 500 MG TABLET    Take 1 tablet (500 mg total) by mouth 2 (two) times a day with meals       Start Date: 2/17/2020 End Date: --       Order Dose: 500 mg       Quantity: 10 tablet    Refills: 0     No discharge procedures on file      PDMP Review     None          ED Provider  Electronically Signed by           Jairon Chauhan 25, DO  02/17/20 1557

## 2020-02-18 ENCOUNTER — HOSPITAL ENCOUNTER (EMERGENCY)
Facility: HOSPITAL | Age: 50
Discharge: HOME | End: 2020-02-18
Attending: EMERGENCY MEDICINE | Admitting: EMERGENCY MEDICINE
Payer: COMMERCIAL

## 2020-02-18 VITALS
HEART RATE: 71 BPM | OXYGEN SATURATION: 99 % | DIASTOLIC BLOOD PRESSURE: 74 MMHG | WEIGHT: 135.36 LBS | TEMPERATURE: 97.9 F | BODY MASS INDEX: 21.85 KG/M2 | SYSTOLIC BLOOD PRESSURE: 122 MMHG | RESPIRATION RATE: 16 BRPM

## 2020-02-18 VITALS
SYSTOLIC BLOOD PRESSURE: 118 MMHG | BODY MASS INDEX: 22.11 KG/M2 | OXYGEN SATURATION: 99 % | DIASTOLIC BLOOD PRESSURE: 75 MMHG | RESPIRATION RATE: 16 BRPM | HEART RATE: 68 BPM | TEMPERATURE: 96.9 F | WEIGHT: 137 LBS

## 2020-02-18 DIAGNOSIS — R44.3 HALLUCINATIONS: Primary | ICD-10-CM

## 2020-02-18 DIAGNOSIS — R44.0 AUDITORY HALLUCINATION: Primary | ICD-10-CM

## 2020-02-18 PROCEDURE — 99285 EMERGENCY DEPT VISIT HI MDM: CPT

## 2020-02-18 PROCEDURE — 99284 EMERGENCY DEPT VISIT MOD MDM: CPT

## 2020-02-18 PROCEDURE — 99284 EMERGENCY DEPT VISIT MOD MDM: CPT | Performed by: EMERGENCY MEDICINE

## 2020-02-18 PROCEDURE — 99282 EMERGENCY DEPT VISIT SF MDM: CPT | Performed by: EMERGENCY MEDICINE

## 2020-02-18 NOTE — DISCHARGE INSTRUCTIONS
Please go to see your psychiatrist/therapist in one week as scheduled  Come back to ED if you have thoughts of harming yourself or others

## 2020-02-18 NOTE — ED NOTES
Pt is a 51 y o  male who was brought to the ED with   Chief Complaint   Patient presents with   [facility] Psychiatric Evaluation     Pt  reports hearing voices about 15 minutes ago while he was at dinner with his family  Pt  reports the voices were telling him to kill himself by cutting his wrists  Pt  denies wanting to be dead  Pt  just reports hearing the voices  Pt brought to the ED with complaints of A/H, Pt reports that he always hears voices, and this is not new to him  "I can control them when they speak" pt reports that tonight he the voices told him to "cut" pt denies any command type to kill himself  Pt also reports that he ran out of his abilify yesterday (15mg bid), pt reports that he sees a psych Dr and [facility]  Pt denies S/I,H/I,V/H  Intake Assessment completed, Safety risk Assessment completed  CW met with pt and discussed what happened, Pt continues to deny S/I,H/I,V/H, Pt continues to reports that the A/H are not new for him, pt is requesting a medication refill  Pt will follow up with his out pt [facility] provider  CW discussed this case and pt plan with ED Physician who is in agreement with this plan  Pt will be discharge per ED Physician   [facility]

## 2020-02-18 NOTE — ED NOTES
Patient reports he has been hearing voices for the past few days that tell him to take his medications  He denies he wants to hurt himself or anyone else and reports he can deal with the voices and tells them to shut up  He reports they do not disrupt his life and he can control them   He doesnt want to be inpatient  Patient does have a psychiatrist and has an appt on 2/26 and reports he will call his psychiatrist today for an earlier appointment  He does not report any prior attempts and feels he is safe at home

## 2020-02-18 NOTE — ED PROVIDER NOTES
History  Chief Complaint   Patient presents with    Hallucinations     pt reporting command auditory hallucination telling him to kill himself by taking a handful of pills  pt denies intent to act upon or  wishes to die  pt states "I love family I love my kid"  pt requesting inpt treatment  pt denies SI/HI,VH       51 y o  M w/h/o bipolar disorder, anxiety, DM p/w auditory hallucinations  Pt reports he is hearing voices that are commanding him to kill himself by overdosing on pills  Pt states he does not want to do this and must live for his 66-year-old daughter  He denies SI/HI/VH  He denies drug/alcohol abuse  He reports compliance with psych meds  Pt states he feels safe with outpt tx and reports he does not need inpt tx (despite being triaged that he wants inpt tx)  History provided by:  Patient   used: No    Psychiatric Evaluation   Presenting symptoms: hallucinations    Presenting symptoms: no agitation, no depression, no self-mutilation and no suicidal thoughts    Chronicity:  New  Context: not alcohol use, not drug abuse and not noncompliant    Treatment compliance: All of the time  Relieved by:  None tried  Worsened by:  Nothing  Ineffective treatments:  None tried  Associated symptoms: no abdominal pain, no anxiety, no chest pain and no headaches    Risk factors: hx of mental illness        Prior to Admission Medications   Prescriptions Last Dose Informant Patient Reported? Taking?    ARIPiprazole (ABILIFY DISCMELT) 10 mg dispersible tablet   No No   Sig: Take 1 tablet (10 mg total) by mouth daily for 7 days   ARIPiprazole (ABILIFY) 10 mg tablet   No No   Sig: Take 1 tablet (10 mg total) by mouth daily for 7 days At 9am   albuterol (PROVENTIL HFA,VENTOLIN HFA) 90 mcg/act inhaler   No No   Sig: Inhale 2 puffs every 4 (four) hours as needed for wheezing   dicyclomine (BENTYL) 20 mg tablet   No No   Sig: Take 1 tablet (20 mg total) by mouth 2 (two) times a day   Patient not taking: Reported on 1/9/2020   fenofibrate (TRICOR) 48 mg tablet   No No   Sig: Take 1 tablet (48 mg total) by mouth daily for 7 days At 9am   fluticasone (FLOVENT HFA) 110 MCG/ACT inhaler   No No   Sig: Inhale 1 puff every 12 (twelve) hours Rinse mouth after use  insulin glargine (LANTUS SOLOSTAR) 100 units/mL injection pen   No No   Sig: Inject 22 Units under the skin daily Every morning  insulin lispro (HUMALOG KWIKPEN) 100 units/mL injection pen   No No   Sig: Inject 4 Units under the skin 3 (three) times a day with meals   lisinopril (ZESTRIL) 5 mg tablet   No No   Sig: Take 1 tablet (5 mg total) by mouth daily for 7 days At 9am   metoclopramide (REGLAN) 10 mg tablet   No No   Sig: Take 1 tablet (10 mg total) by mouth every 6 (six) hours   naproxen (NAPROSYN) 500 mg tablet   No No   Sig: Take 1 tablet (500 mg total) by mouth 2 (two) times a day with meals   ondansetron (ZOFRAN-ODT) 4 mg disintegrating tablet   No No   Sig: Take 1 tablet (4 mg total) by mouth every 8 (eight) hours as needed for nausea or vomiting   Patient not taking: Reported on 1/9/2020   thiothixene (NAVANE) 1 mg capsule   No No   Sig: Take 1 capsule (1 mg total) by mouth daily for 7 days      Facility-Administered Medications: None       Past Medical History:   Diagnosis Date    Anxiety     Asthma     [facility]ipolar [facility] disorder ([facility] )     Depression     Diabetes mellitus ([facility] )     Psychiatric disorder        Past Surgical History:   Procedure Laterality Date    APPENDECTOMY         History reviewed  No pertinent family history  I have reviewed and agree with the history as documented      Social History     Tobacco Use    Smoking status: Former Smoker     Packs/day: 0 20     Types: Cigarettes    Smokeless tobacco: Never Used   Substance Use Topics    Alcohol use: Not Currently     Frequency: Monthly or less     Drinks per session: 1 or 2     Binge frequency: Never     Comment: States has had no alcohol in at least a year    Drug use: No       Review of Systems   Constitutional: Negative for chills and fever  Eyes: Negative for visual disturbance  Cardiovascular: Negative for chest pain  Gastrointestinal: Negative for abdominal pain, diarrhea, nausea and vomiting  Skin: Negative for rash  Neurological: Negative for tremors, facial asymmetry, speech difficulty, weakness, numbness and headaches  Psychiatric/Behavioral: Positive for hallucinations  Negative for agitation, behavioral problems, confusion, decreased concentration, dysphoric mood, self-injury, sleep disturbance and suicidal ideas  The patient is not nervous/anxious and is not hyperactive  All other systems reviewed and are negative  Physical Exam  Physical Exam   Constitutional: He is oriented to person, place, and time  He appears well-developed and well-nourished  No distress  HENT:   Head: Normocephalic  Eyes: EOM are normal    Neck: Normal range of motion  Neck supple  Cardiovascular: Normal rate, regular rhythm, normal heart sounds and intact distal pulses  Exam reveals no gallop and no friction rub  No murmur heard  Pulmonary/Chest: Effort normal and breath sounds normal  No respiratory distress  He has no wheezes  He has no rales  Abdominal: Soft  Bowel sounds are normal  He exhibits no distension  There is no tenderness  There is no rebound and no guarding  Neurological: He is alert and oriented to person, place, and time  Skin: Skin is warm and dry  No pallor  Psychiatric: His mood appears not anxious  His affect is not inappropriate  His speech is not rapid and/or pressured  He is not agitated  He does not exhibit a depressed mood  He expresses no homicidal and no suicidal ideation  Nursing note and vitals reviewed        Vital Signs  ED Triage Vitals [02/18/20 1300]   Temperature Pulse Respirations Blood Pressure SpO2   97 9 °F (36 6 °C) 76 17 127/70 99 %      Temp Source Heart Rate Source Patient Position - Orthostatic VS BP Location FiO2 (%)   Temporal Monitor Sitting Right arm --      Pain Score       --           Vitals:    02/18/20 1300   BP: 127/70   Pulse: 76   Patient Position - Orthostatic VS: Sitting         Visual Acuity      ED Medications  Medications - No data to display    Diagnostic Studies  Results Reviewed     None                 No orders to display              Procedures  Procedures         ED Course  ED Course as of Feb 18 1402   Tue Feb 18, 2020   1400 Pt evaluated by crisis  Pt does not feel he needs inpt tx and wants to go home  MDM        Disposition  Final diagnoses: Auditory hallucination     Time reflects when diagnosis was documented in both MDM as applicable and the Disposition within this note     Time User Action Codes Description Comment    2/18/2020  1:14 PM Chucky Barnhart 48 [R44 0] Auditory hallucination       ED Disposition     ED Disposition Condition Date/Time Comment    Discharge Stable Tue Feb 18, 2020  2:00 PM Jayce Frank discharge to home/self care  MD Documentation      Most Recent Value   Sending MD Dr Tania Cruz    None         Patient's Medications   Discharge Prescriptions    No medications on file     No discharge procedures on file      PDMP Review     None          ED Provider  Electronically Signed by           Jairon Chauhan 24, DO  02/18/20 1402

## 2020-02-18 NOTE — ED ATTENDING ATTESTATION
2/17/2020  I, Charlyne Ahumada, MD, saw and evaluated the patient  I have discussed the patient with the resident/non-physician practitioner and agree with the resident's/non-physician practitioner's findings, Plan of Care, and MDM as documented in the resident's/non-physician practitioner's note, except where noted  All available labs and Radiology studies were reviewed  I was present for key portions of any procedure(s) performed by the resident/non-physician practitioner and I was immediately available to provide assistance  At this point I agree with the current assessment done in the Emergency Department  I have conducted an independent evaluation of this patient a history and physical is as follows:    52 y/o M presents for evaluation of auditory hallucinations  Command hallucinations to do self harm which patient states he doesn't wish to act on which prompted him to come ot the er  Has had these in the past but they resolved on abilify which he is currently not taking  Denies si/hi  10 systems reviewed and otherwise neg  On exam no distress, lungs nml, cardiac nml, psych nml   MDM auditory hallucinations-will consult crisis for mental health evaluation/tx, restart abilify    ED Course         Critical Care Time  Procedures

## 2020-02-19 NOTE — ED PROVIDER NOTES
History  Chief Complaint   Patient presents with    Psychiatric Evaluation     Patient is a 30-year-old male with a history of bipolar who is very well known to me from previous ER visits in the past who presents complaining of command auditory hallucinations to kill himself  Patient states this started after dinner today but review of epic shows that patient was seen earlier today for similar at [address]  Patient was seen by crisis at that time and he decided that he did not need inpatient admission want to go home  Patient states he did not call is acting  States is compliant with medication  Denies any alcohol or illicit drug use  When asked again patient states he is not suicidal and would never kill himself  Denies any homicidal ideation  Prior to Admission Medications   Prescriptions Last Dose Informant Patient Reported? Taking? ARIPiprazole (ABILIFY DISCMELT) 10 mg dispersible tablet   No No   Sig: Take 1 tablet (10 mg total) by mouth daily for 7 days   ARIPiprazole (ABILIFY) 10 mg tablet   No No   Sig: Take 1 tablet (10 mg total) by mouth daily for 7 days At 9am   albuterol (PROVENTIL HFA,VENTOLIN HFA) 90 mcg/act inhaler   No No   Sig: Inhale 2 puffs every 4 (four) hours as needed for wheezing   dicyclomine (BENTYL) 20 mg tablet   No No   Sig: Take 1 tablet (20 mg total) by mouth 2 (two) times a day   Patient not taking: Reported on 1/9/2020   fenofibrate (TRICOR) 48 mg tablet   No No   Sig: Take 1 tablet (48 mg total) by mouth daily for 7 days At 9am   fluticasone (FLOVENT HFA) 110 MCG/ACT inhaler   No No   Sig: Inhale 1 puff every 12 (twelve) hours Rinse mouth after use  insulin glargine (LANTUS SOLOSTAR) 100 units/mL injection pen   No No   Sig: Inject 22 Units under the skin daily Every morning     insulin lispro (HUMALOG KWIKPEN) 100 units/mL injection pen   No No   Sig: Inject 4 Units under the skin 3 (three) times a day with meals   lisinopril (ZESTRIL) 5 mg tablet   No No   Sig: Take 1 tablet (5 mg total) by mouth daily for 7 days At 9am   metoclopramide (REGLAN) 10 mg tablet   No No   Sig: Take 1 tablet (10 mg total) by mouth every 6 (six) hours   naproxen (NAPROSYN) 500 mg tablet   No No   Sig: Take 1 tablet (500 mg total) by mouth 2 (two) times a day with meals   ondansetron (ZOFRAN-ODT) 4 mg disintegrating tablet   No No   Sig: Take 1 tablet (4 mg total) by mouth every 8 (eight) hours as needed for nausea or vomiting   Patient not taking: Reported on 1/9/2020   thiothixene (NAVANE) 1 mg capsule   No No   Sig: Take 1 capsule (1 mg total) by mouth daily for 7 days      Facility-Administered Medications: None       Past Medical History:   Diagnosis Date    Anxiety     Asthma     Bipolar 1 disorder ([facility] )     Depression     Diabetes mellitus ([facility] )     Psychiatric disorder        Past Surgical History:   Procedure Laterality Date    APPENDECTOMY         History reviewed  No pertinent family history  I have reviewed and agree with the history as documented  Social History     Tobacco Use    Smoking status: Former Smoker     Packs/day: 0 20     Types: Cigarettes    Smokeless tobacco: Never Used   Substance Use Topics    Alcohol use: Not Currently     Frequency: Monthly or less     Drinks per session: 1 or 2     Binge frequency: Never     Comment: States has had no alcohol in at least a year    Drug use: No       Review of Systems   Constitutional: Negative  HENT: Negative  Eyes: Negative  Respiratory: Negative  Cardiovascular: Negative  Gastrointestinal: Negative  Endocrine: Negative  Genitourinary: Negative  Musculoskeletal: Negative  Skin: Negative  Allergic/Immunologic: Negative  Neurological: Negative  Hematological: Negative  Psychiatric/Behavioral: Positive for hallucinations  All other systems reviewed and are negative  Physical Exam  Physical Exam   Constitutional: He is oriented to person, place, and time   He appears well-developed and well-nourished  HENT:   Head: Normocephalic  Nose: Nose normal    Mouth/Throat: Oropharynx is clear and moist    Eyes: Pupils are equal, round, and reactive to light  Conjunctivae are normal    Neck: Normal range of motion  Neck supple  Cardiovascular: Normal rate, regular rhythm, normal heart sounds and intact distal pulses  Pulmonary/Chest: Effort normal and breath sounds normal    Abdominal: Soft  Bowel sounds are normal    Musculoskeletal: Normal range of motion  Neurological: He is alert and oriented to person, place, and time  Skin: Skin is warm and dry  Psychiatric: He has a normal mood and affect  His speech is normal and behavior is normal  Thought content normal  He is actively hallucinating  Cognition and memory are normal  He expresses impulsivity  He expresses no homicidal and no suicidal ideation  Vitals reviewed        Vital Signs  ED Triage Vitals [02/18/20 2000]   Temperature Pulse Respirations Blood Pressure SpO2   (!) 96 9 °F (36 1 °C) 68 16 118/75 99 %      Temp Source Heart Rate Source Patient Position - Orthostatic VS BP Location FiO2 (%)   Tympanic Monitor Sitting Left arm --      Pain Score       --           Vitals:    02/18/20 2000   BP: 118/75   Pulse: 68   Patient Position - Orthostatic VS: Sitting         Visual Acuity      ED Medications  Medications - No data to display    Diagnostic Studies  Results Reviewed     None                 No orders to display              Procedures  Procedures         ED Course                               MDM  Number of Diagnoses or Management Options  Hallucinations:      Amount and/or Complexity of Data Reviewed  Review and summarize past medical records: yes          Disposition  Final diagnoses:   Hallucinations     Time reflects when diagnosis was documented in both MDM as applicable and the Disposition within this note     Time User Action Codes Description Comment    2/18/2020  8:00 PM Casie Landrum [R44 3] Hallucinations       ED Disposition     ED Disposition Condition Date/Time Comment    Discharge Stable Tue Feb 18, 2020  8:00 PM Mandy Lee discharge to home/self care              Follow-up Information     Follow up With Specialties Details Why Contact Info    Chloe Alvarez MD Family Medicine   [address], [address]  [phone]            Discharge Medication List as of 2/18/2020  8:00 PM      CONTINUE these medications which have NOT CHANGED    Details   albuterol (PROVENTIL HFA,VENTOLIN HFA) 90 mcg/act inhaler Inhale 2 puffs every 4 (four) hours as needed for wheezing, Starting Mon 2/17/2020, Print      ARIPiprazole (ABILIFY DISCMELT) 10 mg dispersible tablet Take 1 tablet (10 mg total) by mouth daily for 7 days, Starting Mon 2/17/2020, Until Mon 2/24/2020, Normal      ARIPiprazole (ABILIFY) 10 mg tablet Take 1 tablet (10 mg total) by mouth daily for 7 days At 9am, Starting Tue 12/17/2019, Until Thu 1/9/2020, Print      dicyclomine (BENTYL) 20 mg tablet Take 1 tablet (20 mg total) by mouth 2 (two) times a day, Starting Wed 1/8/2020, Print      fenofibrate (TRICOR) 48 mg tablet Take 1 tablet (48 mg total) by mouth daily for 7 days At 9am, Starting Tue 12/17/2019, Until Thu 1/9/2020, Print      fluticasone (FLOVENT HFA) 110 MCG/ACT inhaler Inhale 1 puff every 12 (twelve) hours Rinse mouth after use , Starting Tue 12/10/2019, Print      insulin glargine (LANTUS SOLOSTAR) 100 units/mL injection pen Inject 22 Units under the skin daily Every morning , Starting Tue 12/10/2019, Print      insulin lispro (HUMALOG KWIKPEN) 100 units/mL injection pen Inject 4 Units under the skin 3 (three) times a day with meals, Starting Tue 12/10/2019, Print      lisinopril (ZESTRIL) 5 mg tablet Take 1 tablet (5 mg total) by mouth daily for 7 days At 9am, Starting Tue 12/17/2019, Until Thu 1/9/2020, Print      metoclopramide (REGLAN) 10 mg tablet Take 1 tablet (10 mg total) by mouth every 6 (six) hours, Starting Mon 2/17/2020, Print      naproxen (NAPROSYN) 500 mg tablet Take 1 tablet (500 mg total) by mouth 2 (two) times a day with meals, Starting Mon 2/17/2020, Print      ondansetron (ZOFRAN-ODT) 4 mg disintegrating tablet Take 1 tablet (4 mg total) by mouth every 8 (eight) hours as needed for nausea or vomiting, Starting Wed 1/8/2020, Print      thiothixene (NAVANE) 1 mg capsule Take 1 capsule (1 mg total) by mouth daily for 7 days, Starting Tue 12/17/2019, Until Thu 1/9/2020, Print           No discharge procedures on file      PDMP Review     None          ED Provider  Electronically Signed by           Shelly Be MD  02/18/20 7824

## 2020-02-23 ENCOUNTER — HOSPITAL ENCOUNTER (EMERGENCY)
Facility: HOSPITAL | Age: 50
Discharge: HOME | End: 2020-02-23
Attending: EMERGENCY MEDICINE | Admitting: EMERGENCY MEDICINE
Payer: COMMERCIAL

## 2020-02-23 ENCOUNTER — HOSPITAL ENCOUNTER (EMERGENCY)
Facility: HOSPITAL | Age: 50
Discharge: HOME | End: 2020-02-23
Attending: EMERGENCY MEDICINE
Payer: COMMERCIAL

## 2020-02-23 VITALS
RESPIRATION RATE: 18 BRPM | SYSTOLIC BLOOD PRESSURE: 113 MMHG | BODY MASS INDEX: 22.06 KG/M2 | HEART RATE: 65 BPM | DIASTOLIC BLOOD PRESSURE: 67 MMHG | OXYGEN SATURATION: 99 % | WEIGHT: 136.69 LBS | TEMPERATURE: 97.1 F

## 2020-02-23 VITALS
OXYGEN SATURATION: 100 % | TEMPERATURE: 98 F | RESPIRATION RATE: 16 BRPM | HEART RATE: 97 BPM | SYSTOLIC BLOOD PRESSURE: 111 MMHG | WEIGHT: 132.28 LBS | BODY MASS INDEX: 21.35 KG/M2 | DIASTOLIC BLOOD PRESSURE: 75 MMHG

## 2020-02-23 DIAGNOSIS — R44.0 AUDITORY HALLUCINATIONS: Primary | ICD-10-CM

## 2020-02-23 DIAGNOSIS — R11.2 NAUSEA VOMITING AND DIARRHEA: Primary | ICD-10-CM

## 2020-02-23 DIAGNOSIS — R19.7 NAUSEA VOMITING AND DIARRHEA: Primary | ICD-10-CM

## 2020-02-23 LAB
AMPHETAMINES SERPL QL SCN: NEGATIVE
BARBITURATES UR QL: NEGATIVE
BENZODIAZ UR QL: NEGATIVE
COCAINE UR QL: NEGATIVE
ETHANOL EXG-MCNC: 0 MG/DL
GLUCOSE SERPL-MCNC: 256 MG/DL (ref 65–140)
METHADONE UR QL: NEGATIVE
OPIATES UR QL SCN: NEGATIVE
PCP UR QL: NEGATIVE
THC UR QL: NEGATIVE

## 2020-02-23 PROCEDURE — 99283 EMERGENCY DEPT VISIT LOW MDM: CPT

## 2020-02-23 PROCEDURE — 82948 REAGENT STRIP/BLOOD GLUCOSE: CPT

## 2020-02-23 PROCEDURE — 99284 EMERGENCY DEPT VISIT MOD MDM: CPT | Performed by: EMERGENCY MEDICINE

## 2020-02-23 PROCEDURE — 80307 DRUG TEST PRSMV CHEM ANLYZR: CPT | Performed by: EMERGENCY MEDICINE

## 2020-02-23 PROCEDURE — 82075 ASSAY OF BREATH ETHANOL: CPT | Performed by: EMERGENCY MEDICINE

## 2020-02-23 PROCEDURE — 99284 EMERGENCY DEPT VISIT MOD MDM: CPT

## 2020-02-23 PROCEDURE — 99283 EMERGENCY DEPT VISIT LOW MDM: CPT | Performed by: EMERGENCY MEDICINE

## 2020-02-23 RX ORDER — DICYCLOMINE HCL 20 MG
20 TABLET ORAL EVERY 6 HOURS PRN
Qty: 20 TABLET | Refills: 0 | Status: SHIPPED | OUTPATIENT
Start: 2020-02-23 | End: 2020-03-05 | Stop reason: SDUPTHER

## 2020-02-23 RX ORDER — ONDANSETRON 4 MG/1
4 TABLET, ORALLY DISINTEGRATING ORAL EVERY 8 HOURS PRN
Qty: 20 TABLET | Refills: 0 | Status: SHIPPED | OUTPATIENT
Start: 2020-02-23 | End: 2020-03-05 | Stop reason: SDUPTHER

## 2020-02-23 RX ORDER — LOPERAMIDE HYDROCHLORIDE 2 MG/1
4 CAPSULE ORAL ONCE
Status: COMPLETED | OUTPATIENT
Start: 2020-02-23 | End: 2020-02-23

## 2020-02-23 RX ORDER — DICYCLOMINE HCL 20 MG
20 TABLET ORAL ONCE
Status: COMPLETED | OUTPATIENT
Start: 2020-02-23 | End: 2020-02-23

## 2020-02-23 RX ORDER — ONDANSETRON 4 MG/1
4 TABLET, ORALLY DISINTEGRATING ORAL ONCE
Status: COMPLETED | OUTPATIENT
Start: 2020-02-23 | End: 2020-02-23

## 2020-02-23 RX ADMIN — LOPERAMIDE HYDROCHLORIDE 4 MG: 2 CAPSULE ORAL at 22:28

## 2020-02-23 RX ADMIN — ONDANSETRON 4 MG: 4 TABLET, ORALLY DISINTEGRATING ORAL at 22:27

## 2020-02-23 RX ADMIN — DICYCLOMINE HYDROCHLORIDE 20 MG: 20 TABLET ORAL at 22:28

## 2020-02-23 NOTE — ED PROVIDER NOTES
History  Chief Complaint   Patient presents with    Psychiatric Evaluation     Pt reports medicatications aren't helping with the voices in his head, voices telling him to hurt himself  pt has no plan to hurt self  denies feeling suicidal, pt here to be evaluated for worsening hallucinations  HPI     Pt presents from home, hx of anxiety, depression, bipolar, c/o "voices are telling me to hurt myself "  Pt states that the auditory hallucinations began today  Pt denies any visual hallucinations  Pt has not acted on these voices  Pt does not feel that his Abilify is working  No recent EtOH or drug use  No other symptoms  Pt denies ha, fevers, cough, cp, sob, n/v/d/c, abd pain, dysuria, focal def or syncope  Prior to Admission Medications   Prescriptions Last Dose Informant Patient Reported? Taking? ARIPiprazole (ABILIFY DISCMELT) 10 mg dispersible tablet   No No   Sig: Take 1 tablet (10 mg total) by mouth daily for 7 days   Patient taking differently: Take 15 mg by mouth daily    albuterol (PROVENTIL HFA,VENTOLIN HFA) 90 mcg/act inhaler   No Yes   Sig: Inhale 2 puffs every 4 (four) hours as needed for wheezing   dicyclomine (BENTYL) 20 mg tablet   No Yes   Sig: Take 1 tablet (20 mg total) by mouth 2 (two) times a day   fenofibrate (TRICOR) 48 mg tablet   No Yes   Sig: Take 1 tablet (48 mg total) by mouth daily for 7 days At 9am   fluticasone (FLOVENT HFA) 110 MCG/ACT inhaler   No Yes   Sig: Inhale 1 puff every 12 (twelve) hours Rinse mouth after use  insulin glargine (LANTUS SOLOSTAR) 100 units/mL injection pen   No Yes   Sig: Inject 22 Units under the skin daily Every morning     insulin lispro (HUMALOG KWIKPEN) 100 units/mL injection pen   No Yes   Sig: Inject 4 Units under the skin 3 (three) times a day with meals   lisinopril (ZESTRIL) 5 mg tablet   No Yes   Sig: Take 1 tablet (5 mg total) by mouth daily for 7 days At 9am   metoclopramide (REGLAN) 10 mg tablet   No Yes   Sig: Take 1 tablet (10 mg total) by mouth every 6 (six) hours   naproxen (NAPROSYN) 500 mg tablet   No Yes   Sig: Take 1 tablet (500 mg total) by mouth 2 (two) times a day with meals   ondansetron (ZOFRAN-ODT) 4 mg disintegrating tablet   No Yes   Sig: Take 1 tablet (4 mg total) by mouth every 8 (eight) hours as needed for nausea or vomiting   thiothixene (NAVANE) 1 mg capsule   No Yes   Sig: Take 1 capsule (1 mg total) by mouth daily for 7 days      Facility-Administered Medications: None       Past Medical History:   Diagnosis Date    Anxiety     Asthma     [facility]ipolar 1 disorder ([facility] )     Depression     Diabetes mellitus ([facility] )     Psychiatric disorder        Past Surgical History:   Procedure Laterality Date    APPENDECTOMY         History reviewed  No pertinent family history  I have reviewed and agree with the history as documented  Social History     Tobacco Use    Smoking status: Former Smoker     Packs/day: 0 20     Types: Cigarettes    Smokeless tobacco: Never Used   Substance Use Topics    Alcohol use: Not Currently     Frequency: Monthly or less     Drinks per session: 1 or 2     Binge frequency: Never     Comment: States has had no alcohol in at least a year    Drug use: No       Review of Systems   Constitutional: Negative for activity change, appetite change and fever  HENT: Negative for congestion, nosebleeds and sore throat  Eyes: Negative for photophobia and discharge  Respiratory: Negative for cough, shortness of breath, wheezing and stridor  Cardiovascular: Negative for chest pain  Gastrointestinal: Negative for abdominal pain, constipation, diarrhea, nausea and vomiting  Endocrine: Negative for cold intolerance and polydipsia  Genitourinary: Negative for discharge, hematuria and penile pain  Musculoskeletal: Negative for arthralgias, myalgias and neck stiffness  Skin: Negative for color change and rash  Allergic/Immunologic: Negative for immunocompromised state     Neurological: Negative for dizziness, seizures and headaches  Hematological: Negative for adenopathy  Psychiatric/Behavioral: Positive for hallucinations  Negative for confusion and self-injury  The patient is nervous/anxious  Physical Exam  Physical Exam   Constitutional: He is oriented to person, place, and time  He appears well-developed and well-nourished  No distress  Unkempt appearing but calm and cooperative in NAD   HENT:   Head: Normocephalic and atraumatic  Right Ear: External ear normal    Left Ear: External ear normal    Nose: Nose normal    Mouth/Throat: Oropharynx is clear and moist  No oropharyngeal exudate  Eyes: Pupils are equal, round, and reactive to light  Conjunctivae and EOM are normal  Right eye exhibits no discharge  Left eye exhibits no discharge  No scleral icterus  Neck: Normal range of motion  Neck supple  No JVD present  No tracheal deviation present  No thyromegaly present  Cardiovascular: Normal rate, regular rhythm, normal heart sounds and intact distal pulses  Exam reveals no gallop and no friction rub  No murmur heard  Pulmonary/Chest: Breath sounds normal  No stridor  No respiratory distress  He has no wheezes  He has no rales  He exhibits no tenderness  Abdominal: Soft  Bowel sounds are normal  He exhibits no distension and no mass  There is no tenderness  There is no rebound and no guarding  Musculoskeletal: Normal range of motion  He exhibits no edema, tenderness or deformity  Lymphadenopathy:     He has no cervical adenopathy  Neurological: He is alert and oriented to person, place, and time  He has normal reflexes  He displays normal reflexes  No cranial nerve deficit  He exhibits normal muscle tone  Coordination normal    Skin: Skin is warm and dry  No rash noted  He is not diaphoretic  No erythema  No pallor     Psychiatric: His behavior is normal  Judgment and thought content normal    Pt appears anxious but is not suicidal or homicidal   Nursing note and vitals reviewed  Vital Signs  ED Triage Vitals   Temperature Pulse Respirations Blood Pressure SpO2   02/23/20 1438 02/23/20 1438 02/23/20 1438 02/23/20 1438 02/23/20 1438   98 °F (36 7 °C) 95 20 109/74 99 %      Temp Source Heart Rate Source Patient Position - Orthostatic VS BP Location FiO2 (%)   02/23/20 1438 02/23/20 1438 02/23/20 1847 02/23/20 1438 --   Temporal Monitor Standing Right arm       Pain Score       02/23/20 1438       No Pain           Vitals:    02/23/20 1438 02/23/20 1847   BP: 109/74 111/75   Pulse: 95 97   Patient Position - Orthostatic VS:  Standing         Visual Acuity      ED Medications  Medications - No data to display    Diagnostic Studies  Results Reviewed     Procedure Component Value Units Date/Time    Rapid drug screen, urine [[id number]]  (Normal) Collected:  02/23/20 1600    Lab Status:  Final result Specimen:  Urine, Other Updated:  02/23/20 1630     Amph/Meth UR Negative     Barbiturate Ur Negative     Benzodiazepine Urine Negative     Cocaine Urine Negative     Methadone Urine Negative     Opiate Urine Negative     PCP Ur Negative     THC Urine Negative    Narrative:       FOR MEDICAL PURPOSES ONLY  IF CONFIRMATION NEEDED PLEASE CONTACT THE LAB WITHIN 5 DAYS  Drug Screen Cutoff Levels:  AMPHETAMINE/METHAMPHETAMINES  1000 ng/mL  BARBITURATES     200 ng/mL  BENZODIAZEPINES     200 ng/mL  COCAINE      300 ng/mL  METHADONE      300 ng/mL  OPIATES      300 ng/mL  PHENCYCLIDINE     25 ng/mL  THC       50 ng/mL      POCT alcohol breath test [[id number]]  (Normal) Resulted:  02/23/20 1506    Lab Status:  Final result Updated:  02/23/20 1506     EXTBreath Alcohol 0 000                 No orders to display              Procedures  Procedures         ED Course       5:05 PM - Pt is resting comfortably at this time  Awaiting crisis  to further eval and tx the patient  6:37 PM - Pt was seen by crisis, and the pt is no longer c/o hearing voices    He is asymptomatic and is ready to go home  He will f/up w/ his pcp in the next 1 to 2 days and he understands the need to return immediately for any worsening  He understands the signs and symptoms for which he should return immediately to the Emergency Room  Pt and family are in agreement with the current plan  MDM  Number of Diagnoses or Management Options  Diagnosis management comments: IMP: anxiety/depression versus psychosis  Doubt suicidal or homicidal   Doubt drug overdose or medical cause of patient's symptoms  Plan: check UDS, give benzos for anxiolysis prn  Pt is medically clear for further eval and tx by the [facility] provider  Amount and/or Complexity of Data Reviewed  Clinical lab tests: ordered and reviewed  Decide to obtain previous medical records or to obtain history from someone other than the patient: yes  Review and summarize past medical records: yes  Independent visualization of images, tracings, or specimens: yes    Risk of Complications, Morbidity, and/or Mortality  Presenting problems: high  Diagnostic procedures: moderate  Management options: moderate    Patient Progress  Patient progress: improved        Disposition  Final diagnoses: Auditory hallucinations     Time reflects when diagnosis was documented in both MDM as applicable and the Disposition within this note     Time User Action Codes Description Comment    2/23/2020  6:45 PM Thor Garcia Add [R44 0] Auditory hallucinations       ED Disposition     ED Disposition Condition Date/Time Comment    Discharge Stable Sun Feb 23, 2020  6:45 PM Verito Tate discharge to home/self care              MD Documentation      Most Recent Value   Sending MD Dr Randell Stokes up With Specialties Details Why Amrit Krishnamurthy MD Family Medicine Schedule an appointment as soon as possible for a visit in 1 day Return immediately, If symptoms worsen [address], [address]  [facility]  [phone]            Discharge Medication List as of 2/23/2020  6:46 PM      CONTINUE these medications which have NOT CHANGED    Details   albuterol (PROVENTIL HFA,VENTOLIN HFA) 90 mcg/act inhaler Inhale 2 puffs every 4 (four) hours as needed for wheezing, Starting Mon 2/17/2020, Print      ARIPiprazole (ABILIFY DISCMELT) 10 mg dispersible tablet Take 1 tablet (10 mg total) by mouth daily for 7 days, Starting Mon 2/17/2020, Until Mon 2/24/2020, Normal      dicyclomine (BENTYL) 20 mg tablet Take 1 tablet (20 mg total) by mouth 2 (two) times a day, Starting Wed 1/8/2020, Print      fenofibrate (TRICOR) 48 mg tablet Take 1 tablet (48 mg total) by mouth daily for 7 days At 9am, Starting Tue 12/17/2019, Until Sun 2/23/2020, Print      fluticasone (FLOVENT HFA) 110 MCG/ACT inhaler Inhale 1 puff every 12 (twelve) hours Rinse mouth after use , Starting Tue 12/10/2019, Print      insulin glargine (LANTUS SOLOSTAR) 100 units/mL injection pen Inject 22 Units under the skin daily Every morning , Starting Tue 12/10/2019, Print      insulin lispro (HUMALOG KWIKPEN) 100 units/mL injection pen Inject 4 Units under the skin 3 (three) times a day with meals, Starting Tue 12/10/2019, Print      lisinopril (ZESTRIL) 5 mg tablet Take 1 tablet (5 mg total) by mouth daily for 7 days At 9am, Starting Tue 12/17/2019, Until Sun 2/23/2020, Print      metoclopramide (REGLAN) 10 mg tablet Take 1 tablet (10 mg total) by mouth every 6 (six) hours, Starting Mon 2/17/2020, Print      naproxen (NAPROSYN) 500 mg tablet Take 1 tablet (500 mg total) by mouth 2 (two) times a day with meals, Starting Mon 2/17/2020, Print      ondansetron (ZOFRAN-ODT) 4 mg disintegrating tablet Take 1 tablet (4 mg total) by mouth every 8 (eight) hours as needed for nausea or vomiting, Starting Wed 1/8/2020, Print      thiothixene (NAVANE) 1 mg capsule Take 1 capsule (1 mg total) by mouth daily for 7 days, Starting Tue 12/17/2019, Until Sun 2/23/2020, Print           No discharge procedures on file      PDMP Review     None          ED Provider  Electronically Signed by           Juli Dai DO  02/27/20 4597

## 2020-02-23 NOTE — ED NOTES
Patient medically cleared by Dr Florencio Ley  Crisis worker Kesha Holder paged at this time       Faisal Nunn RN  02/23/20 2350

## 2020-02-23 NOTE — ED NOTES
Pt asking if he can leave at this time  Informed Pt that crisis is coming in to evaluate him and that crisis is aware to come to [facility] for evaluation  Pt understanding of this and returned to his room at this time  Pt remains cooperative with staff        Yousif Marie  69/52/18 [id number]

## 2020-02-23 NOTE — ED NOTES
Patient presents to emergency room due to auditory hallucinations  Patient reports experiencing command auditory hallucinaitons telling him to hurt himself earlier in the day  Patient presents to the emergency room with similar presentation on multiple occasions  Patient states he was able to calm down upon arrival and the voices have stopped  Patient states he has been using his coping skills of reading, cooking, and cleaning  However, today he went out to lunch with his family and did not do any of his coping skills prior to going out and when the voices started he became upset and came here  He is denying any currently suicidal ideation, homicidal ideations, and visual hallucinations  Patient states he did not have any suicidal ideation, only the hallucinations  Patient reports the hallucinations have stopped and he feels better and is interested in discharge home  Patient is established in outpatient care and sees a therapist weekly and has an upcoming appointment with his psychiatrist    Patient was given clear instructions of when to return to the emergency room if needed, patient also confirmed he has the number for crisis as well if needed  Patient will be discharged to continue outpatient treatment

## 2020-02-24 ENCOUNTER — HOSPITAL ENCOUNTER (EMERGENCY)
Facility: HOSPITAL | Age: 50
Discharge: HOME | End: 2020-02-24
Attending: EMERGENCY MEDICINE
Payer: COMMERCIAL

## 2020-02-24 VITALS
RESPIRATION RATE: 16 BRPM | DIASTOLIC BLOOD PRESSURE: 59 MMHG | BODY MASS INDEX: 22.06 KG/M2 | HEART RATE: 68 BPM | TEMPERATURE: 98.2 F | WEIGHT: 136.69 LBS | SYSTOLIC BLOOD PRESSURE: 106 MMHG | OXYGEN SATURATION: 100 %

## 2020-02-24 DIAGNOSIS — M54.50 ACUTE LOW BACK PAIN: Primary | ICD-10-CM

## 2020-02-24 DIAGNOSIS — S39.012A STRAIN OF LUMBAR REGION: ICD-10-CM

## 2020-02-24 PROCEDURE — 99284 EMERGENCY DEPT VISIT MOD MDM: CPT | Performed by: EMERGENCY MEDICINE

## 2020-02-24 PROCEDURE — 99283 EMERGENCY DEPT VISIT LOW MDM: CPT

## 2020-02-24 RX ORDER — LIDOCAINE 50 MG/G
1 PATCH TOPICAL EVERY 24 HOURS
Qty: 30 PATCH | Refills: 0 | Status: SHIPPED | OUTPATIENT
Start: 2020-02-24 | End: 2020-03-05 | Stop reason: ALTCHOICE

## 2020-02-24 RX ORDER — IBUPROFEN 600 MG/1
600 TABLET ORAL ONCE
Status: COMPLETED | OUTPATIENT
Start: 2020-02-24 | End: 2020-02-24

## 2020-02-24 RX ORDER — LIDOCAINE 50 MG/G
1 PATCH TOPICAL ONCE
Status: DISCONTINUED | OUTPATIENT
Start: 2020-02-24 | End: 2020-02-24 | Stop reason: HOSPADM

## 2020-02-24 RX ORDER — IBUPROFEN 600 MG/1
600 TABLET ORAL EVERY 8 HOURS PRN
Qty: 30 TABLET | Refills: 0 | Status: SHIPPED | OUTPATIENT
Start: 2020-02-24 | End: 2020-03-09 | Stop reason: HOSPADM

## 2020-02-24 RX ADMIN — IBUPROFEN 600 MG: 600 TABLET ORAL at 03:24

## 2020-02-24 RX ADMIN — LIDOCAINE 1 PATCH: 50 PATCH TOPICAL at 03:24

## 2020-02-24 NOTE — ED PROVIDER NOTES
History  Chief Complaint   Patient presents with    Back Pain     Presents to ED for third time in approximately 12 hours for back pain  Reports "I pulled my back and it hurts in the center  "     52 yo male who was just seen here for NVD and went to get scripts filled and when he laid down "I pulled a muscle in my back"  Pt came back in for eval       History provided by:  Patient   used: No    Back Pain   Location:  Lumbar spine  Quality:  Aching  Radiates to:  Does not radiate  Pain severity:  Moderate  Onset quality:  Sudden  Duration:  1 hour  Timing:  Constant  Progression:  Unchanged  Chronicity:  New  Relieved by:  Nothing  Worsened by:  Palpation and movement  Ineffective treatments:  None tried  Associated symptoms: no abdominal pain, no abdominal swelling, no bladder incontinence, no bowel incontinence, no chest pain, no dysuria, no fever, no headaches, no paresthesias and no perianal numbness        Prior to Admission Medications   Prescriptions Last Dose Informant Patient Reported? Taking? ARIPiprazole (ABILIFY DISCMELT) 10 mg dispersible tablet   No No   Sig: Take 1 tablet (10 mg total) by mouth daily for 7 days   Patient taking differently: Take 15 mg by mouth daily    albuterol (PROVENTIL HFA,VENTOLIN HFA) 90 mcg/act inhaler   No No   Sig: Inhale 2 puffs every 4 (four) hours as needed for wheezing   dicyclomine (BENTYL) 20 mg tablet   No No   Sig: Take 1 tablet (20 mg total) by mouth 2 (two) times a day   dicyclomine (BENTYL) 20 mg tablet   No No   Sig: Take 1 tablet (20 mg total) by mouth every 6 (six) hours as needed (abd cramping) for up to 5 days   fenofibrate (TRICOR) 48 mg tablet   No No   Sig: Take 1 tablet (48 mg total) by mouth daily for 7 days At 9am   fluticasone (FLOVENT HFA) 110 MCG/ACT inhaler   No No   Sig: Inhale 1 puff every 12 (twelve) hours Rinse mouth after use     insulin glargine (LANTUS SOLOSTAR) 100 units/mL injection pen   No No   Sig: Inject 22 Units under the skin daily Every morning  insulin lispro (HUMALOG KWIKPEN) 100 units/mL injection pen   No No   Sig: Inject 4 Units under the skin 3 (three) times a day with meals   lisinopril (ZESTRIL) 5 mg tablet   No No   Sig: Take 1 tablet (5 mg total) by mouth daily for 7 days At 9am   metoclopramide (REGLAN) 10 mg tablet   No No   Sig: Take 1 tablet (10 mg total) by mouth every 6 (six) hours   naproxen (NAPROSYN) 500 mg tablet   No No   Sig: Take 1 tablet (500 mg total) by mouth 2 (two) times a day with meals   ondansetron (ZOFRAN-ODT) 4 mg disintegrating tablet   No No   Sig: Take 1 tablet (4 mg total) by mouth every 8 (eight) hours as needed for nausea or vomiting   ondansetron (ZOFRAN-ODT) 4 mg disintegrating tablet   No No   Sig: Take 1 tablet (4 mg total) by mouth every 8 (eight) hours as needed for nausea or vomiting for up to 7 days   thiothixene (NAVANE) 1 mg capsule   No No   Sig: Take 1 capsule (1 mg total) by mouth daily for 7 days      Facility-Administered Medications: None       Past Medical History:   Diagnosis Date    Anxiety     Asthma     Bipolar 1 disorder ([facility] )     Depression     Diabetes mellitus ([facility] )     Psychiatric disorder        Past Surgical History:   Procedure Laterality Date    APPENDECTOMY         History reviewed  No pertinent family history  I have reviewed and agree with the history as documented  Social History     Tobacco Use    Smoking status: Former Smoker     Packs/day: 0 20     Types: Cigarettes    Smokeless tobacco: Never Used   Substance Use Topics    Alcohol use: Not Currently     Frequency: Monthly or less     Drinks per session: 1 or 2     Binge frequency: Never     Comment: States has had no alcohol in at least a year    Drug use: No       Review of Systems   Constitutional: Negative for chills and fever  HENT: Negative for congestion and sore throat  Eyes: Negative for visual disturbance     Respiratory: Negative for shortness of breath and wheezing  Cardiovascular: Negative for chest pain and palpitations  Gastrointestinal: Positive for diarrhea, nausea and vomiting  Negative for abdominal pain and bowel incontinence  Better since treatment here earlier   Genitourinary: Negative for bladder incontinence and dysuria  Musculoskeletal: Positive for back pain  Negative for neck pain and neck stiffness  Skin: Negative for pallor and rash  Neurological: Negative for headaches and paresthesias  Psychiatric/Behavioral: Negative for confusion  All other systems reviewed and are negative  Physical Exam  Physical Exam   Constitutional: He is oriented to person, place, and time  He appears well-developed and well-nourished  No distress  HENT:   Head: Normocephalic and atraumatic  Right Ear: External ear normal    Left Ear: External ear normal    Mouth/Throat: Oropharynx is clear and moist    Eyes: EOM are normal    Neck: Neck supple  Cardiovascular: Normal rate and regular rhythm  No murmur heard  Pulmonary/Chest: Effort normal and breath sounds normal    Abdominal: Soft  Bowel sounds are normal  He exhibits no distension  There is no tenderness  Musculoskeletal: Normal range of motion  He exhibits tenderness (parapinsal muscles b/l upper lumbar, no overlying skin changes)  He exhibits no edema  Neurological: He is alert and oriented to person, place, and time  Skin: Skin is warm  No rash noted  No pallor  Psychiatric: He has a normal mood and affect  His behavior is normal    Nursing note and vitals reviewed        Vital Signs  ED Triage Vitals [02/24/20 0257]   Temperature Pulse Respirations Blood Pressure SpO2   98 2 °F (36 8 °C) 68 16 106/59 100 %      Temp Source Heart Rate Source Patient Position - Orthostatic VS BP Location FiO2 (%)   Temporal Monitor Sitting Left arm --      Pain Score       Worst Possible Pain           Vitals:    02/24/20 0257   BP: 106/59   Pulse: 68   Patient Position - Orthostatic VS: Sitting         Visual Acuity      ED Medications  Medications   lidocaine (LIDODERM) 5 % patch 1 patch (1 patch Topical Medication Applied 2/24/20 0324)   ibuprofen (MOTRIN) tablet 600 mg (600 mg Oral Given 2/24/20 0324)       Diagnostic Studies  Results Reviewed     None                 No orders to display              Procedures  Procedures         ED Course  ED Course as of Feb 24 0452 Mon Feb 24, 2020   0251 Pt seen and examined  50 yo male who was just seen here for NVD and went to get scripts filled and when he laid down "I pulled a muscle in my back"  Pt came back in for eval   Obvious muscle strain - reproducible paraspinal muscles upper lumbar b/l - no vert point tenderness, no bowel or bladder issues, normal reflexes, no saddle anesthesia  Will treat with motrin and lidoderm patch  Pt allergic to toradol but states he is fine with motrin  MDM      Disposition  Final diagnoses:   Acute low back pain   Strain of lumbar region     Time reflects when diagnosis was documented in both MDM as applicable and the Disposition within this note     Time User Action Codes Description Comment    2/24/2020  3:18 AM Shira Landrum [M54 5] Acute low back pain     2/24/2020  3:18 AM Shira Landrum [S39 012A] Strain of lumbar region       ED Disposition     ED Disposition Condition Date/Time Comment    Discharge Stable Mon Feb 24, 2020  3:18 AM Misha Zimmerman discharge to home/self care              Follow-up Information     Follow up With Specialties Details Why Contact Info    Erica Mcginnis MD Family Medicine Schedule an appointment as soon as possible for a visit  As needed [address] [place] [address],[address] Floor, [address] [place] [address]  [phone]            Discharge Medication List as of 2/24/2020  3:19 AM      START taking these medications    Details   ibuprofen (MOTRIN) 600 mg tablet Take 1 tablet (600 mg total) by mouth every 8 (eight) hours as needed for mild pain or fever, Starting Mon 2/24/2020, Print      lidocaine (LIDODERM) 5 % Apply 1 patch topically every 24 hours Remove & Discard patch within 12 hours or as directed by MD, Starting Mon 2/24/2020, Until Wed 3/25/2020, Print         CONTINUE these medications which have NOT CHANGED    Details   albuterol (PROVENTIL HFA,VENTOLIN HFA) 90 mcg/act inhaler Inhale 2 puffs every 4 (four) hours as needed for wheezing, Starting Mon 2/17/2020, Print      ARIPiprazole (ABILIFY DISCMELT) 10 mg dispersible tablet Take 1 tablet (10 mg total) by mouth daily for 7 days, Starting Mon 2/17/2020, Until Mon 2/24/2020, Normal      !! dicyclomine (BENTYL) 20 mg tablet Take 1 tablet (20 mg total) by mouth 2 (two) times a day, Starting Wed 1/8/2020, Print      !! dicyclomine (BENTYL) 20 mg tablet Take 1 tablet (20 mg total) by mouth every 6 (six) hours as needed (abd cramping) for up to 5 days, Starting Sun 2/23/2020, Until Fri 2/28/2020, Print      fenofibrate (TRICOR) 48 mg tablet Take 1 tablet (48 mg total) by mouth daily for 7 days At 9am, Starting Tue 12/17/2019, Until Sun 2/23/2020, Print      fluticasone (FLOVENT HFA) 110 MCG/ACT inhaler Inhale 1 puff every 12 (twelve) hours Rinse mouth after use , Starting Tue 12/10/2019, Print      insulin glargine (LANTUS SOLOSTAR) 100 units/mL injection pen Inject 22 Units under the skin daily Every morning , Starting Tue 12/10/2019, Print      insulin lispro (HUMALOG KWIKPEN) 100 units/mL injection pen Inject 4 Units under the skin 3 (three) times a day with meals, Starting Tue 12/10/2019, Print      lisinopril (ZESTRIL) 5 mg tablet Take 1 tablet (5 mg total) by mouth daily for 7 days At 9am, Starting Tue 12/17/2019, Until Sun 2/23/2020, Print      metoclopramide (REGLAN) 10 mg tablet Take 1 tablet (10 mg total) by mouth every 6 (six) hours, Starting Mon 2/17/2020, Print      naproxen (NAPROSYN) 500 mg tablet Take 1 tablet (500 mg total) by mouth 2 (two) times a day with meals, Starting Mon 2/17/2020, Print      !! ondansetron (ZOFRAN-ODT) 4 mg disintegrating tablet Take 1 tablet (4 mg total) by mouth every 8 (eight) hours as needed for nausea or vomiting, Starting Wed 1/8/2020, Print      !! ondansetron (ZOFRAN-ODT) 4 mg disintegrating tablet Take 1 tablet (4 mg total) by mouth every 8 (eight) hours as needed for nausea or vomiting for up to 7 days, Starting Sun 2/23/2020, Until Sun 3/1/2020, Print      thiothixene (NAVANE) 1 mg capsule Take 1 capsule (1 mg total) by mouth daily for 7 days, Starting Tue 12/17/2019, Until Sun 2/23/2020, Print       !! - Potential duplicate medications found  Please discuss with provider  No discharge procedures on file      PDMP Review     None          ED Provider  Electronically Signed by           Patrha Saucedo DO  02/24/20 8379

## 2020-02-24 NOTE — ED PROVIDER NOTES
History  Chief Complaint   Patient presents with    Vomiting     vomiting and diarrhea for two days, weakness, abdomen pain  52 yo male who got out of mental health unit today at 5pm and went home and ate cheese steak and immediately began with nausea, vomiting and shortly after diarrhea - all have persisted  History provided by:  Patient   used: No    Vomiting   Severity:  Moderate  Duration:  4 hours  Timing:  Constant  Quality:  Stomach contents  Progression:  Improving  Chronicity:  New  Recent urination:  Normal  Relieved by:  Nothing  Worsened by:  Food smell, ice chips and liquids  Ineffective treatments:  None tried  Associated symptoms: diarrhea    Associated symptoms: no abdominal pain, no chills, no fever, no headaches and no sore throat    Risk factors: no prior abdominal surgery and no sick contacts        Prior to Admission Medications   Prescriptions Last Dose Informant Patient Reported? Taking? ARIPiprazole (ABILIFY DISCMELT) 10 mg dispersible tablet   No No   Sig: Take 1 tablet (10 mg total) by mouth daily for 7 days   Patient taking differently: Take 15 mg by mouth daily    albuterol (PROVENTIL HFA,VENTOLIN HFA) 90 mcg/act inhaler   No No   Sig: Inhale 2 puffs every 4 (four) hours as needed for wheezing   dicyclomine (BENTYL) 20 mg tablet   No No   Sig: Take 1 tablet (20 mg total) by mouth 2 (two) times a day   fenofibrate (TRICOR) 48 mg tablet   No No   Sig: Take 1 tablet (48 mg total) by mouth daily for 7 days At 9am   fluticasone (FLOVENT HFA) 110 MCG/ACT inhaler   No No   Sig: Inhale 1 puff every 12 (twelve) hours Rinse mouth after use  insulin glargine (LANTUS SOLOSTAR) 100 units/mL injection pen   No No   Sig: Inject 22 Units under the skin daily Every morning     insulin lispro (HUMALOG KWIKPEN) 100 units/mL injection pen   No No   Sig: Inject 4 Units under the skin 3 (three) times a day with meals   lisinopril (ZESTRIL) 5 mg tablet   No No   Sig: Take 1 tablet (5 mg total) by mouth daily for 7 days At 9am   metoclopramide (REGLAN) 10 mg tablet   No No   Sig: Take 1 tablet (10 mg total) by mouth every 6 (six) hours   naproxen (NAPROSYN) 500 mg tablet   No No   Sig: Take 1 tablet (500 mg total) by mouth 2 (two) times a day with meals   ondansetron (ZOFRAN-ODT) 4 mg disintegrating tablet   No No   Sig: Take 1 tablet (4 mg total) by mouth every 8 (eight) hours as needed for nausea or vomiting   thiothixene (NAVANE) 1 mg capsule   No No   Sig: Take 1 capsule (1 mg total) by mouth daily for 7 days      Facility-Administered Medications: None       Past Medical History:   Diagnosis Date    Anxiety     Asthma     Bipolar 1 disorder ([facility] )     Depression     Diabetes mellitus ([place] [address] )     Psychiatric disorder        Past Surgical History:   Procedure Laterality Date    APPENDECTOMY         History reviewed  No pertinent family history  I have reviewed and agree with the history as documented  Social History     Tobacco Use    Smoking status: Former Smoker     Packs/day: 0 20     Types: Cigarettes    Smokeless tobacco: Never Used   Substance Use Topics    Alcohol use: Not Currently     Frequency: Monthly or less     Drinks per session: 1 or 2     Binge frequency: Never     Comment: States has had no alcohol in at least a year    Drug use: No       Review of Systems   Constitutional: Negative for chills and fever  HENT: Negative for congestion and sore throat  Eyes: Negative for visual disturbance  Respiratory: Negative for shortness of breath and wheezing  Cardiovascular: Negative for chest pain and palpitations  Gastrointestinal: Positive for diarrhea, nausea and vomiting  Negative for abdominal pain  Genitourinary: Negative for dysuria  Musculoskeletal: Negative for neck pain and neck stiffness  Skin: Negative for pallor and rash  Neurological: Negative for headaches  Psychiatric/Behavioral: Negative for confusion     All other systems reviewed and are negative  Physical Exam  Physical Exam   Constitutional: He is oriented to person, place, and time  He appears well-developed and well-nourished  No distress  HENT:   Head: Normocephalic and atraumatic  Right Ear: External ear normal    Left Ear: External ear normal    Mouth/Throat: Oropharynx is clear and moist    Eyes: EOM are normal    Neck: Neck supple  Cardiovascular: Normal rate and regular rhythm  No murmur heard  Pulmonary/Chest: Effort normal and breath sounds normal    Abdominal: Soft  He exhibits no distension  Bowel sounds are increased  There is no tenderness  Musculoskeletal: Normal range of motion  He exhibits no edema  Neurological: He is alert and oriented to person, place, and time  Skin: Skin is warm  No rash noted  No pallor  Psychiatric: He has a normal mood and affect  His behavior is normal    Nursing note and vitals reviewed        Vital Signs  ED Triage Vitals   Temperature Pulse Respirations Blood Pressure SpO2   02/23/20 2149 02/23/20 2149 02/23/20 2149 02/23/20 2151 02/23/20 2149   (!) 97 1 °F (36 2 °C) 79 18 128/63 98 %      Temp Source Heart Rate Source Patient Position - Orthostatic VS BP Location FiO2 (%)   02/23/20 2149 02/23/20 2149 02/23/20 2149 02/23/20 2149 --   Temporal Monitor Sitting Right arm       Pain Score       02/23/20 2149       Worst Possible Pain           Vitals:    02/23/20 2149 02/23/20 2151 02/23/20 2318   BP:  128/63 113/67   Pulse: 79  65   Patient Position - Orthostatic VS: Sitting  Lying         Visual Acuity      ED Medications  Medications   ondansetron (ZOFRAN-ODT) dispersible tablet 4 mg (4 mg Oral Given 2/23/20 2227)   dicyclomine (BENTYL) tablet 20 mg (20 mg Oral Given 2/23/20 2228)   loperamide (IMODIUM) capsule 4 mg (4 mg Oral Given 2/23/20 2228)       Diagnostic Studies  Results Reviewed     Procedure Component Value Units Date/Time    Fingerstick Glucose (POCT) [[id number]]  (Abnormal) Collected:  02/23/20 2153 Lab Status:  Final result Updated:  02/23/20 2155     POC Glucose 256 mg/dl                  No orders to display              Procedures  Procedures         ED Course  ED Course as of Feb 23 2352   Sun Feb 23, 2020   2206 Pt seen and examined  50 yo male who got out of mental health unit today at 5pm and went home and ate cheese steak and immediately began with nausea, vomiting and shortly after diarrhea - all have persisted  Abd soft, NT, hyperactive BS  Will give po zofran, bentyl and imodium  2305 Pt feeling better, will po challenge  2314 Pt feels great, drank entire glass of water without problem  MDM      Disposition  Final diagnoses:   Nausea vomiting and diarrhea     Time reflects when diagnosis was documented in both MDM as applicable and the Disposition within this note     Time User Action Codes Description Comment    2/23/2020 11:14 PM Daya Potter Add [R11 2,  R19 7] Nausea vomiting and diarrhea       ED Disposition     ED Disposition Condition Date/Time Comment    Discharge Stable [place] Feb 23, 2020 11:14 PM Rei Shelton discharge to home/self care              Follow-up Information     Follow up With Specialties Details Why Contact Info    Xin Serrano MD Family Medicine Schedule an appointment as soon as possible for a visit  As needed [address] [place] [address],[address] Floor, [address] [facility]  [phone]            Discharge Medication List as of 2/23/2020 11:15 PM      START taking these medications    Details   !! dicyclomine (BENTYL) 20 mg tablet Take 1 tablet (20 mg total) by mouth every 6 (six) hours as needed (abd cramping) for up to 5 days, Starting Sun 2/23/2020, Until Fri 2/28/2020, Print      !! ondansetron (ZOFRAN-ODT) 4 mg disintegrating tablet Take 1 tablet (4 mg total) by mouth every 8 (eight) hours as needed for nausea or vomiting for up to 7 days, Starting Sun 2/23/2020, Until Sun 3/1/2020, Print       !! - Potential duplicate medications found  Please discuss with provider        CONTINUE these medications which have NOT CHANGED    Details   albuterol (PROVENTIL HFA,VENTOLIN HFA) 90 mcg/act inhaler Inhale 2 puffs every 4 (four) hours as needed for wheezing, Starting Mon 2/17/2020, Print      ARIPiprazole (ABILIFY DISCMELT) 10 mg dispersible tablet Take 1 tablet (10 mg total) by mouth daily for 7 days, Starting Mon 2/17/2020, Until Mon 2/24/2020, Normal      !! dicyclomine (BENTYL) 20 mg tablet Take 1 tablet (20 mg total) by mouth 2 (two) times a day, Starting Wed 1/8/2020, Print      fenofibrate (TRICOR) 48 mg tablet Take 1 tablet (48 mg total) by mouth daily for 7 days At 9am, Starting Tue 12/17/2019, Until Sun 2/23/2020, Print      fluticasone (FLOVENT HFA) 110 MCG/ACT inhaler Inhale 1 puff every 12 (twelve) hours Rinse mouth after use , Starting Tue 12/10/2019, Print      insulin glargine (LANTUS SOLOSTAR) 100 units/mL injection pen Inject 22 Units under the skin daily Every morning , Starting Tue 12/10/2019, Print      insulin lispro (HUMALOG KWIKPEN) 100 units/mL injection pen Inject 4 Units under the skin 3 (three) times a day with meals, Starting Tue 12/10/2019, Print      lisinopril (ZESTRIL) 5 mg tablet Take 1 tablet (5 mg total) by mouth daily for 7 days At 9am, Starting Tue 12/17/2019, Until Sun 2/23/2020, Print      metoclopramide (REGLAN) 10 mg tablet Take 1 tablet (10 mg total) by mouth every 6 (six) hours, Starting Mon 2/17/2020, Print      naproxen (NAPROSYN) 500 mg tablet Take 1 tablet (500 mg total) by mouth 2 (two) times a day with meals, Starting Mon 2/17/2020, Print      !! ondansetron (ZOFRAN-ODT) 4 mg disintegrating tablet Take 1 tablet (4 mg total) by mouth every 8 (eight) hours as needed for nausea or vomiting, Starting Wed 1/8/2020, Print      thiothixene (NAVANE) 1 mg capsule Take 1 capsule (1 mg total) by mouth daily for 7 days, Starting Tue 12/17/2019, Until Sun 2/23/2020, Print       !! - Potential duplicate medications found  Please discuss with provider  No discharge procedures on file      PDMP Review     None          ED Provider  Electronically Signed by           Anahi Hickman DO  02/23/20 8727

## 2020-03-05 ENCOUNTER — HOSPITAL ENCOUNTER (EMERGENCY)
Facility: HOSPITAL | Age: 50
Discharge: HOME | End: 2020-03-05
Attending: EMERGENCY MEDICINE | Admitting: EMERGENCY MEDICINE
Payer: COMMERCIAL

## 2020-03-05 VITALS
HEART RATE: 66 BPM | WEIGHT: 125 LBS | SYSTOLIC BLOOD PRESSURE: 137 MMHG | TEMPERATURE: 97.4 F | OXYGEN SATURATION: 97 % | DIASTOLIC BLOOD PRESSURE: 82 MMHG | BODY MASS INDEX: 20.18 KG/M2 | RESPIRATION RATE: 18 BRPM

## 2020-03-05 VITALS
TEMPERATURE: 97.1 F | SYSTOLIC BLOOD PRESSURE: 131 MMHG | BODY MASS INDEX: 22.06 KG/M2 | OXYGEN SATURATION: 100 % | DIASTOLIC BLOOD PRESSURE: 73 MMHG | RESPIRATION RATE: 16 BRPM | HEART RATE: 93 BPM | WEIGHT: 136.69 LBS

## 2020-03-05 DIAGNOSIS — F41.9 ANXIETY: Primary | ICD-10-CM

## 2020-03-05 DIAGNOSIS — R10.9 ABDOMINAL PAIN: ICD-10-CM

## 2020-03-05 DIAGNOSIS — R11.0 NAUSEA: Primary | ICD-10-CM

## 2020-03-05 LAB
BILIRUB UR QL STRIP: NEGATIVE
CLARITY UR: ABNORMAL
COLOR UR: YELLOW
GLUCOSE SERPL-MCNC: 166 MG/DL (ref 65–140)
GLUCOSE UR STRIP-MCNC: ABNORMAL MG/DL
HGB UR QL STRIP.AUTO: NEGATIVE
KETONES UR STRIP-MCNC: NEGATIVE MG/DL
LEUKOCYTE ESTERASE UR QL STRIP: ABNORMAL
NITRITE UR QL STRIP: NEGATIVE
PH UR STRIP.AUTO: 7.5 [PH] (ref 4.5–8)
PROT UR STRIP-MCNC: NEGATIVE MG/DL
SP GR UR STRIP.AUTO: 1.02 (ref 1–1.03)
UROBILINOGEN UR QL STRIP.AUTO: 1 E.U./DL

## 2020-03-05 PROCEDURE — 99284 EMERGENCY DEPT VISIT MOD MDM: CPT | Performed by: EMERGENCY MEDICINE

## 2020-03-05 PROCEDURE — 82948 REAGENT STRIP/BLOOD GLUCOSE: CPT

## 2020-03-05 PROCEDURE — 81001 URINALYSIS AUTO W/SCOPE: CPT

## 2020-03-05 PROCEDURE — 99283 EMERGENCY DEPT VISIT LOW MDM: CPT

## 2020-03-05 RX ORDER — ONDANSETRON 4 MG/1
4 TABLET, ORALLY DISINTEGRATING ORAL ONCE
Status: COMPLETED | OUTPATIENT
Start: 2020-03-05 | End: 2020-03-05

## 2020-03-05 RX ORDER — DICYCLOMINE HCL 20 MG
20 TABLET ORAL 2 TIMES DAILY
Qty: 20 TABLET | Refills: 0 | Status: ON HOLD | OUTPATIENT
Start: 2020-03-05 | End: 2022-02-04 | Stop reason: SDUPTHER

## 2020-03-05 RX ORDER — DICYCLOMINE HCL 20 MG
20 TABLET ORAL ONCE
Status: COMPLETED | OUTPATIENT
Start: 2020-03-05 | End: 2020-03-05

## 2020-03-05 RX ORDER — HYDROXYZINE HYDROCHLORIDE 25 MG/1
25 TABLET, FILM COATED ORAL ONCE
Status: COMPLETED | OUTPATIENT
Start: 2020-03-05 | End: 2020-03-05

## 2020-03-05 RX ORDER — ONDANSETRON 4 MG/1
4 TABLET, ORALLY DISINTEGRATING ORAL EVERY 6 HOURS PRN
Qty: 20 TABLET | Refills: 0 | Status: SHIPPED | OUTPATIENT
Start: 2020-03-05 | End: 2020-03-06

## 2020-03-05 RX ADMIN — HYDROXYZINE HYDROCHLORIDE 25 MG: 25 TABLET, FILM COATED ORAL at 20:01

## 2020-03-05 RX ADMIN — DICYCLOMINE HYDROCHLORIDE 20 MG: 20 TABLET ORAL at 23:08

## 2020-03-05 RX ADMIN — ONDANSETRON 4 MG: 4 TABLET, ORALLY DISINTEGRATING ORAL at 23:07

## 2020-03-06 ENCOUNTER — HOSPITAL ENCOUNTER (EMERGENCY)
Facility: HOSPITAL | Age: 50
Discharge: HOME | End: 2020-03-06
Attending: EMERGENCY MEDICINE | Admitting: EMERGENCY MEDICINE
Payer: COMMERCIAL

## 2020-03-06 ENCOUNTER — HOSPITAL ENCOUNTER (EMERGENCY)
Facility: HOSPITAL | Age: 50
Discharge: HOME | End: 2020-03-06
Attending: EMERGENCY MEDICINE
Payer: COMMERCIAL

## 2020-03-06 ENCOUNTER — HOSPITAL ENCOUNTER (EMERGENCY)
Facility: HOSPITAL | Age: 50
End: 2020-03-07
Attending: EMERGENCY MEDICINE
Payer: COMMERCIAL

## 2020-03-06 VITALS
DIASTOLIC BLOOD PRESSURE: 75 MMHG | WEIGHT: 136.69 LBS | TEMPERATURE: 97 F | HEART RATE: 66 BPM | RESPIRATION RATE: 17 BRPM | BODY MASS INDEX: 22.06 KG/M2 | OXYGEN SATURATION: 99 % | SYSTOLIC BLOOD PRESSURE: 117 MMHG

## 2020-03-06 VITALS
DIASTOLIC BLOOD PRESSURE: 77 MMHG | SYSTOLIC BLOOD PRESSURE: 126 MMHG | HEART RATE: 83 BPM | TEMPERATURE: 97.4 F | OXYGEN SATURATION: 98 % | RESPIRATION RATE: 16 BRPM

## 2020-03-06 VITALS
WEIGHT: 136.69 LBS | DIASTOLIC BLOOD PRESSURE: 85 MMHG | SYSTOLIC BLOOD PRESSURE: 130 MMHG | RESPIRATION RATE: 16 BRPM | BODY MASS INDEX: 22.06 KG/M2 | OXYGEN SATURATION: 99 % | HEART RATE: 74 BPM | TEMPERATURE: 98 F

## 2020-03-06 DIAGNOSIS — F32.A DEPRESSION: Primary | ICD-10-CM

## 2020-03-06 DIAGNOSIS — R44.0 AUDITORY HALLUCINATION: ICD-10-CM

## 2020-03-06 DIAGNOSIS — F43.8 CHRONIC SORROW: Primary | ICD-10-CM

## 2020-03-06 DIAGNOSIS — M54.50 ACUTE RIGHT-SIDED LOW BACK PAIN WITHOUT SCIATICA: Primary | ICD-10-CM

## 2020-03-06 DIAGNOSIS — R45.851 SUICIDAL IDEATION: ICD-10-CM

## 2020-03-06 LAB
AMORPH PHOS CRY URNS QL MICRO: ABNORMAL /HPF
AMPHETAMINES SERPL QL SCN: NEGATIVE
BACTERIA UR QL AUTO: ABNORMAL /HPF
BARBITURATES UR QL: NEGATIVE
BENZODIAZ UR QL: NEGATIVE
COCAINE UR QL: NEGATIVE
ETHANOL EXG-MCNC: 0 MG/DL
ETHANOL EXG-MCNC: 0 MG/DL
GLUCOSE SERPL-MCNC: 217 MG/DL (ref 65–140)
METHADONE UR QL: NEGATIVE
NON-SQ EPI CELLS URNS QL MICRO: ABNORMAL /HPF
OPIATES UR QL SCN: NEGATIVE
PCP UR QL: NEGATIVE
RBC #/AREA URNS AUTO: ABNORMAL /HPF
THC UR QL: NEGATIVE
WBC #/AREA URNS AUTO: ABNORMAL /HPF

## 2020-03-06 PROCEDURE — 99283 EMERGENCY DEPT VISIT LOW MDM: CPT | Performed by: EMERGENCY MEDICINE

## 2020-03-06 PROCEDURE — 80307 DRUG TEST PRSMV CHEM ANLYZR: CPT | Performed by: EMERGENCY MEDICINE

## 2020-03-06 PROCEDURE — 82075 ASSAY OF BREATH ETHANOL: CPT | Performed by: EMERGENCY MEDICINE

## 2020-03-06 PROCEDURE — 82948 REAGENT STRIP/BLOOD GLUCOSE: CPT

## 2020-03-06 PROCEDURE — 99283 EMERGENCY DEPT VISIT LOW MDM: CPT

## 2020-03-06 PROCEDURE — 99284 EMERGENCY DEPT VISIT MOD MDM: CPT

## 2020-03-06 PROCEDURE — 99282 EMERGENCY DEPT VISIT SF MDM: CPT | Performed by: EMERGENCY MEDICINE

## 2020-03-06 PROCEDURE — 99285 EMERGENCY DEPT VISIT HI MDM: CPT

## 2020-03-06 PROCEDURE — 96372 THER/PROPH/DIAG INJ SC/IM: CPT

## 2020-03-06 RX ORDER — HALOPERIDOL 5 MG/ML
5 INJECTION INTRAMUSCULAR EVERY 6 HOURS PRN
Status: CANCELLED | OUTPATIENT
Start: 2020-03-06

## 2020-03-06 RX ORDER — TRAZODONE HYDROCHLORIDE 50 MG/1
50 TABLET ORAL
Status: CANCELLED | OUTPATIENT
Start: 2020-03-06

## 2020-03-06 RX ORDER — ACETAMINOPHEN 325 MG/1
650 TABLET ORAL ONCE
Status: COMPLETED | OUTPATIENT
Start: 2020-03-06 | End: 2020-03-06

## 2020-03-06 RX ORDER — METHOCARBAMOL 500 MG/1
500 TABLET, FILM COATED ORAL
COMMUNITY
Start: 2019-12-24 | End: 2020-03-09 | Stop reason: HOSPADM

## 2020-03-06 RX ORDER — LORAZEPAM 1 MG/1
1 TABLET ORAL EVERY 6 HOURS PRN
Status: CANCELLED | OUTPATIENT
Start: 2020-03-06

## 2020-03-06 RX ORDER — LORAZEPAM 2 MG/ML
1 INJECTION INTRAMUSCULAR EVERY 6 HOURS PRN
Status: CANCELLED | OUTPATIENT
Start: 2020-03-06

## 2020-03-06 RX ORDER — HYDROXYZINE HYDROCHLORIDE 25 MG/1
25 TABLET, FILM COATED ORAL EVERY 6 HOURS PRN
COMMUNITY
Start: 2020-01-17 | End: 2022-02-04 | Stop reason: HOSPADM

## 2020-03-06 RX ORDER — LIDOCAINE 50 MG/G
1 PATCH TOPICAL ONCE
Status: DISCONTINUED | OUTPATIENT
Start: 2020-03-06 | End: 2020-03-06 | Stop reason: HOSPADM

## 2020-03-06 RX ORDER — HALOPERIDOL 5 MG
5 TABLET ORAL EVERY 6 HOURS PRN
Status: CANCELLED | OUTPATIENT
Start: 2020-03-06

## 2020-03-06 RX ORDER — LORAZEPAM 1 MG/1
1 TABLET ORAL EVERY 6 HOURS PRN
COMMUNITY
Start: 2020-03-05 | End: 2020-03-09 | Stop reason: HOSPADM

## 2020-03-06 RX ADMIN — ACETAMINOPHEN 650 MG: 325 TABLET ORAL at 07:35

## 2020-03-06 RX ADMIN — INSULIN LISPRO 1 UNITS: 100 INJECTION, SOLUTION INTRAVENOUS; SUBCUTANEOUS at 22:31

## 2020-03-06 RX ADMIN — LIDOCAINE 1 PATCH: 50 PATCH TOPICAL at 07:35

## 2020-03-06 NOTE — ED PROVIDER NOTES
History  Chief Complaint   Patient presents with    Depression     pt reports " depressed at work today"  " gerardo was yelling at me "  denies SI, HI, AH, VH     Patient is a 50-year-old male well known to me with a history of bipolar who comes in tonight asking for medication to help calm his nerves  States he is having issues at work where he has tried be put into separate situations  Denies any homicidal ideation or suicidal ideation  Patient was actually seen at [facility] earlier today as well  Has appoint with his therapist tomorrow  Again just requesting a dose medication  States lives approximately 3 blocks away  Prior to Admission Medications   Prescriptions Last Dose Informant Patient Reported? Taking? ARIPiprazole (ABILIFY DISCMELT) 10 mg dispersible tablet   No No   Sig: Take 1 tablet (10 mg total) by mouth daily for 7 days   Patient taking differently: Take 15 mg by mouth daily    albuterol (PROVENTIL HFA,VENTOLIN HFA) 90 mcg/act inhaler   No No   Sig: Inhale 2 puffs every 4 (four) hours as needed for wheezing   dicyclomine (BENTYL) 20 mg tablet   No No   Sig: Take 1 tablet (20 mg total) by mouth 2 (two) times a day   dicyclomine (BENTYL) 20 mg tablet   No No   Sig: Take 1 tablet (20 mg total) by mouth every 6 (six) hours as needed (abd cramping) for up to 5 days   fenofibrate (TRICOR) 48 mg tablet   No No   Sig: Take 1 tablet (48 mg total) by mouth daily for 7 days At 9am   fluticasone (FLOVENT HFA) 110 MCG/ACT inhaler   No No   Sig: Inhale 1 puff every 12 (twelve) hours Rinse mouth after use  ibuprofen (MOTRIN) 600 mg tablet   No No   Sig: Take 1 tablet (600 mg total) by mouth every 8 (eight) hours as needed for mild pain or fever   insulin glargine (LANTUS SOLOSTAR) 100 units/mL injection pen   No No   Sig: Inject 22 Units under the skin daily Every morning     insulin lispro (HUMALOG KWIKPEN) 100 units/mL injection pen   No No   Sig: Inject 4 Units under the skin 3 (three) times a day with meals   lidocaine (LIDODERM) 5 %   No No   Sig: Apply 1 patch topically every 24 hours Remove & Discard patch within 12 hours or as directed by MD   lisinopril (ZESTRIL) 5 mg tablet   No No   Sig: Take 1 tablet (5 mg total) by mouth daily for 7 days At 9am   metoclopramide (REGLAN) 10 mg tablet   No No   Sig: Take 1 tablet (10 mg total) by mouth every 6 (six) hours   naproxen (NAPROSYN) 500 mg tablet   No No   Sig: Take 1 tablet (500 mg total) by mouth 2 (two) times a day with meals   ondansetron (ZOFRAN-ODT) 4 mg disintegrating tablet   No No   Sig: Take 1 tablet (4 mg total) by mouth every 8 (eight) hours as needed for nausea or vomiting   ondansetron (ZOFRAN-ODT) 4 mg disintegrating tablet   No No   Sig: Take 1 tablet (4 mg total) by mouth every 8 (eight) hours as needed for nausea or vomiting for up to 7 days   thiothixene (NAVANE) 1 mg capsule   No No   Sig: Take 1 capsule (1 mg total) by mouth daily for 7 days      Facility-Administered Medications: None       Past Medical History:   Diagnosis Date    Anxiety     Asthma     [facility] disorder ([facility] )     Depression     Diabetes mellitus ([facility] )     Psychiatric disorder        Past Surgical History:   Procedure Laterality Date    APPENDECTOMY         History reviewed  No pertinent family history  I have reviewed and agree with the history as documented  E-Cigarette/Vaping    E-Cigarette Use Never User      E-Cigarette/Vaping Substances     Social History     Tobacco Use    Smoking status: Current Every Day Smoker     Packs/day: 0 20     Types: Cigarettes    Smokeless tobacco: Never Used   Substance Use Topics    Alcohol use: Not Currently     Frequency: Monthly or less     Drinks per session: 1 or 2     Binge frequency: Never     Comment: States has had no alcohol in at least a year    Drug use: No       Review of Systems   Constitutional: Negative  HENT: Negative  Eyes: Negative  Respiratory: Negative      Cardiovascular: Negative  Gastrointestinal: Negative  Endocrine: Negative  Genitourinary: Negative  Musculoskeletal: Negative  Skin: Negative  Allergic/Immunologic: Negative  Neurological: Negative  Hematological: Negative  Psychiatric/Behavioral: The patient is nervous/anxious  All other systems reviewed and are negative  Physical Exam  Physical Exam   Constitutional: He is oriented to person, place, and time  He appears well-developed and well-nourished  Neck: Normal range of motion  Neck supple  Cardiovascular: Normal rate, regular rhythm, normal heart sounds and intact distal pulses  Pulmonary/Chest: Effort normal and breath sounds normal    Musculoskeletal: Normal range of motion  Neurological: He is alert and oriented to person, place, and time  Skin: Skin is warm and dry  Capillary refill takes less than 2 seconds  Psychiatric: His speech is normal and behavior is normal  Judgment and thought content normal  His mood appears anxious  He is not actively hallucinating  Cognition and memory are normal  He is attentive  Nursing note and vitals reviewed        Vital Signs  ED Triage Vitals [03/05/20 1950]   Temperature Pulse Respirations Blood Pressure SpO2   (!) 97 1 °F (36 2 °C) 93 16 131/73 100 %      Temp Source Heart Rate Source Patient Position - Orthostatic VS BP Location FiO2 (%)   Tympanic Monitor Sitting Left arm --      Pain Score       --           Vitals:    03/05/20 1950   BP: 131/73   Pulse: 93   Patient Position - Orthostatic VS: Sitting         Visual Acuity      ED Medications  Medications   hydrOXYzine HCL (ATARAX) tablet 25 mg (has no administration in time range)       Diagnostic Studies  Results Reviewed     None                 No orders to display              Procedures  Procedures         ED Course                               MDM  Number of Diagnoses or Management Options     Amount and/or Complexity of Data Reviewed  Review and summarize past medical records: yes          Disposition  Final diagnoses:   Anxiety     Time reflects when diagnosis was documented in both MDM as applicable and the Disposition within this note     Time User Action Codes Description Comment    3/5/2020  7:52 PM Jose Martin Hay Add [F41 9] Anxiety       ED Disposition     ED Disposition Condition Date/Time Comment    Discharge Stable Thu Mar 5, 2020  7:52 PM Pamela Turner discharge to home/self care  Follow-up Information     Follow up With Specialties Details Why Contact Info    Abraham Rodriguez MD Family Medicine   [address] [place] [address],[facility]  [phone]            Patient's Medications   Discharge Prescriptions    No medications on file     No discharge procedures on file      PDMP Review     None          ED Provider  Electronically Signed by           Chrystal Montana MD  03/05/20 0119

## 2020-03-06 NOTE — DISCHARGE INSTRUCTIONS
Continue to take Bentyl for discomfort and Zofran for nausea  Call your family doctor, you should be seen in the office for further evaluation and management

## 2020-03-06 NOTE — ED NOTES
Pt is a 51 y o  male who was brought to the ED with   Chief Complaint   Patient presents with    Depression     Pt was evaluated and d/c earlier today at this facility  Reports his "Bipolar and depression picked up, so I called my [facility] worker and he told me to come back to the ED " Denies SI/HI/AH/VH  States, "Just very sad "    pt brought to the ED with complaints of incresed depression  Pt reports that today he called his  and talked about him feeling depressed, Pt reports that his  told him to go to the ED , Pt reports that he has a upcoming appt with his psych Dr and therapist on thursday 3/12/2020  Pt denies S/I,H/IA/HV/H   Intake Assessment completed, Safety risk Assessment completed  CW met with pt and discussed what happened, Pt continues to deny S/I,H/I,A/H,V/H, pt is able to contract for safety,pt reports that he will follow up with his psych Dr and therapist  [facility] discussed this case with ED Physician who is in agreement with this plan  Pt will be discharged per ED Physician, Pt refused any and all referrals from [facility]ba Lozada          [address] [place] [address]

## 2020-03-06 NOTE — ED PROVIDER NOTES
History  Chief Complaint   Patient presents with    Nausea     patient c/o nausea that started this AM  denies vomiting  insulin dependent diabetic and has been taking medication  denies subjective fevers  52 YO male presents with a complaint of abdominal pain that began this morning  Pt states this was gradual in onset  The pain has been primarily in the lower abdomen, cramping in nature, non-radiating, no known exacerbating factors  This has been associated with nausea  Pt states he has taken ibuprofen, bentyl, zofran and reglan for this  The Bentyl did seem to improve his symptoms but they returned  Pt states he has similar pain monthly  Looking through records pt was evaluated at [facility] ~4 hours ago and [facility] ~2 hours prior  Pt states he mentioned the pain on both occasions but neither visit brings this up  Pt denies CP/SOB/F/C/D/C, no dysuria, burning on urination or blood in urine  History provided by:  Patient   used: No    Abdominal Pain   Pain location: Lower abdomen  Pain quality: cramping    Pain radiates to:  Does not radiate  Pain severity:  Moderate  Onset quality:  Gradual  Duration:  1 day  Timing:  Intermittent  Progression:  Waxing and waning  Chronicity:  Recurrent  Relieved by:  Nothing  Worsened by:  Nothing  Ineffective treatments: Bentyl  Associated symptoms: nausea and vomiting    Associated symptoms: no chest pain, no cough, no diarrhea, no dysuria, no fever and no shortness of breath    Nausea:     Severity:  Moderate    Onset quality:  Gradual    Duration:  1 day    Timing:  Intermittent    Progression:  Waxing and waning  Vomiting:     Quality:  Stomach contents    Number of occurrences:  2    Severity:  Moderate    Duration:  1 day    Timing:  Intermittent    Progression:  Unchanged      Prior to Admission Medications   Prescriptions Last Dose Informant Patient Reported? Taking?    ARIPiprazole (ABILIFY DISCMELT) 10 mg dispersible tablet   No Yes Sig: Take 1 tablet (10 mg total) by mouth daily for 7 days   Patient taking differently: Take 15 mg by mouth daily    albuterol (PROVENTIL HFA,VENTOLIN HFA) 90 mcg/act inhaler   No Yes   Sig: Inhale 2 puffs every 4 (four) hours as needed for wheezing   dicyclomine (BENTYL) 20 mg tablet 3/5/2020 at Unknown time  No Yes   Sig: Take 1 tablet (20 mg total) by mouth 2 (two) times a day   fenofibrate (TRICOR) 48 mg tablet   No Yes   Sig: Take 1 tablet (48 mg total) by mouth daily for 7 days At 9am   fluticasone (FLOVENT HFA) 110 MCG/ACT inhaler   No Yes   Sig: Inhale 1 puff every 12 (twelve) hours Rinse mouth after use  ibuprofen (MOTRIN) 600 mg tablet 3/5/2020 at Unknown time  No Yes   Sig: Take 1 tablet (600 mg total) by mouth every 8 (eight) hours as needed for mild pain or fever   insulin glargine (LANTUS SOLOSTAR) 100 units/mL injection pen   No Yes   Sig: Inject 22 Units under the skin daily Every morning     insulin lispro (HUMALOG KWIKPEN) 100 units/mL injection pen   No Yes   Sig: Inject 4 Units under the skin 3 (three) times a day with meals   Patient taking differently: Inject 15 Units under the skin 3 (three) times a day with meals    lisinopril (ZESTRIL) 5 mg tablet   No Yes   Sig: Take 1 tablet (5 mg total) by mouth daily for 7 days At 9am   metoclopramide (REGLAN) 10 mg tablet 3/5/2020 at Unknown time  No Yes   Sig: Take 1 tablet (10 mg total) by mouth every 6 (six) hours   naproxen (NAPROSYN) 500 mg tablet 3/5/2020 at Unknown time  No Yes   Sig: Take 1 tablet (500 mg total) by mouth 2 (two) times a day with meals   ondansetron (ZOFRAN-ODT) 4 mg disintegrating tablet 3/5/2020 at Unknown time  No Yes   Sig: Take 1 tablet (4 mg total) by mouth every 8 (eight) hours as needed for nausea or vomiting   thiothixene (NAVANE) 1 mg capsule   No Yes   Sig: Take 1 capsule (1 mg total) by mouth daily for 7 days      Facility-Administered Medications: None       Past Medical History:   Diagnosis Date    Anxiety     Asthma     [facility]ipolar [facility] disorder ([facility] )     Depression     Diabetes mellitus ([facility] )     Psychiatric disorder        Past Surgical History:   Procedure Laterality Date    APPENDECTOMY         History reviewed  No pertinent family history  I have reviewed and agree with the history as documented  E-Cigarette/Vaping    E-Cigarette Use Never User      E-Cigarette/Vaping Substances     Social History     Tobacco Use    Smoking status: Current Every Day Smoker     Packs/day: 0 20     Types: Cigarettes    Smokeless tobacco: Never Used   Substance Use Topics    Alcohol use: Not Currently     Frequency: Monthly or less     Drinks per session: 1 or 2     Binge frequency: Never     Comment: States has had no alcohol in at least a year    Drug use: No       Review of Systems   Constitutional: Negative for fever  HENT: Negative for dental problem  Eyes: Negative for visual disturbance  Respiratory: Negative for cough and shortness of breath  Cardiovascular: Negative for chest pain  Gastrointestinal: Positive for abdominal pain, nausea and vomiting  Negative for diarrhea  Genitourinary: Negative for dysuria and frequency  Musculoskeletal: Negative for neck pain and neck stiffness  Skin: Negative for rash  Neurological: Negative for dizziness, weakness and light-headedness  Psychiatric/Behavioral: Negative for agitation, behavioral problems and confusion  All other systems reviewed and are negative  Physical Exam  Physical Exam   Constitutional: He is oriented to person, place, and time  He appears well-developed and well-nourished  HENT:   Head: Normocephalic and atraumatic  Eyes: EOM are normal    Neck: Normal range of motion  Cardiovascular: Normal rate, regular rhythm and normal heart sounds  Pulmonary/Chest: Effort normal and breath sounds normal    Abdominal: Soft  There is no tenderness  Musculoskeletal: Normal range of motion     Neurological: He is alert and oriented to person, place, and time  Skin: Skin is warm and dry  Psychiatric: He has a normal mood and affect  His behavior is normal    Nursing note and vitals reviewed  Vital Signs  ED Triage Vitals [03/05/20 2226]   Temperature Pulse Respirations Blood Pressure SpO2   (!) 97 4 °F (36 3 °C) 66 18 137/82 97 %      Temp Source Heart Rate Source Patient Position - Orthostatic VS BP Location FiO2 (%)   Oral Monitor Lying Left arm --      Pain Score       9           Vitals:    03/05/20 2226   BP: 137/82   Pulse: 66   Patient Position - Orthostatic VS: Lying         Visual Acuity      ED Medications  Medications   ondansetron (ZOFRAN-ODT) dispersible tablet 4 mg (4 mg Oral Given 3/5/20 2307)   dicyclomine (BENTYL) tablet 20 mg (20 mg Oral Given 3/5/20 2308)       Diagnostic Studies  Results Reviewed     Procedure Component Value Units Date/Time    Urine Microscopic [[id number]]  (Abnormal) Collected:  03/05/20 2313    Lab Status:  Final result Specimen:  Urine, Clean Catch Updated:  03/06/20 0015     RBC, UA None Seen /hpf      WBC, UA 0-1 /hpf      Epithelial Cells Occasional /hpf      Bacteria, UA Occasional /hpf      AMORPH PHOSPATES Occasional /hpf     POCT urinalysis dipstick [[id number]]  (Abnormal) Resulted:  03/05/20 2315    Lab Status:  Final result Specimen:  Urine Updated:  03/05/20 2315    Urine Macroscopic, POC [[id number]]  (Abnormal) Collected:  03/05/20 2313    Lab Status:  Final result Specimen:  Urine Updated:  03/05/20 2314     Color, UA Yellow     Clarity, UA Cloudy     pH, UA 7 5     Leukocytes, UA Elevated glucose may cause decreased leukocyte values   See urine microscopic for [facility] result/     Nitrite, UA Negative     Protein, UA Negative mg/dl      Glucose, UA >=1000 (1%) mg/dl      Ketones, UA Negative mg/dl      Urobilinogen, UA 1 0 E U /dl      Bilirubin, UA Negative     Blood, UA Negative     Specific Gravity, UA 1 020    Narrative:       CLINITEK RESULT    Fingerstick Glucose (POCT) [[id number]] (Abnormal) Collected:  03/05/20 2226    Lab Status:  Final result Updated:  03/05/20 2227     POC Glucose 166 mg/dl                  No orders to display              Procedures  Procedures         ED Course                               MDM  Number of Diagnoses or Management Options  Abdominal pain: new and requires workup  Nausea: new and requires workup  Diagnosis management comments: 1  Abdominal pain - Pt states similar to previous, usually gets similar monthly  Will check glucose, give Bentyl and Zofran as pt states this usually helps  Pt has a benign abdomen  Amount and/or Complexity of Data Reviewed  Clinical lab tests: ordered and reviewed  Review and summarize past medical records: yes    Patient Progress  Patient progress: improved        Disposition  Final diagnoses:   Nausea   Abdominal pain     Time reflects when diagnosis was documented in both MDM as applicable and the Disposition within this note     Time User Action Codes Description Comment    3/5/2020 11:30 PM Evelia Miles, [address] [R11 0] Nausea     3/5/2020 11:31 PM Emily Landrum [R10 9] Abdominal pain       ED Disposition     ED Disposition Condition Date/Time Comment    Discharge Stable Thu Mar 5, 2020 11:30 PM Yanira Tejada discharge to home/self care  Follow-up Information     Follow up With Specialties Details Why Contact Info    Sanam Castellanos MD Family Medicine Schedule an appointment as soon as possible for a visit   [address] [place] [address],[address] Floor, [address] [facility]  [phone]            Discharge Medication List as of 3/5/2020 11:36 PM      START taking these medications    Details   !! dicyclomine (BENTYL) 20 mg tablet Take 1 tablet (20 mg total) by mouth 2 (two) times a day, Starting Thu 3/5/2020, Print      !! ondansetron (ZOFRAN-ODT) 4 mg disintegrating tablet Take 1 tablet (4 mg total) by mouth every 6 (six) hours as needed for nausea, Starting Thu 3/5/2020, Print       ! ! - Potential duplicate medications found  Please discuss with provider        CONTINUE these medications which have NOT CHANGED    Details   albuterol (PROVENTIL HFA,VENTOLIN HFA) 90 mcg/act inhaler Inhale 2 puffs every 4 (four) hours as needed for wheezing, Starting Mon 2/17/2020, Print      ARIPiprazole (ABILIFY DISCMELT) 10 mg dispersible tablet Take 1 tablet (10 mg total) by mouth daily for 7 days, Starting Mon 2/17/2020, Until Thu 3/5/2020, Normal      !! dicyclomine (BENTYL) 20 mg tablet Take 1 tablet (20 mg total) by mouth 2 (two) times a day, Starting Wed 1/8/2020, Print      fenofibrate (TRICOR) 48 mg tablet Take 1 tablet (48 mg total) by mouth daily for 7 days At 9am, Starting Tue 12/17/2019, Until Thu 3/5/2020, Print      fluticasone (FLOVENT HFA) 110 MCG/ACT inhaler Inhale 1 puff every 12 (twelve) hours Rinse mouth after use , Starting Tue 12/10/2019, Print      ibuprofen (MOTRIN) 600 mg tablet Take 1 tablet (600 mg total) by mouth every 8 (eight) hours as needed for mild pain or fever, Starting Mon 2/24/2020, Print      insulin glargine (LANTUS SOLOSTAR) 100 units/mL injection pen Inject 22 Units under the skin daily Every morning , Starting Tue 12/10/2019, Print      insulin lispro (HUMALOG KWIKPEN) 100 units/mL injection pen Inject 4 Units under the skin 3 (three) times a day with meals, Starting Tue 12/10/2019, Print      lisinopril (ZESTRIL) 5 mg tablet Take 1 tablet (5 mg total) by mouth daily for 7 days At 9am, Starting Tue 12/17/2019, Until Thu 3/5/2020, Print      metoclopramide (REGLAN) 10 mg tablet Take 1 tablet (10 mg total) by mouth every 6 (six) hours, Starting Mon 2/17/2020, Print      naproxen (NAPROSYN) 500 mg tablet Take 1 tablet (500 mg total) by mouth 2 (two) times a day with meals, Starting Mon 2/17/2020, Print      !! ondansetron (ZOFRAN-ODT) 4 mg disintegrating tablet Take 1 tablet (4 mg total) by mouth every 8 (eight) hours as needed for nausea or vomiting, Starting Wed 1/8/2020, Print      thiothixene (NAVANE) 1 mg capsule Take 1 capsule (1 mg total) by mouth daily for 7 days, Starting Tue 12/17/2019, Until Thu 3/5/2020, Print       !! - Potential duplicate medications found  Please discuss with provider  No discharge procedures on file      PDMP Review     None          ED Provider  Electronically Signed by           Meghan Haywood MD  03/06/20 0940

## 2020-03-06 NOTE — DISCHARGE INSTRUCTIONS
Follow up with your psychiatrist at 8:30 as scheduled, it is very important that you make this appointment

## 2020-03-06 NOTE — ED NOTES
Pt reports taking Maikel Polite and bentyl at 9am today with no relief     Marcelina Rivera RN  03/05/20 0348

## 2020-03-06 NOTE — ED PROVIDER NOTES
History  Chief Complaint   Patient presents with    Depression     Pt was evaluated and d/c earlier today at this facility  Reports his "Bipolar and depression picked up, so I called my [facility] worker and he told me to come back to the ED " Denies SI/HI/AH/VH  States, "Just very sad "      51year old male presents for evaluation of worsening depression over the past several day  He states it has been constant, getting progressively worse and is without modifying factors  He has a history of depression and states that he takes his medications as prescribed but he does not feel these are helping  He has been seen in the emergency department twice for similar symptoms  He states that his  told the common today as he is extremely sad  He denies being suicidal or homicidal   Denies hallucinations, chest pain, shortness of breath, abdominal pain  Patient states he is able to carry out his activities of daily living  Depression   Presenting symptoms: no agitation, no self-mutilation and no suicidal thoughts    Associated symptoms: no anxiety        Prior to Admission Medications   Prescriptions Last Dose Informant Patient Reported? Taking?    ARIPiprazole (ABILIFY DISCMELT) 10 mg dispersible tablet   No No   Sig: Take 1 tablet (10 mg total) by mouth daily for 7 days   Patient taking differently: Take 15 mg by mouth daily    LORazepam (ATIVAN) 1 mg tablet   Yes No   Sig: Take 1 mg by mouth every 6 (six) hours as needed   albuterol (PROVENTIL HFA,VENTOLIN HFA) 90 mcg/act inhaler   No No   Sig: Inhale 2 puffs every 4 (four) hours as needed for wheezing   dicyclomine (BENTYL) 20 mg tablet   No No   Sig: Take 1 tablet (20 mg total) by mouth 2 (two) times a day   dicyclomine (BENTYL) 20 mg tablet   No No   Sig: Take 1 tablet (20 mg total) by mouth 2 (two) times a day   fenofibrate (TRICOR) 48 mg tablet   No No   Sig: Take 1 tablet (48 mg total) by mouth daily for 7 days At 9am   fluticasone (FLOVENT HFA) 110 MCG/ACT inhaler   No No   Sig: Inhale 1 puff every 12 (twelve) hours Rinse mouth after use    hydrOXYzine HCL (ATARAX) 25 mg tablet   Yes No   Sig: Take 25 mg by mouth every 6 (six) hours as needed   ibuprofen (MOTRIN) 600 mg tablet   No No   Sig: Take 1 tablet (600 mg total) by mouth every 8 (eight) hours as needed for mild pain or fever   insulin glargine (LANTUS SOLOSTAR) 100 units/mL injection pen   No No   Sig: Inject 22 Units under the skin daily Every morning  insulin lispro (HUMALOG KWIKPEN) 100 units/mL injection pen   No No   Sig: Inject 4 Units under the skin 3 (three) times a day with meals   Patient taking differently: Inject 15 Units under the skin 3 (three) times a day with meals    lisinopril (ZESTRIL) 5 mg tablet   No No   Sig: Take 1 tablet (5 mg total) by mouth daily for 7 days At 9am   methocarbamol (ROBAXIN) 500 mg tablet   Yes No   Sig: Take 500 mg by mouth   metoclopramide (REGLAN) 10 mg tablet   No No   Sig: Take 1 tablet (10 mg total) by mouth every 6 (six) hours   naproxen (NAPROSYN) 500 mg tablet   No No   Sig: Take 1 tablet (500 mg total) by mouth 2 (two) times a day with meals   ondansetron (ZOFRAN-ODT) 4 mg disintegrating tablet   No No   Sig: Take 1 tablet (4 mg total) by mouth every 8 (eight) hours as needed for nausea or vomiting   ondansetron (ZOFRAN-ODT) 4 mg disintegrating tablet   No No   Sig: Take 1 tablet (4 mg total) by mouth every 6 (six) hours as needed for nausea   thiothixene (NAVANE) 1 mg capsule   No No   Sig: Take 1 capsule (1 mg total) by mouth daily for 7 days      Facility-Administered Medications: None       Past Medical History:   Diagnosis Date    Anxiety     Asthma     [facility] )     Depression     Diabetes mellitus ([facility] )     Psychiatric disorder        Past Surgical History:   Procedure Laterality Date    APPENDECTOMY         History reviewed  No pertinent family history    I have reviewed and agree with the history as documented  E-Cigarette/Vaping    E-Cigarette Use Never User      E-Cigarette/Vaping Substances     Social History     Tobacco Use    Smoking status: Current Every Day Smoker     Packs/day: 0 20     Types: Cigarettes    Smokeless tobacco: Never Used   Substance Use Topics    Alcohol use: Not Currently     Frequency: Monthly or less     Drinks per session: 1 or 2     Binge frequency: Never     Comment: States has had no alcohol in at least a year    Drug use: No       Review of Systems   Psychiatric/Behavioral: Positive for depression and dysphoric mood  Negative for agitation, behavioral problems, decreased concentration, self-injury, sleep disturbance and suicidal ideas  The patient is not nervous/anxious and is not hyperactive  Physical Exam  Physical Exam   Constitutional: He is oriented to person, place, and time  He appears well-developed and well-nourished  HENT:   Mouth/Throat: No oropharyngeal exudate  TMs normal bilaterally no pharyngeal erythema no rhinorrhea nontender palpation of sinuses, normal looking turbinates   Eyes: Conjunctivae and EOM are normal    Neck: Normal range of motion  Neck supple  No meningeal signs   Cardiovascular: Normal rate, regular rhythm, normal heart sounds and intact distal pulses  Pulmonary/Chest: Effort normal and breath sounds normal  No respiratory distress  He has no wheezes  He has no rales  He exhibits no tenderness  Abdominal: Soft  Bowel sounds are normal  He exhibits no distension and no mass  There is no tenderness  No hernia  No cvat   Musculoskeletal: Normal range of motion  He exhibits no edema  Lymphadenopathy:     He has no cervical adenopathy  Neurological: He is alert and oriented to person, place, and time  No cranial nerve deficit  Skin: No rash noted  No erythema  No edema   Psychiatric: His speech is normal and behavior is normal  Judgment and thought content normal  He is not actively hallucinating   Cognition and memory are normal  He exhibits a depressed mood  He is attentive  Nursing note and vitals reviewed  Vital Signs  ED Triage Vitals [03/06/20 1130]   Temperature Pulse Respirations Blood Pressure SpO2   (!) 97 4 °F (36 3 °C) 100 16 126/77 99 %      Temp Source Heart Rate Source Patient Position - Orthostatic VS BP Location FiO2 (%)   Temporal Monitor Sitting Right arm --      Pain Score       No Pain           Vitals:    03/06/20 1130 03/06/20 1420   BP: 126/77    Pulse: 100 83   Patient Position - Orthostatic VS: Sitting          Visual Acuity      ED Medications  Medications - No data to display    Diagnostic Studies  Results Reviewed     Procedure Component Value Units Date/Time    POCT alcohol breath test [[id number]]  (Normal) Resulted:  03/06/20 1420    Lab Status:  Final result Updated:  03/06/20 1420     EXTBreath Alcohol 0 000    Rapid drug screen, urine [[id number]] Collected:  03/06/20 1416    Lab Status: In process Specimen:  Urine, Clean Catch Updated:  03/06/20 1420                 No orders to display              Procedures  Procedures         ED Course                               MDM  Number of Diagnoses or Management Options  Diagnosis management comments: Depression without SI or HI-will consult crisis for mental health evaluation        Disposition  Final diagnoses:   Chronic sorrow     Time reflects when diagnosis was documented in both MDM as applicable and the Disposition within this note     Time User Action Codes Description Comment    3/6/2020  2:59 PM Lynne Landrum [F32 9,  R42 851] Depression with suicidal ideation     3/6/2020  3:00 PM Paulene Meckel [F64 3,  R48 851] Depression with suicidal ideation     3/6/2020  3:00 PM Lynne Landrum [F43 8] Chronic sorrow       ED Disposition     ED Disposition Condition Date/Time Comment    Discharge Stable Fri Mar 6, 2020  2:59 PM María Jerry discharge to home/self care              Follow-up Information     Follow up With Specialties Details Why Contact Info    Jamir Villareal MD Family Medicine Schedule an appointment as soon as possible for a visit in 2 days  [address], [address] [facility]  [phone]            Patient's Medications   Discharge Prescriptions    No medications on file     No discharge procedures on file      PDMP Review     None          ED Provider  Electronically Signed by           Destini Kaminski MD  03/06/20 8124

## 2020-03-06 NOTE — ED PROVIDER NOTES
History  Chief Complaint   Patient presents with    Back Pain     Pt states "my back is stiff and my legs feel like rubber" Pt denies injury  ongoing for days     46-year-old male with history of anxiety, depression, bipolar disorder presents emergency department for evaluation of back pain  Notably, patient has been seen by 5 emergency room providers in last 24 hours, and this is 1st complain of back pain  Patient denies any trauma  Patient denies any pain radiation, fever, history of IV drug abuse, bladder, bowel dysfunction, numbness, tingling, weakness to lower extremities  Patient also denies any chest pain, shortness of breath, abdominal pain, nausea, vomiting, diarrhea, headache, dysuria, frequency, urgency  He denies any SI/HI/AH/VH  History provided by:  Medical records and patient   used: No    Back Pain   Location:  Lumbar spine  Quality:  Aching  Radiates to:  Does not radiate  Pain severity:  Mild  Pain is:  Same all the time  Onset quality:  Sudden  Duration:  1 day  Timing:  Constant  Progression:  Worsening  Chronicity:  Recurrent  Context: not falling, not jumping from heights, not lifting heavy objects, not MVA, not pedestrian accident, not recent illness and not twisting    Relieved by:  None tried  Worsened by:  Bending and movement  Ineffective treatments:  None tried  Associated symptoms: no abdominal pain, no abdominal swelling, no bladder incontinence, no bowel incontinence, no chest pain, no dysuria, no fever, no headaches, no leg pain, no pelvic pain, no perianal numbness, no tingling and no weakness        Prior to Admission Medications   Prescriptions Last Dose Informant Patient Reported? Taking?    ARIPiprazole (ABILIFY DISCMELT) 10 mg dispersible tablet   No No   Sig: Take 1 tablet (10 mg total) by mouth daily for 7 days   Patient taking differently: Take 15 mg by mouth daily    LORazepam (ATIVAN) 1 mg tablet   Yes No   Sig: Take 1 mg by mouth every 6 (six) hours as needed   albuterol (PROVENTIL HFA,VENTOLIN HFA) 90 mcg/act inhaler   No No   Sig: Inhale 2 puffs every 4 (four) hours as needed for wheezing   dicyclomine (BENTYL) 20 mg tablet   No No   Sig: Take 1 tablet (20 mg total) by mouth 2 (two) times a day   dicyclomine (BENTYL) 20 mg tablet   No No   Sig: Take 1 tablet (20 mg total) by mouth 2 (two) times a day   fenofibrate (TRICOR) 48 mg tablet   No No   Sig: Take 1 tablet (48 mg total) by mouth daily for 7 days At 9am   fluticasone (FLOVENT HFA) 110 MCG/ACT inhaler   No No   Sig: Inhale 1 puff every 12 (twelve) hours Rinse mouth after use    hydrOXYzine HCL (ATARAX) 25 mg tablet   Yes No   Sig: Take 25 mg by mouth every 6 (six) hours as needed   ibuprofen (MOTRIN) 600 mg tablet   No No   Sig: Take 1 tablet (600 mg total) by mouth every 8 (eight) hours as needed for mild pain or fever   insulin glargine (LANTUS SOLOSTAR) 100 units/mL injection pen   No No   Sig: Inject 22 Units under the skin daily Every morning     insulin lispro (HUMALOG KWIKPEN) 100 units/mL injection pen   No No   Sig: Inject 4 Units under the skin 3 (three) times a day with meals   Patient taking differently: Inject 15 Units under the skin 3 (three) times a day with meals    lisinopril (ZESTRIL) 5 mg tablet   No No   Sig: Take 1 tablet (5 mg total) by mouth daily for 7 days At 9am   methocarbamol (ROBAXIN) 500 mg tablet   Yes No   Sig: Take 500 mg by mouth   metoclopramide (REGLAN) 10 mg tablet   No No   Sig: Take 1 tablet (10 mg total) by mouth every 6 (six) hours   naproxen (NAPROSYN) 500 mg tablet   No No   Sig: Take 1 tablet (500 mg total) by mouth 2 (two) times a day with meals   ondansetron (ZOFRAN-ODT) 4 mg disintegrating tablet   No No   Sig: Take 1 tablet (4 mg total) by mouth every 8 (eight) hours as needed for nausea or vomiting   ondansetron (ZOFRAN-ODT) 4 mg disintegrating tablet   No No   Sig: Take 1 tablet (4 mg total) by mouth every 6 (six) hours as needed for nausea thiothixene (NAVANE) 1 mg capsule   No No   Sig: Take 1 capsule (1 mg total) by mouth daily for 7 days      Facility-Administered Medications: None       Past Medical History:   Diagnosis Date    Anxiety     Asthma     Bipolar 1 disorder ([facility] )     Depression     Diabetes mellitus ([facility] )     Psychiatric disorder        Past Surgical History:   Procedure Laterality Date    APPENDECTOMY         History reviewed  No pertinent family history  I have reviewed and agree with the history as documented  E-Cigarette/Vaping    E-Cigarette Use Never User      E-Cigarette/Vaping Substances     Social History     Tobacco Use    Smoking status: Current Every Day Smoker     Packs/day: 0 20     Types: Cigarettes    Smokeless tobacco: Never Used   Substance Use Topics    Alcohol use: Not Currently     Frequency: Monthly or less     Drinks per session: 1 or 2     Binge frequency: Never     Comment: States has had no alcohol in at least a year    Drug use: No       Review of Systems   Constitutional: Negative for chills and fever  HENT: Negative for congestion and sore throat  Respiratory: Negative for cough and shortness of breath  Cardiovascular: Negative for chest pain  Gastrointestinal: Negative for abdominal pain, bowel incontinence, diarrhea, nausea and vomiting  Genitourinary: Negative for bladder incontinence, difficulty urinating, dysuria and pelvic pain  Musculoskeletal: Positive for back pain  Skin: Negative for rash  Neurological: Negative for tingling, weakness and headaches  Psychiatric/Behavioral: Negative for agitation, self-injury and suicidal ideas  All other systems reviewed and are negative  Physical Exam  Physical Exam   Constitutional: He is oriented to person, place, and time  Vital signs are normal  He appears well-developed and well-nourished  He does not appear ill  No distress  HENT:   Head: Normocephalic and atraumatic     Right Ear: Hearing and external ear normal    Left Ear: Hearing and external ear normal    Nose: Nose normal    Mouth/Throat: Mucous membranes are normal    Eyes: Conjunctivae are normal    Neck: Full passive range of motion without pain  Neck supple  Cardiovascular: Normal rate, regular rhythm, S1 normal, S2 normal and normal heart sounds  Pulmonary/Chest: Effort normal and breath sounds normal  He has no decreased breath sounds  He has no wheezes  Abdominal: Soft  There is no tenderness  There is no rigidity, no rebound, no guarding and no CVA tenderness  Musculoskeletal:        Lumbar back: He exhibits tenderness, pain and spasm  He exhibits normal range of motion and no bony tenderness  Back:    Gait coordinated and smooth  Negative straight leg raise test    Neurological: He is alert and oriented to person, place, and time  5/5 plantarflexion, dorsiflexion, great toe flexion & extension     Skin: Skin is warm, dry and intact  Capillary refill takes less than 2 seconds  No rash noted  Psychiatric: His speech is normal and behavior is normal  Thought content normal  His mood appears anxious  Cognition and memory are normal  He does not exhibit a depressed mood  He expresses no suicidal ideation  He expresses no suicidal plans and no homicidal plans  Nursing note and vitals reviewed        Vital Signs  ED Triage Vitals [03/06/20 0425]   Temperature Pulse Respirations Blood Pressure SpO2   98 °F (36 7 °C) 74 16 130/85 99 %      Temp src Heart Rate Source Patient Position - Orthostatic VS BP Location FiO2 (%)   -- -- -- -- --      Pain Score       Worst Possible Pain           Vitals:    03/06/20 0425   BP: 130/85   Pulse: 74         Visual Acuity      ED Medications  Medications   acetaminophen (TYLENOL) tablet 650 mg (650 mg Oral Given 3/6/20 0735)       Diagnostic Studies  Results Reviewed     None                 No orders to display              Procedures  Procedures         ED Course MDM  Number of Diagnoses or Management Options  Acute right-sided low back pain without sciatica:   Diagnosis management comments: This patient presents with back pain most consistent with right sided low back pain without sciatica  Differential diagnosis included lumbago versus musculoskeletal spasm/strain versus sciatica  No back pain red flags on history physical   Presentation not consistent with malignancy (lack of history of malignancy, lack of the symptoms), fracture (no trauma, no bony tenderness to palpation),  cauda equina (no bowel or urinary incontinence/retention, no saddle anesthesia, no distal weakness), AAA, viscous perforation, pulmonary embolism, renal colic, pyelonephritis (afebrile, no CVAT, no urinary symptoms)  Given the clinical picture, no indication for imaging at this time  Disposition  Final diagnoses:   Acute right-sided low back pain without sciatica     Time reflects when diagnosis was documented in both MDM as applicable and the Disposition within this note     Time User Action Codes Description Comment    3/6/2020  7:23 AM Nirmala Bernal Add [M54 5] Acute right-sided low back pain without sciatica       ED Disposition     ED Disposition Condition Date/Time Comment    Discharge Stable Fri Mar 6, 2020  7:23 AM Milvia Guallpa discharge to home/self care              Follow-up Information     Follow up With Specialties Details Why Contact Info    Norbert Coyle MD Family Medicine Schedule an appointment as soon as possible for a visit in 3 days  [address] [place] [address],[address] Floor, [address]  [phone]            Discharge Medication List as of 3/6/2020  7:24 AM      CONTINUE these medications which have NOT CHANGED    Details   albuterol (PROVENTIL HFA,VENTOLIN HFA) 90 mcg/act inhaler Inhale 2 puffs every 4 (four) hours as needed for wheezing, Starting Mon 2/17/2020, Print      ARIPiprazole (ABILIFY DISCMELT) 10 mg dispersible tablet Take 1 tablet (10 mg total) by mouth daily for 7 days, Starting Mon 2/17/2020, Until Thu 3/5/2020, Normal      !! dicyclomine (BENTYL) 20 mg tablet Take 1 tablet (20 mg total) by mouth 2 (two) times a day, Starting Wed 1/8/2020, Print      !! dicyclomine (BENTYL) 20 mg tablet Take 1 tablet (20 mg total) by mouth 2 (two) times a day, Starting Thu 3/5/2020, Print      fenofibrate (TRICOR) 48 mg tablet Take 1 tablet (48 mg total) by mouth daily for 7 days At 9am, Starting Tue 12/17/2019, Until Thu 3/5/2020, Print      fluticasone (FLOVENT HFA) 110 MCG/ACT inhaler Inhale 1 puff every 12 (twelve) hours Rinse mouth after use , Starting Tue 12/10/2019, Print      hydrOXYzine HCL (ATARAX) 25 mg tablet Take 25 mg by mouth every 6 (six) hours as needed, Starting Fri 1/17/2020, Historical Med      ibuprofen (MOTRIN) 600 mg tablet Take 1 tablet (600 mg total) by mouth every 8 (eight) hours as needed for mild pain or fever, Starting Mon 2/24/2020, Print      insulin glargine (LANTUS SOLOSTAR) 100 units/mL injection pen Inject 22 Units under the skin daily Every morning , Starting Tue 12/10/2019, Print      insulin lispro (HUMALOG KWIKPEN) 100 units/mL injection pen Inject 4 Units under the skin 3 (three) times a day with meals, Starting Tue 12/10/2019, Print      lisinopril (ZESTRIL) 5 mg tablet Take 1 tablet (5 mg total) by mouth daily for 7 days At 9am, Starting Tue 12/17/2019, Until Thu 3/5/2020, Print      LORazepam (ATIVAN) 1 mg tablet Take 1 mg by mouth every 6 (six) hours as needed, Starting Thu 3/5/2020, Historical Med      methocarbamol (ROBAXIN) 500 mg tablet Take 500 mg by mouth, Starting Tue 12/24/2019, Historical Med      metoclopramide (REGLAN) 10 mg tablet Take 1 tablet (10 mg total) by mouth every 6 (six) hours, Starting Mon 2/17/2020, Print      naproxen (NAPROSYN) 500 mg tablet Take 1 tablet (500 mg total) by mouth 2 (two) times a day with meals, Starting Mon 2/17/2020, Print      !! ondansetron (ZOFRAN-ODT) 4 mg disintegrating tablet Take 1 tablet (4 mg total) by mouth every 8 (eight) hours as needed for nausea or vomiting, Starting Wed 1/8/2020, Print      !! ondansetron (ZOFRAN-ODT) 4 mg disintegrating tablet Take 1 tablet (4 mg total) by mouth every 6 (six) hours as needed for nausea, Starting Thu 3/5/2020, Print      thiothixene (NAVANE) 1 mg capsule Take 1 capsule (1 mg total) by mouth daily for 7 days, Starting Tue 12/17/2019, Until Thu 3/5/2020, Print       !! - Potential duplicate medications found  Please discuss with provider  No discharge procedures on file      PDMP Review     None          ED Provider  Electronically Signed by           Hilton Denver, PA-C  03/06/20 0844

## 2020-03-06 NOTE — ED PROVIDER NOTES
History  Chief Complaint   Patient presents with    Depression     patient states "my depression is getting worse"  patient states it started last night  patient states he is trying to handle two jobs right now when he can only handle one  Denies SI/HI/AH/VH      52 YO male returns to the ED  Pt was just evaluated for abdominal pain and nausea that he states have been present for the entire day  Pt now states he has been dealing with worsening depression for the last day  Pt has Hx of similar  Pt denies SI/HI  He was evaluated earlier at [facility] and [facility] today for the same  Pt states he has an appointment with his psychiatrist at 0830 in the morning  Pt denies having abdominal pain or nausea at this time  Pt denies CP/SOB/F/C/N/V/D/C, no dysuria, burning on urination or blood in urine  History provided by:  Patient   used: No    Depression   Presenting symptoms: depression    Presenting symptoms: no agitation and no suicidal thoughts    Degree of incapacity (severity): Moderate  Onset quality:  Gradual  Timing:  Constant  Progression:  Unchanged  Chronicity:  Chronic  Treatment compliance: All of the time  Relieved by:  Nothing  Worsened by:  Nothing  Associated symptoms: no abdominal pain and no chest pain        Prior to Admission Medications   Prescriptions Last Dose Informant Patient Reported? Taking?    ARIPiprazole (ABILIFY DISCMELT) 10 mg dispersible tablet   No No   Sig: Take 1 tablet (10 mg total) by mouth daily for 7 days   Patient taking differently: Take 15 mg by mouth daily    LORazepam (ATIVAN) 1 mg tablet   Yes Yes   Sig: Take 1 mg by mouth every 6 (six) hours as needed   albuterol (PROVENTIL HFA,VENTOLIN HFA) 90 mcg/act inhaler   No Yes   Sig: Inhale 2 puffs every 4 (four) hours as needed for wheezing   dicyclomine (BENTYL) 20 mg tablet   No No   Sig: Take 1 tablet (20 mg total) by mouth 2 (two) times a day   dicyclomine (BENTYL) 20 mg tablet   No Yes   Sig: Take 1 tablet (20 mg total) by mouth 2 (two) times a day   fenofibrate (TRICOR) 48 mg tablet   No No   Sig: Take 1 tablet (48 mg total) by mouth daily for 7 days At 9am   fluticasone (FLOVENT HFA) 110 MCG/ACT inhaler   No Yes   Sig: Inhale 1 puff every 12 (twelve) hours Rinse mouth after use    hydrOXYzine HCL (ATARAX) 25 mg tablet   Yes Yes   Sig: Take 25 mg by mouth every 6 (six) hours as needed   ibuprofen (MOTRIN) 600 mg tablet   No Yes   Sig: Take 1 tablet (600 mg total) by mouth every 8 (eight) hours as needed for mild pain or fever   insulin glargine (LANTUS SOLOSTAR) 100 units/mL injection pen   No Yes   Sig: Inject 22 Units under the skin daily Every morning     insulin lispro (HUMALOG KWIKPEN) 100 units/mL injection pen   No Yes   Sig: Inject 4 Units under the skin 3 (three) times a day with meals   Patient taking differently: Inject 15 Units under the skin 3 (three) times a day with meals    lisinopril (ZESTRIL) 5 mg tablet   No No   Sig: Take 1 tablet (5 mg total) by mouth daily for 7 days At 9am   methocarbamol (ROBAXIN) 500 mg tablet   Yes Yes   Sig: Take 500 mg by mouth   metoclopramide (REGLAN) 10 mg tablet   No Yes   Sig: Take 1 tablet (10 mg total) by mouth every 6 (six) hours   naproxen (NAPROSYN) 500 mg tablet   No Yes   Sig: Take 1 tablet (500 mg total) by mouth 2 (two) times a day with meals   ondansetron (ZOFRAN-ODT) 4 mg disintegrating tablet   No Yes   Sig: Take 1 tablet (4 mg total) by mouth every 8 (eight) hours as needed for nausea or vomiting   ondansetron (ZOFRAN-ODT) 4 mg disintegrating tablet   No Yes   Sig: Take 1 tablet (4 mg total) by mouth every 6 (six) hours as needed for nausea   thiothixene (NAVANE) 1 mg capsule   No No   Sig: Take 1 capsule (1 mg total) by mouth daily for 7 days      Facility-Administered Medications: None       Past Medical History:   Diagnosis Date    Anxiety     Asthma     Bipolar 1 disorder ([place] [address] )     Depression     Diabetes mellitus ([place] [address] )     Psychiatric disorder        Past Surgical History:   Procedure Laterality Date    APPENDECTOMY         History reviewed  No pertinent family history  I have reviewed and agree with the history as documented  E-Cigarette/Vaping    E-Cigarette Use Never User      E-Cigarette/Vaping Substances     Social History     Tobacco Use    Smoking status: Current Every Day Smoker     Packs/day: 0 20     Types: Cigarettes    Smokeless tobacco: Never Used   Substance Use Topics    Alcohol use: Not Currently     Frequency: Monthly or less     Drinks per session: 1 or 2     Binge frequency: Never     Comment: States has had no alcohol in at least a year    Drug use: No       Review of Systems   Constitutional: Negative for fever  HENT: Negative for dental problem  Eyes: Negative for visual disturbance  Respiratory: Negative for shortness of breath  Cardiovascular: Negative for chest pain  Gastrointestinal: Negative for abdominal pain, nausea and vomiting  Genitourinary: Negative for dysuria and frequency  Musculoskeletal: Negative for neck pain and neck stiffness  Skin: Negative for rash  Neurological: Negative for dizziness, weakness and light-headedness  Psychiatric/Behavioral: Positive for depression  Negative for agitation, behavioral problems, confusion and suicidal ideas  All other systems reviewed and are negative  Physical Exam  Physical Exam   Constitutional: He is oriented to person, place, and time  He appears well-developed and well-nourished  HENT:   Head: Normocephalic and atraumatic  Eyes: EOM are normal    Neck: Normal range of motion  Cardiovascular: Normal rate, regular rhythm and normal heart sounds  Pulmonary/Chest: Effort normal and breath sounds normal    Abdominal: Soft  There is no tenderness  Musculoskeletal: Normal range of motion  Neurological: He is alert and oriented to person, place, and time  Skin: Skin is warm and dry     Psychiatric: He has a normal mood and affect  His behavior is normal  He expresses no homicidal and no suicidal ideation  Nursing note and vitals reviewed  Vital Signs  ED Triage Vitals   Temperature Pulse Respirations Blood Pressure SpO2   03/06/20 0000 03/06/20 0000 03/06/20 0000 03/06/20 0003 03/06/20 0000   (!) 97 °F (36 1 °C) 66 17 117/75 99 %      Temp Source Heart Rate Source Patient Position - Orthostatic VS BP Location FiO2 (%)   03/06/20 0000 03/06/20 0000 03/06/20 0003 03/06/20 0003 --   Temporal Monitor Sitting Left arm       Pain Score       03/06/20 0000       No Pain           Vitals:    03/06/20 0000 03/06/20 0003   BP:  117/75   Pulse: 66    Patient Position - Orthostatic VS:  Sitting         Visual Acuity      ED Medications  Medications - No data to display    Diagnostic Studies  Results Reviewed     None                 No orders to display              Procedures  Procedures         ED Course                               MDM  Number of Diagnoses or Management Options  Depression: new and requires workup  Diagnosis management comments: 1  Depression - Pt with chronic depression, no SI/HI  Pt has been evaluated for this twice previously within the last 10 hours  Will discharge pt to follow up with his psychiatrist in the morning  Amount and/or Complexity of Data Reviewed  Review and summarize past medical records: yes    Patient Progress  Patient progress: stable        Disposition  Final diagnoses:   Depression     Time reflects when diagnosis was documented in both MDM as applicable and the Disposition within this note     Time User Action Codes Description Comment    3/6/2020 12:24 AM Prudence Valero Add [F32 9] Depression       ED Disposition     ED Disposition Condition Date/Time Comment    Discharge Stable Fri Mar 6, 2020 12:24 AM Diane Cullen discharge to home/self care              Follow-up Information    None         Discharge Medication List as of 3/6/2020 12:24 AM      CONTINUE these medications which have NOT CHANGED    Details   albuterol (PROVENTIL HFA,VENTOLIN HFA) 90 mcg/act inhaler Inhale 2 puffs every 4 (four) hours as needed for wheezing, Starting Mon 2/17/2020, Print      ARIPiprazole (ABILIFY DISCMELT) 10 mg dispersible tablet Take 1 tablet (10 mg total) by mouth daily for 7 days, Starting Mon 2/17/2020, Until Thu 3/5/2020, Normal      !! dicyclomine (BENTYL) 20 mg tablet Take 1 tablet (20 mg total) by mouth 2 (two) times a day, Starting Wed 1/8/2020, Print      !! dicyclomine (BENTYL) 20 mg tablet Take 1 tablet (20 mg total) by mouth 2 (two) times a day, Starting Thu 3/5/2020, Print      fenofibrate (TRICOR) 48 mg tablet Take 1 tablet (48 mg total) by mouth daily for 7 days At 9am, Starting Tue 12/17/2019, Until Thu 3/5/2020, Print      fluticasone (FLOVENT HFA) 110 MCG/ACT inhaler Inhale 1 puff every 12 (twelve) hours Rinse mouth after use , Starting Tue 12/10/2019, Print      hydrOXYzine HCL (ATARAX) 25 mg tablet Take 25 mg by mouth every 6 (six) hours as needed, Starting Fri 1/17/2020, Historical Med      ibuprofen (MOTRIN) 600 mg tablet Take 1 tablet (600 mg total) by mouth every 8 (eight) hours as needed for mild pain or fever, Starting Mon 2/24/2020, Print      insulin glargine (LANTUS SOLOSTAR) 100 units/mL injection pen Inject 22 Units under the skin daily Every morning , Starting Tue 12/10/2019, Print      insulin lispro (HUMALOG KWIKPEN) 100 units/mL injection pen Inject 4 Units under the skin 3 (three) times a day with meals, Starting Tue 12/10/2019, Print      lisinopril (ZESTRIL) 5 mg tablet Take 1 tablet (5 mg total) by mouth daily for 7 days At 9am, Starting Tue 12/17/2019, Until Thu 3/5/2020, Print      LORazepam (ATIVAN) 1 mg tablet Take 1 mg by mouth every 6 (six) hours as needed, Starting Thu 3/5/2020, Historical Med      methocarbamol (ROBAXIN) 500 mg tablet Take 500 mg by mouth, Starting Tue 12/24/2019, Historical Med      metoclopramide (REGLAN) 10 mg tablet Take 1 tablet (10 mg total) by mouth every 6 (six) hours, Starting Mon 2/17/2020, Print      naproxen (NAPROSYN) 500 mg tablet Take 1 tablet (500 mg total) by mouth 2 (two) times a day with meals, Starting Mon 2/17/2020, Print      !! ondansetron (ZOFRAN-ODT) 4 mg disintegrating tablet Take 1 tablet (4 mg total) by mouth every 8 (eight) hours as needed for nausea or vomiting, Starting Wed 1/8/2020, Print      !! ondansetron (ZOFRAN-ODT) 4 mg disintegrating tablet Take 1 tablet (4 mg total) by mouth every 6 (six) hours as needed for nausea, Starting Thu 3/5/2020, Print      thiothixene (NAVANE) 1 mg capsule Take 1 capsule (1 mg total) by mouth daily for 7 days, Starting Tue 12/17/2019, Until Thu 3/5/2020, Print       !! - Potential duplicate medications found  Please discuss with provider  No discharge procedures on file      PDMP Review     None          ED Provider  Electronically Signed by           Mukesh Fried MD  03/06/20 2858

## 2020-03-07 ENCOUNTER — HOSPITAL ENCOUNTER (INPATIENT)
Facility: HOSPITAL | Age: 50
LOS: 2 days | Discharge: HOME | DRG: 753 | End: 2020-03-09
Attending: PSYCHIATRY & NEUROLOGY | Admitting: PSYCHIATRY & NEUROLOGY
Payer: COMMERCIAL

## 2020-03-07 VITALS
OXYGEN SATURATION: 99 % | HEART RATE: 75 BPM | SYSTOLIC BLOOD PRESSURE: 94 MMHG | TEMPERATURE: 97.5 F | DIASTOLIC BLOOD PRESSURE: 66 MMHG | RESPIRATION RATE: 16 BRPM

## 2020-03-07 DIAGNOSIS — R45.851 SUICIDAL IDEATION: ICD-10-CM

## 2020-03-07 DIAGNOSIS — F31.32 BIPOLAR AFFECTIVE DISORDER, CURRENTLY DEPRESSED, MODERATE (HCC): Primary | ICD-10-CM

## 2020-03-07 PROBLEM — J45.909 ASTHMA: Status: ACTIVE | Noted: 2020-03-07

## 2020-03-07 PROBLEM — F81.9 LEARNING DISORDER: Status: ACTIVE | Noted: 2019-02-16

## 2020-03-07 PROBLEM — E11.9 DIABETES MELLITUS TYPE 2 IN NONOBESE (HCC): Status: ACTIVE | Noted: 2019-02-11

## 2020-03-07 PROBLEM — Z72.0 TOBACCO ABUSE: Status: ACTIVE | Noted: 2020-03-07

## 2020-03-07 PROBLEM — F31.0 BIPOLAR DISORDER, CURRENT EPISODE HYPOMANIC (HCC): Status: ACTIVE | Noted: 2020-01-01

## 2020-03-07 PROBLEM — Z90.49 STATUS POST APPENDECTOMY: Status: ACTIVE | Noted: 2020-03-07

## 2020-03-07 PROBLEM — Z00.8 MEDICAL CLEARANCE FOR PSYCHIATRIC ADMISSION: Status: ACTIVE | Noted: 2020-03-07

## 2020-03-07 LAB
GLUCOSE SERPL-MCNC: 108 MG/DL (ref 65–140)
GLUCOSE SERPL-MCNC: 118 MG/DL (ref 65–140)
GLUCOSE SERPL-MCNC: 273 MG/DL (ref 65–140)
GLUCOSE SERPL-MCNC: 276 MG/DL (ref 65–140)
GLUCOSE SERPL-MCNC: 92 MG/DL (ref 65–140)

## 2020-03-07 PROCEDURE — 99253 IP/OBS CNSLTJ NEW/EST LOW 45: CPT | Performed by: PHYSICIAN ASSISTANT

## 2020-03-07 PROCEDURE — 82948 REAGENT STRIP/BLOOD GLUCOSE: CPT

## 2020-03-07 PROCEDURE — 99222 1ST HOSP IP/OBS MODERATE 55: CPT | Performed by: PSYCHIATRY & NEUROLOGY

## 2020-03-07 RX ORDER — INSULIN GLARGINE 100 [IU]/ML
22 INJECTION, SOLUTION SUBCUTANEOUS EVERY MORNING
Status: DISCONTINUED | OUTPATIENT
Start: 2020-03-07 | End: 2020-03-09 | Stop reason: HOSPADM

## 2020-03-07 RX ORDER — LORAZEPAM 2 MG/ML
1 INJECTION INTRAMUSCULAR EVERY 6 HOURS PRN
Status: DISCONTINUED | OUTPATIENT
Start: 2020-03-07 | End: 2020-03-09 | Stop reason: HOSPADM

## 2020-03-07 RX ORDER — LORAZEPAM 1 MG/1
1 TABLET ORAL EVERY 6 HOURS PRN
Status: DISCONTINUED | OUTPATIENT
Start: 2020-03-07 | End: 2020-03-09 | Stop reason: HOSPADM

## 2020-03-07 RX ORDER — FENOFIBRATE 48 MG/1
48 TABLET, COATED ORAL DAILY
Status: DISCONTINUED | OUTPATIENT
Start: 2020-03-07 | End: 2020-03-09 | Stop reason: HOSPADM

## 2020-03-07 RX ORDER — HALOPERIDOL 5 MG/ML
5 INJECTION INTRAMUSCULAR EVERY 6 HOURS PRN
Status: DISCONTINUED | OUTPATIENT
Start: 2020-03-07 | End: 2020-03-09 | Stop reason: HOSPADM

## 2020-03-07 RX ORDER — ALBUTEROL SULFATE 90 UG/1
2 AEROSOL, METERED RESPIRATORY (INHALATION) EVERY 4 HOURS PRN
Status: DISCONTINUED | OUTPATIENT
Start: 2020-03-07 | End: 2020-03-09 | Stop reason: HOSPADM

## 2020-03-07 RX ORDER — LISINOPRIL 5 MG/1
5 TABLET ORAL DAILY
Status: DISCONTINUED | OUTPATIENT
Start: 2020-03-07 | End: 2020-03-09 | Stop reason: HOSPADM

## 2020-03-07 RX ORDER — TRAZODONE HYDROCHLORIDE 50 MG/1
50 TABLET ORAL
Status: DISCONTINUED | OUTPATIENT
Start: 2020-03-07 | End: 2020-03-09 | Stop reason: HOSPADM

## 2020-03-07 RX ORDER — ARIPIPRAZOLE 15 MG/1
15 TABLET ORAL DAILY
Status: DISCONTINUED | OUTPATIENT
Start: 2020-03-07 | End: 2020-03-09 | Stop reason: HOSPADM

## 2020-03-07 RX ORDER — HALOPERIDOL 5 MG
5 TABLET ORAL EVERY 6 HOURS PRN
Status: DISCONTINUED | OUTPATIENT
Start: 2020-03-07 | End: 2020-03-09 | Stop reason: HOSPADM

## 2020-03-07 RX ORDER — FLUTICASONE PROPIONATE 110 UG/1
1 AEROSOL, METERED RESPIRATORY (INHALATION) EVERY 12 HOURS SCHEDULED
Status: DISCONTINUED | OUTPATIENT
Start: 2020-03-07 | End: 2020-03-09 | Stop reason: HOSPADM

## 2020-03-07 RX ORDER — NICOTINE 21 MG/24HR
14 PATCH, TRANSDERMAL 24 HOURS TRANSDERMAL DAILY
Status: DISCONTINUED | OUTPATIENT
Start: 2020-03-07 | End: 2020-03-09 | Stop reason: HOSPADM

## 2020-03-07 RX ADMIN — INSULIN LISPRO 4 UNITS: 100 INJECTION, SOLUTION INTRAVENOUS; SUBCUTANEOUS at 09:18

## 2020-03-07 RX ADMIN — FENOFIBRATE 48 MG: 48 TABLET ORAL at 16:59

## 2020-03-07 RX ADMIN — INSULIN LISPRO 6 UNITS: 100 INJECTION, SOLUTION INTRAVENOUS; SUBCUTANEOUS at 09:18

## 2020-03-07 RX ADMIN — INSULIN LISPRO 4 UNITS: 100 INJECTION, SOLUTION INTRAVENOUS; SUBCUTANEOUS at 17:02

## 2020-03-07 RX ADMIN — INSULIN LISPRO 4 UNITS: 100 INJECTION, SOLUTION INTRAVENOUS; SUBCUTANEOUS at 12:00

## 2020-03-07 RX ADMIN — INSULIN GLARGINE 22 UNITS: 100 INJECTION, SOLUTION SUBCUTANEOUS at 09:15

## 2020-03-07 RX ADMIN — ARIPIPRAZOLE 15 MG: 15 TABLET ORAL at 13:52

## 2020-03-07 NOTE — ED NOTES
Pt is a 51 y o  male who was brought to the ED with   Chief Complaint   Patient presents with   [facility] Psychiatric Evaluation     Pt reports SI since dinner time today  Denies plan  Pt states he hears voices telling him to hurt himself  Denies HI/VH  Pt brought to the ED with complaints of A/H command type "the voices are getting louder and telling me to run into traffic" Pt was seen twice today in the ED and was discharge with no complaints, Pt reports S/I as of tonight  Pt reports increased depression, increased anxiety  Pt denies H/I,V/H   Intake Assessment completed, Safety risk Assessment completed  CW met with pt and discussed the process of a  [facility] admission, Pt has agreed and signed a 201, CW discussed this case and pt plan with ED Physician who is in agreement with this plan  CW will start bed search and complete the Pre-Cert        [address] [facility] [place] Worker
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
## 2020-03-07 NOTE — ED PROVIDER NOTES
History  Chief Complaint   Patient presents with    Psychiatric Evaluation     Pt reports SI since dinner time today  Denies plan  Pt states he hears voices telling him to hurt himself  Denies HI/VH  This is a 71-year-old male who is well known to our emergency department with a history of hypertension, hyperlipidemia, bipolar disorder who presents with suicidal ideation and auditory hallucinations  The patient states that his suicidal thoughts began today  Denies any specific plan  States that he is hearing voices which are telling him to jump out in front of traffic  Denies any visual hallucinations  Denies any history of suicide attempts  Denies any alcohol or drug use today  Denies any access to firearms  He has no other complaints today  Denies fever/chills, nausea/vomiting, lightheadedness/dizziness, numbness/weakness, headache, change in vision, URI symptoms, neck pain, chest pain, palpitations, shortness of breath, cough, back pain, flank pain, abdominal pain, diarrhea, hematochezia, melena, dysuria, hematuria  Prior to Admission Medications   Prescriptions Last Dose Informant Patient Reported? Taking?    ARIPiprazole (ABILIFY DISCMELT) 10 mg dispersible tablet   No No   Sig: Take 1 tablet (10 mg total) by mouth daily for 7 days   Patient taking differently: Take 15 mg by mouth daily    LORazepam (ATIVAN) 1 mg tablet   Yes No   Sig: Take 1 mg by mouth every 6 (six) hours as needed   albuterol (PROVENTIL HFA,VENTOLIN HFA) 90 mcg/act inhaler   No No   Sig: Inhale 2 puffs every 4 (four) hours as needed for wheezing   dicyclomine (BENTYL) 20 mg tablet   No No   Sig: Take 1 tablet (20 mg total) by mouth 2 (two) times a day   dicyclomine (BENTYL) 20 mg tablet   No No   Sig: Take 1 tablet (20 mg total) by mouth 2 (two) times a day   fenofibrate (TRICOR) 48 mg tablet   No No   Sig: Take 1 tablet (48 mg total) by mouth daily for 7 days At 9am   fluticasone (FLOVENT HFA) 110 MCG/ACT inhaler   No No   Sig: Inhale 1 puff every 12 (twelve) hours Rinse mouth after use    hydrOXYzine HCL (ATARAX) 25 mg tablet   Yes No   Sig: Take 25 mg by mouth every 6 (six) hours as needed   ibuprofen (MOTRIN) 600 mg tablet   No No   Sig: Take 1 tablet (600 mg total) by mouth every 8 (eight) hours as needed for mild pain or fever   insulin glargine (LANTUS SOLOSTAR) 100 units/mL injection pen   No No   Sig: Inject 22 Units under the skin daily Every morning  insulin lispro (HUMALOG KWIKPEN) 100 units/mL injection pen   No No   Sig: Inject 4 Units under the skin 3 (three) times a day with meals   Patient taking differently: Inject 15 Units under the skin 3 (three) times a day with meals    lisinopril (ZESTRIL) 5 mg tablet   No No   Sig: Take 1 tablet (5 mg total) by mouth daily for 7 days At 9am   methocarbamol (ROBAXIN) 500 mg tablet   Yes No   Sig: Take 500 mg by mouth   metoclopramide (REGLAN) 10 mg tablet   No No   Sig: Take 1 tablet (10 mg total) by mouth every 6 (six) hours   naproxen (NAPROSYN) 500 mg tablet   No No   Sig: Take 1 tablet (500 mg total) by mouth 2 (two) times a day with meals   ondansetron (ZOFRAN-ODT) 4 mg disintegrating tablet   No No   Sig: Take 1 tablet (4 mg total) by mouth every 8 (eight) hours as needed for nausea or vomiting   ondansetron (ZOFRAN-ODT) 4 mg disintegrating tablet   No No   Sig: Take 1 tablet (4 mg total) by mouth every 6 (six) hours as needed for nausea   thiothixene (NAVANE) 1 mg capsule   No No   Sig: Take 1 capsule (1 mg total) by mouth daily for 7 days      Facility-Administered Medications: None       Past Medical History:   Diagnosis Date    Anxiety     Asthma     [facility]     Depression     [facility]     Psychiatric disorder        Past Surgical History:   Procedure Laterality Date    APPENDECTOMY         History reviewed  No pertinent family history  I have reviewed and agree with the history as documented      E-Cigarette/Vaping    E-Cigarette Use Never User      E-Cigarette/Vaping Substances     Social History     Tobacco Use    Smoking status: Current Every Day Smoker     Packs/day: 0 20     Types: Cigarettes    Smokeless tobacco: Never Used   Substance Use Topics    Alcohol use: Not Currently     Frequency: Monthly or less     Drinks per session: 1 or 2     Binge frequency: Never     Comment: States has had no alcohol in at least a year    Drug use: No       Review of Systems   Constitutional: Negative for chills, fatigue and fever  HENT: Negative for rhinorrhea, sore throat and trouble swallowing  Eyes: Negative for photophobia and visual disturbance  Respiratory: Negative for cough, chest tightness and shortness of breath  Cardiovascular: Negative for chest pain, palpitations and leg swelling  Gastrointestinal: Negative for abdominal pain, blood in stool, diarrhea, nausea and vomiting  Endocrine: Negative for polyuria  Genitourinary: Negative for dysuria, flank pain and hematuria  Musculoskeletal: Negative for back pain and neck pain  Skin: Negative for color change and rash  Allergic/Immunologic: Negative for immunocompromised state  Neurological: Negative for dizziness, weakness, light-headedness, numbness and headaches  Psychiatric/Behavioral: Positive for hallucinations and suicidal ideas  All other systems reviewed and are negative  Physical Exam  Physical Exam   Constitutional: Vital signs are normal  He appears well-developed and well-nourished  He is cooperative  No distress  HENT:   Mouth/Throat: Uvula is midline and oropharynx is clear and moist    Eyes: Pupils are equal, round, and reactive to light  Conjunctivae, EOM and lids are normal    Neck: Trachea normal  No thyroid mass and no thyromegaly present  Cardiovascular: Normal rate, regular rhythm, normal heart sounds, intact distal pulses and normal pulses  No murmur heard    Pulmonary/Chest: Effort normal and breath sounds normal  Abdominal: Soft  Normal appearance and bowel sounds are normal  There is no tenderness  There is no rebound, no guarding, no CVA tenderness and negative Flor's sign  Neurological: He is alert  Skin: Skin is warm, dry and intact  Psychiatric: He has a normal mood and affect  His speech is normal and behavior is normal  He expresses suicidal ideation  He expresses no homicidal ideation  He expresses no suicidal plans and no homicidal plans  Vital Signs  ED Triage Vitals [03/06/20 2004]   Temperature Pulse Respirations Blood Pressure SpO2   97 5 °F (36 4 °C) 93 18 129/73 99 %      Temp Source Heart Rate Source Patient Position - Orthostatic VS BP Location FiO2 (%)   Oral Monitor -- Right arm --      Pain Score       --           Vitals:    03/06/20 2004   BP: 129/73   Pulse: 93         Visual Acuity      ED Medications  Medications - No data to display    Diagnostic Studies  Results Reviewed     Procedure Component Value Units Date/Time    POCT alcohol breath test [[id number]]     Lab Status:  No result     Rapid drug screen, urine [[id number]]     Lab Status:  No result Specimen:  Urine                  No orders to display              Procedures  Procedures         ED Course                               MDM  Number of Diagnoses or Management Options  Diagnosis management comments: Rapid urine drug screen, Breathalyzer  Crisis consult          Disposition  Final diagnoses:   Depression   Suicidal ideation   Auditory hallucination     Time reflects when diagnosis was documented in both MDM as applicable and the Disposition within this note     Time User Action Codes Description Comment    3/6/2020  8:39 PM Le Soulier Add [F32 9] Depression     3/6/2020  8:39 PM Le Soulier Add [I20 875] Suicidal ideation     3/6/2020  8:39 PM Le Soulier Add [R44 0] Auditory hallucination       ED Disposition     ED Disposition Condition Date/Time Comment    Transfer to [facility] Mar 6, 2020 8:39 PM Martinez Mccauley should be transferred out to psych and has been medically cleared  Follow-up Information    None         Patient's Medications   Discharge Prescriptions    No medications on file     No discharge procedures on file      PDMP Review     None          ED Provider  Electronically Signed by           Maggy Peña MD  03/06/20 2239

## 2020-03-07 NOTE — ED NOTES
Insurance Authorization for admission:   Phone call placed to [facility]  Phone number: [phone]  Spoke to [address]     3 days approved  Level of care: Acute Inpt  (201)  Review on 03/09/2020  Authorization # To be obtained by accepting facility upon arrival         EVS (Eligibility Verification System) called - [phone]  Automated system indicates: Active with [facility] for Transportation: To be obtained, if needed, once transportation arranged       TRINITY Gilliland  03/06/20   3898

## 2020-03-07 NOTE — TREATMENT PLAN
RN will assess patient at least twice daily for any concerns regarding diagnosis, medications, and also to assist with coping mechanisms as needed

## 2020-03-07 NOTE — PROGRESS NOTES
Pt is a 201 from [facility]-ED for increased depression, anxiety, and CAH telling him to jump into traffic  He reports "hearing voices for a long time but not like this "  He states they have been getting louder in the last couple of days  He reports to this writer that he currently does not hear them and reports feeling safe at home and feels safe here, verbally CFS  He currently denies SI,VH and states never having HI  He states ne does not currently drink alcohol stating his last drink was in 2004  Denies ever being a heavy drinker at any time  He denies any drug use, UDS negative  He reports being compliant with all his medications currently taking abilify 15 mg daily for depression, atarax and ativan for anxiety and several medications listed in PTA med list he does not know why he takes them  He reports taking lisinopril 5mg daily his BP runs low denying any symptoms of dizziness  No hx of falls or seizures  He reports smoking one cigarette every other day  He would like to quit  Pt given education on smoking cessation  He is pleasant, calm, and cooperative with admission process  Appearance is disheveled, blunted affect, awake and alert  Pt was given fluids, sandwich, crackers, and juice upon arrival   He reports having been her before as writer oriented pt to the unit  He is currently in his room resting appears to be comfortable

## 2020-03-07 NOTE — ED NOTES
Assumed care of patient at this time, pt resting in room comfortably, pt being monitored by Yovani nava on a one to one visual observation which is being recorded on ED behavior health observation record       Saintclair Catholic, RN  03/07/20 9380

## 2020-03-07 NOTE — ED NOTES
Provided with sandwich, pretzels, gram crackers and diet pepsi as per his request       Ariadne Taveras RN  03/06/20 2126

## 2020-03-07 NOTE — ED NOTES
Patient is accepted at [facility] [address]  Patient is accepted by Dr Margaret Thibodeaux  per [facility] is arranged with [facility] is scheduled for 3:45am  Patient may be picked up anytime after 2am        Nurse report is to be called to [phone] prior to patient transfer

## 2020-03-07 NOTE — TREATMENT PLAN
TREATMENT PLAN REVIEW - [facility] [place] 51 y o  1970 male MRN: [id number]    [address] [place] [address] Room / Bed: [facility] 989-70 Encounter: [id number]          Admit Date/Time:  3/7/2020  4:24 AM    Treatment Team: Attending Provider: Didier Raya DO; Patient Care Technician: Flash Bailey; Patient Care Assistant: Dudley Crocker; Patient Care Technician: Ely Claire; Security: Rebekah Salts; Medications RN: Mariela Newton RN; Registered Nurse: Catarina Landau, RN; Patient Care Assistant: Johnson Thompson    Diagnosis: Principal Problem:    Bipolar affective disorder, currently depressed, moderate ([facility] )  Active Problems:    HTN (hypertension)    Type 1 diabetes mellitus ([facility] )    Medical clearance for psychiatric admission    Asthma    Tobacco abuse      Patient Strengths/Assets: cooperative, patient is on a voluntary commitment    Patient Barriers/Limitations: limited support system, poor past treatment response    Short Term Goals: decrease in Auditory hallucinations    Long Term Goals: resolution of psychotic symptoms    Progress Towards Goals: starting psychiatric medications as prescribed    Recommended Treatment: medication management, patient medication education, group therapy, milieu therapy, continued Behavioral Health psychiatric evaluation/assessment process    Treatment Frequency: daily medication monitoring, group and milieu therapy daily, monitoring through interdisciplinary rounds, monitoring through weekly patient care conferences    Expected Discharge Date:   In 3 to 5 days     Discharge Plan: referrals as indicated    Treatment Plan Created/Updated By: Rosina Castleman, MD

## 2020-03-07 NOTE — ASSESSMENT & PLAN NOTE
· Blood pressure stable this AM did appear to have a lower BP on arrival to the unit  · Maintained on lisinopril - continue but with hold parameters

## 2020-03-07 NOTE — ASSESSMENT & PLAN NOTE
· Vital signs stable at time of assessment  · CBC, CMP, TSH pending  · EKG:  NSR, normal QTc  · Patient appears medically stable at this time for inpatient psychiatric treatment

## 2020-03-07 NOTE — CONSULTS
Consult- Verito Tate 1970, 51 y o  male MRN: [id number]    Unit/Bed#: [facility] 988-10 Encounter: [id number]    Primary Care Provider: Sindi Iyer MD   Date and time admitted to hospital: 3/7/2020  4:24 AM    Inpatient consult for Medical Clearance for [facility] patient  Consult performed by: Jean-Paul Andre PA-C  Consult ordered by: Maximus Yi MD        Medical clearance for psychiatric admission  Assessment & Plan  · Vital signs stable at time of assessment  · CBC, CMP, TSH pending  · EKG:  NSR, normal QTc  · Patient appears medically stable at this time for inpatient psychiatric treatment    Tobacco abuse  Assessment & Plan  · Counseled cessation  · Nicotine patch ordered    Asthma  Assessment & Plan  · Does not appear to be in acute exacerbation  · Continue flovent BID  · Albuterol PRN    Bipolar affective disorder, currently depressed, moderate ([facility] )  Assessment & Plan  · Further plan per psychiatry    Type 1 diabetes mellitus [facility])  Assessment & Plan  Lab Results   Component Value Date    HGBA1C 11 4 (H) 10/12/2019       Recent Labs     03/05/20  2226 03/06/20  2224 03/07/20  0434   POCGLU 166* 217* 273*       Blood Sugar Average: Last 72 hrs:  (P) 273   · Uncontrolled  · Currently maintained on lantus 22 units QAM  · Humalog 4 units TID with meals  · SSI + accuchek  · Diabetic diet  · Outpatient follow up with endocrinology    HTN (hypertension)  Assessment & Plan  · Blood pressure stable this AM did appear to have a lower BP on arrival to the unit  · Maintained on lisinopril - continue but with hold parameters      VTE Prophylaxis: Reason for no pharmacologic prophylaxis low risk, ambulate  / reason for no mechanical VTE prophylaxis psychiatric unit, ambulate     Recommendations for Discharge:  · Follow up with PCP and endocrinology as an outpatient    Counseling / Coordination of Care Time: 20 minutes    Greater than 50% of total time spent on patient counseling and coordination of care     Collaboration of Care: Were Recommendations Directly Discussed with Primary Treatment Team? - No     History of Present Illness:    Martinez Mccauley is a 51 y o  male who is originally admitted to the psychiatry service due to auditory hallucinations  We are consulted for medical clearance  Past medical history significant for essential hypertension, type 1 diabetes mellitus, bipolar disorder, asthma tobacco abuse  Patient states that he has been hearing voices telling him to self harm  Denies any current suicidal ideation  Denies any physical complaints including chest pain/palpitations, shortness of breath, nausea/vomiting, abdominal pain  Review of Systems:    Review of Systems   Constitutional: Negative for chills, fatigue, fever and unexpected weight change  HENT: Negative for congestion, sore throat and trouble swallowing  Eyes: Negative for photophobia, pain and visual disturbance  Respiratory: Negative for cough, shortness of breath and wheezing  Cardiovascular: Negative for chest pain, palpitations and leg swelling  Gastrointestinal: Negative for abdominal pain, constipation, diarrhea, nausea and vomiting  Endocrine: Negative for polyuria  Genitourinary: Negative for difficulty urinating, dysuria, flank pain, hematuria and urgency  Musculoskeletal: Negative for back pain, myalgias, neck pain and neck stiffness  Skin: Negative for pallor and rash  Neurological: Negative for dizziness, tremors, syncope, speech difficulty, weakness, light-headedness and headaches  Hematological: Does not bruise/bleed easily  Psychiatric/Behavioral: Positive for hallucinations  Negative for agitation and confusion         Past Medical and Surgical History:     Past Medical History:   Diagnosis Date    Anxiety     Asthma     Bipolar [facility] disorder ([facility] )     Depression     Diabetes mellitus ([facility] )     Psychiatric disorder        Past Surgical History:   Procedure Laterality Date    APPENDECTOMY         Meds/Allergies:    all medications and allergies reviewed    Allergies: Allergies   Allergen Reactions    Ketorolac Other (See Comments)     Pt states he has mood disturbances, agitation if he takes too much      Toradol [Ketorolac Tromethamine]     Latex Rash       Social History:     Marital Status: Single    Substance Use History:   Social History     Substance and Sexual Activity   Alcohol Use Not Currently    Frequency: Never    Binge frequency: Never    Comment: States has had no alcohol in at least a year     Social History     Tobacco Use   Smoking Status Current Every Day Smoker    Packs/day: 0 20    Types: Cigarettes   Smokeless Tobacco Never Used     Social History     Substance and Sexual Activity   Drug Use No       Family History:    History reviewed  No pertinent family history  Physical Exam:     Vitals:   Blood Pressure: 108/55 (03/07/20 0726)  Pulse: 74 (03/07/20 0726)  Temperature: 97 7 °F (36 5 °C) (03/07/20 0726)  Temp Source: Tympanic (03/07/20 0726)  Respirations: 19 (03/07/20 0726)  Height: 5' 6" (167 6 cm) (03/07/20 0440)  Weight - Scale: 59 2 kg (130 lb 9 6 oz) (03/07/20 0440)  SpO2: 96 % (03/07/20 0440)    Physical Exam   Constitutional: He is oriented to person, place, and time  Vital signs are normal  He appears well-developed  Appears comfortable, no acute distress  HENT:   Head: Normocephalic  Eyes: Pupils are equal, round, and reactive to light  Conjunctivae and EOM are normal  No scleral icterus  Neck: Normal range of motion  Cardiovascular: Normal rate, regular rhythm and normal heart sounds  No murmur heard  Pulmonary/Chest: Effort normal and breath sounds normal  No respiratory distress  He has no wheezes  He has no rhonchi  He has no rales  Abdominal: Soft  Bowel sounds are normal  There is no tenderness  There is no rigidity, no rebound and no guarding  Musculoskeletal: He exhibits no edema, tenderness or deformity     Able to ambulate without difficulty   Neurological: He is alert and oriented to person, place, and time  Skin: Skin is warm and dry  Psychiatric: He has a normal mood and affect  His speech is normal and behavior is normal    Nursing note and vitals reviewed  Additional Data:     Lab Results: I have personally reviewed pertinent reports  Lab Results   Component Value Date/Time    HGBA1C 11 4 (H) 10/12/2019 09:28 AM     Results from last 7 days   Lab Units 03/07/20  0820 03/07/20  0434 03/06/20  2224 03/05/20  2226   POC GLUCOSE mg/dl 276* 273* 217* 166*           Imaging: I have personally reviewed pertinent reports  No orders to display       EKG, Pathology, and Other Studies Reviewed on Admission:   · EKG:  NSR normal QTC    ** Please Note: This note has been constructed using a voice recognition system   **

## 2020-03-07 NOTE — ASSESSMENT & PLAN NOTE
Lab Results   Component Value Date    HGBA1C 11 4 (H) 10/12/2019       Recent Labs     03/05/20  2226 03/06/20  2224 03/07/20  0434   POCGLU 166* 217* 273*       Blood Sugar Average: Last 72 hrs:  (P) 273   · Uncontrolled  · Currently maintained on lantus 22 units QAM  · Humalog 4 units TID with meals  · SSI + accuchek  · Diabetic diet  · Outpatient follow up with endocrinology

## 2020-03-07 NOTE — PROGRESS NOTES
Pt is pleasant and polite during interaction  Seclusive to room, coming out primarily for meals and meds  Denies all psych sx  Requested to sign 72 hour notice in hopes to be discharged on Monday and return to work  72 hour notice signed today at 1265

## 2020-03-07 NOTE — ED NOTES
CW started bed search, CW spoke with Dot Masker  who informed me that pt clinical can be presented to [address] for possible admission  [address] has been faxed      [address] [place] [address]

## 2020-03-07 NOTE — PROGRESS NOTES
Pt arrived to the [facility] with the following items  AT BEDSIDE  -socks  -underware  -jeans  -shirt  IN LOCKER  -belt  -hoodie  -coat  -sneakers  -cell phone  -cell phone   -paperwork  -wallet containing the following:  *[place] state ID card  *insurance cards x2  * Social Security card  *multiple business cards

## 2020-03-07 NOTE — EMTALA/ACUTE CARE TRANSFER
[facility]  [address] [facility] [address]  Dept: [phone]      EMTALA TRANSFER CONSENT    NAME Rizwana Palomares                                         1970                              MRN [id number]    I have been informed of my rights regarding examination, treatment, and transfer   by Dr Eliot Nevarez MD    Benefits: Specialized equipment and/or services available at the receiving facility (Include comment)________________________    Risks: Potential for delay in receiving treatment      Consent for Transfer:  I acknowledge that my medical condition has been evaluated and explained to me by the emergency department physician or other qualified medical person and/or my attending physician, who has recommended that I be transferred to the service of  Accepting Physician: Dr Didi Lin at [address] [place] [address] Name, [facility] : [address], [place]   The above potential benefits of such transfer, the potential risks associated with such transfer, and the probable risks of not being transferred have been explained to me, and I fully understand them  The doctor has explained that, in my case, the benefits of transfer outweigh the risks  I agree to be transferred  I authorize the performance of emergency medical procedures and treatments upon me in both transit and upon arrival at the receiving facility  Additionally, I authorize the release of any and all medical records to the receiving facility and request they be transported with me, if possible  I understand that the safest mode of transportation during a medical emergency is an ambulance and that the Hospital advocates the use of this mode of transport   Risks of traveling to the receiving facility by car, including absence of medical control, life sustaining equipment, such as oxygen, and medical personnel has been explained to me and I fully understand them     ([facility])  [ x ]  I consent to the stated transfer and to be transported by ambulance/helicopter  [  ]  I consent to the stated transfer, but refuse transportation by ambulance and accept full responsibility for my transportation by car  I understand the risks of non-ambulance transfers and I exonerate the Hospital and its staff from any deterioration in my condition that results from this refusal     X___________________________________________    DATE  20  TIME________  Signature of patient or legally responsible individual signing on patient behalf           RELATIONSHIP TO PATIENT_________________________          Provider Certification    NAME Parisa Masterson                                         1970                              MRN [id number]    A medical screening exam was performed on the above named patient  Based on the examination:    Condition Necessitating Transfer The primary encounter diagnosis was Depression  Diagnoses of Suicidal ideation and Auditory hallucination were also pertinent to this visit      Patient Condition: The patient has been stabilized such that within reasonable medical probability, no material deterioration of the patient condition or the condition of the unborn child(aida) is likely to result from the transfer    Reason for Transfer: Level of Care needed not available at this facility    Transfer Requirements: [address] [place], [place]    · Space available and qualified personnel available for treatment as acknowledged by    · Agreed to accept transfer and to provide appropriate medical treatment as acknowledged by       Dr Flori Parks  · Appropriate medical records of the examination and treatment of the patient are provided at the time of transfer   [address] [facility], [address] _______  · Transfer will be performed by qualified personnel from    and appropriate transfer equipment as required, including the use of necessary and appropriate life support measures  Provider Certification: I have examined the patient and explained the following risks and benefits of being transferred/refusing transfer to the patient/family:  The patient is stable for psychiatric transfer because they are medically stable, and is protected from harming him/herself or others during transport      Based on these reasonable risks and benefits to the patient and/or the unborn child(aida), and based upon the information available at the time of the patients examination, I certify that the medical benefits reasonably to be expected from the provision of appropriate medical treatments at another medical facility outweigh the increasing risks, if any, to the individuals medical condition, and in the case of labor to the unborn child, from effecting the transfer      X____________________________________________ DATE 03/06/20        TIME_______      ORIGINAL - SEND TO MEDICAL RECORDS   COPY - SEND WITH PATIENT DURING TRANSFER

## 2020-03-08 LAB
ALBUMIN SERPL BCP-MCNC: 3.6 G/DL (ref 3.5–5)
ALP SERPL-CCNC: 66 U/L (ref 46–116)
ALT SERPL W P-5'-P-CCNC: 17 U/L (ref 12–78)
ANION GAP SERPL CALCULATED.3IONS-SCNC: 6 MMOL/L (ref 4–13)
AST SERPL W P-5'-P-CCNC: 10 U/L (ref 5–45)
BASOPHILS # BLD AUTO: 0.02 THOUSANDS/ΜL (ref 0–0.1)
BASOPHILS NFR BLD AUTO: 0 % (ref 0–1)
BILIRUB SERPL-MCNC: 0.21 MG/DL (ref 0.2–1)
BUN SERPL-MCNC: 19 MG/DL (ref 5–25)
CALCIUM SERPL-MCNC: 8.9 MG/DL (ref 8.3–10.1)
CHLORIDE SERPL-SCNC: 107 MMOL/L (ref 100–108)
CO2 SERPL-SCNC: 30 MMOL/L (ref 21–32)
CREAT SERPL-MCNC: 0.96 MG/DL (ref 0.6–1.3)
EOSINOPHIL # BLD AUTO: 0.15 THOUSAND/ΜL (ref 0–0.61)
EOSINOPHIL NFR BLD AUTO: 2 % (ref 0–6)
ERYTHROCYTE [DISTWIDTH] IN BLOOD BY AUTOMATED COUNT: 12.7 % (ref 11.6–15.1)
GFR SERPL CREATININE-BSD FRML MDRD: 93 ML/MIN/1.73SQ M
GLUCOSE P FAST SERPL-MCNC: 203 MG/DL (ref 65–99)
GLUCOSE SERPL-MCNC: 123 MG/DL (ref 65–140)
GLUCOSE SERPL-MCNC: 143 MG/DL (ref 65–140)
GLUCOSE SERPL-MCNC: 176 MG/DL (ref 65–140)
GLUCOSE SERPL-MCNC: 189 MG/DL (ref 65–140)
GLUCOSE SERPL-MCNC: 203 MG/DL (ref 65–140)
HCT VFR BLD AUTO: 41 % (ref 36.5–49.3)
HGB BLD-MCNC: 13.8 G/DL (ref 12–17)
IMM GRANULOCYTES # BLD AUTO: 0.01 THOUSAND/UL (ref 0–0.2)
IMM GRANULOCYTES NFR BLD AUTO: 0 % (ref 0–2)
LYMPHOCYTES # BLD AUTO: 2.25 THOUSANDS/ΜL (ref 0.6–4.47)
LYMPHOCYTES NFR BLD AUTO: 35 % (ref 14–44)
MCH RBC QN AUTO: 30.1 PG (ref 26.8–34.3)
MCHC RBC AUTO-ENTMCNC: 33.7 G/DL (ref 31.4–37.4)
MCV RBC AUTO: 90 FL (ref 82–98)
MONOCYTES # BLD AUTO: 0.52 THOUSAND/ΜL (ref 0.17–1.22)
MONOCYTES NFR BLD AUTO: 8 % (ref 4–12)
NEUTROPHILS # BLD AUTO: 3.44 THOUSANDS/ΜL (ref 1.85–7.62)
NEUTS SEG NFR BLD AUTO: 55 % (ref 43–75)
NRBC BLD AUTO-RTO: 0 /100 WBCS
PLATELET # BLD AUTO: 219 THOUSANDS/UL (ref 149–390)
PMV BLD AUTO: 12.1 FL (ref 8.9–12.7)
POTASSIUM SERPL-SCNC: 4.1 MMOL/L (ref 3.5–5.3)
PROT SERPL-MCNC: 6.8 G/DL (ref 6.4–8.2)
RBC # BLD AUTO: 4.58 MILLION/UL (ref 3.88–5.62)
SODIUM SERPL-SCNC: 143 MMOL/L (ref 136–145)
TSH SERPL DL<=0.05 MIU/L-ACNC: 0.59 UIU/ML (ref 0.36–3.74)
WBC # BLD AUTO: 6.39 THOUSAND/UL (ref 4.31–10.16)

## 2020-03-08 PROCEDURE — 80053 COMPREHEN METABOLIC PANEL: CPT | Performed by: STUDENT IN AN ORGANIZED HEALTH CARE EDUCATION/TRAINING PROGRAM

## 2020-03-08 PROCEDURE — 82948 REAGENT STRIP/BLOOD GLUCOSE: CPT

## 2020-03-08 PROCEDURE — 85025 COMPLETE CBC W/AUTO DIFF WBC: CPT | Performed by: STUDENT IN AN ORGANIZED HEALTH CARE EDUCATION/TRAINING PROGRAM

## 2020-03-08 PROCEDURE — 84443 ASSAY THYROID STIM HORMONE: CPT | Performed by: PSYCHIATRY & NEUROLOGY

## 2020-03-08 PROCEDURE — 99232 SBSQ HOSP IP/OBS MODERATE 35: CPT | Performed by: PSYCHIATRY & NEUROLOGY

## 2020-03-08 RX ADMIN — FENOFIBRATE 48 MG: 48 TABLET ORAL at 09:02

## 2020-03-08 RX ADMIN — INSULIN LISPRO 4 UNITS: 100 INJECTION, SOLUTION INTRAVENOUS; SUBCUTANEOUS at 16:52

## 2020-03-08 RX ADMIN — ARIPIPRAZOLE 15 MG: 15 TABLET ORAL at 09:02

## 2020-03-08 RX ADMIN — INSULIN LISPRO 2 UNITS: 100 INJECTION, SOLUTION INTRAVENOUS; SUBCUTANEOUS at 09:06

## 2020-03-08 RX ADMIN — INSULIN GLARGINE 22 UNITS: 100 INJECTION, SOLUTION SUBCUTANEOUS at 09:05

## 2020-03-08 RX ADMIN — INSULIN LISPRO 4 UNITS: 100 INJECTION, SOLUTION INTRAVENOUS; SUBCUTANEOUS at 12:23

## 2020-03-08 RX ADMIN — INSULIN LISPRO 2 UNITS: 100 INJECTION, SOLUTION INTRAVENOUS; SUBCUTANEOUS at 12:23

## 2020-03-08 RX ADMIN — INSULIN LISPRO 4 UNITS: 100 INJECTION, SOLUTION INTRAVENOUS; SUBCUTANEOUS at 09:05

## 2020-03-08 NOTE — PLAN OF CARE
Problem: SELF HARM/SUICIDALITY  Goal: Will have no self-injury during hospital stay  Description  INTERVENTIONS:  - Q 15 MINUTES: Routine safety checks  - Q WAKING SHIFT & PRN: Assess risk to determine if routine checks are adequate to maintain patient safety  - Encourage patient to participate actively in care by formulating a plan to combat response to suicidal ideation, identify supports and resources  Outcome: Progressing     Problem: DEPRESSION  Goal: Will be euthymic at discharge  Description  INTERVENTIONS:  - Administer medication as ordered  - Provide emotional support via 1:1 interaction with staff  - Encourage involvement in milieu/groups/activities  - Monitor for social isolation  Outcome: Progressing     Problem: PSYCHOSIS  Goal: Will report no hallucinations or delusions  Description  Interventions:  - Administer medication as  ordered  - Every waking shifts and PRN assess for the presence of hallucinations and or delusions  - Assist with reality testing to support increasing orientation  - Assess if patient's hallucinations or delusions are encouraging self-harm or harm to others and intervene as appropriate  Outcome: Progressing     Problem: ANXIETY  Goal: Will report anxiety at manageable levels  Description  INTERVENTIONS:  - Administer medication as ordered  - Teach and encourage coping skills  - Provide emotional support  - Assess patient/family for anxiety and ability to cope  Outcome: Progressing

## 2020-03-08 NOTE — PLAN OF CARE
Problem: SELF HARM/SUICIDALITY  Goal: Will have no self-injury during hospital stay  Description  INTERVENTIONS:  - Q 15 MINUTES: Routine safety checks  - Q WAKING SHIFT & PRN: Assess risk to determine if routine checks are adequate to maintain patient safety  - Encourage patient to participate actively in care by formulating a plan to combat response to suicidal ideation, identify supports and resources  Outcome: Progressing     Problem: DEPRESSION  Goal: Will be euthymic at discharge  Description  INTERVENTIONS:  - Administer medication as ordered  - Provide emotional support via 1:1 interaction with staff  - Encourage involvement in milieu/groups/activities  - Monitor for social isolation  Outcome: Progressing     Problem: PSYCHOSIS  Goal: Will report no hallucinations or delusions  Description  Interventions:  - Administer medication as  ordered  - Every waking shifts and PRN assess for the presence of hallucinations and or delusions  - Assist with reality testing to support increasing orientation  - Assess if patient's hallucinations or delusions are encouraging self-harm or harm to others and intervene as appropriate  Outcome: Progressing     Problem: ANXIETY  Goal: Will report anxiety at manageable levels  Description  INTERVENTIONS:  - Administer medication as ordered  - Teach and encourage coping skills  - Provide emotional support  - Assess patient/family for anxiety and ability to cope  Outcome: Progressing     Problem: PAIN - ADULT  Goal: Verbalizes/displays adequate comfort level or baseline comfort level  Description  Interventions:  - Encourage patient to monitor pain and request assistance  - Assess pain using appropriate pain scale  - Administer analgesics based on type and severity of pain and evaluate response  - Implement non-pharmacological measures as appropriate and evaluate response  - Consider cultural and social influences on pain and pain management  - Notify physician/advanced practitioner if interventions unsuccessful or patient reports new pain  Outcome: Progressing     Problem: SAFETY ADULT  Goal: Patient will remain free of falls  Description  INTERVENTIONS:  - Assess patient frequently for physical needs  -  Identify cognitive and physical deficits and behaviors that affect risk of falls    -  [place] fall precautions as indicated by assessment   - Educate patient/family on patient safety including physical limitations  - Instruct patient to call for assistance with activity based on assessment  - Modify environment to reduce risk of injury  - Consider OT/PT consult to assist with strengthening/mobility  Outcome: Progressing     Problem: METABOLIC, FLUID AND ELECTROLYTES - ADULT  Goal: Glucose maintained within target range  Description  INTERVENTIONS:  - Monitor Blood Glucose as ordered  - Assess for signs and symptoms of hyperglycemia and hypoglycemia  - Administer ordered medications to maintain glucose within target range  - Assess nutritional intake and initiate nutrition service referral as needed  Outcome: Progressing

## 2020-03-08 NOTE — PROGRESS NOTES
Progress Note - [address] [facility] 51 [facility]  [facility] MRN: [id number]  Unit/Bed#: U 253-02 Encounter: [id number]    Assessment/Plan   Principal Problem:    Bipolar affective disorder, currently depressed, moderate ([facility] )  Active Problems:    HTN (hypertension)    Type 1 diabetes mellitus ([facility] )    Medical clearance for psychiatric admission    Asthma    Tobacco abuse      Behavior over the last 24 hours:  improved  Sleep: normal  Appetite: normal  Medication side effects: No  ROS: no complaints    Mental Status Evaluation:  Appearance:  age appropriate and disheveled   Behavior:  cooperative and pleasant    Speech:  normal pitch and normal volume   Mood:  constricted   Affect:  constricted   Thought Process:  goal directed and logical   Thought Content:  denies   Perceptual Disturbances: denies A/V hallucinations    Risk Potential: Suicidal Ideations none, Homicidal Ideations none and Potential for Aggression No   Sensorium:  person, place and time/date   Cognition:  grossly intact   Consciousness:  alert and awake    Attention: attention span appeared shorter than expected for age   Insight:  limited   Judgment: limited   Gait/Station: normal gait/station and normal balance   Motor Activity: no abnormal movements     Progress Toward Goals: Patient remains compliant with medications and denies side effects  He has been attending groups  He denies A/V hallucinations ever since he arrived to the unit  He signed 72 hour notice today and is hoping for discharge tomorrow  It is not clear what prompted his multiple ED visits last Friday since every time he went in he had different complainants  Patient is a very poor historian  Will continue current treatment  Recommended Treatment: Continue with group therapy, milieu therapy and occupational therapy        Risks, benefits and possible side effects of Medications:   Risks, benefits, and possible side effects of medications explained to patient and patient verbalizes understanding  Medications:   all current active meds have been reviewed, continue current psychiatric medications and current meds:   Current Facility-Administered Medications   Medication Dose Route Frequency    albuterol (PROVENTIL HFA,VENTOLIN HFA) inhaler 2 puff  2 puff Inhalation Q4H PRN    ARIPiprazole (ABILIFY) tablet 15 mg  15 mg Oral Daily    fenofibrate (TRICOR) tablet 48 mg  48 mg Oral Daily    fluticasone (FLOVENT HFA) 110 MCG/ACT inhaler 1 puff  1 puff Inhalation Q12H [address]    haloperidol (HALDOL) tablet 5 mg  5 mg Oral Q6H PRN    haloperidol lactate (HALDOL) injection 5 mg  5 mg Intramuscular Q6H PRN    insulin glargine (LANTUS) subcutaneous injection 22 Units 0 22 mL  22 Units Subcutaneous QAM    insulin lispro (HumaLOG) 100 units/mL subcutaneous injection 2-12 Units  2-12 Units Subcutaneous TID AC    insulin lispro (HumaLOG) 100 units/mL subcutaneous injection 2-12 Units  2-12 Units Subcutaneous HS    insulin lispro (HumaLOG) 100 units/mL subcutaneous injection 4 Units  4 Units Subcutaneous TID With Meals    lisinopril (ZESTRIL) tablet 5 mg  5 mg Oral Daily    LORazepam (ATIVAN) injection 1 mg  1 mg Intramuscular Q6H PRN    LORazepam (ATIVAN) tablet 1 mg  1 mg Oral Q6H PRN    nicotine (NICODERM CQ) 14 mg/24hr TD 24 hr patch 14 mg  14 mg Transdermal Daily    nicotine polacrilex (NICORETTE) gum 2 mg  2 mg Oral Q2H PRN    traZODone (DESYREL) tablet 50 mg  50 mg Oral HS PRN     Labs: reveiwed    Counseling / Coordination of Care  Total floor / unit time spent today minutes  Greater than 50% of total time was spent with the patient and / or family counseling and / or coordination of care   A description of the counseling / coordination of care:

## 2020-03-08 NOTE — PROGRESS NOTES
Pt is pleasant and polite  Attends groups  Reports being "happy" today  Denies all SI/AVH  Feel improvement with med adjustment/s  Expresses readiness for discharge

## 2020-03-08 NOTE — PROGRESS NOTES
Pt walking halls much of the day  Pleasant and smiling during interaction  Social with peers  Denies any symptoms

## 2020-03-08 NOTE — PROGRESS NOTES
Pt lying in bed during interaction  Has been visible on unit, feeling "good" today  Denies questions, concerns  Denies SI, HI, AVH

## 2020-03-08 NOTE — ED NOTES
Marylee from [facility] called with authorization number for pre cert      3 days approved  [facility]  Review on Monday the 9th with Batsheva Alba # [id number]

## 2020-03-09 VITALS
HEIGHT: 66 IN | WEIGHT: 136 LBS | OXYGEN SATURATION: 96 % | RESPIRATION RATE: 16 BRPM | TEMPERATURE: 98.7 F | SYSTOLIC BLOOD PRESSURE: 93 MMHG | BODY MASS INDEX: 21.86 KG/M2 | HEART RATE: 68 BPM | DIASTOLIC BLOOD PRESSURE: 53 MMHG

## 2020-03-09 LAB
GLUCOSE SERPL-MCNC: 159 MG/DL (ref 65–140)
GLUCOSE SERPL-MCNC: 165 MG/DL (ref 65–140)

## 2020-03-09 PROCEDURE — 82948 REAGENT STRIP/BLOOD GLUCOSE: CPT

## 2020-03-09 PROCEDURE — 99238 HOSP IP/OBS DSCHRG MGMT 30/<: CPT | Performed by: PSYCHIATRY & NEUROLOGY

## 2020-03-09 RX ORDER — ARIPIPRAZOLE 15 MG/1
15 TABLET ORAL DAILY
Qty: 30 TABLET | Refills: 0 | Status: ON HOLD
Start: 2020-03-10 | End: 2022-02-04 | Stop reason: SDUPTHER

## 2020-03-09 RX ADMIN — INSULIN LISPRO 2 UNITS: 100 INJECTION, SOLUTION INTRAVENOUS; SUBCUTANEOUS at 11:33

## 2020-03-09 RX ADMIN — INSULIN LISPRO 4 UNITS: 100 INJECTION, SOLUTION INTRAVENOUS; SUBCUTANEOUS at 08:54

## 2020-03-09 RX ADMIN — FENOFIBRATE 48 MG: 48 TABLET ORAL at 08:50

## 2020-03-09 RX ADMIN — INSULIN LISPRO 2 UNITS: 100 INJECTION, SOLUTION INTRAVENOUS; SUBCUTANEOUS at 08:54

## 2020-03-09 RX ADMIN — INSULIN LISPRO 4 UNITS: 100 INJECTION, SOLUTION INTRAVENOUS; SUBCUTANEOUS at 11:33

## 2020-03-09 RX ADMIN — INSULIN GLARGINE 22 UNITS: 100 INJECTION, SOLUTION SUBCUTANEOUS at 08:53

## 2020-03-09 RX ADMIN — ARIPIPRAZOLE 15 MG: 15 TABLET ORAL at 08:50

## 2020-03-09 NOTE — PROGRESS NOTES
03/09/20 1202   Team Meeting   Meeting Type Tx Team Meeting   Team Members Present   Team Members Present Physician;Nurse;   Physician Team Member Jas Del Toro Team Member CARL [facility] Management Team Member Luis Eduardo   Patient/Family Present   Patient Present Yes   Patient's Family Present No     TX discussed diagnosis of Bipolar Affective Disorder, depressed moderate  Pt's medications continued from last admission  Abilify increased from 10mg to 15 mg  Pt has follow up with [facility] [place] and Preventive Measures  CM discussed referral that has been discussed with [facility] [place] regarding TLC  AT this time pt only agreeable to returning home  Pt identifies good outpatient providers and desire to get better as strengths

## 2020-03-09 NOTE — DISCHARGE INSTR - OTHER ORDERS
[facility]  [address], Sandip Reyes [address]  Tel: [phone]  Fax:([phone])    [facility] Crisis    Crisis Intervention: [address] is a confidential 7 days/week telephone support service manned by trained mental health consumers   [employer] operates daily but is not able to [address]   · [facility] provides support, a listening ear and can provide information about available services       · [employer] specializes in the concerns of mental health consumers, their families and friends   However, we are also here for anyone who has a mental health concern, is confused about or just doesn't know anything about mental health or where to get information       To reach Chester Young, call [phone] accepts calls between 6:00 AM to 10:00 AM and from 4:00 PM to 35:30 AM or click on the link to view additional information  The LUIS ALBERTO Family-to-Family Education Program is a free 12-week (2 1/2 hours/week) course for families of individuals with severe brain disorders (mental illnesses)  The classes are taught by trained family members  All course materials are furnished at no cost to you  Below are some details  To register, e-mail [email or web address] or call [phone]  The curriculum focuses on schizophrenia, bipolar disorder (manic depression), clinical depression, panic disorders and obsessive-compulsive disorder (OCD)  The course discusses the clinical treatment of these illnesses and teaches the knowledge and skills that family members need to cope more effectively    Topics Include:  Learning about feelings, learning about facts   Schizophrenia, major depression and asad: diagnosis and dealing with critical periods   Subtypes of depression and bipolar disorder, panic disorder and OCD; diagnosis and causes; sharing our stories   The biology of the brain/new research   Problem solving workshops   Medication review   Empathy workshop  what its like to have a brain disorder   Communication skills workshop   Self-care and relative groups   Rehabilitation, services available   Advocacy: fighting stigma   Review and certification ceremony    Aczf-oz-Yhan Education Course  The [employer] Education class is a ten week  two hours per week  experiential education course on the topic of recovery for any person with serious mental illness who is interested in establishing and maintaining wellness  The course uses a combination of lecture, interactive exercises, and structural group processes  The diversity of experience among participants affords for a lively dynamic that moves the course along  LUIS ALBERTOs Jknq-zz-Mqpb Education class is offered free of charge to people who experience mental illness  You do not need to be a member of [facility] to take the course  Courses are taught by teams of trained mentors/peer-teachers who are themselves experienced at living well with mental illness  Below are some details  To register, call [phone] or e-mail [email or web address]  Sign up today! [facility] group is for family members, caregivers, and loved ones of individuals living with the everyday challenges of mental illness  The leaders are family members in the same situation  Sessions take place in an intimate, confidential setting to allow families to share openly with each other  These support groups allow participants to learn from the experiences of other group members, share coping strategies, and offer each other encouragement and understanding  Danyel Ring know that you are not alone  Drop inno registration is necessary  Here are the times and locations    JUJU  Monthly: 3rd Monday, 7:00-8:30 pm  [facility], [place]  Monthly: 4th Tuesday, 7:00-8:30 pm  [facility]  Monthly: 1st Monday, 7:00-8:30 pm  Mil [facility]  [address] [place], [place] [facility], [address] [place] [address]         Monthly Support for Persons with Mental Illness  The Peer Support Group is a monthly meeting for individuals facing the challenges of recovering from severe and persistent mental illness  Depression, manic depression, schizophrenia, and general anxiety disorder are only a few of the diagnoses of individuals who have found a supportive place at our meetings  Our [place]  We are a fellowship of individuals who share a common goal of recovery and the ability to maintain mental and emotional stability  We help others and ourselves through sharing our experiences, strength and hope with each other  No matter how traumatic our past or how despairing our present may be, there is hope for a new day  Sessions take place in an intimate, confidential setting to allow individuals to share openly with each other  Paul Ellington know that you are not alone  Drop inno registration is necessary  Here are the times and locations  JUAN  Monthly: 1st Monday, 7:00-8:30 pm  [address]   [id number]  Monthly: 3rd Monday, 7:00-8:30 pm  [facility]  [address] [facility], [facility]  The [facility] ([address]) offers persons with a mental illness a safe, healing environment to explore their personal and vocational potential and receive support in achieving their goals  Instead of traditional therapy, the work of the house is the rehabilitation  As members contribute meaningful work to the house, they build confidence and a sense of purpose  Be a Member  Its Free  Membership in the [facility] is open to any [facility] resident who is 25 or older and has a history of mental illness  Membership is free for life      [facility] Guarantees  · A right to have a place to come  · A right to meaningful relationships  · A right to meaningful work  · A right to belong    [facility] [facility]  [address] [facility], [address] [place] [address]  Hours: Monday  Friday: 8 a m   4 p m  With varying hours on holidays    [address] (Albin B[address], [place], [place]) provides a stress-free atmosphere for persons 18 and older who have experienced mental health issues  What The [address]  · Games  · Outings  · Holiday gatherings  Recovery  · Employment  · Education  · [employer]  Empowerment  · Helps participants achieve their goals  · Enhances quality of life  · Encourages leadership    [facility]   [address] [place], [place]  Hours: Monday through Friday: 4 p m   9 p m  Saturday: 2 p m   8 p m    Closed Sundays  Varying hours on holidays    Contact [phone]

## 2020-03-09 NOTE — PROGRESS NOTES
03/09/20 0754   Team Meeting   Meeting Type Daily Rounds   Team Members Present   Team Members Present Physician;Nurse;;Occupational Therapist   Physician Team Member Jesusita Godoy   Nursing Team Member CARL [facility] Management Team Member Luis Eduardo/ [address]   OT Team Member Samreen   Patient/Family Present   Patient Present No   Patient's Family Present No      201  [facility] to jump into traffic  Voices getting louder the last few days  Verbally contracts for safety  Signed 72 hour notice  Wants dc  Blood sugar 165 this morning

## 2020-03-09 NOTE — PROGRESS NOTES
Pt remains pleasant, calm and cooperative  Observed walking halls and at times talking to self however pt denies AVH stating "I'm just talking to myself " pt social with peers and staff-laughing and joking  Denies any concerns or questions at this time  Expresses feeling hopeful to be discharged today and if wanting to speak to Doctor regarding this  No inappropriate behavior

## 2020-03-09 NOTE — DISCHARGE INSTRUCTIONS
Bipolar Disorder   WHAT YOU NEED TO KNOW:   Bipolar disorder is a long-term chemical imbalance that causes rapid changes in mood and behavior  High moods are called asad  Low moods are called depression  Sometimes you will feel manic and sometimes you will feel depressed  You can have alternating episodes of asad and depression  This is called a mixed bipolar state  DISCHARGE INSTRUCTIONS:   Call 911 if:   · You think about hurting yourself or someone else  Contact your healthcare provider or psychiatrist if:   · You are having trouble managing your bipolar disorder  · You cannot sleep, or are sleeping all the time  · You cannot eat, or are eating more than usual     · You feel dizzy or your stomach is upset  · You cannot make it to your next meeting  · You have questions or concerns about your condition or care  Medicines:   · Medicines  may be given to help keep your mood stable, or to help you sleep  Changes in medicine are often needed as your bipolar disorder changes  · Take your medicine as directed  Contact your healthcare provider if you think your medicine is not helping or if you have side effects  Tell him or her if you are allergic to any medicine  Keep a list of the medicines, vitamins, and herbs you take  Include the amounts, and when and why you take them  Bring the list or the pill bottles to follow-up visits  Carry your medicine list with you in case of an emergency  Follow up with your healthcare provider or psychiatrist as directed:  Write down your questions so you remember to ask them during your visits  Manage bipolar disorder:  Watch for triggers of bipolar disorder symptoms, such as stress  Learn new ways to relax, such as deep breathing, to manage your stress  Tell someone if you feel a manic or depressive period might be coming on  Ask a friend or family member to help watch you for bipolar symptoms   Work to develop skills that will help you manage bipolar disorder  You may need to make lifestyle changes  Ask your healthcare provider or psychiatrist for resources  For support and more information:   · [address] [facility], [address] [facility], [address] [facility], [address]  [place] [address]   Phone: [phone]  Phone: [phone]  Web Address: [facility]  · Depression and [address] (DBSA)  [address] [place] [address], [address] [place] [address] , [address]  Phone: [phone]  Web Address: [employer] no  org   © 2017 [facility] Information is for End User's use only and may not be sold, redistributed or otherwise used for commercial purposes  All illustrations and images included in CareNotes® are the copyrighted property of [employer] , [employer]  or Joshua Ospina  The above information is an  only  It is not intended as medical advice for individual conditions or treatments  Talk to your doctor, nurse or pharmacist before following any medical regimen to see if it is safe and effective for you

## 2020-03-09 NOTE — DISCHARGE SUMMARY
Discharge Summary - [address] 51 y o  male MRN: [id number]  Unit/Bed#: Surendra Alejandre 440-83 Encounter: [id number]     Admission Date:   Admission Orders (From admission, onward)     Ordered        03/07/20 0427  DISCHARGE READMIT Admit Patient to [address] [place] [address] (use with Discharge Readmit Navigator in Sandip Barry 1153 Discharge Readmit scenario including from any IP unit or different campus ED to [facility])  Nurse to release order [facility]  [facility]                         Discharge Date: 3/9/20    Attending Psychiatrist: Sandrine Guardado MD    Reason for Admission/HPI:   History of Present Illness     Patient is a 52-year-old male who presented to [place] [address] ED due to suicidal ideation without plan  Apparently patient was in the ED 7 times total on 3/5/20 & 3/6/20 for related complaints  He was not suicidal on previous 6 ED visits  He stated that his suicidal thoughts began that day and also had auditory hallucinations of voices telling him to jump out in front of traffic  He mentioned on 1 of his ED visits that he is having increased stress at his job  On initial psychiatric evaluation patient stated he no longer felt suicidal and wanted to sign a 72 hour notice  He reported mood related symptoms of low energy and anhedonia  He denied increased anxiety  He did not show signs of asad  He does have history of asad  He denied psychosis at that time  He did not appear paranoid    Patient has numerous inpatient psychiatric hospitalizations, denied prior suicide attempts, and does have current outpatient psychiatrist     Psychosocial Stressors: occupational     Hospital Course:   Behavioral Health Medications:   current meds:   Current Facility-Administered Medications   Medication Dose Route Frequency    albuterol (PROVENTIL HFA,VENTOLIN HFA) inhaler 2 puff  2 puff Inhalation Q4H PRN    ARIPiprazole (ABILIFY) tablet 15 mg  15 mg Oral Daily    fenofibrate (TRICOR) tablet 48 mg  48 mg Oral Daily    fluticasone (FLOVENT HFA) 110 MCG/ACT inhaler 1 puff  1 puff Inhalation Q12H [facility]    haloperidol (HALDOL) tablet 5 mg  5 mg Oral Q6H PRN    haloperidol lactate (HALDOL) injection 5 mg  5 mg Intramuscular Q6H PRN    insulin glargine (LANTUS) subcutaneous injection 22 Units 0 22 mL  22 Units Subcutaneous QAM    insulin lispro (HumaLOG) 100 units/mL subcutaneous injection 2-12 Units  2-12 Units Subcutaneous TID AC    insulin lispro (HumaLOG) 100 units/mL subcutaneous injection 2-12 Units  2-12 Units Subcutaneous HS    insulin lispro (HumaLOG) 100 units/mL subcutaneous injection 4 Units  4 Units Subcutaneous TID With Meals    lisinopril (ZESTRIL) tablet 5 mg  5 mg Oral Daily    LORazepam (ATIVAN) injection 1 mg  1 mg Intramuscular Q6H PRN    LORazepam (ATIVAN) tablet 1 mg  1 mg Oral Q6H PRN    nicotine (NICODERM CQ) 14 mg/24hr TD 24 hr patch 14 mg  14 mg Transdermal Daily    nicotine polacrilex (NICORETTE) gum 2 mg  2 mg Oral Q2H PRN    traZODone (DESYREL) tablet 50 mg  50 mg Oral HS PRN       Patient was admitted to [address] [place] [address] inpatient psychiatric unit on voluntary 201 commitment for safety and stabilization  On admission patient was continued on Abilify 15mg QD for psychosis/mood stabilization  He did not require PRN psychiatric medication or titration of Abilify  He quickly signed 72 hour notice and was focused on discharge  He did not display 302 behavior  He tolerated medications with no acute side effects  His mood brightened over the course of his treatment, and he was seen in [facility] interacting appropriately with peers  He did not demonstrate dangerous behavior to self or others during his inpatient stay  On day of discharge patient had reduced depression, controllable anxiety, denied psychosis, did not show signs of asad, and denied suicidal/homicidal ideations        Mental Status at time of Discharge:     Appearance:  casually dressed Behavior:  cooperative but restless   Speech:  normal pitch and normal volume   Mood:  euthymic   Affect:  mood-congruent   Thought Process:  concrete, goal directed   Thought Content:  obsessions   Perceptual Disturbances: None   Risk Potential: Denied SI/HI  Potential for aggression: No   Sensorium:  person, place and time/date   Cognition:  grossly intact   Consciousness:  alert and awake    Attention: attention span appeared shorter than expected for age   Insight:  partial   Judgment: fair   Gait/Station: normal gait/station and normal balance   Motor Activity: no abnormal movements       Discharge Diagnosis:   Bipolar affective disorder, currently depressed, moderate       Discharge Medications:  See after visit summary for reconciled discharge medications provided to patient and family  Discharge instructions/Information to patient and family:   See after visit summary for information provided to patient and family  Provisions for Follow-Up Care:  See after visit summary for information related to follow-up care and any pertinent home health orders  Discharge Statement   I spent 32 minutes discharging the patient  This time was spent on the day of discharge  I had direct contact with the patient on the day of discharge  On day of discharge patient had mental status exam performed, discharge instructions/medications reviewed, and outpatient planning discussed  He was given no scripts  He denied tobacco cessation therapy after discharge      Jovan Donaldson PA-C

## 2020-03-09 NOTE — CASE MANAGEMENT
CM reviewed dc with pt  Pt verbally understood dc plan  Pt will walk to his appointment at [facility] this Thursday @ 1015  Pt denies SI  Reports feeling good  Pt provided with relapse prevention plan and crisis numbers  [facility] called  [facility] unable to accommodate due to late notice  Pt signed waiver

## 2020-03-09 NOTE — NURSING NOTE
Reviewed discharge AVS with patient  Pt denies any concerns or questions  Remains pleasant and calm-expressed readiness for discharge
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
## 2020-03-09 NOTE — DISCHARGE INSTR - APPOINTMENTS
Your  through [facility] has done a [facility] application for you and a place became open which you then declined  If you would like an opportunity to live at [facility], please give your PA [place] a call and he can submit another application

## 2020-03-09 NOTE — BH TRANSITION RECORD
Contact Information: If you have any questions, concerns, pended studies, tests and/or procedures, or emergencies regarding your inpatient behavioral health visit  Please contact [facility] [phone] and ask to speak to a , nurse or physician  A contact is available 24 hours/ 7 days a week at this number  Summary of Procedures Performed During your Stay:  Below is a list of major procedures performed during your hospital stay and a summary of results:  - No major procedures performed  Pending Studies (From admission, onward)     Start     Ordered    03/09/20 0600  CBC and differential  Morning draw      03/08/20 1125    03/09/20 0600  Comprehensive metabolic panel  Morning draw      03/08/20 1125    03/09/20 0600  TSH, 3rd generation  Morning draw      03/08/20 1125    03/09/20 0600  Lipid panel  Morning draw     Comments:  Fasting      03/08/20 1125              If studies are pending at discharge, follow up with your PCP and/or referring provider

## 2020-03-12 ENCOUNTER — HOSPITAL ENCOUNTER (EMERGENCY)
Facility: HOSPITAL | Age: 50
Discharge: HOME | End: 2020-03-12
Attending: EMERGENCY MEDICINE | Admitting: EMERGENCY MEDICINE
Payer: COMMERCIAL

## 2020-03-12 VITALS
TEMPERATURE: 97.2 F | HEART RATE: 86 BPM | SYSTOLIC BLOOD PRESSURE: 130 MMHG | WEIGHT: 139.5 LBS | RESPIRATION RATE: 18 BRPM | OXYGEN SATURATION: 99 % | DIASTOLIC BLOOD PRESSURE: 84 MMHG | BODY MASS INDEX: 22.52 KG/M2

## 2020-03-12 DIAGNOSIS — F32.A DEPRESSION: Primary | ICD-10-CM

## 2020-03-12 PROCEDURE — 99284 EMERGENCY DEPT VISIT MOD MDM: CPT

## 2020-03-12 PROCEDURE — 80307 DRUG TEST PRSMV CHEM ANLYZR: CPT | Performed by: EMERGENCY MEDICINE

## 2020-03-12 PROCEDURE — 82075 ASSAY OF BREATH ETHANOL: CPT | Performed by: EMERGENCY MEDICINE

## 2020-03-12 PROCEDURE — NC001 PR NO CHARGE: Performed by: EMERGENCY MEDICINE

## 2020-03-12 PROCEDURE — 99284 EMERGENCY DEPT VISIT MOD MDM: CPT | Performed by: EMERGENCY MEDICINE

## 2020-03-12 NOTE — ED PROVIDER NOTES
History  Chief Complaint   Patient presents with    Psychiatric Evaluation     50 yo male with a history of anxiety, asthma, depression, bipolar disorder, and DM presents to the ED complaining of suicidal ideation, auditory hallucinations, and depression  The patient states he is hearing voices telling him to "burn and cut"  (+) SI but no plan  He denies HI  No visual hallucinations  He says he has been compliant with all of his medications  No other complaints  Prior to Admission Medications   Prescriptions Last Dose Informant Patient Reported? Taking? ARIPiprazole (ABILIFY) 15 mg tablet   No No   Sig: Take 1 tablet (15 mg total) by mouth daily At 9am   albuterol (PROVENTIL HFA,VENTOLIN HFA) 90 mcg/act inhaler   No No   Sig: Inhale 2 puffs every 4 (four) hours as needed for wheezing   dicyclomine (BENTYL) 20 mg tablet   No No   Sig: Take 1 tablet (20 mg total) by mouth 2 (two) times a day   fenofibrate (TRICOR) 48 mg tablet   No No   Sig: Take 1 tablet (48 mg total) by mouth daily for 7 days At 9am   fluticasone (FLOVENT HFA) 110 MCG/ACT inhaler   No No   Sig: Inhale 1 puff every 12 (twelve) hours Rinse mouth after use    hydrOXYzine HCL (ATARAX) 25 mg tablet   Yes No   Sig: Take 25 mg by mouth every 6 (six) hours as needed   insulin glargine (LANTUS SOLOSTAR) 100 units/mL injection pen   No No   Sig: Inject 22 Units under the skin daily Every morning     insulin lispro (HUMALOG KWIKPEN) 100 units/mL injection pen   No No   Sig: Inject 4 Units under the skin 3 (three) times a day with meals   Patient taking differently: Inject 15 Units under the skin 3 (three) times a day with meals    lisinopril (ZESTRIL) 5 mg tablet   No No   Sig: Take 1 tablet (5 mg total) by mouth daily for 7 days At 9am      Facility-Administered Medications: None       Past Medical History:   Diagnosis Date    Anxiety     Asthma     [facility]     Depression     Diabetes mellitus ([facility] )     Psychiatric disorder Past Surgical History:   Procedure Laterality Date    APPENDECTOMY         History reviewed  No pertinent family history  I have reviewed and agree with the history as documented  E-Cigarette/Vaping    E-Cigarette Use Never User      E-Cigarette/Vaping Substances     Social History     Tobacco Use    Smoking status: Current Every Day Smoker     Packs/day: 0 20     Types: Cigarettes    Smokeless tobacco: Never Used   Substance Use Topics    Alcohol use: Not Currently     Frequency: Never     Binge frequency: Never     Comment: States has had no alcohol in at least a year    Drug use: No       Review of Systems   Constitutional: Negative for chills and fever  HENT: Negative for sore throat  Respiratory: Negative for cough and shortness of breath  Cardiovascular: Negative for chest pain and palpitations  Gastrointestinal: Negative for abdominal pain, diarrhea, nausea and vomiting  Endocrine: Negative for cold intolerance and heat intolerance  Genitourinary: Negative for dysuria and flank pain  Musculoskeletal: Negative for back pain  Skin: Negative for rash  Allergic/Immunologic: Negative for immunocompromised state  Neurological: Negative for headaches  Hematological: Negative for adenopathy  Psychiatric/Behavioral: Positive for dysphoric mood, hallucinations and suicidal ideas  The patient is not nervous/anxious  Physical Exam  Physical Exam   Constitutional: He is oriented to person, place, and time  He appears well-developed and well-nourished  No distress  HENT:   Head: Normocephalic and atraumatic  Eyes: Pupils are equal, round, and reactive to light  EOM are normal    Neck: Normal range of motion  Neck supple  Cardiovascular: Normal rate and regular rhythm  Pulmonary/Chest: Effort normal and breath sounds normal  No respiratory distress  Abdominal: Soft  He exhibits no distension  There is no tenderness  Musculoskeletal: Normal range of motion   He exhibits no edema  Neurological: He is alert and oriented to person, place, and time  Skin: Skin is warm and dry  Psychiatric: He has a normal mood and affect  His speech is normal  He is actively hallucinating  He expresses suicidal ideation  He expresses no homicidal ideation  He expresses no suicidal plans and no homicidal plans  Vital Signs  ED Triage Vitals   Temperature Pulse Respirations Blood Pressure SpO2   03/12/20 1812 03/12/20 1812 03/12/20 1812 03/12/20 1814 03/12/20 1812   (!) 97 2 °F (36 2 °C) 91 22 134/81 96 %      Temp Source Heart Rate Source Patient Position - Orthostatic VS BP Location FiO2 (%)   03/12/20 1812 03/12/20 1812 03/12/20 1812 03/12/20 1812 --   Tympanic Monitor Sitting Right arm       Pain Score       --                  Vitals:    03/12/20 1812 03/12/20 1814   BP:  134/81   Pulse: 91    Patient Position - Orthostatic VS: Sitting          Visual Acuity      ED Medications  Medications - No data to display    Diagnostic Studies  Results Reviewed     Procedure Component Value Units Date/Time    Rapid drug screen, urine [[id number]]  (Normal) Collected:  03/12/20 1842    Lab Status:  Final result Specimen:  Urine, Clean Catch Updated:  03/12/20 1912     Amph/Meth UR Negative     Barbiturate Ur Negative     Benzodiazepine Urine Negative     Cocaine Urine Negative     Methadone Urine Negative     Opiate Urine Negative     PCP Ur Negative     THC Urine Negative    Narrative:       FOR MEDICAL PURPOSES ONLY  IF CONFIRMATION NEEDED PLEASE CONTACT THE LAB WITHIN 5 DAYS      Drug Screen Cutoff Levels:  AMPHETAMINE/METHAMPHETAMINES  1000 ng/mL  BARBITURATES     200 ng/mL  BENZODIAZEPINES     200 ng/mL  COCAINE      300 ng/mL  METHADONE      300 ng/mL  OPIATES      300 ng/mL  PHENCYCLIDINE     25 ng/mL  THC       50 ng/mL      POCT alcohol breath test [[id number]]  (Normal) Resulted:  03/12/20 1854    Lab Status:  Final result Updated:  03/12/20 1854     EXTBreath Alcohol 0 00 No orders to display              Procedures  Procedures         ED Course                                 MDM  Number of Diagnoses or Management Options  Diagnosis management comments: The patient is well appearing with a benign exam and stable vital signs  He is pleasant and cooperative with the exam/history  He does not appear to be a threat to himself or anyone else at this time  Case discussed with Crisis --> they will come to the ED to evaluate the patient  Disposition per crisis worker  Amount and/or Complexity of Data Reviewed  Clinical lab tests: ordered and reviewed    Patient Progress  Patient progress: stable        Disposition  Final diagnoses:   None     ED Disposition     None      Follow-up Information    None         Patient's Medications   Discharge Prescriptions    No medications on file     No discharge procedures on file      PDMP Review     None          ED Provider  Electronically Signed by           Sue Alan MD  03/12/20 8007

## 2020-03-12 NOTE — ED CARE HANDOFF
Emergency Department Sign Out Note        Sign out and transfer of care from Dr Easton Winkler  See Separate Emergency Department note  The patient, Lynne Oconnell, was evaluated by the previous provider for depression  Just released form inpatient 3 days ago, on meds,  No SI/HI/hallucinations  Workup Completed:  Medical clearance    ED Course / Workup Pending (followup):  Pending crisis eval                               Procedures  MDM    Disposition  Final diagnoses:   None     ED Disposition     None      Follow-up Information    None       Patient's Medications   Discharge Prescriptions    No medications on file     No discharge procedures on file         ED Provider  Electronically Signed by     Shelly Be MD  03/12/20 0602

## 2020-03-12 NOTE — ED TRIAGE NOTES
Reports he has been in a depressive mood all day  States he doesn't know why he was in a depressive mood  Denies hearing or seeing things  Denies wanting to hurt self  Reports last admission has been awhile   Reports he is taking his medicine

## 2020-03-12 NOTE — ED PROVIDER NOTES
History  Chief Complaint   Patient presents with    Psychiatric Evaluation     HPI    Prior to Admission Medications   Prescriptions Last Dose Informant Patient Reported? Taking? ARIPiprazole (ABILIFY) 15 mg tablet   No No   Sig: Take 1 tablet (15 mg total) by mouth daily At 9am   albuterol (PROVENTIL HFA,VENTOLIN HFA) 90 mcg/act inhaler   No No   Sig: Inhale 2 puffs every 4 (four) hours as needed for wheezing   dicyclomine (BENTYL) 20 mg tablet   No No   Sig: Take 1 tablet (20 mg total) by mouth 2 (two) times a day   fenofibrate (TRICOR) 48 mg tablet   No No   Sig: Take 1 tablet (48 mg total) by mouth daily for 7 days At 9am   fluticasone (FLOVENT HFA) 110 MCG/ACT inhaler   No No   Sig: Inhale 1 puff every 12 (twelve) hours Rinse mouth after use    hydrOXYzine HCL (ATARAX) 25 mg tablet   Yes No   Sig: Take 25 mg by mouth every 6 (six) hours as needed   insulin glargine (LANTUS SOLOSTAR) 100 units/mL injection pen   No No   Sig: Inject 22 Units under the skin daily Every morning  insulin lispro (HUMALOG KWIKPEN) 100 units/mL injection pen   No No   Sig: Inject 4 Units under the skin 3 (three) times a day with meals   Patient taking differently: Inject 15 Units under the skin 3 (three) times a day with meals    lisinopril (ZESTRIL) 5 mg tablet   No No   Sig: Take 1 tablet (5 mg total) by mouth daily for 7 days At 9am      Facility-Administered Medications: None       Past Medical History:   Diagnosis Date    Anxiety     Asthma     [facility] )     Depression     Diabetes mellitus ([facility] )     Psychiatric disorder        Past Surgical History:   Procedure Laterality Date    APPENDECTOMY         History reviewed  No pertinent family history  I have reviewed and agree with the history as documented      E-Cigarette/Vaping    E-Cigarette Use Never User      E-Cigarette/Vaping Substances     Social History     Tobacco Use    Smoking status: Current Every Day Smoker     Packs/day: 0 20     Types: Cigarettes    Smokeless tobacco: Never Used   Substance Use Topics    Alcohol use: Not Currently     Frequency: Never     Binge frequency: Never     Comment: States has had no alcohol in at least a year    Drug use: No       Review of Systems    Physical Exam  Physical Exam    Vital Signs  ED Triage Vitals   Temperature Pulse Respirations Blood Pressure SpO2   03/12/20 1812 03/12/20 1812 03/12/20 1812 03/12/20 1814 03/12/20 1812   (!) 97 2 °F (36 2 °C) 91 22 134/81 96 %      Temp Source Heart Rate Source Patient Position - Orthostatic VS BP Location FiO2 (%)   03/12/20 1812 03/12/20 1812 03/12/20 1812 03/12/20 1812 --   Tympanic Monitor Sitting Right arm       Pain Score       --                  Vitals:    03/12/20 1812 03/12/20 1814 03/12/20 2015   BP:  134/81 130/84   Pulse: 91  86   Patient Position - Orthostatic VS: Sitting  Sitting         Visual Acuity      ED Medications  Medications - No data to display    Diagnostic Studies  Results Reviewed     Procedure Component Value Units Date/Time    Rapid drug screen, urine [[id number]]  (Normal) Collected:  03/12/20 1842    Lab Status:  Final result Specimen:  Urine, Clean Catch Updated:  03/12/20 1912     Amph/Meth UR Negative     Barbiturate Ur Negative     Benzodiazepine Urine Negative     Cocaine Urine Negative     Methadone Urine Negative     Opiate Urine Negative     PCP Ur Negative     THC Urine Negative    Narrative:       FOR MEDICAL PURPOSES ONLY  IF CONFIRMATION NEEDED PLEASE CONTACT THE LAB WITHIN 5 DAYS      Drug Screen Cutoff Levels:  AMPHETAMINE/METHAMPHETAMINES  1000 ng/mL  BARBITURATES     200 ng/mL  BENZODIAZEPINES     200 ng/mL  COCAINE      300 ng/mL  METHADONE      300 ng/mL  OPIATES      300 ng/mL  PHENCYCLIDINE     25 ng/mL  THC       50 ng/mL      POCT alcohol breath test [[id number]]  (Normal) Resulted:  03/12/20 1854    Lab Status:  Final result Updated:  03/12/20 1854     EXTBreath Alcohol 0 00                 No orders to display Procedures  Procedures         ED Course  ED Course as of Mar 12 2037   Thu Mar 12, 2020   1948 Seen by Crisis  Will try and use coping skills and see ACT worker tomorrow  MDM      Disposition  Final diagnoses:   Depression     Time reflects when diagnosis was documented in both MDM as applicable and the Disposition within this note     Time User Action Codes Description Comment    3/12/2020  7:48 PM Wally Meraz Add [F32 9] Depression       ED Disposition     ED Disposition Condition Date/Time Comment    Discharge Stable u Mar 12, 2020  7:48 PM Dayna Marx discharge to home/self care              Follow-up Information     Follow up With Specialties Details Why Contact Info    Cherry Ma MD Family Medicine   [address], [address]  [phone]            Discharge Medication List as of 3/12/2020  7:48 PM      CONTINUE these medications which have NOT CHANGED    Details   albuterol (PROVENTIL HFA,VENTOLIN HFA) 90 mcg/act inhaler Inhale 2 puffs every 4 (four) hours as needed for wheezing, Starting Mon 2/17/2020, Print      ARIPiprazole (ABILIFY) 15 mg tablet Take 1 tablet (15 mg total) by mouth daily At 9am, Starting Tue 3/10/2020, No Print      dicyclomine (BENTYL) 20 mg tablet Take 1 tablet (20 mg total) by mouth 2 (two) times a day, Starting Thu 3/5/2020, Print      fenofibrate (TRICOR) 48 mg tablet Take 1 tablet (48 mg total) by mouth daily for 7 days At 9am, Starting Tue 12/17/2019, Until Thu 3/5/2020, Print      fluticasone (FLOVENT HFA) 110 MCG/ACT inhaler Inhale 1 puff every 12 (twelve) hours Rinse mouth after use , Starting Tue 12/10/2019, Print      hydrOXYzine HCL (ATARAX) 25 mg tablet Take 25 mg by mouth every 6 (six) hours as needed, Starting Fri 1/17/2020, Historical Med      insulin glargine (LANTUS SOLOSTAR) 100 units/mL injection pen Inject 22 Units under the skin daily Every morning , Starting Tue 12/10/2019, Print      insulin lispro (HUMALOG KWIKPEN) 100 units/mL injection pen Inject 4 Units under the skin 3 (three) times a day with meals, Starting Tue 12/10/2019, Print      lisinopril (ZESTRIL) 5 mg tablet Take 1 tablet (5 mg total) by mouth daily for 7 days At 9am, Starting Tue 12/17/2019, Until u 3/5/2020, Print           No discharge procedures on file      PDMP Review     None          ED Provider  Electronically Signed by           Lea House MD  03/12/20 2037

## 2020-03-13 ENCOUNTER — HOSPITAL ENCOUNTER (EMERGENCY)
Facility: HOSPITAL | Age: 50
Discharge: HOME | End: 2020-03-13
Attending: EMERGENCY MEDICINE | Admitting: EMERGENCY MEDICINE
Payer: COMMERCIAL

## 2020-03-13 ENCOUNTER — HOSPITAL ENCOUNTER (EMERGENCY)
Facility: HOSPITAL | Age: 50
Discharge: HOME | End: 2020-03-13
Attending: EMERGENCY MEDICINE
Payer: COMMERCIAL

## 2020-03-13 VITALS
DIASTOLIC BLOOD PRESSURE: 77 MMHG | BODY MASS INDEX: 22.31 KG/M2 | HEART RATE: 84 BPM | TEMPERATURE: 97.8 F | OXYGEN SATURATION: 98 % | RESPIRATION RATE: 18 BRPM | WEIGHT: 138.23 LBS | SYSTOLIC BLOOD PRESSURE: 145 MMHG

## 2020-03-13 VITALS
HEART RATE: 88 BPM | BODY MASS INDEX: 22.29 KG/M2 | SYSTOLIC BLOOD PRESSURE: 100 MMHG | WEIGHT: 138.67 LBS | RESPIRATION RATE: 18 BRPM | DIASTOLIC BLOOD PRESSURE: 62 MMHG | TEMPERATURE: 98.3 F | OXYGEN SATURATION: 100 % | HEIGHT: 66 IN

## 2020-03-13 VITALS
WEIGHT: 138.67 LBS | DIASTOLIC BLOOD PRESSURE: 78 MMHG | SYSTOLIC BLOOD PRESSURE: 103 MMHG | HEART RATE: 86 BPM | BODY MASS INDEX: 22.38 KG/M2 | RESPIRATION RATE: 18 BRPM | TEMPERATURE: 97.5 F | OXYGEN SATURATION: 98 %

## 2020-03-13 DIAGNOSIS — R45.851 SUICIDAL IDEATIONS: ICD-10-CM

## 2020-03-13 DIAGNOSIS — F31.9 BIPOLAR 1 DISORDER (HCC): Primary | ICD-10-CM

## 2020-03-13 DIAGNOSIS — Z00.8 ENCOUNTER FOR PSYCHOLOGICAL EVALUATION: Primary | ICD-10-CM

## 2020-03-13 DIAGNOSIS — R53.1 WEAKNESS: Primary | ICD-10-CM

## 2020-03-13 DIAGNOSIS — F29 PSYCHOSES (HCC): ICD-10-CM

## 2020-03-13 LAB
AMPHETAMINES SERPL QL SCN: NEGATIVE
BARBITURATES UR QL: NEGATIVE
BENZODIAZ UR QL: NEGATIVE
COCAINE UR QL: NEGATIVE
ETHANOL EXG-MCNC: 0 MG/DL
GLUCOSE SERPL-MCNC: 218 MG/DL (ref 65–140)
METHADONE UR QL: NEGATIVE
OPIATES UR QL SCN: NEGATIVE
PCP UR QL: NEGATIVE
THC UR QL: NEGATIVE

## 2020-03-13 PROCEDURE — 99284 EMERGENCY DEPT VISIT MOD MDM: CPT | Performed by: PSYCHIATRY & NEUROLOGY

## 2020-03-13 PROCEDURE — 99283 EMERGENCY DEPT VISIT LOW MDM: CPT | Performed by: EMERGENCY MEDICINE

## 2020-03-13 PROCEDURE — 99284 EMERGENCY DEPT VISIT MOD MDM: CPT

## 2020-03-13 PROCEDURE — 99282 EMERGENCY DEPT VISIT SF MDM: CPT

## 2020-03-13 PROCEDURE — 82075 ASSAY OF BREATH ETHANOL: CPT | Performed by: EMERGENCY MEDICINE

## 2020-03-13 PROCEDURE — 99285 EMERGENCY DEPT VISIT HI MDM: CPT | Performed by: EMERGENCY MEDICINE

## 2020-03-13 PROCEDURE — 80307 DRUG TEST PRSMV CHEM ANLYZR: CPT | Performed by: EMERGENCY MEDICINE

## 2020-03-13 PROCEDURE — 93005 ELECTROCARDIOGRAM TRACING: CPT

## 2020-03-13 PROCEDURE — 99285 EMERGENCY DEPT VISIT HI MDM: CPT

## 2020-03-13 PROCEDURE — 82948 REAGENT STRIP/BLOOD GLUCOSE: CPT

## 2020-03-13 PROCEDURE — 99282 EMERGENCY DEPT VISIT SF MDM: CPT | Performed by: EMERGENCY MEDICINE

## 2020-03-13 RX ORDER — TRAZODONE HYDROCHLORIDE 100 MG/1
100 TABLET ORAL
Status: ON HOLD | COMMUNITY
End: 2022-02-04 | Stop reason: SDUPTHER

## 2020-03-13 RX ORDER — ARIPIPRAZOLE 20 MG/1
20 TABLET ORAL DAILY
Qty: 30 TABLET | Refills: 0 | Status: SHIPPED | OUTPATIENT
Start: 2020-03-13 | End: 2020-04-12

## 2020-03-13 NOTE — ED NOTES
Crisis worker met with Pt  Pt reports that he has been taking his medications since his discharge, but was feeling depressed today without an identified stressor  Pt denies SI/HI  Pt reports he went to his follow up appointment today at [facility] and saw his psychiatrist and his therapist  Pt reports he did not use his coping skills prior to arrival, but identified cooking, cleaning, listening to the radio, and cellphone games as helpful when he is feeling this way  He does live with his sister who he identified as supportive  No grounds at this time for involuntary admission  Pt is in agreement with meeting with his ICM tomorrow and using his coping skills tonight at home  Pt is aware to return to the ED if symptoms worsen

## 2020-03-13 NOTE — ED NOTES
Sherita Hanna MD, Psychiatry is present to evaluate the patient due to frequent ED presentations  Patient had signed out of his last admission

## 2020-03-13 NOTE — ED NOTES
The patient was self-referred for depression and suicidal thoughts with no plan  He has a history of bipolar disorder with manic episodes  The patient has been presenting to emergency departments regularly with similar complaints  His ICM has been unable to deter this behavior as the patiernt is ambivalent when it comes to making decisions  Patient states he has been compliant  was seen by therapist Naomy Blackmon yesterday but did not relate what he was experiencing  Yesterday, he stated he was hearing voices telling him to cut or burn himself  He did not have that complaint today  In addition, his girlfriend was hospitalized yesterday

## 2020-03-13 NOTE — ED NOTES
Call placed to Deric Jarrett [facility], [phone]  Left  requesting return call
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
## 2020-03-13 NOTE — ED PROVIDER NOTES
History  Chief Complaint   Patient presents with    Psychiatric Evaluation     pt was just seen today by psych  Dr Graeme Aguayo spoke with pt in triage  Patient is a 56-year-old male well known to me who was just seen here today and evaluated by a hospital psychiatrist   States he is having continued suicidal thoughts  Has no plan admits that he would not act on them  Nothing has changed since he was here earlier today  Denies any drugs or alcohol  Did not call as act worker  No homicidal thoughts and no auditory visual hallucinations this time  Prior to Admission Medications   Prescriptions Last Dose Informant Patient Reported? Taking? ARIPiprazole (ABILIFY) 15 mg tablet   No No   Sig: Take 1 tablet (15 mg total) by mouth daily At 9am   ARIPiprazole (ABILIFY) 20 MG tablet   No No   Sig: Take 1 tablet (20 mg total) by mouth daily   albuterol (PROVENTIL HFA,VENTOLIN HFA) 90 mcg/act inhaler   No No   Sig: Inhale 2 puffs every 4 (four) hours as needed for wheezing   dicyclomine (BENTYL) 20 mg tablet   No No   Sig: Take 1 tablet (20 mg total) by mouth 2 (two) times a day   fenofibrate (TRICOR) 48 mg tablet   No No   Sig: Take 1 tablet (48 mg total) by mouth daily for 7 days At 9am   fluticasone (FLOVENT HFA) 110 MCG/ACT inhaler   No No   Sig: Inhale 1 puff every 12 (twelve) hours Rinse mouth after use    hydrOXYzine HCL (ATARAX) 25 mg tablet   Yes No   Sig: Take 25 mg by mouth every 6 (six) hours as needed   insulin glargine (LANTUS SOLOSTAR) 100 units/mL injection pen   No No   Sig: Inject 22 Units under the skin daily Every morning     insulin lispro (HUMALOG KWIKPEN) 100 units/mL injection pen   No No   Sig: Inject 4 Units under the skin 3 (three) times a day with meals   Patient taking differently: Inject 15 Units under the skin 3 (three) times a day with meals    lisinopril (ZESTRIL) 5 mg tablet   No No   Sig: Take 1 tablet (5 mg total) by mouth daily for 7 days At 9am   traZODone (DESYREL) 100 mg tablet   Yes No   Sig: Take 100 mg by mouth daily at bedtime      Facility-Administered Medications: None       Past Medical History:   Diagnosis Date    Anxiety     Asthma     [facility]ipolar 1 disorder ([facility] )     Depression     Diabetes mellitus ([facility] )     Psychiatric disorder        Past Surgical History:   Procedure Laterality Date    APPENDECTOMY         History reviewed  No pertinent family history  I have reviewed and agree with the history as documented  E-Cigarette/Vaping    E-Cigarette Use Never User      E-Cigarette/Vaping Substances     Social History     Tobacco Use    Smoking status: Current Every Day Smoker     Packs/day: 0 20     Types: Cigarettes    Smokeless tobacco: Never Used   Substance Use Topics    Alcohol use: Not Currently     Frequency: Never     Binge frequency: Never     Comment: States has had no alcohol in at least a year    Drug use: No       Review of Systems   Constitutional: Negative  HENT: Negative  Eyes: Negative  Respiratory: Negative  Cardiovascular: Negative  Gastrointestinal: Negative  Endocrine: Negative  Genitourinary: Negative  Musculoskeletal: Negative  Skin: Negative  Allergic/Immunologic: Negative  Neurological: Negative  Hematological: Negative  Psychiatric/Behavioral: Positive for suicidal ideas  All other systems reviewed and are negative  Physical Exam  Physical Exam   Constitutional: He appears well-developed and well-nourished  HENT:   Head: Normocephalic  Nose: Nose normal    Neck: Normal range of motion  Cardiovascular: Normal rate, regular rhythm, normal heart sounds and intact distal pulses  Pulmonary/Chest: Effort normal and breath sounds normal    Abdominal: Soft  Bowel sounds are normal    Musculoskeletal: Normal range of motion  Neurological: He is alert  Skin: Skin is warm and dry  Psychiatric: His speech is normal and behavior is normal  Judgment normal  His affect is blunt   He is not actively hallucinating  Cognition and memory are normal  He expresses no suicidal plans  He is attentive  Nursing note and vitals reviewed  Vital Signs  ED Triage Vitals [03/13/20 1916]   Temperature Pulse Respirations Blood Pressure SpO2   97 5 °F (36 4 °C) 86 18 103/78 98 %      Temp Source Heart Rate Source Patient Position - Orthostatic VS BP Location FiO2 (%)   Tympanic -- Sitting Left arm --      Pain Score       --           Vitals:    03/13/20 1916   BP: 103/78   Pulse: 86   Patient Position - Orthostatic VS: Sitting         Visual Acuity      ED Medications  Medications - No data to display    Diagnostic Studies  Results Reviewed     None                 No orders to display              Procedures  Procedures         ED Course                                 MDM  Number of Diagnoses or Management Options  Bipolar 1 disorder [facility]):      Amount and/or Complexity of Data Reviewed  Review and summarize past medical records: yes          Disposition  Final diagnoses:   Bipolar 1 disorder ([facility] )     Time reflects when diagnosis was documented in both MDM as applicable and the Disposition within this note     Time User Action Codes Description Comment    3/13/2020  7:08 PM Roly Schwab Add [F31 9] Bipolar 1 disorder [facility])       ED Disposition     ED Disposition Condition Date/Time Comment    Discharge Stable Fri Mar 13, 2020  7:08 PM Curtis Stephie discharge to home/self care  Follow-up Information     Follow up With Specialties Details Why Contact Info    Joshua Mahan MD Family Medicine   [address] [place] [address],Third [facility]  [phone]            Patient's Medications   Discharge Prescriptions    No medications on file     No discharge procedures on file      PDMP Review     None          ED Provider  Electronically Signed by           Javed Henderson MD  03/13/20 0495

## 2020-03-13 NOTE — ED PROVIDER NOTES
History  Chief Complaint   Patient presents with    Psychiatric Evaluation     was talking to his CM Stephanierobbie Mitesh from [facility]) and had thoughts of hurting himself       History provided by:  Patient  Psychiatric Evaluation   Presenting symptoms: suicidal thoughts    Degree of incapacity (severity): Moderate  Onset quality:  Gradual  Timing:  Constant  Progression:  Worsening  Chronicity:  New  Treatment compliance: All of the time  Relieved by:  Nothing  Worsened by:  Nothing  Ineffective treatments:  None tried  Associated symptoms: no abdominal pain, no chest pain and no headaches    Associated symptoms comment:  56-year-old male presents emergency department behavior health issues and suicidal ideation no suicide attempt  No direct harm to himself was recently admitted to the hospital for psychiatric reasons  The discharge        Prior to Admission Medications   Prescriptions Last Dose Informant Patient Reported? Taking? ARIPiprazole (ABILIFY) 15 mg tablet   No No   Sig: Take 1 tablet (15 mg total) by mouth daily At 9am   albuterol (PROVENTIL HFA,VENTOLIN HFA) 90 mcg/act inhaler   No No   Sig: Inhale 2 puffs every 4 (four) hours as needed for wheezing   dicyclomine (BENTYL) 20 mg tablet   No No   Sig: Take 1 tablet (20 mg total) by mouth 2 (two) times a day   fenofibrate (TRICOR) 48 mg tablet   No No   Sig: Take 1 tablet (48 mg total) by mouth daily for 7 days At 9am   fluticasone (FLOVENT HFA) 110 MCG/ACT inhaler   No No   Sig: Inhale 1 puff every 12 (twelve) hours Rinse mouth after use    hydrOXYzine HCL (ATARAX) 25 mg tablet   Yes No   Sig: Take 25 mg by mouth every 6 (six) hours as needed   insulin glargine (LANTUS SOLOSTAR) 100 units/mL injection pen   No No   Sig: Inject 22 Units under the skin daily Every morning     insulin lispro (HUMALOG KWIKPEN) 100 units/mL injection pen   No No   Sig: Inject 4 Units under the skin 3 (three) times a day with meals   Patient taking differently: Inject 15 Units under the skin 3 (three) times a day with meals    lisinopril (ZESTRIL) 5 mg tablet   No No   Sig: Take 1 tablet (5 mg total) by mouth daily for 7 days At 9am   traZODone (DESYREL) 100 mg tablet   Yes Yes   Sig: Take 100 mg by mouth daily at bedtime      Facility-Administered Medications: None       Past Medical History:   Diagnosis Date    Anxiety     Asthma     Bipolar 1 disorder ([facility] )     Depression     Diabetes mellitus ([place] [address] )     Psychiatric disorder        Past Surgical History:   Procedure Laterality Date    APPENDECTOMY         History reviewed  No pertinent family history  I have reviewed and agree with the history as documented  E-Cigarette/Vaping    E-Cigarette Use Never User      E-Cigarette/Vaping Substances     Social History     Tobacco Use    Smoking status: Current Every Day Smoker     Packs/day: 0 20     Types: Cigarettes    Smokeless tobacco: Never Used   Substance Use Topics    Alcohol use: Not Currently     Frequency: Never     Binge frequency: Never     Comment: States has had no alcohol in at least a year    Drug use: No       Review of Systems   Constitutional: Negative for chills and fever  HENT: Negative for rhinorrhea, sore throat and trouble swallowing  Eyes: Negative for pain  Respiratory: Negative for cough, shortness of breath, wheezing and stridor  Cardiovascular: Negative for chest pain and leg swelling  Gastrointestinal: Negative for abdominal pain, diarrhea and nausea  Endocrine: Negative for polyuria  Genitourinary: Negative for dysuria, flank pain and urgency  Musculoskeletal: Negative for joint swelling, myalgias and neck stiffness  Skin: Negative for rash  Allergic/Immunologic: Negative for immunocompromised state  Neurological: Negative for dizziness, syncope, weakness, numbness and headaches  Psychiatric/Behavioral: Positive for suicidal ideas  Negative for confusion  All other systems reviewed and are negative        Physical Exam  Physical Exam   Constitutional: He is oriented to person, place, and time  He appears well-developed and well-nourished  HENT:   Head: Normocephalic and atraumatic  Eyes: Pupils are equal, round, and reactive to light  EOM are normal    Neck: Normal range of motion  Neck supple  Cardiovascular: Normal rate and regular rhythm  Exam reveals no friction rub  No murmur heard  Pulmonary/Chest: Breath sounds normal  No respiratory distress  He has no wheezes  He has no rales  Abdominal: Soft  Bowel sounds are normal  He exhibits no distension  There is no tenderness  Musculoskeletal: Normal range of motion  He exhibits no edema or tenderness  Neurological: He is alert and oriented to person, place, and time  Skin: Skin is warm  No rash noted  Psychiatric: He has a normal mood and affect  Nursing note and vitals reviewed        Vital Signs  ED Triage Vitals   Temperature Pulse Respirations Blood Pressure SpO2   03/13/20 1121 03/13/20 1121 03/13/20 1121 03/13/20 1121 03/13/20 1121   98 3 °F (36 8 °C) 88 18 100/62 100 %      Temp Source Heart Rate Source Patient Position - Orthostatic VS BP Location FiO2 (%)   03/13/20 1121 03/13/20 1121 03/13/20 1121 03/13/20 1121 --   Tympanic Monitor Sitting Left arm       Pain Score       03/13/20 1125       No Pain           Vitals:    03/13/20 1121   BP: 100/62   Pulse: 88   Patient Position - Orthostatic VS: Sitting         Visual Acuity      ED Medications  Medications - No data to display    Diagnostic Studies  Results Reviewed     Procedure Component Value Units Date/Time    Rapid drug screen, urine [[id number]]  (Normal) Collected:  03/13/20 1256    Lab Status:  Final result Specimen:  Urine, Clean Catch Updated:  03/13/20 1400     Amph/Meth UR Negative     Barbiturate Ur Negative     Benzodiazepine Urine Negative     Cocaine Urine Negative     Methadone Urine Negative     Opiate Urine Negative     PCP Ur Negative     THC Urine Negative    Narrative: FOR MEDICAL PURPOSES ONLY  IF CONFIRMATION NEEDED PLEASE CONTACT THE LAB WITHIN 5 DAYS  Drug Screen Cutoff Levels:  AMPHETAMINE/METHAMPHETAMINES  1000 ng/mL  BARBITURATES     200 ng/mL  BENZODIAZEPINES     200 ng/mL  COCAINE      300 ng/mL  METHADONE      300 ng/mL  OPIATES      300 ng/mL  PHENCYCLIDINE     25 ng/mL  THC       50 ng/mL      POCT alcohol breath test [[id number]]  (Normal) Resulted:  03/13/20 1136    Lab Status:  Final result Updated:  03/13/20 1136     EXTBreath Alcohol 0 000                 No orders to display              Procedures  Procedures         ED Course  ED Course as of Mar 13 1426   Fri Mar 13, 2020   1359 Psychiatry came down and evaluated the patient stable for outpatient management will increase his Abilify to 20 mg  MDM  Number of Diagnoses or Management Options  Encounter for psychological evaluation: new and requires workup  Psychoses [facility]): new and requires workup  Suicidal ideations: new and requires workup  Diagnosis management comments: This is a 43-year-old man this is presents emergency department vague suicidal ideations  Psychiatry did evaluate the patient and deemed stable for outpatient management will increase the Abilify to 20 mg   Plan will be for outpatient management follow-up given strict instructions when to return back to Emergency Department  Morgan WASSERMAN , was the attending physician on duty at the time the patient visited the emergency department  The patient was evaluated by the AP  I was personally available for consultation concerning the patient  I did not see the patient nor participate in the medical decision making process of the encounter and the patient was dispositioned in the emergency department  I am administratively signing the chart after the fact            Amount and/or Complexity of Data Reviewed  Review and summarize past medical records: yes          Disposition  Final diagnoses:   Encounter for psychological evaluation   Suicidal ideations   Psychoses ([facility] )     Time reflects when diagnosis was documented in both MDM as applicable and the Disposition within this note     Time User Action Codes Description Comment    3/13/2020  1:59 PM Anais Finely Add [Z00 8] Encounter for psychological evaluation     3/13/2020  1:59 PM Anais Finely Add [Z80 458] Suicidal ideations     3/13/2020  2:00 PM Anais Finely Add [F29] Psychoses [facility])       ED Disposition     ED Disposition Condition Date/Time Comment    Discharge Stable Fri Mar 13, 2020  1:59 PM Martinez Mccauely discharge to home/self care  Follow-up Information    None         Patient's Medications   Discharge Prescriptions    ARIPIPRAZOLE (ABILIFY) 20 MG TABLET    Take 1 tablet (20 mg total) by mouth daily       Start Date: 3/13/2020 End Date: 4/12/2020       Order Dose: 20 mg       Quantity: 30 tablet    Refills: 0     No discharge procedures on file      PDMP Review     None          ED Provider  Electronically Signed by           Piter Perrin DO  03/13/20 0505

## 2020-03-13 NOTE — ED NOTES
The patient was evaluated by Dr Nora Young, who increased the patient's Abilify to 20mg  The patient will be discharged to the community VM left for Mikie Nogueira, the patient's ICM, regarding disposition

## 2020-03-14 ENCOUNTER — HOSPITAL ENCOUNTER (EMERGENCY)
Facility: HOSPITAL | Age: 50
Discharge: HOME | End: 2020-03-14
Attending: EMERGENCY MEDICINE | Admitting: EMERGENCY MEDICINE
Payer: COMMERCIAL

## 2020-03-14 ENCOUNTER — HOSPITAL ENCOUNTER (EMERGENCY)
Facility: HOSPITAL | Age: 50
Discharge: HOME | End: 2020-03-14
Attending: EMERGENCY MEDICINE
Payer: COMMERCIAL

## 2020-03-14 VITALS
SYSTOLIC BLOOD PRESSURE: 118 MMHG | RESPIRATION RATE: 16 BRPM | WEIGHT: 138.01 LBS | BODY MASS INDEX: 22.28 KG/M2 | HEART RATE: 79 BPM | OXYGEN SATURATION: 100 % | TEMPERATURE: 98.1 F | DIASTOLIC BLOOD PRESSURE: 69 MMHG

## 2020-03-14 VITALS
RESPIRATION RATE: 16 BRPM | SYSTOLIC BLOOD PRESSURE: 111 MMHG | BODY MASS INDEX: 22.06 KG/M2 | HEART RATE: 86 BPM | WEIGHT: 136.69 LBS | OXYGEN SATURATION: 99 % | DIASTOLIC BLOOD PRESSURE: 78 MMHG | TEMPERATURE: 98.1 F

## 2020-03-14 DIAGNOSIS — F32.A DEPRESSION: Primary | ICD-10-CM

## 2020-03-14 DIAGNOSIS — F32.A DEPRESSION: ICD-10-CM

## 2020-03-14 DIAGNOSIS — R45.851 FEELING SUICIDAL: Primary | ICD-10-CM

## 2020-03-14 LAB
AMPHETAMINES SERPL QL SCN: NEGATIVE
ATRIAL RATE: 72 BPM
BARBITURATES UR QL: NEGATIVE
BENZODIAZ UR QL: NEGATIVE
COCAINE UR QL: NEGATIVE
ETHANOL EXG-MCNC: 0 MG/DL
GLUCOSE SERPL-MCNC: 223 MG/DL (ref 65–140)
METHADONE UR QL: NEGATIVE
OPIATES UR QL SCN: NEGATIVE
P AXIS: 61 DEGREES
PCP UR QL: NEGATIVE
PR INTERVAL: 108 MS
QRS AXIS: 66 DEGREES
QRSD INTERVAL: 90 MS
QT INTERVAL: 370 MS
QTC INTERVAL: 405 MS
T WAVE AXIS: 66 DEGREES
THC UR QL: NEGATIVE
VENTRICULAR RATE: 72 BPM

## 2020-03-14 PROCEDURE — 96372 THER/PROPH/DIAG INJ SC/IM: CPT

## 2020-03-14 PROCEDURE — 99283 EMERGENCY DEPT VISIT LOW MDM: CPT

## 2020-03-14 PROCEDURE — 80307 DRUG TEST PRSMV CHEM ANLYZR: CPT | Performed by: PHYSICIAN ASSISTANT

## 2020-03-14 PROCEDURE — 82075 ASSAY OF BREATH ETHANOL: CPT | Performed by: PHYSICIAN ASSISTANT

## 2020-03-14 PROCEDURE — 99285 EMERGENCY DEPT VISIT HI MDM: CPT | Performed by: PHYSICIAN ASSISTANT

## 2020-03-14 PROCEDURE — 82948 REAGENT STRIP/BLOOD GLUCOSE: CPT

## 2020-03-14 PROCEDURE — 93010 ELECTROCARDIOGRAM REPORT: CPT | Performed by: INTERNAL MEDICINE

## 2020-03-14 PROCEDURE — 99285 EMERGENCY DEPT VISIT HI MDM: CPT | Performed by: EMERGENCY MEDICINE

## 2020-03-14 RX ORDER — LORAZEPAM 0.5 MG/1
1 TABLET ORAL ONCE
Status: COMPLETED | OUTPATIENT
Start: 2020-03-14 | End: 2020-03-14

## 2020-03-14 RX ADMIN — LORAZEPAM 1 MG: 0.5 TABLET ORAL at 01:31

## 2020-03-14 RX ADMIN — INSULIN LISPRO 2 UNITS: 100 INJECTION, SOLUTION INTRAVENOUS; SUBCUTANEOUS at 14:56

## 2020-03-14 NOTE — ED ATTENDING ATTESTATION
3/13/2020  I, Carmen Bianchi MD, saw and evaluated the patient  I have discussed the patient with the resident/non-physician practitioner and agree with the resident's/non-physician practitioner's findings, Plan of Care, and MDM as documented in the resident's/non-physician practitioner's note, except where noted  All available labs and Radiology studies were reviewed  I was present for key portions of any procedure(s) performed by the resident/non-physician practitioner and I was immediately available to provide assistance  At this point I agree with the current assessment done in the Emergency Department  I have conducted an independent evaluation of this patient a history and physical is as follows:      Final Diagnosis:  1  Weakness      Chief Complaint   Patient presents with    Weakness - Generalized     Pt reports for last couple days has been feeling weak and b/l leg "feel like rubber"  Pt also c/o SOB     This is a 56-year-old male with a history of type 1 diabetes and multiple psychiatric issues who presents the emergency department for lightheadedness  The patient was already seen twice a day for different psychiatric complaints  States that when he woke up this morning he was feeling lightheaded and rubbery in the legs    Denies any other symptoms  No chest pain, shortness of breath, palpitations, numbness/weakness  Denies any ambulatory issues  When asked why he did not mention this at his previous 2 visits, the patient states that I was seen for psychiatric issues  Denies fever/chills, nausea/vomiting, dizziness, numbness/weakness, headache, change in vision, URI symptoms, neck pain, chest pain, palpitations, shortness of breath, cough, back pain, flank pain, abdominal pain, diarrhea, hematochezia, melena, dysuria, hematuria        PMH:  - type 1 diabetes, bipolar disorder, asthma  PSH:  - not applicable    PE:   Vitals:    03/13/20 2038   BP: 145/77   BP Location: Right arm   Pulse: 84   Resp: 18   Temp: 97 8 °F (36 6 °C)   TempSrc: Oral   SpO2: 98%   Weight: 62 7 kg (138 lb 3 7 oz)       Constitutional: Vital signs are normal  He appears well-developed  He is cooperative  No distress  HENT:   Mouth/Throat: Uvula is midline, oropharynx is clear and moist and mucous membranes are normal    Eyes: Pupils are equal, round, and reactive to light  Conjunctivae and EOM are normal    Neck: Trachea normal  No thyroid mass and no thyromegaly present  Cardiovascular: Normal rate, regular rhythm, normal heart sounds, intact distal pulses and normal pulses  No murmur heard  Pulmonary/Chest: Effort normal and breath sounds normal    Abdominal: Soft  Normal appearance and bowel sounds are normal  There is no tenderness  There is no rebound, no guarding and no CVA tenderness  Neurological: He is alert  Cranial nerves 2-12 intact, strength out of 5 throughout, sensation intact throughout  Normal gait  Skin: Skin is warm, dry and intact  Psychiatric: He has a normal mood and affect  His speech is normal and behavior is normal  Thought content normal        A:  - this is a 71-year-old male with multiple psychiatric issues in type 1 diabetes who presents with lightheadedness  P:  - will check EKG and fingerstick glucose  Plan is discharge with reassurance  - 13 point ROS was performed and all are normal unless stated in the history above  - Nursing note reviewed  Vitals reviewed  - Orders placed by myself and/or advanced practitioner / resident     - Previous chart was reviewed  - No language barrier    - History obtained from patient  - There are no limitations to the history obtained  - Critical care time: Not applicable for this patient            Medications - No data to display  No orders to display     Orders Placed This Encounter   Procedures    Fingerstick Glucose (POCT)    ECG 12 lead     Labs Reviewed   POCT GLUCOSE - Abnormal       Result Value Ref Range Status    POC Glucose 218 (*) 65 - 140 mg/dl Final     Time reflects when diagnosis was documented in both MDM as applicable and the Disposition within this note     Time User Action Codes Description Comment    3/13/2020  9:31 PM Mable Infante Add [R53 1] Weakness       ED Disposition     ED Disposition Condition Date/Time Comment    Discharge Stable Fri Mar 13, 2020  9:31 PM María Jerry discharge to home/self care  Follow-up Information     Follow up With Specialties Details Why Contact Info    Tarsha Norris MD Family Medicine   [address] [place] [address],[facility]  [phone]          Patient's Medications   Discharge Prescriptions    No medications on file     No discharge procedures on file  Prior to Admission Medications   Prescriptions Last Dose Informant Patient Reported? Taking? ARIPiprazole (ABILIFY) 15 mg tablet   No No   Sig: Take 1 tablet (15 mg total) by mouth daily At 9am   ARIPiprazole (ABILIFY) 20 MG tablet   No No   Sig: Take 1 tablet (20 mg total) by mouth daily   albuterol (PROVENTIL HFA,VENTOLIN HFA) 90 mcg/act inhaler   No No   Sig: Inhale 2 puffs every 4 (four) hours as needed for wheezing   dicyclomine (BENTYL) 20 mg tablet   No No   Sig: Take 1 tablet (20 mg total) by mouth 2 (two) times a day   fenofibrate (TRICOR) 48 mg tablet   No No   Sig: Take 1 tablet (48 mg total) by mouth daily for 7 days At 9am   fluticasone (FLOVENT HFA) 110 MCG/ACT inhaler   No No   Sig: Inhale 1 puff every 12 (twelve) hours Rinse mouth after use    hydrOXYzine HCL (ATARAX) 25 mg tablet   Yes No   Sig: Take 25 mg by mouth every 6 (six) hours as needed   insulin glargine (LANTUS SOLOSTAR) 100 units/mL injection pen   No No   Sig: Inject 22 Units under the skin daily Every morning     insulin lispro (HUMALOG KWIKPEN) 100 units/mL injection pen   No No   Sig: Inject 4 Units under the skin 3 (three) times a day with meals   Patient taking differently: Inject 15 Units under the skin 3 (three) times a day with meals    lisinopril (ZESTRIL) 5 mg tablet   No No   Sig: Take 1 tablet (5 mg total) by mouth daily for 7 days At 9am   traZODone (DESYREL) 100 mg tablet   Yes No   Sig: Take 100 mg by mouth daily at bedtime      Facility-Administered Medications: None       Portions of the record may have been created with voice recognition software  Occasional wrong word or "sound a like" substitutions may have occurred due to the inherent limitations of voice recognition software  Read the chart carefully and recognize, using context, where substitutions have occurred      ED Course         Critical Care Time  Procedures

## 2020-03-14 NOTE — ED PROVIDER NOTES
History  Chief Complaint   Patient presents with    Psychiatric Evaluation     pt c/o SI  pt states he has no specific plan  pt states he was just released from [place] and feels he was not ready to go home  pt deneis HI/VH  pt sttaes he does here AH but pt states he just hears "mumbling"  49y  o male with PMH of anxiety, asthma, bipolar, depression and DM presents to the ER for evaluation  Patient states he has been feeling suicidal but has no plan  Patient states that even if he did have a plan, he wouldn't go through with it  He denies HI or VH at this time  He does experience auditory hallucinations  He states he hears mumbling but also hears at times voices telling him to cut himself or burn himself  He was recently admitted to [place]  He did have medications changed and has been taking his medications as prescribed  He follows with Enrique Gonzalez at [facility]  He lives with his cousin  He does work  He smokes cigarettes but denies drug or alcohol use  He denies fever, chills, chest pain, dyspnea, N/V/D, abdominal pain, weakness or paresthesias  History provided by:  Patient   used: No        Prior to Admission Medications   Prescriptions Last Dose Informant Patient Reported? Taking?    ARIPiprazole (ABILIFY) 15 mg tablet   No No   Sig: Take 1 tablet (15 mg total) by mouth daily At 9am   ARIPiprazole (ABILIFY) 20 MG tablet   No No   Sig: Take 1 tablet (20 mg total) by mouth daily   albuterol (PROVENTIL HFA,VENTOLIN HFA) 90 mcg/act inhaler   No No   Sig: Inhale 2 puffs every 4 (four) hours as needed for wheezing   dicyclomine (BENTYL) 20 mg tablet   No No   Sig: Take 1 tablet (20 mg total) by mouth 2 (two) times a day   fenofibrate (TRICOR) 48 mg tablet   No No   Sig: Take 1 tablet (48 mg total) by mouth daily for 7 days At 9am   fluticasone (FLOVENT HFA) 110 MCG/ACT inhaler   No No   Sig: Inhale 1 puff every 12 (twelve) hours Rinse mouth after use    hydrOXYzine HCL (ATARAX) 25 mg tablet   Yes No   Sig: Take 25 mg by mouth every 6 (six) hours as needed   insulin glargine (LANTUS SOLOSTAR) 100 units/mL injection pen   No No   Sig: Inject 22 Units under the skin daily Every morning  insulin lispro (HUMALOG KWIKPEN) 100 units/mL injection pen   No No   Sig: Inject 4 Units under the skin 3 (three) times a day with meals   Patient taking differently: Inject 15 Units under the skin 3 (three) times a day with meals    lisinopril (ZESTRIL) 5 mg tablet   No No   Sig: Take 1 tablet (5 mg total) by mouth daily for 7 days At 9am   traZODone (DESYREL) 100 mg tablet   Yes No   Sig: Take 100 mg by mouth daily at bedtime      Facility-Administered Medications: None       Past Medical History:   Diagnosis Date    Anxiety     Asthma     Bipolar 1 disorder ([facility] )     Depression     Diabetes mellitus ([place] [address] )     Psychiatric disorder        Past Surgical History:   Procedure Laterality Date    APPENDECTOMY         History reviewed  No pertinent family history  I have reviewed and agree with the history as documented  E-Cigarette/Vaping    E-Cigarette Use Never User      E-Cigarette/Vaping Substances     Social History     Tobacco Use    Smoking status: Current Every Day Smoker     Packs/day: 0 20     Types: Cigarettes    Smokeless tobacco: Never Used   Substance Use Topics    Alcohol use: Not Currently     Frequency: Never     Binge frequency: Never     Comment: States has had no alcohol in at least a year    Drug use: No       Review of Systems   Constitutional: Negative for chills and fever  Eyes: Negative for redness  Respiratory: Negative for shortness of breath  Cardiovascular: Negative for chest pain  Gastrointestinal: Negative for abdominal pain, diarrhea, nausea and vomiting  Musculoskeletal: Negative for neck stiffness  Skin: Negative for rash  Allergic/Immunologic: Negative for food allergies  Neurological: Negative for weakness and numbness     Psychiatric/Behavioral: Positive for hallucinations and suicidal ideas  Negative for self-injury  Physical Exam  Physical Exam   Constitutional: He is active  Non-toxic appearance  No distress  HENT:   Head: Normocephalic and atraumatic  Neck: Normal range of motion  Neck supple  No tracheal deviation present  Cardiovascular: Normal rate, regular rhythm, S1 normal, S2 normal and normal heart sounds  Exam reveals no gallop and no friction rub  No murmur heard  Pulmonary/Chest: Effort normal and breath sounds normal  No respiratory distress  He has no decreased breath sounds  He has no wheezes  He has no rhonchi  He has no rales  He exhibits no tenderness  Abdominal: Soft  Bowel sounds are normal  He exhibits no distension  There is no tenderness  There is no rebound and no guarding  Neurological: He is alert  GCS eye subscore is 4  GCS verbal subscore is 5  GCS motor subscore is 6  Skin: Skin is warm and dry  No rash noted  Psychiatric: He has a normal mood and affect  His speech is normal and behavior is normal  He expresses no suicidal plans and no homicidal plans  Nursing note and vitals reviewed        Vital Signs  ED Triage Vitals   Temperature Pulse Respirations Blood Pressure SpO2   03/14/20 1213 03/14/20 1213 03/14/20 1213 03/14/20 1213 03/14/20 1213   98 1 °F (36 7 °C) 81 16 127/63 99 %      Temp Source Heart Rate Source Patient Position - Orthostatic VS BP Location FiO2 (%)   03/14/20 1213 03/14/20 1213 03/14/20 1213 03/14/20 1213 --   Temporal Monitor Sitting Right arm       Pain Score       03/14/20 1433       No Pain           Vitals:    03/14/20 1213 03/14/20 1433   BP: 127/63 118/69   Pulse: 81 79   Patient Position - Orthostatic VS: Sitting Sitting         Visual Acuity      ED Medications  Medications   insulin lispro (HumaLOG) 100 units/mL subcutaneous injection 1-5 Units (2 Units Subcutaneous Given 3/14/20 1456)       Diagnostic Studies  Results Reviewed     Procedure Component Value Units Date/Time    Fingerstick Glucose (POCT) [[id number]]  (Abnormal) Collected:  03/14/20 1435    Lab Status:  Final result Updated:  03/14/20 1436     POC Glucose 223 mg/dl     Rapid drug screen, urine [[id number]]  (Normal) Collected:  03/14/20 1242    Lab Status:  Final result Specimen:  Urine, Clean Catch Updated:  03/14/20 1307     Amph/Meth UR Negative     Barbiturate Ur Negative     Benzodiazepine Urine Negative     Cocaine Urine Negative     Methadone Urine Negative     Opiate Urine Negative     PCP Ur Negative     THC Urine Negative    Narrative:       FOR MEDICAL PURPOSES ONLY  IF CONFIRMATION NEEDED PLEASE CONTACT THE LAB WITHIN 5 DAYS  Drug Screen Cutoff Levels:  AMPHETAMINE/METHAMPHETAMINES  1000 ng/mL  BARBITURATES     200 ng/mL  BENZODIAZEPINES     200 ng/mL  COCAINE      300 ng/mL  METHADONE      300 ng/mL  OPIATES      300 ng/mL  PHENCYCLIDINE     25 ng/mL  THC       50 ng/mL      POCT alcohol breath test [[id number]]  (Normal) Resulted:  03/14/20 1240    Lab Status:  Final result Updated:  03/14/20 1241     EXTBreath Alcohol 0 000                 No orders to display              Procedures  Procedures         ED Course                                 MDM  Number of Diagnoses or Management Options  Depression: new and does not require workup  Diagnosis management comments: DDX consists of but not limited to: suicidal, depression, bipolar    Will check UDS and BAT  Will consult Crisis  Patient spoke with Daxa Kate from Crisis  Per Daxa Kate, patient stated he did not need to be in the hospital and he has no plan to hurt himself  Plan is to discharge home  Patient agreeable  At discharge, I instructed the patient to:  -follow up with pcp  -return to the ER if symptoms worsened or new symptoms arose  Patient agreed to this plan and was stable at time of discharge         Amount and/or Complexity of Data Reviewed  Clinical lab tests: ordered and reviewed  Discuss the patient with other providers: yes    Patient Progress  Patient progress: stable        Disposition  Final diagnoses:   Depression     Time reflects when diagnosis was documented in both MDM as applicable and the Disposition within this note     Time User Action Codes Description Comment    3/14/2020  2:02 PM Mynor PIERRE Add [F32 9] Depression       ED Disposition     ED Disposition Condition Date/Time Comment    Discharge Stable Sat Mar 14, 2020  2:02 PM Levora Callow discharge to home/self care              Follow-up Information     Follow up With Specialties Details Why Contact Info Additional Information    Manolo Cordova MD Family Medicine Schedule an appointment as soon as possible for a visit   [address] [facility]Third [address], [phone]  [address] [facility] Family Medicine Schedule an appointment as soon as possible for a visit   [address], [address] [facility] [address] [facility], [address] [facility] [address], [address], [address]          Discharge Medication List as of 3/14/2020  2:04 PM      CONTINUE these medications which have NOT CHANGED    Details   albuterol (PROVENTIL HFA,VENTOLIN HFA) 90 mcg/act inhaler Inhale 2 puffs every 4 (four) hours as needed for wheezing, Starting Mon 2/17/2020, Print      !! ARIPiprazole (ABILIFY) 15 mg tablet Take 1 tablet (15 mg total) by mouth daily At 9am, Starting Tue 3/10/2020, No Print      !! ARIPiprazole (ABILIFY) 20 MG tablet Take 1 tablet (20 mg total) by mouth daily, Starting Fri 3/13/2020, Until Sun 4/12/2020, Normal      dicyclomine (BENTYL) 20 mg tablet Take 1 tablet (20 mg total) by mouth 2 (two) times a day, Starting Thu 3/5/2020, Print      fenofibrate (TRICOR) 48 mg tablet Take 1 tablet (48 mg total) by mouth daily for 7 days At 9am, Starting Tue 12/17/2019, Until Thu 3/5/2020, Print      fluticasone (FLOVENT HFA) 110 MCG/ACT inhaler Inhale 1 puff every 12 (twelve) hours Rinse mouth after use , Starting Tue 12/10/2019, Print      hydrOXYzine HCL (ATARAX) 25 mg tablet Take 25 mg by mouth every 6 (six) hours as needed, Starting Fri 1/17/2020, Historical Med      insulin glargine (LANTUS SOLOSTAR) 100 units/mL injection pen Inject 22 Units under the skin daily Every morning , Starting Tue 12/10/2019, Print      insulin lispro (HUMALOG KWIKPEN) 100 units/mL injection pen Inject 4 Units under the skin 3 (three) times a day with meals, Starting Tue 12/10/2019, Print      lisinopril (ZESTRIL) 5 mg tablet Take 1 tablet (5 mg total) by mouth daily for 7 days At 9am, Starting Tue 12/17/2019, Until Thu 3/5/2020, Print      traZODone (DESYREL) 100 mg tablet Take 100 mg by mouth daily at bedtime, Historical Med       !! - Potential duplicate medications found  Please discuss with provider  No discharge procedures on file      PDMP Review     None          ED Provider  Electronically Signed by           Mia Brown PA-C  03/14/20 0901

## 2020-03-14 NOTE — ED NOTES
Patient medicated with ativan and  ate multiple food items including sandwich and several packets of crackers prior to discharge        Bernard Rick RN  03/14/20 0467

## 2020-03-14 NOTE — ED PROVIDER NOTES
History  Chief Complaint   Patient presents with    Psychiatric Evaluation     Patient here with suicidal thoughts, does not  have a plan  Patient seen here earlier today by 2 different physicians including a psychiatrist and discharged by the psychiatrist      52 y/o W male brought in by [facility] - well-known to ED staff; patient seen three times within past 24 hours  SI without plan  Patient cleared by 2 prior physicians - in addition by attending psychiatrist, and discharged  Patient is homeless  Patient was provided with discharge planning and medications  Prior to Admission Medications   Prescriptions Last Dose Informant Patient Reported? Taking? ARIPiprazole (ABILIFY) 15 mg tablet   No No   Sig: Take 1 tablet (15 mg total) by mouth daily At 9am   ARIPiprazole (ABILIFY) 20 MG tablet   No No   Sig: Take 1 tablet (20 mg total) by mouth daily   albuterol (PROVENTIL HFA,VENTOLIN HFA) 90 mcg/act inhaler   No No   Sig: Inhale 2 puffs every 4 (four) hours as needed for wheezing   dicyclomine (BENTYL) 20 mg tablet   No No   Sig: Take 1 tablet (20 mg total) by mouth 2 (two) times a day   fenofibrate (TRICOR) 48 mg tablet   No No   Sig: Take 1 tablet (48 mg total) by mouth daily for 7 days At 9am   fluticasone (FLOVENT HFA) 110 MCG/ACT inhaler   No No   Sig: Inhale 1 puff every 12 (twelve) hours Rinse mouth after use    hydrOXYzine HCL (ATARAX) 25 mg tablet   Yes No   Sig: Take 25 mg by mouth every 6 (six) hours as needed   insulin glargine (LANTUS SOLOSTAR) 100 units/mL injection pen   No No   Sig: Inject 22 Units under the skin daily Every morning     insulin lispro (HUMALOG KWIKPEN) 100 units/mL injection pen   No No   Sig: Inject 4 Units under the skin 3 (three) times a day with meals   Patient taking differently: Inject 15 Units under the skin 3 (three) times a day with meals    lisinopril (ZESTRIL) 5 mg tablet   No No   Sig: Take 1 tablet (5 mg total) by mouth daily for 7 days At 9am   traZODone (DESYREL) 100 mg tablet   Yes No   Sig: Take 100 mg by mouth daily at bedtime      Facility-Administered Medications: None       Past Medical History:   Diagnosis Date    Anxiety     Asthma     Bipolar 1 disorder ([facility] )     Depression     Diabetes mellitus ([facility] )     Psychiatric disorder        Past Surgical History:   Procedure Laterality Date    APPENDECTOMY         History reviewed  No pertinent family history  I have reviewed and agree with the history as documented  E-Cigarette/Vaping    E-Cigarette Use Never User      E-Cigarette/Vaping Substances     Social History     Tobacco Use    Smoking status: Current Every Day Smoker     Packs/day: 0 20     Types: Cigarettes    Smokeless tobacco: Never Used   Substance Use Topics    Alcohol use: Not Currently     Frequency: Never     Binge frequency: Never     Comment: States has had no alcohol in at least a year    Drug use: No       Review of Systems   Psychiatric/Behavioral: Positive for suicidal ideas  The patient is nervous/anxious  All other systems reviewed and are negative  Physical Exam  Physical Exam   Constitutional: He is oriented to person, place, and time  He appears well-developed and well-nourished  HENT:   Head: Normocephalic and atraumatic  Eyes: Pupils are equal, round, and reactive to light  Conjunctivae and EOM are normal    Neck: Normal range of motion  Neck supple  Cardiovascular: Normal rate  Pulmonary/Chest: Effort normal and breath sounds normal    Abdominal: Soft  Bowel sounds are normal    Musculoskeletal: Normal range of motion  Neurological: He is alert and oriented to person, place, and time  Skin: Skin is warm and dry  Capillary refill takes less than 2 seconds  Psychiatric: Judgment normal  His speech is slurred  Cognition and memory are normal  He exhibits a depressed mood  Vitals reviewed        Vital Signs  ED Triage Vitals [03/14/20 0105]   Temperature Pulse Respirations Blood Pressure SpO2 98 1 °F (36 7 °C) 86 16 111/78 99 %      Temp Source Heart Rate Source Patient Position - Orthostatic VS BP Location FiO2 (%)   Tympanic Monitor Sitting Left arm --      Pain Score       --           Vitals:    03/14/20 0105   BP: 111/78   Pulse: 86   Patient Position - Orthostatic VS: Sitting         Visual Acuity      ED Medications  Medications   LORazepam (ATIVAN) tablet 1 mg (1 mg Oral Given 3/14/20 0131)       Diagnostic Studies  Results Reviewed     None                 No orders to display              Procedures  Procedures         ED Course                                 MDM      Disposition  Final diagnoses:   Feeling suicidal   Depression     Time reflects when diagnosis was documented in both MDM as applicable and the Disposition within this note     Time User Action Codes Description Comment    3/14/2020  1:10 AM Neil Slovak Add [R45 851] Feeling suicidal     3/14/2020  1:10 AM Neil Rodriguez Add [F32 9] Depression       ED Disposition     ED Disposition Condition Date/Time Comment    Discharge Stable Sat Mar 14, 2020  1:10 AM Marianne Louis discharge to home/self care  Follow-up Information     Follow up With Specialties Details Why Contact Louis Balderas MD [facility] Medicine Schedule an appointment as soon as possible for a visit in 1 week As needed [address] [facility],Third [facility], [address] [facility]  [phone]            Patient's Medications   Discharge Prescriptions    No medications on file     No discharge procedures on file      PDMP Review     None          ED Provider  Electronically Signed by           Remy Quintanilla DO  03/14/20 0826

## 2020-03-14 NOTE — ED NOTES
Pt is a 51 y o  male who was brought to the ED with   Chief Complaint   Patient presents with   Sandhya Milan Psychiatric Evaluation     pt c/o SI  pt states he has no specific plan  pt states he was just released from [facility] and feels he was not ready to go home  pt deneis HI/VH  pt sttaes he does here AH but pt states he just hears "mumbling"  Pt brought to the ED with complaints of S/I pt reports no plan or intent, Pt reports that he went to [facility] "i thought that they could help me with my s/i", Pt reports that thing are good were he lives, pt states " I dont need to be in the hospital " Pt [facility], Pt reports that he has no plan or intent to hurt himself  Pt dneies H/I, A/H,V/H   Intake Assessment completed, Safety risk Assessment completed  CW met with pt and discussed what happened, Pt continues to state that he does not want to be admitted to a BHU, and that he will use his coping skills  Of playing on line video games  Pt reports that he will [facility]  CW discussed this case and pt plan with ED Provider who is in agreement with this plan, Pt will be discharged per ED Provider, CW gave pt referral for follow up care            [address] [place] [address]

## 2020-03-14 NOTE — ED NOTES
Patient reports that he is having suicidal thoughts - no plans - states he has his 15year old daughter to think of so he wouldn't harm himself  Patient was given food and drink  Is not hearing or seeing things       Ronni Lewis RN  03/14/20 5875

## 2020-03-16 NOTE — ED PROVIDER NOTES
History  Chief Complaint   Patient presents with    Weakness - Generalized     Pt reports for last couple days has been feeling weak and b/l leg "feel like rubber"  Pt also c/o SOB     45-year-old man with a past medical history of diabetes mellitus, bipolar multiple ED visits presents for evaluation of weakness  Patient states that his legs feel like rubber when ambulating  The symptoms started this morning  Patient has not taken any medication for the symptoms  He states that he was seen at [facility] multiple times a day for psychiatric issues  He states that he was feeling weak at that time but did not mention into any the providers because he was only there for psychiatric treatment  He states that he is compliant with his medications  He denies SI, HI, hallucinations  He denies fever, chills, nausea vomiting, diarrhea, shortness of breath, chest pain  Prior to Admission Medications   Prescriptions Last Dose Informant Patient Reported? Taking? ARIPiprazole (ABILIFY) 15 mg tablet   No No   Sig: Take 1 tablet (15 mg total) by mouth daily At 9am   ARIPiprazole (ABILIFY) 20 MG tablet   No No   Sig: Take 1 tablet (20 mg total) by mouth daily   albuterol (PROVENTIL HFA,VENTOLIN HFA) 90 mcg/act inhaler   No No   Sig: Inhale 2 puffs every 4 (four) hours as needed for wheezing   dicyclomine (BENTYL) 20 mg tablet   No No   Sig: Take 1 tablet (20 mg total) by mouth 2 (two) times a day   fenofibrate (TRICOR) 48 mg tablet   No No   Sig: Take 1 tablet (48 mg total) by mouth daily for 7 days At 9am   fluticasone (FLOVENT HFA) 110 MCG/ACT inhaler   No No   Sig: Inhale 1 puff every 12 (twelve) hours Rinse mouth after use    hydrOXYzine HCL (ATARAX) 25 mg tablet   Yes No   Sig: Take 25 mg by mouth every 6 (six) hours as needed   insulin glargine (LANTUS SOLOSTAR) 100 units/mL injection pen   No No   Sig: Inject 22 Units under the skin daily Every morning     insulin lispro (HUMALOG KWIKPEN) 100 units/mL injection pen   No No   Sig: Inject 4 Units under the skin 3 (three) times a day with meals   Patient taking differently: Inject 15 Units under the skin 3 (three) times a day with meals    lisinopril (ZESTRIL) 5 mg tablet   No No   Sig: Take 1 tablet (5 mg total) by mouth daily for 7 days At 9am   traZODone (DESYREL) 100 mg tablet   Yes No   Sig: Take 100 mg by mouth daily at bedtime      Facility-Administered Medications: None       Past Medical History:   Diagnosis Date    Anxiety     Asthma     Bipolar 1 disorder ([place] [address] )     Depression     Diabetes mellitus ([place] [address] )     Psychiatric disorder        Past Surgical History:   Procedure Laterality Date    APPENDECTOMY         History reviewed  No pertinent family history  I have reviewed and agree with the history as documented  E-Cigarette/Vaping    E-Cigarette Use Never User      E-Cigarette/Vaping Substances     Social History     Tobacco Use    Smoking status: Current Every Day Smoker     Packs/day: 0 20     Types: Cigarettes    Smokeless tobacco: Never Used   Substance Use Topics    Alcohol use: Not Currently     Frequency: Never     Binge frequency: Never     Comment: States has had no alcohol in at least a year    Drug use: No        Review of Systems   Constitutional: Negative for appetite change, chills, diaphoresis, fatigue and fever  HENT: Negative for congestion, rhinorrhea and sore throat  Respiratory: Negative for apnea, cough, choking, chest tightness, shortness of breath, wheezing and stridor  Cardiovascular: Negative for chest pain, palpitations and leg swelling  Gastrointestinal: Negative for abdominal distention, abdominal pain, constipation, diarrhea, nausea and vomiting  Genitourinary: Negative for dysuria and hematuria  Musculoskeletal: Negative for back pain, neck pain and neck stiffness  Skin: Negative for pallor, rash and wound  Neurological: Positive for weakness   Negative for dizziness, light-headedness and headaches  Psychiatric/Behavioral: Negative for behavioral problems and confusion  All other systems reviewed and are negative  Physical Exam  ED Triage Vitals [03/13/20 2038]   Temperature Pulse Respirations Blood Pressure SpO2   97 8 °F (36 6 °C) 84 18 145/77 98 %      Temp Source Heart Rate Source Patient Position - Orthostatic VS BP Location FiO2 (%)   Oral Monitor Lying Right arm --      Pain Score       No Pain             Orthostatic Vital Signs  Vitals:    03/13/20 2038   BP: 145/77   Pulse: 84   Patient Position - Orthostatic VS: Lying       Physical Exam   Constitutional: He is oriented to person, place, and time  He appears well-developed and well-nourished  No distress  HENT:   Head: Normocephalic and atraumatic  Mouth/Throat: Oropharynx is clear and moist    Eyes: Pupils are equal, round, and reactive to light  Conjunctivae and EOM are normal  No scleral icterus  Neck: Normal range of motion  Neck supple  Cardiovascular: Normal rate, regular rhythm and normal heart sounds  No murmur heard  Pulmonary/Chest: Effort normal and breath sounds normal  No stridor  No respiratory distress  He has no wheezes  He has no rales  He exhibits no tenderness  Abdominal: Soft  Bowel sounds are normal  He exhibits no distension and no mass  There is no tenderness  There is no rebound and no guarding  Musculoskeletal: Normal range of motion  He exhibits no edema  Neurological: He is alert and oriented to person, place, and time  Skin: Skin is warm and dry  No rash noted  He is not diaphoretic  Psychiatric: He has a normal mood and affect  His behavior is normal    Nursing note and vitals reviewed        ED Medications  Medications - No data to display    Diagnostic Studies  Results Reviewed     Procedure Component Value Units Date/Time    Fingerstick Glucose (POCT) [[id number]]  (Abnormal) Collected:  03/13/20 2056    Lab Status:  Final result Updated:  03/13/20 2057     POC Glucose 218 mg/dl                  No orders to display         Procedures  ECG 12 Lead Documentation Only  Date/Time: 3/13/2020 9:20 PM  Performed by: Galileo Mendez MD  Authorized by: Galileo Mendez MD     Indications / Diagnosis:  Weakness  Patient location:  ED  Previous ECG:     Previous ECG:  Compared to current  Interpretation:     Interpretation: normal    Rate:     ECG rate:  72    ECG rate assessment: normal    Rhythm:     Rhythm: sinus rhythm    Ectopy:     Ectopy: none    QRS:     QRS axis:  Normal    QRS intervals:  Normal  Conduction:     Conduction: normal    ST segments:     ST segments:  Normal  T waves:     T waves: normal            ED Course  ED Course as of Mar 16 1647   Fri Mar 13, 2020   2109 POC Glucose(!): 218                                 MDM  Number of Diagnoses or Management Options  Weakness: new and requires workup  Diagnosis management comments: 73-year-old man presents with weakness  Patient has multiple visits to the emergency department for the past 24 hours for very psych complaints  Patient denies SI, HI, hallucinations at this time  He is well appearing on exam   Plan EKG, fingerstick blood glucose         Amount and/or Complexity of Data Reviewed  Clinical lab tests: ordered and reviewed  Decide to obtain previous medical records or to obtain history from someone other than the patient: yes  Obtain history from someone other than the patient: yes  Review and summarize past medical records: yes  Discuss the patient with other providers: yes  Independent visualization of images, tracings, or specimens: yes    Risk of Complications, Morbidity, and/or Mortality  Presenting problems: minimal  Diagnostic procedures: minimal  Management options: minimal    Patient Progress  Patient progress: stable        Disposition  Final diagnoses:   Weakness     Time reflects when diagnosis was documented in both MDM as applicable and the Disposition within this note     Time User Action Codes Description Comment    3/13/2020  9:31 PM Berenice Abraham Add [R53 1] Weakness       ED Disposition     ED Disposition Condition Date/Time Comment    Discharge Stable Fri Mar 13, 2020  9:31 PM Alka Barney discharge to home/self care              Follow-up Information     Follow up With Specialties Details Why Contact Info    Polo Prieto MD Family Medicine   [address], [address]  [phone]            Discharge Medication List as of 3/13/2020  9:31 PM      CONTINUE these medications which have NOT CHANGED    Details   albuterol (PROVENTIL HFA,VENTOLIN HFA) 90 mcg/act inhaler Inhale 2 puffs every 4 (four) hours as needed for wheezing, Starting Mon 2/17/2020, Print      !! ARIPiprazole (ABILIFY) 15 mg tablet Take 1 tablet (15 mg total) by mouth daily At 9am, Starting Tue 3/10/2020, No Print      !! ARIPiprazole (ABILIFY) 20 MG tablet Take 1 tablet (20 mg total) by mouth daily, Starting Fri 3/13/2020, Until Sun 4/12/2020, Normal      dicyclomine (BENTYL) 20 mg tablet Take 1 tablet (20 mg total) by mouth 2 (two) times a day, Starting Thu 3/5/2020, Print      fenofibrate (TRICOR) 48 mg tablet Take 1 tablet (48 mg total) by mouth daily for 7 days At 9am, Starting Tue 12/17/2019, Until Thu 3/5/2020, Print      fluticasone (FLOVENT HFA) 110 MCG/ACT inhaler Inhale 1 puff every 12 (twelve) hours Rinse mouth after use , Starting Tue 12/10/2019, Print      hydrOXYzine HCL (ATARAX) 25 mg tablet Take 25 mg by mouth every 6 (six) hours as needed, Starting Fri 1/17/2020, Historical Med      insulin glargine (LANTUS SOLOSTAR) 100 units/mL injection pen Inject 22 Units under the skin daily Every morning , Starting Tue 12/10/2019, Print      insulin lispro (HUMALOG KWIKPEN) 100 units/mL injection pen Inject 4 Units under the skin 3 (three) times a day with meals, Starting Tue 12/10/2019, Print      lisinopril (ZESTRIL) 5 mg tablet Take 1 tablet (5 mg total) by mouth daily for 7 days At 9am, Starting Tue 12/17/2019, Until Thu 3/5/2020, Print      traZODone (DESYREL) 100 mg tablet Take 100 mg by mouth daily at bedtime, Historical Med       !! - Potential duplicate medications found  Please discuss with provider  No discharge procedures on file  PDMP Review     None           ED Provider  Attending physically available and evaluated Doron Garrett I managed the patient along with the ED Attending      Electronically Signed by         Tashia Cyr MD  03/16/20 5158

## 2020-03-26 ENCOUNTER — HOSPITAL ENCOUNTER (EMERGENCY)
Facility: HOSPITAL | Age: 50
Discharge: HOME | End: 2020-03-26
Attending: EMERGENCY MEDICINE | Admitting: EMERGENCY MEDICINE
Payer: COMMERCIAL

## 2020-03-26 VITALS
HEART RATE: 93 BPM | DIASTOLIC BLOOD PRESSURE: 97 MMHG | RESPIRATION RATE: 17 BRPM | SYSTOLIC BLOOD PRESSURE: 111 MMHG | OXYGEN SATURATION: 97 % | TEMPERATURE: 97.9 F

## 2020-03-26 DIAGNOSIS — Z86.59 HISTORY OF BIPOLAR DISORDER: ICD-10-CM

## 2020-03-26 DIAGNOSIS — Z00.8 ENCOUNTER FOR PSYCHOLOGICAL EVALUATION: Primary | ICD-10-CM

## 2020-03-26 LAB
AMPHETAMINES SERPL QL SCN: NEGATIVE
BARBITURATES UR QL: NEGATIVE
BENZODIAZ UR QL: NEGATIVE
COCAINE UR QL: NEGATIVE
ETHANOL EXG-MCNC: 0 MG/DL
GLUCOSE SERPL-MCNC: 200 MG/DL (ref 65–140)
METHADONE UR QL: NEGATIVE
OPIATES UR QL SCN: NEGATIVE
PCP UR QL: NEGATIVE
THC UR QL: NEGATIVE

## 2020-03-26 PROCEDURE — 80307 DRUG TEST PRSMV CHEM ANLYZR: CPT | Performed by: EMERGENCY MEDICINE

## 2020-03-26 PROCEDURE — 99284 EMERGENCY DEPT VISIT MOD MDM: CPT | Performed by: EMERGENCY MEDICINE

## 2020-03-26 PROCEDURE — 82948 REAGENT STRIP/BLOOD GLUCOSE: CPT

## 2020-03-26 PROCEDURE — 99284 EMERGENCY DEPT VISIT MOD MDM: CPT

## 2020-03-26 PROCEDURE — 82075 ASSAY OF BREATH ETHANOL: CPT | Performed by: EMERGENCY MEDICINE

## 2020-03-26 PROCEDURE — 96372 THER/PROPH/DIAG INJ SC/IM: CPT

## 2020-03-26 RX ORDER — INSULIN GLARGINE 100 [IU]/ML
22 INJECTION, SOLUTION SUBCUTANEOUS EVERY MORNING
Status: DISCONTINUED | OUTPATIENT
Start: 2020-03-27 | End: 2020-03-26 | Stop reason: HOSPADM

## 2020-03-26 RX ORDER — INSULIN GLARGINE 100 [IU]/ML
22 INJECTION, SOLUTION SUBCUTANEOUS ONCE
Status: DISCONTINUED | OUTPATIENT
Start: 2020-03-26 | End: 2020-03-26

## 2020-03-26 RX ORDER — INSULIN GLARGINE 100 [IU]/ML
22 INJECTION, SOLUTION SUBCUTANEOUS ONCE
Status: DISCONTINUED | OUTPATIENT
Start: 2020-03-27 | End: 2020-03-26

## 2020-03-26 RX ADMIN — INSULIN LISPRO 15 UNITS: 100 INJECTION, SOLUTION INTRAVENOUS; SUBCUTANEOUS at 16:39

## 2020-03-26 NOTE — ED NOTES
Crisis met with Pt  He stated he was released from [facility] yesterday but felt it was to soon  He stated that he had suicidal thoughts but denied a plan  Pt denies any suicidal attemps  Pt was prescribe Abilify on 3/16 that he can  from [facility] in [facility]  Pt has a therapist and psychiatrist through [facility]  Pt states that he lives with his cousin and states that he feels safe in his home  Pt denies substance use or legal issues  Pt was oriented X4  Pt was pleasant, thought content was normal   The Pt's attention and memory were intact  Pt denies issues with sleep or appetite  Pt denies homicidal ideations or plans  Pt denies any self abusive behaviors  He states that he takes all his meds as prescribed  Pt has diabetes but states that he takes insulin as prescribed  Pt feels he is safe at home and will  his medication and contact his psychiatrist   Dr Carito Bowie is in agreement that Pt is safe to return home

## 2020-03-26 NOTE — ED PROVIDER NOTES
History  Chief Complaint   Patient presents with    Psychiatric Evaluation     pt reports SI x 1month  reports hearing voices to cut himself  denies HI/VH     51 y o  M w/h/o bipolar disorder p/w SI x 1 month  Pt reports he is not on any medications because his psychiatrist told him he doesn't need medications anymore  He reports feeling SI for a month, but doesn't actually want to hurt himself "because I have a 15year old daughter "  Seeing shadows  Denies HI  History provided by:  Patient   used: No    Psychiatric Evaluation   Presenting symptoms: hallucinations (Seeing shadows) and suicidal thoughts    Presenting symptoms: no agitation and no self-mutilation    Duration:  1 month  Chronicity:  Recurrent  Treatment compliance:  Untreated  Relieved by:  None tried  Worsened by:  Nothing  Ineffective treatments:  None tried  Associated symptoms: no anxiety and no headaches    Risk factors: hx of mental illness        Prior to Admission Medications   Prescriptions Last Dose Informant Patient Reported? Taking? ARIPiprazole (ABILIFY) 15 mg tablet   No No   Sig: Take 1 tablet (15 mg total) by mouth daily At 9am   ARIPiprazole (ABILIFY) 20 MG tablet   No No   Sig: Take 1 tablet (20 mg total) by mouth daily   albuterol (PROVENTIL HFA,VENTOLIN HFA) 90 mcg/act inhaler   No No   Sig: Inhale 2 puffs every 4 (four) hours as needed for wheezing   dicyclomine (BENTYL) 20 mg tablet   No No   Sig: Take 1 tablet (20 mg total) by mouth 2 (two) times a day   fluticasone (FLOVENT HFA) 110 MCG/ACT inhaler   No No   Sig: Inhale 1 puff every 12 (twelve) hours Rinse mouth after use    hydrOXYzine HCL (ATARAX) 25 mg tablet   Yes No   Sig: Take 25 mg by mouth every 6 (six) hours as needed   insulin glargine (LANTUS SOLOSTAR) 100 units/mL injection pen   No No   Sig: Inject 22 Units under the skin daily Every morning     insulin lispro (HUMALOG KWIKPEN) 100 units/mL injection pen   No No   Sig: Inject 4 Units under the skin 3 (three) times a day with meals   Patient taking differently: Inject 15 Units under the skin 3 (three) times a day with meals    traZODone (DESYREL) 100 mg tablet   Yes No   Sig: Take 100 mg by mouth daily at bedtime      Facility-Administered Medications: None       Past Medical History:   Diagnosis Date    Anxiety     Asthma     Bipolar 1 disorder ([place] [address] )     Depression     Diabetes mellitus ([place] [address] )     Psychiatric disorder        Past Surgical History:   Procedure Laterality Date    APPENDECTOMY         History reviewed  No pertinent family history  I have reviewed and agree with the history as documented  E-Cigarette/Vaping    E-Cigarette Use Never User      E-Cigarette/Vaping Substances     Social History     Tobacco Use    Smoking status: Current Every Day Smoker     Packs/day: 0 20     Types: Cigarettes    Smokeless tobacco: Never Used   Substance Use Topics    Alcohol use: Not Currently     Frequency: Never     Binge frequency: Never     Comment: States has had no alcohol in at least a year    Drug use: No       Review of Systems   Constitutional: Negative for chills and fever  Eyes: Negative for visual disturbance  Gastrointestinal: Negative for diarrhea, nausea and vomiting  Skin: Negative for rash  Neurological: Negative for tremors, facial asymmetry, speech difficulty, weakness, numbness and headaches  Psychiatric/Behavioral: Positive for hallucinations (Seeing shadows) and suicidal ideas  Negative for agitation, behavioral problems, confusion, decreased concentration, dysphoric mood, self-injury and sleep disturbance  The patient is not nervous/anxious and is not hyperactive  All other systems reviewed and are negative  Physical Exam  Physical Exam   Constitutional: He is oriented to person, place, and time  He appears well-developed and well-nourished  No distress  HENT:   Head: Normocephalic  Eyes: EOM are normal    Neck: Normal range of motion   Neck supple  Cardiovascular: Normal rate, regular rhythm, normal heart sounds and intact distal pulses  Exam reveals no gallop and no friction rub  No murmur heard  Pulmonary/Chest: Effort normal and breath sounds normal  No respiratory distress  He has no wheezes  He has no rales  Abdominal: Soft  Bowel sounds are normal  He exhibits no distension  There is no tenderness  There is no rebound and no guarding  Neurological: He is alert and oriented to person, place, and time  Skin: Skin is warm and dry  No pallor  Psychiatric: His affect is not angry  His speech is not rapid and/or pressured  He is not agitated, not aggressive, not hyperactive and not actively hallucinating  He does not exhibit a depressed mood  He expresses suicidal ideation  He expresses no homicidal ideation  Nursing note and vitals reviewed        Vital Signs  ED Triage Vitals [03/26/20 1535]   Temperature Pulse Respirations Blood Pressure SpO2   97 9 °F (36 6 °C) 93 17 111/97 97 %      Temp Source Heart Rate Source Patient Position - Orthostatic VS BP Location FiO2 (%)   Oral Monitor Lying Left arm --      Pain Score       --           Vitals:    03/26/20 1535   BP: 111/97   Pulse: 93   Patient Position - Orthostatic VS: Lying         Visual Acuity      ED Medications  Medications   insulin lispro (HumaLOG) 100 units/mL subcutaneous injection 15 Units (15 Units Subcutaneous Given 3/26/20 1639)   insulin glargine (LANTUS) subcutaneous injection 22 Units 0 22 mL (has no administration in time range)       Diagnostic Studies  Results Reviewed     Procedure Component Value Units Date/Time    Fingerstick Glucose (POCT) [[id number]]  (Abnormal) Collected:  03/26/20 1612    Lab Status:  Final result Updated:  03/26/20 1614     POC Glucose 200 mg/dl     Rapid drug screen, urine [[id number]]  (Normal) Collected:  03/26/20 1549    Lab Status:  Final result Specimen:  Urine, Catheter Updated:  03/26/20 1609     Amph/Meth UR Negative     Barbiturate Ur Negative     Benzodiazepine Urine Negative     Cocaine Urine Negative     Methadone Urine Negative     Opiate Urine Negative     PCP Ur Negative     THC Urine Negative    Narrative:       FOR MEDICAL PURPOSES ONLY  IF CONFIRMATION NEEDED PLEASE CONTACT THE LAB WITHIN 5 DAYS  Drug Screen Cutoff Levels:  AMPHETAMINE/METHAMPHETAMINES  1000 ng/mL  BARBITURATES     200 ng/mL  BENZODIAZEPINES     200 ng/mL  COCAINE      300 ng/mL  METHADONE      300 ng/mL  OPIATES      300 ng/mL  PHENCYCLIDINE     25 ng/mL  THC       50 ng/mL      POCT alcohol breath test [[id number]]  (Normal) Resulted:  03/26/20 1548    Lab Status:  Final result Updated:  03/26/20 1548     EXTBreath Alcohol 0 00                 No orders to display              Procedures  Procedures         ED Course  ED Course as of Mar 26 1810   Thu Mar 26, 2020   1748 Pt evaluated by crisis and safe for discharge  Pt actually has psych medications waiting at a pharmacy for him from yesterday's psych discharge  Pt was instructed to  these medications  MDM      Disposition  Final diagnoses:   Encounter for psychological evaluation   History of bipolar disorder     Time reflects when diagnosis was documented in both MDM as applicable and the Disposition within this note     Time User Action Codes Description Comment    3/26/2020  5:46 PM Chucky Barnhart 49 [Z00 8] Encounter for psychological evaluation     3/26/2020  5:46 PM Chucky Barnhart 48 [Z86 59] History of bipolar disorder       ED Disposition     ED Disposition Condition Date/Time Comment    Discharge Stable Thu Mar 26, 2020  5:56 PM Maria Luisa Lorenzo discharge to home/self care              MD Documentation      Most Recent Value   Accepting Facility Name, [facility]      RN Documentation      [facility] Name, [address]   Preventative Measures      Follow-up Information    None Discharge Medication List as of 3/26/2020  5:46 PM      CONTINUE these medications which have NOT CHANGED    Details   albuterol (PROVENTIL HFA,VENTOLIN HFA) 90 mcg/act inhaler Inhale 2 puffs every 4 (four) hours as needed for wheezing, Starting Mon 2/17/2020, Print      !! ARIPiprazole (ABILIFY) 15 mg tablet Take 1 tablet (15 mg total) by mouth daily At 9am, Starting Tue 3/10/2020, No Print      !! ARIPiprazole (ABILIFY) 20 MG tablet Take 1 tablet (20 mg total) by mouth daily, Starting Fri 3/13/2020, Until Sun 4/12/2020, Normal      dicyclomine (BENTYL) 20 mg tablet Take 1 tablet (20 mg total) by mouth 2 (two) times a day, Starting Thu 3/5/2020, Print      fluticasone (FLOVENT HFA) 110 MCG/ACT inhaler Inhale 1 puff every 12 (twelve) hours Rinse mouth after use , Starting Tue 12/10/2019, Print      hydrOXYzine HCL (ATARAX) 25 mg tablet Take 25 mg by mouth every 6 (six) hours as needed, Starting Fri 1/17/2020, Historical Med      insulin glargine (LANTUS SOLOSTAR) 100 units/mL injection pen Inject 22 Units under the skin daily Every morning , Starting Tue 12/10/2019, Print      insulin lispro (HUMALOG KWIKPEN) 100 units/mL injection pen Inject 4 Units under the skin 3 (three) times a day with meals, Starting Tue 12/10/2019, Print      traZODone (DESYREL) 100 mg tablet Take 100 mg by mouth daily at bedtime, Historical Med       !! - Potential duplicate medications found  Please discuss with provider  No discharge procedures on file      PDMP Review     None          ED Provider  Electronically Signed by           Jairon Chauhan 25, DO  03/26/20 9048

## 2020-05-10 ENCOUNTER — HOSPITAL ENCOUNTER (EMERGENCY)
Facility: HOSPITAL | Age: 50
Discharge: HOME | End: 2020-05-10
Attending: EMERGENCY MEDICINE | Admitting: EMERGENCY MEDICINE
Payer: COMMERCIAL

## 2020-05-10 VITALS
RESPIRATION RATE: 18 BRPM | TEMPERATURE: 97.2 F | OXYGEN SATURATION: 97 % | WEIGHT: 141.5 LBS | DIASTOLIC BLOOD PRESSURE: 87 MMHG | SYSTOLIC BLOOD PRESSURE: 169 MMHG | HEART RATE: 86 BPM | BODY MASS INDEX: 22.84 KG/M2

## 2020-05-10 DIAGNOSIS — F32.A DEPRESSION, UNSPECIFIED DEPRESSION TYPE: Primary | ICD-10-CM

## 2020-05-10 PROCEDURE — 99283 EMERGENCY DEPT VISIT LOW MDM: CPT

## 2020-05-10 PROCEDURE — 99284 EMERGENCY DEPT VISIT MOD MDM: CPT | Performed by: EMERGENCY MEDICINE

## 2020-05-11 ENCOUNTER — HOSPITAL ENCOUNTER (EMERGENCY)
Facility: HOSPITAL | Age: 50
Discharge: HOME | End: 2020-05-11
Attending: EMERGENCY MEDICINE | Admitting: EMERGENCY MEDICINE
Payer: COMMERCIAL

## 2020-05-11 VITALS
HEART RATE: 110 BPM | SYSTOLIC BLOOD PRESSURE: 112 MMHG | HEIGHT: 66 IN | BODY MASS INDEX: 22.99 KG/M2 | WEIGHT: 143.08 LBS | DIASTOLIC BLOOD PRESSURE: 72 MMHG | RESPIRATION RATE: 18 BRPM | OXYGEN SATURATION: 99 % | TEMPERATURE: 97.8 F

## 2020-05-11 DIAGNOSIS — F32.A DEPRESSION: Primary | ICD-10-CM

## 2020-05-11 PROCEDURE — 99284 EMERGENCY DEPT VISIT MOD MDM: CPT

## 2020-05-11 PROCEDURE — 99283 EMERGENCY DEPT VISIT LOW MDM: CPT | Performed by: EMERGENCY MEDICINE

## 2020-05-11 PROCEDURE — 80307 DRUG TEST PRSMV CHEM ANLYZR: CPT | Performed by: EMERGENCY MEDICINE

## 2020-05-11 PROCEDURE — 82075 ASSAY OF BREATH ETHANOL: CPT | Performed by: EMERGENCY MEDICINE

## 2020-05-12 ENCOUNTER — PATIENT OUTREACH (OUTPATIENT)
Dept: CASE MANAGEMENT | Facility: OTHER | Age: 50
End: 2020-05-12

## 2020-08-21 ENCOUNTER — PATIENT OUTREACH (OUTPATIENT)
Dept: CASE MANAGEMENT | Facility: OTHER | Age: 50
End: 2020-08-21

## 2020-08-21 NOTE — PROGRESS NOTES
OPCM UBALDO completed chart review  Patient hasn't made any recent ED visit or had any admissions  Patient has a [facility]  Will continue to follow and be available as needed

## 2020-10-22 ENCOUNTER — PATIENT OUTREACH (OUTPATIENT)
Dept: CASE MANAGEMENT | Facility: OTHER | Age: 50
End: 2020-10-22

## 2020-11-10 ENCOUNTER — PATIENT OUTREACH (OUTPATIENT)
Dept: CASE MANAGEMENT | Facility: OTHER | Age: 50
End: 2020-11-10

## 2020-11-11 ENCOUNTER — PATIENT OUTREACH (OUTPATIENT)
Dept: CASE MANAGEMENT | Facility: OTHER | Age: 50
End: 2020-11-11

## 2020-11-19 ENCOUNTER — PATIENT OUTREACH (OUTPATIENT)
Dept: CASE MANAGEMENT | Facility: OTHER | Age: 50
End: 2020-11-19

## 2020-11-23 ENCOUNTER — PATIENT OUTREACH (OUTPATIENT)
Dept: CASE MANAGEMENT | Facility: OTHER | Age: 50
End: 2020-11-23

## 2020-11-28 ENCOUNTER — HOSPITAL ENCOUNTER (EMERGENCY)
Facility: HOSPITAL | Age: 50
Discharge: HOME | End: 2020-11-28
Attending: EMERGENCY MEDICINE
Payer: COMMERCIAL

## 2020-11-28 VITALS
BODY MASS INDEX: 23.29 KG/M2 | TEMPERATURE: 96.3 F | HEART RATE: 76 BPM | OXYGEN SATURATION: 99 % | WEIGHT: 144.31 LBS | RESPIRATION RATE: 18 BRPM | DIASTOLIC BLOOD PRESSURE: 78 MMHG | SYSTOLIC BLOOD PRESSURE: 130 MMHG

## 2020-11-28 VITALS
DIASTOLIC BLOOD PRESSURE: 65 MMHG | RESPIRATION RATE: 18 BRPM | OXYGEN SATURATION: 97 % | SYSTOLIC BLOOD PRESSURE: 98 MMHG | WEIGHT: 143.3 LBS | BODY MASS INDEX: 23.03 KG/M2 | HEART RATE: 100 BPM | TEMPERATURE: 97.4 F | HEIGHT: 66 IN

## 2020-11-28 DIAGNOSIS — E10.9 TYPE 1 DIABETES MELLITUS WITHOUT COMPLICATION (HCC): ICD-10-CM

## 2020-11-28 DIAGNOSIS — J45.909 ASTHMA: ICD-10-CM

## 2020-11-28 DIAGNOSIS — Z76.0 ENCOUNTER FOR MEDICATION REFILL: Primary | ICD-10-CM

## 2020-11-28 DIAGNOSIS — F31.32 BIPOLAR AFFECTIVE DISORDER, CURRENTLY DEPRESSED, MODERATE (HCC): ICD-10-CM

## 2020-11-28 DIAGNOSIS — Z76.0 ENCOUNTER FOR MEDICATION REFILL: ICD-10-CM

## 2020-11-28 DIAGNOSIS — R05.9 COUGH: ICD-10-CM

## 2020-11-28 DIAGNOSIS — Z76.0 MEDICATION REFILL: Primary | ICD-10-CM

## 2020-11-28 PROCEDURE — 99283 EMERGENCY DEPT VISIT LOW MDM: CPT | Performed by: EMERGENCY MEDICINE

## 2020-11-28 PROCEDURE — 99282 EMERGENCY DEPT VISIT SF MDM: CPT

## 2020-11-28 PROCEDURE — 99281 EMR DPT VST MAYX REQ PHY/QHP: CPT

## 2020-11-28 RX ORDER — FENOFIBRATE 48 MG/1
48 TABLET, COATED ORAL DAILY
Qty: 7 TABLET | Refills: 0 | Status: ON HOLD | OUTPATIENT
Start: 2020-11-28 | End: 2022-02-04 | Stop reason: SDUPTHER

## 2020-11-28 RX ORDER — FLUTICASONE PROPIONATE 110 UG/1
1 AEROSOL, METERED RESPIRATORY (INHALATION) EVERY 12 HOURS SCHEDULED
Qty: 1 INHALER | Refills: 0 | Status: ON HOLD | OUTPATIENT
Start: 2020-11-28 | End: 2022-02-04 | Stop reason: SDUPTHER

## 2020-11-28 RX ORDER — FENOFIBRATE 48 MG/1
48 TABLET, COATED ORAL DAILY
Qty: 7 TABLET | Refills: 0 | Status: SHIPPED | OUTPATIENT
Start: 2020-11-28 | End: 2020-11-28 | Stop reason: SDUPTHER

## 2020-11-28 RX ORDER — ALBUTEROL SULFATE 90 UG/1
2 AEROSOL, METERED RESPIRATORY (INHALATION) EVERY 4 HOURS PRN
Qty: 1 INHALER | Refills: 0 | Status: SHIPPED | OUTPATIENT
Start: 2020-11-28 | End: 2020-11-28 | Stop reason: SDUPTHER

## 2020-11-28 RX ORDER — INSULIN GLULISINE 100 [IU]/ML
10 INJECTION, SOLUTION SUBCUTANEOUS
Qty: 5 PEN | Refills: 0 | Status: SHIPPED | OUTPATIENT
Start: 2020-11-28 | End: 2022-02-04 | Stop reason: HOSPADM

## 2020-11-28 RX ORDER — ESCITALOPRAM OXALATE 10 MG/1
10 TABLET ORAL DAILY
Qty: 7 TABLET | Refills: 0 | Status: SHIPPED | OUTPATIENT
Start: 2020-11-28 | End: 2020-11-28 | Stop reason: SDUPTHER

## 2020-11-28 RX ORDER — INSULIN GLARGINE 100 [IU]/ML
40 INJECTION, SOLUTION SUBCUTANEOUS
Qty: 5 PEN | Refills: 0 | Status: ON HOLD | OUTPATIENT
Start: 2020-11-28 | End: 2022-02-04 | Stop reason: SDUPTHER

## 2020-11-28 RX ORDER — ESCITALOPRAM OXALATE 10 MG/1
10 TABLET ORAL DAILY
Qty: 7 TABLET | Refills: 0 | Status: ON HOLD | OUTPATIENT
Start: 2020-11-28 | End: 2022-02-04 | Stop reason: SDUPTHER

## 2020-11-28 RX ORDER — FLUTICASONE PROPIONATE 110 UG/1
1 AEROSOL, METERED RESPIRATORY (INHALATION) EVERY 12 HOURS SCHEDULED
Qty: 1 INHALER | Refills: 0 | Status: SHIPPED | OUTPATIENT
Start: 2020-11-28 | End: 2020-11-28 | Stop reason: SDUPTHER

## 2020-11-28 RX ORDER — ARIPIPRAZOLE 20 MG/1
20 TABLET ORAL DAILY
Qty: 7 TABLET | Refills: 0 | Status: ON HOLD | OUTPATIENT
Start: 2020-11-28 | End: 2022-02-04 | Stop reason: SDUPTHER

## 2020-11-28 RX ORDER — ARIPIPRAZOLE 20 MG/1
20 TABLET ORAL DAILY
Qty: 7 TABLET | Refills: 0 | Status: SHIPPED | OUTPATIENT
Start: 2020-11-28 | End: 2020-11-28 | Stop reason: SDUPTHER

## 2020-11-28 RX ORDER — ALBUTEROL SULFATE 90 UG/1
2 AEROSOL, METERED RESPIRATORY (INHALATION) EVERY 4 HOURS PRN
Qty: 1 INHALER | Refills: 0 | Status: ON HOLD | OUTPATIENT
Start: 2020-11-28 | End: 2022-02-04 | Stop reason: SDUPTHER

## 2020-11-29 ENCOUNTER — HOSPITAL ENCOUNTER (EMERGENCY)
Facility: HOSPITAL | Age: 50
Discharge: HOME | End: 2020-11-29
Attending: EMERGENCY MEDICINE
Payer: COMMERCIAL

## 2020-11-29 VITALS
BODY MASS INDEX: 23.24 KG/M2 | OXYGEN SATURATION: 99 % | HEART RATE: 63 BPM | DIASTOLIC BLOOD PRESSURE: 74 MMHG | SYSTOLIC BLOOD PRESSURE: 126 MMHG | RESPIRATION RATE: 18 BRPM | TEMPERATURE: 97.9 F | WEIGHT: 143.96 LBS

## 2020-11-29 VITALS
TEMPERATURE: 97.7 F | HEART RATE: 90 BPM | BODY MASS INDEX: 23.24 KG/M2 | OXYGEN SATURATION: 96 % | RESPIRATION RATE: 20 BRPM | WEIGHT: 143.96 LBS | SYSTOLIC BLOOD PRESSURE: 113 MMHG | DIASTOLIC BLOOD PRESSURE: 74 MMHG

## 2020-11-29 VITALS
RESPIRATION RATE: 16 BRPM | HEART RATE: 90 BPM | OXYGEN SATURATION: 97 % | DIASTOLIC BLOOD PRESSURE: 69 MMHG | SYSTOLIC BLOOD PRESSURE: 103 MMHG | TEMPERATURE: 97.5 F | WEIGHT: 142.42 LBS | BODY MASS INDEX: 22.99 KG/M2

## 2020-11-29 DIAGNOSIS — Z86.59 HISTORY OF BIPOLAR DISORDER: ICD-10-CM

## 2020-11-29 DIAGNOSIS — Z65.9 SOCIAL PROBLEM: Primary | ICD-10-CM

## 2020-11-29 DIAGNOSIS — F31.9 BIPOLAR DISORDER (HCC): Primary | ICD-10-CM

## 2020-11-29 DIAGNOSIS — Z00.8 ENCOUNTER FOR PSYCHOLOGICAL EVALUATION: Primary | ICD-10-CM

## 2020-11-29 DIAGNOSIS — Z00.8 ENCOUNTER FOR PSYCHOLOGICAL EVALUATION: ICD-10-CM

## 2020-11-29 PROCEDURE — 99282 EMERGENCY DEPT VISIT SF MDM: CPT | Performed by: EMERGENCY MEDICINE

## 2020-11-29 PROCEDURE — 99284 EMERGENCY DEPT VISIT MOD MDM: CPT

## 2020-11-29 PROCEDURE — 99282 EMERGENCY DEPT VISIT SF MDM: CPT

## 2020-11-30 ENCOUNTER — PATIENT OUTREACH (OUTPATIENT)
Dept: CASE MANAGEMENT | Facility: OTHER | Age: 50
End: 2020-11-30

## 2020-12-14 ENCOUNTER — PATIENT OUTREACH (OUTPATIENT)
Dept: CASE MANAGEMENT | Facility: OTHER | Age: 50
End: 2020-12-14

## 2020-12-23 ENCOUNTER — PATIENT OUTREACH (OUTPATIENT)
Dept: CASE MANAGEMENT | Facility: OTHER | Age: 50
End: 2020-12-23

## 2021-01-28 ENCOUNTER — PATIENT OUTREACH (OUTPATIENT)
Dept: CASE MANAGEMENT | Facility: OTHER | Age: 51
End: 2021-01-28

## 2021-01-28 NOTE — PROGRESS NOTES
OP  CM  UBALDO completed 30 day chart review  Patient has not had any recent ED visits, however patient no showed to their PCP appointment at [facility] [place]  Unable to reach letter sent  Will continue to follow and be available as needed via chart review  Patient is in [address] [place] [address] Street and [facility]

## 2021-03-02 ENCOUNTER — PATIENT OUTREACH (OUTPATIENT)
Dept: CASE MANAGEMENT | Facility: OTHER | Age: 51
End: 2021-03-02

## 2021-03-02 NOTE — PROGRESS NOTES
OP  KASIA CONNER completed 30 day chart review  Patient has not had any recent ED visits  Will continue to follow and be available as needed via chart review  Patient is in [address] and has a [facility] set next 30 day chart review

## 2021-04-02 ENCOUNTER — PATIENT OUTREACH (OUTPATIENT)
Dept: CASE MANAGEMENT | Facility: OTHER | Age: 51
End: 2021-04-02

## 2021-04-02 NOTE — PROGRESS NOTES
OP  KASIA CONNER completed 30 day chart review  Patient has not had any recent ED visits  Will continue to follow and be available as needed via chart review  Patient is in [address] and has a [facility] set next 30 day chart review

## 2021-05-03 ENCOUNTER — PATIENT OUTREACH (OUTPATIENT)
Dept: CASE MANAGEMENT | Facility: OTHER | Age: 51
End: 2021-05-03

## 2021-05-03 NOTE — PROGRESS NOTES
OP  KASIA CONNER completed 30 day chart review  Patient has not had any recent ED visits  Will continue to follow and be available as needed via chart review  Patient is in [address] and has a [facility] set next 30 day chart review

## 2021-06-03 ENCOUNTER — PATIENT OUTREACH (OUTPATIENT)
Dept: CASE MANAGEMENT | Facility: OTHER | Age: 51
End: 2021-06-03

## 2021-06-03 NOTE — PROGRESS NOTES
OP  KASIA CONNER completed 30 day chart review  Patient has not had any recent ED visits  Will continue to follow and be available as needed via chart review  Patient is in [address] [place] [address] Street and has a [facility] set next 30 day chart review   Patient might possibly be in [place]

## 2021-07-12 ENCOUNTER — PATIENT OUTREACH (OUTPATIENT)
Dept: CASE MANAGEMENT | Facility: OTHER | Age: 51
End: 2021-07-12

## 2021-07-12 NOTE — PROGRESS NOTES
OP  KASIA CONNER completed 30 day chart review  Patient has not had any recent ED visits  Will continue to follow and be available as needed via chart review  Patient is in [address] [place] [address] Street and has a [facility] set next 30 day chart review   Patient might possibly be in [place]

## 2021-08-12 ENCOUNTER — PATIENT OUTREACH (OUTPATIENT)
Dept: CASE MANAGEMENT | Facility: OTHER | Age: 51
End: 2021-08-12

## 2021-08-12 NOTE — PROGRESS NOTES
OP  KASIA CONNER completed 30 day chart review  Patient has not had any recent ED visits  Will continue to follow and be available as needed via chart review   Patient is in [address] [place] [address] Street and has a [facility] set next 30 day chart review  Patient might possibly be in [place]

## 2021-09-21 ENCOUNTER — PATIENT OUTREACH (OUTPATIENT)
Dept: CASE MANAGEMENT | Facility: OTHER | Age: 51
End: 2021-09-21

## 2021-09-21 NOTE — PROGRESS NOTES
OP  KASIA CONNER completed 30 day chart review  Patient has not had any recent ED visits  Will continue to follow and be available as needed via chart review   Patient is in [address] [place] [address] Street and has a [facility] set next 30 day chart review  Patient might possibly be in [place]

## 2021-10-22 ENCOUNTER — PATIENT OUTREACH (OUTPATIENT)
Dept: CASE MANAGEMENT | Facility: OTHER | Age: 51
End: 2021-10-22

## 2021-11-23 ENCOUNTER — PATIENT OUTREACH (OUTPATIENT)
Dept: CASE MANAGEMENT | Facility: OTHER | Age: 51
End: 2021-11-23

## 2021-12-23 ENCOUNTER — PATIENT OUTREACH (OUTPATIENT)
Dept: CASE MANAGEMENT | Facility: OTHER | Age: 51
End: 2021-12-23

## 2022-01-24 ENCOUNTER — PATIENT OUTREACH (OUTPATIENT)
Dept: CASE MANAGEMENT | Facility: OTHER | Age: 52
End: 2022-01-24

## 2022-01-24 NOTE — PROGRESS NOTES
OP CM UBALDO received 30 day chart review  Patient has a [facility] & is presently in  surveillance  No recent ED visits or admissions  Patient is possibly residing in [place], [place] however no recent ED visits there either  SW CM will continue to follow [facility] in Surveillance at this time  Next 30 day outreach set

## 2022-02-02 ENCOUNTER — HOSPITAL ENCOUNTER (INPATIENT)
Facility: HOSPITAL | Age: 52
LOS: 2 days | Discharge: HOME | DRG: 420 | End: 2022-02-04
Attending: EMERGENCY MEDICINE | Admitting: INTERNAL MEDICINE
Payer: COMMERCIAL

## 2022-02-02 ENCOUNTER — PATIENT OUTREACH (OUTPATIENT)
Dept: CASE MANAGEMENT | Facility: OTHER | Age: 52
End: 2022-02-02

## 2022-02-02 DIAGNOSIS — Z59.00 HOMELESSNESS: ICD-10-CM

## 2022-02-02 DIAGNOSIS — E10.9 TYPE 1 DIABETES MELLITUS WITHOUT COMPLICATION (HCC): ICD-10-CM

## 2022-02-02 DIAGNOSIS — F31.9 BIPOLAR 1 DISORDER (HCC): ICD-10-CM

## 2022-02-02 DIAGNOSIS — Z76.0 ENCOUNTER FOR MEDICATION REFILL: ICD-10-CM

## 2022-02-02 DIAGNOSIS — R10.9 ABDOMINAL PAIN: ICD-10-CM

## 2022-02-02 DIAGNOSIS — F32.A DEPRESSION WITH SUICIDAL IDEATION: Primary | ICD-10-CM

## 2022-02-02 DIAGNOSIS — R73.9 HYPERGLYCEMIA: ICD-10-CM

## 2022-02-02 DIAGNOSIS — R05.9 COUGH: ICD-10-CM

## 2022-02-02 DIAGNOSIS — E11.65 TYPE 2 DIABETES MELLITUS WITH HYPERGLYCEMIA, WITH LONG-TERM CURRENT USE OF INSULIN (HCC): ICD-10-CM

## 2022-02-02 DIAGNOSIS — F31.32 BIPOLAR AFFECTIVE DISORDER, CURRENTLY DEPRESSED, MODERATE (HCC): ICD-10-CM

## 2022-02-02 DIAGNOSIS — J45.909 ASTHMA: ICD-10-CM

## 2022-02-02 DIAGNOSIS — R45.851 DEPRESSION WITH SUICIDAL IDEATION: Primary | ICD-10-CM

## 2022-02-02 DIAGNOSIS — Z79.4 TYPE 2 DIABETES MELLITUS WITH HYPERGLYCEMIA, WITH LONG-TERM CURRENT USE OF INSULIN (HCC): ICD-10-CM

## 2022-02-02 PROBLEM — Z65.9 POOR SOCIAL SITUATION: Status: ACTIVE | Noted: 2022-02-02

## 2022-02-02 PROBLEM — Z60.9 POOR SOCIAL SITUATION: Status: ACTIVE | Noted: 2022-02-02

## 2022-02-02 PROBLEM — E83.42 HYPOMAGNESEMIA: Status: ACTIVE | Noted: 2022-02-02

## 2022-02-02 LAB
ALBUMIN SERPL BCP-MCNC: 4.5 G/DL (ref 3.5–5)
ALP SERPL-CCNC: 81 U/L (ref 34–104)
ALT SERPL W P-5'-P-CCNC: 12 U/L (ref 7–52)
AMPHETAMINES SERPL QL SCN: NEGATIVE
ANION GAP SERPL CALCULATED.3IONS-SCNC: 9 MMOL/L (ref 4–13)
APTT PPP: 24 SECONDS (ref 23–37)
AST SERPL W P-5'-P-CCNC: 13 U/L (ref 13–39)
ATRIAL RATE: 96 BPM
BARBITURATES UR QL: NEGATIVE
BASOPHILS # BLD AUTO: 0.02 THOUSANDS/ΜL (ref 0–0.1)
BASOPHILS NFR BLD AUTO: 0 % (ref 0–1)
BENZODIAZ UR QL: NEGATIVE
BETA-HYDROXYBUTYRATE: 0.3 MMOL/L
BILIRUB SERPL-MCNC: 0.69 MG/DL (ref 0.2–1)
BILIRUB UR QL STRIP: NEGATIVE
BUN SERPL-MCNC: 15 MG/DL (ref 5–25)
CALCIUM SERPL-MCNC: 9.6 MG/DL (ref 8.4–10.2)
CHLORIDE SERPL-SCNC: 92 MMOL/L (ref 96–108)
CLARITY UR: CLEAR
CO2 SERPL-SCNC: 27 MMOL/L (ref 21–32)
COCAINE UR QL: NEGATIVE
COLOR UR: ABNORMAL
CREAT SERPL-MCNC: 1.09 MG/DL (ref 0.6–1.3)
EOSINOPHIL # BLD AUTO: 0.05 THOUSAND/ΜL (ref 0–0.61)
EOSINOPHIL NFR BLD AUTO: 1 % (ref 0–6)
ERYTHROCYTE [DISTWIDTH] IN BLOOD BY AUTOMATED COUNT: 12.3 % (ref 11.6–15.1)
ETHANOL EXG-MCNC: NORMAL MG/DL
FLUAV RNA RESP QL NAA+PROBE: NEGATIVE
FLUBV RNA RESP QL NAA+PROBE: NEGATIVE
GFR SERPL CREATININE-BSD FRML MDRD: 78 ML/MIN/1.73SQ M
GLUCOSE SERPL-MCNC: 279 MG/DL (ref 65–140)
GLUCOSE SERPL-MCNC: 339 MG/DL (ref 65–140)
GLUCOSE SERPL-MCNC: 392 MG/DL (ref 65–140)
GLUCOSE SERPL-MCNC: 628 MG/DL (ref 65–140)
GLUCOSE SERPL-MCNC: >500 MG/DL (ref 65–140)
GLUCOSE UR STRIP-MCNC: ABNORMAL MG/DL
HCT VFR BLD AUTO: 47.1 % (ref 36.5–49.3)
HGB BLD-MCNC: 15.8 G/DL (ref 12–17)
HGB UR QL STRIP.AUTO: NEGATIVE
IMM GRANULOCYTES # BLD AUTO: 0.02 THOUSAND/UL (ref 0–0.2)
IMM GRANULOCYTES NFR BLD AUTO: 0 % (ref 0–2)
INR PPP: 0.99 (ref 0.84–1.19)
KETONES UR STRIP-MCNC: NEGATIVE MG/DL
LEUKOCYTE ESTERASE UR QL STRIP: NEGATIVE
LYMPHOCYTES # BLD AUTO: 1.3 THOUSANDS/ΜL (ref 0.6–4.47)
LYMPHOCYTES NFR BLD AUTO: 22 % (ref 14–44)
MAGNESIUM SERPL-MCNC: 1.8 MG/DL (ref 1.9–2.7)
MCH RBC QN AUTO: 29.1 PG (ref 26.8–34.3)
MCHC RBC AUTO-ENTMCNC: 33.5 G/DL (ref 31.4–37.4)
MCV RBC AUTO: 87 FL (ref 82–98)
METHADONE UR QL: NEGATIVE
MONOCYTES # BLD AUTO: 0.57 THOUSAND/ΜL (ref 0.17–1.22)
MONOCYTES NFR BLD AUTO: 10 % (ref 4–12)
NEUTROPHILS # BLD AUTO: 3.84 THOUSANDS/ΜL (ref 1.85–7.62)
NEUTS SEG NFR BLD AUTO: 67 % (ref 43–75)
NITRITE UR QL STRIP: NEGATIVE
NRBC BLD AUTO-RTO: 0 /100 WBCS
OPIATES UR QL SCN: NEGATIVE
OXYCODONE+OXYMORPHONE UR QL SCN: NEGATIVE
P AXIS: 65 DEGREES
PCP UR QL: NEGATIVE
PH UR STRIP.AUTO: 5.5 [PH]
PLATELET # BLD AUTO: 214 THOUSANDS/UL (ref 149–390)
PMV BLD AUTO: 10.9 FL (ref 8.9–12.7)
POTASSIUM SERPL-SCNC: 3.9 MMOL/L (ref 3.5–5.3)
PR INTERVAL: 122 MS
PROT SERPL-MCNC: 7.9 G/DL (ref 6.4–8.4)
PROT UR STRIP-MCNC: NEGATIVE MG/DL
PROTHROMBIN TIME: 13 SECONDS (ref 11.6–14.5)
QRS AXIS: 95 DEGREES
QRSD INTERVAL: 86 MS
QT INTERVAL: 342 MS
QTC INTERVAL: 432 MS
RBC # BLD AUTO: 5.43 MILLION/UL (ref 3.88–5.62)
RSV RNA RESP QL NAA+PROBE: NEGATIVE
SARS-COV-2 RNA RESP QL NAA+PROBE: NEGATIVE
SODIUM SERPL-SCNC: 128 MMOL/L (ref 135–147)
SP GR UR STRIP.AUTO: <=1.005 (ref 1–1.03)
T WAVE AXIS: 40 DEGREES
THC UR QL: NEGATIVE
TSH SERPL DL<=0.05 MIU/L-ACNC: 1.27 UIU/ML (ref 0.45–5.33)
UROBILINOGEN UR QL STRIP.AUTO: 0.2 E.U./DL
VENTRICULAR RATE: 96 BPM
WBC # BLD AUTO: 5.8 THOUSAND/UL (ref 4.31–10.16)

## 2022-02-02 PROCEDURE — 99285 EMERGENCY DEPT VISIT HI MDM: CPT

## 2022-02-02 PROCEDURE — 99285 EMERGENCY DEPT VISIT HI MDM: CPT | Performed by: EMERGENCY MEDICINE

## 2022-02-02 PROCEDURE — 94760 N-INVAS EAR/PLS OXIMETRY 1: CPT

## 2022-02-02 PROCEDURE — 80053 COMPREHEN METABOLIC PANEL: CPT | Performed by: EMERGENCY MEDICINE

## 2022-02-02 PROCEDURE — 83036 HEMOGLOBIN GLYCOSYLATED A1C: CPT | Performed by: EMERGENCY MEDICINE

## 2022-02-02 PROCEDURE — 82010 KETONE BODYS QUAN: CPT | Performed by: EMERGENCY MEDICINE

## 2022-02-02 PROCEDURE — 83735 ASSAY OF MAGNESIUM: CPT | Performed by: EMERGENCY MEDICINE

## 2022-02-02 PROCEDURE — 80307 DRUG TEST PRSMV CHEM ANLYZR: CPT | Performed by: EMERGENCY MEDICINE

## 2022-02-02 PROCEDURE — 99223 1ST HOSP IP/OBS HIGH 75: CPT | Performed by: NURSE PRACTITIONER

## 2022-02-02 PROCEDURE — 93005 ELECTROCARDIOGRAM TRACING: CPT

## 2022-02-02 PROCEDURE — 36415 COLL VENOUS BLD VENIPUNCTURE: CPT | Performed by: EMERGENCY MEDICINE

## 2022-02-02 PROCEDURE — 82948 REAGENT STRIP/BLOOD GLUCOSE: CPT

## 2022-02-02 PROCEDURE — 81003 URINALYSIS AUTO W/O SCOPE: CPT | Performed by: EMERGENCY MEDICINE

## 2022-02-02 PROCEDURE — 96361 HYDRATE IV INFUSION ADD-ON: CPT

## 2022-02-02 PROCEDURE — 85025 COMPLETE CBC W/AUTO DIFF WBC: CPT | Performed by: EMERGENCY MEDICINE

## 2022-02-02 PROCEDURE — 85730 THROMBOPLASTIN TIME PARTIAL: CPT | Performed by: EMERGENCY MEDICINE

## 2022-02-02 PROCEDURE — 93010 ELECTROCARDIOGRAM REPORT: CPT | Performed by: INTERNAL MEDICINE

## 2022-02-02 PROCEDURE — 84443 ASSAY THYROID STIM HORMONE: CPT | Performed by: EMERGENCY MEDICINE

## 2022-02-02 PROCEDURE — 0241U HB NFCT DS VIR RESP RNA 4 TRGT: CPT | Performed by: EMERGENCY MEDICINE

## 2022-02-02 PROCEDURE — 82075 ASSAY OF BREATH ETHANOL: CPT | Performed by: EMERGENCY MEDICINE

## 2022-02-02 PROCEDURE — 96360 HYDRATION IV INFUSION INIT: CPT

## 2022-02-02 PROCEDURE — 94664 DEMO&/EVAL PT USE INHALER: CPT

## 2022-02-02 PROCEDURE — 85610 PROTHROMBIN TIME: CPT | Performed by: EMERGENCY MEDICINE

## 2022-02-02 RX ORDER — MAGNESIUM SULFATE HEPTAHYDRATE 40 MG/ML
2 INJECTION, SOLUTION INTRAVENOUS ONCE
Status: COMPLETED | OUTPATIENT
Start: 2022-02-02 | End: 2022-02-02

## 2022-02-02 RX ORDER — FLUTICASONE PROPIONATE 110 UG/1
1 AEROSOL, METERED RESPIRATORY (INHALATION) EVERY 12 HOURS SCHEDULED
Status: DISCONTINUED | OUTPATIENT
Start: 2022-02-02 | End: 2022-02-04 | Stop reason: HOSPADM

## 2022-02-02 RX ORDER — TRAZODONE HYDROCHLORIDE 50 MG/1
100 TABLET ORAL
Status: DISCONTINUED | OUTPATIENT
Start: 2022-02-02 | End: 2022-02-04 | Stop reason: HOSPADM

## 2022-02-02 RX ORDER — ALBUTEROL SULFATE 90 UG/1
2 AEROSOL, METERED RESPIRATORY (INHALATION) EVERY 4 HOURS PRN
Status: DISCONTINUED | OUTPATIENT
Start: 2022-02-02 | End: 2022-02-04 | Stop reason: HOSPADM

## 2022-02-02 RX ORDER — INSULIN GLARGINE 100 [IU]/ML
40 INJECTION, SOLUTION SUBCUTANEOUS
Status: DISCONTINUED | OUTPATIENT
Start: 2022-02-02 | End: 2022-02-04 | Stop reason: HOSPADM

## 2022-02-02 RX ORDER — ARIPIPRAZOLE 10 MG/1
20 TABLET ORAL DAILY
Status: DISCONTINUED | OUTPATIENT
Start: 2022-02-03 | End: 2022-02-04 | Stop reason: HOSPADM

## 2022-02-02 RX ORDER — ACETAMINOPHEN 325 MG/1
650 TABLET ORAL EVERY 6 HOURS PRN
Status: DISCONTINUED | OUTPATIENT
Start: 2022-02-02 | End: 2022-02-04 | Stop reason: HOSPADM

## 2022-02-02 RX ORDER — ESCITALOPRAM OXALATE 10 MG/1
10 TABLET ORAL
Status: DISCONTINUED | OUTPATIENT
Start: 2022-02-02 | End: 2022-02-04 | Stop reason: HOSPADM

## 2022-02-02 RX ADMIN — FLUTICASONE PROPIONATE 1 PUFF: 110 AEROSOL, METERED RESPIRATORY (INHALATION) at 22:27

## 2022-02-02 RX ADMIN — INSULIN GLARGINE 40 UNITS: 100 INJECTION, SOLUTION SUBCUTANEOUS at 22:29

## 2022-02-02 RX ADMIN — SODIUM CHLORIDE 1000 ML: 0.9 INJECTION, SOLUTION INTRAVENOUS at 12:08

## 2022-02-02 RX ADMIN — ESCITALOPRAM OXALATE 10 MG: 10 TABLET ORAL at 22:29

## 2022-02-02 RX ADMIN — MAGNESIUM SULFATE HEPTAHYDRATE 2 G: 40 INJECTION, SOLUTION INTRAVENOUS at 17:20

## 2022-02-02 RX ADMIN — INSULIN LISPRO 3 UNITS: 100 INJECTION, SOLUTION INTRAVENOUS; SUBCUTANEOUS at 22:28

## 2022-02-02 RX ADMIN — INSULIN LISPRO 4 UNITS: 100 INJECTION, SOLUTION INTRAVENOUS; SUBCUTANEOUS at 17:26

## 2022-02-02 RX ADMIN — TRAZODONE HYDROCHLORIDE 100 MG: 50 TABLET ORAL at 22:29

## 2022-02-02 RX ADMIN — INSULIN LISPRO 10 UNITS: 100 INJECTION, SOLUTION INTRAVENOUS; SUBCUTANEOUS at 17:26

## 2022-02-02 RX ADMIN — SODIUM CHLORIDE 1000 ML: 0.9 INJECTION, SOLUTION INTRAVENOUS at 13:08

## 2022-02-02 SDOH — ECONOMIC STABILITY - HOUSING INSECURITY: HOMELESSNESS UNSPECIFIED: Z59.00

## 2022-02-02 NOTE — H&P
[address] [facility],[address] 1970, 49 y o  male MRN: [id number]  Unit/Bed#: ED 20 Encounter: [id number]  Primary Care Provider: No primary care provider on file  Date and time admitted to hospital: 2/2/2022 11:46 AM    * Type 2 diabetes mellitus with hyperglycemia, with long-term current use of insulin [facility])  Assessment & Plan  Lab Results   Component Value Date    HGBA1C 11 1 (H) 01/01/2020       Recent Labs     02/02/22  1147 02/02/22  1426   POCGLU >500* 392*       Blood Sugar Average: Last 72 hrs:  (P) 392     · Glucose initially 628  · Received 2 L normal saline  · Repeat sugar 392  Anion gap is 9, beta hydroxybutyrate is 0 3, CO2 is 27  · He has been out of his meds including Lantus 40 units at bedtime, and Apidra 10 units 3 times daily with meals, and metformin  · Will give 10 of Humalog subcutaneous now, and start sliding scale insulin with meals and at bedtime  · Trend glucose  · Check hemoglobin A1c    Poor social situation  Assessment & Plan  · Patient arrived here yesterday from [place]-he is homeless  · His fiancee is also currently hospitalized  · Case management consult    Hypomagnesemia  Assessment & Plan  · Magnesium 1 8  · Replete  · Recheck magnesium in the morning    Auditory hallucinations  Assessment & Plan  · Patient says he hears voices telling him to do things, but states "I would never do anything " referring to self-harm or suicidal gestures or attempts   · Psychiatric consult  · Supportive care    Asthma  Assessment & Plan  · Currently controlled  · Continue inhalers  · Respiratory protocol    Bipolar 1 disorder ([facility] [address] )  Assessment & Plan  · He has been out of his meds including trazodone, Lexapro, and Abilify-will restart for now  · Psychiatric consult    VTE Prophylaxis: Enoxaparin (Lovenox)   Code Status:  Full  POLST: POLST form is not discussed and not completed at this time    Discussion with family:  Patient    Anticipated Length of Stay:  Patient will be admitted on an Inpatient basis with an anticipated length of stay of  greater than 2 midnights  Justification for Hospital Stay:  Per plan above    Total Time for Visit, including Counseling / Coordination of Care: 45 minutes  Greater than 50% of this total time spent on direct patient counseling and coordination of care  Chief Complaint:   High blood sugar    History of Present Illness:    Nadia Fournier is a 49 y o  male who presents with high blood sugar  He says that he is homeless, and came from [place] to [place] and arrived yesterday and stayed on the floor at and acquaintances residence with his fiancee  He says he has been out of his meds for over a month because he does not have a state ID card  When asked if he had suicidal ideas, he stated, "No, I would never do that " He says he hears a voice in his head telling him to do things but he does not listen  He says he feels hopeless with his living situation and says him and his fiancee need their own place  He denies fever or chills, shortness of breath, chest pain, abdominal pain, vomiting, myalgias, rash, dizziness, or syncope  Review of Systems:    Review of Systems   Constitutional: Negative for chills and fever  HENT: Negative for congestion  Eyes: Negative for visual disturbance  Respiratory: Negative for cough and shortness of breath  Cardiovascular: Negative for chest pain, palpitations and leg swelling  Gastrointestinal: Negative for abdominal distention, abdominal pain, blood in stool, constipation, diarrhea, nausea and vomiting  Genitourinary: Negative for dysuria and flank pain  Musculoskeletal: Negative for arthralgias and myalgias  Skin: Negative for pallor and rash  Neurological: Negative for dizziness, seizures, syncope, weakness, numbness and headaches  Psychiatric/Behavioral: Negative for suicidal ideas  All other systems reviewed and are negative        Past Medical and Surgical History: Past Medical History:   Diagnosis Date    Anxiety     Asthma     Bipolar 1 disorder ([facility] [address] )     Depression     Diabetes mellitus ([facility] )     Psychiatric disorder        Past Surgical History:   Procedure Laterality Date    APPENDECTOMY         Meds/Allergies:    Prior to Admission medications    Medication Sig Start Date End Date Taking? Authorizing Provider   albuterol (PROVENTIL HFA,VENTOLIN HFA) 90 mcg/act inhaler Inhale 2 puffs every 4 (four) hours as needed for wheezing 11/28/20   Ab Orellana, DO   ARIPiprazole (ABILIFY) 15 mg tablet Take 1 tablet (15 mg total) by mouth daily At 9am 3/10/20   Kathie Pereyra PA-C   ARIPiprazole (ABILIFY) 20 MG tablet Take 1 tablet (20 mg total) by mouth daily 11/28/20   Ab Jarrell, DO   dicyclomine (BENTYL) 20 mg tablet Take 1 tablet (20 mg total) by mouth 2 (two) times a day 3/5/20   Stevenson Gilford, MD   escitalopram (LEXAPRO) 10 mg tablet Take 1 tablet (10 mg total) by mouth daily 11/28/20   Ab Orellana, DO   fenofibrate (TRICOR) 48 mg tablet Take 1 tablet (48 mg total) by mouth daily 11/28/20   Ab Orellana, DO   fluticasone (FLOVENT HFA) 110 MCG/ACT inhaler Inhale 1 puff every 12 (twelve) hours Rinse mouth after use  11/28/20   Ab Jarrlel, DO   hydrOXYzine HCL (ATARAX) 25 mg tablet Take 25 mg by mouth every 6 (six) hours as needed 1/17/20   Historical Provider, MD   insulin glargine (Lantus SoloStar) 100 units/mL injection pen Inject 40 Units under the skin daily at bedtime Every morning   11/28/20   Cody Greenfield, DO   insulin glulisine (Apidra SoloStar) 100 units/mL injection pen Inject 10 Units under the skin 3 (three) times a day with meals 11/28/20   Cody Greenfield, DO   insulin lispro (HUMALOG KWIKPEN) 100 units/mL injection pen Inject 4 Units under the skin 3 (three) times a day with meals  Patient taking differently: Inject 15 Units under the skin 3 (three) times a day with meals  12/10/19   Kathie Pereyra PA-C   metFORMIN (GLUCOPHAGE) 1000 MG tablet Take 1 tablet (1,000 mg total) by mouth 2 (two) times a day with meals 11/28/20   Ab Orellana DO   traZODone (DESYREL) 100 mg tablet Take 100 mg by mouth daily at bedtime    Historical Provider, MD     I have reviewed home medications with patient personally  Allergies: Allergies   Allergen Reactions    Ketorolac Other (See Comments)     Pt states he has mood disturbances, agitation if he takes too much      Toradol [Ketorolac Tromethamine]     Latex Rash       Social History:     Marital Status: Single   Occupation:  Not employed  Patient Pre-hospital Living Situation:  Homeless  Patient Pre-hospital Level of Mobility:  Independent  Patient Pre-hospital Diet Restrictions: And  Substance Use History:   Social History     Substance and Sexual Activity   Alcohol Use Not Currently    Comment: States has had no alcohol in at least a year     Social History     Tobacco Use   Smoking Status Former Smoker    Packs/day: 0 20    Types: Cigarettes   Smokeless Tobacco Never Used     Social History     Substance and Sexual Activity   Drug Use No       Family History:    non-contributory    Physical Exam:     Vitals:   Blood Pressure: 130/89 (02/02/22 1430)  Pulse: 81 (02/02/22 1430)  Temperature: 98 3 °F (36 8 °C) (02/02/22 1145)  Temp Source: Temporal (02/02/22 1145)  Respirations: 18 (02/02/22 1430)  Weight - Scale: 61 2 kg (135 lb) (02/02/22 1150)  SpO2: 98 % (02/02/22 1430)    Physical Exam  Vitals and nursing note reviewed  Constitutional:       Appearance: He is underweight  HENT:      Head: Normocephalic and atraumatic  Mouth/Throat:      Mouth: Mucous membranes are moist       Pharynx: Oropharynx is clear  Eyes:      Pupils: Pupils are equal, round, and reactive to light  Cardiovascular:      Rate and Rhythm: Normal rate and regular rhythm  Pulses: Normal pulses  Heart sounds: Normal heart sounds     Pulmonary:      Effort: Pulmonary effort is normal  Breath sounds: Normal breath sounds  Abdominal:      General: Bowel sounds are normal       Palpations: Abdomen is soft  Tenderness: There is no abdominal tenderness  Musculoskeletal:         General: Normal range of motion  Cervical back: Normal range of motion and neck supple  Skin:     General: Skin is warm and dry  Capillary Refill: Capillary refill takes less than 2 seconds  Neurological:      General: No focal deficit present  Mental Status: He is alert and oriented to person, place, and time  Additional Data:     Lab Results: I have personally reviewed pertinent reports  Results from last 7 days   Lab Units 02/02/22  1207   WBC Thousand/uL 5 80   HEMOGLOBIN g/dL 15 8   HEMATOCRIT % 47 1   PLATELETS Thousands/uL 214   NEUTROS PCT % 67   LYMPHS PCT % 22   MONOS PCT % 10   EOS PCT % 1     Results from last 7 days   Lab Units 02/02/22  1207   SODIUM mmol/L 128*   POTASSIUM mmol/L 3 9   CHLORIDE mmol/L 92*   CO2 mmol/L 27   BUN mg/dL 15   CREATININE mg/dL 1 09   ANION GAP mmol/L 9   CALCIUM mg/dL 9 6   ALBUMIN g/dL 4 5   TOTAL BILIRUBIN mg/dL 0 69   ALK PHOS U/L 81   ALT U/L 12   AST U/L 13   GLUCOSE RANDOM mg/dL 628*     Results from last 7 days   Lab Units 02/02/22  1207   INR  0 99     Results from last 7 days   Lab Units 02/02/22  1426 02/02/22  1147   POC GLUCOSE mg/dl 392* >500*               Imaging: I have personally reviewed pertinent reports  No orders to display       EKG, Pathology, and Other Studies Reviewed on Admission:   · EKG: Will obtain    [facility] Epic [facility] Reviewed[facility] Yes     ** Please Note: This note has been constructed using a voice recognition system   **

## 2022-02-02 NOTE — ASSESSMENT & PLAN NOTE
· Patient arrived here yesterday from [place]-he is homeless  · His fiancee is also currently hospitalized  · Case management consult

## 2022-02-02 NOTE — PROGRESS NOTES
OP CM SW received ADT alert regarding patient  Chart review completed  Rubi Devine presented to [address] [place] [address] for SI & running out of his medications  Rubi Devine has not presented to ED in network for sometime  Last ED visits was in [facility] CM will continue to follow via chart review & be available as needed

## 2022-02-02 NOTE — RESPIRATORY THERAPY NOTE
RT Protocol Note  Omid Sy 49 y o  male MRN: [id number]  Unit/Bed#: ED 20 Encounter: [id number]    Assessment    Principal Problem:    Type 2 diabetes mellitus with hyperglycemia, with long-term current use of insulin ([facility])  Active Problems:    Bipolar 1 disorder ([facility])    Auditory hallucinations    Asthma    Hypomagnesemia    Poor social situation      Home Pulmonary Medications:  Flovent, Albuterol MDI       Past Medical History:   Diagnosis Date    Anxiety     Asthma     Bipolar 1 disorder ([place] [address] )     Depression     Diabetes mellitus ([place] [address] )     Psychiatric disorder      Social History     Socioeconomic History    Marital status: Single     Spouse name: None    Number of children: None    Years of education: None    Highest education level: None   Occupational History    None   Tobacco Use    Smoking status: Former Smoker     Packs/day: 0 20     Years: 1 00     Pack years: 0 20     Types: Cigarettes     Start date:      Quit date:      Years since quittin 1    Smokeless tobacco: Never Used   Vaping Use    Vaping Use: Never used   Substance and Sexual Activity    Alcohol use: Not Currently     Comment: States has had no alcohol in at least a year    Drug use: No    Sexual activity: Yes     Partners: Female   Other Topics Concern    None   Social History Narrative    None     Social Determinants of Health     Financial Resource Strain: Not on file   Food Insecurity: Not on file   Transportation Needs: Not on file   Physical Activity: Not on file   Stress: Not on file   Social Connections: Not on file   Intimate Partner Violence: Not on file   Housing Stability: Not on file       Subjective         Objective    Physical Exam:   Assessment Type: Assess only  General Appearance: Alert,Awake  Respiratory Pattern: Normal  Chest Assessment: Chest expansion symmetrical  Bilateral Breath Sounds: Clear  Cough: Non-productive  O2 Device: Room Air    Vitals:  Blood pressure 131/79, pulse 99, temperature 98 3 °F (36 8 °C), temperature source Temporal, resp  rate 16, weight 61 2 kg (135 lb), SpO2 97 %  Imaging and other studies: I have personally reviewed pertinent reports        O2 Device: Room Air     Plan    Respiratory Plan: Home Bronchodilator Patient pathway      Continue ordered MDIs

## 2022-02-02 NOTE — ED PROVIDER NOTES
History  Chief Complaint   Patient presents with    Hyperglycemia - no symptoms     Pt w/ PMHx of T2DM presents c/o running out of insulin  Per EMS, POCT glucose >600  Pt asymptomatic at this time  Pt also c/o suicidal ideations for "a while now" w/o plan  -HI, -AH, -TH, -VH   Suicidal     HPI      This is a 80-year-old gentleman presents the emergency department with a longstanding history of diabetes, most recent hemoglobin A1c was 14 3 as of November 2020, presents the emergency department with increased anxiety, depression, vague suicidal thoughts  Patient is currently homeless and has been living with a friend in the [place] area  Upon further probing the patient reports that he does not want to end his life but just feels hopeless regarding his current living situation  Patient is not been taking his diabetic medications for over a month because his legal address is in [place] and he cannot get a local pharmacy to have his prescription filled  Patient denies any chest pain, shortness of breath, abdominal pain or vomiting  Patient has had multiple ER visits secondary to hyperglycemia, bipolar disorder and suicidal ideations  Prior to Admission Medications   Prescriptions Last Dose Informant Patient Reported? Taking?    ARIPiprazole (ABILIFY) 15 mg tablet   No No   Sig: Take 1 tablet (15 mg total) by mouth daily At 9am   ARIPiprazole (ABILIFY) 20 MG tablet   No No   Sig: Take 1 tablet (20 mg total) by mouth daily   albuterol (PROVENTIL HFA,VENTOLIN HFA) 90 mcg/act inhaler   No No   Sig: Inhale 2 puffs every 4 (four) hours as needed for wheezing   dicyclomine (BENTYL) 20 mg tablet   No No   Sig: Take 1 tablet (20 mg total) by mouth 2 (two) times a day   escitalopram (LEXAPRO) 10 mg tablet   No No   Sig: Take 1 tablet (10 mg total) by mouth daily   fenofibrate (TRICOR) 48 mg tablet   No No   Sig: Take 1 tablet (48 mg total) by mouth daily   fluticasone (FLOVENT HFA) 110 MCG/ACT inhaler   No No Sig: Inhale 1 puff every 12 (twelve) hours Rinse mouth after use    hydrOXYzine HCL (ATARAX) 25 mg tablet   Yes No   Sig: Take 25 mg by mouth every 6 (six) hours as needed   insulin glargine (Lantus SoloStar) 100 units/mL injection pen   No No   Sig: Inject 40 Units under the skin daily at bedtime Every morning  insulin glulisine (Apidra SoloStar) 100 units/mL injection pen   No No   Sig: Inject 10 Units under the skin 3 (three) times a day with meals   insulin lispro (HUMALOG KWIKPEN) 100 units/mL injection pen   No No   Sig: Inject 4 Units under the skin 3 (three) times a day with meals   Patient taking differently: Inject 15 Units under the skin 3 (three) times a day with meals    metFORMIN (GLUCOPHAGE) 1000 MG tablet   No No   Sig: Take 1 tablet (1,000 mg total) by mouth 2 (two) times a day with meals   traZODone (DESYREL) 100 mg tablet   Yes No   Sig: Take 100 mg by mouth daily at bedtime      Facility-Administered Medications: None       Past Medical History:   Diagnosis Date    Anxiety     Asthma     Bipolar 1 disorder ([facility] )     Depression     Diabetes mellitus ([facility] )     Psychiatric disorder        Past Surgical History:   Procedure Laterality Date    APPENDECTOMY         History reviewed  No pertinent family history  I have reviewed and agree with the history as documented  E-Cigarette/Vaping    E-Cigarette Use Never User      E-Cigarette/Vaping Substances     Social History     Tobacco Use    Smoking status: Former Smoker     Packs/day: 0 20     Types: Cigarettes    Smokeless tobacco: Never Used   Vaping Use    Vaping Use: Never used   Substance Use Topics    Alcohol use: Not Currently     Comment: States has had no alcohol in at least a year    Drug use: No       Review of Systems   Constitutional: Positive for fatigue  HENT: Negative  Eyes: Negative  Respiratory: Negative  Negative for chest tightness and shortness of breath  Cardiovascular: Negative    Negative for chest pain    Gastrointestinal: Negative  Negative for abdominal pain, diarrhea and nausea  Endocrine: Negative  Genitourinary: Negative  Musculoskeletal: Negative  Negative for back pain  Skin: Negative  Allergic/Immunologic: Negative  Neurological: Negative  Hematological: Negative  Psychiatric/Behavioral: Negative  Physical Exam  Physical Exam  Vitals and nursing note reviewed  Constitutional:       General: He is not in acute distress  Appearance: He is underweight  He is not ill-appearing, toxic-appearing or diaphoretic  Comments: + temporal wasting   HENT:      Right Ear: External ear normal       Left Ear: External ear normal       Nose: Nose normal       Mouth/Throat:      Mouth: Mucous membranes are moist       Pharynx: Oropharynx is clear  Eyes:      Conjunctiva/sclera: Conjunctivae normal       Pupils: Pupils are equal, round, and reactive to light  Cardiovascular:      Rate and Rhythm: Normal rate and regular rhythm  Pulses: Normal pulses  Heart sounds: Normal heart sounds  Pulmonary:      Effort: Pulmonary effort is normal       Breath sounds: Normal breath sounds  Abdominal:      General: Abdomen is flat  Bowel sounds are normal    Musculoskeletal:         General: Normal range of motion  Cervical back: Normal range of motion  Skin:     General: Skin is warm  Capillary Refill: Capillary refill takes less than 2 seconds  Neurological:      General: No focal deficit present  Mental Status: He is alert  Psychiatric:         Mood and Affect: Mood normal          Behavior: Behavior normal  Behavior is cooperative  Thought Content:  Thought content normal          Judgment: Judgment normal          Vital Signs  ED Triage Vitals [02/02/22 1145]   Temperature Pulse Respirations Blood Pressure SpO2   98 3 °F (36 8 °C) 98 18 136/83 97 %      Temp Source Heart Rate Source Patient Position - Orthostatic VS BP Location FiO2 (%) Temporal Monitor Lying Left arm --      Pain Score       No Pain           Vitals:    02/02/22 1145   BP: 136/83   Pulse: 98   Patient Position - Orthostatic VS: Lying         Visual Acuity      ED Medications  Medications   sodium chloride 0 9 % bolus 1,000 mL (0 mL Intravenous Stopped 2/2/22 1308)   sodium chloride 0 9 % bolus 1,000 mL (1,000 mL Intravenous New Bag 2/2/22 1308)       Diagnostic Studies  Results Reviewed     Procedure Component Value Units Date/Time    COVID/FLU/RSV - 2 hour TAT [[id number]]  (Normal) Collected: 02/02/22 1207    Lab Status: Final result Specimen: Nares from Nose Updated: 02/02/22 1301     SARS-CoV-2 Negative     INFLUENZA A PCR Negative     INFLUENZA B PCR Negative     RSV PCR Negative    Narrative:      FOR PEDIATRIC PATIENTS - copy/paste COVID Guidelines URL to browser: [email or web address]    SARS-CoV-2 assay is a Nucleic Acid Amplification assay intended for the  qualitative detection of nucleic acid from SARS-CoV-2 in nasopharyngeal  swabs  Results are for the presumptive identification of SARS-CoV-2 RNA  Positive results are indicative of infection with SARS-CoV-2, the virus  causing COVID-19, but do not rule out bacterial infection or co-infection  with other viruses  Laboratories within the [place] and its  territories are required to report all positive results to the appropriate  public health authorities  Negative results do not preclude SARS-CoV-2  infection and should not be used as the sole basis for treatment or other  patient management decisions  Negative results must be combined with  clinical observations, patient history, and epidemiological information  This test has not been FDA cleared or approved  This test has been authorized by FDA under an Emergency Use Authorization  (EUA)   This test is only authorized for the duration of time the  declaration that circumstances exist justifying the authorization of the  emergency use of an in vitro diagnostic tests for detection of SARS-CoV-2  virus and/or diagnosis of COVID-19 infection under section 564(b)(1) of  the Act, 21 U  S C  213YWU-7(A)(6), unless the authorization is terminated  or revoked sooner  The test has been validated but independent review by FDA  and CLIA is pending  Test performed using [employer] GeneXpert: This RT-PCR assay targets N2,  a region unique to SARS-CoV-2  A conserved region in the E-gene was chosen  for pan-Sarbecovirus detection which includes SARS-CoV-2  TSH [[id number]]  (Normal) Collected: 02/02/22 1207    Lab Status: Final result Specimen: Blood from Arm, Right Updated: 02/02/22 1252     TSH 3RD GENERATON 1 275 uIU/mL     Narrative:      Patients undergoing fluorescein dye angiography may retain small amounts of fluorescein in the body for 48-72 hours post procedure  Samples containing fluorescein can produce falsely depressed TSH values  If the patient had this procedure,a specimen should be resubmitted post fluorescein clearance        Comprehensive metabolic panel [[id number]]  (Abnormal) Collected: 02/02/22 1207    Lab Status: Final result Specimen: Blood from Arm, Right Updated: 02/02/22 1251     Sodium 128 mmol/L      Potassium 3 9 mmol/L      Chloride 92 mmol/L      CO2 27 mmol/L      ANION GAP 9 mmol/L      BUN 15 mg/dL      Creatinine 1 09 mg/dL      Glucose 628 mg/dL      Calcium 9 6 mg/dL      AST 13 U/L      ALT 12 U/L      Alkaline Phosphatase 81 U/L      Total Protein 7 9 g/dL      Albumin 4 5 g/dL      Total Bilirubin 0 69 mg/dL      eGFR 78 ml/min/1 73sq m     Narrative:      [place] guidelines for Chronic Kidney Disease (CKD):     Stage 1 with normal or high GFR (GFR > 90 mL/min/1 73 square meters)    Stage 2 Mild CKD (GFR = 60-89 mL/min/1 73 square meters)    Stage 3A Moderate CKD (GFR = 45-59 mL/min/1 73 square meters)    Stage 3B Moderate CKD (GFR = 30-44 mL/min/1 73 square meters)    Stage 4 Severe CKD (GFR = 15-29 mL/min/1 73 square meters)    Stage 5 End Stage CKD (GFR <15 mL/min/1 73 square meters)  Note: GFR calculation is accurate only with a steady state creatinine    Magnesium [[id number]]  (Abnormal) Collected: 02/02/22 1207    Lab Status: Final result Specimen: Blood from Arm, Right Updated: 02/02/22 1240     Magnesium 1 8 mg/dL     Rapid drug screen, urine [[id number]]  (Normal) Collected: 02/02/22 1210    Lab Status: Final result Specimen: Urine, Clean Catch Updated: 02/02/22 1239     Amph/Meth UR Negative     Barbiturate Ur Negative     Benzodiazepine Urine Negative     Cocaine Urine Negative     Methadone Urine Negative     Opiate Urine Negative     PCP Ur Negative     THC Urine Negative     Oxycodone Urine Negative    Narrative:      FOR MEDICAL PURPOSES ONLY  IF CONFIRMATION NEEDED PLEASE CONTACT THE LAB WITHIN 5 DAYS      Drug Screen Cutoff Levels:  AMPHETAMINE/METHAMPHETAMINES  1000 ng/mL  BARBITURATES     200 ng/mL  BENZODIAZEPINES     200 ng/mL  COCAINE      300 ng/mL  METHADONE      300 ng/mL  OPIATES      300 ng/mL  PHENCYCLIDINE     25 ng/mL  THC       50 ng/mL  OXYCODONE      100 ng/mL    Protime-INR [[id number]]  (Normal) Collected: 02/02/22 1207    Lab Status: Final result Specimen: Blood from Arm, Right Updated: 02/02/22 1232     Protime 13 0 seconds      INR 0 99    APTT [[id number]]  (Normal) Collected: 02/02/22 1207    Lab Status: Final result Specimen: Blood from Arm, Right Updated: 02/02/22 1232     PTT 24 seconds     Beta Hydroxybutyrate [[id number]]  (Normal) Collected: 02/02/22 1207    Lab Status: Final result Specimen: Blood from Arm, Right Updated: 02/02/22 1223     BETA-HYDROXYBUTYRATE 0 3 mmol/L     UA w Reflex to Microscopic w Reflex to Culture [[id number]]  (Abnormal) Collected: 02/02/22 1210    Lab Status: Final result Specimen: Urine, Clean Catch Updated: 02/02/22 1222     Color, UA Straw     Clarity, UA Clear     Specific [place], UA <=1 005     pH, UA 5 5     Leukocytes, UA Negative     Nitrite, UA Negative     Protein, UA Negative mg/dl      Glucose, UA 3+ mg/dl      Ketones, UA Negative mg/dl      Urobilinogen, UA 0 2 E U /dl      Bilirubin, UA Negative     Blood, UA Negative    CBC and differential [[id number]] Collected: 02/02/22 1207    Lab Status: Final result Specimen: Blood from Arm, Right Updated: 02/02/22 1220     WBC 5 80 Thousand/uL      RBC 5 43 Million/uL      Hemoglobin 15 8 g/dL      Hematocrit 47 1 %      MCV 87 fL      MCH 29 1 pg      MCHC 33 5 g/dL      RDW 12 3 %      MPV 10 9 fL      Platelets 847 Thousands/uL      nRBC 0 /100 WBCs      Neutrophils Relative 67 %      Immat GRANS % 0 %      Lymphocytes Relative 22 %      Monocytes Relative 10 %      Eosinophils Relative 1 %      Basophils Relative 0 %      Neutrophils Absolute 3 84 Thousands/µL      Immature Grans Absolute 0 02 Thousand/uL      Lymphocytes Absolute 1 30 Thousands/µL      Monocytes Absolute 0 57 Thousand/µL      Eosinophils Absolute 0 05 Thousand/µL      Basophils Absolute 0 02 Thousands/µL     POCT alcohol breath test [[id number]]  (Normal) Resulted: 02/02/22 1215    Lab Status: Final result Updated: 02/02/22 1216     EXTBreath Alcohol 0 00  neg ED doctor and RN made aware    Hemoglobin A1C [[id number]] Collected: 02/02/22 1207    Lab Status:  In process Specimen: Blood from Arm, Right Updated: 02/02/22 1214    Fingerstick Glucose (POCT) [[id number]]  (Abnormal) Collected: 02/02/22 1147    Lab Status: Final result Updated: 02/02/22 1151     POC Glucose >500 mg/dl                  No orders to display              Procedures  Procedures         ED Course  ED Course as of 02/02/22 1419   Wed Feb 02, 2022   1346 Patient does not have any evidence of gap metabolic acidosis, and like the fact the patient had some vague suicidal tendencies, patient will not be able to be medically cleared for inpatient psychiatric evaluation, will admit the medicine for stabilization of significant hyperglycemia  1404 Case d/w KRISH Walton, will admit the patient to Medicine for reason stated in prior entry  SBIRT 22yo+      Most Recent Value   SBIRT (22 yo +)    In order to provide better care to our patients, we are screening all of our patients for alcohol and drug use  Would it be okay to ask you these screening questions? No Filed at: 02/02/2022 1148                    MDM  Number of Diagnoses or Management Options  Depression with suicidal ideation  Homelessness  Hyperglycemia  Diagnosis management comments: 81-year-old gentleman with multiple psychiatric social stressors, homeless from has been off his diabetic medications for approximately a month secondary to a sure intense issue, presents emergency department increased anxiety, depression vague suicidal ideations with no specific plan, noted to have a blood sugar greater than 500, no evidence of gap metabolic acidosis will admit patient hospital for stabilization of blood sugar and psychiatric consultation  Portions of the record may have been created with voice recognition software  Occasional wrong word or "sound a like" substitutions may have occurred due to the inherent limitations of voice recognition software  Read the chart carefully and recognize, using context, where substitutions have occurred  Amount and/or Complexity of Data Reviewed  Clinical lab tests: ordered and reviewed  Tests in the medicine section of CPT®: ordered and reviewed        Disposition  Final diagnoses:   Depression with suicidal ideation   Hyperglycemia   Homelessness     Time reflects when diagnosis was documented in both MDM as applicable and the Disposition within this note     Time User Action Codes Description Comment    2/2/2022  2:05 PM Ricardo Arnett Add [F32  Lanice Orellana Depression with suicidal ideation     2/2/2022  2:05 PM Ricardo Arnett Add [R73 9] Hyperglycemia     2/2/2022  2:06 PM Lidia Morales Add [Z59 00] Homelessness       ED Disposition     ED Disposition Condition Date/Time Comment    Discharge Stable Wed Feb 2, 2022  2:05 PM Anmol Greenfield discharge to home/self care  Follow-up Information    None         Patient's Medications   Discharge Prescriptions    No medications on file       No discharge procedures on file      PDMP Review     None          ED Provider  Electronically Signed by           Aarti Kennedy III, DO  02/02/22 6767

## 2022-02-02 NOTE — ASSESSMENT & PLAN NOTE
· Patient says he hears voices telling him to do things, but states "I would never do anything " referring to self-harm or suicidal gestures or attempts   · Psychiatric consult  · Supportive care
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
## 2022-02-02 NOTE — ASSESSMENT & PLAN NOTE
· He has been out of his meds including trazodone, Lexapro, and Abilify-will restart for now  · Psychiatric consult

## 2022-02-02 NOTE — ASSESSMENT & PLAN NOTE
Lab Results   Component Value Date    HGBA1C 11 1 (H) 01/01/2020       Recent Labs     02/02/22  1147 02/02/22  1426   POCGLU >500* 392*       Blood Sugar Average: Last 72 hrs:  (P) 392     · Glucose initially 628  · Received 2 L normal saline  · Repeat sugar 392   Anion gap is 9, beta hydroxybutyrate is 0 3, CO2 is 27  · He has been out of his meds including Lantus 40 units at bedtime, and Apidra 10 units 3 times daily with meals, and metformin  · Will give 10 of Humalog subcutaneous now, and start sliding scale insulin with meals and at bedtime  · Trend glucose  · Check hemoglobin A1c

## 2022-02-03 PROBLEM — J45.20 MILD INTERMITTENT ASTHMA WITHOUT COMPLICATION: Status: ACTIVE | Noted: 2020-03-07

## 2022-02-03 LAB
ANION GAP SERPL CALCULATED.3IONS-SCNC: 8 MMOL/L (ref 4–13)
BASOPHILS # BLD AUTO: 0.03 THOUSANDS/ΜL (ref 0–0.1)
BASOPHILS NFR BLD AUTO: 0 % (ref 0–1)
BUN SERPL-MCNC: 17 MG/DL (ref 5–25)
CALCIUM SERPL-MCNC: 8.8 MG/DL (ref 8.4–10.2)
CHLORIDE SERPL-SCNC: 107 MMOL/L (ref 96–108)
CO2 SERPL-SCNC: 25 MMOL/L (ref 21–32)
CREAT SERPL-MCNC: 0.92 MG/DL (ref 0.6–1.3)
EOSINOPHIL # BLD AUTO: 0.08 THOUSAND/ΜL (ref 0–0.61)
EOSINOPHIL NFR BLD AUTO: 1 % (ref 0–6)
ERYTHROCYTE [DISTWIDTH] IN BLOOD BY AUTOMATED COUNT: 12 % (ref 11.6–15.1)
EST. AVERAGE GLUCOSE BLD GHB EST-MCNC: 341 MG/DL
GFR SERPL CREATININE-BSD FRML MDRD: 96 ML/MIN/1.73SQ M
GLUCOSE SERPL-MCNC: 122 MG/DL (ref 65–140)
GLUCOSE SERPL-MCNC: 130 MG/DL (ref 65–140)
GLUCOSE SERPL-MCNC: 146 MG/DL (ref 65–140)
GLUCOSE SERPL-MCNC: 214 MG/DL (ref 65–140)
GLUCOSE SERPL-MCNC: 244 MG/DL (ref 65–140)
HBA1C MFR BLD: 13.5 %
HCT VFR BLD AUTO: 43.7 % (ref 36.5–49.3)
HGB BLD-MCNC: 14.4 G/DL (ref 12–17)
IMM GRANULOCYTES # BLD AUTO: 0.02 THOUSAND/UL (ref 0–0.2)
IMM GRANULOCYTES NFR BLD AUTO: 0 % (ref 0–2)
LYMPHOCYTES # BLD AUTO: 1.78 THOUSANDS/ΜL (ref 0.6–4.47)
LYMPHOCYTES NFR BLD AUTO: 23 % (ref 14–44)
MAGNESIUM SERPL-MCNC: 1.9 MG/DL (ref 1.9–2.7)
MCH RBC QN AUTO: 28.5 PG (ref 26.8–34.3)
MCHC RBC AUTO-ENTMCNC: 33 G/DL (ref 31.4–37.4)
MCV RBC AUTO: 87 FL (ref 82–98)
MONOCYTES # BLD AUTO: 0.61 THOUSAND/ΜL (ref 0.17–1.22)
MONOCYTES NFR BLD AUTO: 8 % (ref 4–12)
NEUTROPHILS # BLD AUTO: 5.16 THOUSANDS/ΜL (ref 1.85–7.62)
NEUTS SEG NFR BLD AUTO: 68 % (ref 43–75)
NRBC BLD AUTO-RTO: 0 /100 WBCS
PLATELET # BLD AUTO: 202 THOUSANDS/UL (ref 149–390)
PMV BLD AUTO: 11 FL (ref 8.9–12.7)
POTASSIUM SERPL-SCNC: 3.2 MMOL/L (ref 3.5–5.3)
RBC # BLD AUTO: 5.05 MILLION/UL (ref 3.88–5.62)
SODIUM SERPL-SCNC: 140 MMOL/L (ref 135–147)
WBC # BLD AUTO: 7.68 THOUSAND/UL (ref 4.31–10.16)

## 2022-02-03 PROCEDURE — 85025 COMPLETE CBC W/AUTO DIFF WBC: CPT | Performed by: NURSE PRACTITIONER

## 2022-02-03 PROCEDURE — 99232 SBSQ HOSP IP/OBS MODERATE 35: CPT | Performed by: NURSE PRACTITIONER

## 2022-02-03 PROCEDURE — 80048 BASIC METABOLIC PNL TOTAL CA: CPT | Performed by: NURSE PRACTITIONER

## 2022-02-03 PROCEDURE — 82948 REAGENT STRIP/BLOOD GLUCOSE: CPT

## 2022-02-03 PROCEDURE — 83735 ASSAY OF MAGNESIUM: CPT | Performed by: NURSE PRACTITIONER

## 2022-02-03 PROCEDURE — G0425 INPT/ED TELECONSULT30: HCPCS | Performed by: PSYCHIATRY & NEUROLOGY

## 2022-02-03 RX ORDER — POTASSIUM CHLORIDE 20 MEQ/1
40 TABLET, EXTENDED RELEASE ORAL ONCE
Status: COMPLETED | OUTPATIENT
Start: 2022-02-03 | End: 2022-02-03

## 2022-02-03 RX ORDER — MAGNESIUM SULFATE HEPTAHYDRATE 40 MG/ML
2 INJECTION, SOLUTION INTRAVENOUS ONCE
Status: COMPLETED | OUTPATIENT
Start: 2022-02-03 | End: 2022-02-03

## 2022-02-03 RX ADMIN — INSULIN GLARGINE 40 UNITS: 100 INJECTION, SOLUTION SUBCUTANEOUS at 21:39

## 2022-02-03 RX ADMIN — FLUTICASONE PROPIONATE 1 PUFF: 110 AEROSOL, METERED RESPIRATORY (INHALATION) at 09:57

## 2022-02-03 RX ADMIN — INSULIN LISPRO 2 UNITS: 100 INJECTION, SOLUTION INTRAVENOUS; SUBCUTANEOUS at 21:39

## 2022-02-03 RX ADMIN — ARIPIPRAZOLE 20 MG: 10 TABLET ORAL at 09:57

## 2022-02-03 RX ADMIN — ESCITALOPRAM OXALATE 10 MG: 10 TABLET ORAL at 21:39

## 2022-02-03 RX ADMIN — POTASSIUM CHLORIDE 40 MEQ: 1500 TABLET, EXTENDED RELEASE ORAL at 13:09

## 2022-02-03 RX ADMIN — MAGNESIUM SULFATE 2 G: 2 INJECTION INTRAVENOUS at 13:09

## 2022-02-03 RX ADMIN — TRAZODONE HYDROCHLORIDE 100 MG: 50 TABLET ORAL at 21:39

## 2022-02-03 RX ADMIN — ENOXAPARIN SODIUM 40 MG: 100 INJECTION SUBCUTANEOUS at 09:57

## 2022-02-03 RX ADMIN — INSULIN LISPRO 2 UNITS: 100 INJECTION, SOLUTION INTRAVENOUS; SUBCUTANEOUS at 12:25

## 2022-02-03 NOTE — CASE MANAGEMENT
Case Management Assessment    Patient name Zorita Cooks  Location [address] [id number] MRN [id number]  : 1970 Date 2/3/2022       Current Admission Date: 2022  Current Admission Diagnosis:Type 2 diabetes mellitus with hyperglycemia, with long-term current use of insulin [facility])   Patient Active Problem List    Diagnosis Date Noted    Hypomagnesemia 2022    Poor social situation 2022    Medical clearance for psychiatric admission 2020    Asthma 2020    Tobacco abuse 2020    Status post appendectomy 2020    Auditory hallucinations 2020    Bipolar disorder, current episode hypomanic ([facility] ) 2020    Bipolar 1 disorder ([address] ) 2019 [facility] (hyperglycemic hyperosmolar nonketotic coma) ([facility] ) 10/12/2019    HTN (hypertension) 10/12/2019    HLD (hyperlipidemia) 10/12/2019    Bipolar affective disorder, currently depressed, moderate ([address] ) 10/12/2019    Learning disorder 2019    Type 2 diabetes mellitus with hyperglycemia, with long-term current use of insulin ([facility] ) 2019      LOS (days): 1  Geometric Mean LOS (GMLOS) (days): 2 90  Days to GMLOS:2     OBJECTIVE:    Risk of Unplanned Readmission Score: 10     Current admission status: Inpatient  Referral Reason:  (Discharge planning)    Preferred Pharmacy:   [address] [place] [id number]  Phone: [phone] Fax: [phone]    Primary Care Provider: No primary care provider on file  Primary Insurance: [address] [place] [address]  Secondary Insurance:     ASSESSMENT:  Active Health Care Agents    There are no active Health Care Agents on file         Advance Directives  Does patient have a [address]?: No  Was patient offered paperwork?: Yes (Paperwork provided)  Does patient currently have a Health Care decision maker?: No  Does patient have Advance Directives?: No  Was patient offered paperwork?: Yes [phone] provided)  Readmission Root Cause  30 Day Readmission: No    Patient Information  Admitted from[de-identified] Other (comment) (Homeless)  Mental Status: Alert  During Assessment patient was accompanied by: Not accompanied during assessment  Assessment information provided by[de-identified] Patient  Primary Caregiver: Self  Support Systems: Spouse/significant other  [place] of Residence: [address] do you live in?: was staying with friends  Home entry access options   Select all that apply : Other access (Comment) (Homeless)  Type of Current Residence: Homeless  In the last 12 months, was there a time when you were not able to pay the mortgage or rent on time?: Yes  In the last 12 months, how many places have you lived?: 2  In the last 12 months, was there a time when you did not have a steady place to sleep or slept in a shelter (including now)?: Yes  Homeless/housing insecurity resource given?: Refused (Has no state ID)  Living Arrangements: Other (Comment)  Is patient a ?: Yes  Is patient active with [facility])?: No (Lost all paperwork)    Activities of Daily Living Prior to Admission  Completes ADLs independently?: Yes  Ambulates independently?: Yes  Does patient use assisted devices?: No  Does patient currently own DME?: No  Does patient have a history of Outpatient Therapy (PT/OT)?: No  Does the patient have a history of Short-Term Rehab?: No  Does patient have a history of HHC?: No  Does patient currently have [address]?: No    Patient Information Continued  Income Source: SSI/SSD  Does patient have prescription coverage?: Yes  Within the past 12 months, you worried that your food would run out before you got the money to buy more : Never true  Within the past 12 months, the food you bought just didnt last and you didnt have money to get more : Never true  Food insecurity resource given?: N/A  Does patient receive dialysis treatments?: No  Does patient have a history of substance abuse?: No  Does patient have a history of Mental Health Diagnosis?: Yes  Is patient receiving treatment for mental health?: No  Treatment options were provided  Has patient received inpatient treatment related to mental health in the last 2 years?: Yes    PHQ 2/9 Screening   Reviewed PHQ 2/9 Depression Screening Score?: No    Means of Transportation  Means of Transport to Appts[de-identified] Friends  In the past 12 months, has lack of transportation kept you from medical appointments or from getting medications?: Yes  In the past 12 months, has lack of transportation kept you from meetings, work, or from getting things needed for daily living?: Yes  Was application for public transport provided?:  (Pt has residence)   Pt states he was living with a friend, however landlord will not let him go back  Pt is currently homeless, no car either  Pt states he has no state ID as he just came here from [place]  Pt states he has a SO who is currently in [facility]  Pt reports he is on disability  Has not been on  Any medications for a month, states "I cant get refills"  Pt states he has  a cousin in [place] and may be able to go there    Pt has a pending psych eval

## 2022-02-03 NOTE — ASSESSMENT & PLAN NOTE
· Patient says he hears voices telling him to do things, but states "I would never do anything" referring to self-harm or suicidal gestures or attempts   · Pending formal Psychiatric evaluation   · Supportive care

## 2022-02-03 NOTE — ASSESSMENT & PLAN NOTE
Lab Results   Component Value Date    HGBA1C 13 5 (H) 02/02/2022       Recent Labs     02/02/22  1726 02/02/22  1947 02/03/22  0622 02/03/22  1121   POCGLU 339* 279* 146* 214*       Blood Sugar Average: Last 72 hrs:  (P) 274     · Glucose initially 628  · Received 2 L normal saline  · He has been out of his meds includeing Lantus 40 units at bedtime, and Apidra 10 units 3 times daily with meals, and metformin  · Hemoglobin A1c- 13 5- poor controlled   · BG much improved while in the hospital

## 2022-02-03 NOTE — ASSESSMENT & PLAN NOTE
· He has been out of his meds including trazodone, Lexapro, and Abilify-will restart for now  · Pending formal Psychiatric evaluation

## 2022-02-03 NOTE — ASSESSMENT & PLAN NOTE
· Patient arrived here yesterday from [place]-he is homeless  · His fiancee is also currently in a long-term psychiatric facility   · Case management is working on discharge plan

## 2022-02-03 NOTE — PROGRESS NOTES
Sammie 46  Progress Note Frankie Mansfield 1970, 49 y o  male MRN: [id number]  Unit/Bed#: -01 Encounter: [id number]  Primary Care Provider: No primary care provider on file  Date and time admitted to hospital: 2/2/2022 11:46 AM    * Type 2 diabetes mellitus with hyperglycemia, with long-term current use of insulin [facility])  Assessment & Plan  Lab Results   Component Value Date    HGBA1C 13 5 (H) 02/02/2022       Recent Labs     02/02/22  1726 02/02/22  1947 02/03/22  0622 02/03/22  1121   POCGLU 339* 279* 146* 214*       Blood Sugar Average: Last 72 hrs:  (P) 274     · Glucose initially 628  · Received 2 L normal saline  · He has been out of his meds includeing Lantus 40 units at bedtime, and Apidra 10 units 3 times daily with meals, and metformin  · Hemoglobin A1c- 13 5- poor controlled   · BG much improved while in the hospital     Poor social situation  Assessment & Plan  · Patient arrived here yesterday from [place]-he is homeless  · His fiancee is also currently in a long-term psychiatric facility   · Case management is working on discharge plan     Hypomagnesemia  Assessment & Plan  · Continue to replete and monitor     Mild intermittent asthma without complication  Assessment & Plan  · Currently controlled  · Continue inhalers      Auditory hallucinations  Assessment & Plan  · Patient says he hears voices telling him to do things, but states "I would never do anything" referring to self-harm or suicidal gestures or attempts   · Pending formal Psychiatric evaluation   · Supportive care    Bipolar 1 disorder ([facility] [address] )  Assessment & Plan  · He has been out of his meds including trazodone, Lexapro, and Abilify-will restart for now  · Pending formal Psychiatric evaluation       VTE Prophylaxis:  Enoxaparin (Lovenox)    Patient Centered Rounds: I have performed bedside rounds with nursing staff today      Discussions with Specialists or Other Care Team Provider: psychiatry, CM Education and Discussions with Family / Patient: patient     Current Length of Stay: 1 day(s)    Current Patient Status: Inpatient   Certification Statement: The patient will continue to require additional inpatient hospital stay due to hyperglycemia     Discharge Plan: pending hospital course  Pending psych evaluation and if cleared by psych patient will be medically cleared for discharge  Code Status: Level 1 - Full Code    Subjective:   Feeling better  Denies any pain  Admits to audible hallucinations  No suicidal/homicidal ideation or plan  Objective:     Vitals:   Temp (24hrs), Av 7 °F (36 5 °C), Min:97 5 °F (36 4 °C), Max:97 8 °F (36 6 °C)    Temp:  [97 5 °F (36 4 °C)-97 8 °F (36 6 °C)] 97 5 °F (36 4 °C)  HR:  [79-99] 98  Resp:  [16-20] 20  BP: ()/(56-89) 116/71  SpO2:  [96 %-98 %] 96 %  Body mass index is 20 64 kg/m²  Input and Output Summary (last 24 hours): Intake/Output Summary (Last 24 hours) at 2/3/2022 1243  Last data filed at 2/3/2022 0815  Gross per 24 hour   Intake 2180 ml   Output 700 ml   Net 1480 ml       Physical Exam:   Physical Exam  Vitals and nursing note reviewed  Constitutional:       General: He is not in acute distress  Appearance: Normal appearance  HENT:      Head: Normocephalic and atraumatic  Right Ear: External ear normal       Left Ear: External ear normal       Nose: Nose normal       Mouth/Throat:      Mouth: Mucous membranes are moist       Pharynx: Oropharynx is clear  Eyes:      General:         Right eye: No discharge  Left eye: No discharge  Extraocular Movements: Extraocular movements intact  Pupils: Pupils are equal, round, and reactive to light  Cardiovascular:      Rate and Rhythm: Normal rate and regular rhythm  Pulses: Normal pulses  Heart sounds: Normal heart sounds  No murmur heard  Pulmonary:      Effort: Pulmonary effort is normal  No respiratory distress        Breath sounds: Normal breath sounds  No wheezing or rales  Abdominal:      General: Bowel sounds are normal  There is no distension  Palpations: Abdomen is soft  There is no mass  Tenderness: There is no abdominal tenderness  Musculoskeletal:         General: No swelling, tenderness or deformity  Normal range of motion  Cervical back: Normal range of motion and neck supple  No rigidity  Skin:     General: Skin is warm and dry  Capillary Refill: Capillary refill takes less than 2 seconds  Coloration: Skin is not pale  Findings: No erythema  Neurological:      General: No focal deficit present  Mental Status: He is alert and oriented to person, place, and time  Mental status is at baseline  Psychiatric:         Attention and Perception: He perceives auditory hallucinations  Mood and Affect: Mood is anxious  Behavior: Behavior normal          Thought Content: Thought content normal          Additional Data:     Labs:    Results from last 7 days   Lab Units 02/03/22  0456   WBC Thousand/uL 7 68   HEMOGLOBIN g/dL 14 4   HEMATOCRIT % 43 7   PLATELETS Thousands/uL 202   NEUTROS PCT % 68   LYMPHS PCT % 23   MONOS PCT % 8   EOS PCT % 1     Results from last 7 days   Lab Units 02/03/22  0456 02/02/22  1207 02/02/22  1207   SODIUM mmol/L 140   < > 128*   POTASSIUM mmol/L 3 2*   < > 3 9   CHLORIDE mmol/L 107   < > 92*   CO2 mmol/L 25   < > 27   BUN mg/dL 17   < > 15   CREATININE mg/dL 0 92   < > 1 09   CALCIUM mg/dL 8 8   < > 9 6   ALK PHOS U/L  --   --  81   ALT U/L  --   --  12   AST U/L  --   --  13    < > = values in this interval not displayed       Results from last 7 days   Lab Units 02/02/22  1207   INR  0 99     Results from last 7 days   Lab Units 02/03/22  1121 02/03/22  0622 02/02/22  1947 02/02/22  1726 02/02/22  1426 02/02/22  1147   POC GLUCOSE mg/dl 214* 146* 279* 339* 392* >500*     Results from last 7 days   Lab Units 02/02/22  1207   HEMOGLOBIN A1C % 13 5*       * I Have Reviewed All Lab Data Listed Above  * Additional Pertinent Lab Tests Reviewed: Yi [address] Admission  Reviewed    Imaging:  Imaging Reports Reviewed Today Include: n/a     Recent Cultures (last 7 days):           Last 24 Hours Medication List:   Current Facility-Administered Medications   Medication Dose Route Frequency Provider Last Rate    acetaminophen  650 mg Oral Q6H PRN Maxcine Spina, CRNP      albuterol  2 puff Inhalation Q4H PRN Maxcine Spina, CRNP      ARIPiprazole  20 mg Oral Daily Maxcine Spina, CRNP      enoxaparin  40 mg Subcutaneous Daily Maxcine Spina, CRNP      escitalopram  10 mg Oral HS Maxcine Spina, CRNP      fluticasone  1 puff Inhalation Q12H [facility] Maxcine Spina, CRNP      insulin glargine  40 Units Subcutaneous HS Maxcine Spina, CRNP      insulin lispro  1-5 Units Subcutaneous TID AC Maxcine Spina, CRNP      insulin lispro  1-5 Units Subcutaneous HS Maxcine Spina, CRNP      magnesium sulfate  2 g Intravenous Once Iwona Ricci, CRNP      potassium chloride  40 mEq Oral Once Iwona Ricci, BARNP      traZODone  100 mg Oral HS MaxBAR ChanNP          Today, Patient Was Seen By: KRISH Bolton    ** Please Note: Dictation voice to text software may have been used in the creation of this document   **

## 2022-02-03 NOTE — APP STUDENT NOTE
S:    CC: hyperglycemia    HPI: Patient presented to the ED on 2/2/22 for elevated blood sugar  He has a past medical history of type II diabetes and is on lantus, apidra and metformin but hasn't been taking his meds as directed due to cost  He is currently homeless and was staying at a friends home with his fiancee  He also has a history of bipolar depression with auditory hallucinations  Denied any SI/HI stated he does her voices at times but doesn't listen to them  O:   Vitals: [id number], 96% on room air  Physical exam: Patient is pleasant states he is feeling better and "everyhthing is going to be alright " Respirations non-labored on room air  No cough or shortness of breath  Abdomen soft, non tender with +BS x4  Denies any abdominal pain, n/v/d, or loss of appetite  Verbalizes needs  Assessment/Plan:    1  Type II diabetes, on insulin with hyperglycemia  -Patient homeless, presented to ED on 2/2 for hyperglycemia  He is on lantus insulin, apidra and metformin at home and hasn't been taking his medications  -2/2 blood sugar per chemistry was 628, CO2 9, anion gap 9  -patient received 2L IV normal saline in the ED plus 10 units regular insulin SQ  This brought the blood sugar down to 392   -2/2 Beta Hydroxybutyrate was normal at 0 3  -2/2 hemoglobin A1C 13 5, increased from 1/1/20 where result was 11 1  Component Ref Range & Units 2/2/22 1207 1/1/20 2324 10/12/19 0928 7/29/19 1054 7/4/19 0630 2/11/19 0630 1/12/19 2220   Hemoglobin A1C Normal 3 8-5 6%; PreDiabetic 5 7-6 4%;  Diabetic >=6 5%; Glycemic control for adults with diabetes <7 0% % 13 5 High   11 1 High  R, CM  11 4 High  R   13 9 High  R, CM        -continue to monitor chemistries  -continue sliding scale insulin coverage ac and hs with humalog coverage as ordered  -monitor accucheck results    Component Ref Range & Units 2/3/22 0622 2/2/22 1947 2/2/22 1726 2/2/22 1426 2/2/22 1147 3/26/20 1612 3/14/20 1435   POC Glucose 65 - 140 mg/dl 146 High   279 High  CM  339 High   392 High   >500 High Panic        -continue lantus insulin 40 units at bedtime    2  Social problem, homeless  -patient currently homeless, was living with rosmery who is also currently hospitalized  -case management consulted     3  Bipolar disorder  -psychiatry consulted  -admits to auditory hallucinations  -denies SI/HI  -continue trazodone, Abilify as ordered     4  Hypomagnesemia  -Mg 1 8 on 2/2, given 2gm IV magnesium x1, repeat Mg today 1 9  -continue to monitor chemistries, replace as needed    5  Hypokalemia  -K 3 2 today  -continue to monitor chemistries and replace as needed    Plan: Patient homeless prior top arrival, came management consulted and working on discharge plan for when stable for discharge  History of bipolar disorder  Psych consult pending

## 2022-02-03 NOTE — TELEMEDICINE
TeleConsultation - [id number] [facility] [place] 49 y o  male MRN: [id number]  Unit/Bed#: -01 Encounter: [id number]        REQUIRED DOCUMENTATION:     1  This service was provided via Telemedicine  2  Provider located at [place]  3  TeleMed provider: Rio Garcia  4  Identify all parties in room with patient during tele consult:  Patient  5  Patient was then informed that this was a Telemedicine visit and that the exam was being conducted confidentially over secure lines  My office door was closed  No one else was in the room  Patient acknowledged consent and understanding of privacy and security of the Telemedicine visit, and gave us permission to have the assistant stay in the room in order to assist with the history and to conduct the exam   I informed the patient that I have reviewed their record in Epic and presented the opportunity for them to ask any questions regarding the visit today  The patient agreed to participate  Assessment/Plan     Assessment:  Is 69-year-old male who reports history of bipolar depression and schizophrenia and past suicidal ideation 3 months ago  He denies current suicidal ideation but states he has been very stressed by being homeless and having no id to help remedy that situation  Plan:   Risks, benefits and possible side effects of Medications:   Risks, benefits, and possible side effects of medications explained to patient and patient verbalizes understanding  Inpatient psychiatric treatment is not indicated as the patient denies any suicidal ideation over the past 3 months  Furthermore he denies any significant disturbance of mood currently  He is requesting assistance from  to help him locate a place where he can stay since he is currently homeless  Is also a goal is to locate a place where his fiancee can live with him after she is discharged from her current hospitalization    Recommend outpatient psychiatric follow-up and to continue the current psychiatric medications as presently ordered  He appears to be well tolerating his medications without adverse side effect  No EPS observed or reported  Chief Complaint:  I am homeless    History of Present Illness     Reason for Consult / Principal Problem:  Bipolar disorder    Patient is a 49 y o  male presented to the emergency department with provider documented the following: This is a 52-year-old gentleman presents the emergency department with a longstanding history of diabetes, most recent hemoglobin A1c was 14 3 as of November 2020, presents the emergency department with increased anxiety, depression, vague suicidal thoughts  Patient is currently homeless and has been living with a friend in the [place] area  Upon further probing the patient reports that he does not want to end his life but just feels hopeless regarding his current living situation  Patient is not been taking his diabetic medications for over a month because his legal address is in [place] and he cannot get a local pharmacy to have his prescription filled  Patient denies any chest pain, shortness of breath, abdominal pain or vomiting  Patient has had multiple ER visits secondary to hyperglycemia, bipolar disorder and suicidal ideations "     The patient states that he has been stressed by his homeless situation but denies any significant disturbance of mood or any recent suicidal ideation  He admits he had been suicidal 3 months ago but not since that time  He states that he does not have any id which makes it more difficult on shelter/housing  He reports he has been out of his psychiatric medications after running out of then about month ago  Past psychiatric history:  As above  He denies any prior suicide attempt  He admits he did have some suicidal ideation without plan about 3 months ago  He states he would never kill himself    He reports he has a 78-year-old daughter who will be going into the service in very much wants to be here for her  Social history:  As above  Patient's rosmery is currently hospitalized long-term hospital psychiatric program   The patient states he wants to find housing with the 2 of them can live together which she is released  Family history:  Unremarkable     Substance use history:  The patient denies use of alcohol or other substances     Mental status examination:  The patient is alert and well oriented all spheres  Affect is pleasant and euthymic  He made good eye contact  He is very cooperative with the evaluation  Sensorium is clear  Thought process is logical linear  Thought content is reality based  The patient appears to be of average intellectual function by his use of vocabulary constant structured syntax and general fund of knowledge  He denies suicidal ideation as reported above  He states he would never kill himself  He denies homicidal ideation  He admits to past auditory hallucinations a year or more ago but none since that time  Insight and judgment are intact  He is very future oriented and goal oriented at this time      Inpatient consult to Psychiatry  Consult performed by: Giuseppe Valencia  Consult ordered by: KRISH Blake              Past Medical History:   Diagnosis Date    Anxiety     Asthma     Bipolar 1 disorder ([facility] )     Depression     Diabetes mellitus ([facility] )     Psychiatric disorder        Medical Review Of Systems:  Review of Systems    Meds/Allergies   all current active meds have been reviewed  Allergies   Allergen Reactions    Ketorolac Other (See Comments)     Pt states he has mood disturbances, agitation if he takes too much      Toradol [Ketorolac Tromethamine]     Latex Rash       Objective   Vital signs in last 24 hours:  Temp:  [97 5 °F (36 4 °C)-97 8 °F (36 6 °C)] 97 5 °F (36 4 °C)  HR:  [79-99] 98  Resp:  [16-20] 20  BP: ()/(56-89) 116/71      Intake/Output Summary (Last 24 hours) at 2/3/2022 1310  Last data filed at 2/3/2022 0815  Gross per 24 hour   Intake 1180 ml   Output 700 ml   Net 480 ml         Lab Results:  Reviewed  Imaging Studies:  Reviewed  EKG, Pathology, and Other Studies:  Reviewed    Code Status: Level 1 - Full Code  Advance Directive and Living Will:      Power of :    POLST:      Counseling / Coordination of Care  Total floor / unit time spent today 30 minutes  Greater than 50% of total time was spent with the patient and / or family counseling and / or coordination of care  A description of the counseling / coordination of care:  Chart review, patient evaluation, coordination communication with staff, nursing and provider

## 2022-02-03 NOTE — PLAN OF CARE
Problem: METABOLIC, FLUID AND ELECTROLYTES - ADULT  Goal: Electrolytes maintained within normal limits  Description: INTERVENTIONS:  - Monitor labs and assess patient for signs and symptoms of electrolyte imbalances  - Administer electrolyte replacement as ordered  - Monitor response to electrolyte replacements, including repeat lab results as appropriate  - Instruct patient on fluid and nutrition as appropriate  Outcome: Progressing  Goal: Fluid balance maintained  Description: INTERVENTIONS:  - Monitor labs   - Monitor I/O and WT  - Instruct patient on fluid and nutrition as appropriate  - Assess for signs & symptoms of volume excess or deficit  Outcome: Progressing  Goal: Glucose maintained within target range  Description: INTERVENTIONS:  - Monitor Blood Glucose as ordered  - Assess for signs and symptoms of hyperglycemia and hypoglycemia  - Administer ordered medications to maintain glucose within target range  - Assess nutritional intake and initiate nutrition service referral as needed  Outcome: Progressing     Problem: PAIN - ADULT  Goal: Verbalizes/displays adequate comfort level or baseline comfort level  Description: Interventions:  - Encourage patient to monitor pain and request assistance  - Assess pain using appropriate pain scale  - Administer analgesics based on type and severity of pain and evaluate response  - Implement non-pharmacological measures as appropriate and evaluate response  - Consider cultural and social influences on pain and pain management  - Notify physician/advanced practitioner if interventions unsuccessful or patient reports new pain  Outcome: Progressing     Problem: INFECTION - ADULT  Goal: Absence or prevention of progression during hospitalization  Description: INTERVENTIONS:  - Assess and monitor for signs and symptoms of infection  - Monitor lab/diagnostic results  - Monitor all insertion sites, i e  indwelling lines, tubes, and drains  - Monitor endotracheal if appropriate and nasal secretions for changes in amount and color  - [place] appropriate cooling/warming therapies per order  - Administer medications as ordered  - Instruct and encourage patient and family to use good hand hygiene technique  - Identify and instruct in appropriate isolation precautions for identified infection/condition  Outcome: Progressing  Goal: Absence of fever/infection during neutropenic period  Description: INTERVENTIONS:  - Monitor WBC    Outcome: Progressing     Problem: SAFETY ADULT  Goal: Patient will remain free of falls  Description: INTERVENTIONS:  - Educate patient/family on patient safety including physical limitations  - Instruct patient to call for assistance with activity   - Consult OT/PT to assist with strengthening/mobility   - Keep Call bell within reach  - Keep bed low and locked with side rails adjusted as appropriate  - Keep care items and personal belongings within reach  - Initiate and maintain comfort rounds  - Make Fall Risk Sign visible to staff  - Offer Toileting every 1 Hours, in advance of need  - Initiate/Maintain bedalarm  - Obtain necessary fall risk management equipment: yellow socks  - Apply yellow socks and bracelet for high fall risk patients  - Consider moving patient to room near nurses station  Outcome: Progressing  Goal: Maintain or return to baseline ADL function  Description: INTERVENTIONS:  -  Assess patient's ability to carry out ADLs; assess patient's baseline for ADL function and identify physical deficits which impact ability to perform ADLs (bathing, care of mouth/teeth, toileting, grooming, dressing, etc )  - Assess/evaluate cause of self-care deficits   - Assess range of motion  - Assess patient's mobility; develop plan if impaired  - Assess patient's need for assistive devices and provide as appropriate  - Encourage maximum independence but intervene and supervise when necessary  - Involve family in performance of ADLs  - Assess for home care needs following discharge   - Consider OT consult to assist with ADL evaluation and planning for discharge  - Provide patient education as appropriate  Outcome: Progressing  Goal: Maintains/Returns to pre admission functional level  Description: INTERVENTIONS:  - Perform BMAT or MOVE assessment daily    - Set and communicate daily mobility goal to care team and patient/family/caregiver  - Collaborate with rehabilitation services on mobility goals if consulted  - Perform Range of Motion 3 times a day  - Reposition patient every 2 hours  - Dangle patient 3 times a day  - Stand patient 3 times a day  - Ambulate patient 3 times a day  - Out of bed to chair 3 times a day   - Out of bed for meals 3 times a day  - Out of bed for toileting  - Record patient progress and toleration of activity level   Outcome: Progressing     Problem: DISCHARGE PLANNING  Goal: Discharge to home or other facility with appropriate resources  Description: INTERVENTIONS:  - Identify barriers to discharge w/patient and caregiver  - Arrange for needed discharge resources and transportation as appropriate  - Identify discharge learning needs (meds, wound care, etc )  - Arrange for interpretive services to assist at discharge as needed  - Refer to Case Management Department for coordinating discharge planning if the patient needs post-hospital services based on physician/advanced practitioner order or complex needs related to functional status, cognitive ability, or social support system  Outcome: Progressing     Problem: Knowledge Deficit  Goal: Patient/family/caregiver demonstrates understanding of disease process, treatment plan, medications, and discharge instructions  Description: Complete learning assessment and assess knowledge base    Interventions:  - Provide teaching at level of understanding  - Provide teaching via preferred learning methods  Outcome: Progressing

## 2022-02-03 NOTE — UTILIZATION REVIEW
Initial Clinical Review    Admission: Date/Time/Statement:   Admission Orders (From admission, onward)     Ordered        02/02/22 1407  INPATIENT ADMISSION  Once                      Orders Placed This Encounter   Procedures    INPATIENT ADMISSION     Standing Status:   Standing     Number of Occurrences:   1     Order Specific Question:   Level of Care     Answer:   Med Surg [16]     Order Specific Question:   Estimated length of stay     Answer:   More than 2 Midnights     Order Specific Question:   Certification     Answer:   I certify that inpatient services are medically necessary for this patient for a duration of greater than two midnights  See H&P and MD Progress Notes for additional information about the patient's course of treatment  ED Arrival Information     Expected Arrival Acuity    - 2/2/2022 11:38 Emergent         Means of arrival Escorted by Service Admission type    Ambulance [facility] Emergency         Arrival complaint    high blood pressure        Chief Complaint   Patient presents with    Hyperglycemia - no symptoms     Pt w/ PMHx of T2DM presents c/o running out of insulin  Per EMS, POCT glucose >600  Pt asymptomatic at this time  Pt also c/o suicidal ideations for "a while now" w/o plan  -HI, -, -TH, -VH   Suicidal     Initial Presentation:   49 yom to ER from home via EMS with increased anxiety, depression, vague suicidal thoughts  Pt homeless, been staying with friend  Upon further probing the patient reports that he does not want to end his life but just feels hopeless regarding his current living situation  He says he hears a voice in his head telling him to do things but he does not listen  Patient is not been taking his diabetic medications for over a month because his legal address is in [address] and he cannot get a local pharmacy to have his prescription filled    Hx diabetes, most recent hemoglobin A1c was 14 3, multiple ER visits secondary to hyperglycemia, bipolar disorder and suicidal ideations  Presents as above  Admission work-up showing hyponatremia, hypomagnesemia, blood sugar>620  Admitted to inpatient status for hyperglycemia  Started insulin with accuchecks & sliding scale insulin coverage  Psyche consulted  Date: 2/3/22   Day 2:   Admits to audible hallucinations, however denies suicidal/homicidal ideations/plan  Blood sugars improved since admission while on meds  Pt unable to get meds, no state ID, recently relocated from [place]; homeless  Case mgt consulted for assistance  Per psyche:  Mental status examination:  The patient is alert and well oriented all spheres  Affect is pleasant and euthymic  He made good eye contact  He is very cooperative with the evaluation  Sensorium is clear  Thought process is logical linear  Thought content is reality based  The patient appears to be of average intellectual function by his use of vocabulary constant structured syntax and general fund of knowledge  He denies suicidal ideation  He states he would never kill himself  He denies homicidal ideation  He admits to past auditory hallucinations a year or more ago but none since that time  Insight and judgment are intact  He is very future oriented and goal oriented at this time  Inpatient psychiatric treatment is not indicated      ED Triage Vitals [02/02/22 1145]   Temperature Pulse Respirations Blood Pressure SpO2   98 3 °F (36 8 °C) 98 18 136/83 97 %      Temp Source Heart Rate Source Patient Position - Orthostatic VS BP Location FiO2 (%)   Temporal Monitor Lying Left arm --      Pain Score       No Pain          Wt Readings from Last 1 Encounters:   02/02/22 58 kg (127 lb 13 9 oz)     Additional Vital Signs:   02/03/22 09:02:12 97 5 °F (36 4 °C) 98 20 116/71 86 96 % -- --   02/02/22 2300 97 8 °F (36 6 °C) 79 18 105/73 -- 96 % None (Room air) Lying   02/02/22 2200 -- -- -- -- -- -- None (Room air) --   02/02/22 1952 -- -- -- 97/62 74 -- -- --   02/02/22 19:50:58 -- 88 18 83/56 Abnormal  65 97 % -- --   02/02/22 1943 97 7 °F (36 5 °C) -- -- -- -- -- -- --   02/02/22 1817 -- 99 16 -- -- 97 % None (Room air) --   02/02/22 1700 -- 84 16 131/79 101 98 % None (Room air) Sitting     Pertinent Labs/Diagnostic Test Results:   Results from last 7 days   Lab Units 02/02/22  1207   SARS-COV-2  Negative     Results from last 7 days   Lab Units 02/03/22  0456 02/02/22  1207   WBC Thousand/uL 7 68 5 80   HEMOGLOBIN g/dL 14 4 15 8   HEMATOCRIT % 43 7 47 1   PLATELETS Thousands/uL 202 214   NEUTROS ABS Thousands/µL 5 16 3 84     Results from last 7 days   Lab Units 02/03/22  0456 02/02/22  1207   SODIUM mmol/L 140 128*   POTASSIUM mmol/L 3 2* 3 9   CHLORIDE mmol/L 107 92*   CO2 mmol/L 25 27   ANION GAP mmol/L 8 9   BUN mg/dL 17 15   CREATININE mg/dL 0 92 1 09   EGFR ml/min/1 73sq m 96 78   CALCIUM mg/dL 8 8 9 6   MAGNESIUM mg/dL 1 9 1 8*     Results from last 7 days   Lab Units 02/02/22  1207   AST U/L 13   ALT U/L 12   ALK PHOS U/L 81   TOTAL PROTEIN g/dL 7 9   ALBUMIN g/dL 4 5   TOTAL BILIRUBIN mg/dL 0 69     Results from last 7 days   Lab Units 02/03/22  0622 02/02/22  1947 02/02/22  1726 02/02/22  1426 02/02/22  1147   POC GLUCOSE mg/dl 146* 279* 339* 392* >500*     Results from last 7 days   Lab Units 02/03/22  0456 02/02/22  1207   GLUCOSE RANDOM mg/dL 122 628*     Results from last 7 days   Lab Units 02/02/22  1207   HEMOGLOBIN A1C % 13 5*   EAG mg/dl 341     BETA-HYDROXYBUTYRATE   Date Value Ref Range Status   02/02/2022 0 3 <0 6 mmol/L Final      Results from last 7 days   Lab Units 02/02/22  1207   PROTIME seconds 13 0   INR  0 99   PTT seconds 24     Results from last 7 days   Lab Units 02/02/22  1207   TSH 3RD GENERATON uIU/mL 1 275     Results from last 7 days   Lab Units 02/02/22  1210   CLARITY UA  Clear   COLOR UA  Straw   SPEC GRAV UA  <=1 005*   PH UA  5 5   GLUCOSE UA mg/dl 3+*   KETONES UA mg/dl Negative   BLOOD UA  Negative PROTEIN UA mg/dl Negative   NITRITE UA  Negative   BILIRUBIN UA  Negative   UROBILINOGEN UA E U /dl 0 2   LEUKOCYTES UA  Negative     Results from last 7 days   Lab Units 02/02/22  1207   INFLUENZA A PCR  Negative   INFLUENZA B PCR  Negative   RSV PCR  Negative     Results from last 7 days   Lab Units 02/02/22  1210   AMPH/METH  Negative   BARBITURATE UR  Negative   BENZODIAZEPINE UR  Negative   COCAINE UR  Negative   METHADONE URINE  Negative   OPIATE UR  Negative   PCP UR  Negative   THC UR  Negative     2/2  Ekg=  Normal sinus rhythm  Rightward axis    ED Treatment:   Medication Administration from 02/02/2022 1138 to 02/02/2022 1943       Date/Time Order Dose Route Action     02/02/2022 1208 sodium chloride 0 9 % bolus 1,000 mL 1,000 mL Intravenous New Bag     02/02/2022 1308 sodium chloride 0 9 % bolus 1,000 mL 1,000 mL Intravenous New Bag     02/02/2022 1726 insulin lispro (HumaLOG) 100 units/mL subcutaneous injection 10 Units 10 Units Subcutaneous Given     02/02/2022 1726 insulin lispro (HumaLOG) 100 units/mL subcutaneous injection 1-5 Units 4 Units Subcutaneous Given     02/02/2022 1720 magnesium sulfate 2 g/50 mL IVPB (premix) 2 g 2 g Intravenous New Bag        Past Medical History:   Diagnosis Date    Anxiety     Asthma     Bipolar 1 disorder ([place] [address] )     Depression     Diabetes mellitus ([place] [address] )     Psychiatric disorder      Present on Admission:   [facility] disorder ([facility] )   Asthma   Auditory hallucinations    Admitting Diagnosis: Hyperglycemia [R73 9]  Homelessness [Z59 00]  Bipolar 1 disorder ([place] [address] ) [F31 9]  Depression with suicidal ideation [F32  A, R45 851]  Age/Sex: 49 y o  male  Admission Orders:  Consult crisis  accuchecks  Consult psyche    Scheduled Medications:  ARIPiprazole, 20 mg, Oral, Daily  enoxaparin, 40 mg, Subcutaneous, Daily  escitalopram, 10 mg, Oral, HS  fluticasone, 1 puff, Inhalation, Q12H ALEE  insulin glargine, 40 Units, Subcutaneous, HS  insulin lispro, 1-5 Units, Subcutaneous, TID AC  insulin lispro, 1-5 Units, Subcutaneous, HS  traZODone, 100 mg, Oral, HS    PRN Meds:  acetaminophen, 650 mg, Oral, Q6H PRN  albuterol, 2 puff, Inhalation, Q4H PRN    Network Utilization Review Department  ATTENTION: Please call with any questions or concerns to [phone] and carefully listen to the prompts so that you are directed to the right person  All voicemails are confidential   Sudie Crigler all requests for admission clinical reviews, approved or denied determinations and any other requests to dedicated fax number below belonging to the campus where the patient is receiving treatment   List of dedicated fax numbers for the Facilities:  [address] DENIALS (Administrative/Medical Necessity) [phone]   [address] (Maternity/NICU/Pediatrics) [phone]   [address]  [address] [facility] [place] [address] [id number] [place] [address] [address] [facility] [place] [address] [phone]

## 2022-02-04 ENCOUNTER — HOSPITAL ENCOUNTER (EMERGENCY)
Facility: HOSPITAL | Age: 52
Discharge: HOME | End: 2022-02-05
Attending: EMERGENCY MEDICINE | Admitting: EMERGENCY MEDICINE
Payer: COMMERCIAL

## 2022-02-04 VITALS
OXYGEN SATURATION: 95 % | WEIGHT: 127.87 LBS | DIASTOLIC BLOOD PRESSURE: 62 MMHG | HEIGHT: 66 IN | HEART RATE: 79 BPM | BODY MASS INDEX: 20.55 KG/M2 | SYSTOLIC BLOOD PRESSURE: 112 MMHG | RESPIRATION RATE: 18 BRPM | TEMPERATURE: 97.7 F

## 2022-02-04 VITALS
RESPIRATION RATE: 18 BRPM | DIASTOLIC BLOOD PRESSURE: 72 MMHG | WEIGHT: 127 LBS | SYSTOLIC BLOOD PRESSURE: 124 MMHG | TEMPERATURE: 97.9 F | HEART RATE: 95 BPM | HEIGHT: 66 IN | OXYGEN SATURATION: 97 % | BODY MASS INDEX: 20.41 KG/M2

## 2022-02-04 DIAGNOSIS — Z59.00 HOMELESS: Primary | ICD-10-CM

## 2022-02-04 LAB
ANION GAP SERPL CALCULATED.3IONS-SCNC: 5 MMOL/L (ref 4–13)
BUN SERPL-MCNC: 12 MG/DL (ref 5–25)
CALCIUM SERPL-MCNC: 8.6 MG/DL (ref 8.4–10.2)
CHLORIDE SERPL-SCNC: 105 MMOL/L (ref 96–108)
CO2 SERPL-SCNC: 27 MMOL/L (ref 21–32)
CREAT SERPL-MCNC: 0.93 MG/DL (ref 0.6–1.3)
GFR SERPL CREATININE-BSD FRML MDRD: 95 ML/MIN/1.73SQ M
GLUCOSE SERPL-MCNC: 109 MG/DL (ref 65–140)
GLUCOSE SERPL-MCNC: 190 MG/DL (ref 65–140)
GLUCOSE SERPL-MCNC: 90 MG/DL (ref 65–140)
MAGNESIUM SERPL-MCNC: 1.7 MG/DL (ref 1.9–2.7)
POTASSIUM SERPL-SCNC: 3.7 MMOL/L (ref 3.5–5.3)
SODIUM SERPL-SCNC: 137 MMOL/L (ref 135–147)

## 2022-02-04 PROCEDURE — 99281 EMR DPT VST MAYX REQ PHY/QHP: CPT | Performed by: EMERGENCY MEDICINE

## 2022-02-04 PROCEDURE — 80048 BASIC METABOLIC PNL TOTAL CA: CPT | Performed by: NURSE PRACTITIONER

## 2022-02-04 PROCEDURE — 99283 EMERGENCY DEPT VISIT LOW MDM: CPT

## 2022-02-04 PROCEDURE — 83735 ASSAY OF MAGNESIUM: CPT | Performed by: NURSE PRACTITIONER

## 2022-02-04 PROCEDURE — 99239 HOSP IP/OBS DSCHRG MGMT >30: CPT | Performed by: NURSE PRACTITIONER

## 2022-02-04 PROCEDURE — 82948 REAGENT STRIP/BLOOD GLUCOSE: CPT

## 2022-02-04 RX ORDER — FLUTICASONE PROPIONATE 110 UG/1
1 AEROSOL, METERED RESPIRATORY (INHALATION) EVERY 12 HOURS SCHEDULED
Qty: 12 G | Refills: 0 | Status: SHIPPED | OUTPATIENT
Start: 2022-02-04

## 2022-02-04 RX ORDER — ARIPIPRAZOLE 20 MG/1
20 TABLET ORAL DAILY
Qty: 30 TABLET | Refills: 0 | Status: SHIPPED | OUTPATIENT
Start: 2022-02-04 | End: 2022-05-17

## 2022-02-04 RX ORDER — DICYCLOMINE HCL 20 MG
20 TABLET ORAL 2 TIMES DAILY
Qty: 20 TABLET | Refills: 0 | Status: SHIPPED | OUTPATIENT
Start: 2022-02-04

## 2022-02-04 RX ORDER — ESCITALOPRAM OXALATE 10 MG/1
10 TABLET ORAL DAILY
Qty: 30 TABLET | Refills: 0 | Status: SHIPPED | OUTPATIENT
Start: 2022-02-04 | End: 2022-05-17

## 2022-02-04 RX ORDER — ARIPIPRAZOLE 20 MG/1
20 TABLET ORAL DAILY
Qty: 7 TABLET | Refills: 0 | Status: SHIPPED | OUTPATIENT
Start: 2022-02-04 | End: 2022-02-04 | Stop reason: SDUPTHER

## 2022-02-04 RX ORDER — POTASSIUM CHLORIDE 20 MEQ/1
40 TABLET, EXTENDED RELEASE ORAL ONCE
Status: COMPLETED | OUTPATIENT
Start: 2022-02-04 | End: 2022-02-04

## 2022-02-04 RX ORDER — TRAZODONE HYDROCHLORIDE 100 MG/1
100 TABLET ORAL
Qty: 30 TABLET | Refills: 0 | Status: SHIPPED | OUTPATIENT
Start: 2022-02-04 | End: 2022-05-17

## 2022-02-04 RX ORDER — ARIPIPRAZOLE 15 MG/1
15 TABLET ORAL DAILY
Qty: 30 TABLET | Refills: 0 | Status: SHIPPED | OUTPATIENT
Start: 2022-02-04 | End: 2022-02-04 | Stop reason: HOSPADM

## 2022-02-04 RX ORDER — FENOFIBRATE 48 MG/1
48 TABLET, COATED ORAL DAILY
Qty: 30 TABLET | Refills: 0 | Status: SHIPPED | OUTPATIENT
Start: 2022-02-04 | End: 2022-05-17

## 2022-02-04 RX ORDER — INSULIN GLARGINE 100 [IU]/ML
40 INJECTION, SOLUTION SUBCUTANEOUS
Qty: 12 ML | Refills: 0 | Status: SHIPPED | OUTPATIENT
Start: 2022-02-04 | End: 2022-05-17

## 2022-02-04 RX ORDER — ARIPIPRAZOLE 20 MG/1
20 TABLET ORAL DAILY
Qty: 30 TABLET | Refills: 0 | Status: SHIPPED | OUTPATIENT
Start: 2022-02-04 | End: 2022-02-04 | Stop reason: SDUPTHER

## 2022-02-04 RX ORDER — MAGNESIUM SULFATE HEPTAHYDRATE 40 MG/ML
2 INJECTION, SOLUTION INTRAVENOUS ONCE
Status: COMPLETED | OUTPATIENT
Start: 2022-02-04 | End: 2022-02-04

## 2022-02-04 RX ORDER — INSULIN LISPRO 100 [IU]/ML
15 INJECTION, SOLUTION INTRAVENOUS; SUBCUTANEOUS
Qty: 13.5 ML | Refills: 0 | Status: SHIPPED | OUTPATIENT
Start: 2022-02-04 | End: 2022-05-17

## 2022-02-04 RX ORDER — ALBUTEROL SULFATE 90 UG/1
2 AEROSOL, METERED RESPIRATORY (INHALATION) EVERY 4 HOURS PRN
Qty: 18 G | Refills: 0 | Status: SHIPPED | OUTPATIENT
Start: 2022-02-04

## 2022-02-04 RX ADMIN — MAGNESIUM SULFATE 2 G: 2 INJECTION INTRAVENOUS at 08:42

## 2022-02-04 RX ADMIN — ENOXAPARIN SODIUM 40 MG: 100 INJECTION SUBCUTANEOUS at 08:41

## 2022-02-04 RX ADMIN — POTASSIUM CHLORIDE 40 MEQ: 1500 TABLET, EXTENDED RELEASE ORAL at 08:41

## 2022-02-04 RX ADMIN — ACETAMINOPHEN 650 MG: 325 TABLET ORAL at 12:28

## 2022-02-04 RX ADMIN — INSULIN LISPRO 1 UNITS: 100 INJECTION, SOLUTION INTRAVENOUS; SUBCUTANEOUS at 12:28

## 2022-02-04 RX ADMIN — ARIPIPRAZOLE 20 MG: 10 TABLET ORAL at 08:41

## 2022-02-04 SDOH — ECONOMIC STABILITY - HOUSING INSECURITY: HOMELESSNESS UNSPECIFIED: Z59.00

## 2022-02-04 NOTE — ASSESSMENT & PLAN NOTE
· He has been out of his meds including trazodone, Lexapro, and Abilify- restarted on home medications   · Psychiatric input appreciated   · No need for inpatient psychiatry admission

## 2022-02-04 NOTE — PLAN OF CARE
Problem: METABOLIC, FLUID AND ELECTROLYTES - ADULT  Goal: Electrolytes maintained within normal limits  Description: INTERVENTIONS:  - Monitor labs and assess patient for signs and symptoms of electrolyte imbalances  - Administer electrolyte replacement as ordered  - Monitor response to electrolyte replacements, including repeat lab results as appropriate  - Instruct patient on fluid and nutrition as appropriate  Outcome: Progressing  Goal: Fluid balance maintained  Description: INTERVENTIONS:  - Monitor labs   - Monitor I/O and WT  - Instruct patient on fluid and nutrition as appropriate  - Assess for signs & symptoms of volume excess or deficit  Outcome: Progressing  Goal: Glucose maintained within target range  Description: INTERVENTIONS:  - Monitor Blood Glucose as ordered  - Assess for signs and symptoms of hyperglycemia and hypoglycemia  - Administer ordered medications to maintain glucose within target range  - Assess nutritional intake and initiate nutrition service referral as needed  Outcome: Progressing     Problem: Nutrition/Hydration-ADULT  Goal: Nutrient/Hydration intake appropriate for improving, restoring or maintaining nutritional needs  Description: Monitor and assess patient's nutrition/hydration status for malnutrition  Collaborate with interdisciplinary team and initiate plan and interventions as ordered  Monitor patient's weight and dietary intake as ordered or per policy  Utilize nutrition screening tool and intervene as necessary  Determine patient's food preferences and provide high-protein, high-caloric foods as appropriate       INTERVENTIONS:  - Monitor oral intake, urinary output, labs, and treatment plans  - Assess nutrition and hydration status and recommend course of action  - Evaluate amount of meals eaten  - Assist patient with eating if necessary   - Allow adequate time for meals  - Recommend/ encourage appropriate diets, oral nutritional supplements, and vitamin/mineral supplements  - Order, calculate, and assess calorie counts as needed  - Recommend, monitor, and adjust tube feedings and TPN/PPN based on assessed needs  - Assess need for intravenous fluids  - Provide specific nutrition/hydration education as appropriate  - Include patient/family/caregiver in decisions related to nutrition  Outcome: Progressing

## 2022-02-04 NOTE — ED PROVIDER NOTES
History  Chief Complaint   Patient presents with    Homeless     patient turned away from shelter  transport brought him back     62-year-old male with history of diabetes and behavior health issues presents emergency room status post discharge from inpatient care today due to homelessness  The patient apparently was dropped off at a shelter today but cannot get into the shelter because he does not have his ID  Because of that reason he is return to the emergency department - he currently has no where to go  Patient denies active complaints and according to the medicine service he was doing well on initial examination earlier today as well as repeat labs                Prior to Admission Medications   Prescriptions Last Dose Informant Patient Reported? Taking? ARIPiprazole (ABILIFY) 20 MG tablet   No No   Sig: Take 1 tablet (20 mg total) by mouth daily   Needle, Disp, 31G X 5/16" MISC   No No   Sig: Use in the morning   Needle, Disp, 31G X 5/16" MISC   No No   Sig: Use 3 (three) times a day   albuterol (PROVENTIL HFA,VENTOLIN HFA) 90 mcg/act inhaler   No No   Sig: Inhale 2 puffs every 4 (four) hours as needed for wheezing   dicyclomine (BENTYL) 20 mg tablet   No No   Sig: Take 1 tablet (20 mg total) by mouth 2 (two) times a day   escitalopram (LEXAPRO) 10 mg tablet   No No   Sig: Take 1 tablet (10 mg total) by mouth daily   fenofibrate (TRICOR) 48 mg tablet   No No   Sig: Take 1 tablet (48 mg total) by mouth daily   fluticasone (FLOVENT HFA) 110 MCG/ACT inhaler   No No   Sig: Inhale 1 puff every 12 (twelve) hours Rinse mouth after use  insulin glargine (Lantus SoloStar) 100 units/mL injection pen   No No   Sig: Inject 40 Units under the skin daily at bedtime Every morning     insulin lispro (HumaLOG KwikPen) 100 units/mL injection pen   No No   Sig: Inject 15 Units under the skin 3 (three) times a day with meals   metFORMIN (GLUCOPHAGE) 1000 MG tablet   No No   Sig: Take 1 tablet (1,000 mg total) by mouth 2 (two) times a day with meals   traZODone (DESYREL) 100 mg tablet   No No   Sig: Take 1 tablet (100 mg total) by mouth daily at bedtime      Facility-Administered Medications: None       Past Medical History:   Diagnosis Date    Anxiety     Asthma     Bipolar 1 disorder ([facility] )     Depression     Diabetes mellitus ([place] [address] )     Psychiatric disorder        Past Surgical History:   Procedure Laterality Date    APPENDECTOMY      DENTAL SURGERY         Family History   Problem Relation Age of Onset    Liver disease Mother     Scoliosis Daughter     Bipolar disorder Daughter      I have reviewed and agree with the history as documented  E-Cigarette/Vaping    E-Cigarette Use Never User      E-Cigarette/Vaping Substances     Social History     Tobacco Use    Smoking status: Former Smoker     Packs/day: 0 20     Years: 1 00     Pack years: 0 20     Types: Cigarettes     Start date:      Quit date:      Years since quittin 1    Smokeless tobacco: Never Used   Vaping Use    Vaping Use: Never used   Substance Use Topics    Alcohol use: Not Currently     Comment: States has had no alcohol in at least a year    Drug use: No       Review of Systems   Constitutional: Negative for chills and fever  HENT: Negative for ear pain and sore throat  Eyes: Negative for pain and visual disturbance  Respiratory: Negative for cough and shortness of breath  Cardiovascular: Negative for chest pain and palpitations  Gastrointestinal: Negative for abdominal pain and vomiting  Genitourinary: Negative for dysuria and hematuria  Musculoskeletal: Negative for arthralgias and back pain  Skin: Negative for color change and rash  Neurological: Negative for seizures and syncope  Psychiatric/Behavioral: Positive for behavioral problems  All other systems reviewed and are negative  Physical Exam  Physical Exam  Vitals and nursing note reviewed  Constitutional:       Appearance: He is well-developed  HENT:      Head: Normocephalic and atraumatic  Nose: Nose normal  No congestion  Eyes:      Conjunctiva/sclera: Conjunctivae normal    Cardiovascular:      Rate and Rhythm: Normal rate and regular rhythm  Heart sounds: No murmur heard  Pulmonary:      Effort: Pulmonary effort is normal  No respiratory distress  Breath sounds: Normal breath sounds  Abdominal:      Palpations: Abdomen is soft  Tenderness: There is no abdominal tenderness  Musculoskeletal:      Cervical back: Neck supple  Skin:     General: Skin is warm and dry  Capillary Refill: Capillary refill takes less than 2 seconds  Neurological:      Mental Status: He is alert  Psychiatric:         Mood and Affect: Mood normal          Vital Signs  ED Triage Vitals [02/04/22 1750]   Temperature Pulse Respirations Blood Pressure SpO2   97 9 °F (36 6 °C) 95 18 124/72 97 %      Temp Source Heart Rate Source Patient Position - Orthostatic VS BP Location FiO2 (%)   Temporal Monitor Sitting Left arm --      Pain Score       No Pain           Vitals:    02/04/22 1750   BP: 124/72   Pulse: 95   Patient Position - Orthostatic VS: Sitting         Visual Acuity      ED Medications  Medications - No data to display    Diagnostic Studies  Results Reviewed     None                 No orders to display              Procedures  Procedures         ED Course  ED Course as of 02/05/22 0017   Fri Feb 04, 2022   1826 Discussed care with case management, who notes to hold him in the ED untill case management can see him tomorrow  2205 Case signed out to Dr Alvaro Whitlock pending case management evaluation in the morning  SBIRT 20yo+      Most Recent Value   SBIRT (24 yo +)    In order to provide better care to our patients, we are screening all of our patients for alcohol and drug use  Would it be okay to ask you these screening questions?  Yes Filed at: 02/04/2022 4986   Initial Alcohol Screen: US AUDIT-C     1  How often do you have a drink containing alcohol? 0 Filed at: 02/04/2022 1846   2  How many drinks containing alcohol do you have on a typical day you are drinking? 0 Filed at: 02/04/2022 1846   3a  Male UNDER 65: How often do you have five or more drinks on one occasion? 0 Filed at: 02/04/2022 1846   3b  FEMALE Any Age, or MALE 65+: How often do you have 4 or more drinks on one occassion? 0 Filed at: 02/04/2022 1846   Audit-C Score 0 Filed at: 02/04/2022 1846   PADMINI: How many times in the past year have you    Used an illegal drug or used a prescription medication for non-medical reasons? Never Filed at: 02/04/2022 1846                    MDM    Disposition  Final diagnoses:   Homeless     Time reflects when diagnosis was documented in both MDM as applicable and the Disposition within this note     Time User Action Codes Description Comment    2/4/2022  6:27 PM [address] [Z59 00] Homeless       ED Disposition     None      Follow-up Information    None         Patient's Medications   Discharge Prescriptions    No medications on file       No discharge procedures on file      PDMP Review     None          ED Provider  Electronically Signed by           Kennedy Ross DO  02/05/22 9621

## 2022-02-04 NOTE — PLAN OF CARE
Problem: METABOLIC, FLUID AND ELECTROLYTES - ADULT  Goal: Electrolytes maintained within normal limits  Description: INTERVENTIONS:  - Monitor labs and assess patient for signs and symptoms of electrolyte imbalances  - Administer electrolyte replacement as ordered  - Monitor response to electrolyte replacements, including repeat lab results as appropriate  - Instruct patient on fluid and nutrition as appropriate  Outcome: Progressing  Goal: Fluid balance maintained  Description: INTERVENTIONS:  - Monitor labs   - Monitor I/O and WT  - Instruct patient on fluid and nutrition as appropriate  - Assess for signs & symptoms of volume excess or deficit  Outcome: Progressing  Goal: Glucose maintained within target range  Description: INTERVENTIONS:  - Monitor Blood Glucose as ordered  - Assess for signs and symptoms of hyperglycemia and hypoglycemia  - Administer ordered medications to maintain glucose within target range  - Assess nutritional intake and initiate nutrition service referral as needed  Outcome: Progressing     Problem: PAIN - ADULT  Goal: Verbalizes/displays adequate comfort level or baseline comfort level  Description: Interventions:  - Encourage patient to monitor pain and request assistance  - Assess pain using appropriate pain scale  - Administer analgesics based on type and severity of pain and evaluate response  - Implement non-pharmacological measures as appropriate and evaluate response  - Consider cultural and social influences on pain and pain management  - Notify physician/advanced practitioner if interventions unsuccessful or patient reports new pain  Outcome: Progressing     Problem: INFECTION - ADULT  Goal: Absence or prevention of progression during hospitalization  Description: INTERVENTIONS:  - Assess and monitor for signs and symptoms of infection  - Monitor lab/diagnostic results  - Monitor all insertion sites, i e  indwelling lines, tubes, and drains  - Monitor endotracheal if appropriate and nasal secretions for changes in amount and color  - [place] appropriate cooling/warming therapies per order  - Administer medications as ordered  - Instruct and encourage patient and family to use good hand hygiene technique  - Identify and instruct in appropriate isolation precautions for identified infection/condition  Outcome: Progressing  Goal: Absence of fever/infection during neutropenic period  Description: INTERVENTIONS:  - Monitor WBC    Outcome: Progressing     Problem: SAFETY ADULT  Goal: Patient will remain free of falls  Description: INTERVENTIONS:  - Educate patient/family on patient safety including physical limitations  - Instruct patient to call for assistance with activity   - Consult OT/PT to assist with strengthening/mobility   - Keep Call bell within reach  - Keep bed low and locked with side rails adjusted as appropriate  - Keep care items and personal belongings within reach  - Initiate and maintain comfort rounds  - Make Fall Risk Sign visible to staff  - Offer Toileting every 2 Hours, in advance of need  - Initiate/Maintain bed alarm  - Obtain necessary fall risk management equipment  - Apply yellow socks and bracelet for high fall risk patients  - Consider moving patient to room near nurses station  Outcome: Progressing  Goal: Maintain or return to baseline ADL function  Description: INTERVENTIONS:  -  Assess patient's ability to carry out ADLs; assess patient's baseline for ADL function and identify physical deficits which impact ability to perform ADLs (bathing, care of mouth/teeth, toileting, grooming, dressing, etc )  - Assess/evaluate cause of self-care deficits   - Assess range of motion  - Assess patient's mobility; develop plan if impaired  - Assess patient's need for assistive devices and provide as appropriate  - Encourage maximum independence but intervene and supervise when necessary  - Involve family in performance of ADLs  - Assess for home care needs following discharge   - Consider OT consult to assist with ADL evaluation and planning for discharge  - Provide patient education as appropriate  Outcome: Progressing  Goal: Maintains/Returns to pre admission functional level  Description: INTERVENTIONS:  - Perform BMAT or MOVE assessment daily    - Set and communicate daily mobility goal to care team and patient/family/caregiver  - Collaborate with rehabilitation services on mobility goals if consulted  - Perform Range of Motion 3 times a day  - Reposition patient every 2 hours  - Dangle patient 2 times a day  - Stand patient 2 times a day  - Ambulate patient 2 times a day  - Out of bed to chair 2 times a day   - Out of bed for meals 2 times a day  - Out of bed for toileting  - Record patient progress and toleration of activity level   Outcome: Progressing     Problem: DISCHARGE PLANNING  Goal: Discharge to home or other facility with appropriate resources  Description: INTERVENTIONS:  - Identify barriers to discharge w/patient and caregiver  - Arrange for needed discharge resources and transportation as appropriate  - Identify discharge learning needs (meds, wound care, etc )  - Arrange for interpretive services to assist at discharge as needed  - Refer to Case Management Department for coordinating discharge planning if the patient needs post-hospital services based on physician/advanced practitioner order or complex needs related to functional status, cognitive ability, or social support system  Outcome: Progressing     Problem: Knowledge Deficit  Goal: Patient/family/caregiver demonstrates understanding of disease process, treatment plan, medications, and discharge instructions  Description: Complete learning assessment and assess knowledge base    Interventions:  - Provide teaching at level of understanding  - Provide teaching via preferred learning methods  Outcome: Progressing     Problem: Nutrition/Hydration-ADULT  Goal: Nutrient/Hydration intake appropriate for improving, restoring or maintaining nutritional needs  Description: Monitor and assess patient's nutrition/hydration status for malnutrition  Collaborate with interdisciplinary team and initiate plan and interventions as ordered  Monitor patient's weight and dietary intake as ordered or per policy  Utilize nutrition screening tool and intervene as necessary  Determine patient's food preferences and provide high-protein, high-caloric foods as appropriate       INTERVENTIONS:  - Monitor oral intake, urinary output, labs, and treatment plans  - Assess nutrition and hydration status and recommend course of action  - Evaluate amount of meals eaten  - Assist patient with eating if necessary   - Allow adequate time for meals  - Recommend/ encourage appropriate diets, oral nutritional supplements, and vitamin/mineral supplements  - Order, calculate, and assess calorie counts as needed  - Recommend, monitor, and adjust tube feedings and TPN/PPN based on assessed needs  - Assess need for intravenous fluids  - Provide specific nutrition/hydration education as appropriate  - Include patient/family/caregiver in decisions related to nutrition  Outcome: Progressing

## 2022-02-04 NOTE — ASSESSMENT & PLAN NOTE
· Patient arrived here yesterday from [place]-he is homeless  · His fiancee is also currently in a long-term psychiatric facility   · CM obtained meds-to-bed and able to provide a ride for patient

## 2022-02-04 NOTE — NURSING NOTE
S:    CC: hyperglycemia    HPI: Patient presented to the ED on 2/2/22 for elevated blood sugar  He has a past medical history of type II diabetes and is on lantus, apidra and metformin but hasn't been taking his meds as directed due to cost  He is currently homeless and was staying at a friends home with his fiancee  He also has a history of bipolar depression with auditory hallucinations  Denied any SI/HI stated he does her voices at times but doesn't listen to them       O:   Vitals: 97 7-79-/62, 95% on room air  Physical exam:  Patient is pleasant and talkative  Denies pain or discomfort  Respirations nonlabored on room air, lungs clear all fields on auscultation B/L no cough or shortness of breath  Abdomen soft and non tender with +BSx4  Had BM last jovita and voiding without difficulty  Verbalizes needs  Assessment/Plan:     1  Type II diabetes, on insulin with hyperglycemia  -Patient homeless, presented to ED on 2/2 for hyperglycemia  He is on lantus insulin, apidra and metformin at home and hasn't been taking his medications  Informed me that he ran out of insulin and doesn't have a PCP here so nobody will fill his medications  -2/2 blood sugar per chemistry was 628, CO2 9, anion gap 9  -patient received 2L IV normal saline in the ED plus 10 units regular insulin SQ   This brought the blood sugar down to 392   -2/2 Beta Hydroxybutyrate was normal at 0 3  -2/2 hemoglobin A1C 13 5, increased from 1/1/20 where result was 11 1  -continue to monitor chemistries  Component Ref Range & Units 2/4/22 0432 2/3/22 0456 2/2/22 1207 3/8/20 0803 1/9/20 2330 1/8/20 2208 11/24/19 2216   Sodium 135 - 147 mmol/L 137  140  128 Low   143 R  143 R  143 R  136 R    Potassium 3 5 - 5 3 mmol/L 3 7  3 2 Low   3 9  4 1  3 5 Low  R  3 7 R  4 1    Chloride 96 - 108 mmol/L 105  107  92 Low   107 R  102 R  103 R  102 R    CO2 21 - 32 mmol/L 27  25  27  30  30 R  31 High  R  30    ANION GAP 4 - 13 mmol/L 5  8  9  6  11 R  9 R  4 BUN 5 - 25 mg/dL 12  17  15  19  16  20  20    Creatinine 0 60 - 1 30 mg/dL 0 93  0 92 CM  1 09 CM  0 96 CM  1 00 R, CM  1 06 R, CM  1 32 High  CM    Comment: Standardized to IDMS reference method   Glucose 65 - 140 mg/dL 109  122 CM  628 High Panic  CM  203 High  CM  92        -continue sliding scale insulin coverage ac and hs with humalog coverage as ordered  -monitor accucheck results  Component Ref Range & Units 2/4/22 0539 2/3/22 2036 2/3/22 1617 2/3/22 1121 2/3/22 0622 2/2/22 1947 2/2/22 1726   POC Glucose 65 - 140 mg/dl 90  244 High   130  214 High   146 High   279       -continue lantus insulin 40 units at bedtime  -resume outpatient metformin on discharge     2  Social problem, homeless  -patient currently homeless, was living with rosmery who is also currently hospitalized  -case management consulted      3    Bipolar disorder  -psychiatry consulted  -admits to auditory hallucinations  -denies SI/HI  -continue trazodone, Abilify as ordered      4  Hypomagnesemia  -Mg 1 8 on 2/2, given 2gm IV magnesium x1, repeat Mg today yesterday 1 9, replaced, today 1 7  Had another 2GM Iv mag rider today  -continue to monitor chemistries, replace as needed  Component Ref Range & Units 2/4/22 0432 2/3/22 0456 2/2/22 1207 10/19/19 0157 10/15/19 0128 6/18/19 1655   Magnesium 1 9 - 2 7 mg/dL 1 7 Low   1 9  1 8 Low   1 7 R  1 7         5  Hypokalemia  -K 3 7 (resolved)  -continue to monitor chemistries and replace as needed     Plan: Patient homeless prior top arrival, came management consulted and working on discharge plan for when stable for discharge  History of bipolar disorder  Psychiatry consulted, had telemed consult yesterday  Feel patient doesn't require inpatient psych treatment, resume outpatient home psych medications and can see psych as outpatient

## 2022-02-04 NOTE — ASSESSMENT & PLAN NOTE
· Patient says he hears voices telling him to do things, but states "I would never do anything" referring to self-harm or suicidal gestures or attempts   · Psychiatric input appreciated   · No need for inpatient psychiatry admission   · Supportive care

## 2022-02-04 NOTE — CASE MANAGEMENT
Case Management Discharge Planning Note    Patient name Tad Simons  Location [address] 211/-[id number] MRN [id number]  : 1970 Date 2022       Current Admission Date: 2022  Current Admission Diagnosis:Type 2 diabetes mellitus with hyperglycemia, with long-term current use of insulin [facility])   Patient Active Problem List    Diagnosis Date Noted    Hypomagnesemia 2022    Poor social situation 2022    Medical clearance for psychiatric admission 2020    Mild intermittent asthma without complication     Tobacco abuse 2020    Status post appendectomy 2020    Auditory hallucinations 2020    Bipolar disorder, current episode hypomanic ([facility] [address] ) 2020    Bipolar 1 disorder ([facility] [address] ) 2019    [id number] (hyperglycemic hyperosmolar nonketotic coma) ([facility] [address] ) 10/12/2019    HTN (hypertension) 10/12/2019    HLD (hyperlipidemia) 10/12/2019    Bipolar affective disorder, currently depressed, moderate ([facility] [address] ) 10/12/2019    Learning disorder 2019    Type 2 diabetes mellitus with hyperglycemia, with long-term current use of insulin ([facility] [address] ) 2019      LOS (days): 2  Geometric Mean LOS (GMLOS) (days): 2 90  Days to GMLOS:1 1     OBJECTIVE:  Risk of Unplanned Readmission Score: 10     Current admission status: Inpatient   Preferred Pharmacy:   [address] [place] [address]  [address] [place] [id number]  Phone: [phone] Fax: [address] [address] [facility] [address] [address]  Phone: [phone] Fax: [phone]    Primary Care Provider: No primary care provider on file  Primary Insurance: [address] [place] [address]  Secondary Insurance:     DISCHARGE DETAILS:  Pt has been evaluated by [facility] and is stable to be discharged  Ordered Meds To Beds  Meds will be delivered between 2-2:30  Pt states he will go the the [employer]    Pt will need transport set up  TC to [employer], spoke to Mandeep Currie and set up [employer] transport for 3:00  Pt made aware of same

## 2022-02-04 NOTE — ASSESSMENT & PLAN NOTE
Lab Results   Component Value Date    HGBA1C 13 5 (H) 02/02/2022       Recent Labs     02/03/22  1617 02/03/22 2036 02/04/22  0539 02/04/22  1138   POCGLU 130 244* 90 190*       Blood Sugar Average: Last 72 hrs:  (P) 209 [id number]     · Glucose initially 628   · Received 2 L normal saline  · He has been out of his meds includeing Lantus 40 units at bedtime, with meal- coverage, and metformin  · Hemoglobin A1c- 13 5- poor controlled   · BG much improved while in the hospital   · Outpatient referral to endocrinology

## 2022-02-04 NOTE — DISCHARGE INSTR - AVS FIRST PAGE
Dear Per Pineda,     It was our pleasure to care for you here at [facility],  [address]  It is our hope that we were always able to exceed the expected standards for your care during your stay  You were hospitalized due to elevated blood glucose  You were cared for on the 2nd floor by KRISH Epstein with the [facility] who covers for your primary care physician (PCP), No primary care provider on file  , while you were hospitalized  If you have any questions or concerns related to this hospitalization, you may contact us at [phone]  For follow up as well as any medication refills, we recommend that you follow up with your primary care physician  A registered nurse will reach out to you by phone within a few days after your discharge to answer any additional questions that you may have after going home  However, at this time we provide for you here, the most important instructions / recommendations at discharge:     Notable Medication Adjustments -   None    Testing Required after Discharge -   None   Important follow up information -   Follow-up with primary care  Please appointment with endocrinology  Other Instructions -   Please refer to discharge instructions   Please review this entire after visit summary as additional general instructions including medication list, appointments, activity, diet, any pertinent wound care, and other additional recommendations from your care team that may be provided for you        Sincerely,     KRSIH Epstein

## 2022-02-05 NOTE — CASE MANAGEMENT
Case Management Discharge Planning Note    Patient name Tad Simons  Location Z1 H5/Z1 H5 MRN [id number]  : 1970 Date 2022       Current Admission Date: 2022  Current Admission [facility] for aftercare   Patient Active Problem List    Diagnosis Date Noted    Hypomagnesemia 2022    Poor social situation 2022    Medical clearance for psychiatric admission 2020    Mild intermittent asthma without complication     Tobacco abuse 2020    Status post appendectomy 2020    Auditory hallucinations 2020    Bipolar disorder, current episode hypomanic ([facility] [address] ) 2020    Bipolar 1 disorder ([address] ) 2019 [facility] (hyperglycemic hyperosmolar nonketotic coma) ([address] ) 10/12/2019    HTN (hypertension) 10/12/2019    HLD (hyperlipidemia) 10/12/2019    Bipolar affective disorder, currently depressed, moderate ([address] ) 10/12/2019    Learning disorder 2019    Type 2 diabetes mellitus with hyperglycemia, with long-term current use of insulin ([facility] [address] ) 2019      LOS (days): 0  Geometric Mean LOS (GMLOS) (days):   Days to GMLOS:     OBJECTIVE:            Current admission status: Emergency   Preferred Pharmacy:   [address] [place] [address], [address] [place] [address]  [address] [address]  Phone: [phone] Fax: [address] [place] [address]  Phone: [phone] Fax: [phone]    Primary Care Provider: No primary care provider on file      Primary Insurance: [address] [place] [address]  Secondary Insurance:     DISCHARGE DETAILS:     cm received a call around 5 pm from [facility] [employer] [facility] stating they are going to bring this patient back to the hospital , [facility] , pt was not able to be accepted because he did not have an ID, cm called the rescue mission today at 07;38 am and   I spoke with Gurwinder Saucedo and I made him  aware that   I have a copy of and old license from the patient's chart , he stated he would accept this patient with the copy as long as it was clear and he was also made aware it was a copy of an  license, 1 pm w/c Lucille Perkins was set to go to the rescue mission he was denied,,   I spoke with the director at the center and   I made him aware what was stated to me and   I was told by Gurwinder Saucedo that the copy could be used as an   ID,, I was told by the director that I was given incorrect information , [employer] had other transport  , they took the pt to the [address] [place] [address], I was told by the director at the rescue mission has a night time program and he thinks they do not need an ID  Cm called the director of  for some guidance

## 2022-02-05 NOTE — ED NOTES
[facility] Suicide Risk Assessment deferred, as unable to assess while patient sleeping  Behavioral Health Assessment deferred as patient is sleeping and would benefit from additional rest   Vital signs deferred until patient awake, no signs or symptoms of respiratory distress at this time  Once patient is awake and able to participate, will complete assessments           Maine Zaragoza RN  02/05/22 9721

## 2022-02-07 ENCOUNTER — PATIENT OUTREACH (OUTPATIENT)
Dept: CASE MANAGEMENT | Facility: OTHER | Age: 52
End: 2022-02-07

## 2022-02-07 NOTE — PROGRESS NOTES
OP CM UBALDO received ADT alert regarding patient  He had presented to ED multiple times due to homelessness  According to documentation in epic by case management, he was discharged to [facility] , however he did not have any ID so they would not take him, so he had to go back to ED because he had no where to go  Eventually he was discharged with [address]  Patient has a HUCP & is presently in [address] [place] [address]  Multiple attempts to engage with Aditya in the past but no success   Attempted outreach to number listed for patient with no success in reaching him  UBALDO PEDROZA will continue to follow

## 2022-02-16 ENCOUNTER — PATIENT OUTREACH (OUTPATIENT)
Dept: CASE MANAGEMENT | Facility: OTHER | Age: 52
End: 2022-02-16

## 2022-02-16 NOTE — PROGRESS NOTES
OP CM SW will be closing patient at this time  SW CM referral for [employer] however unable to reach  Joanie Temple is homeless  Will remain available in future if needs present

## 2022-03-06 ENCOUNTER — HOSPITAL ENCOUNTER (EMERGENCY)
Facility: HOSPITAL | Age: 52
Discharge: HOME | End: 2022-03-06
Attending: EMERGENCY MEDICINE
Payer: COMMERCIAL

## 2022-03-06 ENCOUNTER — APPOINTMENT (EMERGENCY)
Dept: RADIOLOGY | Facility: HOSPITAL | Age: 52
End: 2022-03-06
Payer: COMMERCIAL

## 2022-03-06 VITALS
DIASTOLIC BLOOD PRESSURE: 93 MMHG | SYSTOLIC BLOOD PRESSURE: 153 MMHG | OXYGEN SATURATION: 96 % | TEMPERATURE: 98 F | HEART RATE: 102 BPM | RESPIRATION RATE: 16 BRPM

## 2022-03-06 DIAGNOSIS — Z51.89 VISIT FOR WOUND CHECK: Primary | ICD-10-CM

## 2022-03-06 PROCEDURE — 99284 EMERGENCY DEPT VISIT MOD MDM: CPT | Performed by: EMERGENCY MEDICINE

## 2022-03-06 PROCEDURE — 99283 EMERGENCY DEPT VISIT LOW MDM: CPT

## 2022-03-06 PROCEDURE — 73630 X-RAY EXAM OF FOOT: CPT

## 2022-03-06 RX ORDER — GINSENG 100 MG
1 CAPSULE ORAL ONCE
Status: COMPLETED | OUTPATIENT
Start: 2022-03-06 | End: 2022-03-06

## 2022-03-06 RX ORDER — BACITRACIN, NEOMYCIN, POLYMYXIN B 400; 3.5; 5 [USP'U]/G; MG/G; [USP'U]/G
OINTMENT TOPICAL 2 TIMES DAILY
Qty: 15 G | Refills: 0 | Status: SHIPPED | OUTPATIENT
Start: 2022-03-06

## 2022-03-06 RX ADMIN — BACITRACIN ZINC 1 SMALL APPLICATION: 500 OINTMENT TOPICAL at 08:16

## 2022-03-06 NOTE — ED PROVIDER NOTES
History  Chief Complaint   Patient presents with    Wound Check     patient states has been having heel pain  noticed today that a wound was forming     50 YO male presents with a wound over the achilles of the Right foot  Pt states he has been noticing pain for some time, he does walk frequently  States this morning he noticed an ulceration over the posterior foot  He denies any drainage from the site, has not had fevers  Pt does have Hx of diabetes  He is currently homeless  Pt denies CP/SOB/F/C/N/V/D/C, no dysuria, burning on urination or blood in urine  History provided by:  Patient   used: No    Wound Check   He was treated in the ED today  Prior ED Treatment: None  There has been no treatment since the wound repair  There has been no drainage from the wound  There is no redness present  There is no swelling present  The pain has not changed  Prior to Admission Medications   Prescriptions Last Dose Informant Patient Reported? Taking? ARIPiprazole (ABILIFY) 20 MG tablet   No No   Sig: Take 1 tablet (20 mg total) by mouth daily   Needle, Disp, 31G X 5/16" MISC   No No   Sig: Use in the morning   Needle, Disp, 31G X 5/16" MISC   No No   Sig: Use 3 (three) times a day   albuterol (PROVENTIL HFA,VENTOLIN HFA) 90 mcg/act inhaler   No No   Sig: Inhale 2 puffs every 4 (four) hours as needed for wheezing   dicyclomine (BENTYL) 20 mg tablet   No No   Sig: Take 1 tablet (20 mg total) by mouth 2 (two) times a day   escitalopram (LEXAPRO) 10 mg tablet   No No   Sig: Take 1 tablet (10 mg total) by mouth daily   fenofibrate (TRICOR) 48 mg tablet   No No   Sig: Take 1 tablet (48 mg total) by mouth daily   fluticasone (FLOVENT HFA) 110 MCG/ACT inhaler   No No   Sig: Inhale 1 puff every 12 (twelve) hours Rinse mouth after use  insulin glargine (Lantus SoloStar) 100 units/mL injection pen   No No   Sig: Inject 40 Units under the skin daily at bedtime Every morning     insulin lispro (HumaLOG KwikPen) 100 units/mL injection pen   No No   Sig: Inject 15 Units under the skin 3 (three) times a day with meals   metFORMIN (GLUCOPHAGE) 1000 MG tablet   No No   Sig: Take 1 tablet (1,000 mg total) by mouth 2 (two) times a day with meals   traZODone (DESYREL) 100 mg tablet   No No   Sig: Take 1 tablet (100 mg total) by mouth daily at bedtime      Facility-Administered Medications: None       Past Medical History:   Diagnosis Date    Anxiety     Asthma     Bipolar 1 disorder ([email or web address] )     Depression     Diabetes mellitus ([place] [address] )     Psychiatric disorder        Past Surgical History:   Procedure Laterality Date    APPENDECTOMY      DENTAL SURGERY         Family History   Problem Relation Age of Onset    Liver disease Mother     Scoliosis Daughter     Bipolar disorder Daughter      I have reviewed and agree with the history as documented  E-Cigarette/Vaping    E-Cigarette Use Never User      E-Cigarette/Vaping Substances     Social History     Tobacco Use    Smoking status: Former Smoker     Packs/day: 0 20     Years: 1 00     Pack years: 0 20     Types: Cigarettes     Start date:      Quit date:      Years since quittin 2    Smokeless tobacco: Never Used   Vaping Use    Vaping Use: Never used   Substance Use Topics    Alcohol use: Not Currently     Comment: States has had no alcohol in at least a year    Drug use: No       Review of Systems   Constitutional: Negative for fever  HENT: Negative for dental problem  Eyes: Negative for visual disturbance  Respiratory: Negative for shortness of breath  Cardiovascular: Negative for chest pain  Gastrointestinal: Negative for abdominal pain, nausea and vomiting  Genitourinary: Negative for dysuria and frequency  Musculoskeletal: Negative for neck pain and neck stiffness  Skin: Negative for rash  Neurological: Negative for dizziness, weakness and light-headedness     Psychiatric/Behavioral: Negative for agitation, behavioral problems and confusion  All other systems reviewed and are negative  Physical Exam  Physical Exam  Vitals and nursing note reviewed  Constitutional:       Appearance: He is well-developed  HENT:      Head: Normocephalic and atraumatic  Eyes:      Extraocular Movements: Extraocular movements intact  Cardiovascular:      Rate and Rhythm: Normal rate  Pulmonary:      Effort: Pulmonary effort is normal    Abdominal:      General: There is no distension  Musculoskeletal:         General: Normal range of motion  Cervical back: Normal range of motion  Skin:     Findings: No rash  Comments: <1cm ulceration to the posterior aspect of the Right foot, over the achilles  No drainage, no erythema or induration  Does not appear to track down  Neurological:      Mental Status: He is alert and oriented to person, place, and time  Psychiatric:         Behavior: Behavior normal          Vital Signs  ED Triage Vitals   Temperature Pulse Respirations Blood Pressure SpO2   03/06/22 0734 03/06/22 0732 03/06/22 0732 03/06/22 0732 03/06/22 0732   98 °F (36 7 °C) 102 16 153/93 96 %      Temp Source Heart Rate Source Patient Position - Orthostatic VS BP Location FiO2 (%)   03/06/22 0734 03/06/22 0732 -- -- --   Oral Monitor         Pain Score       --                  Vitals:    03/06/22 0732   BP: 153/93   Pulse: 102         Visual Acuity      ED Medications  Medications   bacitracin topical ointment 1 small application (1 small application Topical Given 3/6/22 0816)       Diagnostic Studies  Results Reviewed     None                 XR foot 3+ views RIGHT   ED Interpretation by Karon Ruiz MD (03/06 0783)   No Fx, no gas at site of ulceration                 Procedures  Procedures         ED Course                               SBIRT 22yo+      Most Recent Value   SBIRT (22 yo +)    In order to provide better care to our patients, we are screening all of our patients for alcohol and drug use   Would it be okay to ask you these screening questions? No Filed at: 03/06/2022 0754                    [facility]  Number of Diagnoses or Management Options  Visit for wound check: new and requires workup  Diagnosis management comments: 1  Wound - Pt with would over the posterior Right foot, appears to be from rubbing against the back of shoe  There does not appear to be any infection and it does not seem to track down  Will obtain x-ray as Pt has Hx of DM, will likely provide Abx ointment and dressing  Amount and/or Complexity of Data Reviewed  Tests in the radiology section of CPT®: ordered and reviewed  Independent visualization of images, tracings, or specimens: yes    Patient Progress  Patient progress: stable      Disposition  Final diagnoses:   Visit for wound check     Time reflects when diagnosis was documented in both MDM as applicable and the Disposition within this note     Time User Action Codes Description Comment    3/6/2022  7:59 AM Eric Hurst Add [Z51 89] Visit for wound check       ED Disposition     ED Disposition Condition Date/Time Comment    Discharge Stable Sun Mar 6, 2022  [address] discharge to home/self care  Follow-up Information    None         Patient's Medications   Discharge Prescriptions    NEOMYCIN-BACITRACIN-POLYMYXIN B (NEOSPORIN) OINTMENT    Apply topically 2 (two) times a day       Start Date: 3/6/2022  End Date: --       Order Dose: --       Quantity: 15 g    Refills: 0       No discharge procedures on file      PDMP Review     None          ED Provider  Electronically Signed by           Yimi Ballesteros MD  03/06/22 0366

## 2022-03-06 NOTE — ED NOTES
Xray at bedside        Michelle Bailey, [facility]  03/06/22 7242

## 2022-03-06 NOTE — DISCHARGE INSTRUCTIONS
Apply antibiotic ointment and change dressing twice daily  Keep the wound covered while walking around, leave it open to air few times daily

## 2022-03-19 ENCOUNTER — APPOINTMENT (EMERGENCY)
Dept: CT IMAGING | Facility: HOSPITAL | Age: 52
End: 2022-03-19
Payer: COMMERCIAL

## 2022-03-19 ENCOUNTER — HOSPITAL ENCOUNTER (EMERGENCY)
Facility: HOSPITAL | Age: 52
Discharge: HOME | End: 2022-03-19
Attending: EMERGENCY MEDICINE | Admitting: EMERGENCY MEDICINE
Payer: COMMERCIAL

## 2022-03-19 VITALS
OXYGEN SATURATION: 97 % | DIASTOLIC BLOOD PRESSURE: 60 MMHG | TEMPERATURE: 97.6 F | RESPIRATION RATE: 15 BRPM | HEART RATE: 72 BPM | SYSTOLIC BLOOD PRESSURE: 99 MMHG

## 2022-03-19 DIAGNOSIS — R74.8 ELEVATED LIPASE: ICD-10-CM

## 2022-03-19 DIAGNOSIS — R10.9 ABDOMINAL PAIN: Primary | ICD-10-CM

## 2022-03-19 DIAGNOSIS — R73.9 HYPERGLYCEMIA: ICD-10-CM

## 2022-03-19 DIAGNOSIS — R93.5 ABNORMAL CT OF THE ABDOMEN: ICD-10-CM

## 2022-03-19 LAB
ALBUMIN SERPL BCP-MCNC: 3.8 G/DL (ref 3.5–5)
ALP SERPL-CCNC: 112 U/L (ref 46–116)
ALT SERPL W P-5'-P-CCNC: 15 U/L (ref 12–78)
ANION GAP SERPL CALCULATED.3IONS-SCNC: 11 MMOL/L (ref 4–13)
AST SERPL W P-5'-P-CCNC: 14 U/L (ref 5–45)
BACTERIA UR QL AUTO: NORMAL /HPF
BASOPHILS # BLD AUTO: 0.03 THOUSANDS/ΜL (ref 0–0.1)
BASOPHILS NFR BLD AUTO: 1 % (ref 0–1)
BILIRUB SERPL-MCNC: 0.4 MG/DL (ref 0.2–1)
BILIRUB UR QL STRIP: NEGATIVE
BUN SERPL-MCNC: 20 MG/DL (ref 5–25)
CALCIUM SERPL-MCNC: 9.2 MG/DL (ref 8.3–10.1)
CHLORIDE SERPL-SCNC: 102 MMOL/L (ref 100–108)
CLARITY UR: CLEAR
CO2 SERPL-SCNC: 25 MMOL/L (ref 21–32)
COLOR UR: YELLOW
CREAT SERPL-MCNC: 1.06 MG/DL (ref 0.6–1.3)
EOSINOPHIL # BLD AUTO: 0.11 THOUSAND/ΜL (ref 0–0.61)
EOSINOPHIL NFR BLD AUTO: 2 % (ref 0–6)
ERYTHROCYTE [DISTWIDTH] IN BLOOD BY AUTOMATED COUNT: 12.1 % (ref 11.6–15.1)
GFR SERPL CREATININE-BSD FRML MDRD: 81 ML/MIN/1.73SQ M
GLUCOSE SERPL-MCNC: 405 MG/DL (ref 65–140)
GLUCOSE UR STRIP-MCNC: ABNORMAL MG/DL
HCT VFR BLD AUTO: 43 % (ref 36.5–49.3)
HGB BLD-MCNC: 15.3 G/DL (ref 12–17)
HGB UR QL STRIP.AUTO: NEGATIVE
IMM GRANULOCYTES # BLD AUTO: 0.02 THOUSAND/UL (ref 0–0.2)
IMM GRANULOCYTES NFR BLD AUTO: 0 % (ref 0–2)
KETONES UR STRIP-MCNC: NEGATIVE MG/DL
LEUKOCYTE ESTERASE UR QL STRIP: ABNORMAL
LIPASE SERPL-CCNC: 431 U/L (ref 73–393)
LYMPHOCYTES # BLD AUTO: 1.94 THOUSANDS/ΜL (ref 0.6–4.47)
LYMPHOCYTES NFR BLD AUTO: 32 % (ref 14–44)
MCH RBC QN AUTO: 29.7 PG (ref 26.8–34.3)
MCHC RBC AUTO-ENTMCNC: 35.6 G/DL (ref 31.4–37.4)
MCV RBC AUTO: 83 FL (ref 82–98)
MONOCYTES # BLD AUTO: 0.59 THOUSAND/ΜL (ref 0.17–1.22)
MONOCYTES NFR BLD AUTO: 10 % (ref 4–12)
NEUTROPHILS # BLD AUTO: 3.43 THOUSANDS/ΜL (ref 1.85–7.62)
NEUTS SEG NFR BLD AUTO: 55 % (ref 43–75)
NITRITE UR QL STRIP: NEGATIVE
NON-SQ EPI CELLS URNS QL MICRO: NORMAL /HPF
NRBC BLD AUTO-RTO: 0 /100 WBCS
PH UR STRIP.AUTO: 7 [PH] (ref 4.5–8)
PLATELET # BLD AUTO: 202 THOUSANDS/UL (ref 149–390)
PMV BLD AUTO: 10.3 FL (ref 8.9–12.7)
POTASSIUM SERPL-SCNC: 5.1 MMOL/L (ref 3.5–5.3)
PROT SERPL-MCNC: 7.6 G/DL (ref 6.4–8.2)
PROT UR STRIP-MCNC: NEGATIVE MG/DL
RBC # BLD AUTO: 5.16 MILLION/UL (ref 3.88–5.62)
RBC #/AREA URNS AUTO: NORMAL /HPF
SODIUM SERPL-SCNC: 138 MMOL/L (ref 136–145)
SP GR UR STRIP.AUTO: 1.01 (ref 1–1.03)
UROBILINOGEN UR QL STRIP.AUTO: 0.2 E.U./DL
WBC # BLD AUTO: 6.12 THOUSAND/UL (ref 4.31–10.16)
WBC #/AREA URNS AUTO: NORMAL /HPF

## 2022-03-19 PROCEDURE — 85025 COMPLETE CBC W/AUTO DIFF WBC: CPT | Performed by: PHYSICIAN ASSISTANT

## 2022-03-19 PROCEDURE — 36415 COLL VENOUS BLD VENIPUNCTURE: CPT | Performed by: PHYSICIAN ASSISTANT

## 2022-03-19 PROCEDURE — 99284 EMERGENCY DEPT VISIT MOD MDM: CPT

## 2022-03-19 PROCEDURE — 96361 HYDRATE IV INFUSION ADD-ON: CPT

## 2022-03-19 PROCEDURE — 99284 EMERGENCY DEPT VISIT MOD MDM: CPT | Performed by: PHYSICIAN ASSISTANT

## 2022-03-19 PROCEDURE — 81001 URINALYSIS AUTO W/SCOPE: CPT

## 2022-03-19 PROCEDURE — 74177 CT ABD & PELVIS W/CONTRAST: CPT

## 2022-03-19 PROCEDURE — 80053 COMPREHEN METABOLIC PANEL: CPT | Performed by: PHYSICIAN ASSISTANT

## 2022-03-19 PROCEDURE — 83690 ASSAY OF LIPASE: CPT | Performed by: PHYSICIAN ASSISTANT

## 2022-03-19 PROCEDURE — 96374 THER/PROPH/DIAG INJ IV PUSH: CPT

## 2022-03-19 RX ORDER — NAPROXEN 500 MG/1
500 TABLET ORAL 2 TIMES DAILY PRN
Qty: 30 TABLET | Refills: 0 | Status: SHIPPED | OUTPATIENT
Start: 2022-03-19

## 2022-03-19 RX ORDER — DICYCLOMINE HCL 20 MG
20 TABLET ORAL 2 TIMES DAILY PRN
Qty: 20 TABLET | Refills: 0 | Status: SHIPPED | OUTPATIENT
Start: 2022-03-19

## 2022-03-19 RX ORDER — MORPHINE SULFATE 4 MG/ML
4 INJECTION, SOLUTION INTRAMUSCULAR; INTRAVENOUS ONCE
Status: COMPLETED | OUTPATIENT
Start: 2022-03-19 | End: 2022-03-19

## 2022-03-19 RX ADMIN — IOHEXOL 100 ML: 350 INJECTION, SOLUTION INTRAVENOUS at 10:09

## 2022-03-19 RX ADMIN — SODIUM CHLORIDE 1000 ML: 0.9 INJECTION, SOLUTION INTRAVENOUS at 09:13

## 2022-03-19 RX ADMIN — MORPHINE SULFATE 4 MG: 4 INJECTION INTRAVENOUS at 09:13

## 2022-03-19 NOTE — DISCHARGE INSTRUCTIONS
Please refer to the attached information for strict return instructions  If symptoms worsen or new symptoms develop please return to the ER  Please follow up with your primary care physician for re-evaluation of symptoms  You had an abnormal lipase level as well as an abnormal appearance of small bowel and kidney/bladder on CT imaging  You must follow up with your primary care physician for re-evaluation of symptoms

## 2022-03-19 NOTE — Clinical Note
Aileen Shipley was seen and treated in our emergency department on 3/19/2022  Diagnosis:     Julranjanmg Lebron    He may return on this date:     Please allow the patient to sleep on a lower/bottom bunk if available     If you have any questions or concerns, please don't hesitate to call        Gissel Cnoroy PA-C    ______________________________           _______________          _______________  Hospital Representative                              Date                                Time

## 2022-03-19 NOTE — ED PROVIDER NOTES
History  Chief Complaint   Patient presents with    Abdominal Pain     Pt reports upper abdominal pain radiating into back beginning last night  Denies n/v/d    Back Pain     Aditya is a 50 yo M, history of DM, presenting with one day of upper abdominal pain  He reports this pain does seem to radiate to mid-back as well  He reports no associated nausea, vomiting, or diarrhea  No fevers/chills  No medications prior to arrival for symptoms  Denies alcohol or NSAID use  Denies improvement/worsening of pain with eating  No blood in stool or melena  Remote history of appendectomy reported  History provided by:  Patient   used: No        Prior to Admission Medications   Prescriptions Last Dose Informant Patient Reported? Taking? ARIPiprazole (ABILIFY) 20 MG tablet 3/19/2022 at Unknown time  No Yes   Sig: Take 1 tablet (20 mg total) by mouth daily   Needle, Disp, 31G X 5/16" MISC   No No   Sig: Use in the morning   Needle, Disp, 31G X 5/16" MISC   No No   Sig: Use 3 (three) times a day   albuterol (PROVENTIL HFA,VENTOLIN HFA) 90 mcg/act inhaler 3/19/2022 at Unknown time  No Yes   Sig: Inhale 2 puffs every 4 (four) hours as needed for wheezing   dicyclomine (BENTYL) 20 mg tablet 3/19/2022 at Unknown time  No Yes   Sig: Take 1 tablet (20 mg total) by mouth 2 (two) times a day   escitalopram (LEXAPRO) 10 mg tablet 3/19/2022 at Unknown time  No Yes   Sig: Take 1 tablet (10 mg total) by mouth daily   fenofibrate (TRICOR) 48 mg tablet 3/19/2022 at Unknown time  No Yes   Sig: Take 1 tablet (48 mg total) by mouth daily   fluticasone (FLOVENT HFA) 110 MCG/ACT inhaler 3/19/2022 at Unknown time  No Yes   Sig: Inhale 1 puff every 12 (twelve) hours Rinse mouth after use  insulin glargine (Lantus SoloStar) 100 units/mL injection pen 3/18/2022 at Unknown time  No Yes   Sig: Inject 40 Units under the skin daily at bedtime Every morning     insulin lispro (HumaLOG KwikPen) 100 units/mL injection pen 3/19/2022 at Unknown time  No Yes   Sig: Inject 15 Units under the skin 3 (three) times a day with meals   metFORMIN (GLUCOPHAGE) 1000 MG tablet 3/19/2022 at Unknown time  No Yes   Sig: Take 1 tablet (1,000 mg total) by mouth 2 (two) times a day with meals   neomycin-bacitracin-polymyxin b (NEOSPORIN) ointment 3/19/2022 at Unknown time  No Yes   Sig: Apply topically 2 (two) times a day   traZODone (DESYREL) 100 mg tablet 3/18/2022 at Unknown time  No Yes   Sig: Take 1 tablet (100 mg total) by mouth daily at bedtime      Facility-Administered Medications: None       Past Medical History:   Diagnosis Date    Anxiety     Asthma     Bipolar 1 disorder ([facility] )     Depression     Diabetes mellitus ([facility] )     Psychiatric disorder        Past Surgical History:   Procedure Laterality Date    APPENDECTOMY      DENTAL SURGERY         Family History   Problem Relation Age of Onset    Liver disease Mother     Scoliosis Daughter     Bipolar disorder Daughter      I have reviewed and agree with the history as documented  E-Cigarette/Vaping    E-Cigarette Use Never User      E-Cigarette/Vaping Substances     Social History     Tobacco Use    Smoking status: Former Smoker     Packs/day: 0 20     Years: 1 00     Pack years: 0 20     Types: Cigarettes     Start date: 37     Quit date:      Years since quittin 2    Smokeless tobacco: Never Used   Vaping Use    Vaping Use: Never used   Substance Use Topics    Alcohol use: Not Currently     Comment: States has had no alcohol in at least a year    Drug use: No       Review of Systems   Constitutional: Negative for chills  HENT: Negative for congestion, rhinorrhea and sore throat  Eyes: Negative for pain and visual disturbance  Respiratory: Negative for cough, shortness of breath and wheezing  Cardiovascular: Negative for palpitations  Gastrointestinal: Positive for abdominal pain  Negative for blood in stool, constipation, diarrhea, nausea and vomiting  Genitourinary: Negative for frequency and urgency  Musculoskeletal: Positive for back pain  Negative for neck pain and neck stiffness  Skin: Negative for rash and wound  Neurological: Negative for dizziness and light-headedness  Physical Exam  Physical Exam  Constitutional:       General: He is not in acute distress  Appearance: He is well-developed  He is not diaphoretic  HENT:      Head: Normocephalic and atraumatic  Right Ear: External ear normal       Left Ear: External ear normal    Eyes:      Conjunctiva/sclera: Conjunctivae normal       Pupils: Pupils are equal, round, and reactive to light  Cardiovascular:      Rate and Rhythm: Normal rate and regular rhythm  Heart sounds: Normal heart sounds  No murmur heard  No friction rub  No gallop  Pulmonary:      Effort: Pulmonary effort is normal  No respiratory distress  Breath sounds: Normal breath sounds  No wheezing  Abdominal:      General: There is no distension  Palpations: Abdomen is soft  Tenderness: There is abdominal tenderness in the epigastric area and periumbilical area  There is no right CVA tenderness, left CVA tenderness or guarding  Negative signs include Flor's sign, Rovsing's sign, McBurney's sign, psoas sign and obturator sign  Musculoskeletal:      Cervical back: Normal range of motion and neck supple  Lymphadenopathy:      Cervical: No cervical adenopathy  Skin:     General: Skin is warm and dry  Capillary Refill: Capillary refill takes less than 2 seconds  Findings: No erythema or rash  Neurological:      Mental Status: He is alert and oriented to person, place, and time  Motor: No abnormal muscle tone  Coordination: Coordination normal    Psychiatric:         Behavior: Behavior normal          Thought Content:  Thought content normal          Judgment: Judgment normal          Vital Signs  ED Triage Vitals [03/19/22 0813]   Temperature Pulse Respirations Blood Pressure SpO2   97 6 °F (36 4 °C) 79 18 137/74 97 %      Temp Source Heart Rate Source Patient Position - Orthostatic VS BP Location FiO2 (%)   Oral Monitor Lying Right arm --      Pain Score       10 - Worst Possible Pain           Vitals:    03/19/22 0813 03/19/22 1042 03/19/22 1228   BP: 137/74 107/64 99/60   Pulse: 79 68 72   Patient Position - Orthostatic VS: Lying           Visual Acuity      ED Medications  Medications   sodium chloride 0 9 % bolus 1,000 mL (0 mL Intravenous Stopped 3/19/22 1043)   morphine (PF) 4 mg/mL injection 4 mg (4 mg Intravenous Given 3/19/22 0913)   iohexol (OMNIPAQUE) 350 MG/ML injection (SINGLE-DOSE) 100 mL (100 mL Intravenous Given 3/19/22 1009)       Diagnostic Studies  Results Reviewed     Procedure Component Value Units Date/Time    Urine Microscopic [[id number]]  (Normal) Collected: 03/19/22 0909    Lab Status: Final result Specimen: Urine, Clean Catch Updated: 03/19/22 1110     RBC, UA None Seen /hpf      WBC, UA None Seen /hpf      Epithelial Cells None Seen /hpf      Bacteria, UA None Seen /hpf     Comprehensive metabolic panel [[id number]]  (Abnormal) Collected: 03/19/22 0911    Lab Status: Final result Specimen: Blood from Arm, Right Updated: 03/19/22 0953     Sodium 138 mmol/L      Potassium 5 1 mmol/L      Chloride 102 mmol/L      CO2 25 mmol/L      ANION GAP 11 mmol/L      BUN 20 mg/dL      Creatinine 1 06 mg/dL      Glucose 405 mg/dL      Calcium 9 2 mg/dL      AST 14 U/L      ALT 15 U/L      Alkaline Phosphatase 112 U/L      Total Protein 7 6 g/dL      Albumin 3 8 g/dL      Total Bilirubin 0 40 mg/dL      eGFR 81 ml/min/1 73sq m     Narrative:      [place] guidelines for Chronic Kidney Disease (CKD):     Stage 1 with normal or high GFR (GFR > 90 mL/min/1 73 square meters)    Stage 2 Mild CKD (GFR = 60-89 mL/min/1 73 square meters)    Stage 3A Moderate CKD (GFR = 45-59 mL/min/1 73 square meters)    Stage 3B Moderate CKD (GFR = 30-44 mL/min/1 73 square meters)    Stage 4 Severe CKD (GFR = 15-29 mL/min/1 73 square meters)    Stage 5 End Stage CKD (GFR <15 mL/min/1 73 square meters)  Note: GFR calculation is accurate only with a steady state creatinine    Lipase [[id number]]  (Abnormal) Collected: 03/19/22 0911    Lab Status: Final result Specimen: Blood from Arm, Right Updated: 03/19/22 0953     Lipase 431 u/L     CBC and differential [[id number]] Collected: 03/19/22 0911    Lab Status: Final result Specimen: Blood from Arm, Right Updated: 03/19/22 0924     WBC 6 12 Thousand/uL      RBC 5 16 Million/uL      Hemoglobin 15 3 g/dL      Hematocrit 43 0 %      MCV 83 fL      MCH 29 7 pg      MCHC 35 6 g/dL      RDW 12 1 %      MPV 10 3 fL      Platelets 816 Thousands/uL      nRBC 0 /100 WBCs      Neutrophils Relative 55 %      Immat GRANS % 0 %      Lymphocytes Relative 32 %      Monocytes Relative 10 %      Eosinophils Relative 2 %      Basophils Relative 1 %      Neutrophils Absolute 3 43 Thousands/µL      Immature Grans Absolute 0 02 Thousand/uL      Lymphocytes Absolute 1 94 Thousands/µL      Monocytes Absolute 0 59 Thousand/µL      Eosinophils Absolute 0 11 Thousand/µL      Basophils Absolute 0 03 Thousands/µL     Urine Macroscopic, POC [[id number]]  (Abnormal) Collected: 03/19/22 0909    Lab Status: Final result Specimen: Urine Updated: 03/19/22 0911     Color, UA Yellow     Clarity, UA Clear     pH, UA 7 0     Leukocytes, UA Elevated glucose may cause decreased leukocyte values  See urine microscopic for [facility] result/     Nitrite, UA Negative     Protein, UA Negative mg/dl      Glucose, UA >=1000 (1%) mg/dl      Ketones, UA Negative mg/dl      Urobilinogen, UA 0 2 E U /dl      Bilirubin, UA Negative     Blood, UA Negative     Specific Gravity, UA 1 010    Narrative:      CLINITEK RESULT                 CT abdomen pelvis with contrast   Final Result by Noelle Dobbs DO (03/19 1033)   1  Mild abnormal appearance of the distal small bowel  Correlate for enteritis, either infectious or inflammatory  2   Mild abnormal appearance of the right kidney and urinary bladder  Correlate for urinary tract infection  Workstation performed: [id number]                    Procedures  Procedures         ED Course                               SBIRT 22yo+      Most Recent Value   SBIRT (22 yo +)    In order to provide better care to our patients, we are screening all of our patients for alcohol and drug use  Would it be okay to ask you these screening questions? Yes Filed at: 03/19/2022 3082   Initial Alcohol Screen: US AUDIT-C     1  How often do you have a drink containing alcohol? 0 Filed at: 03/19/2022 0819   2  How many drinks containing alcohol do you have on a typical day you are drinking? 0 Filed at: 03/19/2022 0819   3a  Male UNDER 65: How often do you have five or more drinks on one occasion? 0 Filed at: 03/19/2022 0819   Audit-C Score 0 Filed at: 03/19/2022 9272   PADMINI: How many times in the past year have you    Used an illegal drug or used a prescription medication for non-medical reasons? Never Filed at: 03/19/2022 0093                    MDM  Number of Diagnoses or Management Options  Abdominal pain  Abnormal CT of the abdomen  Elevated lipase  Hyperglycemia  Diagnosis management comments: Upper abdominal pain with TTP to epigastrium/periumbilical region  No peritoneal signs or pain out of proportion to exam findings  No associated N/V/D  Labs reveal slight lipase elevation, hyperglycemia without anion gap or ketonuria  No CT evidence of pancreatitis  Mild abnormal appearance of distal small bowel and of right kidney/urinary bladder, however no associated N/V/D/fevers/sick contacts or  urinary symptoms or UTI by urine dip/micro  Pain significantly improved with ED therapy   Will discharge with supportive care, plenty of fluids encouraged, patient instructed to regularly check glucose and administer medications as previously instructed by PCP  Follow up with PCP encouraged  Return to ED indications reviewed  Amount and/or Complexity of Data Reviewed  Clinical lab tests: ordered and reviewed  Tests in the radiology section of CPT®: ordered and reviewed    Patient Progress  Patient progress: improved      Disposition  Final diagnoses:   Abdominal pain   Elevated lipase   Abnormal CT of the abdomen   Hyperglycemia     Time reflects when diagnosis was documented in both MDM as applicable and the Disposition within this note     Time User Action Codes Description Comment    3/19/2022 12:27 PM Zena Hals Add [R10 9] Abdominal pain     3/19/2022 12:27 PM Zena Hals Add [R74 8] Elevated lipase     3/19/2022 12:27 PM Zena Hals Add [R93 5] Abnormal CT of the abdomen     3/20/2022  7:25 AM Zena Hals Add [R73 9] Hyperglycemia       ED Disposition     ED Disposition Condition Date/Time Comment    Discharge Stable Sat Mar 19, 2022 12:27 PM Cami Charles discharge to home/self care  Follow-up Information     Follow up With Specialties Details Why [address] [facility] Emergency Department Emergency Medicine  If symptoms worsen [address] [facility] [address]  [address] [place] [address] Emergency Department, [address] [place], [place], [address]          Discharge Medication List as of 3/19/2022 12:30 PM      START taking these medications    Details   !! dicyclomine (BENTYL) 20 mg tablet Take 1 tablet (20 mg total) by mouth 2 (two) times a day as needed (Abdominal cramping), Starting Sat 3/19/2022, Normal      naproxen (NAPROSYN) 500 mg tablet Take 1 tablet (500 mg total) by mouth 2 (two) times a day as needed for mild pain or moderate pain, Starting Sat 3/19/2022, Normal       !! - Potential duplicate medications found  Please discuss with provider        CONTINUE these medications which have NOT CHANGED    Details albuterol (PROVENTIL HFA,VENTOLIN HFA) 90 mcg/act inhaler Inhale 2 puffs every 4 (four) hours as needed for wheezing, Starting Fri 2/4/2022, Normal      ARIPiprazole (ABILIFY) 20 MG tablet Take 1 tablet (20 mg total) by mouth daily, Starting Fri 2/4/2022, Until Sat 3/19/2022, Normal      !! dicyclomine (BENTYL) 20 mg tablet Take 1 tablet (20 mg total) by mouth 2 (two) times a day, Starting Fri 2/4/2022, Normal      escitalopram (LEXAPRO) 10 mg tablet Take 1 tablet (10 mg total) by mouth daily, Starting Fri 2/4/2022, Until Sat 3/19/2022, Normal      fenofibrate (TRICOR) 48 mg tablet Take 1 tablet (48 mg total) by mouth daily, Starting Fri 2/4/2022, Until Sat 3/19/2022, Normal      fluticasone (FLOVENT HFA) 110 MCG/ACT inhaler Inhale 1 puff every 12 (twelve) hours Rinse mouth after use , Starting Fri 2/4/2022, Normal      insulin glargine (Lantus SoloStar) 100 units/mL injection pen Inject 40 Units under the skin daily at bedtime Every morning , Starting Fri 2/4/2022, Until Sat 3/19/2022, Normal      insulin lispro (HumaLOG KwikPen) 100 units/mL injection pen Inject 15 Units under the skin 3 (three) times a day with meals, Starting Fri 2/4/2022, Until Sat 3/19/2022, Normal      metFORMIN (GLUCOPHAGE) 1000 MG tablet Take 1 tablet (1,000 mg total) by mouth 2 (two) times a day with meals, Starting Fri 2/4/2022, Until Sat 3/19/2022, Normal      neomycin-bacitracin-polymyxin b (NEOSPORIN) ointment Apply topically 2 (two) times a day, Starting Sun 3/6/2022, Print      traZODone (DESYREL) 100 mg tablet Take 1 tablet (100 mg total) by mouth daily at bedtime, Starting Fri 2/4/2022, Until Sat 3/19/2022, Normal      !! Needle, Disp, 31G X 5/16" MISC Use in the morning, Starting Fri 2/4/2022, Normal      !! Needle, Disp, 31G X 5/16" MISC Use 3 (three) times a day, Starting Fri 2/4/2022, Normal       !! - Potential duplicate medications found  Please discuss with provider  No discharge procedures on file      PDMP Review None          ED Provider  Electronically Signed by           Ike Heranndez PA-C  03/20/22 5221

## 2022-05-07 ENCOUNTER — HOSPITAL ENCOUNTER (EMERGENCY)
Facility: HOSPITAL | Age: 52
Discharge: HOME | End: 2022-05-07
Attending: EMERGENCY MEDICINE | Admitting: EMERGENCY MEDICINE
Payer: COMMERCIAL

## 2022-05-07 VITALS
RESPIRATION RATE: 17 BRPM | OXYGEN SATURATION: 98 % | HEART RATE: 98 BPM | TEMPERATURE: 97.8 F | DIASTOLIC BLOOD PRESSURE: 77 MMHG | WEIGHT: 125.22 LBS | BODY MASS INDEX: 20.21 KG/M2 | SYSTOLIC BLOOD PRESSURE: 120 MMHG

## 2022-05-07 DIAGNOSIS — M54.50 ACUTE LOW BACK PAIN: Primary | ICD-10-CM

## 2022-05-07 PROCEDURE — 99282 EMERGENCY DEPT VISIT SF MDM: CPT | Performed by: EMERGENCY MEDICINE

## 2022-05-07 PROCEDURE — 99283 EMERGENCY DEPT VISIT LOW MDM: CPT

## 2022-05-07 RX ORDER — LIDOCAINE 50 MG/G
1 PATCH TOPICAL ONCE
Status: DISCONTINUED | OUTPATIENT
Start: 2022-05-07 | End: 2022-05-07 | Stop reason: HOSPADM

## 2022-05-07 RX ADMIN — LIDOCAINE 1 PATCH: 50 PATCH TOPICAL at 15:54

## 2022-05-07 NOTE — ED PROVIDER NOTES
History  Chief Complaint   Patient presents with    Back Pain     States from pushing his cart  Patient is a 73-year-old male well known to me who was just here as a guest of another ER patient who presents with several days of right lower back pain  Constant  Worse with pusing his cart  Just seen by [facility]  Denies any trauma  No numbness/weakness  No bowel or bladder incontinence  No relief with tylenol  Prior to Admission Medications   Prescriptions Last Dose Informant Patient Reported? Taking? ARIPiprazole (ABILIFY) 20 MG tablet   No No   Sig: Take 1 tablet (20 mg total) by mouth daily   Needle, Disp, 31G X 5/16" MISC   No No   Sig: Use in the morning   Needle, Disp, 31G X 5/16" MISC   No No   Sig: Use 3 (three) times a day   albuterol (PROVENTIL HFA,VENTOLIN HFA) 90 mcg/act inhaler   No No   Sig: Inhale 2 puffs every 4 (four) hours as needed for wheezing   dicyclomine (BENTYL) 20 mg tablet   No No   Sig: Take 1 tablet (20 mg total) by mouth 2 (two) times a day   dicyclomine (BENTYL) 20 mg tablet   No No   Sig: Take 1 tablet (20 mg total) by mouth 2 (two) times a day as needed (Abdominal cramping)   escitalopram (LEXAPRO) 10 mg tablet   No No   Sig: Take 1 tablet (10 mg total) by mouth daily   fenofibrate (TRICOR) 48 mg tablet   No No   Sig: Take 1 tablet (48 mg total) by mouth daily   fluticasone (FLOVENT HFA) 110 MCG/ACT inhaler   No No   Sig: Inhale 1 puff every 12 (twelve) hours Rinse mouth after use  insulin glargine (Lantus SoloStar) 100 units/mL injection pen   No No   Sig: Inject 40 Units under the skin daily at bedtime Every morning     insulin lispro (HumaLOG KwikPen) 100 units/mL injection pen   No No   Sig: Inject 15 Units under the skin 3 (three) times a day with meals   metFORMIN (GLUCOPHAGE) 1000 MG tablet   No No   Sig: Take 1 tablet (1,000 mg total) by mouth 2 (two) times a day with meals   naproxen (NAPROSYN) 500 mg tablet   No No   Sig: Take 1 tablet (500 mg total) by mouth 2 (two) times a day as needed for mild pain or moderate pain   neomycin-bacitracin-polymyxin b (NEOSPORIN) ointment   No No   Sig: Apply topically 2 (two) times a day   traZODone (DESYREL) 100 mg tablet   No No   Sig: Take 1 tablet (100 mg total) by mouth daily at bedtime      Facility-Administered Medications: None       Past Medical History:   Diagnosis Date    Anxiety     Asthma     Bipolar 1 disorder ([place] [address] )     Depression     Diabetes mellitus ([place] [address] )     Psychiatric disorder        Past Surgical History:   Procedure Laterality Date    APPENDECTOMY      DENTAL SURGERY         Family History   Problem Relation Age of Onset    Liver disease Mother     Scoliosis Daughter     Bipolar disorder Daughter      I have reviewed and agree with the history as documented  E-Cigarette/Vaping    E-Cigarette Use Never User      E-Cigarette/Vaping Substances     Social History     Tobacco Use    Smoking status: Former Smoker     Packs/day: 0 20     Years: 1 00     Pack years: 0 20     Types: Cigarettes     Start date: 37     Quit date:      Years since quittin 3    Smokeless tobacco: Never Used   Vaping Use    Vaping Use: Never used   Substance Use Topics    Alcohol use: Not Currently     Comment: States has had no alcohol in at least a year    Drug use: No       Review of Systems   Constitutional: Negative  HENT: Negative  Eyes: Negative  Respiratory: Negative  Cardiovascular: Negative  Gastrointestinal: Negative  Endocrine: Negative  Genitourinary: Negative  Musculoskeletal: Positive for back pain  Skin: Negative  Allergic/Immunologic: Negative  Neurological: Negative  Hematological: Negative  Psychiatric/Behavioral: Negative  All other systems reviewed and are negative  Physical Exam  Physical Exam  Vitals and nursing note reviewed  Constitutional:       Appearance: Normal appearance  He is normal weight     HENT:      Head: Normocephalic and atraumatic  Cardiovascular:      Rate and Rhythm: Normal rate and regular rhythm  Pulses: Normal pulses  Heart sounds: Normal heart sounds  Pulmonary:      Effort: Respiratory distress present  Breath sounds: Normal breath sounds  Abdominal:      General: Bowel sounds are normal       Palpations: Abdomen is soft  Musculoskeletal:         General: No swelling, tenderness, deformity or signs of injury  Cervical back: Normal range of motion and neck supple  Right lower leg: No edema  Left lower leg: No edema  Comments: No midline spinal ttp, stepoff or deformity  Skin:     General: Skin is warm and dry  Capillary Refill: Capillary refill takes less than 2 seconds  Neurological:      General: No focal deficit present  Mental Status: He is alert and oriented to person, place, and time  Motor: No weakness  Gait: Gait normal       Deep Tendon Reflexes: Reflexes normal    Psychiatric:         Mood and Affect: Mood normal          Behavior: Behavior normal          Vital Signs  ED Triage Vitals [05/07/22 1552]   Temperature Pulse Respirations Blood Pressure SpO2   97 8 °F (36 6 °C) 98 17 120/77 98 %      Temp Source Heart Rate Source Patient Position - Orthostatic VS BP Location FiO2 (%)   Oral Monitor Sitting Left arm --      Pain Score       8           Vitals:    05/07/22 1552   BP: 120/77   Pulse: 98   Patient Position - Orthostatic VS: Sitting         Visual Acuity      ED Medications  Medications   lidocaine (LIDODERM) 5 % patch 1 patch (1 patch Topical Medication Applied 5/7/22 1554)       Diagnostic Studies  Results Reviewed     None                 No orders to display              Procedures  Procedures         ED Course                               SBIRT 22yo+      Most Recent Value   SBIRT (22 yo +)    In order to provide better care to our patients, we are screening all of our patients for alcohol and drug use   Would it be okay to ask you these screening questions? No Filed at: 05/07/2022 1553                    [facility]  Number of Diagnoses or Management Options     Amount and/or Complexity of Data Reviewed  Review and summarize past medical records: yes        Disposition  Final diagnoses:   Acute low back pain     Time reflects when diagnosis was documented in both MDM as applicable and the Disposition within this note     Time User Action Codes Description Comment    5/7/2022  3:53 PM Camilo Banegas Lucita [M54 51] Acute low back pain       ED Disposition     ED Disposition Condition Date/Time Comment    Discharge Stable Sat May 7, 2022  3:53 PM Martinez Mccauley discharge to home/self care  Follow-up Information     Follow up With Specialties Details Why Contact Info Additional [address]   [address], [address] [facility] [id number] [place] [address], [address], [address] [place], [place], [address]          Patient's Medications   Discharge Prescriptions    No medications on file       No discharge procedures on file      PDMP Review     None          ED Provider  Electronically Signed by           Emmett Schultz MD  05/07/22 4406

## 2022-05-13 ENCOUNTER — HOSPITAL ENCOUNTER (EMERGENCY)
Facility: HOSPITAL | Age: 52
Discharge: HOME | End: 2022-05-13
Attending: EMERGENCY MEDICINE | Admitting: EMERGENCY MEDICINE
Payer: COMMERCIAL

## 2022-05-13 VITALS
HEART RATE: 111 BPM | OXYGEN SATURATION: 99 % | SYSTOLIC BLOOD PRESSURE: 117 MMHG | RESPIRATION RATE: 16 BRPM | WEIGHT: 123.9 LBS | TEMPERATURE: 97.5 F | BODY MASS INDEX: 20 KG/M2 | DIASTOLIC BLOOD PRESSURE: 72 MMHG

## 2022-05-13 DIAGNOSIS — Z59.00 HOMELESS: ICD-10-CM

## 2022-05-13 DIAGNOSIS — R45.851 SUICIDAL IDEATIONS: Primary | ICD-10-CM

## 2022-05-13 LAB
AMPHETAMINES SERPL QL SCN: NEGATIVE
BARBITURATES UR QL: NEGATIVE
BENZODIAZ UR QL: NEGATIVE
COCAINE UR QL: NEGATIVE
ETHANOL EXG-MCNC: 0 MG/DL
FLUAV RNA RESP QL NAA+PROBE: NEGATIVE
FLUBV RNA RESP QL NAA+PROBE: NEGATIVE
METHADONE UR QL: NEGATIVE
OPIATES UR QL SCN: NEGATIVE
OXYCODONE+OXYMORPHONE UR QL SCN: NEGATIVE
PCP UR QL: NEGATIVE
RSV RNA RESP QL NAA+PROBE: NEGATIVE
SARS-COV-2 RNA RESP QL NAA+PROBE: NEGATIVE
THC UR QL: NEGATIVE

## 2022-05-13 PROCEDURE — 80307 DRUG TEST PRSMV CHEM ANLYZR: CPT | Performed by: EMERGENCY MEDICINE

## 2022-05-13 PROCEDURE — 99284 EMERGENCY DEPT VISIT MOD MDM: CPT

## 2022-05-13 PROCEDURE — 0241U HB NFCT DS VIR RESP RNA 4 TRGT: CPT

## 2022-05-13 PROCEDURE — 99285 EMERGENCY DEPT VISIT HI MDM: CPT

## 2022-05-13 PROCEDURE — 82075 ASSAY OF BREATH ETHANOL: CPT | Performed by: EMERGENCY MEDICINE

## 2022-05-13 SDOH — ECONOMIC STABILITY - HOUSING INSECURITY: HOMELESSNESS UNSPECIFIED: Z59.00
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
## 2022-05-13 NOTE — ED NOTES
Patient cooperative at this time  Patient changed into blue scrubs and provided urine sample       Angelina Henry RN  05/13/22 7552

## 2022-05-13 NOTE — ED NOTES
This writer discussed the patients current presentation and recommended discharge plan with Dr Fabirce Roblero  They agree with the patient being discharged at this time with referrals and/or information about outpatient and shelters  The patient was Instructed to follow up with [facility] if he feels he can get help through them and shelters provided  The patient was provided with referral information for:   Shelters in the area, outpatient facilities  This writer and the patient completed a safety plan  The patient was provided with a copy of their safety plan with encouragement to utilize the plan following discharge  In addition, the patient was instructed to call [facility] crisis, other crisis services, [facility] or to go to the nearest ER immediately if their situation changes at any time  This writer discussed discharge plans with the patient who agrees with and understands the discharge plans           SAFETY PLAN  Warning Signs (thoughts, images, mood, behavior, situations) of a potential crisis: increased depression or crying    Coping Skills (what can I do to take my mind off the problem, or to keep myself safe): Find someone to talk to in the shelter or come back to ED or call suicide hotline    Outside Support (who can I reach out to for support and help): ER or call hotline below        National Suicide Prevention Hotline:  1-527-140-439.357.1867    [address] [phone] - [facility]: [facility]: [place]ianne [address] [place] [address] [address] [facility] [phone] - Crisis   [phone] - Peer Support Talk Line (1-9pm daily)  [phone] - Teen Support Talk Line (1-9pm daily)  [address] [place] [address] ([place] - [address]

## 2022-05-13 NOTE — ED NOTES
Pt stated that he has thoughts of suicide but has no plan and no intent to act on his thoughts  Pt states he's homeless and came to ED to get a referral for [facility]  He said that if he gets assessed in ED it can help  Pt denied HI or hallucinations  Pt states he eats and sleeps well, especially the last few days because he was able to stay in a hotel  Pt was oriented X4  Pt stated that he felt he could stay safe and would like other shelters he can try  Pt would like to be discharged and Dr  In agreement  Crisis provided Pt with shelters and discharge paperwork to provide to [facility] of his visit and assessment in ED

## 2022-05-13 NOTE — ED NOTES
When documenting pt's belongings pt was asked if there are any valuables within the cart, pt stated that it's all clothing inside and nothing is of great value to him       Yuan Waddell  05/13/22 1537

## 2022-05-14 PROCEDURE — 99283 EMERGENCY DEPT VISIT LOW MDM: CPT

## 2022-05-15 ENCOUNTER — HOSPITAL ENCOUNTER (EMERGENCY)
Facility: HOSPITAL | Age: 52
Discharge: HOME | End: 2022-05-15
Attending: EMERGENCY MEDICINE
Payer: COMMERCIAL

## 2022-05-15 VITALS
SYSTOLIC BLOOD PRESSURE: 104 MMHG | HEART RATE: 79 BPM | TEMPERATURE: 97.9 F | RESPIRATION RATE: 16 BRPM | DIASTOLIC BLOOD PRESSURE: 70 MMHG | BODY MASS INDEX: 19.77 KG/M2 | WEIGHT: 123 LBS | OXYGEN SATURATION: 99 % | HEIGHT: 66 IN

## 2022-05-15 DIAGNOSIS — M79.672 BILATERAL FOOT PAIN: Primary | ICD-10-CM

## 2022-05-15 DIAGNOSIS — M79.671 BILATERAL FOOT PAIN: Primary | ICD-10-CM

## 2022-05-15 PROCEDURE — 99282 EMERGENCY DEPT VISIT SF MDM: CPT | Performed by: EMERGENCY MEDICINE

## 2022-05-15 RX ORDER — ACETAMINOPHEN 325 MG/1
650 TABLET ORAL ONCE
Status: COMPLETED | OUTPATIENT
Start: 2022-05-15 | End: 2022-05-15

## 2022-05-15 RX ADMIN — ACETAMINOPHEN 325MG 650 MG: 325 TABLET ORAL at 01:45

## 2022-05-15 NOTE — ED ATTENDING ATTESTATION
2022  Nancy SALCEDO DO, saw and evaluated the patient  I have discussed the patient with the resident/non-physician practitioner and agree with the resident's/non-physician practitioner's findings, Plan of Care, and MDM as documented in the resident's/non-physician practitioner's note, except where noted  All available labs and Radiology studies were reviewed  I was present for key portions of any procedure(s) performed by the resident/non-physician practitioner and I was immediately available to provide assistance  At this point I agree with the current assessment done in the Emergency Department  I have conducted an independent evaluation of this patient a history and physical is as follows:    Rickie SALCEDO DO, saw and evaluated the patient  All available labs and X-rays were reviewed  I discussed the patient with the resident / non-physician and agree with the resident's / non-physician practitioner's findings and plan as documented in the resident's / non-physician practicitioner's note, except where noted  At this point, I agree with the current assessment done in the ED      NAME: Mandy Lee  AGE: 49 y o  SEX: male  : 1970   MRN: [id number]  ENCOUNTER: [id number]    DATE: 5/15/2022  TIME: 2:53 AM      History of Present Illness   Mandy Lee is a 49 y o  male who presents with Foot Pain (Pt reports feng foot pain x a few days)    has a past medical history of Anxiety, Asthma, Bipolar 1 disorder ([facility] ), Depression, Diabetes mellitus ([facility] ), and Psychiatric disorder        Past Medical History     Past Medical History:   Diagnosis Date    Anxiety     Asthma     Bipolar 1 disorder ([facility]     Depression     Diabetes mellitus ([facility] )     Psychiatric disorder        Past Surgical History     Past Surgical History:   Procedure Laterality Date    APPENDECTOMY      DENTAL SURGERY         Social History     Social History     Substance and Sexual Activity   Alcohol Use Not Currently    Comment: States has had no alcohol in at least a year     Social History     Substance and Sexual Activity   Drug Use No     Social History     Tobacco Use   Smoking Status Former Smoker    Packs/day: 0 20    Years: 1 00    Pack years: 0 20    Types: Cigarettes    Start date: 37    Quit date: 0    Years since quittin 3   Smokeless Tobacco Never Used       Family History     Family History   Problem Relation Age of Onset    Liver disease Mother     Scoliosis Daughter     Bipolar disorder Daughter        Medications Prior to Admission     Prior to Admission medications    Medication Sig Start Date End Date Taking? Authorizing Provider   albuterol (PROVENTIL HFA,VENTOLIN HFA) 90 mcg/act inhaler Inhale 2 puffs every 4 (four) hours as needed for wheezing 22   KRISH [place]   ARIPiprazole (ABILIFY) 20 MG tablet Take 1 tablet (20 mg total) by mouth daily 2/4/22 3/19/22  KRISH [place]   dicyclomine (BENTYL) 20 mg tablet Take 1 tablet (20 mg total) by mouth 2 (two) times a day 22   KRISH [place]   dicyclomine (BENTYL) 20 mg tablet Take 1 tablet (20 mg total) by mouth 2 (two) times a day as needed (Abdominal cramping) 3/19/22   Lew Lugo PA-C   escitalopram (LEXAPRO) 10 mg tablet Take 1 tablet (10 mg total) by mouth daily 2/4/22 3/19/22  KRISH [place]   fenofibrate (TRICOR) 48 mg tablet Take 1 tablet (48 mg total) by mouth daily 2/4/22 3/19/22  KRISH [place]   fluticasone (FLOVENT HFA) 110 MCG/ACT inhaler Inhale 1 puff every 12 (twelve) hours Rinse mouth after use  22   KRISH [place]   insulin glargine (Lantus SoloStar) 100 units/mL injection pen Inject 40 Units under the skin daily at bedtime Every morning   2/4/22 3/19/22  KRISH [place]   insulin lispro (HumaLOG KwikPen) 100 units/mL injection pen Inject 15 Units under the skin 3 (three) times a day with meals 2/4/22 3/19/22  KRISH [place] metFORMIN (GLUCOPHAGE) 1000 MG tablet Take 1 tablet (1,000 mg total) by mouth 2 (two) times a day with meals 2/4/22 3/19/22  KRISH [place]   naproxen (NAPROSYN) 500 mg tablet Take 1 tablet (500 mg total) by mouth 2 (two) times a day as needed for mild pain or moderate pain 3/19/22   Clemente Delvalle PA-C   Needle, Disp, 31G X 5/16" MISC Use in the morning 2/4/22   KRISH [place]   Needle, Disp, 31G X 5/16" MISC Use 3 (three) times a day 2/4/22   KRISH [place]   neomycin-bacitracin-polymyxin b (NEOSPORIN) ointment Apply topically 2 (two) times a day 3/6/22   Donn Pool MD   traZODone (DESYREL) 100 mg tablet Take 1 tablet (100 mg total) by mouth daily at bedtime 2/4/22 3/19/22  KRISH [place]       Allergies     Allergies   Allergen Reactions    Ketorolac Other (See Comments)     Pt states he has mood disturbances, agitation if he takes too much      Toradol [Ketorolac Tromethamine]     Latex Rash       Objective     Vitals:    05/14/22 2345   BP: 107/68   BP Location: Right arm   Pulse: 82   Resp: 16   Temp: 97 9 °F (36 6 °C)   TempSrc: Oral   SpO2: 98%   Weight: 55 8 kg (123 lb)   Height: 5' 6" (1 676 m)     Body mass index is 19 85 kg/m²    No intake or output data in the 24 hours ending 05/15/22 0253  Invasive Devices  Report    None                 Physical Exam  General: awake, alert, no acute distress  Head: normocephalic, atraumatic  Eyes: no scleral icterus  Ears: external ears normal, hearing grossly intact  Nose: external exam grossly normal  Neck: symmetric, No JVD noted, trachea midline  Pulmonary: no respiratory distress, no tachypnea noted  Cardiovascular: appears well perfused  Abdomen: no distention noted  Musculoskeletal: no deformities noted, tone normal  Neuro: grossly non-focal  Psych: mood and affect appropriate    Lab Results:  Labs Reviewed - No data to display      Imaging:   No orders to display         Medications given in Emergency Department Medication Administration - last 24 hours from 05/14/2022 0253 to 05/15/2022 0253       Date/Time Order Dose Route Action Action by     05/15/2022 0145 acetaminophen (TYLENOL) tablet 650 mg 650 mg Oral Given Frankey Rogue, RN          Assessment and Plan  Homelessness  Bilateral foot pain    Patient has no signs of trauma  Patient is homeless and walks around all day  Probable mild plantar fasciitis verses muscle strain  No s/o trench foot  No treatment other than supportive care, proper footwear and following up with [facility] as scheduled  Active Problems:    * No active hospital problems  *      Final Diagnosis:  1   Bilateral foot pain        ED Course         Critical Care Time  Procedures

## 2022-05-15 NOTE — ED NOTES
AVS reviewed with pt, pt verbalized understanding of d/c instructions   Pt left ambulatory with steady gait, alert and oriented with all belongings     Coy SenderSTUART  05/15/22 6392

## 2022-05-15 NOTE — ED NOTES
Patient resting comfortably in bed, sleeping   Showing no signs of distress     Viola Hook RN  05/15/22 8712

## 2022-05-16 ENCOUNTER — HOSPITAL ENCOUNTER (EMERGENCY)
Facility: HOSPITAL | Age: 52
Discharge: HOME | End: 2022-05-16
Attending: EMERGENCY MEDICINE | Admitting: EMERGENCY MEDICINE
Payer: COMMERCIAL

## 2022-05-16 VITALS
HEART RATE: 86 BPM | OXYGEN SATURATION: 100 % | RESPIRATION RATE: 18 BRPM | SYSTOLIC BLOOD PRESSURE: 136 MMHG | DIASTOLIC BLOOD PRESSURE: 89 MMHG | TEMPERATURE: 98.6 F

## 2022-05-16 VITALS
TEMPERATURE: 98.1 F | OXYGEN SATURATION: 100 % | HEART RATE: 79 BPM | RESPIRATION RATE: 17 BRPM | DIASTOLIC BLOOD PRESSURE: 91 MMHG | SYSTOLIC BLOOD PRESSURE: 150 MMHG

## 2022-05-16 DIAGNOSIS — M79.672 BILATERAL FOOT PAIN: Primary | ICD-10-CM

## 2022-05-16 DIAGNOSIS — M79.671 BILATERAL FOOT PAIN: Primary | ICD-10-CM

## 2022-05-16 PROCEDURE — 99282 EMERGENCY DEPT VISIT SF MDM: CPT | Performed by: EMERGENCY MEDICINE

## 2022-05-16 PROCEDURE — 99282 EMERGENCY DEPT VISIT SF MDM: CPT

## 2022-05-16 PROCEDURE — 99283 EMERGENCY DEPT VISIT LOW MDM: CPT

## 2022-05-16 NOTE — ED ATTENDING ATTESTATION
5/16/2022  INancy, DO, saw and evaluated the patient  I have discussed the patient with the resident/non-physician practitioner and agree with the resident's/non-physician practitioner's findings, Plan of Care, and MDM as documented in the resident's/non-physician practitioner's note, except where noted  All available labs and Radiology studies were reviewed  I was present for key portions of any procedure(s) performed by the resident/non-physician practitioner and I was immediately available to provide assistance  At this point I agree with the current assessment done in the Emergency Department  I have conducted an independent evaluation of this patient a history and physical is as follows:    Patient presents again for repeat evaluation of foot pain  Patient just seen here yesterday  Patient is not called or followed up since that time  No new systemic changes  No new trauma  Patient has no signs of trauma  Patient is homeless and walks around all day  Patient ambulated here without difficulty  Probable mild plantar fasciitis verses muscle strain  No s/o trench foot  No treatment other than supportive care, proper footwear and following up with [facility] as scheduled      ED Course         Critical Care Time  Procedures

## 2022-05-17 ENCOUNTER — HOSPITAL ENCOUNTER (EMERGENCY)
Facility: HOSPITAL | Age: 52
Discharge: HOME | End: 2022-05-18
Attending: EMERGENCY MEDICINE | Admitting: EMERGENCY MEDICINE
Payer: COMMERCIAL

## 2022-05-17 ENCOUNTER — HOSPITAL ENCOUNTER (EMERGENCY)
Facility: HOSPITAL | Age: 52
Discharge: HOME | End: 2022-05-17
Attending: EMERGENCY MEDICINE
Payer: COMMERCIAL

## 2022-05-17 DIAGNOSIS — Z59.00 HOMELESSNESS: Primary | ICD-10-CM

## 2022-05-17 DIAGNOSIS — Z59.00 HOMELESSNESS: ICD-10-CM

## 2022-05-17 DIAGNOSIS — Z76.5 MALINGERING: Primary | ICD-10-CM

## 2022-05-17 DIAGNOSIS — R45.851 SUICIDAL IDEATIONS: ICD-10-CM

## 2022-05-17 LAB
AMPHETAMINES SERPL QL SCN: NEGATIVE
BARBITURATES UR QL: NEGATIVE
BENZODIAZ UR QL: NEGATIVE
COCAINE UR QL: NEGATIVE
ETHANOL EXG-MCNC: 0 MG/DL
FLUAV RNA RESP QL NAA+PROBE: NEGATIVE
FLUBV RNA RESP QL NAA+PROBE: NEGATIVE
GLUCOSE SERPL-MCNC: 328 MG/DL (ref 65–140)
GLUCOSE SERPL-MCNC: 347 MG/DL (ref 65–140)
METHADONE UR QL: NEGATIVE
OPIATES UR QL SCN: NEGATIVE
OXYCODONE+OXYMORPHONE UR QL SCN: NEGATIVE
PCP UR QL: NEGATIVE
RSV RNA RESP QL NAA+PROBE: NEGATIVE
SARS-COV-2 RNA RESP QL NAA+PROBE: NEGATIVE
THC UR QL: NEGATIVE

## 2022-05-17 PROCEDURE — 99282 EMERGENCY DEPT VISIT SF MDM: CPT | Performed by: PHYSICIAN ASSISTANT

## 2022-05-17 PROCEDURE — 99284 EMERGENCY DEPT VISIT MOD MDM: CPT

## 2022-05-17 PROCEDURE — 80307 DRUG TEST PRSMV CHEM ANLYZR: CPT | Performed by: PHYSICIAN ASSISTANT

## 2022-05-17 PROCEDURE — 96360 HYDRATION IV INFUSION INIT: CPT

## 2022-05-17 PROCEDURE — 0241U HB NFCT DS VIR RESP RNA 4 TRGT: CPT | Performed by: PHYSICIAN ASSISTANT

## 2022-05-17 PROCEDURE — 96372 THER/PROPH/DIAG INJ SC/IM: CPT

## 2022-05-17 PROCEDURE — 82948 REAGENT STRIP/BLOOD GLUCOSE: CPT

## 2022-05-17 PROCEDURE — 82075 ASSAY OF BREATH ETHANOL: CPT | Performed by: PHYSICIAN ASSISTANT

## 2022-05-17 RX ORDER — INSULIN GLARGINE 100 [IU]/ML
40 INJECTION, SOLUTION SUBCUTANEOUS
Status: DISCONTINUED | OUTPATIENT
Start: 2022-05-17 | End: 2022-05-17 | Stop reason: HOSPADM

## 2022-05-17 RX ORDER — FLUTICASONE PROPIONATE 110 UG/1
1 AEROSOL, METERED RESPIRATORY (INHALATION) EVERY 12 HOURS SCHEDULED
Status: DISCONTINUED | OUTPATIENT
Start: 2022-05-17 | End: 2022-05-17 | Stop reason: HOSPADM

## 2022-05-17 RX ORDER — ESCITALOPRAM OXALATE 10 MG/1
10 TABLET ORAL DAILY
Status: DISCONTINUED | OUTPATIENT
Start: 2022-05-17 | End: 2022-05-17 | Stop reason: HOSPADM

## 2022-05-17 RX ORDER — FENOFIBRATE 48 MG/1
48 TABLET, COATED ORAL DAILY
Status: DISCONTINUED | OUTPATIENT
Start: 2022-05-17 | End: 2022-05-17 | Stop reason: HOSPADM

## 2022-05-17 RX ORDER — ARIPIPRAZOLE 10 MG/1
20 TABLET ORAL DAILY
Status: DISCONTINUED | OUTPATIENT
Start: 2022-05-17 | End: 2022-05-17 | Stop reason: HOSPADM

## 2022-05-17 RX ORDER — INSULIN LISPRO 100 [IU]/ML
15 INJECTION, SOLUTION INTRAVENOUS; SUBCUTANEOUS
Status: DISCONTINUED | OUTPATIENT
Start: 2022-05-17 | End: 2022-05-17 | Stop reason: HOSPADM

## 2022-05-17 RX ADMIN — METFORMIN HYDROCHLORIDE 1000 MG: 500 TABLET ORAL at 07:39

## 2022-05-17 RX ADMIN — SODIUM CHLORIDE 1000 ML: 0.9 INJECTION, SOLUTION INTRAVENOUS at 23:30

## 2022-05-17 RX ADMIN — ARIPIPRAZOLE 20 MG: 10 TABLET ORAL at 09:54

## 2022-05-17 RX ADMIN — INSULIN HUMAN 7 UNITS: 100 INJECTION, SOLUTION PARENTERAL at 23:26

## 2022-05-17 RX ADMIN — FLUTICASONE PROPIONATE 1 PUFF: 110 AEROSOL, METERED RESPIRATORY (INHALATION) at 10:16

## 2022-05-17 RX ADMIN — ESCITALOPRAM OXALATE 10 MG: 10 TABLET ORAL at 09:54

## 2022-05-17 SDOH — ECONOMIC STABILITY - HOUSING INSECURITY: HOMELESSNESS UNSPECIFIED: Z59.00

## 2022-05-17 NOTE — ED NOTES
Patient has been coming to the ED repeatedly over the past few days and making medical complaints  Tonight he said his gf overdosed on her pills the other day and got admitted and is going to get to go to another facility after she leaves the medical floor here, so he was thinking about doing or saying that so he could go somewhere too  He reports being tired of being homeless, and being unable to get back to [place]  He has stated off and on a few times that he feels suicidal and might take pills, but then when questioned confesses that he is simply trying to get off the streets and have somewhere to stay  Additionally, he hopes wherever he is placed is closer to [place] than this hospital is  He stated that he was actually attempted to use the system, and that if he had an option to just get a ride to [place] instead, he would prefer that over pretending to be suicidal and being [facility] to a [facility]  He repeated several times that he has somewhere to stay in [place], and if he had a ride there he would be safe and no at all suicidal  He added, however, that if he can't get a ride to [place], then he will have to continue to come to the ED for fabricated reasons so he has somewhere safe to be  Discussed his comments with ED physician and PA currently in charge of his case  Due to his repeated visits to the ED and manipulative attempts to get back to [place], admitted medically or psychiatrically to avoid homelessness, he will remain in the ED until first shift when case management can be consulted to explore options of helping him return to [place] vs continued manipulation of the ED services and resources

## 2022-05-17 NOTE — ED PROVIDER NOTES
History  Chief Complaint   Patient presents with    Foot Pain     Pt reports feng foot pain x a few days     64-year-old male, currently homeless presenting for evaluation bilateral foot pain  Patient has had this foot pain for a while now  States that nobody from podiatry has called him and he has tried to get in  Believes that he cannot get an appointment for about a month  He has been walking a lot on thinks this is likely the etiology  Has not been able to change issues  Reports no skin changes on the feet  Has not been taking anything other than Tylenol  No recent falls or trauma  Patient reports no other symptoms at this time  No chest pain, shortness of breath, lightheadedness or dizziness  Having a lot of difficulty being homeless currently because his partner is currently admitted to the hospital and he is used to having someone with him  History provided by:  Patient   used: No        Prior to Admission Medications   Prescriptions Last Dose Informant Patient Reported? Taking?    ARIPiprazole (ABILIFY) 20 MG tablet   No No   Sig: Take 1 tablet (20 mg total) by mouth daily   Needle, Disp, 31G X 5/16" MISC   No No   Sig: Use in the morning   Needle, Disp, 31G X 5/16" MISC   No No   Sig: Use 3 (three) times a day   albuterol (PROVENTIL HFA,VENTOLIN HFA) 90 mcg/act inhaler   No No   Sig: Inhale 2 puffs every 4 (four) hours as needed for wheezing   dicyclomine (BENTYL) 20 mg tablet   No No   Sig: Take 1 tablet (20 mg total) by mouth 2 (two) times a day   dicyclomine (BENTYL) 20 mg tablet   No No   Sig: Take 1 tablet (20 mg total) by mouth 2 (two) times a day as needed (Abdominal cramping)   escitalopram (LEXAPRO) 10 mg tablet   No No   Sig: Take 1 tablet (10 mg total) by mouth daily   fenofibrate (TRICOR) 48 mg tablet   No No   Sig: Take 1 tablet (48 mg total) by mouth daily   fluticasone (FLOVENT HFA) 110 MCG/ACT inhaler   No No   Sig: Inhale 1 puff every 12 (twelve) hours Rinse mouth after use  insulin glargine (Lantus SoloStar) 100 units/mL injection pen   No No   Sig: Inject 40 Units under the skin daily at bedtime Every morning  Patient taking differently: Inject 40 Units under the skin daily at bedtime   insulin lispro (HumaLOG KwikPen) 100 units/mL injection pen   No No   Sig: Inject 15 Units under the skin 3 (three) times a day with meals   metFORMIN (GLUCOPHAGE) 1000 MG tablet   No No   Sig: Take 1 tablet (1,000 mg total) by mouth 2 (two) times a day with meals   naproxen (NAPROSYN) 500 mg tablet   No No   Sig: Take 1 tablet (500 mg total) by mouth 2 (two) times a day as needed for mild pain or moderate pain   neomycin-bacitracin-polymyxin b (NEOSPORIN) ointment   No No   Sig: Apply topically 2 (two) times a day   traZODone (DESYREL) 100 mg tablet   No No   Sig: Take 1 tablet (100 mg total) by mouth daily at bedtime      Facility-Administered Medications: None       Past Medical History:   Diagnosis Date    Anxiety     Asthma     Bipolar 1 disorder ([facility] )     Depression     Diabetes mellitus ([facility] )     Psychiatric disorder        Past Surgical History:   Procedure Laterality Date    APPENDECTOMY      DENTAL SURGERY         Family History   Problem Relation Age of Onset    Liver disease Mother     Scoliosis Daughter     Bipolar disorder Daughter      I have reviewed and agree with the history as documented      E-Cigarette/Vaping    E-Cigarette Use Never User      E-Cigarette/Vaping Substances     Social History     Tobacco Use    Smoking status: Former Smoker     Packs/day: 0 20     Years: 1 00     Pack years: 0 20     Types: Cigarettes     Start date: 37     Quit date:      Years since quittin 4    Smokeless tobacco: Never Used   Vaping Use    Vaping Use: Never used   Substance Use Topics    Alcohol use: Not Currently     Comment: States has had no alcohol in at least a year    Drug use: No        Review of Systems   Constitutional: Negative for chills and fever  HENT: Negative for ear pain and sore throat  Eyes: Negative for visual disturbance  Respiratory: Negative for cough and shortness of breath  Cardiovascular: Negative for chest pain and palpitations  Gastrointestinal: Negative for abdominal pain and vomiting  Genitourinary: Negative for dysuria and hematuria  Musculoskeletal: Negative for arthralgias and back pain  Skin: Negative for color change and rash  Neurological: Negative for syncope and headaches  Psychiatric/Behavioral: Negative for confusion  The patient is not nervous/anxious  All other systems reviewed and are negative  Physical Exam  ED Triage Vitals [05/14/22 2345]   Temperature Pulse Respirations Blood Pressure SpO2   97 9 °F (36 6 °C) 82 16 107/68 98 %      Temp Source Heart Rate Source Patient Position - Orthostatic VS BP Location FiO2 (%)   Oral Monitor Sitting Right arm --      Pain Score       10 - Worst Possible Pain             Orthostatic Vital Signs  Vitals:    05/14/22 2345 05/15/22 0437   BP: 107/68 104/70   Pulse: 82 79   Patient Position - Orthostatic VS: Sitting        Physical Exam  Vitals and nursing note reviewed  Constitutional:       Appearance: Normal appearance  He is well-developed  He is not ill-appearing or diaphoretic  HENT:      Head: Normocephalic and atraumatic  Right Ear: External ear normal       Left Ear: External ear normal       Nose: Nose normal       Mouth/Throat:      Mouth: Mucous membranes are moist       Pharynx: Oropharynx is clear  Eyes:      Conjunctiva/sclera: Conjunctivae normal    Cardiovascular:      Rate and Rhythm: Normal rate and regular rhythm  Heart sounds: No murmur heard  Comments: Palpable 2+ DP and PT pulses bilaterally  Normal perfusion of the lower extremities  Pulmonary:      Effort: Pulmonary effort is normal  No respiratory distress  Breath sounds: Normal breath sounds  Abdominal:      Palpations: Abdomen is soft  Tenderness: There is no abdominal tenderness  Musculoskeletal:         General: Normal range of motion  Cervical back: Normal range of motion and neck supple  Right lower leg: No edema  Left lower leg: No edema  Skin:     General: Skin is warm and dry  Comments: Normal feet bilaterally  No skin changes or ulcers  Neurological:      General: No focal deficit present  Mental Status: He is alert  Gait: Gait normal    Psychiatric:         Mood and Affect: Mood normal          ED Medications  Medications   acetaminophen (TYLENOL) tablet 650 mg (650 mg Oral Given 5/15/22 0145)       Diagnostic Studies  Results Reviewed     None                 No orders to display         Procedures  Procedures      ED Course                             SBIRT 22yo+    Flowsheet Row Most Recent Value   SBIRT (25 yo +)    In order to provide better care to our patients, we are screening all of our patients for alcohol and drug use  Would it be okay to ask you these screening questions? No Filed at: 05/15/2022 0148                [facility]  Number of Diagnoses or Management Options  Bilateral foot pain  Diagnosis management comments: 52-year-old male presenting for bilateral foot pain  Well-appearing on exam without any abnormalities  Likely related to him walking in excess, will send podiatry referral   Discharge  Disposition  Final diagnoses:   Bilateral foot pain     Time reflects when diagnosis was documented in both MDM as applicable and the Disposition within this note     Time User Action Codes Description Comment    5/15/2022  1:41 AM Afua Henry Add [[id number],  J85 [id number]] Bilateral foot pain       ED Disposition     ED Disposition   Discharge    Condition   Good    Date/Time   Sun May 15, 2022  1:41 AM    Roberta Hunt discharge to home/self care                 Follow-up Information     Follow up With Specialties Details Why Contact Info Additional Information    Ashley Hernandez Mo Tobin MD Family Medicine Schedule an appointment as soon as possible for a visit in 1 week For reevaluation as we discussed  [address] [facility] Emergency Department Emergency Medicine Go to  As needed Eli [id number]  [address] [facility] Emergency Department, [address] , [place], [place], [address] Schedule an appointment as soon as possible for a visit  For reevaluation as we discussed   [address] [id number]  [address] [place] [address], [address] [place], [place] [address] [address]to , [place], [place], [address], [phone]          Discharge Medication List as of 5/15/2022  4:26 AM      CONTINUE these medications which have NOT CHANGED    Details   albuterol (PROVENTIL HFA,VENTOLIN HFA) 90 mcg/act inhaler Inhale 2 puffs every 4 (four) hours as needed for wheezing, Starting Fri 2/4/2022, Normal      ARIPiprazole (ABILIFY) 20 MG tablet Take 1 tablet (20 mg total) by mouth daily, Starting Fri 2/4/2022, Until Sat 3/19/2022, Normal      !! dicyclomine (BENTYL) 20 mg tablet Take 1 tablet (20 mg total) by mouth 2 (two) times a day, Starting Fri 2/4/2022, Normal      escitalopram (LEXAPRO) 10 mg tablet Take 1 tablet (10 mg total) by mouth daily, Starting Fri 2/4/2022, Until Sat 3/19/2022, Normal      fenofibrate (TRICOR) 48 mg tablet Take 1 tablet (48 mg total) by mouth daily, Starting Fri 2/4/2022, Until Sat 3/19/2022, Normal      fluticasone (FLOVENT HFA) 110 MCG/ACT inhaler Inhale 1 puff every 12 (twelve) hours Rinse mouth after use , Starting Fri 2/4/2022, Normal      insulin glargine (Lantus SoloStar) 100 units/mL injection pen Inject 40 Units under the skin daily at bedtime Every morning , Starting Fri 2/4/2022, Until Sat 3/19/2022, Normal      insulin lispro (HumaLOG KwikPen) 100 units/mL injection pen Inject 15 Units under the skin 3 (three) times a day with meals, Starting Fri 2/4/2022, Until Sat 3/19/2022, Normal      metFORMIN (GLUCOPHAGE) 1000 MG tablet Take 1 tablet (1,000 mg total) by mouth 2 (two) times a day with meals, Starting Fri 2/4/2022, Until Sat 3/19/2022, Normal      traZODone (DESYREL) 100 mg tablet Take 1 tablet (100 mg total) by mouth daily at bedtime, Starting Fri 2/4/2022, Until Sat 3/19/2022, Normal      !! dicyclomine (BENTYL) 20 mg tablet Take 1 tablet (20 mg total) by mouth 2 (two) times a day as needed (Abdominal cramping), Starting Sat 3/19/2022, Normal      naproxen (NAPROSYN) 500 mg tablet Take 1 tablet (500 mg total) by mouth 2 (two) times a day as needed for mild pain or moderate pain, Starting Sat 3/19/2022, Normal      !! Needle, Disp, 31G X 5/16" MISC Use in the morning, Starting Fri 2/4/2022, Normal      !! Needle, Disp, 31G X 5/16" MISC Use 3 (three) times a day, Starting Fri 2/4/2022, Normal      neomycin-bacitracin-polymyxin b (NEOSPORIN) ointment Apply topically 2 (two) times a day, Starting Sun 3/6/2022, Print       !! - Potential duplicate medications found  Please discuss with provider  PDMP Review     None           ED Provider  Attending physically available and evaluated Maria Luisa Lorenzo  I managed the patient along with the ED Attending      Electronically Signed by         Sachi Henry MD  05/17/22 0417

## 2022-05-17 NOTE — ED NOTES
Spoke to patient regarding discharge  Patient states he needs a ride to [place] and he doesn't live here  Patient was told we do not provide bus passes  Patient denies suicidal and homicidal ideations and will be discharged with a list of shelters and outpatient referrals

## 2022-05-17 NOTE — ED NOTES
Pt states "I just want to go back to [place] I dont want to be homeless anymore" pt states "I dont actually want to hurt myself I just need somewhere to live I cant keep doing this "      Monie Munson, STUART  05/16/22 2047

## 2022-05-17 NOTE — DISCHARGE INSTRUCTIONS
DISCHARGE INSTRUCTIONS:    FOLLOW UP WITH YOUR PRIMARY CARE PROVIDER OR THE [address] [place] [address]  MAKE AN APPOINTMENT TO BE SEEN  FOLLOW UP WITH CRISIS REFERRALS  IF SYMPTOMS WORSEN OR NEW SYMPTOMS ARISE, RETURN TO THE ER TO BE SEEN

## 2022-05-17 NOTE — ED CARE HANDOFF
Emergency Department Sign Out Note        Sign out and transfer of care from [facility]  See Separate Emergency Department note  The patient, Carmelina Steven, was evaluated by the previous provider for homelessness and ongoing suicidal thoughts  Workup Completed:  Covid/flu/rsv  UDS  POCT alcohol breath test  Seen by Crisis  Reported he really just wants to be sent back to [place] area  If he can be sent to [place] then he will no longer be suicidal      ED Course / Workup Pending (followup):  0600 - Patient initially said he was suicidal but then told multiple staff members that he really was not suicidal but was looking for a way to get back to the [place] area, where he is from  Patient is waiting to be seen by case management to possibly get him back to the [place] area  1105 Patient seen by myself and Crisis  Patient states he is just looking for a way to get back to [place] area where he is from  At this time, we are unable to provide transport from [facility] to [facility] discharge at this time with information for shelters and psychiatry follow up  RTER precautions given and patient agreeable  The management plan was discussed in detail with the patient at bedside and all questions were answered  Prior to discharge, we provided both verbal and written instructions  We discussed with the patient the signs and symptoms for which to return to the emergency department  All questions were answered and patient was comfortable with the plan of care and discharged to home  Instructed the patient to follow up with the primary care provider and/or specialist provided and their written instructions  The patient verbalized understanding of our discussion and plan of care, and agrees to return to the Emergency Department for concerns and progression of illness      At discharge, I instructed the patient to:  -follow up with pcp  -follow up with Crisis referrals  -return to the ER if symptoms worsened or new symptoms arose  Patient agreed to this plan and was stable at time of discharge  ED Course as of 05/17/22 1152   Tue May 17, 2022   1105 Patient seen by myself and Crisis  Patient states he is just looking for a way to get back to [place] area where he is from  Will discharge at this time with information for shelters and psychiatry follow up  RTER precautions given and patient agreeable  Procedures  MDM  Number of Diagnoses or Management Options  Homelessness: new and requires workup  Diagnosis management comments:          Amount and/or Complexity of Data Reviewed  Clinical lab tests: ordered and reviewed  Review and summarize past medical records: yes  Discuss the patient with other providers: yes    Patient Progress  Patient progress: stable          Disposition  Final diagnoses:   Homelessness     Time reflects when diagnosis was documented in both MDM as applicable and the Disposition within this note     Time User Action Codes Description Comment    5/17/2022  5:41 AM Emma Vega Add [[id number]] Homelessness       ED Disposition     ED Disposition   Discharge    Condition   Stable    Date/Time   Tue May 17, 2022 11:03 AM    Comment   [place] Miss[place] discharge to home/self care                 MD Documentation    Flowsheet Row Most Recent Value   Sending MD Balderrama      Follow-up Information     Follow up With Specialties Details Why Contact Info    Giovanny Costa MD Family Medicine Schedule an appointment as soon as possible for a visit in 1 day  [address]  [address] [id number] [phone]          Discharge Medication List as of 5/17/2022 11:05 AM      CONTINUE these medications which have NOT CHANGED    Details   albuterol (PROVENTIL HFA,VENTOLIN HFA) 90 mcg/act inhaler Inhale 2 puffs every 4 (four) hours as needed for wheezing, Starting Fri 2/4/2022, Normal      ARIPiprazole (ABILIFY) 20 MG tablet Take 1 tablet (20 mg total) by mouth daily, Starting Fri 2/4/2022, Until Tue 5/17/2022, Normal      Diclofenac Sodium (VOLTAREN) 1 % Apply 2 g topically in the morning and 2 g at noon and 2 g in the evening and 2 g before bedtime  Do all this for 7 days  , Starting Mon 5/16/2022, Until Mon 5/23/2022, Normal      !! dicyclomine (BENTYL) 20 mg tablet Take 1 tablet (20 mg total) by mouth 2 (two) times a day, Starting Fri 2/4/2022, Normal      escitalopram (LEXAPRO) 10 mg tablet Take 1 tablet (10 mg total) by mouth daily, Starting Fri 2/4/2022, Until Tue 5/17/2022, Normal      fenofibrate (TRICOR) 48 mg tablet Take 1 tablet (48 mg total) by mouth daily, Starting Fri 2/4/2022, Until Tue 5/17/2022, Normal      fluticasone (FLOVENT HFA) 110 MCG/ACT inhaler Inhale 1 puff every 12 (twelve) hours Rinse mouth after use , Starting Fri 2/4/2022, Normal      insulin glargine (Lantus SoloStar) 100 units/mL injection pen Inject 40 Units under the skin daily at bedtime Every morning , Starting Fri 2/4/2022, Until Tue 5/17/2022, Normal      insulin lispro (HumaLOG KwikPen) 100 units/mL injection pen Inject 15 Units under the skin 3 (three) times a day with meals, Starting Fri 2/4/2022, Until Tue 5/17/2022, Normal      metFORMIN (GLUCOPHAGE) 1000 MG tablet Take 1 tablet (1,000 mg total) by mouth 2 (two) times a day with meals, Starting Fri 2/4/2022, Until Tue 5/17/2022, Normal      traZODone (DESYREL) 100 mg tablet Take 1 tablet (100 mg total) by mouth daily at bedtime, Starting Fri 2/4/2022, Until Tue 5/17/2022, Normal      !! dicyclomine (BENTYL) 20 mg tablet Take 1 tablet (20 mg total) by mouth 2 (two) times a day as needed (Abdominal cramping), Starting Sat 3/19/2022, Normal      naproxen (NAPROSYN) 500 mg tablet Take 1 tablet (500 mg total) by mouth 2 (two) times a day as needed for mild pain or moderate pain, Starting Sat 3/19/2022, Normal      !! Needle, Disp, 31G X 5/16" MISC Use in the morning, Starting Fri 2/4/2022, Normal      !!  Needle, Disp, 31G X 5/16" MISC Use 3 (three) times a day, Starting Fri 2/4/2022, Normal      neomycin-bacitracin-polymyxin b (NEOSPORIN) ointment Apply topically 2 (two) times a day, Starting Sun 3/6/2022, Print       !! - Potential duplicate medications found  Please discuss with provider  No discharge procedures on file         ED Provider  Electronically Signed by     Eda Barboza PA-C  05/17/22 5556

## 2022-05-17 NOTE — ED PROVIDER NOTES
History  Chief Complaint   Patient presents with    Homeless     Pt states "  I want to go back to [place], I dont want to be homeless no where will take me"  "my girlfriend took all her pills and got admitted so im about to do the same"     50 y/o M PMHx diabetes and homelessness p/w suicidal thoughts "for a while"  He endorses plan to "take all the pills in my bag " He denies homicidal thoughts, auditory and visual hallucinations  He denies any other complaints at this time  He states "I am not from up here I am from [place] and I am suicidal because I have no where to go "  Patient was evaluated in the ED the past 2 nights without any mention of suicidal ideation  Patient is seen in the ER frequently for different complaints  History provided by:  Patient   used: No        Prior to Admission Medications   Prescriptions Last Dose Informant Patient Reported? Taking? ARIPiprazole (ABILIFY) 20 MG tablet   No Yes   Sig: Take 1 tablet (20 mg total) by mouth daily   Diclofenac Sodium (VOLTAREN) 1 %   No Yes   Sig: Apply 2 g topically in the morning and 2 g at noon and 2 g in the evening and 2 g before bedtime  Do all this for 7 days     Needle, Disp, 31G X 5/16" MISC   No No   Sig: Use in the morning   Needle, Disp, 31G X 5/16" MISC   No No   Sig: Use 3 (three) times a day   albuterol (PROVENTIL HFA,VENTOLIN HFA) 90 mcg/act inhaler   No Yes   Sig: Inhale 2 puffs every 4 (four) hours as needed for wheezing   dicyclomine (BENTYL) 20 mg tablet   No Yes   Sig: Take 1 tablet (20 mg total) by mouth 2 (two) times a day   dicyclomine (BENTYL) 20 mg tablet   No No   Sig: Take 1 tablet (20 mg total) by mouth 2 (two) times a day as needed (Abdominal cramping)   escitalopram (LEXAPRO) 10 mg tablet   No Yes   Sig: Take 1 tablet (10 mg total) by mouth daily   fenofibrate (TRICOR) 48 mg tablet   No Yes   Sig: Take 1 tablet (48 mg total) by mouth daily   fluticasone (FLOVENT HFA) 110 MCG/ACT inhaler   No Yes   Sig: Inhale 1 puff every 12 (twelve) hours Rinse mouth after use  insulin glargine (Lantus SoloStar) 100 units/mL injection pen   No Yes   Sig: Inject 40 Units under the skin daily at bedtime Every morning  Patient taking differently: Inject 40 Units under the skin daily at bedtime   insulin lispro (HumaLOG KwikPen) 100 units/mL injection pen   No Yes   Sig: Inject 15 Units under the skin 3 (three) times a day with meals   metFORMIN (GLUCOPHAGE) 1000 MG tablet   No Yes   Sig: Take 1 tablet (1,000 mg total) by mouth 2 (two) times a day with meals   naproxen (NAPROSYN) 500 mg tablet   No No   Sig: Take 1 tablet (500 mg total) by mouth 2 (two) times a day as needed for mild pain or moderate pain   neomycin-bacitracin-polymyxin b (NEOSPORIN) ointment   No No   Sig: Apply topically 2 (two) times a day   traZODone (DESYREL) 100 mg tablet   No Yes   Sig: Take 1 tablet (100 mg total) by mouth daily at bedtime      Facility-Administered Medications: None       Past Medical History:   Diagnosis Date    Anxiety     Asthma     [facility]     Depression     Diabetes mellitus ([facility] )     Psychiatric disorder        Past Surgical History:   Procedure Laterality Date    APPENDECTOMY      DENTAL SURGERY         Family History   Problem Relation Age of Onset    Liver disease Mother     Scoliosis Daughter     Bipolar disorder Daughter      I have reviewed and agree with the history as documented  E-Cigarette/Vaping    E-Cigarette Use Never User      E-Cigarette/Vaping Substances     Social History     Tobacco Use    Smoking status: Former Smoker     Packs/day: 0 20     Years: 1 00     Pack years: 0 20     Types: Cigarettes     Start date: 37     Quit date:      Years since quittin 4    Smokeless tobacco: Never Used   Vaping Use    Vaping Use: Never used   Substance Use Topics    Alcohol use: Not Currently     Comment: States has had no alcohol in at least a year    Drug use:  No Review of Systems   Constitutional: Negative for appetite change, chills, fatigue, fever and unexpected weight change  HENT: Negative for congestion, hearing loss, sore throat and trouble swallowing  Eyes: Negative for visual disturbance  Respiratory: Negative for cough, chest tightness and shortness of breath  Cardiovascular: Negative for chest pain, palpitations and leg swelling  Gastrointestinal: Negative for abdominal pain, constipation, diarrhea, nausea and vomiting  Endocrine: Negative for polydipsia  Genitourinary: Negative for dysuria, frequency and urgency  Musculoskeletal: Negative for arthralgias  Skin: Negative for color change and rash  Neurological: Negative for dizziness, weakness, numbness and headaches  Psychiatric/Behavioral: Positive for suicidal ideas  Negative for dysphoric mood and sleep disturbance  The patient is not nervous/anxious  All other systems reviewed and are negative  Physical Exam  Physical Exam  Vitals reviewed  Constitutional:       General: He is not in acute distress  Appearance: Normal appearance  He is well-developed and well-groomed  He is not ill-appearing, toxic-appearing or diaphoretic  HENT:      Head: Normocephalic and atraumatic  Right Ear: External ear normal       Left Ear: External ear normal       Nose: Nose normal  No congestion or rhinorrhea  Mouth/Throat:      Mouth: Mucous membranes are moist       Pharynx: Oropharynx is clear  No oropharyngeal exudate or posterior oropharyngeal erythema  Eyes:      General: No scleral icterus  Right eye: No discharge  Left eye: No discharge  Extraocular Movements: Extraocular movements intact  Conjunctiva/sclera: Conjunctivae normal    Cardiovascular:      Rate and Rhythm: Normal rate and regular rhythm  Pulses: Normal pulses  Heart sounds: No murmur heard  No friction rub  No gallop     Pulmonary:      Effort: Pulmonary effort is normal  No respiratory distress  Breath sounds: Normal breath sounds  No wheezing, rhonchi or rales  Abdominal:      General: Abdomen is flat  There is no distension  Palpations: Abdomen is soft  Tenderness: There is no abdominal tenderness  There is no right CVA tenderness, left CVA tenderness, guarding or rebound  Musculoskeletal:         General: No deformity  Normal range of motion  Cervical back: Normal range of motion and neck supple  Skin:     General: Skin is warm and dry  Coloration: Skin is not jaundiced or pale  Findings: No rash  Neurological:      General: No focal deficit present  Mental Status: He is alert  Psychiatric:         Mood and Affect: Mood normal          Thought Content: Thought content includes suicidal ideation  Thought content includes suicidal plan           Vital Signs  ED Triage Vitals [05/16/22 2044]   Temperature Pulse Respirations Blood Pressure SpO2   98 6 °F (37 °C) 86 18 136/89 100 %      Temp Source Heart Rate Source Patient Position - Orthostatic VS BP Location FiO2 (%)   Oral Monitor -- -- --      Pain Score       --           Vitals:    05/16/22 2044   BP: 136/89   Pulse: 86         Visual Acuity      ED Medications  Medications   ARIPiprazole (ABILIFY) tablet 20 mg (has no administration in time range)   escitalopram (LEXAPRO) tablet 10 mg (has no administration in time range)   fenofibrate (TRICOR) tablet 48 mg (has no administration in time range)   fluticasone (FLOVENT HFA) 110 MCG/ACT inhaler 1 puff (has no administration in time range)   metFORMIN (GLUCOPHAGE) tablet 1,000 mg (has no administration in time range)   insulin lispro (HumaLOG) 100 units/mL subcutaneous injection 15 Units (has no administration in time range)   insulin glargine (LANTUS) subcutaneous injection 40 Units 0 4 mL (has no administration in time range)       Diagnostic Studies  Results Reviewed     Procedure Component Value Units Date/Time COVID/FLU/RSV [[id number]]  (Normal) Collected: 05/17/22 0335    Lab Status: Final result Specimen: Nares from Nose Updated: 05/17/22 0424     SARS-CoV-2 Negative     INFLUENZA A PCR Negative     INFLUENZA B PCR Negative     RSV PCR Negative    Narrative:      FOR PEDIATRIC PATIENTS - copy/paste COVID Guidelines URL to browser: [email or web address]    SARS-CoV-2 assay is a Nucleic Acid Amplification assay intended for the  qualitative detection of nucleic acid from SARS-CoV-2 in nasopharyngeal  swabs  Results are for the presumptive identification of SARS-CoV-2 RNA  Positive results are indicative of infection with SARS-CoV-2, the virus  causing COVID-19, but do not rule out bacterial infection or co-infection  with other viruses  Laboratories within the [place] and its  territories are required to report all positive results to the appropriate  public health authorities  Negative results do not preclude SARS-CoV-2  infection and should not be used as the sole basis for treatment or other  patient management decisions  Negative results must be combined with  clinical observations, patient history, and epidemiological information  This test has not been FDA cleared or approved  This test has been authorized by FDA under an Emergency Use Authorization  (EUA)  This test is only authorized for the duration of time the  declaration that circumstances exist justifying the authorization of the  emergency use of an in vitro diagnostic tests for detection of SARS-CoV-2  virus and/or diagnosis of COVID-19 infection under section 564(b)(1) of  the Act, 21 U  S C  847MXB-1(G)(0), unless the authorization is terminated  or revoked sooner  The test has been validated but independent review by FDA  and CLIA is pending  Test performed using [facility] Gene[employer]pert: This RT-PCR assay targets N2,  a region unique to SARS-CoV-2   A conserved region in the E-gene was chosen  for pan-Sarbecovirus detection which includes SARS-CoV-2  Rapid drug screen, urine [[id number]] Collected: 05/17/22 0327    Lab Status: In process Specimen: Urine, Clean Catch Updated: 05/17/22 0340    POCT alcohol breath test [[id number]]  (Normal) Resulted: 05/17/22 0311    Lab Status: Final result Updated: 05/17/22 0311     EXTBreath Alcohol 0 000                 No orders to display              Procedures  Procedures         ED Course  ED Course as of 05/17/22 0631   Tue May 17, 2022   0300 He informed nursing after triage that he did not want to hurt himself, he just wanted to go back to [place]  4518 Patient is medically cleared to be evaluated by crisis  0500 Discussed patient with crisis worker  He informed crisis that his gf OD on pills and got admitted and will be transferred to another facility at d/c so he wanted to do the same  When questioned further he reports that he does not actually want to harm himself, he just wants to get off the streets and have somewhere to stay  He states he has a place to stay in [place] and if he could get a ride there he would not be suicidal and would be safe  Will discuss with morning shift AP about need for case management consult to help patient get to [place]  SBIRT 22yo+    Flowsheet Row Most Recent Value   SBIRT (23 yo +)    In order to provide better care to our patients, we are screening all of our patients for alcohol and drug use  Would it be okay to ask you these screening questions? No Filed at: 05/17/2022 0247                    MDM  Number of Diagnoses or Management Options  Homelessness: new and requires workup  Diagnosis management comments:     Patient is a 60-year-old male presenting with "suicidal thoughts" with a plan to "take all of his pills in his bag " Patient has been seen in the ED the past 2 nights without mention of suicidal ideation  He does state that these suicidal ideas have been going on for some time now    He admits that his girlfriend has been had admitted after overdose, and will be transferred to a facility after discharge and he wants to do the same  Discussed patient with crisis who will evaluate patient  Upon further discussion with crisis worker, patient admitted that he does not actually want to harm himself and he just does not want to live on the streets anymore  He states that he would like to get back to [place] and does have a safe place to stay there but has not been able to get back  He informed the crisis worker that he would be safe and claim to be suicidal if he was able to get back to [place]  He did state that if he was unable to get back to [place] or into some facility, he would continue to return to the ED so that he is off the street  Patient was informed that we can discuss with case management in the morning to see if there is a possibility of getting him back to [place]  Patient is agreeable to this plan  Sign out given to morning PA-C  Patient is stable at time of sign out         Amount and/or Complexity of Data Reviewed  Clinical lab tests: ordered and reviewed  Tests in the medicine section of CPT®: ordered and reviewed  Review and summarize past medical records: yes  Discuss the patient with other providers: yes  Independent visualization of images, tracings, or specimens: yes    Risk of Complications, Morbidity, and/or Mortality  Presenting problems: high  Diagnostic procedures: low  Management options: low    Patient Progress  Patient progress: resolved      Disposition  Final diagnoses:   Homelessness     Time reflects when diagnosis was documented in both MDM as applicable and the Disposition within this note     Time User Action Codes Description Comment    5/17/2022  5:41 AM Caleb Armenta Add [Z59 00] Homelessness       ED Disposition     None      MD Documentation    Flowsheet Row Most Recent Value   Sending MD Balderrama      Follow-up Information    None Patient's Medications   Discharge Prescriptions    No medications on file       No discharge procedures on file      PDMP Review     None          ED Provider  Electronically Signed by JASVIR Vizcaino PA-C  05/17/22 1445

## 2022-05-17 NOTE — ED NOTES
Reports received and care assumed at this time  Pt a  a ox3 with easy nonlabored respirations  Pt updated on continued plan of care   Pt agrees and verbalizes understanding     Zeb Rico RN  05/17/22 2462

## 2022-05-18 VITALS
SYSTOLIC BLOOD PRESSURE: 108 MMHG | HEART RATE: 77 BPM | OXYGEN SATURATION: 96 % | BODY MASS INDEX: 19.77 KG/M2 | HEIGHT: 66 IN | WEIGHT: 123 LBS | DIASTOLIC BLOOD PRESSURE: 69 MMHG | TEMPERATURE: 98.1 F | RESPIRATION RATE: 16 BRPM

## 2022-05-18 LAB
GLUCOSE SERPL-MCNC: 238 MG/DL (ref 65–140)
GLUCOSE SERPL-MCNC: 250 MG/DL (ref 65–140)

## 2022-05-18 PROCEDURE — 82948 REAGENT STRIP/BLOOD GLUCOSE: CPT

## 2022-05-18 RX ORDER — ESCITALOPRAM OXALATE 10 MG/1
10 TABLET ORAL DAILY
Status: CANCELLED | OUTPATIENT
Start: 2022-05-18

## 2022-05-18 RX ORDER — ARIPIPRAZOLE 10 MG/1
20 TABLET ORAL DAILY
Status: CANCELLED | OUTPATIENT
Start: 2022-05-18

## 2022-05-18 RX ORDER — FENOFIBRATE 48 MG/1
48 TABLET, COATED ORAL DAILY
Status: CANCELLED | OUTPATIENT
Start: 2022-05-18

## 2022-05-18 NOTE — ED NOTES
Spoke with Dr Uli De Santiago who was in communication with [facility] regarding this patient  Their plan is the plan put in place last night in this same situation  A consult order for Case Management to see this patient so they can arrange for a bus pass for him to return to his home in [place]  He repeatedly checks into the ED and makes threats to claim SI unless he is given assistance to get him back to [place]  He consistently denies actually feeling suicidal, but says he knows of no other way to get off the streets and have someone to stay, be fed, etc  He confirms that he has a place to stay in [place] and will have absolutely no SI once he gets back there  Crisis consult will be marked completed at this time as both PA and physician state crisis consult was not knowingly ordered, and this case is now a case management issue

## 2022-05-18 NOTE — CASE MANAGEMENT
Case Management ED Progress Note    Patient name Pamela Turner  Location Larry Mosquera MRN [id number]  : 1970 Date 2022        OBJECTIVE:  Predictive Model Details         38% Factor Value    Risk of Hospital Admission or ED Visit Model Number of ED Visits 5+     Is in Relationship Yes     Has Medicaid Yes     Number of Hospitalizations 1     Has Depression Yes     Has Diabetes Yes     Has Asthma Yes     Has PCP Yes            Chief Complaint: Psychiatric complaint   Patient Class: Emergency  Preferred Pharmacy:   [address], [address] [facility] [facility]  [address]  [place] [address]  Phone: [phone] Fax: [address] [facility] [address] [facility] [address] [address]  Phone: [phone] Fax: [phone]    Primary Care Provider: Jannette Hamilton MD    Primary Insurance: Kimberley Whittington  Secondary Insurance:     ED Progress Note:    Case rteferred by attending  This is his fourth admission to this ED  Patient is homeless, planning to go to his father who lives in [place], [place]  Patient claims has no one who can help him, has no money  [facility] was contact and [facility] scheduled for transport today  Patient destination [place] No other needs reoported by patient

## 2022-05-18 NOTE — ED PROVIDER NOTES
History  Chief Complaint   Patient presents with    Psychiatric Evaluation     Pt reports he feels suicidal with plan to take all of his meds  49 YOM with PMH diabetes and homelessness presents with suicidal thoughts "for a while"  He endorses plan to "take all the pills in my bag"  He denies homicidal thoughts, auditory and visual hallucinations  Denies any other complaints at this time  He states "I need a ribe back to [place] because that is where I am from and I am suicidal because I have nowhere to go"  Pt states "I came here to sign myself in to get treatment  My girlfriend did the same thing"  Pt was seen here last night for the same complaints  A consult to case management was placed however they somehow never saw him this morning  The previous 2 nights prior to last night patient was seen here without any mention of suicidal ideation  Of note patient does sit in the ER with pills in his bag and has not tried to take any  Prior to Admission Medications   Prescriptions Last Dose Informant Patient Reported? Taking? ARIPiprazole (ABILIFY) 20 MG tablet Not Taking at Unknown time  No No   Sig: Take 1 tablet (20 mg total) by mouth daily   Patient not taking: Reported on 5/17/2022   Diclofenac Sodium (VOLTAREN) 1 % Not Taking at Unknown time  No No   Sig: Apply 2 g topically in the morning and 2 g at noon and 2 g in the evening and 2 g before bedtime  Do all this for 7 days     Patient not taking: Reported on 5/17/2022   Needle, Disp, 31G X 5/16" MISC Not Taking at Unknown time  No No   Sig: Use in the morning   Patient not taking: Reported on 5/17/2022   Needle, Disp, 31G X 5/16" MISC Not Taking at Unknown time  No No   Sig: Use 3 (three) times a day   Patient not taking: Reported on 5/17/2022   albuterol (PROVENTIL HFA,VENTOLIN HFA) 90 mcg/act inhaler Not Taking at Unknown time  No No   Sig: Inhale 2 puffs every 4 (four) hours as needed for wheezing   Patient not taking: Reported on 5/17/2022 dicyclomine (BENTYL) 20 mg tablet Not Taking at Unknown time  No No   Sig: Take 1 tablet (20 mg total) by mouth 2 (two) times a day   Patient not taking: Reported on 5/17/2022   dicyclomine (BENTYL) 20 mg tablet Not Taking at Unknown time  No No   Sig: Take 1 tablet (20 mg total) by mouth 2 (two) times a day as needed (Abdominal cramping)   Patient not taking: Reported on 5/17/2022   escitalopram (LEXAPRO) 10 mg tablet Not Taking at Unknown time  No No   Sig: Take 1 tablet (10 mg total) by mouth daily   Patient not taking: Reported on 5/17/2022   fenofibrate (TRICOR) 48 mg tablet Not Taking at Unknown time  No No   Sig: Take 1 tablet (48 mg total) by mouth daily   Patient not taking: Reported on 5/17/2022   fluticasone (FLOVENT HFA) 110 MCG/ACT inhaler Not Taking at Unknown time  No No   Sig: Inhale 1 puff every 12 (twelve) hours Rinse mouth after use  Patient not taking: Reported on 5/17/2022   insulin glargine (Lantus SoloStar) 100 units/mL injection pen Not Taking at Unknown time  No No   Sig: Inject 40 Units under the skin daily at bedtime Every morning     Patient not taking: Reported on 5/17/2022   insulin lispro (HumaLOG KwikPen) 100 units/mL injection pen Not Taking at Unknown time  No No   Sig: Inject 15 Units under the skin 3 (three) times a day with meals   Patient not taking: Reported on 5/17/2022   metFORMIN (GLUCOPHAGE) 1000 MG tablet Not Taking at Unknown time  No No   Sig: Take 1 tablet (1,000 mg total) by mouth 2 (two) times a day with meals   Patient not taking: Reported on 5/17/2022   naproxen (NAPROSYN) 500 mg tablet Not Taking at Unknown time  No No   Sig: Take 1 tablet (500 mg total) by mouth 2 (two) times a day as needed for mild pain or moderate pain   Patient not taking: Reported on 5/17/2022   neomycin-bacitracin-polymyxin b (NEOSPORIN) ointment Not Taking at Unknown time  No No   Sig: Apply topically 2 (two) times a day   Patient not taking: Reported on 5/17/2022   traZODone ([facility]) 100 mg tablet Not Taking at Unknown time  No No   Sig: Take 1 tablet (100 mg total) by mouth daily at bedtime   Patient not taking: Reported on 2022      Facility-Administered Medications: None       Past Medical History:   Diagnosis Date    Anxiety     Asthma     [facility] disorder ([facility] )     Depression     Diabetes mellitus ([facility] )     Psychiatric disorder        Past Surgical History:   Procedure Laterality Date    APPENDECTOMY      DENTAL SURGERY         Family History   Problem Relation Age of Onset    Liver disease Mother     Scoliosis Daughter     Bipolar disorder Daughter      I have reviewed and agree with the history as documented  E-Cigarette/Vaping    E-Cigarette Use Never User      E-Cigarette/Vaping Substances     Social History     Tobacco Use    Smoking status: Former Smoker     Packs/day: 0 20     Years: 1 00     Pack years: 0 20     Types: Cigarettes     Start date:      Quit date:      Years since quittin 4    Smokeless tobacco: Never Used   Vaping Use    Vaping Use: Never used   Substance Use Topics    Alcohol use: Not Currently     Comment: States has had no alcohol in at least a year    Drug use: No       Review of Systems   Constitutional: Negative for chills and fever  HENT: Negative for ear pain and sore throat  Eyes: Negative for pain and visual disturbance  Respiratory: Negative for cough and shortness of breath  Cardiovascular: Negative for chest pain and palpitations  Gastrointestinal: Negative for abdominal pain and vomiting  Genitourinary: Negative for dysuria and hematuria  Musculoskeletal: Negative for arthralgias and back pain  Skin: Negative for color change and rash  Neurological: Negative for seizures and syncope  Psychiatric/Behavioral: Positive for suicidal ideas  Negative for hallucinations and self-injury  The patient is not nervous/anxious and is not hyperactive      All other systems reviewed and are negative  Physical Exam  Physical Exam  Vitals and nursing note reviewed  Constitutional:       Appearance: He is well-developed  HENT:      Head: Normocephalic and atraumatic  Eyes:      Conjunctiva/sclera: Conjunctivae normal    Cardiovascular:      Rate and Rhythm: Normal rate and regular rhythm  Heart sounds: No murmur heard  Pulmonary:      Effort: Pulmonary effort is normal  No respiratory distress  Breath sounds: Normal breath sounds  Abdominal:      Palpations: Abdomen is soft  Tenderness: There is no abdominal tenderness  Musculoskeletal:      Cervical back: Normal range of motion and neck supple  Skin:     General: Skin is warm and dry  Capillary Refill: Capillary refill takes less than 2 seconds  Neurological:      General: No focal deficit present  Mental Status: He is alert and oriented to person, place, and time  Psychiatric:         Attention and Perception: Attention normal          Mood and Affect: Mood normal          Speech: Speech normal          Behavior: Behavior normal          Thought Content: Thought content includes suicidal ideation  Thought content does not include homicidal ideation  Thought content includes suicidal plan  Thought content does not include homicidal plan           Vital Signs  ED Triage Vitals [05/17/22 2042]   Temperature Pulse Respirations Blood Pressure SpO2   98 1 °F (36 7 °C) 94 18 100/79 98 %      Temp Source Heart Rate Source Patient Position - Orthostatic VS BP Location FiO2 (%)   Oral Monitor Sitting Right arm --      Pain Score       No Pain           Vitals:    05/17/22 2042 05/17/22 2236 05/17/22 2331   BP: 100/79 116/78 114/61   Pulse: 94 77 67   Patient Position - Orthostatic VS: Sitting Lying Lying         Visual Acuity      ED Medications  Medications   sodium chloride 0 9 % bolus 1,000 mL (0 mL Intravenous Stopped 5/18/22 0030)   insulin regular (HumuLIN R,NovoLIN R) injection 7 Units (7 Units Subcutaneous Given 5/17/22 2326)       Diagnostic Studies  Results Reviewed     Procedure Component Value Units Date/Time    Fingerstick Glucose (POCT) [[id number]]  (Abnormal) Collected: 05/17/22 2234    Lab Status: Final result Updated: 05/17/22 2235     POC Glucose 347 mg/dl                  No orders to display              Procedures  Procedures         ED Course  ED Course as of 05/18/22 0035   Tue May 17, 2022   2206 Spoke with Hospital supervisor and explained the situation  He stated that he cannot authorize bus passes  He took pt's MRN and will pass along information to AM supervisor that case management has to come see him  2244 POC Glucose(!): 347  Will give 7 units insulin and IVF bolus       MDM  Number of Diagnoses or Management Options  Homelessness: minor  Malingering: minor  Suicidal ideations: minor  Diagnosis management comments: This is a 56-year-old male who is very well-known to us comes in with suicidal ideation with plans to take all of his pills  He states that he would not be suicidal if he was able to get a ride to [place]  He has had this same complaint and statement before  He has been given multiple resources for shelters numerous times and offered [employer] to the shelters which he ultimately declines  If he is not having suicidal ideations, he has other complaints that ultimately end up being discharged  Pt seen here last night for suicidal ideations  Crisis worker saw him (see note)  Case Management was supposed to see him this morning however they did not and patient was ultimately discharged  I placed a call to hospital supervisor and explained the situation, he stated that he would pass this information along to the morning supervisor and have Case Management come see patient in the morning 1st thing       Sign out to ERASMO BECKFORD [facility] PA-C: being seen by case management in AM  Repeat glucose after IVF       Amount and/or Complexity of Data Reviewed  Decide to obtain previous medical records or to obtain history from someone other than the patient: yes  Review and summarize past medical records: yes    Patient Progress  Patient progress: stable      Disposition  Final diagnoses:   Suicidal ideations   Malingering   Homelessness     Time reflects when diagnosis was documented in both MDM as applicable and the Disposition within this note     Time User Action Codes Description Comment    5/17/2022 10:54 PM Madelaine, [id number] Suicidal ideations     5/17/2022 10:54 PM Kristal Perez [Z76 5] Malingering     5/17/2022 10:54 PM Madelaine, [facility] Suicidal ideations     5/17/2022 10:54 PM Viviane Magallanes [Z76 5] Malingering     5/17/2022 10:54 PM Jefm Dakin Add [Z59 00] Homelessness       ED Disposition     None      Follow-up Information    None         Patient's Medications   Discharge Prescriptions    No medications on file       No discharge procedures on file      PDMP Review     None          ED Provider  Electronically Signed by           Shree Alvarez PA-C  05/18/22 4282

## 2022-05-19 NOTE — ED PROVIDER NOTES
History  Chief Complaint   Patient presents with    Foot Pain     Reports b/l foot pain, seen yesterday for same, statrs "I was out walking with my girl and I had to take my shoes off it hurt so bad", ambulated in hallway while pushing cart without difficulty     48 yo M with h/o homelessness, frequently seen in the ED for b/l foot pain presenting for same  He reports that his feet hurt because he walks all day  Does not have any access to new shoes  Has not been using anything for the pain  No rashes or lacerations to the feet  Has been keeping them dry  No other complaints today  Stating that he wants to be transferred to [place] as he was able to get into shelters there  He has no other complaints  Dneies any chest pain, sob, coughing, abd pain, n/v/d  History provided by:  Patient   used: No        Prior to Admission Medications   Prescriptions Last Dose Informant Patient Reported? Taking?    ARIPiprazole (ABILIFY) 20 MG tablet   No No   Sig: Take 1 tablet (20 mg total) by mouth daily   Patient not taking: Reported on 5/17/2022   Needle, Disp, 31G X 5/16" MISC   No No   Sig: Use in the morning   Patient not taking: Reported on 5/17/2022   Needle, Disp, 31G X 5/16" MISC   No No   Sig: Use 3 (three) times a day   Patient not taking: Reported on 5/17/2022   albuterol (PROVENTIL HFA,VENTOLIN HFA) 90 mcg/act inhaler   No No   Sig: Inhale 2 puffs every 4 (four) hours as needed for wheezing   Patient not taking: Reported on 5/17/2022   dicyclomine (BENTYL) 20 mg tablet   No No   Sig: Take 1 tablet (20 mg total) by mouth 2 (two) times a day   Patient not taking: Reported on 5/17/2022   dicyclomine (BENTYL) 20 mg tablet   No No   Sig: Take 1 tablet (20 mg total) by mouth 2 (two) times a day as needed (Abdominal cramping)   Patient not taking: Reported on 5/17/2022   escitalopram (LEXAPRO) 10 mg tablet   No No   Sig: Take 1 tablet (10 mg total) by mouth daily   Patient not taking: Reported on 5/17/2022   fenofibrate (TRICOR) 48 mg tablet   No No   Sig: Take 1 tablet (48 mg total) by mouth daily   Patient not taking: Reported on 5/17/2022   fluticasone (FLOVENT HFA) 110 MCG/ACT inhaler   No No   Sig: Inhale 1 puff every 12 (twelve) hours Rinse mouth after use  Patient not taking: Reported on 5/17/2022   insulin glargine (Lantus SoloStar) 100 units/mL injection pen   No No   Sig: Inject 40 Units under the skin daily at bedtime Every morning  Patient not taking: Reported on 5/17/2022   insulin lispro (HumaLOG KwikPen) 100 units/mL injection pen   No No   Sig: Inject 15 Units under the skin 3 (three) times a day with meals   Patient not taking: Reported on 5/17/2022   metFORMIN (GLUCOPHAGE) 1000 MG tablet   No No   Sig: Take 1 tablet (1,000 mg total) by mouth 2 (two) times a day with meals   Patient not taking: Reported on 5/17/2022   naproxen (NAPROSYN) 500 mg tablet   No No   Sig: Take 1 tablet (500 mg total) by mouth 2 (two) times a day as needed for mild pain or moderate pain   Patient not taking: Reported on 5/17/2022   neomycin-bacitracin-polymyxin b (NEOSPORIN) ointment   No No   Sig: Apply topically 2 (two) times a day   Patient not taking: Reported on 5/17/2022   traZODone (DESYREL) 100 mg tablet   No No   Sig: Take 1 tablet (100 mg total) by mouth daily at bedtime   Patient not taking: Reported on 5/17/2022      Facility-Administered Medications: None       Past Medical History:   Diagnosis Date    Anxiety     Asthma     [facility]     Depression     Diabetes mellitus [facility]     Psychiatric disorder        Past Surgical History:   Procedure Laterality Date    APPENDECTOMY      DENTAL SURGERY         Family History   Problem Relation Age of Onset    Liver disease Mother     Scoliosis Daughter     Bipolar disorder Daughter      I have reviewed and agree with the history as documented      E-Cigarette/Vaping    E-Cigarette Use Never User      E-Cigarette/Vaping Substances Social History     Tobacco Use    Smoking status: Former Smoker     Packs/day: 0 20     Years: 1 00     Pack years: 0 20     Types: Cigarettes     Start date: 37     Quit date:      Years since quittin 4    Smokeless tobacco: Never Used   Vaping Use    Vaping Use: Never used   Substance Use Topics    Alcohol use: Not Currently     Comment: States has had no alcohol in at least a year    Drug use: No        Review of Systems   Constitutional: Negative for chills, fatigue and fever  HENT: Negative for ear pain and sore throat  Eyes: Negative for visual disturbance  Respiratory: Negative for cough and shortness of breath  Cardiovascular: Negative for chest pain and palpitations  Gastrointestinal: Negative for abdominal pain and vomiting  Genitourinary: Negative for dysuria and hematuria  Musculoskeletal: Negative for arthralgias and back pain  Skin: Negative for color change and rash  Neurological: Negative for seizures and syncope  Psychiatric/Behavioral: Negative for confusion  The patient is not nervous/anxious  All other systems reviewed and are negative  Physical Exam  ED Triage Vitals [22 0140]   Temperature Pulse Respirations Blood Pressure SpO2   98 1 °F (36 7 °C) 79 17 150/91 100 %      Temp src Heart Rate Source Patient Position - Orthostatic VS BP Location FiO2 (%)   -- -- -- -- --      Pain Score       --             Orthostatic Vital Signs  Vitals:    22 0140   BP: 150/91   Pulse: 79       Physical Exam  Vitals and nursing note reviewed  Constitutional:       Appearance: Normal appearance  He is well-developed  He is not ill-appearing or diaphoretic  HENT:      Head: Normocephalic and atraumatic  Right Ear: External ear normal       Left Ear: External ear normal       Nose: Nose normal       Mouth/Throat:      Mouth: Mucous membranes are moist       Pharynx: Oropharynx is clear     Eyes:      Conjunctiva/sclera: Conjunctivae normal  Cardiovascular:      Rate and Rhythm: Normal rate and regular rhythm  Heart sounds: No murmur heard  Pulmonary:      Effort: Pulmonary effort is normal  No respiratory distress  Breath sounds: Normal breath sounds  Abdominal:      General: Abdomen is flat  There is no distension  Palpations: Abdomen is soft  Tenderness: There is no abdominal tenderness  Musculoskeletal:         General: Normal range of motion  Cervical back: Normal range of motion and neck supple  Left lower leg: No edema  Comments: No abnormalities visualized on the feet  Patient does not have any significant tenderness to palpation  Skin:     General: Skin is warm and dry  Neurological:      General: No focal deficit present  Mental Status: He is alert  Gait: Gait (Patient ambulating normally during ED visit ) normal    Psychiatric:         Mood and Affect: Mood normal          ED Medications  Medications - No data to display    Diagnostic Studies  Results Reviewed     None                 No orders to display         Procedures  Procedures      ED Course                                       MDM  Number of Diagnoses or Management Options  Bilateral foot pain  Diagnosis management comments: 45-year-old male presenting for repeat evaluation of bilateral foot pain  Patient has been seen multiple times for same  Complaints of homelessness and wanting to move to [place]  Will f/u with podiatry and PCP  Discharged  Disposition  Final diagnoses:   Bilateral foot pain     Time reflects when diagnosis was documented in both MDM as applicable and the Disposition within this note     Time User Action Codes Description Comment    5/16/2022  2:50 AM Shy Evans Add [J89 948,  M79 102] Bilateral foot pain       ED Disposition     ED Disposition   Discharge    Condition   Good    Date/Time   Mon May 16, 2022  2:50 AM    Roberta Cantor discharge to home/self care  Follow-up Information     Follow up With Specialties Details Why Contact Info Additional Information    Jace Headley MD Family Medicine Schedule an appointment as soon as possible for a visit in 1 week For reevaluation as we discussed  [address] [facility] Emergency Department Emergency Medicine Go to  As needed [place] [id number] [address] [facility] Emergency Department, [address] , [place], [address] [facility], [id number]          Discharge Medication List as of 5/16/2022  2:51 AM      START taking these medications    Details   Diclofenac Sodium (VOLTAREN) 1 % Apply 2 g topically in the morning and 2 g at noon and 2 g in the evening and 2 g before bedtime  Do all this for 7 days  , Starting Mon 5/16/2022, Until Mon 5/23/2022, Normal         CONTINUE these medications which have NOT CHANGED    Details   albuterol (PROVENTIL HFA,VENTOLIN HFA) 90 mcg/act inhaler Inhale 2 puffs every 4 (four) hours as needed for wheezing, Starting Fri 2/4/2022, Normal      ARIPiprazole (ABILIFY) 20 MG tablet Take 1 tablet (20 mg total) by mouth daily, Starting Fri 2/4/2022, Until Sat 3/19/2022, Normal      !! dicyclomine (BENTYL) 20 mg tablet Take 1 tablet (20 mg total) by mouth 2 (two) times a day, Starting Fri 2/4/2022, Normal      !! dicyclomine (BENTYL) 20 mg tablet Take 1 tablet (20 mg total) by mouth 2 (two) times a day as needed (Abdominal cramping), Starting Sat 3/19/2022, Normal      escitalopram (LEXAPRO) 10 mg tablet Take 1 tablet (10 mg total) by mouth daily, Starting Fri 2/4/2022, Until Sat 3/19/2022, Normal      fenofibrate (TRICOR) 48 mg tablet Take 1 tablet (48 mg total) by mouth daily, Starting Fri 2/4/2022, Until Sat 3/19/2022, Normal      fluticasone (FLOVENT HFA) 110 MCG/ACT inhaler Inhale 1 puff every 12 (twelve) hours Rinse mouth after use , Starting Fri 2/4/2022, Normal      insulin glargine (Lantus SoloStar) 100 units/mL injection pen Inject 40 Units under the skin daily at bedtime Every morning , Starting Fri 2/4/2022, Until Sat 3/19/2022, Normal      insulin lispro (HumaLOG KwikPen) 100 units/mL injection pen Inject 15 Units under the skin 3 (three) times a day with meals, Starting Fri 2/4/2022, Until Sat 3/19/2022, Normal      metFORMIN (GLUCOPHAGE) 1000 MG tablet Take 1 tablet (1,000 mg total) by mouth 2 (two) times a day with meals, Starting Fri 2/4/2022, Until Sat 3/19/2022, Normal      naproxen (NAPROSYN) 500 mg tablet Take 1 tablet (500 mg total) by mouth 2 (two) times a day as needed for mild pain or moderate pain, Starting Sat 3/19/2022, Normal      !! Needle, Disp, 31G X 5/16" MISC Use in the morning, Starting Fri 2/4/2022, Normal      !! Needle, Disp, 31G X 5/16" MISC Use 3 (three) times a day, Starting Fri 2/4/2022, Normal      neomycin-bacitracin-polymyxin b (NEOSPORIN) ointment Apply topically 2 (two) times a day, Starting Sun 3/6/2022, Print      traZODone (DESYREL) 100 mg tablet Take 1 tablet (100 mg total) by mouth daily at bedtime, Starting Fri 2/4/2022, Until Sat 3/19/2022, Normal       !! - Potential duplicate medications found  Please discuss with provider  No discharge procedures on file  PDMP Review     None           ED Provider  Attending physically available and evaluated Curtis Card I managed the patient along with the ED Attending      Electronically Signed by         Olga Nunez MD  05/19/22 1447

## 2023-10-01 ENCOUNTER — HOSPITAL ENCOUNTER (EMERGENCY)
Facility: HOSPITAL | Age: 53
Discharge: HOME | End: 2023-10-01
Attending: EMERGENCY MEDICINE
Payer: COMMERCIAL

## 2023-10-01 VITALS
RESPIRATION RATE: 18 BRPM | OXYGEN SATURATION: 99 % | SYSTOLIC BLOOD PRESSURE: 149 MMHG | TEMPERATURE: 97.8 F | HEART RATE: 120 BPM | DIASTOLIC BLOOD PRESSURE: 82 MMHG

## 2023-10-01 DIAGNOSIS — Z59.00 HOMELESSNESS: Primary | ICD-10-CM

## 2023-10-01 PROCEDURE — 99284 EMERGENCY DEPT VISIT MOD MDM: CPT

## 2023-10-01 PROCEDURE — 99283 EMERGENCY DEPT VISIT LOW MDM: CPT | Performed by: EMERGENCY MEDICINE

## 2023-10-01 SDOH — ECONOMIC STABILITY - HOUSING INSECURITY: HOMELESSNESS UNSPECIFIED: Z59.00

## 2023-10-02 NOTE — ED PROVIDER NOTES
History  Chief Complaint   Patient presents with   • Homeless     Reports him and his girlfriend got kicked out of the hotel they are staying at and causing him to be depressed. Requesting ride to [place]. Patient is a 60-year-old male well known to myself and this department who presents requesting help to get back to [place]. Moved up here Friday with girlfriend (current patient also here) and friends would not let them stay as previously promised. Kicked out of the hotel today. Trying to get back to [place]. Prior to Admission Medications   Prescriptions Last Dose Informant Patient Reported? Taking? ARIPiprazole (ABILIFY) 20 MG tablet   No No   Sig: Take 1 tablet (20 mg total) by mouth daily   Patient not taking: Reported on 5/17/2022   Diclofenac Sodium (VOLTAREN) 1 %   No No   Sig: Apply 2 g topically in the morning and 2 g at noon and 2 g in the evening and 2 g before bedtime. Do all this for 7 days.    Patient not taking: Reported on 5/17/2022   Needle, Disp, 31G X 5/16" MISC   No No   Sig: Use in the morning   Patient not taking: Reported on 5/17/2022   Needle, Disp, 31G X 5/16" MISC   No No   Sig: Use 3 (three) times a day   Patient not taking: Reported on 5/17/2022   albuterol (PROVENTIL HFA,VENTOLIN HFA) 90 mcg/act inhaler   No No   Sig: Inhale 2 puffs every 4 (four) hours as needed for wheezing   Patient not taking: Reported on 5/17/2022   dicyclomine (BENTYL) 20 mg tablet   No No   Sig: Take 1 tablet (20 mg total) by mouth 2 (two) times a day   Patient not taking: Reported on 5/17/2022   dicyclomine (BENTYL) 20 mg tablet   No No   Sig: Take 1 tablet (20 mg total) by mouth 2 (two) times a day as needed (Abdominal cramping)   Patient not taking: Reported on 5/17/2022   escitalopram (LEXAPRO) 10 mg tablet   No No   Sig: Take 1 tablet (10 mg total) by mouth daily   Patient not taking: Reported on 5/17/2022   fenofibrate (TRICOR) 48 mg tablet   No No   Sig: Take 1 tablet (48 mg total) by mouth daily   Patient not taking: Reported on 5/17/2022   fluticasone (FLOVENT HFA) 110 MCG/ACT inhaler   No No   Sig: Inhale 1 puff every 12 (twelve) hours Rinse mouth after use. Patient not taking: Reported on 5/17/2022   insulin glargine (Lantus SoloStar) 100 units/mL injection pen   No No   Sig: Inject 40 Units under the skin daily at bedtime Every morning. Patient not taking: Reported on 5/17/2022   insulin lispro (HumaLOG KwikPen) 100 units/mL injection pen   No No   Sig: Inject 15 Units under the skin 3 (three) times a day with meals   Patient not taking: Reported on 5/17/2022   metFORMIN (GLUCOPHAGE) 1000 MG tablet   No No   Sig: Take 1 tablet (1,000 mg total) by mouth 2 (two) times a day with meals   Patient not taking: Reported on 5/17/2022   naproxen (NAPROSYN) 500 mg tablet   No No   Sig: Take 1 tablet (500 mg total) by mouth 2 (two) times a day as needed for mild pain or moderate pain   Patient not taking: Reported on 5/17/2022   neomycin-bacitracin-polymyxin b (NEOSPORIN) ointment   No No   Sig: Apply topically 2 (two) times a day   Patient not taking: Reported on 5/17/2022   traZODone (DESYREL) 100 mg tablet   No No   Sig: Take 1 tablet (100 mg total) by mouth daily at bedtime   Patient not taking: Reported on 5/17/2022      Facility-Administered Medications: None       Past Medical History:   Diagnosis Date   • Anxiety    • Asthma    • Bipolar 1 disorder ([facility])    • Depression    • Diabetes mellitus ([facility])    • Psychiatric disorder        Past Surgical History:   Procedure Laterality Date   • APPENDECTOMY     • DENTAL SURGERY         Family History   Problem Relation Age of Onset   • Liver disease Mother    • Scoliosis Daughter    • Bipolar disorder Daughter      I have reviewed and agree with the history as documented.     E-Cigarette/Vaping   • E-Cigarette Use Never User      E-Cigarette/Vaping Substances     Social History     Tobacco Use   • Smoking status: Former     Packs/day: 0.20 Years: 1.00     Total pack years: 0.20     Types: Cigarettes     Start date: 37     Quit date: 0     Years since quittin.7   • Smokeless tobacco: Never   Vaping Use   • Vaping Use: Never used   Substance Use Topics   • Alcohol use: Not Currently     Comment: States has had no alcohol in at least a year   • Drug use: No       Review of Systems   Constitutional: Negative. HENT: Negative. Eyes: Negative. Respiratory: Negative. Cardiovascular: Negative. Gastrointestinal: Negative. Endocrine: Negative. Genitourinary: Negative. Musculoskeletal: Negative. Skin: Negative. Allergic/Immunologic: Negative. Neurological: Negative. Hematological: Negative. Psychiatric/Behavioral: Negative. All other systems reviewed and are negative. Physical Exam  Physical Exam  Vitals and nursing note reviewed. Constitutional:       Appearance: Normal appearance. He is normal weight. HENT:      Head: Normocephalic and atraumatic. Cardiovascular:      Rate and Rhythm: Normal rate and regular rhythm. Pulses: Normal pulses. Heart sounds: Normal heart sounds. Pulmonary:      Effort: Pulmonary effort is normal.      Breath sounds: Normal breath sounds. Abdominal:      General: Bowel sounds are normal.      Palpations: Abdomen is soft. Musculoskeletal:         General: Normal range of motion. Cervical back: Normal range of motion and neck supple. Skin:     General: Skin is warm and dry. Capillary Refill: Capillary refill takes less than 2 seconds. Neurological:      General: No focal deficit present. Mental Status: He is alert and oriented to person, place, and time.    Psychiatric:         Mood and Affect: Mood normal.         Behavior: Behavior normal.         Vital Signs  ED Triage Vitals [10/01/23 2114]   Temperature Pulse Respirations Blood Pressure SpO2   97.8 °F (36.6 °C) (!) 120 18 149/82 99 %      Temp Source Heart Rate Source Patient Position - Orthostatic VS BP Location FiO2 (%)   Oral Monitor Sitting Left arm --      Pain Score       --           Vitals:    10/01/23 2114   BP: 149/82   Pulse: (!) 120   Patient Position - Orthostatic VS: Sitting         Visual Acuity      ED Medications  Medications - No data to display    Diagnostic Studies  Results Reviewed     None                 No orders to display              Procedures  Procedures         ED Course                                             Medical Decision Making  Homelessness: acute illness or injury     Details: able to get ride back to [place]. Disposition  Final diagnoses:   None     ED Disposition     None      Follow-up Information    None         Patient's Medications   Discharge Prescriptions    No medications on file       No discharge procedures on file.     PDMP Review     None          ED Provider  Electronically Signed by           Nelva Fabry, MD  10/02/23 3771

## 2023-10-24 ENCOUNTER — HOSPITAL ENCOUNTER (EMERGENCY)
Facility: HOSPITAL | Age: 53
Discharge: HOME | End: 2023-10-24
Attending: EMERGENCY MEDICINE | Admitting: EMERGENCY MEDICINE
Payer: COMMERCIAL

## 2023-10-24 VITALS
SYSTOLIC BLOOD PRESSURE: 139 MMHG | OXYGEN SATURATION: 99 % | TEMPERATURE: 98.2 F | RESPIRATION RATE: 18 BRPM | DIASTOLIC BLOOD PRESSURE: 94 MMHG | HEART RATE: 120 BPM

## 2023-10-24 DIAGNOSIS — Z59.00 HOMELESSNESS: Primary | ICD-10-CM

## 2023-10-24 PROCEDURE — 99282 EMERGENCY DEPT VISIT SF MDM: CPT

## 2023-10-24 PROCEDURE — 99283 EMERGENCY DEPT VISIT LOW MDM: CPT | Performed by: PHYSICIAN ASSISTANT

## 2023-10-24 SDOH — ECONOMIC STABILITY - HOUSING INSECURITY: HOMELESSNESS UNSPECIFIED: Z59.00

## 2023-10-25 NOTE — ED PROVIDER NOTES
History  Chief Complaint   Patient presents with    Homeless     Pt arriving with ems and states the warming shelter will not take him until November. No other complaints     47year old male presenting to the ED via EMS after not being able to enter [facility] shelter. Patient states he went to home shelter in [place], they gave him and his "wife" (also patient here) money for cab to come to [facility] for a warming shelter. Shelter is not open till November, he states "I just want to go back to [place]". Patient has no complaints at this time. History provided by:  Patient   used: No        Prior to Admission Medications   Prescriptions Last Dose Informant Patient Reported? Taking? ARIPiprazole (ABILIFY) 20 MG tablet   No No   Sig: Take 1 tablet (20 mg total) by mouth daily   Patient not taking: Reported on 5/17/2022   Diclofenac Sodium (VOLTAREN) 1 %   No No   Sig: Apply 2 g topically in the morning and 2 g at noon and 2 g in the evening and 2 g before bedtime. Do all this for 7 days.    Patient not taking: Reported on 5/17/2022   Needle, Disp, 31G X 5/16" MISC   No No   Sig: Use in the morning   Patient not taking: Reported on 5/17/2022   Needle, Disp, 31G X 5/16" MISC   No No   Sig: Use 3 (three) times a day   Patient not taking: Reported on 5/17/2022   albuterol (PROVENTIL HFA,VENTOLIN HFA) 90 mcg/act inhaler   No No   Sig: Inhale 2 puffs every 4 (four) hours as needed for wheezing   Patient not taking: Reported on 5/17/2022   dicyclomine (BENTYL) 20 mg tablet   No No   Sig: Take 1 tablet (20 mg total) by mouth 2 (two) times a day   Patient not taking: Reported on 5/17/2022   dicyclomine (BENTYL) 20 mg tablet   No No   Sig: Take 1 tablet (20 mg total) by mouth 2 (two) times a day as needed (Abdominal cramping)   Patient not taking: Reported on 5/17/2022   escitalopram (LEXAPRO) 10 mg tablet   No No   Sig: Take 1 tablet (10 mg total) by mouth daily   Patient not taking: Reported on 5/17/2022   fenofibrate (TRICOR) 48 mg tablet   No No   Sig: Take 1 tablet (48 mg total) by mouth daily   Patient not taking: Reported on 5/17/2022   fluticasone (FLOVENT HFA) 110 MCG/ACT inhaler   No No   Sig: Inhale 1 puff every 12 (twelve) hours Rinse mouth after use. Patient not taking: Reported on 5/17/2022   insulin glargine (Lantus SoloStar) 100 units/mL injection pen   No No   Sig: Inject 40 Units under the skin daily at bedtime Every morning. Patient not taking: Reported on 5/17/2022   insulin lispro (HumaLOG KwikPen) 100 units/mL injection pen   No No   Sig: Inject 15 Units under the skin 3 (three) times a day with meals   Patient not taking: Reported on 5/17/2022   metFORMIN (GLUCOPHAGE) 1000 MG tablet   No No   Sig: Take 1 tablet (1,000 mg total) by mouth 2 (two) times a day with meals   Patient not taking: Reported on 5/17/2022   naproxen (NAPROSYN) 500 mg tablet   No No   Sig: Take 1 tablet (500 mg total) by mouth 2 (two) times a day as needed for mild pain or moderate pain   Patient not taking: Reported on 5/17/2022   neomycin-bacitracin-polymyxin b (NEOSPORIN) ointment   No No   Sig: Apply topically 2 (two) times a day   Patient not taking: Reported on 5/17/2022   traZODone (DESYREL) 100 mg tablet   No No   Sig: Take 1 tablet (100 mg total) by mouth daily at bedtime   Patient not taking: Reported on 5/17/2022      Facility-Administered Medications: None       Past Medical History:   Diagnosis Date    Anxiety     Asthma     Bipolar 1 disorder ([facility])     Depression     Diabetes mellitus ([facility])     Psychiatric disorder        Past Surgical History:   Procedure Laterality Date    APPENDECTOMY      DENTAL SURGERY         Family History   Problem Relation Age of Onset    Liver disease Mother     Scoliosis Daughter     Bipolar disorder Daughter      I have reviewed and agree with the history as documented.     E-Cigarette/Vaping    E-Cigarette Use Never User      E-Cigarette/Vaping Substances     Social History     Tobacco Use    Smoking status: Former     Packs/day: 0.20     Years: 1.00     Total pack years: 0.20     Types: Cigarettes     Start date: 37     Quit date: 0     Years since quittin.8    Smokeless tobacco: Never   Vaping Use    Vaping Use: Never used   Substance Use Topics    Alcohol use: Not Currently     Comment: States has had no alcohol in at least a year    Drug use: No       Review of Systems   All other systems reviewed and are negative. Physical Exam  Physical Exam  Vitals reviewed. Constitutional:       General: He is not in acute distress. Appearance: Normal appearance. He is well-developed and well-groomed. He is not ill-appearing. HENT:      Head: Normocephalic and atraumatic. Right Ear: External ear normal.      Left Ear: External ear normal.      Nose: Nose normal.   Eyes:      General: No scleral icterus. Conjunctiva/sclera: Conjunctivae normal.   Cardiovascular:      Rate and Rhythm: Normal rate and regular rhythm. Pulmonary:      Effort: Pulmonary effort is normal.      Breath sounds: No stridor. Abdominal:      General: There is no distension. Musculoskeletal:         General: No deformity. Normal range of motion. Cervical back: Normal range of motion. Skin:     Coloration: Skin is not jaundiced or pale. Findings: No lesion or rash. Neurological:      Mental Status: He is alert and oriented to person, place, and time. Psychiatric:         Mood and Affect: Mood normal.         Behavior: Behavior normal. Behavior is cooperative.          Vital Signs  ED Triage Vitals [10/24/23 1938]   Temperature Pulse Respirations Blood Pressure SpO2   98.2 °F (36.8 °C) (!) 120 18 139/94 99 %      Temp Source Heart Rate Source Patient Position - Orthostatic VS BP Location FiO2 (%)   Oral Monitor Sitting Left arm --      Pain Score       --           Vitals:    10/24/23 1938   BP: 139/94   Pulse: (!) 120   Patient Position - Orthostatic VS: Sitting Visual Acuity      ED Medications  Medications - No data to display    Diagnostic Studies  Results Reviewed       None                   No orders to display              Procedures  Procedures         ED Course                               SBIRT 22yo+      Flowsheet Row Most Recent Value   Initial Alcohol Screen: US AUDIT-C     1. How often do you have a drink containing alcohol? 0 Filed at: 10/24/2023 1939   2. How many drinks containing alcohol do you have on a typical day you are drinking? 0 Filed at: 10/24/2023 1939   3a. Male UNDER 65: How often do you have five or more drinks on one occasion? 0 Filed at: 10/24/2023 1939   Audit-C Score 0 Filed at: 10/24/2023 1939   PADMINI: How many times in the past year have you. .. Used an illegal drug or used a prescription medication for non-medical reasons? Never Filed at: 10/24/2023 1939                      Medical Decision Making      Patient presenting after unable to enter warming shelter. Warm shelter does not open till November. Patient requesting an Juan Duty back to [place]. Patient has no medical complaints at this time. Patient is well-appearing from exam.  Patient provided with shelter information at time of discharge. Disposition  Final diagnoses:   Homelessness     Time reflects when diagnosis was documented in both MDM as applicable and the Disposition within this note       Time User Action Codes Description Comment    10/24/2023  8:18 PM [address] [K65.50] Homelessness           ED Disposition       ED Disposition   Discharge    Condition   Stable    Date/Time   Tue Oct 24, 2023 2018    [address] discharge to home/self care.                    Follow-up Information    None         Discharge Medication List as of 10/24/2023  8:24 PM        CONTINUE these medications which have NOT CHANGED    Details   albuterol (PROVENTIL HFA,VENTOLIN HFA) 90 mcg/act inhaler Inhale 2 puffs every 4 (four) hours as needed for wheezing, Starting Fri 2/4/2022, Normal      ARIPiprazole (ABILIFY) 20 MG tablet Take 1 tablet (20 mg total) by mouth daily, Starting Fri 2/4/2022, Until Tue 5/17/2022, Normal      Diclofenac Sodium (VOLTAREN) 1 % Apply 2 g topically in the morning and 2 g at noon and 2 g in the evening and 2 g before bedtime. Do all this for 7 days. , Starting Mon 5/16/2022, Until Mon 5/23/2022, Normal      !! dicyclomine (BENTYL) 20 mg tablet Take 1 tablet (20 mg total) by mouth 2 (two) times a day, Starting Fri 2/4/2022, Normal      !! dicyclomine (BENTYL) 20 mg tablet Take 1 tablet (20 mg total) by mouth 2 (two) times a day as needed (Abdominal cramping), Starting Sat 3/19/2022, Normal      escitalopram (LEXAPRO) 10 mg tablet Take 1 tablet (10 mg total) by mouth daily, Starting Fri 2/4/2022, Until Tue 5/17/2022, Normal      fenofibrate (TRICOR) 48 mg tablet Take 1 tablet (48 mg total) by mouth daily, Starting Fri 2/4/2022, Until Tue 5/17/2022, Normal      fluticasone (FLOVENT HFA) 110 MCG/ACT inhaler Inhale 1 puff every 12 (twelve) hours Rinse mouth after use., Starting Fri 2/4/2022, Normal      insulin glargine (Lantus SoloStar) 100 units/mL injection pen Inject 40 Units under the skin daily at bedtime Every morning., Starting Fri 2/4/2022, Until Tue 5/17/2022, Normal      insulin lispro (HumaLOG KwikPen) 100 units/mL injection pen Inject 15 Units under the skin 3 (three) times a day with meals, Starting Fri 2/4/2022, Until Tue 5/17/2022, Normal      metFORMIN (GLUCOPHAGE) 1000 MG tablet Take 1 tablet (1,000 mg total) by mouth 2 (two) times a day with meals, Starting Fri 2/4/2022, Until Tue 5/17/2022, Normal      naproxen (NAPROSYN) 500 mg tablet Take 1 tablet (500 mg total) by mouth 2 (two) times a day as needed for mild pain or moderate pain, Starting Sat 3/19/2022, Normal      !! Needle, Disp, 31G X 5/16" MISC Use in the morning, Starting Fri 2/4/2022, Normal      !!  Needle, Disp, 31G X 5/16" MISC Use 3 (three) times a day, Starting Fri 2/4/2022, Normal      neomycin-bacitracin-polymyxin b (NEOSPORIN) ointment Apply topically 2 (two) times a day, Starting Sun 3/6/2022, Print      traZODone (DESYREL) 100 mg tablet Take 1 tablet (100 mg total) by mouth daily at bedtime, Starting Fri 2/4/2022, Until Tue 5/17/2022, Normal       !! - Potential duplicate medications found. Please discuss with provider. No discharge procedures on file.     PDMP Review       None            ED Provider  Electronically Signed by [facility]ASVIR  10/25/23 8025

## 2023-12-09 ENCOUNTER — HOSPITAL ENCOUNTER (EMERGENCY)
Facility: HOSPITAL | Age: 53
Discharge: HOME | End: 2023-12-09
Attending: EMERGENCY MEDICINE
Payer: COMMERCIAL

## 2023-12-09 VITALS
TEMPERATURE: 98.5 F | HEART RATE: 72 BPM | DIASTOLIC BLOOD PRESSURE: 99 MMHG | RESPIRATION RATE: 16 BRPM | OXYGEN SATURATION: 99 % | SYSTOLIC BLOOD PRESSURE: 143 MMHG

## 2023-12-09 DIAGNOSIS — R42 DIZZINESS: Primary | ICD-10-CM

## 2023-12-09 DIAGNOSIS — R73.9 HYPERGLYCEMIA: ICD-10-CM

## 2023-12-09 LAB
ALBUMIN SERPL BCP-MCNC: 4.3 G/DL (ref 3.5–5)
ALP SERPL-CCNC: 89 U/L (ref 34–104)
ALT SERPL W P-5'-P-CCNC: 11 U/L (ref 7–52)
ANION GAP SERPL CALCULATED.3IONS-SCNC: 6 MMOL/L
AST SERPL W P-5'-P-CCNC: 12 U/L (ref 13–39)
ATRIAL RATE: 81 BPM
BASOPHILS # BLD AUTO: 0.03 THOUSANDS/ÂΜL (ref 0–0.1)
BASOPHILS NFR BLD AUTO: 1 % (ref 0–1)
BILIRUB SERPL-MCNC: 0.29 MG/DL (ref 0.2–1)
BUN SERPL-MCNC: 18 MG/DL (ref 5–25)
CALCIUM SERPL-MCNC: 9.8 MG/DL (ref 8.4–10.2)
CHLORIDE SERPL-SCNC: 101 MMOL/L (ref 96–108)
CO2 SERPL-SCNC: 27 MMOL/L (ref 21–32)
CREAT SERPL-MCNC: 1.1 MG/DL (ref 0.6–1.3)
EOSINOPHIL # BLD AUTO: 0.16 THOUSAND/ÂΜL (ref 0–0.61)
EOSINOPHIL NFR BLD AUTO: 3 % (ref 0–6)
ERYTHROCYTE [DISTWIDTH] IN BLOOD BY AUTOMATED COUNT: 12 % (ref 11.6–15.1)
GFR SERPL CREATININE-BSD FRML MDRD: 76 ML/MIN/1.73SQ M
GLUCOSE SERPL-MCNC: 229 MG/DL (ref 65–140)
GLUCOSE SERPL-MCNC: 479 MG/DL (ref 65–140)
GLUCOSE SERPL-MCNC: 481 MG/DL (ref 65–140)
HCT VFR BLD AUTO: 41.6 % (ref 36.5–49.3)
HGB BLD-MCNC: 14.2 G/DL (ref 12–17)
IMM GRANULOCYTES # BLD AUTO: 0.02 THOUSAND/UL (ref 0–0.2)
IMM GRANULOCYTES NFR BLD AUTO: 0 % (ref 0–2)
LYMPHOCYTES # BLD AUTO: 2.09 THOUSANDS/ÂΜL (ref 0.6–4.47)
LYMPHOCYTES NFR BLD AUTO: 35 % (ref 14–44)
MCH RBC QN AUTO: 29.1 PG (ref 26.8–34.3)
MCHC RBC AUTO-ENTMCNC: 34.1 G/DL (ref 31.4–37.4)
MCV RBC AUTO: 85 FL (ref 82–98)
MONOCYTES # BLD AUTO: 0.78 THOUSAND/ÂΜL (ref 0.17–1.22)
MONOCYTES NFR BLD AUTO: 13 % (ref 4–12)
NEUTROPHILS # BLD AUTO: 2.92 THOUSANDS/ÂΜL (ref 1.85–7.62)
NEUTS SEG NFR BLD AUTO: 48 % (ref 43–75)
NRBC BLD AUTO-RTO: 0 /100 WBCS
P AXIS: 55 DEGREES
PLATELET # BLD AUTO: 257 THOUSANDS/UL (ref 149–390)
PMV BLD AUTO: 10.5 FL (ref 8.9–12.7)
POTASSIUM SERPL-SCNC: 4.9 MMOL/L (ref 3.5–5.3)
PR INTERVAL: 118 MS
PROT SERPL-MCNC: 7.4 G/DL (ref 6.4–8.4)
QRS AXIS: 76 DEGREES
QRSD INTERVAL: 80 MS
QT INTERVAL: 350 MS
QTC INTERVAL: 406 MS
RBC # BLD AUTO: 4.88 MILLION/UL (ref 3.88–5.62)
SODIUM SERPL-SCNC: 134 MMOL/L (ref 135–147)
T WAVE AXIS: 32 DEGREES
VENTRICULAR RATE: 81 BPM
WBC # BLD AUTO: 6 THOUSAND/UL (ref 4.31–10.16)

## 2023-12-09 PROCEDURE — 99284 EMERGENCY DEPT VISIT MOD MDM: CPT

## 2023-12-09 PROCEDURE — 93005 ELECTROCARDIOGRAM TRACING: CPT

## 2023-12-09 PROCEDURE — 36415 COLL VENOUS BLD VENIPUNCTURE: CPT | Performed by: EMERGENCY MEDICINE

## 2023-12-09 PROCEDURE — 80053 COMPREHEN METABOLIC PANEL: CPT | Performed by: EMERGENCY MEDICINE

## 2023-12-09 PROCEDURE — 96360 HYDRATION IV INFUSION INIT: CPT

## 2023-12-09 PROCEDURE — 99284 EMERGENCY DEPT VISIT MOD MDM: CPT | Performed by: EMERGENCY MEDICINE

## 2023-12-09 PROCEDURE — 85025 COMPLETE CBC W/AUTO DIFF WBC: CPT | Performed by: EMERGENCY MEDICINE

## 2023-12-09 PROCEDURE — 82948 REAGENT STRIP/BLOOD GLUCOSE: CPT

## 2023-12-09 PROCEDURE — 96372 THER/PROPH/DIAG INJ SC/IM: CPT

## 2023-12-09 PROCEDURE — 96361 HYDRATE IV INFUSION ADD-ON: CPT

## 2023-12-09 RX ORDER — INSULIN LISPRO 100 [IU]/ML
10 INJECTION, SOLUTION INTRAVENOUS; SUBCUTANEOUS ONCE
Status: COMPLETED | OUTPATIENT
Start: 2023-12-09 | End: 2023-12-09

## 2023-12-09 RX ADMIN — SODIUM CHLORIDE 1000 ML: 0.9 INJECTION, SOLUTION INTRAVENOUS at 09:59

## 2023-12-09 RX ADMIN — INSULIN LISPRO 10 UNITS: 100 INJECTION, SOLUTION INTRAVENOUS; SUBCUTANEOUS at 09:58

## 2023-12-09 RX ADMIN — METFORMIN HYDROCHLORIDE 1000 MG: 500 TABLET ORAL at 09:58

## 2023-12-09 NOTE — ED PROVIDER NOTES
Pt Name: Angela Swanson  MRN: [id number]  [address] 1970  Age/Sex: 47 y.o. male  Date of evaluation: 2023  PCP: Keshav Lewis MD    [address]    Chief Complaint   Patient presents with    Dizziness     Patient c/o dizziness that started this am and believes BS is high. Patient was seen at [facility] last night. believes BS is high. Homeless         HPI    Niru Leiva presents to the Emergency Department complaining of dizziness. He slept outside last night because he was too late to get into the shelter. Now he feels better but the was feeling like his blood sugar was high and has not taken his medication. HPI      Past Medical and Surgical History    Past Medical History:   Diagnosis Date    Anxiety     Asthma     Bipolar 1 disorder ([facility])     Depression     Diabetes mellitus ([facility])     Psychiatric disorder        Past Surgical History:   Procedure Laterality Date    APPENDECTOMY      DENTAL SURGERY         Family History   Problem Relation Age of Onset    Liver disease Mother     Scoliosis Daughter     Bipolar disorder Daughter        Social History     Tobacco Use    Smoking status: Former     Packs/day: 0.20     Years: 1.00     Total pack years: 0.20     Types: Cigarettes     Start date: 37     Quit date: 0     Years since quittin.9    Smokeless tobacco: Never   Vaping Use    Vaping Use: Never used   Substance Use Topics    Alcohol use: Not Currently     Comment: States has had no alcohol in at least a year    Drug use: No         .    Allergies    Allergies   Allergen Reactions    Ketorolac Other (See Comments)     Pt states he has mood disturbances, agitation if he takes too much      Toradol [Ketorolac Tromethamine]     Latex Rash       Home Medications    Prior to Admission medications    Medication Sig Start Date End Date Taking?  Authorizing Provider   albuterol (PROVENTIL HFA,VENTOLIN HFA) 90 mcg/act inhaler Inhale 2 puffs every 4 (four) hours as needed for wheezing  Patient not taking: Reported on 5/17/2022 2/4/22   Erving Blocker, CRNP   ARIPiprazole (ABILIFY) 20 MG tablet Take 1 tablet (20 mg total) by mouth daily  Patient not taking: Reported on 5/17/2022 2/4/22 5/17/22  Erving Blocker, CRNP   Diclofenac Sodium (VOLTAREN) 1 % Apply 2 g topically in the morning and 2 g at noon and 2 g in the evening and 2 g before bedtime. Do all this for 7 days. Patient not taking: Reported on 5/17/2022 5/16/22 5/23/22  Elnoria Cogan, MD   dicyclomine (BENTYL) 20 mg tablet Take 1 tablet (20 mg total) by mouth 2 (two) times a day  Patient not taking: Reported on 5/17/2022 2/4/22   Erving Blocker, CRNP   dicyclomine (BENTYL) 20 mg tablet Take 1 tablet (20 mg total) by mouth 2 (two) times a day as needed (Abdominal cramping)  Patient not taking: Reported on 5/17/2022 3/19/22   Earle Goodrich PA-C   escitalopram (LEXAPRO) 10 mg tablet Take 1 tablet (10 mg total) by mouth daily  Patient not taking: Reported on 5/17/2022 2/4/22 5/17/22  Erving Blocker, CRNP   fenofibrate (TRICOR) 48 mg tablet Take 1 tablet (48 mg total) by mouth daily  Patient not taking: Reported on 5/17/2022 2/4/22 5/17/22  Erving Blocker, CRNP   fluticasone (FLOVENT HFA) 110 MCG/ACT inhaler Inhale 1 puff every 12 (twelve) hours Rinse mouth after use. Patient not taking: Reported on 5/17/2022 2/4/22   Erving Blocker, CRNP   insulin glargine (Lantus SoloStar) 100 units/mL injection pen Inject 40 Units under the skin daily at bedtime Every morning.   Patient not taking: Reported on 5/17/2022 2/4/22 5/17/22  Erving Blocker, CRNP   insulin lispro (HumaLOG KwikPen) 100 units/mL injection pen Inject 15 Units under the skin 3 (three) times a day with meals  Patient not taking: Reported on 5/17/2022 2/4/22 5/17/22  Erving Blocker, KRISH   metFORMIN (GLUCOPHAGE) 1000 MG tablet Take 1 tablet (1,000 mg total) by mouth 2 (two) times a day with meals  Patient not taking: Reported on 5/17/2022 2/4/22 5/17/22 KRISH Madrigal   naproxen (NAPROSYN) 500 mg tablet Take 1 tablet (500 mg total) by mouth 2 (two) times a day as needed for mild pain or moderate pain  Patient not taking: Reported on 5/17/2022 3/19/22   Raegan Decker PA-C   Needle, Disp, 31G X 5/16" MISC Use in the morning  Patient not taking: Reported on 5/17/2022 2/4/22   Arias FátimaKRISH   Needle, Disp, 31G X 5/16" MISC Use 3 (three) times a day  Patient not taking: Reported on 5/17/2022 2/4/22   Arias FátimaKRISH gracia   neomycin-bacitracin-polymyxin b (NEOSPORIN) ointment Apply topically 2 (two) times a day  Patient not taking: Reported on 5/17/2022 3/6/22   Gabriel Ochoa MD   traZODone (DESYREL) 100 mg tablet Take 1 tablet (100 mg total) by mouth daily at bedtime  Patient not taking: Reported on 5/17/2022 2/4/22 5/17/22  KRISH Madrigal           Review of Systems    Review of Systems   Constitutional:  Negative for chills and fever. HENT:  Negative for ear pain and sore throat. Eyes:  Negative for pain and visual disturbance. Respiratory:  Negative for cough and shortness of breath. Cardiovascular:  Negative for chest pain and palpitations. Gastrointestinal:  Negative for abdominal pain and vomiting. Genitourinary:  Negative for dysuria and hematuria. Musculoskeletal:  Negative for arthralgias and back pain. Skin:  Negative for color change and rash. Neurological:  Positive for dizziness and weakness. Negative for seizures and syncope. All other systems reviewed and are negative.       Physical Exam      ED Triage Vitals   Temperature Pulse Respirations Blood Pressure SpO2   12/09/23 0924 12/09/23 0920 12/09/23 0920 12/09/23 0920 12/09/23 0920   98.5 °F (36.9 °C) 72 16 143/99 99 %      Temp Source Heart Rate Source Patient Position - Orthostatic VS BP Location FiO2 (%)   12/09/23 0924 -- 12/09/23 0920 12/09/23 0920 --   Oral  Sitting Left arm       Pain Score       12/09/23 0920       No Pain Physical Exam  Vitals and nursing note reviewed. Constitutional:       General: He is not in acute distress. Appearance: He is well-developed. He is not diaphoretic. HENT:      Head: Normocephalic and atraumatic. Nose: Nose normal.   Eyes:      Conjunctiva/sclera: Conjunctivae normal.      Pupils: Pupils are equal, round, and reactive to light. Cardiovascular:      Rate and Rhythm: Normal rate and regular rhythm. Heart sounds: Normal heart sounds. No murmur heard. No friction rub. No gallop. Pulmonary:      Effort: Pulmonary effort is normal. No respiratory distress. Breath sounds: Normal breath sounds. No wheezing or rales. Abdominal:      General: Bowel sounds are normal.      Palpations: Abdomen is soft. Tenderness: There is no abdominal tenderness. There is no guarding or rebound. Musculoskeletal:         General: Normal range of motion. Cervical back: Normal range of motion and neck supple. Skin:     General: Skin is warm and dry. Neurological:      Mental Status: He is alert and oriented to person, place, and time. Psychiatric:         Behavior: Behavior normal.                Assessment and Plan    Mai Barakat is a 47 y.o. male who presents with high blood sugar. Physical examination unremarkable. Plan will be to perform diagnostic testing and treat symptomatically. MDM      Diagnostic Results    EKG:  normal EKG, normal sinus rhythm, unchanged from previous tracings    EKG INTERPRETATION  EKG Interpretation    Rate: 81 BPM  Rhythm: sinus  Axis: normal  Intervals: Normal, no blocks, QTc 406ms  T waves: nonspecific  ST segments: normal    Impression: nonspecific EKG. EKG for comparison: reviewed and no significant change is seen  EKG interpreted by me. Interpretation by Contreras Montez DO  EKG reviewed and interpreted independently.     Labs:    Results for orders placed or performed during the hospital encounter of 12/09/23   CBC and differential   Result Value Ref Range    WBC 6.00 4.31 - 10.16 Thousand/uL    RBC 4.88 3.88 - 5.62 Million/uL    Hemoglobin 14.2 12.0 - 17.0 g/dL    Hematocrit 41.6 36.5 - 49.3 %    MCV 85 82 - 98 fL    MCH 29.1 26.8 - 34.3 pg    MCHC 34.1 31.4 - 37.4 g/dL    RDW 12.0 11.6 - 15.1 %    MPV 10.5 8.9 - 12.7 fL    Platelets 544 556 - 867 Thousands/uL    nRBC 0 /100 WBCs    Neutrophils Relative 48 43 - 75 %    Immat GRANS % 0 0 - 2 %    Lymphocytes Relative 35 14 - 44 %    Monocytes Relative 13 (H) 4 - 12 %    Eosinophils Relative 3 0 - 6 %    Basophils Relative 1 0 - 1 %    Neutrophils Absolute 2.92 1.85 - 7.62 Thousands/µL    Immature Grans Absolute 0.02 0.00 - 0.20 Thousand/uL    Lymphocytes Absolute 2.09 0.60 - 4.47 Thousands/µL    Monocytes Absolute 0.78 0.17 - 1.22 Thousand/µL    Eosinophils Absolute 0.16 0.00 - 0.61 Thousand/µL    Basophils Absolute 0.03 0.00 - 0.10 Thousands/µL   Comprehensive metabolic panel   Result Value Ref Range    Sodium 134 (L) 135 - 147 mmol/L    Potassium 4.9 3.5 - 5.3 mmol/L    Chloride 101 96 - 108 mmol/L    CO2 27 21 - 32 mmol/L    ANION GAP 6 mmol/L    BUN 18 5 - 25 mg/dL    Creatinine 1.10 0.60 - 1.30 mg/dL    Glucose 479 (H) 65 - 140 mg/dL    Calcium 9.8 8.4 - 10.2 mg/dL    AST 12 (L) 13 - 39 U/L    ALT 11 7 - 52 U/L    Alkaline Phosphatase 89 34 - 104 U/L    Total Protein 7.4 6.4 - 8.4 g/dL    Albumin 4.3 3.5 - 5.0 g/dL    Total Bilirubin 0.29 0.20 - 1.00 mg/dL    eGFR 76 ml/min/1.73sq m   ECG 12 lead   Result Value Ref Range    Ventricular Rate 81 BPM    Atrial Rate 81 BPM    CO Interval 118 ms    QRSD Interval 80 ms    QT Interval 350 ms    QTC Interval 406 ms    P Axis 55 degrees    QRS Axis 76 degrees    T Wave Axis 32 degrees   Fingerstick Glucose (POCT)   Result Value Ref Range    POC Glucose 481 (H) 65 - 140 mg/dl   Fingerstick Glucose (POCT)   Result Value Ref Range    POC Glucose 229 (H) 65 - 140 mg/dl       All labs reviewed and utilized in the medical decision making process    Radiology:    No orders to display       All radiology studies independently viewed by me and interpreted by the radiologist.    Procedure    Procedures      ED Course of Care and Re-Assessments    Patient's blood sugar improved with IVF and meds. Medications   sodium chloride 0.9 % bolus 1,000 mL (0 mL Intravenous Stopped 12/9/23 1153)   metFORMIN (GLUCOPHAGE) tablet 1,000 mg (1,000 mg Oral Given 12/9/23 0958)   insulin lispro (HumaLOG) 100 units/mL subcutaneous injection 10 Units (10 Units Subcutaneous Given 12/9/23 0958)           FINAL IMPRESSION    Final diagnoses:   Dizziness   Hyperglycemia         DISPOSITION/PLAN      Time reflects when diagnosis was documented in both MDM as applicable and the Disposition within this note       Time User Action Codes Description Comment    12/9/2023 11:38 AM Jeana DIAL Add [R42] Dizziness     12/9/2023 11:38 AM Jeana Lyman Add [R73.9] Hyperglycemia           ED Disposition       ED Disposition   Discharge    Condition   Stable    Date/Time   Sat Dec 9, 2023 11:38 AM    Comment   Shayne Foreman discharge to home/self care.                    Follow-up Information       Follow up With Specialties Details Why Contact Info    Álvaro Candelario MD Family Medicine Schedule an appointment as soon as possible for a visit   [address] [address] [place] [address]  [address]:    Álvaro Candelario, [address] [place] [address]  [address]    Schedule an appointment as soon as possible for a visit         DISCHARGE MEDICATIONS:    Discharge Medication List as of 12/9/2023 11:38 AM        CONTINUE these medications which have NOT CHANGED    Details   albuterol (PROVENTIL HFA,VENTOLIN HFA) 90 mcg/act inhaler Inhale 2 puffs every 4 (four) hours as needed for wheezing, Starting Fri 2/4/2022, Normal      ARIPiprazole (ABILIFY) 20 MG tablet Take 1 tablet (20 mg total) by mouth daily, Starting Fri 2/4/2022, Until Tue 5/17/2022, Normal      Diclofenac Sodium (VOLTAREN) 1 % Apply 2 g topically in the morning and 2 g at noon and 2 g in the evening and 2 g before bedtime. Do all this for 7 days. , Starting Mon 5/16/2022, Until Mon 5/23/2022, Normal      !! dicyclomine (BENTYL) 20 mg tablet Take 1 tablet (20 mg total) by mouth 2 (two) times a day, Starting Fri 2/4/2022, Normal      !! dicyclomine (BENTYL) 20 mg tablet Take 1 tablet (20 mg total) by mouth 2 (two) times a day as needed (Abdominal cramping), Starting Sat 3/19/2022, Normal      escitalopram (LEXAPRO) 10 mg tablet Take 1 tablet (10 mg total) by mouth daily, Starting Fri 2/4/2022, Until Tue 5/17/2022, Normal      fenofibrate (TRICOR) 48 mg tablet Take 1 tablet (48 mg total) by mouth daily, Starting Fri 2/4/2022, Until Tue 5/17/2022, Normal      fluticasone (FLOVENT HFA) 110 MCG/ACT inhaler Inhale 1 puff every 12 (twelve) hours Rinse mouth after use., Starting Fri 2/4/2022, Normal      insulin glargine (Lantus SoloStar) 100 units/mL injection pen Inject 40 Units under the skin daily at bedtime Every morning., Starting Fri 2/4/2022, Until Tue 5/17/2022, Normal      insulin lispro (HumaLOG KwikPen) 100 units/mL injection pen Inject 15 Units under the skin 3 (three) times a day with meals, Starting Fri 2/4/2022, Until Tue 5/17/2022, Normal      metFORMIN (GLUCOPHAGE) 1000 MG tablet Take 1 tablet (1,000 mg total) by mouth 2 (two) times a day with meals, Starting Fri 2/4/2022, Until Tue 5/17/2022, Normal      naproxen (NAPROSYN) 500 mg tablet Take 1 tablet (500 mg total) by mouth 2 (two) times a day as needed for mild pain or moderate pain, Starting Sat 3/19/2022, Normal      !! Needle, Disp, 31G X 5/16" MISC Use in the morning, Starting Fri 2/4/2022, Normal      !!  Needle, Disp, 31G X 5/16" MISC Use 3 (three) times a day, Starting Fri 2/4/2022, Normal      neomycin-bacitracin-polymyxin b (NEOSPORIN) ointment Apply topically 2 (two) times a day, Starting Sun 3/6/2022, Print      traZODone (DESYREL) 100 mg tablet Take 1 tablet (100 mg total) by mouth daily at bedtime, Starting Fri 2/4/2022, Until Tue 5/17/2022, Normal       !! - Potential duplicate medications found. Please discuss with provider. No discharge procedures on file.          DO Malik Marques DO  12/09/23 [id number]

## 2023-12-12 ENCOUNTER — HOSPITAL ENCOUNTER (EMERGENCY)
Facility: HOSPITAL | Age: 53
Discharge: HOME | End: 2023-12-12
Attending: EMERGENCY MEDICINE
Payer: COMMERCIAL

## 2023-12-12 ENCOUNTER — HOSPITAL ENCOUNTER (EMERGENCY)
Facility: HOSPITAL | Age: 53
Discharge: HOME | End: 2023-12-12
Attending: EMERGENCY MEDICINE | Admitting: EMERGENCY MEDICINE
Payer: COMMERCIAL

## 2023-12-12 VITALS
DIASTOLIC BLOOD PRESSURE: 72 MMHG | TEMPERATURE: 97.6 F | WEIGHT: 130.73 LBS | OXYGEN SATURATION: 100 % | HEART RATE: 85 BPM | RESPIRATION RATE: 18 BRPM | SYSTOLIC BLOOD PRESSURE: 126 MMHG | BODY MASS INDEX: 21.1 KG/M2

## 2023-12-12 VITALS
HEART RATE: 85 BPM | DIASTOLIC BLOOD PRESSURE: 72 MMHG | TEMPERATURE: 97.6 F | BODY MASS INDEX: 21.1 KG/M2 | SYSTOLIC BLOOD PRESSURE: 126 MMHG | WEIGHT: 130.73 LBS | OXYGEN SATURATION: 100 % | RESPIRATION RATE: 18 BRPM

## 2023-12-12 DIAGNOSIS — Z59.00 HOMELESSNESS: Primary | ICD-10-CM

## 2023-12-12 DIAGNOSIS — Z00.8 ENCOUNTER FOR PSYCHOLOGICAL EVALUATION: Primary | ICD-10-CM

## 2023-12-12 DIAGNOSIS — Z59.01 SHELTERED HOMELESSNESS: ICD-10-CM

## 2023-12-12 PROCEDURE — 99284 EMERGENCY DEPT VISIT MOD MDM: CPT

## 2023-12-12 PROCEDURE — 99283 EMERGENCY DEPT VISIT LOW MDM: CPT

## 2023-12-12 PROCEDURE — 99245 OFF/OP CONSLTJ NEW/EST HI 55: CPT | Performed by: PSYCHIATRY & NEUROLOGY

## 2023-12-12 PROCEDURE — 99284 EMERGENCY DEPT VISIT MOD MDM: CPT | Performed by: EMERGENCY MEDICINE

## 2023-12-12 SDOH — ECONOMIC STABILITY - HOUSING INSECURITY: SHELTERED HOMELESSNESS: Z59.01

## 2023-12-12 SDOH — ECONOMIC STABILITY - HOUSING INSECURITY: HOMELESSNESS UNSPECIFIED: Z59.00

## 2023-12-12 NOTE — CONSULTS
REQUIRED DOCUMENTATION:     1. This service was provided via Telemedicine.  2. Provider located at [facility].  3. TeleMed provider: Jordan Christopher Holter, DO.  4. Identify all parties in room with patient during tele consult:  Case management  5.Patient was then informed that this was a Telemedicine visit and that the exam was being conducted confidentially over secure lines. My office door was closed. No one else was in the room.  Patient acknowledged consent and understanding of privacy and security of the Telemedicine visit, and gave us permission to have the assistant stay in the room in order to assist with the history and to conduct the exam.  I informed the patient that I have reviewed their record in Epic and presented the opportunity for them to ask any questions regarding the visit today.  The patient agreed to participate.         Psychiatric Evaluation - Department of Behavioral Health   Mark Quick 53 y.o. male MRN: [id number]  Unit/Bed#: EMMANUEL-28 Encounter: [id number]    ASSESSMENT & PLAN     Suicide/Homicide Risk Assessment:  Risk of Harm to Self:  The following ratings are based on assessment at the time of the interview and review of records  Demographic risk factors include: , lowest socioeconomic class, male, age: over 50 or older  Historical Risk Factors include: chronic psychiatric problems, chronic depression, chronic depressive symptoms, history of depression, chronic mood disorder, history of mood disorder, history of psychosis, criminal behaviors  Recent Specific Risk Factors include: diagnosis of mood disorder, mental illness diagnosis, persistent suicidal ideation, chronic health problems, lack of support, unemployed  Protective Factors: having a desire to be alive, having a sense of purpose or meaning in life, resiliency, responsibilities and duties to others, restricted access to lethal means  Weapons: none. The following steps have been taken to ensure  weapons are properly secured: not applicable  Based on today's assessment, Mark presents the following risk of harm to self: low    Risk of Harm to Others:  The following ratings are based on assessment at the time of the interview and review of records  Demographic risk factors include: , lowest socioeconomic class, male, age: over 50 or older  Historical Risk Factors include: chronic psychiatric problems, chronic depression, chronic depressive symptoms, history of depression, chronic mood disorder, history of mood disorder, history of psychosis, criminal behaviors  Recent Specific Risk Factors include: diagnosis of mood disorder, mental illness diagnosis, persistent suicidal ideation, chronic health problems, lack of support, unemployed  Protective Factors: no homicidal ideation, having a desire to be alive, having a sense of purpose or meaning in life, resiliency, responsibilities and duties to others, restricted access to lethal means  Weapons: none. The following steps have been taken to ensure weapons are properly secured: not applicable  Based on today's assessment, Mark presents the following risk of harm to others: minimal    DSM-5 Diagnoses:   Mood disorder, unspecified; consideration for major depressive disorder, recurrent per record review and/or adjustment disorder with mixed disturbance of emotions and conduct    Treatment Recommendations/Precautions:  Continue medical management per primary team.  Collaborate with collaterals for baseline assessment and disposition as necessary.  Defer reintroduction of psychotropic medication per patient preference.  Presently, patient does not adhere to criteria for inpatient behavioral health admission. Patient is in the process of pursue outpatient psychiatric management through [facility], provided resources regarding outpatient psychiatric management.  Discharge date per primary team.   Consultation appreciated. Psychiatry to sign off. Please  "do not hesitate to call/contact our service with any additional comments/concerns. Please call/contact on-call Psychiatry via [employer] including on-call psychiatric service via [employer] ([phone]) with any comments/concerns if after hours/Fri/Sat/Sunday.Thank you!    Medications Risks/Benefits:    Risks, benefits, and possible side effects of medications explained to Mark and he verbalizes understanding. At this time Mark does not want to continue psychiatric treatment. He is aware of risk of psychiatric decompensation, possible hospitalization and/or loss of function if treatment is not continued.    Physician Requesting Consult: Gerald Alvarenga MD  Reason for Consult / Principal Problem: encounter for psychological evaluation    HISTORY OF PRESENT ILLNESS (HPI)     Mark Quick is a 53 y.o., overtly appearing , single male, possessing pertinent psychiatric history of bipolar affective disorder versus major depressive disorder, medically complicated by hypertension, hyperlipidemia and type 2 diabetes mellitus including previous instances hyperglycemic hyperosmolar nonketotic coma, presenting to [facility] emergency department, subsequent to worsening dysphoric mood including depression and depressive signs/symptoms that include suicidal ideation without particular plan in the setting of worsening psychosocial stressors including financial stability in addition to having an unstable residential situation, presently residing in a \" warming shelter\" with his significant other. Previously, patient states he was voluntarily admitted to inpatient behavioral health through [facility] approximately 2 months ago for suicidal ideation, subsequently stabilized on psychotropic medications including Invega, although patient admits to inappropriately adherence to aforementioned agent, stating that he is in the process of procuring outpatient psychiatric management through " [facility]. Per record review, patient possesses previous presentations to the emergency department through [facility], [facility] and [facility] for similar signs/symptoms relating to his psychosocial stressors, appearing to possess intermittent instances of suicidal ideation since 2016.     Presently, patient admits to suicidal ideation without particular plan, although states he has had previous thoughts of \"running into traffic\", denying homicidal ideation in addition to thoughts of self-injury, citing his significant other and 17-year-old daughter as deterrents against self-harm. Although patient admits to passive suicidal ideation, he denies dysphoric mood including depression, depressive signs/symptoms, anxiety, instances of panic attack, signs/symptoms of danyelle/hypomania and psychosis, appearing to contradict himself at times pertaining to his suicidality and psychiatric state. Patient states that his significant other is admitted to the same emergency department for similar complaints, acknowledging that he is requesting inpatient behavioral health admission, so that he isn't \"without her\"; \"she's my everything.\" Based on today's assessment and clinical criteria, outpatient level of care is deemed appropriate at this present time. Mark acknowledges that if they are no longer able to contract for safety, patient should call/contact their outpatient provider, call/contact crisis and/or attend to the nearest Emergency Department for immediate evaluation.    PSYCHIATRIC REVIEW OF SYSTEMS     Appetite: no  Adverse eating: no  Weight changes: no  Insomnia/sleeplessness: no  Fatigue/anergy: no  Anhedonia/lack of interest: no  Attention/concentration: no  Psychomotor agitation/retardation: no  Somatic symptoms: no  Anxiety/panic attack: no  Danyelle/hypomania: no  Hopelessness/helplessness/worthlessness: no  Self-injurious behavior/high-risk behavior: no  Suicidal " ideation: yes, no plan  Homicidal ideation: no  Auditory hallucinations: no  Visual hallucinations: no  Other perceptual disturbances: no  Delusional thinking: no  Obsessive/compulsive symptoms: no    REVIEW OF SYSTEMS   Pertinent items are noted in HPI.    HISTORICAL INFORMATION     Past Psychiatric History:   Past Psychiatric management: admits to previous instances of inpatient behavioral health admission including prior admission through [facility] in October 2023, denying present outpatient psychiatric management  Past Suicide attempts/self-injurious behavior: denies  Past Violent behavior: denies  Past Psychiatric medications: multiple including: Abilify, Lexapro, Trazodone.     Substance Abuse History:  I spent time with patient in counseling and education on risk of substance abuse. Assessed him motivation and encouraged patient for treatment. Brief intervention done.   Social History       Tobacco History       Smoking Status  Former Smoking Start Date  1980 Quit Date  1981 Smoking Frequency  0.20 packs/day for 1 year (0.20 ttl pk-yrs)    Smoking Tobacco Type  Cigarettes from 1980 to 1981      Smokeless Tobacco Use  Never              Alcohol History       Alcohol Use Status  Not Currently Comment  States has had no alcohol in at least a year              Drug Use       Drug Use Status  No              Sexual Activity       Sexually Active  Yes Partners  Female              Activities of Daily Living    Not Asked                 Additional Substance Use Detail       Questions Responses    Substance Use Assessment Substance use within the past 12 months    Alcohol Use Frequency Denies use in past 12 months    Cannabis frequency Never used    Comment: Never used on 12/4/2019     Heroin Frequency Denies use in past 12 months    Last Use of Alcohol & Amount 2004    Cocaine frequency Never used    Comment: Never used on 12/4/2019     Crack Cocaine Frequency Denies use in past 12 months    Methamphetamine Frequency  Denies use in past 12 months    Narcotic Frequency Denies use in past 12 months    Benzodiazepine Frequency Denies use in past 12 months    Amphetamine frequency Denies use in past 12 months    Barbituate Frequency Denies use use in past 12 months    Inhalant frequency Never used    Comment: Never used on 12/4/2019     Hallucinogen frequency Never used    Comment: Never used on 3/7/2020     Ecstasy frequency Never used    Comment: Never used on 12/4/2019     Other drug frequency Never used    Comment: Never used on 12/4/2019     Opiate frequency Denies use in past 12 months    Last reviewed by Anil Jackson RN on 12/12/2023          I have assessed this patient for substance use within the past 12 months    Family Psychiatric History:   Denies.    Social History:  Education: 12th grade academic level.   Learning Disabilities: denies, although record review reveals learning disability.   Marital history: co-habitating  Living arrangement, social support:  denies stable residential situation, cohabitating with significant other in a warming shelter, citing limited support system..  Occupational History: unemployed  Functioning Relationships: limited support system.  Other Pertinent History: denies, although record review reveals previous incarceration in 2004, denying access to firearms    Traumatic History:   Abuse: denies  Other Traumatic Events: denies    OBJECTIVE     Past Medical History:   Diagnosis Date    Anxiety     Asthma     Bipolar 1 disorder (HCC)     Depression     Diabetes mellitus (HCC)     Psychiatric disorder        Meds/Allergies   all current active meds have been reviewed, current meds:   No current facility-administered medications for this encounter.   , and PTA meds:   Prior to Admission Medications   Prescriptions Last Dose Informant Patient Reported? Taking?   ARIPiprazole (ABILIFY) 20 MG tablet   No No   Sig: Take 1 tablet (20 mg total) by mouth daily   Patient not taking: Reported on  "5/17/2022   Diclofenac Sodium (VOLTAREN) 1 %   No No   Sig: Apply 2 g topically in the morning and 2 g at noon and 2 g in the evening and 2 g before bedtime. Do all this for 7 days.   Patient not taking: Reported on 5/17/2022   Needle, Disp, 31G X 5/16\" MISC   No No   Sig: Use in the morning   Patient not taking: Reported on 5/17/2022   Needle, Disp, 31G X 5/16\" MISC   No No   Sig: Use 3 (three) times a day   Patient not taking: Reported on 5/17/2022   albuterol (PROVENTIL HFA,VENTOLIN HFA) 90 mcg/act inhaler   No No   Sig: Inhale 2 puffs every 4 (four) hours as needed for wheezing   Patient not taking: Reported on 5/17/2022   dicyclomine (BENTYL) 20 mg tablet   No No   Sig: Take 1 tablet (20 mg total) by mouth 2 (two) times a day   Patient not taking: Reported on 5/17/2022   dicyclomine (BENTYL) 20 mg tablet   No No   Sig: Take 1 tablet (20 mg total) by mouth 2 (two) times a day as needed (Abdominal cramping)   Patient not taking: Reported on 5/17/2022   escitalopram (LEXAPRO) 10 mg tablet   No No   Sig: Take 1 tablet (10 mg total) by mouth daily   Patient not taking: Reported on 5/17/2022   fenofibrate (TRICOR) 48 mg tablet   No No   Sig: Take 1 tablet (48 mg total) by mouth daily   Patient not taking: Reported on 5/17/2022   fluticasone (FLOVENT HFA) 110 MCG/ACT inhaler   No No   Sig: Inhale 1 puff every 12 (twelve) hours Rinse mouth after use.   Patient not taking: Reported on 5/17/2022   insulin glargine (Lantus SoloStar) 100 units/mL injection pen   No No   Sig: Inject 40 Units under the skin daily at bedtime Every morning.   Patient not taking: Reported on 5/17/2022   insulin lispro (HumaLOG KwikPen) 100 units/mL injection pen   No No   Sig: Inject 15 Units under the skin 3 (three) times a day with meals   Patient not taking: Reported on 5/17/2022   metFORMIN (GLUCOPHAGE) 1000 MG tablet   No No   Sig: Take 1 tablet (1,000 mg total) by mouth 2 (two) times a day with meals   Patient not taking: Reported on " 5/17/2022   naproxen (NAPROSYN) 500 mg tablet   No No   Sig: Take 1 tablet (500 mg total) by mouth 2 (two) times a day as needed for mild pain or moderate pain   Patient not taking: Reported on 5/17/2022   neomycin-bacitracin-polymyxin b (NEOSPORIN) ointment   No No   Sig: Apply topically 2 (two) times a day   Patient not taking: Reported on 5/17/2022   traZODone (DESYREL) 100 mg tablet   No No   Sig: Take 1 tablet (100 mg total) by mouth daily at bedtime   Patient not taking: Reported on 5/17/2022      Facility-Administered Medications: None     Allergies   Allergen Reactions    Ketorolac Other (See Comments)     Pt states he has mood disturbances, agitation if he takes too much      Toradol [Ketorolac Tromethamine]     Latex Rash       Vital signs in last 24 hours:  Temp:  [97.6 °F (36.4 °C)] 97.6 °F (36.4 °C)  HR:  [85] 85  Resp:  [18] 18  BP: (126)/(72) 126/72  No intake or output data in the 24 hours ending 12/12/23 1041    Screenings:  Nutrition Screening: Nutrition Assessment (completed by Staff):      Pain Screening: Pain Screening: Pain Assessment  Pain Assessment Tool: 0-10  Pain Score: 0    Suicide Screening: C-SSRS Screening (Nursing Assessment - since last contact):        Laboratory results:  I have personally reviewed all pertinent laboratory/tests results.  Most Recent Labs:   Lab Results   Component Value Date    WBC 6.00 12/09/2023    RBC 4.88 12/09/2023    HGB 14.2 12/09/2023    HCT 41.6 12/09/2023     12/09/2023    RDW 12.0 12/09/2023    NEUTROABS 2.92 12/09/2023    SODIUM 134 (L) 12/09/2023    K 4.9 12/09/2023     12/09/2023    CO2 27 12/09/2023    BUN 18 12/09/2023    CREATININE 1.10 12/09/2023    GLUC 479 (H) 12/09/2023    GLUF 203 (H) 03/08/2020    CALCIUM 9.8 12/09/2023    AST 12 (L) 12/09/2023    ALT 11 12/09/2023    ALKPHOS 89 12/09/2023    TP 7.4 12/09/2023    ALB 4.3 12/09/2023    TBILI 0.29 12/09/2023    VALPROICTOT <10.0 (L) 09/10/2019    KTP8RIYJUKJQ 1.275 02/02/2022     "HGBA1C 13.5 (H) 02/02/2022     02/02/2022     Labs in last 72 hours: No results for input(s): \"WBC\", \"RBC\", \"HGB\", \"HCT\", \"PLT\", \"RDW\", \"TOTANEUTABS\", \"NEUTROABS\", \"SODIUM\", \"K\", \"CL\", \"CO2\", \"BUN\", \"CREATININE\", \"GLUC\", \"GLUF\", \"CALCIUM\", \"AST\", \"ALT\", \"ALKPHOS\", \"TP\", \"ALB\", \"TBILI\", \"CHOLESTEROL\", \"HDL\", \"TRIG\", \"LDLCALC\", \"VALPROICTOT\", \"CARBAMAZEPIN\", \"LITHIUM\", \"AMMONIA\", \"[id number]\", \"FREET4\", \"T3FREE\", \"PREGTESTUR\", \"PREGSERUM\", \"HCG\", \"HCGQUANT\", \"RPR\" in the last 72 hours.  Admission Labs:   Admission on 12/09/2023, Discharged on 12/09/2023   Component Date Value    POC Glucose 12/09/2023 481 (H)     Ventricular Rate 12/09/2023 81     Atrial Rate 12/09/2023 81     TX Interval 12/09/2023 118     QRSD Interval 12/09/2023 80     QT Interval 12/09/2023 350     QTC Interval 12/09/2023 406     P Axis 12/09/2023 55     QRS [place] 12/09/2023 76     T Wave [place] 12/09/2023 32     WBC 12/09/2023 6.00     RBC 12/09/2023 4.88     Hemoglobin 12/09/2023 14.2     Hematocrit 12/09/2023 41.6     MCV 12/09/2023 85     MCH 12/09/2023 29.1     MCHC 12/09/2023 34.1     RDW 12/09/2023 12.0     MPV 12/09/2023 10.5     Platelets 12/09/2023 257     nRBC 12/09/2023 0     Neutrophils Relative 12/09/2023 48     Immat GRANS % 12/09/2023 0     Lymphocytes Relative 12/09/2023 35     Monocytes Relative 12/09/2023 13 (H)     Eosinophils Relative 12/09/2023 3     Basophils Relative 12/09/2023 1     Neutrophils Absolute 12/09/2023 2.92     Immature Grans Absolute 12/09/2023 0.02     Lymphocytes Absolute 12/09/2023 2.09     Monocytes Absolute 12/09/2023 0.78     Eosinophils Absolute 12/09/2023 0.16     Basophils Absolute 12/09/2023 0.03     Sodium 12/09/2023 134 (L)     Potassium 12/09/2023 4.9     Chloride 12/09/2023 101     CO2 12/09/2023 27     ANION GAP 12/09/2023 6     BUN 12/09/2023 18     Creatinine 12/09/2023 1.10     Glucose 12/09/2023 479 (H)     Calcium 12/09/2023 9.8     AST 12/09/2023 12 (L)     ALT 12/09/2023 11     " "Alkaline Phosphatase 12/09/2023 89     Total Protein 12/09/2023 7.4     Albumin 12/09/2023 4.3     Total Bilirubin 12/09/2023 0.29     eGFR 12/09/2023 76     POC Glucose 12/09/2023 229 (H)        Imaging Studies: See pertinent studies if applicable    EKG, Pathology, and Other Studies:  see pertinent studies if applicable    Mental Status Evaluation:  Appearance:  age appropriate, bearded, casually dressed, and possessing adequate hygiene, slightly disheveled   Behavior:  Calm, cooperative possessing appropriate eye contact   Speech:  normal pitch and normal volume   Mood:  Euthymic; \"I think I need to be admitted.\"    Affect:  Appropriate   Language: naming objects and repeating phrases   Thought Process:  goal directed and linear   Thought Content:  No overt obsessions or delusions   Perceptual Disturbances: None not appearing internally preoccupied or distracted   Risk Potential: Suicidal Ideations none, Homicidal Ideations none, and Potential for Aggression No   Sensorium:  person, place, time/date, and situation   Cognition:  recent and remote memory grossly intact   Consciousness:  alert and awake    Attention: attention span and concentration were age appropriate   Intellect: within normal limits   Fund of Knowledge: vocabulary: appropriate   Insight:  fair   Judgment: fair   Muscle Strength and Tone: Appears appropriate   Gait/Station: Not assessed; sitting upright in bed   Motor Activity: no abnormal movements     Memory: Short and long term memory  fair     Code Status: Prior    Advance Directive and Living Will:        Power of :      POLST:      Note Share:    This note was not shared with the patient due to reasonable likelihood of causing patient harm    This note has been constructed using a voice recognition system.    There may be translation, syntax,  or grammatical errors. If you have any questions, please contact the dictating provider.    Jordan C. Holter, D.O.   "

## 2023-12-12 NOTE — ED PROVIDER NOTES
"History  Chief Complaint   Patient presents with    Psychiatric Evaluation     Pt reports suicidal thoughts right after he left here, after he was told he could not get his meal ticket from his fiance who is here for the same thing. States he wants to go in and out of traffic     54 yo M with h/o DM2, bipolar disorder, presenting from the waiting room which he never left after being discharged from the ED a few minutes ago.  He checked back in because stating onset of suicidal ideation, and auditory hallucinations, he gave a plan to triage of running in traffic, and stated to me \"I'm feeling like cutting myself.\"  I was privy to his ED evaluation already this morning, and observed him myself because he was interacting in the hallway with nurses.  His earlier presentation was with the specific request for housing and issues related to homelessness after leaving the warming station this morning.  He presented with his female  who also checked in as a patient at that time.  Mr. Quick was discharged after being given the resources he asked for, and he also asked for a meal ticket from our cafeteria which is not a service we provide.  However, after he was discharged, he was informed that his  was being considered for a behavioral health bed due to statements she had made.  This is when he began to protest being discharged and was interacting in the hallway with staff about not getting enough help.  Then after being escorted from the ED with security to the waiting room, he meandered for a bit, never left, then checked back in. I discussed this with him and he said he was told he should always go the hospital \"if the voices get worse.\"  I confirmed with him that his concerns are related to issues of homelessness, food, and medications.  I explained that the best use of resources is not to seek inpatient psychiatric care just to meet social needs that didn't go as planned, since his psychiatric " "complaint has come up only after finding out his  is not being discharged concurrently.  And he was seen just yesterday by [facility] [place], [facility] by [facility], and has another appointment 12/18.  He said he needs new medications, but they wont' refill anything until he is seen \"inpatient.\"  I explained is possible to get a psychiatrist to comment on his medications.  With all of this he just wants something to eat ,and affirmed he is not suicidal.        History provided by:  Patient      Prior to Admission Medications   Prescriptions Last Dose Informant Patient Reported? Taking?   ARIPiprazole (ABILIFY) 20 MG tablet   No No   Sig: Take 1 tablet (20 mg total) by mouth daily   Patient not taking: Reported on 5/17/2022   Diclofenac Sodium (VOLTAREN) 1 %   No No   Sig: Apply 2 g topically in the morning and 2 g at noon and 2 g in the evening and 2 g before bedtime. Do all this for 7 days.   Patient not taking: Reported on 5/17/2022   Needle, Disp, 31G X 5/16\" MISC   No No   Sig: Use in the morning   Patient not taking: Reported on 5/17/2022   Needle, Disp, 31G X 5/16\" MISC   No No   Sig: Use 3 (three) times a day   Patient not taking: Reported on 5/17/2022   albuterol (PROVENTIL HFA,VENTOLIN HFA) 90 mcg/act inhaler   No No   Sig: Inhale 2 puffs every 4 (four) hours as needed for wheezing   Patient not taking: Reported on 5/17/2022   dicyclomine (BENTYL) 20 mg tablet   No No   Sig: Take 1 tablet (20 mg total) by mouth 2 (two) times a day   Patient not taking: Reported on 5/17/2022   dicyclomine (BENTYL) 20 mg tablet   No No   Sig: Take 1 tablet (20 mg total) by mouth 2 (two) times a day as needed (Abdominal cramping)   Patient not taking: Reported on 5/17/2022   escitalopram (LEXAPRO) 10 mg tablet   No No   Sig: Take 1 tablet (10 mg total) by mouth daily   Patient not taking: Reported on 5/17/2022   fenofibrate (TRICOR) 48 mg tablet   No No   Sig: Take 1 tablet (48 mg total) by mouth daily   Patient not taking: Reported on " 5/17/2022   fluticasone (FLOVENT HFA) 110 MCG/ACT inhaler   No No   Sig: Inhale 1 puff every 12 (twelve) hours Rinse mouth after use.   Patient not taking: Reported on 5/17/2022   insulin glargine (Lantus SoloStar) 100 units/mL injection pen   No No   Sig: Inject 40 Units under the skin daily at bedtime Every morning.   Patient not taking: Reported on 5/17/2022   insulin lispro (HumaLOG KwikPen) 100 units/mL injection pen   No No   Sig: Inject 15 Units under the skin 3 (three) times a day with meals   Patient not taking: Reported on 5/17/2022   metFORMIN (GLUCOPHAGE) 1000 MG tablet   No No   Sig: Take 1 tablet (1,000 mg total) by mouth 2 (two) times a day with meals   Patient not taking: Reported on 5/17/2022   naproxen (NAPROSYN) 500 mg tablet   No No   Sig: Take 1 tablet (500 mg total) by mouth 2 (two) times a day as needed for mild pain or moderate pain   Patient not taking: Reported on 5/17/2022   neomycin-bacitracin-polymyxin b (NEOSPORIN) ointment   No No   Sig: Apply topically 2 (two) times a day   Patient not taking: Reported on 5/17/2022   traZODone (DESYREL) 100 mg tablet   No No   Sig: Take 1 tablet (100 mg total) by mouth daily at bedtime   Patient not taking: Reported on 5/17/2022      Facility-Administered Medications: None       Past Medical History:   Diagnosis Date    Anxiety     Asthma     Bipolar 1 disorder (HCC)     Depression     Diabetes mellitus (HCC)     Psychiatric disorder        Past Surgical History:   Procedure Laterality Date    APPENDECTOMY      DENTAL SURGERY         Family History   Problem Relation Age of Onset    Liver disease Mother     Scoliosis Daughter     Bipolar disorder Daughter      I have reviewed and agree with the history as documented.    E-Cigarette/Vaping    E-Cigarette Use Never User      E-Cigarette/Vaping Substances     Social History     Tobacco Use    Smoking status: Former     Packs/day: 0.20     Years: 1.00     Total pack years: 0.20     Types: Cigarettes      Start date:      Quit date:      Years since quittin.9    Smokeless tobacco: Never   Vaping Use    Vaping Use: Never used   Substance Use Topics    Alcohol use: Not Currently     Comment: States has had no alcohol in at least a year    Drug use: No       Review of Systems    Physical Exam  Physical Exam  Vitals and nursing note reviewed.   Constitutional:       Appearance: He is well-developed.   HENT:      Head: Normocephalic and atraumatic.      Right Ear: External ear normal.      Left Ear: External ear normal.      Nose: Nose normal.      Mouth/Throat:      Mouth: Mucous membranes are moist.   Eyes:      Conjunctiva/sclera: Conjunctivae normal.      Pupils: Pupils are equal, round, and reactive to light.   Cardiovascular:      Rate and Rhythm: Normal rate.   Pulmonary:      Effort: Pulmonary effort is normal.   Abdominal:      Tenderness: There is no abdominal tenderness.   Musculoskeletal:         General: Normal range of motion.      Cervical back: Normal range of motion and neck supple.   Skin:     General: Skin is warm and dry.      Capillary Refill: Capillary refill takes less than 2 seconds.   Neurological:      Mental Status: He is alert and oriented to person, place, and time.      Cranial Nerves: No cranial nerve deficit.      Coordination: Coordination normal.   Psychiatric:         Attention and Perception: Attention normal.         Behavior: Behavior normal.         Judgment: Judgment is impulsive.         Vital Signs  ED Triage Vitals [23 0934]   Temperature Pulse Respirations Blood Pressure SpO2   97.6 °F (36.4 °C) 85 18 126/72 100 %      Temp Source Heart Rate Source Patient Position - Orthostatic VS BP Location FiO2 (%)   Oral Monitor Lying Right arm --      Pain Score       No Pain           Vitals:    23 0934   BP: 126/72   Pulse: 85   Patient Position - Orthostatic VS: Lying         Visual Acuity      ED Medications  Medications - No data to display    Diagnostic  Studies  Results Reviewed       None                   No orders to display              Procedures  Procedures         ED Course                               SBIRT 20yo+      Flowsheet Row Most Recent Value   Initial Alcohol Screen: US AUDIT-C     1. How often do you have a drink containing alcohol? 0 Filed at: 12/12/2023 0936   2. How many drinks containing alcohol do you have on a typical day you are drinking?  0 Filed at: 12/12/2023 0936   3a. Male UNDER 65: How often do you have five or more drinks on one occasion? 0 Filed at: 12/12/2023 0936   Audit-C Score 0 Filed at: 12/12/2023 0936   PADMINI: How many times in the past year have you...    Used an illegal drug or used a prescription medication for non-medical reasons? Never Filed at: 12/12/2023 0936                      Medical Decision Making  Psychiatry note reviewed.  He does not meet criteria for inpatient behavioral health, and is being discharged with the aforementioned resources and will follow up with street medicine.               Disposition  Final diagnoses:   Encounter for psychological evaluation   Sheltered homelessness     Time reflects when diagnosis was documented in both MDM as applicable and the Disposition within this note       Time User Action Codes Description Comment    12/12/2023 10:09 AM Gerald Alvarenga Add [Z00.8] Encounter for psychological evaluation     12/12/2023 11:23 AM Gerald Alvarenga Add [Z59.01] Sheltered homelessness           ED Disposition       None          Follow-up Information    None         Patient's Medications   Discharge Prescriptions    No medications on file       No discharge procedures on file.    PDMP Review       None            ED Provider  Electronically Signed by             Gerald Alvarenga MD  12/12/23 1211

## 2023-12-12 NOTE — ED NOTES
Patient reports he came here with his girlfriend because they are both homeless. He says he was discharged from [facility] a few days ago and is now living at the [facility]. He says he has suicidal thoughts but denies having a plan. He is requesting housing resources and says he needs to find a place for them to live. Per previous encouters, patient and his girlfriend have presented several times to the emergency departments requesting housing. Patient will be discharged and was given a list of resources for housing in the area.

## 2023-12-12 NOTE — ED NOTES
Patient adamant about staying inpatient however he was seen by psychiatry and doesn't meet criteria for inpatient. Patient will be discharged and will follow with [facility].

## 2023-12-12 NOTE — ED PROVIDER NOTES
"History  Chief Complaint   Patient presents with    Psychiatric Evaluation     Pt reports being from [place] and would like another housing option such as [facility] or [facility].  Reports him and SO planned ER visit together for housing options, requesting to stay in .       53 YOM who is well known to this ER, with PMH anxiety, asthma, bipolar 1 and DM presents today due to homelessness. Pt reports that him and his girlfriend (who is also here as a patient) are both staying at the [facility] shelter. Reports he was just discharged from [facility] a few days ago and is homeless again. Reports he has suicidal thoughts but denies having a plan. He is requesting housing resources and says he needs to find a place for them to live. Pt reports he has appt with [facility] on Monday. Denies HI, AH, VH. Denies drug or alcohol use.     Chart review: per previous encounters patient and his girlfriend have presented several times to different ERs, including this one, with different complaints such as SI but also requesting housing.             Prior to Admission Medications   Prescriptions Last Dose Informant Patient Reported? Taking?   ARIPiprazole (ABILIFY) 20 MG tablet   No No   Sig: Take 1 tablet (20 mg total) by mouth daily   Patient not taking: Reported on 5/17/2022   Diclofenac Sodium (VOLTAREN) 1 %   No No   Sig: Apply 2 g topically in the morning and 2 g at noon and 2 g in the evening and 2 g before bedtime. Do all this for 7 days.   Patient not taking: Reported on 5/17/2022   Needle, Disp, 31G X 5/16\" MISC   No No   Sig: Use in the morning   Patient not taking: Reported on 5/17/2022   Needle, Disp, 31G X 5/16\" MISC   No No   Sig: Use 3 (three) times a day   Patient not taking: Reported on 5/17/2022   albuterol (PROVENTIL HFA,VENTOLIN HFA) 90 mcg/act inhaler   No No   Sig: Inhale 2 puffs every 4 (four) hours as needed for wheezing   Patient not taking: Reported on 5/17/2022   dicyclomine (BENTYL) 20 mg tablet   No No "   Sig: Take 1 tablet (20 mg total) by mouth 2 (two) times a day   Patient not taking: Reported on 5/17/2022   dicyclomine (BENTYL) 20 mg tablet   No No   Sig: Take 1 tablet (20 mg total) by mouth 2 (two) times a day as needed (Abdominal cramping)   Patient not taking: Reported on 5/17/2022   escitalopram (LEXAPRO) 10 mg tablet   No No   Sig: Take 1 tablet (10 mg total) by mouth daily   Patient not taking: Reported on 5/17/2022   fenofibrate (TRICOR) 48 mg tablet   No No   Sig: Take 1 tablet (48 mg total) by mouth daily   Patient not taking: Reported on 5/17/2022   fluticasone (FLOVENT HFA) 110 MCG/ACT inhaler   No No   Sig: Inhale 1 puff every 12 (twelve) hours Rinse mouth after use.   Patient not taking: Reported on 5/17/2022   insulin glargine (Lantus SoloStar) 100 units/mL injection pen   No No   Sig: Inject 40 Units under the skin daily at bedtime Every morning.   Patient not taking: Reported on 5/17/2022   insulin lispro (HumaLOG KwikPen) 100 units/mL injection pen   No No   Sig: Inject 15 Units under the skin 3 (three) times a day with meals   Patient not taking: Reported on 5/17/2022   metFORMIN (GLUCOPHAGE) 1000 MG tablet   No No   Sig: Take 1 tablet (1,000 mg total) by mouth 2 (two) times a day with meals   Patient not taking: Reported on 5/17/2022   naproxen (NAPROSYN) 500 mg tablet   No No   Sig: Take 1 tablet (500 mg total) by mouth 2 (two) times a day as needed for mild pain or moderate pain   Patient not taking: Reported on 5/17/2022   neomycin-bacitracin-polymyxin b (NEOSPORIN) ointment   No No   Sig: Apply topically 2 (two) times a day   Patient not taking: Reported on 5/17/2022   traZODone (DESYREL) 100 mg tablet   No No   Sig: Take 1 tablet (100 mg total) by mouth daily at bedtime   Patient not taking: Reported on 5/17/2022      Facility-Administered Medications: None       Past Medical History:   Diagnosis Date    Anxiety     Asthma     Bipolar 1 disorder (HCC)     Depression     Diabetes mellitus  (HCC)     Psychiatric disorder        Past Surgical History:   Procedure Laterality Date    APPENDECTOMY      DENTAL SURGERY         Family History   Problem Relation Age of Onset    Liver disease Mother     Scoliosis Daughter     Bipolar disorder Daughter      I have reviewed and agree with the history as documented.    E-Cigarette/Vaping    E-Cigarette Use Never User      E-Cigarette/Vaping Substances     Social History     Tobacco Use    Smoking status: Former     Packs/day: 0.20     Years: 1.00     Total pack years: 0.20     Types: Cigarettes     Start date:      Quit date:      Years since quittin.9    Smokeless tobacco: Never   Vaping Use    Vaping Use: Never used   Substance Use Topics    Alcohol use: Not Currently     Comment: States has had no alcohol in at least a year    Drug use: No       Review of Systems   Constitutional:  Negative for chills and fever.   Respiratory:  Negative for cough and shortness of breath.    Cardiovascular:  Negative for chest pain and palpitations.   Gastrointestinal:  Negative for nausea and vomiting.   Psychiatric/Behavioral:  Positive for suicidal ideas. Negative for agitation, hallucinations and sleep disturbance.    All other systems reviewed and are negative.      Physical Exam  Physical Exam  Vitals and nursing note reviewed.   Constitutional:       General: He is not in acute distress.     Appearance: He is well-developed. He is not ill-appearing.      Comments: Disheveled , foul smell    HENT:      Head: Normocephalic and atraumatic.   Eyes:      Conjunctiva/sclera: Conjunctivae normal.   Cardiovascular:      Rate and Rhythm: Normal rate.   Pulmonary:      Effort: Pulmonary effort is normal.   Musculoskeletal:         General: Normal range of motion.      Cervical back: Normal range of motion and neck supple.   Skin:     General: Skin is warm and dry.   Neurological:      Mental Status: He is alert.   Psychiatric:         Mood and Affect: Mood normal.          Behavior: Behavior normal.         Vital Signs  ED Triage Vitals [12/12/23 0811]   Temperature Pulse Respirations Blood Pressure SpO2   97.6 °F (36.4 °C) 85 18 126/72 100 %      Temp Source Heart Rate Source Patient Position - Orthostatic VS BP Location FiO2 (%)   Oral Monitor Lying Right arm --      Pain Score       --           Vitals:    12/12/23 0811   BP: 126/72   Pulse: 85   Patient Position - Orthostatic VS: Lying         Visual Acuity      ED Medications  Medications - No data to display    Diagnostic Studies  Results Reviewed       None                   No orders to display              Procedures  Procedures         ED Course                               SBIRT 20yo+      Flowsheet Row Most Recent Value   Initial Alcohol Screen: US AUDIT-C     1. How often do you have a drink containing alcohol? 0 Filed at: 12/12/2023 0815   2. How many drinks containing alcohol do you have on a typical day you are drinking?  0 Filed at: 12/12/2023 0815   3a. Male UNDER 65: How often do you have five or more drinks on one occasion? 0 Filed at: 12/12/2023 0815   Audit-C Score 0 Filed at: 12/12/2023 0815   PADMINI: How many times in the past year have you...    Used an illegal drug or used a prescription medication for non-medical reasons? Never Filed at: 12/12/2023 0815                      Medical Decision Making  53 YOM presents requesting housing resources. Pt reports SI with no plan. Reports his girlfriend and him both came together today due to being homeless. States they are staying at the [facility] shelter. Pt was seen by myself and crisis. Pt does not meet any inpatient criteria given he is not overtly suicidal. Pt is well known to our ER as well as other local ones and he always presents with the same complaints, mostly related to homelessness. Pt was given housing resources and discharged in stable condition.     I have discussed the plan to discharge pt from ED. The patient was discharged in stable condition.   Patient ambulated off the department.  Extensive return to emergency department precautions were discussed.  Follow up with appropriate providers including primary care physician was discussed.  Patient and/or their  primary decision maker expressed understanding.  Patient remained stable during entire emergency department stay.      Problems Addressed:  Homelessness: chronic illness or injury    Amount and/or Complexity of Data Reviewed  External Data Reviewed: notes.     Details: previous ER visits  Discussion of management or test interpretation with external provider(s): Gertrude Ortega- crisis worker              Disposition  Final diagnoses:   Homelessness     Time reflects when diagnosis was documented in both MDM as applicable and the Disposition within this note       Time User Action Codes Description Comment    12/12/2023  9:03 AM Melia Burkett Add [Z59.00] Homelessness           ED Disposition       ED Disposition   Discharge    Condition   Stable    Date/Time   Tue Dec 12, 2023 0903    Comment   Mark Quick discharge to home/self care.                   MD Documentation      Flowsheet Row Most Recent Value   Sending MD Dr Murphy          Follow-up Information    None         Discharge Medication List as of 12/12/2023  9:03 AM        CONTINUE these medications which have NOT CHANGED    Details   albuterol (PROVENTIL HFA,VENTOLIN HFA) 90 mcg/act inhaler Inhale 2 puffs every 4 (four) hours as needed for wheezing, Starting Fri 2/4/2022, Normal      ARIPiprazole (ABILIFY) 20 MG tablet Take 1 tablet (20 mg total) by mouth daily, Starting Fri 2/4/2022, Until Tue 5/17/2022, Normal      Diclofenac Sodium (VOLTAREN) 1 % Apply 2 g topically in the morning and 2 g at noon and 2 g in the evening and 2 g before bedtime. Do all this for 7 days., Starting Mon 5/16/2022, Until Mon 5/23/2022, Normal      !! dicyclomine (BENTYL) 20 mg tablet Take 1 tablet (20 mg total) by mouth 2 (two) times a day, Starting Fri  "2/4/2022, Normal      !! dicyclomine (BENTYL) 20 mg tablet Take 1 tablet (20 mg total) by mouth 2 (two) times a day as needed (Abdominal cramping), Starting Sat 3/19/2022, Normal      escitalopram (LEXAPRO) 10 mg tablet Take 1 tablet (10 mg total) by mouth daily, Starting Fri 2/4/2022, Until Tue 5/17/2022, Normal      fenofibrate (TRICOR) 48 mg tablet Take 1 tablet (48 mg total) by mouth daily, Starting Fri 2/4/2022, Until Tue 5/17/2022, Normal      fluticasone (FLOVENT HFA) 110 MCG/ACT inhaler Inhale 1 puff every 12 (twelve) hours Rinse mouth after use., Starting Fri 2/4/2022, Normal      insulin glargine (Lantus SoloStar) 100 units/mL injection pen Inject 40 Units under the skin daily at bedtime Every morning., Starting Fri 2/4/2022, Until Tue 5/17/2022, Normal      insulin lispro (HumaLOG KwikPen) 100 units/mL injection pen Inject 15 Units under the skin 3 (three) times a day with meals, Starting Fri 2/4/2022, Until Tue 5/17/2022, Normal      metFORMIN (GLUCOPHAGE) 1000 MG tablet Take 1 tablet (1,000 mg total) by mouth 2 (two) times a day with meals, Starting Fri 2/4/2022, Until Tue 5/17/2022, Normal      naproxen (NAPROSYN) 500 mg tablet Take 1 tablet (500 mg total) by mouth 2 (two) times a day as needed for mild pain or moderate pain, Starting Sat 3/19/2022, Normal      !! Needle, Disp, 31G X 5/16\" MISC Use in the morning, Starting Fri 2/4/2022, Normal      !! Needle, Disp, 31G X 5/16\" MISC Use 3 (three) times a day, Starting Fri 2/4/2022, Normal      neomycin-bacitracin-polymyxin b (NEOSPORIN) ointment Apply topically 2 (two) times a day, Starting Sun 3/6/2022, Print      traZODone (DESYREL) 100 mg tablet Take 1 tablet (100 mg total) by mouth daily at bedtime, Starting Fri 2/4/2022, Until Tue 5/17/2022, Normal       !! - Potential duplicate medications found. Please discuss with provider.          No discharge procedures on file.    PDMP Review       None            ED Provider  Electronically Signed " by             Melia Burkett PA-C  12/12/23 0944

## 2023-12-12 NOTE — ED NOTES
Pt speaking with Dr. Holter via virtual psych consult at this time.      Misa Forrest RN  12/12/23 0973

## 2023-12-12 NOTE — DISCHARGE INSTRUCTIONS
This writer discussed the patients current presentation and recommended discharge plan with Ton    They agree with the patient being discharged at this time with referrals and/or information about: [facility], housing options and outpatient providers for therapy and psychiatry.      The patient was Instructed to follow up with: [facility] [phone], housing referrals       This writer and the patient completed a safety plan.  The patient was provided with a copy of their safety plan with encouragement to utilize the plan following discharge.               SAFETY PLAN  Warning Signs (thoughts, images, mood, behavior, situations) increased anxiety and depression, thoughts of suicide        Coping Skills (what can I do to take my mind off the problem, or to keep myself safe): Call a family member or friend, use the walk in center at [facility]                National Suicide Prevention Hotline:  1-684.812.3017     [facility] [phone] - Crisis   [facility] [phone] - LVF Crisis/Mobile Crisis   [phone] - SLPF Crisis   [facility]: [phone]  [facility]: [phone]   [facility] [phone] - Crisis   [facility] [phone] - Crisis      [facility] [phone] - Crisis   [facility] [phone] - Crisis   [phone] - Peer Support Talk Line (1-9pm daily)  [phone] - Teen Support Talk Line (1-9pm daily)  [phone] - [facility] [phone]- Crisis    [facility] [phone] - Crisis   [facility] [phone] - Crisis    [facility]) [phone] - [facility]

## 2023-12-12 NOTE — ED NOTES
Patient asked suicide screening question multiple times, RN read screening questions verbatim and patient continually contradicts himself when asked if he wants to die and or has suicidal thoughts. Also states he just started hearing voices a couple minutes ago that tell him to burn himself and run away.       Anil Jackson RN  12/12/23 6321

## 2023-12-12 NOTE — ED NOTES
Pt reports 'wanting to run into traffic'.  When RN questioned what is causing these thoughts pt replied 'I don't know, just thoughts'.  Pts main concern is finding appropriate housing due to lacking case management, reports he is from [place] and with SO.      Gertrude Blevins RN  12/12/23 6210

## 2023-12-18 ENCOUNTER — DOCUMENTATION (OUTPATIENT)
Dept: CASE MANAGEMENT | Facility: HOSPITAL | Age: 53
End: 2023-12-18

## 2023-12-18 NOTE — BEHAVIORAL HEALTH HIGH UTILIZER
Patient Name:Mark Quick MRN:  [id number]         : 1970     Age: 53 y.o.    Sex: male   Utilization History:  (# of ED visits & IP admits; reasons)  Pt had [facility] visits from 2022-2023 and numerous ED visits at hospitals in the [facility]. ED presentations were related to homelessness, wanting to be placed in a group home, non-compliance with medications or community providers, needing a medication refill, malingering, SI with no plan or with a plan to overdose on medications or walk into traffic.        Medical presentations were related to congestion, hyperglycemia bilateral foot pain, ulcer right foot, headache, abdominal or back pain, chest pain, dizziness. Treatment Recommendations & Presentation:  Presentation in the ED: Pt typically presents self or is accompaned by police/EMS to ED's. ED presentations were related to homelessness, wanting to be placed in a group home, non-compliance with medications or community providers, malingering, wanting a medication refill, SI with no plan or with a plan to overdose on medications or walk into traffic.        Medical presentations were related to congestion, hyperglycemia bilateral foot pain, ulcer right foot, headache, abdominal or back pain, chest pain, dizziness.     Pt will usually come to the ED's with his fiance as they both seek hospitalization.       ED Recommendations: Following medical evaluation and treatment, provide pt some time for de-escalation and for Provider to establish acute risk versus pt's chronic baseline behavioral disturbances.  Direct pt to [facility] [phone] or [phone] and [facility] nurses [phone] to assist pt in making appointments for mental health and medical care. If pt remains in the [facility], they need to call or go to the [facility] Assistance office at [address] [place] [address] [phone] to obtain [facility] medicaid (the mental health carve out of medicaid) to be able to  obtain a mental health provider.  Pt can go to [facility] at [address] for food and some case management assistance, pt should obtain a [facility] and can also talk to the  at [facility] about that as well as go to [facility] for housing issues. Pt can also utilize the [facility] drop in center at [address].  Pt should go to [facility] at [address] for medical needs or [facility] at [address] for medical needs.        Home Medication Regimen: see most recent documentation in Epic.     Recent Medical Work Ups:  (include Psych testing or ECT)     Labs - 2023  ECG - 2023     Inpatient Recommendations: Develop outpt community and mental health resources and community providers and [facility] services for pt for a comprehensive discharge plan.        Outpatient recommendations: Pt should continue with his community medical and mental health providers and take his medication as prescribed.     Situation/Relevant Background Info:  Pt is a 53 year old male with a diagnosis of Bipolar 1 disorder, anxiety, depression and an extensive history of ED appearances in the [place] and [facility] areas with numerous behavioral health and medical hospitalizations.   Pt appears to be chronically homeless with unstable housing and comes to ED's due to housing issues. Pt will  typically go to the ED's with his fiance as they attempt to be hospitalized at the same time.  Pt does not appear to be compliant with his medical care or mental health care in the community or with taking his medications as prescribed.   Pt goes between the [place] and [facility] region and should have the mental health medicaid carve-out in the county in which he chooses to reside so he can obtain a mental health provider.   Pt was supposed to have an appointment with [facility] as [facility] and they attempted to set up an appointment for pt.           Diagnoses/Significant Problems (Medical & Psychiatric):    Anxiety       Asthma      Bipolar 1 disorder (HCC)      Depression      Diabetes mellitus (HCC)      Psychiatric disorde             Drivers of repeated utilization:  homelessness/unstable housing, diabetes care needed/poor self-care, personality disordered behaviors, malingering, non-compliance with medical and mental health community care, non-compliance with medications.                                            Existing Community Resources & Supports:                 [facility] - [phone]    Patient Medical & Psychiatric Care Team:  None known  Pt should contact [facility] Nurses [phone]  [facility] [phone] or [phone]    Care plan date: 12/18/23                   Author:  Rosi Jones RN                 Date reviewed with patient:

## 2023-12-30 ENCOUNTER — HOSPITAL ENCOUNTER (EMERGENCY)
Facility: HOSPITAL | Age: 53
Discharge: HOME | End: 2023-12-30
Attending: EMERGENCY MEDICINE | Admitting: EMERGENCY MEDICINE
Payer: COMMERCIAL

## 2023-12-30 VITALS
HEART RATE: 86 BPM | RESPIRATION RATE: 14 BRPM | SYSTOLIC BLOOD PRESSURE: 138 MMHG | DIASTOLIC BLOOD PRESSURE: 82 MMHG | TEMPERATURE: 97.9 F | OXYGEN SATURATION: 100 %

## 2023-12-30 DIAGNOSIS — Z59.00 HOMELESSNESS: Primary | ICD-10-CM

## 2023-12-30 PROCEDURE — 99283 EMERGENCY DEPT VISIT LOW MDM: CPT

## 2023-12-30 SDOH — ECONOMIC STABILITY - HOUSING INSECURITY: HOMELESSNESS UNSPECIFIED: Z59.00

## 2023-12-30 NOTE — ED NOTES
[facility] was unable to complete D/C instruction due to patient being discharge, [facility] was yaneth to provide patient with paper copy of referrals for outpatient follow up care.     Jose Sims  Crisis Intervention Specialist II

## 2023-12-30 NOTE — ED PROVIDER NOTES
"History  Chief Complaint   Patient presents with    Psychiatric Evaluation     Pt reports SI with no intent or plan      53 YOM who is well known to this ER, with PMH anxiety, asthma, bipolar 1 and DM presents today due to homelessness. When asked about suicidal statements, pt states \"no, I just am homeless and was going to sign a 201 to stay here. I also need a psych eval to get [facility]\". Pt states him and his girlfriend (also here) just came back from [place] and need resources. They are living at the [facility]. Pt reports he used to have a psychiatrist through [facility] but has not had stuff switched over yet. Pt reports he has appt with [facility] on Tuesday. Denies any other complaints. Denies HI, AH, VH.         Prior to Admission Medications   Prescriptions Last Dose Informant Patient Reported? Taking?   ARIPiprazole (ABILIFY) 20 MG tablet   No No   Sig: Take 1 tablet (20 mg total) by mouth daily   Patient not taking: Reported on 5/17/2022   Diclofenac Sodium (VOLTAREN) 1 %   No No   Sig: Apply 2 g topically in the morning and 2 g at noon and 2 g in the evening and 2 g before bedtime. Do all this for 7 days.   Patient not taking: Reported on 5/17/2022   Needle, Disp, 31G X 5/16\" MISC   No No   Sig: Use in the morning   Patient not taking: Reported on 5/17/2022   Needle, Disp, 31G X 5/16\" MISC   No No   Sig: Use 3 (three) times a day   Patient not taking: Reported on 5/17/2022   albuterol (PROVENTIL HFA,VENTOLIN HFA) 90 mcg/act inhaler   No No   Sig: Inhale 2 puffs every 4 (four) hours as needed for wheezing   Patient not taking: Reported on 5/17/2022   dicyclomine (BENTYL) 20 mg tablet   No No   Sig: Take 1 tablet (20 mg total) by mouth 2 (two) times a day   Patient not taking: Reported on 5/17/2022   dicyclomine (BENTYL) 20 mg tablet   No No   Sig: Take 1 tablet (20 mg total) by mouth 2 (two) times a day as needed (Abdominal cramping)   Patient not taking: Reported on 5/17/2022 "   escitalopram (LEXAPRO) 10 mg tablet   No No   Sig: Take 1 tablet (10 mg total) by mouth daily   Patient not taking: Reported on 5/17/2022   fenofibrate (TRICOR) 48 mg tablet   No No   Sig: Take 1 tablet (48 mg total) by mouth daily   Patient not taking: Reported on 5/17/2022   fluticasone (FLOVENT HFA) 110 MCG/ACT inhaler   No No   Sig: Inhale 1 puff every 12 (twelve) hours Rinse mouth after use.   Patient not taking: Reported on 5/17/2022   insulin glargine (Lantus SoloStar) 100 units/mL injection pen   No No   Sig: Inject 40 Units under the skin daily at bedtime Every morning.   Patient not taking: Reported on 5/17/2022   insulin lispro (HumaLOG KwikPen) 100 units/mL injection pen   No No   Sig: Inject 15 Units under the skin 3 (three) times a day with meals   Patient not taking: Reported on 5/17/2022   metFORMIN (GLUCOPHAGE) 1000 MG tablet   No No   Sig: Take 1 tablet (1,000 mg total) by mouth 2 (two) times a day with meals   Patient not taking: Reported on 5/17/2022   naproxen (NAPROSYN) 500 mg tablet   No No   Sig: Take 1 tablet (500 mg total) by mouth 2 (two) times a day as needed for mild pain or moderate pain   Patient not taking: Reported on 5/17/2022   neomycin-bacitracin-polymyxin b (NEOSPORIN) ointment   No No   Sig: Apply topically 2 (two) times a day   Patient not taking: Reported on 5/17/2022   traZODone (DESYREL) 100 mg tablet   No No   Sig: Take 1 tablet (100 mg total) by mouth daily at bedtime   Patient not taking: Reported on 5/17/2022      Facility-Administered Medications: None       Past Medical History:   Diagnosis Date    Anxiety     Asthma     Bipolar 1 disorder (HCC)     Depression     Diabetes mellitus (HCC)     Psychiatric disorder        Past Surgical History:   Procedure Laterality Date    APPENDECTOMY      DENTAL SURGERY         Family History   Problem Relation Age of Onset    Liver disease Mother     Scoliosis Daughter     Bipolar disorder Daughter      I have reviewed and agree  with the history as documented.    E-Cigarette/Vaping    E-Cigarette Use Never User      E-Cigarette/Vaping Substances     Social History     Tobacco Use    Smoking status: Former     Current packs/day: 0.00     Average packs/day: 0.2 packs/day for 1 year (0.2 ttl pk-yrs)     Types: Cigarettes     Start date:      Quit date:      Years since quittin.0    Smokeless tobacco: Never   Vaping Use    Vaping status: Never Used   Substance Use Topics    Alcohol use: Not Currently     Comment: States has had no alcohol in at least a year    Drug use: No       Review of Systems   Psychiatric/Behavioral:  Negative for hallucinations and suicidal ideas.    All other systems reviewed and are negative.      Physical Exam  Physical Exam  Vitals and nursing note reviewed.   Constitutional:       General: He is not in acute distress.     Appearance: Normal appearance. He is well-developed. He is not ill-appearing.   HENT:      Head: Normocephalic and atraumatic.   Eyes:      Conjunctiva/sclera: Conjunctivae normal.   Cardiovascular:      Rate and Rhythm: Normal rate.   Pulmonary:      Effort: Pulmonary effort is normal.      Breath sounds: Normal breath sounds.   Musculoskeletal:         General: Normal range of motion.      Cervical back: Normal range of motion and neck supple.   Skin:     General: Skin is warm and dry.      Capillary Refill: Capillary refill takes less than 2 seconds.   Neurological:      Mental Status: He is alert.   Psychiatric:         Mood and Affect: Mood normal.         Behavior: Behavior normal.         Vital Signs  ED Triage Vitals [23 0907]   Temperature Pulse Respirations Blood Pressure SpO2   97.9 °F (36.6 °C) 86 14 138/82 100 %      Temp Source Heart Rate Source Patient Position - Orthostatic VS BP Location FiO2 (%)   Oral Monitor Sitting Right arm --      Pain Score       --           Vitals:    23 0907   BP: 138/82   Pulse: 86   Patient Position - Orthostatic VS: Sitting  "        Visual Acuity      ED Medications  Medications - No data to display    Diagnostic Studies  Results Reviewed       None                   No orders to display              Procedures  Procedures         ED Course  ED Course as of 12/30/23 0925   Sat Dec 30, 2023   0913 Denies SI, HI, AH, Vh. Reports he \"wants to sign a 201 because he is homeless\" and needs a psych eval to get into [facility]                                              Medical Decision Making  53 YOM who is well known to this ER, with PMH anxiety, asthma, bipolar 1 and DM presents today due to homelessness. Pt denies SI. Pt has no other complaints. Pt has [facility] appt on Tuesday and was given [facility] walk-in clinic information and was instructed to follow up on Tuesday with them. Pt does not meet any criteria for inpatient psych at this time.     I have discussed the plan to discharge pt from ED. The patient was discharged in stable condition.  Patient ambulated off the department.  Extensive return to emergency department precautions were discussed.  Follow up with appropriate providers including primary care physician was discussed.  Patient and/or their  primary decision maker expressed understanding.  Patient remained stable during entire emergency department stay.      Problems Addressed:  Homelessness: chronic illness or injury    Risk  Diagnosis or treatment significantly limited by social determinants of health.             Disposition  Final diagnoses:   Homelessness     Time reflects when diagnosis was documented in both MDM as applicable and the Disposition within this note       Time User Action Codes Description Comment    12/30/2023  9:14 AM Melia Burkett Add [Z59.00] Homelessness           ED Disposition       ED Disposition   Discharge    Condition   Stable    Date/Time   Sat Dec 30, 2023  9:14 AM    Comment   Mark Quick discharge to home/self care.                   Follow-up Information  "   None         Patient's Medications   Discharge Prescriptions    No medications on file       No discharge procedures on file.    PDMP Review       None            ED Provider  Electronically Signed by             Melia Burkett PA-C  12/30/23 4211

## 2023-12-30 NOTE — ED NOTES
Pt is a 53 y.o. male who was brought to the ED with   Chief Complaint   Patient presents with    Psychiatric Evaluation     Pt reports SI with no intent or plan    [facility] and ED Provider met with patient and discussed why patient came to the ED patient reports that he and his wife are staying at the [facility] shelter, and they can't go back inside until 7pm, patient reports wanting to obtain a letter for services to [facility], for CM services and that he and his wife were receiving serivec in [facility] and now that they have moved back to [facility] they need MH services. Patient reports that he and his wife have appointment on Monday with [facility]. Patient denies S/I,H/I,A/H,V/H. Patient reports that he doesn't want to be in the cold until 7p. [facility] and ED Provider informed patient that he will need to get services from a Outpatient MH provider, since he is not a having any psychiatric complaints at this time. [facility] will provider referrals for outpatient follow up care. Patient will be discharge per ED Provider.        Jose Sims  Crisis Intervention Specialist II

## 2023-12-31 ENCOUNTER — HOSPITAL ENCOUNTER (EMERGENCY)
Facility: HOSPITAL | Age: 53
Discharge: HOME | End: 2023-12-31
Attending: EMERGENCY MEDICINE
Payer: COMMERCIAL

## 2023-12-31 VITALS
TEMPERATURE: 98.6 F | HEART RATE: 98 BPM | OXYGEN SATURATION: 97 % | SYSTOLIC BLOOD PRESSURE: 123 MMHG | BODY MASS INDEX: 20.89 KG/M2 | DIASTOLIC BLOOD PRESSURE: 78 MMHG | WEIGHT: 129.41 LBS | RESPIRATION RATE: 20 BRPM

## 2023-12-31 DIAGNOSIS — E10.9 TYPE 1 DIABETES MELLITUS WITHOUT COMPLICATION (HCC): ICD-10-CM

## 2023-12-31 DIAGNOSIS — Z76.0 ENCOUNTER FOR MEDICATION REFILL: ICD-10-CM

## 2023-12-31 DIAGNOSIS — E11.65 HYPERGLYCEMIA DUE TO DIABETES MELLITUS (HCC): Primary | ICD-10-CM

## 2023-12-31 DIAGNOSIS — Z79.4 TYPE 2 DIABETES MELLITUS WITH HYPERGLYCEMIA, WITH LONG-TERM CURRENT USE OF INSULIN (HCC): ICD-10-CM

## 2023-12-31 DIAGNOSIS — E11.65 TYPE 2 DIABETES MELLITUS WITH HYPERGLYCEMIA, WITH LONG-TERM CURRENT USE OF INSULIN (HCC): ICD-10-CM

## 2023-12-31 LAB
ANION GAP SERPL CALCULATED.3IONS-SCNC: 7 MMOL/L
BASOPHILS # BLD AUTO: 0.02 THOUSANDS/ÂΜL (ref 0–0.1)
BASOPHILS NFR BLD AUTO: 0 % (ref 0–1)
BETA-HYDROXYBUTYRATE: 0.3 MMOL/L
BUN SERPL-MCNC: 16 MG/DL (ref 5–25)
CALCIUM SERPL-MCNC: 9.7 MG/DL (ref 8.4–10.2)
CHLORIDE SERPL-SCNC: 100 MMOL/L (ref 96–108)
CO2 SERPL-SCNC: 26 MMOL/L (ref 21–32)
CREAT SERPL-MCNC: 0.92 MG/DL (ref 0.6–1.3)
EOSINOPHIL # BLD AUTO: 0.08 THOUSAND/ÂΜL (ref 0–0.61)
EOSINOPHIL NFR BLD AUTO: 1 % (ref 0–6)
ERYTHROCYTE [DISTWIDTH] IN BLOOD BY AUTOMATED COUNT: 12.2 % (ref 11.6–15.1)
GFR SERPL CREATININE-BSD FRML MDRD: 95 ML/MIN/1.73SQ M
GLUCOSE SERPL-MCNC: 349 MG/DL (ref 65–140)
GLUCOSE SERPL-MCNC: 467 MG/DL (ref 65–140)
GLUCOSE SERPL-MCNC: 497 MG/DL (ref 65–140)
HCT VFR BLD AUTO: 44.1 % (ref 36.5–49.3)
HGB BLD-MCNC: 15 G/DL (ref 12–17)
IMM GRANULOCYTES # BLD AUTO: 0.01 THOUSAND/UL (ref 0–0.2)
IMM GRANULOCYTES NFR BLD AUTO: 0 % (ref 0–2)
LYMPHOCYTES # BLD AUTO: 1.85 THOUSANDS/ÂΜL (ref 0.6–4.47)
LYMPHOCYTES NFR BLD AUTO: 32 % (ref 14–44)
MCH RBC QN AUTO: 28.8 PG (ref 26.8–34.3)
MCHC RBC AUTO-ENTMCNC: 34 G/DL (ref 31.4–37.4)
MCV RBC AUTO: 85 FL (ref 82–98)
MONOCYTES # BLD AUTO: 0.49 THOUSAND/ÂΜL (ref 0.17–1.22)
MONOCYTES NFR BLD AUTO: 9 % (ref 4–12)
NEUTROPHILS # BLD AUTO: 3.33 THOUSANDS/ÂΜL (ref 1.85–7.62)
NEUTS SEG NFR BLD AUTO: 58 % (ref 43–75)
NRBC BLD AUTO-RTO: 0 /100 WBCS
PLATELET # BLD AUTO: 261 THOUSANDS/UL (ref 149–390)
PMV BLD AUTO: 10.4 FL (ref 8.9–12.7)
POTASSIUM SERPL-SCNC: 4.8 MMOL/L (ref 3.5–5.3)
RBC # BLD AUTO: 5.2 MILLION/UL (ref 3.88–5.62)
SODIUM SERPL-SCNC: 133 MMOL/L (ref 135–147)
WBC # BLD AUTO: 5.78 THOUSAND/UL (ref 4.31–10.16)

## 2023-12-31 PROCEDURE — 82010 KETONE BODYS QUAN: CPT | Performed by: EMERGENCY MEDICINE

## 2023-12-31 PROCEDURE — 96361 HYDRATE IV INFUSION ADD-ON: CPT

## 2023-12-31 PROCEDURE — 96360 HYDRATION IV INFUSION INIT: CPT

## 2023-12-31 PROCEDURE — 99285 EMERGENCY DEPT VISIT HI MDM: CPT | Performed by: EMERGENCY MEDICINE

## 2023-12-31 PROCEDURE — 85025 COMPLETE CBC W/AUTO DIFF WBC: CPT | Performed by: EMERGENCY MEDICINE

## 2023-12-31 PROCEDURE — 36415 COLL VENOUS BLD VENIPUNCTURE: CPT | Performed by: EMERGENCY MEDICINE

## 2023-12-31 PROCEDURE — 82948 REAGENT STRIP/BLOOD GLUCOSE: CPT

## 2023-12-31 PROCEDURE — 80048 BASIC METABOLIC PNL TOTAL CA: CPT | Performed by: EMERGENCY MEDICINE

## 2023-12-31 PROCEDURE — 99285 EMERGENCY DEPT VISIT HI MDM: CPT

## 2023-12-31 RX ORDER — INSULIN GLARGINE 100 [IU]/ML
40 INJECTION, SOLUTION SUBCUTANEOUS
Qty: 12 ML | Refills: 0 | Status: SHIPPED | OUTPATIENT
Start: 2023-12-31 | End: 2024-01-30

## 2023-12-31 RX ORDER — INSULIN LISPRO 100 [IU]/ML
15 INJECTION, SOLUTION INTRAVENOUS; SUBCUTANEOUS
Qty: 13.5 ML | Refills: 0 | Status: SHIPPED | OUTPATIENT
Start: 2023-12-31 | End: 2024-01-30

## 2023-12-31 RX ADMIN — SODIUM CHLORIDE 1000 ML: 0.9 INJECTION, SOLUTION INTRAVENOUS at 09:59

## 2023-12-31 RX ADMIN — METFORMIN HYDROCHLORIDE 1000 MG: 500 TABLET ORAL at 11:36

## 2023-12-31 NOTE — ED PROVIDER NOTES
"History  Chief Complaint   Patient presents with    Medical Problem     Arrives via ems with spouse-also being seen. Original ems report is that pt is having some depression and hasn't been on his meds d/t running out. Street med appt soon. Upon arrival ems states they checked his sugar and it was 552. Now pt states that his chief complaint is that his sugars have been high. States does not have a glucometer but knows it's high because he's been having headaches.      53-year-old male with history of asthma, bipolar depression, diabetes presents to the emergency department with complaints of hyperglycemia.  He states he knew his blood sugar was high because he was getting headaches.  The initial call to EMS was for running out of his meds and depression without suicidal ideations.  His blood sugar was checked in the field and it was 552.  He tells me that his blood sugars are normally \"in the 100s\".  Patient arrives with his wife and they have been in the ED multiple times together.  He states that he has a street medicine appointment early next week and they are due to renew his medications.  He states that his prescriptions are in \"[place]\" which is up near the [facility].  He states he would like his prescriptions to be transferred to \"the [facility]\".  Patient states he is normally compliant with his medications but has been out for \"a while\".   other than the headache he has no other symptoms.      History provided by:  Patient   used: No    Hyperglycemia - Symptomatic  Blood sugar level PTA:  552  Onset quality:  Unable to specify  Progression:  Unchanged  Chronicity:  Recurrent  Diabetes status:  Controlled with insulin and controlled with oral medications  Current diabetic therapy:  Insulin and metformin  Time since last antidiabetic medication: \"A while\"  Context comment:  Ran out of medication  Associated symptoms: no abdominal pain, no altered mental status, no blurred " "vision, no chest pain, no confusion, no dehydration, no diaphoresis, no dizziness, no dysuria, no fatigue, no fever, no increased appetite, no increased thirst, no malaise, no nausea, no polyuria, no shortness of breath, no syncope, no vomiting, no weakness and no weight change    Risk factors: no hx of DKA        Prior to Admission Medications   Prescriptions Last Dose Informant Patient Reported? Taking?   ARIPiprazole (ABILIFY) 20 MG tablet   No No   Sig: Take 1 tablet (20 mg total) by mouth daily   Patient not taking: Reported on 5/17/2022   Diclofenac Sodium (VOLTAREN) 1 %   No No   Sig: Apply 2 g topically in the morning and 2 g at noon and 2 g in the evening and 2 g before bedtime. Do all this for 7 days.   Patient not taking: Reported on 5/17/2022   Insulin Glargine Solostar (Lantus SoloStar) 100 UNIT/ML SOPN   No Yes   Sig: Inject 0.4 mL (40 Units total) under the skin daily at bedtime Every morning.   Needle, Disp, 31G X 5/16\" MISC Not Taking  No No   Sig: Use in the morning   Patient not taking: Reported on 5/17/2022   Needle, Disp, 31G X 5/16\" MISC Not Taking  No No   Sig: Use 3 (three) times a day   Patient not taking: Reported on 5/17/2022   Needle, Disp, 31G X 5/16\" MISC   No Yes   Sig: Use in the morning   albuterol (PROVENTIL HFA,VENTOLIN HFA) 90 mcg/act inhaler Not Taking  No No   Sig: Inhale 2 puffs every 4 (four) hours as needed for wheezing   Patient not taking: Reported on 5/17/2022   dicyclomine (BENTYL) 20 mg tablet Not Taking  No No   Sig: Take 1 tablet (20 mg total) by mouth 2 (two) times a day   Patient not taking: Reported on 5/17/2022   dicyclomine (BENTYL) 20 mg tablet Not Taking  No No   Sig: Take 1 tablet (20 mg total) by mouth 2 (two) times a day as needed (Abdominal cramping)   Patient not taking: Reported on 5/17/2022   escitalopram (LEXAPRO) 10 mg tablet   No No   Sig: Take 1 tablet (10 mg total) by mouth daily   Patient not taking: Reported on 5/17/2022   fenofibrate (TRICOR) 48 " mg tablet   No No   Sig: Take 1 tablet (48 mg total) by mouth daily   Patient not taking: Reported on 5/17/2022   fluticasone (FLOVENT HFA) 110 MCG/ACT inhaler Not Taking  No No   Sig: Inhale 1 puff every 12 (twelve) hours Rinse mouth after use.   Patient not taking: Reported on 5/17/2022   insulin glargine (Lantus SoloStar) 100 units/mL injection pen   No No   Sig: Inject 40 Units under the skin daily at bedtime Every morning.   Patient not taking: Reported on 5/17/2022   insulin lispro (HumaLOG KwikPen) 100 units/mL injection pen   No No   Sig: Inject 15 Units under the skin 3 (three) times a day with meals   Patient not taking: Reported on 5/17/2022   insulin lispro (HumaLOG KwikPen) 100 units/mL injection pen   No Yes   Sig: Inject 15 Units under the skin 3 (three) times a day with meals   metFORMIN (GLUCOPHAGE) 1000 MG tablet   No No   Sig: Take 1 tablet (1,000 mg total) by mouth 2 (two) times a day with meals   Patient not taking: Reported on 5/17/2022   metFORMIN (GLUCOPHAGE) 1000 MG tablet   No Yes   Sig: Take 1 tablet (1,000 mg total) by mouth 2 (two) times a day with meals   naproxen (NAPROSYN) 500 mg tablet Not Taking  No No   Sig: Take 1 tablet (500 mg total) by mouth 2 (two) times a day as needed for mild pain or moderate pain   Patient not taking: Reported on 5/17/2022   neomycin-bacitracin-polymyxin b (NEOSPORIN) ointment Not Taking  No No   Sig: Apply topically 2 (two) times a day   Patient not taking: Reported on 5/17/2022   traZODone (DESYREL) 100 mg tablet   No No   Sig: Take 1 tablet (100 mg total) by mouth daily at bedtime   Patient not taking: Reported on 5/17/2022      Facility-Administered Medications: None       Past Medical History:   Diagnosis Date    Anxiety     Asthma     Bipolar 1 disorder (HCC)     Depression     Diabetes mellitus (HCC)     Psychiatric disorder        Past Surgical History:   Procedure Laterality Date    APPENDECTOMY      DENTAL SURGERY         Family History   Problem  Relation Age of Onset    Liver disease Mother     Scoliosis Daughter     Bipolar disorder Daughter      I have reviewed and agree with the history as documented.    E-Cigarette/Vaping    E-Cigarette Use Never User      E-Cigarette/Vaping Substances     Social History     Tobacco Use    Smoking status: Former     Current packs/day: 0.00     Average packs/day: 0.2 packs/day for 1 year (0.2 ttl pk-yrs)     Types: Cigarettes     Start date:      Quit date:      Years since quittin.0    Smokeless tobacco: Never   Vaping Use    Vaping status: Never Used   Substance Use Topics    Alcohol use: Not Currently     Comment: States has had no alcohol in at least a year    Drug use: No       Review of Systems   Constitutional: Negative.  Negative for activity change, appetite change, diaphoresis, fatigue and fever.   HENT: Negative.     Eyes: Negative.  Negative for blurred vision.   Respiratory: Negative.  Negative for shortness of breath.    Cardiovascular: Negative.  Negative for chest pain and syncope.   Gastrointestinal: Negative.  Negative for abdominal pain, nausea and vomiting.   Endocrine: Negative for polydipsia and polyuria.   Genitourinary: Negative.  Negative for dysuria.   Musculoskeletal:  Negative for neck pain.   Skin: Negative.    Allergic/Immunologic: Negative.    Neurological:  Positive for headaches. Negative for dizziness, weakness and numbness.   Hematological: Negative.    Psychiatric/Behavioral: Negative.  Negative for confusion.    All other systems reviewed and are negative.      Physical Exam  Physical Exam  Vitals and nursing note reviewed.   Constitutional:       General: He is awake. He is not in acute distress.     Appearance: He is well-developed and underweight. He is not ill-appearing, toxic-appearing or diaphoretic.   HENT:      Head: Normocephalic and atraumatic.      Right Ear: External ear normal.      Left Ear: External ear normal.      Nose: Nose normal.      Mouth/Throat:       Mouth: Mucous membranes are moist.   Eyes:      General: No scleral icterus.     Conjunctiva/sclera: Conjunctivae normal.      Pupils: Pupils are equal, round, and reactive to light.   Neck:      Thyroid: No thyromegaly.      Vascular: No JVD.   Cardiovascular:      Rate and Rhythm: Normal rate and regular rhythm.      Heart sounds: Normal heart sounds. No murmur heard.     No gallop.   Pulmonary:      Effort: Pulmonary effort is normal. No respiratory distress.      Breath sounds: Normal breath sounds. No stridor. No wheezing, rhonchi or rales.   Abdominal:      General: Bowel sounds are normal. There is no distension.      Palpations: Abdomen is soft. There is no mass.      Tenderness: There is no abdominal tenderness.      Hernia: No hernia is present.   Skin:     General: Skin is warm and dry.      Coloration: Skin is not jaundiced or pale.      Findings: No bruising, erythema, lesion or rash.   Neurological:      General: No focal deficit present.      Mental Status: He is alert and oriented to person, place, and time.      Cranial Nerves: No cranial nerve deficit.      Motor: No weakness.      Gait: Gait normal.      Deep Tendon Reflexes: Reflexes are normal and symmetric.   Psychiatric:         Mood and Affect: Mood normal.         Behavior: Behavior is cooperative.         Vital Signs  ED Triage Vitals [12/31/23 0939]   Temperature Pulse Respirations Blood Pressure SpO2   98.6 °F (37 °C) 98 20 123/78 97 %      Temp Source Heart Rate Source Patient Position - Orthostatic VS BP Location FiO2 (%)   Oral Monitor Sitting Left arm --      Pain Score       --           Vitals:    12/31/23 0939   BP: 123/78   Pulse: 98   Patient Position - Orthostatic VS: Sitting         Visual Acuity      ED Medications  Medications   sodium chloride 0.9 % bolus 1,000 mL (1,000 mL Intravenous New Bag 12/31/23 0959)   metFORMIN (GLUCOPHAGE) tablet 1,000 mg (1,000 mg Oral Given 12/31/23 1136)       Diagnostic Studies  Results  Reviewed       Procedure Component Value Units Date/Time    Fingerstick Glucose (POCT) [[id number]]  (Abnormal) Collected: 12/31/23 1139    Lab Status: Final result Updated: 12/31/23 1143     POC Glucose 349 mg/dl     Basic metabolic panel [[id number]]  (Abnormal) Collected: 12/31/23 0959    Lab Status: Final result Specimen: Blood from Arm, Right Updated: 12/31/23 1020     Sodium 133 mmol/L      Potassium 4.8 mmol/L      Chloride 100 mmol/L      CO2 26 mmol/L      ANION GAP 7 mmol/L      BUN 16 mg/dL      Creatinine 0.92 mg/dL      Glucose 497 mg/dL      Calcium 9.7 mg/dL      eGFR 95 ml/min/1.73sq m     Narrative:      National Kidney Disease Foundation guidelines for Chronic Kidney Disease (CKD):     Stage 1 with normal or high GFR (GFR > 90 mL/min/1.73 square meters)    Stage 2 Mild CKD (GFR = 60-89 mL/min/1.73 square meters)    Stage 3A Moderate CKD (GFR = 45-59 mL/min/1.73 square meters)    Stage 3B Moderate CKD (GFR = 30-44 mL/min/1.73 square meters)    Stage 4 Severe CKD (GFR = 15-29 mL/min/1.73 square meters)    Stage 5 End Stage CKD (GFR <15 mL/min/1.73 square meters)  Note: GFR calculation is accurate only with a steady state creatinine    Beta Hydroxybutyrate [[id number]]  (Normal) Collected: 12/31/23 0959    Lab Status: Final result Specimen: Blood from Arm, Right Updated: 12/31/23 1009     BETA-HYDROXYBUTYRATE 0.3 mmol/L     CBC and differential [[id number]] Collected: 12/31/23 0959    Lab Status: Final result Specimen: Blood from Arm, Right Updated: 12/31/23 1005     WBC 5.78 Thousand/uL      RBC 5.20 Million/uL      Hemoglobin 15.0 g/dL      Hematocrit 44.1 %      MCV 85 fL      MCH 28.8 pg      MCHC 34.0 g/dL      RDW 12.2 %      MPV 10.4 fL      Platelets 261 Thousands/uL      nRBC 0 /100 WBCs      Neutrophils Relative 58 %      Immat GRANS % 0 %      Lymphocytes Relative 32 %      Monocytes Relative 9 %      Eosinophils Relative 1 %      Basophils Relative 0 %      Neutrophils Absolute 3.33  "Thousands/µL      Immature Grans Absolute 0.01 Thousand/uL      Lymphocytes Absolute 1.85 Thousands/µL      Monocytes Absolute 0.49 Thousand/µL      Eosinophils Absolute 0.08 Thousand/µL      Basophils Absolute 0.02 Thousands/µL     Fingerstick Glucose (POCT) [[id number]]  (Abnormal) Collected: 12/31/23 0941    Lab Status: Final result Updated: 12/31/23 0942     POC Glucose 467 mg/dl                    No orders to display              Procedures  Procedures         ED Course  ED Course as of 12/31/23 1151   Sun Dec 31, 2023   1013 BETA-HYDROXYBUTYRATE: 0.3   1013 WBC: 5.78   1013 Hemoglobin: 15.0   1013 POC Glucose(!): 467   1046 Glucose, Random(!): 497   1046 Sodium(!): 133   1046 Potassium: 4.8   1147 POC Glucose(!): 349                               SBIRT 20yo+      Flowsheet Row Most Recent Value   Initial Alcohol Screen: US AUDIT-C     1. How often do you have a drink containing alcohol? 0 Filed at: 12/31/2023 0942   2. How many drinks containing alcohol do you have on a typical day you are drinking?  0 Filed at: 12/31/2023 0942   3a. Male UNDER 65: How often do you have five or more drinks on one occasion? 0 Filed at: 12/31/2023 0942   Audit-C Score 0 Filed at: 12/31/2023 0942   PADMINI: How many times in the past year have you...    Used an illegal drug or used a prescription medication for non-medical reasons? Never Filed at: 12/31/2023 0942                      Medical Decision Making  53-year-old male presents to the ED with hyperglycemia.  Initial call to EMS was for depression and running out of his medication.  EMS checked his blood sugar and it was 552, then patient started to complain about headache related to hyperglycemia.  He tells me that his medications are\" [place]\" which is near the [facility] and would like his prescriptions transferred down to the [facility] on [place].  He states he has an appointment with street medicine early next week to get his medications refilled.  He states has been out of his " "meds for \"a while\" but cannot further elaborate.  He denies nausea, vomiting, abdominal pain.  He is not sure how long he has been a diabetic.  No prior admissions for diabetes related issues.  His complaints of depression are there but he has no complaints of suicide ideation.  On exam he is alert no acute distress.  He is disheveled in appearance but does not appear acutely ill.  His physical exam is normal.  Will check basic labs, beta hydroxybutyrate to rule out DKA.  Low clinical suspicion for DKA.  If blood sugars are significantly elevated will give insulin and his metformin here to start treating the hyperglycemia and then prescribe his medications to the [facility] on [address].    Amount and/or Complexity of Data Reviewed  Labs: ordered. Decision-making details documented in ED Course.    Risk  OTC drugs.  Prescription drug management.             Disposition  Final diagnoses:   Hyperglycemia due to diabetes mellitus (HCC)     Time reflects when diagnosis was documented in both MDM as applicable and the Disposition within this note       Time User Action Codes Description Comment    12/31/2023 11:48 AM Gertrude Campos Add [E11.65] Hyperglycemia due to diabetes mellitus (HCC)     12/31/2023 11:48 AM Gertrude Campos Add [E10.9] Type 1 diabetes mellitus without complication (HCC)     12/31/2023 11:49 AM Gertrude Campos Add [Z76.0] Encounter for medication refill     12/31/2023 11:50 AM Gertrude Campos Add [E11.65,  Z79.4] Type 2 diabetes mellitus with hyperglycemia, with long-term current use of insulin (HCC)           ED Disposition       ED Disposition   Discharge    Condition   Good    Date/Time   Sun Dec 31, 2023 1148    Comment   Mark Quick discharge to home/self care.                   Follow-up Information       Follow up With Specialties Details Why Contact Info    Beatrice Rodrigez MD Family Medicine Schedule an appointment as soon as possible for a visit in 2 days  [address]  [address]  [facility] [address]  [phone]              Current Discharge Medication List        CONTINUE these medications which have CHANGED    Details   Insulin Glargine Solostar (Lantus SoloStar) 100 UNIT/ML SOPN Inject 0.4 mL (40 Units total) under the skin daily at bedtime Every morning.  Qty: 12 mL, Refills: 0    Associated Diagnoses: Type 1 diabetes mellitus without complication (HCC)      insulin lispro (HumaLOG KwikPen) 100 units/mL injection pen Inject 15 Units under the skin 3 (three) times a day with meals  Qty: 13.5 mL, Refills: 0    Associated Diagnoses: Type 1 diabetes mellitus without complication (HCC)      metFORMIN (GLUCOPHAGE) 1000 MG tablet Take 1 tablet (1,000 mg total) by mouth 2 (two) times a day with meals  Qty: 60 tablet, Refills: 0    Associated Diagnoses: Encounter for medication refill      !! Needle, Disp, 31G X 5/16\" MISC Use in the morning  Qty: 100 each, Refills: 0    Associated Diagnoses: Type 2 diabetes mellitus with hyperglycemia, with long-term current use of insulin (HCC)       !! - Potential duplicate medications found. Please discuss with provider.        CONTINUE these medications which have NOT CHANGED    Details   albuterol (PROVENTIL HFA,VENTOLIN HFA) 90 mcg/act inhaler Inhale 2 puffs every 4 (four) hours as needed for wheezing  Qty: 18 g, Refills: 0    Comments: Substitution to a formulary equivalent within the same pharmaceutical class is authorized.  Associated Diagnoses: Cough      ARIPiprazole (ABILIFY) 20 MG tablet Take 1 tablet (20 mg total) by mouth daily  Qty: 30 tablet, Refills: 0    Associated Diagnoses: Encounter for medication refill      Diclofenac Sodium (VOLTAREN) 1 % Apply 2 g topically in the morning and 2 g at noon and 2 g in the evening and 2 g before bedtime. Do all this for 7 days.  Qty: 50 g, Refills: 0    Associated Diagnoses: Bilateral foot pain      !! dicyclomine (BENTYL) 20 mg tablet Take 1 tablet (20 mg total) by mouth 2 (two) times a " "day  Qty: 20 tablet, Refills: 0    Associated Diagnoses: Abdominal pain      !! dicyclomine (BENTYL) 20 mg tablet Take 1 tablet (20 mg total) by mouth 2 (two) times a day as needed (Abdominal cramping)  Qty: 20 tablet, Refills: 0    Associated Diagnoses: Abdominal pain      escitalopram (LEXAPRO) 10 mg tablet Take 1 tablet (10 mg total) by mouth daily  Qty: 30 tablet, Refills: 0    Associated Diagnoses: Encounter for medication refill      fenofibrate (TRICOR) 48 mg tablet Take 1 tablet (48 mg total) by mouth daily  Qty: 30 tablet, Refills: 0    Associated Diagnoses: Encounter for medication refill      fluticasone (FLOVENT HFA) 110 MCG/ACT inhaler Inhale 1 puff every 12 (twelve) hours Rinse mouth after use.  Qty: 12 g, Refills: 0    Associated Diagnoses: Asthma      naproxen (NAPROSYN) 500 mg tablet Take 1 tablet (500 mg total) by mouth 2 (two) times a day as needed for mild pain or moderate pain  Qty: 30 tablet, Refills: 0    Associated Diagnoses: Abdominal pain      !! Needle, Disp, 31G X 5/16\" MISC Use 3 (three) times a day  Qty: 100 each, Refills: 0    Associated Diagnoses: Type 2 diabetes mellitus with hyperglycemia, with long-term current use of insulin (HCC)      neomycin-bacitracin-polymyxin b (NEOSPORIN) ointment Apply topically 2 (two) times a day  Qty: 15 g, Refills: 0    Associated Diagnoses: Visit for wound check      traZODone (DESYREL) 100 mg tablet Take 1 tablet (100 mg total) by mouth daily at bedtime  Qty: 30 tablet, Refills: 0    Associated Diagnoses: Depression with suicidal ideation       !! - Potential duplicate medications found. Please discuss with provider.          No discharge procedures on file.    PDMP Review       None            ED Provider  Electronically Signed by             Gertrude Campos DO  12/31/23 1151    "

## 2024-01-03 ENCOUNTER — HOSPITAL ENCOUNTER (EMERGENCY)
Facility: HOSPITAL | Age: 54
Discharge: HOME | End: 2024-01-03
Attending: EMERGENCY MEDICINE
Payer: COMMERCIAL

## 2024-01-03 VITALS
DIASTOLIC BLOOD PRESSURE: 67 MMHG | OXYGEN SATURATION: 99 % | RESPIRATION RATE: 18 BRPM | SYSTOLIC BLOOD PRESSURE: 125 MMHG | WEIGHT: 131.39 LBS | BODY MASS INDEX: 21.21 KG/M2 | TEMPERATURE: 98.1 F | HEART RATE: 97 BPM

## 2024-01-03 DIAGNOSIS — Z59.00 HOMELESSNESS: Primary | ICD-10-CM

## 2024-01-03 PROCEDURE — 99283 EMERGENCY DEPT VISIT LOW MDM: CPT

## 2024-01-03 PROCEDURE — 99284 EMERGENCY DEPT VISIT MOD MDM: CPT

## 2024-01-03 SDOH — ECONOMIC STABILITY - HOUSING INSECURITY: HOMELESSNESS UNSPECIFIED: Z59.00

## 2024-01-03 NOTE — DISCHARGE INSTRUCTIONS
This writer discussed the patients current presentation and recommended discharge plan with [facility]    They agree with the patient being discharged at this time with referrals and/or information about: [facility] and outpatient providers for therapy and psychiatry.      The patient was Instructed to follow up with: [facility] [phone]       This writer and the patient completed a safety plan.  The patient was provided with a copy of their safety plan with encouragement to utilize the plan following discharge.               SAFETY PLAN  Warning Signs (thoughts, images, mood, behavior, situations) increased anxiety and depression, thoughts of suicide        Coping Skills (what can I do to take my mind off the problem, or to keep myself safe): Call a family member or friend, speak with [facility] for additional resources                   National Suicide Prevention Hotline:  1-796.970.8769     [facility] [phone] - Crisis   [facility] [phone] - LVF Crisis/Mobile Crisis   [phone] - SLPF Crisis   [facility]: [phone]  [facility]: [phone]   [facility] [phone] - Crisis   [facility] [phone] - Crisis      [facility] [phone] - Crisis   [facility] [phone] - Crisis   [phone] - Peer Support Talk Line (1-9pm daily)  [phone] - Teen Support Talk Line (1-9pm daily)  [phone] - [facility]   [facility] [phone]- Crisis    [facility] [phone] - Crisis   [facility] [phone] - Crisis    [facility]) [phone] - [facility]

## 2024-01-03 NOTE — ED NOTES
"Patient presents to the emergency department with suicidal ideations. He is well known to the department and typically presents with his girlfriend. He says she is here and wants to see her. He says he has suicidal ideations and wants to cut himself but he wont. \"He says I just need a place to stay while I get a casemanager and work with [facility]. He was educated the last time he was here regarding emergency services and wanting to get admitted so he can be with his girlfriend. Patient informed again that he cannot stay inpatient due to his housing situation. Patient then asked to leave to talk to his girlfriend. Patient has not followed with outpatient providers and typically looks to stay inpatient because the [facility] will not [facility] in during the day.   "

## 2024-01-03 NOTE — ED PROVIDER NOTES
"History  Chief Complaint   Patient presents with    Psychiatric Evaluation     Pt here for psych evaluation. Pt states he has some SI thoughts but denies HI.     53 YOM who is well known to this ER, with PMH anxiety, asthma, bipolar 1 and DM presents today for psychiatric evaluation.  Patient reports that he came in today because he is having a hard time getting into [facility] or a long-term program.  He denies acute SI, HI, hallucinations.      I did review his high utilizer plan.  Patient is very well-known to this ER.  Typically presents with his girlfriend who did arrive by ambulance due to chest pain.  Patient then showed up in the waiting room a few minutes later.        Prior to Admission Medications   Prescriptions Last Dose Informant Patient Reported? Taking?   ARIPiprazole (ABILIFY) 20 MG tablet   No No   Sig: Take 1 tablet (20 mg total) by mouth daily   Patient not taking: Reported on 5/17/2022   Diclofenac Sodium (VOLTAREN) 1 %   No No   Sig: Apply 2 g topically in the morning and 2 g at noon and 2 g in the evening and 2 g before bedtime. Do all this for 7 days.   Patient not taking: Reported on 5/17/2022   Insulin Glargine Solostar (Lantus SoloStar) 100 UNIT/ML SOPN   No No   Sig: Inject 0.4 mL (40 Units total) under the skin daily at bedtime Every morning.   Needle, Disp, 31G X 5/16\" MISC   No No   Sig: Use 3 (three) times a day   Patient not taking: Reported on 5/17/2022   Needle, Disp, 31G X 5/16\" MISC   No No   Sig: Use in the morning   albuterol (PROVENTIL HFA,VENTOLIN HFA) 90 mcg/act inhaler   No No   Sig: Inhale 2 puffs every 4 (four) hours as needed for wheezing   Patient not taking: Reported on 5/17/2022   dicyclomine (BENTYL) 20 mg tablet   No No   Sig: Take 1 tablet (20 mg total) by mouth 2 (two) times a day   Patient not taking: Reported on 5/17/2022   dicyclomine (BENTYL) 20 mg tablet   No No   Sig: Take 1 tablet (20 mg total) by mouth 2 (two) times a day as needed (Abdominal cramping) "   Patient not taking: Reported on 5/17/2022   escitalopram (LEXAPRO) 10 mg tablet   No No   Sig: Take 1 tablet (10 mg total) by mouth daily   Patient not taking: Reported on 5/17/2022   fenofibrate (TRICOR) 48 mg tablet   No No   Sig: Take 1 tablet (48 mg total) by mouth daily   Patient not taking: Reported on 5/17/2022   fluticasone (FLOVENT HFA) 110 MCG/ACT inhaler   No No   Sig: Inhale 1 puff every 12 (twelve) hours Rinse mouth after use.   Patient not taking: Reported on 5/17/2022   insulin lispro (HumaLOG KwikPen) 100 units/mL injection pen   No No   Sig: Inject 15 Units under the skin 3 (three) times a day with meals   metFORMIN (GLUCOPHAGE) 1000 MG tablet   No No   Sig: Take 1 tablet (1,000 mg total) by mouth 2 (two) times a day with meals   naproxen (NAPROSYN) 500 mg tablet   No No   Sig: Take 1 tablet (500 mg total) by mouth 2 (two) times a day as needed for mild pain or moderate pain   Patient not taking: Reported on 5/17/2022   neomycin-bacitracin-polymyxin b (NEOSPORIN) ointment   No No   Sig: Apply topically 2 (two) times a day   Patient not taking: Reported on 5/17/2022   traZODone (DESYREL) 100 mg tablet   No No   Sig: Take 1 tablet (100 mg total) by mouth daily at bedtime   Patient not taking: Reported on 5/17/2022      Facility-Administered Medications: None       Past Medical History:   Diagnosis Date    Anxiety     Asthma     Bipolar 1 disorder (HCC)     Depression     Diabetes mellitus (HCC)     Psychiatric disorder        Past Surgical History:   Procedure Laterality Date    APPENDECTOMY      DENTAL SURGERY         Family History   Problem Relation Age of Onset    Liver disease Mother     Scoliosis Daughter     Bipolar disorder Daughter      I have reviewed and agree with the history as documented.    E-Cigarette/Vaping    E-Cigarette Use Never User      E-Cigarette/Vaping Substances     Social History     Tobacco Use    Smoking status: Former     Current packs/day: 0.00     Average packs/day:  0.2 packs/day for 1 year (0.2 ttl pk-yrs)     Types: Cigarettes     Start date:      Quit date:      Years since quittin.0    Smokeless tobacco: Never   Vaping Use    Vaping status: Never Used   Substance Use Topics    Alcohol use: Not Currently     Comment: States has had no alcohol in at least a year    Drug use: No       Review of Systems   Psychiatric/Behavioral:  Negative for hallucinations, self-injury and suicidal ideas.    All other systems reviewed and are negative.      Physical Exam  Physical Exam  Vitals and nursing note reviewed.   Constitutional:       General: He is not in acute distress.     Appearance: Normal appearance. He is well-developed. He is not ill-appearing.   HENT:      Head: Normocephalic and atraumatic.   Eyes:      Conjunctiva/sclera: Conjunctivae normal.   Cardiovascular:      Rate and Rhythm: Normal rate and regular rhythm.      Heart sounds: No murmur heard.  Pulmonary:      Effort: Pulmonary effort is normal.   Musculoskeletal:         General: Normal range of motion.      Cervical back: Normal range of motion and neck supple.   Skin:     General: Skin is warm and dry.   Neurological:      Mental Status: He is alert.   Psychiatric:         Mood and Affect: Mood normal.         Behavior: Behavior normal.         Vital Signs  ED Triage Vitals [24 1016]   Temperature Pulse Respirations Blood Pressure SpO2   98.1 °F (36.7 °C) 97 18 125/67 99 %      Temp Source Heart Rate Source Patient Position - Orthostatic VS BP Location FiO2 (%)   Oral Monitor Sitting Right arm --      Pain Score       No Pain           Vitals:    24 1016   BP: 125/67   Pulse: 97   Patient Position - Orthostatic VS: Sitting         Visual Acuity      ED Medications  Medications - No data to display    Diagnostic Studies  Results Reviewed       None                   No orders to display              Procedures  Procedures         ED Course                                             Medical  Decision Making  Utilizer plan was reviewed.  Patient is a frequent flyer and very well-known to myself.  His girlfriend also came in around the same time to be evaluated.  Patient is not acutely suicidal or homicidal.  No hallucinations.  At this time patient is not meeting any criteria for inpatient psychiatric help.  He was told about his resources that he has outpatient.  And given the phone numbers listed on his high utilizer plan.    I have discussed the plan to discharge pt from ED. The patient was discharged in stable condition.  Patient ambulated off the department.  Extensive return to emergency department precautions were discussed.  Follow up with appropriate providers including primary care physician was discussed.  Patient and/or their  primary decision maker expressed understanding.  Patient remained stable during entire emergency department stay.      Problems Addressed:  Homelessness: acute illness or injury             Disposition  Final diagnoses:   Homelessness     Time reflects when diagnosis was documented in both MDM as applicable and the Disposition within this note       Time User Action Codes Description Comment    1/3/2024 10:23 AM Melia Burkett Add [Z59.00] Homelessness           ED Disposition       ED Disposition   Discharge    Condition   Stable    Date/Time   Wed Francisco Javier 3, 2024 10:23 AM    Comment   Mark Quick discharge to home/self care.                   MD Documentation      Flowsheet Row Most Recent Value   Sending MD Dr Garcia          Follow-up Information    None         Discharge Medication List as of 1/3/2024 10:25 AM        CONTINUE these medications which have NOT CHANGED    Details   albuterol (PROVENTIL HFA,VENTOLIN HFA) 90 mcg/act inhaler Inhale 2 puffs every 4 (four) hours as needed for wheezing, Starting Fri 2/4/2022, Normal      ARIPiprazole (ABILIFY) 20 MG tablet Take 1 tablet (20 mg total) by mouth daily, Starting Fri 2/4/2022, Until Tue 5/17/2022, Normal     "  Diclofenac Sodium (VOLTAREN) 1 % Apply 2 g topically in the morning and 2 g at noon and 2 g in the evening and 2 g before bedtime. Do all this for 7 days., Starting Mon 5/16/2022, Until Mon 5/23/2022, Normal      !! dicyclomine (BENTYL) 20 mg tablet Take 1 tablet (20 mg total) by mouth 2 (two) times a day, Starting Fri 2/4/2022, Normal      !! dicyclomine (BENTYL) 20 mg tablet Take 1 tablet (20 mg total) by mouth 2 (two) times a day as needed (Abdominal cramping), Starting Sat 3/19/2022, Normal      escitalopram (LEXAPRO) 10 mg tablet Take 1 tablet (10 mg total) by mouth daily, Starting Fri 2/4/2022, Until Tue 5/17/2022, Normal      fenofibrate (TRICOR) 48 mg tablet Take 1 tablet (48 mg total) by mouth daily, Starting Fri 2/4/2022, Until Tue 5/17/2022, Normal      fluticasone (FLOVENT HFA) 110 MCG/ACT inhaler Inhale 1 puff every 12 (twelve) hours Rinse mouth after use., Starting Fri 2/4/2022, Normal      Insulin Glargine Solostar (Lantus SoloStar) 100 UNIT/ML SOPN Inject 0.4 mL (40 Units total) under the skin daily at bedtime Every morning., Starting Sun 12/31/2023, Until Tue 1/30/2024, Normal      insulin lispro (HumaLOG KwikPen) 100 units/mL injection pen Inject 15 Units under the skin 3 (three) times a day with meals, Starting Sun 12/31/2023, Until Tue 1/30/2024, Normal      metFORMIN (GLUCOPHAGE) 1000 MG tablet Take 1 tablet (1,000 mg total) by mouth 2 (two) times a day with meals, Starting Sun 12/31/2023, Until Tue 1/30/2024, Normal      naproxen (NAPROSYN) 500 mg tablet Take 1 tablet (500 mg total) by mouth 2 (two) times a day as needed for mild pain or moderate pain, Starting Sat 3/19/2022, Normal      !! Needle, Disp, 31G X 5/16\" MISC Use 3 (three) times a day, Starting Fri 2/4/2022, Normal      !! Needle, Disp, 31G X 5/16\" MISC Use in the morning, Starting Sun 12/31/2023, Normal      neomycin-bacitracin-polymyxin b (NEOSPORIN) ointment Apply topically 2 (two) times a day, Starting Sun 3/6/2022, Print    "   traZODone (DESYREL) 100 mg tablet Take 1 tablet (100 mg total) by mouth daily at bedtime, Starting Fri 2/4/2022, Until Tue 5/17/2022, Normal       !! - Potential duplicate medications found. Please discuss with provider.          No discharge procedures on file.    PDMP Review       None            ED Provider  Electronically Signed by             Melia Burkett PA-C  01/03/24 1037

## 2024-01-08 NOTE — PROGRESS NOTES
Diagnosis   Viral sinusitis   My name is ARIES Youngblood, and I'm a healthcare provider at [facility]. From your interview, I see that you have viral sinusitis (sinus infection).   To prevent the spread of illness to others, stay home and away from other people as much as possible while you're sick. When you need to be around other people, consider wearing a face mask.   Here are the main things to know about your current symptoms:    A viral sinusitis infection will get better on its own.    You can use the medications I recommend here to help ease your symptoms.   Based on what you told me in your interview, I haven't prescribed any antibiotics. Antibiotics fight bacteria, not viruses. The latest research shows that 90% of sinus infections are caused by viruses. Antibiotics won't help with your viral infection. They could even make you feel worse as they can cause or worsen nausea, diarrhea, and stomach pain. The following factors suggest that a virus is causing your symptoms:    You've had symptoms for less than 10 days. A bacterial infection is suspected when you've had symptoms longer than 10 days without improvement.    You haven't had a fever. Bacterial infections can cause a high fever.   Medications   Your pharmacy   [facility] - [address], [address] [place] [id number] [phone]     Prescription   Benzonatate (200mg): Take 1 capsule by mouth 3 times a day as needed for cough. Do not chew or cut these capsules.   Non-prescription   Pseudoephedrine (60mg): Take 1 tablet by mouth every 6 hours as needed for nasal congestion for up to 5 days. Do not exceed 240mg (4 pills) in a 24-hour period.   Mucus Relief ER oral tablet, extended release (600mg): Take 1 tablet by mouth every 12 hours as needed for cough and congestion.    I've recommended a decongestant (pseudoephedrine) for your congestion. Although this is an over-the-counter medication, it's kept behind the  Third outreach attempted  Left message on patient's voicemail  pharmacist's counter. Ask the pharmacist for pseudoephedrine for your congestion.   About your diagnosis   Viral sinusitis is an infection of the sinuses, the hollow spaces connected to your nasal passages. These passages swell and block the drainage of fluid or mucus from the nose, causing pain, pressure, and congestion. These are the key things to know about a viral sinus infection:    It usually starts with cold symptoms.    Some medications can lessen your symptoms.    You can spread it to other people.    The 200 different viruses that cause the common cold can also cause a viral sinus infection.   Common symptoms include fever, sneezing, runny nose, congestion, sinus pain and pressure, sore throat, and cough. You may also notice fatigue, body aches, difficulty sleeping, or decreased appetite.   What to expect   You should feel better within 1 to 2 weeks.   When to seek care   Call us at [phone]   with any sudden or unexpected symptoms.    Fever that measures over 103F or continues for more than 3 days.    Your headache worsens.    Swallowing becomes extremely difficult or impossible.    Hoarse voice or loss of voice lasting longer than 2 weeks.    Your sinus pain continues for 10 days or more, without improvement.    More than 5 episodes of diarrhea in a day.    More than 5 episodes of vomiting in a day.    Coughing up red or bloody mucus.    Severe shortness of breath.    Severe stomach pain.    Symptoms that get better for a few days, and then suddenly get worse.    Severe chest pain   Other treatment    Rest! Your body needs rest to recover and fight infection.    Drink plenty of water to stay hydrated.    Use a clean humidifier or a cool-mist vaporizer in your room at night. Breathing humid air may help you feel better.    Place a warm, moist washcloth over your nose and forehead to help with your sinus pain and pressure.    Try non-prescription saline nasal sprays to help your nasal symptoms.     Try using a Neti Pot to flush out your stuffy nose and sinuses. Neti Pots are available at any drugstore without a prescription.    Gargle with salt water several times a day to help your throat feel better. Cough drops and throat lozenges may provide extra relief. A teaspoon of honey stirred into warm water or weak tea can help soothe a sore throat and cough.    Avoid smoke and air pollution. Smoke can make infections worse.   Prevention    Avoid close contact with other people when you're sick.    Cover your mouth and nose when you cough or sneeze. Use a tissue or cough into your elbow. Make sure that used tissues go directly into the trash.    Avoid touching your eyes, nose, or mouth while you're sick.    Wash your hands often, especially after coughing, sneezing, or blowing your nose. If soap and water are not available, use an alcohol-based hand .    If you or someone in your home or workplace is sick, disinfect commonly used items. This includes door handles, tables, computers, remotes, and pens.    Coronavirus (COVID-19) information   Common symptoms of COVID-19 include fever, cough, shortness of breath, fatigue, muscle or body aches, headaches, new loss of sense of taste or smell, sore throat, stuffy or runny nose, nausea or vomiting, and diarrhea. Most people who get COVID-19 have mild symptoms and can rest at home until they get better. Elderly people and those with chronic medical problems may be at risk for more serious complications.   FAQs about the COVID-19 vaccine   Are the vaccines safe?   Yes. Hundreds of millions of people in the [place] have already safely received COVID-19 vaccines under the most intense safety monitoring in the history of the [place].   Do I need the vaccine if I've already had COVID?   Yes. Vaccination helps protect you even if you've already had COVID.   If you had COVID-19 and had symptoms, wait to get vaccinated until you've recovered and completed your isolation period.    If you tested positive for COVID-19 but did not have symptoms, you can get vaccinated after 5 full days have passed since you had a positive test, as long as you don't develop symptoms.   How many doses of the vaccine do I need?   Visit www.cdc.gov/coronavirus/2019-ncov/vaccines/stay-up-to-date.html   to find out how to stay up to date with your COVID-19 vaccines.   I'm immunocompromised. How many doses of the vaccine do I need?   For information on how immunocompromised people can stay up to date with their COVID-19 vaccines, visit www.cdc.gov/coronavirus/2019-ncov/vaccines/recommendations/immuno.html  .   What are the common side effects of the vaccine?   A sore arm, tiredness, headache, and muscle pain may occur within two days of getting the vaccine and last a day or two. For the Moderna or Pfizer vaccines, side effects are more common after the second dose. People over the age of 55 are less likely to have side effects than younger people.   After I'm up to date on vaccines, can I still get or spread COVID?   Yes, you can still get COVID, but your disease should be milder. And your risk of serious illness, hospitalization, and complications will be much lower, especially if you're up to date. Unfortunately, you can still spread COVID if you've been vaccinated. That's why it's important to follow isolation guidelines if you get sick or test positive.   After I'm up to date on vaccines, can I go back to normal?   You should still wear a mask indoors in public if:    It's required by laws, rules, regulations, or local guidance.    You have a weakened immune system.    Your age puts you at increased risk of severe disease.    You have a medical condition that puts you at increased risk of severe disease.    Someone in your household has a weakened immune system, is at increased risk for severe disease, or is unvaccinated.    You're in an area of high transmission.   Where can I get a COVID-19 vaccine?   Visit  [facility]'s website for more information. To find a COVID-19 vaccination site near you, visit www.vaccines.gov/  , call [phone]  , or text your zip code to [address] ([email or web address]). Message and data rates may apply.   I've had close contact with someone who has COVID. Do I need to quarantine, and if so, for how long?   For the most current answer, including a calculator to determine whether you need to stay home and for how long, visit www.cdc.gov/coronavirus/2019-ncov/your-health/isolation.html  .   I've tested positive for COVID. How long do I need to isolate?   For the latest recommendations, including a calculator to determine how long you need to stay home, visit www.cdc.gov/coronavirus/2019-ncov/your-health/isolation.html  .   What if I develop symptoms that might be from COVID?   For the latest recommendations on what to do if you're sick, including when to seek emergency care, visit www.cdc.gov/coronavirus/2019-ncov/if-you-are-sick/index.html  .    Flu vaccine information   Who should get a flu vaccine?   Everyone 6 months of age and older should get a yearly flu vaccine.   When should I get vaccinated?   You should get a flu vaccine by the end of October. Once you're vaccinated, it takes about two weeks for antibodies to develop and protect you against the flu. That's why it's important to get vaccinated as soon as possible.   After October, is it too late to get vaccinated?   No. You should still get vaccinated. As long as the flu viruses are still in your community, flu vaccines will remain available, even into January of next year or later.   Why do I need a flu vaccine EVERY year?   Flu viruses are constantly changing, so flu vaccines are usually updated from one season to the next. Your protection from the flu vaccine also lessens over time.   Is the flu vaccine safe?   Yes. Over the last 50 years, hundreds of millions of Americans have safely received the flu vaccines.   What are the side effects  of flu vaccines?   You CANNOT get the flu from a flu vaccine. Common side effects of the flu shot include soreness, redness and/or swelling where the shot was given, low grade fever, and aches. Common side effects of the nasal spray flu vaccine for adults include runny nose, headaches, sore throat, and cough. For children, side effects include wheezing, vomiting, muscle aches, and fever.   Does the flu vaccine increase your risk of getting COVID-19?   No. There is no evidence that getting a flu vaccine increases your risk of getting COVID-19.   Is it safe to get the flu vaccine along with a COVID-19 vaccine?   Yes. It's safe to get the flu vaccine with a COVID-19 vaccine or booster.   Contact your healthcare provider TODAY for details on when and where to get your flu vaccine.   Your provider   Your diagnosis was provided by ARIES Youngblood, a member of your trusted care team at [facility].   If you have any questions, call us at [phone]  .

## 2024-01-27 ENCOUNTER — HOSPITAL ENCOUNTER (EMERGENCY)
Facility: HOSPITAL | Age: 54
Discharge: HOME | End: 2024-01-27
Attending: EMERGENCY MEDICINE
Payer: COMMERCIAL

## 2024-01-27 VITALS
SYSTOLIC BLOOD PRESSURE: 127 MMHG | BODY MASS INDEX: 21.17 KG/M2 | TEMPERATURE: 98.1 F | OXYGEN SATURATION: 97 % | RESPIRATION RATE: 18 BRPM | DIASTOLIC BLOOD PRESSURE: 71 MMHG | WEIGHT: 131.17 LBS | HEART RATE: 103 BPM

## 2024-01-27 DIAGNOSIS — K21.9 ACID REFLUX: Primary | ICD-10-CM

## 2024-01-27 DIAGNOSIS — M54.10 RADICULOPATHY: ICD-10-CM

## 2024-01-27 PROCEDURE — 99284 EMERGENCY DEPT VISIT MOD MDM: CPT | Performed by: EMERGENCY MEDICINE

## 2024-01-27 PROCEDURE — 99284 EMERGENCY DEPT VISIT MOD MDM: CPT

## 2024-01-27 RX ORDER — FAMOTIDINE 20 MG/1
20 TABLET, FILM COATED ORAL ONCE
Status: COMPLETED | OUTPATIENT
Start: 2024-01-27 | End: 2024-01-27

## 2024-01-27 RX ORDER — METHOCARBAMOL 750 MG/1
750 TABLET, FILM COATED ORAL 2 TIMES DAILY
Qty: 20 TABLET | Refills: 0 | Status: SHIPPED | OUTPATIENT
Start: 2024-01-27

## 2024-01-27 RX ORDER — ACETAMINOPHEN 500 MG
1000 TABLET ORAL EVERY 6 HOURS PRN
Qty: 20 TABLET | Refills: 0 | Status: SHIPPED | OUTPATIENT
Start: 2024-01-27

## 2024-01-27 RX ORDER — METHOCARBAMOL 500 MG/1
1000 TABLET, FILM COATED ORAL ONCE
Status: COMPLETED | OUTPATIENT
Start: 2024-01-27 | End: 2024-01-27

## 2024-01-27 RX ORDER — MAGNESIUM HYDROXIDE/ALUMINUM HYDROXICE/SIMETHICONE 120; 1200; 1200 MG/30ML; MG/30ML; MG/30ML
30 SUSPENSION ORAL ONCE
Status: COMPLETED | OUTPATIENT
Start: 2024-01-27 | End: 2024-01-27

## 2024-01-27 RX ORDER — ACETAMINOPHEN 325 MG/1
975 TABLET ORAL ONCE
Status: COMPLETED | OUTPATIENT
Start: 2024-01-27 | End: 2024-01-27

## 2024-01-27 RX ORDER — FAMOTIDINE 20 MG/1
20 TABLET, FILM COATED ORAL 2 TIMES DAILY
Qty: 14 TABLET | Refills: 0 | Status: SHIPPED | OUTPATIENT
Start: 2024-01-27 | End: 2024-02-03

## 2024-01-27 RX ADMIN — ALUMINUM HYDROXIDE, MAGNESIUM HYDROXIDE, AND DIMETHICONE 30 ML: 200; 20; 200 SUSPENSION ORAL at 08:33

## 2024-01-27 RX ADMIN — METHOCARBAMOL 1000 MG: 500 TABLET ORAL at 09:59

## 2024-01-27 RX ADMIN — FAMOTIDINE 20 MG: 20 TABLET ORAL at 08:33

## 2024-01-27 RX ADMIN — ACETAMINOPHEN 325MG 975 MG: 325 TABLET ORAL at 09:59

## 2024-01-28 ENCOUNTER — HOSPITAL ENCOUNTER (EMERGENCY)
Facility: HOSPITAL | Age: 54
Discharge: HOME | End: 2024-01-28
Attending: EMERGENCY MEDICINE
Payer: COMMERCIAL

## 2024-01-28 VITALS
OXYGEN SATURATION: 98 % | RESPIRATION RATE: 16 BRPM | DIASTOLIC BLOOD PRESSURE: 75 MMHG | HEART RATE: 83 BPM | TEMPERATURE: 97.8 F | SYSTOLIC BLOOD PRESSURE: 127 MMHG

## 2024-01-28 DIAGNOSIS — R73.9 HYPERGLYCEMIA: Primary | ICD-10-CM

## 2024-01-28 DIAGNOSIS — M79.10 MYALGIA: ICD-10-CM

## 2024-01-28 LAB
ALBUMIN SERPL BCP-MCNC: 3.9 G/DL (ref 3.5–5)
ALP SERPL-CCNC: 82 U/L (ref 34–104)
ALT SERPL W P-5'-P-CCNC: 10 U/L (ref 7–52)
ANION GAP SERPL CALCULATED.3IONS-SCNC: 7 MMOL/L
AST SERPL W P-5'-P-CCNC: 8 U/L (ref 13–39)
ATRIAL RATE: 75 BPM
BASE EX.OXY STD BLDV CALC-SCNC: 82.1 % (ref 60–80)
BASE EXCESS BLDV CALC-SCNC: -0.1 MMOL/L
BASOPHILS # BLD AUTO: 0.03 THOUSANDS/ÂΜL (ref 0–0.1)
BASOPHILS NFR BLD AUTO: 1 % (ref 0–1)
BETA-HYDROXYBUTYRATE: 0.2 MMOL/L
BILIRUB SERPL-MCNC: 0.38 MG/DL (ref 0.2–1)
BILIRUB UR QL STRIP: NEGATIVE
BUN SERPL-MCNC: 19 MG/DL (ref 5–25)
CALCIUM SERPL-MCNC: 8.7 MG/DL (ref 8.4–10.2)
CARDIAC TROPONIN I PNL SERPL HS: 2 NG/L
CHLORIDE SERPL-SCNC: 96 MMOL/L (ref 96–108)
CLARITY UR: CLEAR
CO2 SERPL-SCNC: 27 MMOL/L (ref 21–32)
COLOR UR: YELLOW
CREAT SERPL-MCNC: 0.96 MG/DL (ref 0.6–1.3)
EOSINOPHIL # BLD AUTO: 0.09 THOUSAND/ÂΜL (ref 0–0.61)
EOSINOPHIL NFR BLD AUTO: 2 % (ref 0–6)
ERYTHROCYTE [DISTWIDTH] IN BLOOD BY AUTOMATED COUNT: 12.1 % (ref 11.6–15.1)
GFR SERPL CREATININE-BSD FRML MDRD: 90 ML/MIN/1.73SQ M
GLUCOSE SERPL-MCNC: 235 MG/DL (ref 65–140)
GLUCOSE SERPL-MCNC: 627 MG/DL (ref 65–140)
GLUCOSE SERPL-MCNC: >500 MG/DL (ref 65–140)
GLUCOSE UR STRIP-MCNC: ABNORMAL MG/DL
HCO3 BLDV-SCNC: 25.4 MMOL/L (ref 24–30)
HCT VFR BLD AUTO: 38 % (ref 36.5–49.3)
HGB BLD-MCNC: 13.1 G/DL (ref 12–17)
HGB UR QL STRIP.AUTO: NEGATIVE
IMM GRANULOCYTES # BLD AUTO: 0.01 THOUSAND/UL (ref 0–0.2)
IMM GRANULOCYTES NFR BLD AUTO: 0 % (ref 0–2)
KETONES UR STRIP-MCNC: NEGATIVE MG/DL
LEUKOCYTE ESTERASE UR QL STRIP: NEGATIVE
LYMPHOCYTES # BLD AUTO: 1.49 THOUSANDS/ÂΜL (ref 0.6–4.47)
LYMPHOCYTES NFR BLD AUTO: 26 % (ref 14–44)
MCH RBC QN AUTO: 28.9 PG (ref 26.8–34.3)
MCHC RBC AUTO-ENTMCNC: 34.5 G/DL (ref 31.4–37.4)
MCV RBC AUTO: 84 FL (ref 82–98)
MONOCYTES # BLD AUTO: 0.42 THOUSAND/ÂΜL (ref 0.17–1.22)
MONOCYTES NFR BLD AUTO: 7 % (ref 4–12)
NEUTROPHILS # BLD AUTO: 3.64 THOUSANDS/ÂΜL (ref 1.85–7.62)
NEUTS SEG NFR BLD AUTO: 64 % (ref 43–75)
NITRITE UR QL STRIP: NEGATIVE
NRBC BLD AUTO-RTO: 0 /100 WBCS
O2 CT BLDV-SCNC: 15.9 ML/DL
P AXIS: 80 DEGREES
PCO2 BLDV: 44.8 MM HG (ref 42–50)
PH BLDV: 7.37 [PH] (ref 7.3–7.4)
PH UR STRIP.AUTO: 6 [PH] (ref 4.5–8)
PLATELET # BLD AUTO: 211 THOUSANDS/UL (ref 149–390)
PMV BLD AUTO: 10.7 FL (ref 8.9–12.7)
PO2 BLDV: 48 MM HG (ref 35–45)
POTASSIUM SERPL-SCNC: 4.4 MMOL/L (ref 3.5–5.3)
PR INTERVAL: 116 MS
PROT SERPL-MCNC: 7.1 G/DL (ref 6.4–8.4)
PROT UR STRIP-MCNC: NEGATIVE MG/DL
QRS AXIS: 100 DEGREES
QRSD INTERVAL: 88 MS
QT INTERVAL: 386 MS
QTC INTERVAL: 431 MS
RBC # BLD AUTO: 4.54 MILLION/UL (ref 3.88–5.62)
SODIUM SERPL-SCNC: 130 MMOL/L (ref 135–147)
SP GR UR STRIP.AUTO: 1.01 (ref 1–1.03)
T WAVE AXIS: 70 DEGREES
UROBILINOGEN UR QL STRIP.AUTO: 0.2 E.U./DL
VENTRICULAR RATE: 75 BPM
WBC # BLD AUTO: 5.68 THOUSAND/UL (ref 4.31–10.16)

## 2024-01-28 PROCEDURE — 85025 COMPLETE CBC W/AUTO DIFF WBC: CPT | Performed by: EMERGENCY MEDICINE

## 2024-01-28 PROCEDURE — 82805 BLOOD GASES W/O2 SATURATION: CPT | Performed by: EMERGENCY MEDICINE

## 2024-01-28 PROCEDURE — 93005 ELECTROCARDIOGRAM TRACING: CPT

## 2024-01-28 PROCEDURE — 36415 COLL VENOUS BLD VENIPUNCTURE: CPT | Performed by: EMERGENCY MEDICINE

## 2024-01-28 PROCEDURE — 99285 EMERGENCY DEPT VISIT HI MDM: CPT | Performed by: EMERGENCY MEDICINE

## 2024-01-28 PROCEDURE — 96361 HYDRATE IV INFUSION ADD-ON: CPT

## 2024-01-28 PROCEDURE — 82948 REAGENT STRIP/BLOOD GLUCOSE: CPT

## 2024-01-28 PROCEDURE — 96360 HYDRATION IV INFUSION INIT: CPT

## 2024-01-28 PROCEDURE — 80053 COMPREHEN METABOLIC PANEL: CPT | Performed by: EMERGENCY MEDICINE

## 2024-01-28 PROCEDURE — 96372 THER/PROPH/DIAG INJ SC/IM: CPT

## 2024-01-28 PROCEDURE — 82010 KETONE BODYS QUAN: CPT | Performed by: EMERGENCY MEDICINE

## 2024-01-28 PROCEDURE — 81003 URINALYSIS AUTO W/O SCOPE: CPT

## 2024-01-28 PROCEDURE — 84484 ASSAY OF TROPONIN QUANT: CPT | Performed by: EMERGENCY MEDICINE

## 2024-01-28 PROCEDURE — 99283 EMERGENCY DEPT VISIT LOW MDM: CPT

## 2024-01-28 RX ORDER — KETOROLAC TROMETHAMINE 30 MG/ML
30 INJECTION, SOLUTION INTRAMUSCULAR; INTRAVENOUS ONCE
Status: DISCONTINUED | OUTPATIENT
Start: 2024-01-28 | End: 2024-01-28

## 2024-01-28 RX ORDER — SODIUM CHLORIDE, SODIUM GLUCONATE, SODIUM ACETATE, POTASSIUM CHLORIDE, MAGNESIUM CHLORIDE, SODIUM PHOSPHATE, DIBASIC, AND POTASSIUM PHOSPHATE .53; .5; .37; .037; .03; .012; .00082 G/100ML; G/100ML; G/100ML; G/100ML; G/100ML; G/100ML; G/100ML
1000 INJECTION, SOLUTION INTRAVENOUS ONCE
Status: DISCONTINUED | OUTPATIENT
Start: 2024-01-28 | End: 2024-01-28

## 2024-01-28 RX ORDER — IBUPROFEN 600 MG/1
600 TABLET ORAL ONCE
Status: COMPLETED | OUTPATIENT
Start: 2024-01-28 | End: 2024-01-28

## 2024-01-28 RX ADMIN — SODIUM CHLORIDE 1000 ML: 0.9 INJECTION, SOLUTION INTRAVENOUS at 08:51

## 2024-01-28 RX ADMIN — IBUPROFEN 600 MG: 600 TABLET, FILM COATED ORAL at 08:40

## 2024-01-28 RX ADMIN — SODIUM CHLORIDE 1000 ML: 0.9 INJECTION, SOLUTION INTRAVENOUS at 10:00

## 2024-01-28 RX ADMIN — INSULIN HUMAN 10 UNITS: 100 INJECTION, SOLUTION PARENTERAL at 09:00

## 2024-01-28 NOTE — ED PROVIDER NOTES
"History  Chief Complaint   Patient presents with    Leg Pain     C/o b/l leg pain \"feel like rubber\" seen [facility] yesterday for same reports tylenol and robaxin not helping     Don is a 53-year-old male, well-known to me in our emergency department from multiple previous visits.  Has history of DM, anxiety, bipolar, depression, homelessness.  Here today complaining of bilateral aching in his legs.  Reports normal p.o. intake, no change in urination quantity or quality.  Denies trauma or acute injury.  Complaining of pain that radiates down the back of bilateral legs, worse with ambulation.  No numbness or weakness, has been able to ambulate without difficulty just complaining of discomfort.      History provided by:  Patient  Leg Pain  Associated symptoms: no back pain and no fever        Prior to Admission Medications   Prescriptions Last Dose Informant Patient Reported? Taking?   ARIPiprazole (ABILIFY) 20 MG tablet   No No   Sig: Take 1 tablet (20 mg total) by mouth daily   Patient not taking: Reported on 5/17/2022   Diclofenac Sodium (VOLTAREN) 1 %   No No   Sig: Apply 2 g topically in the morning and 2 g at noon and 2 g in the evening and 2 g before bedtime. Do all this for 7 days.   Patient not taking: Reported on 5/17/2022   Insulin Glargine Solostar (Lantus SoloStar) 100 UNIT/ML SOPN   No No   Sig: Inject 0.4 mL (40 Units total) under the skin daily at bedtime Every morning.   Needle, Disp, 31G X 5/16\" MISC   No No   Sig: Use 3 (three) times a day   Patient not taking: Reported on 5/17/2022   Needle, Disp, 31G X 5/16\" MISC   No No   Sig: Use in the morning   acetaminophen (TYLENOL) 500 mg tablet   No No   Sig: Take 2 tablets (1,000 mg total) by mouth every 6 (six) hours as needed for mild pain   albuterol (PROVENTIL HFA,VENTOLIN HFA) 90 mcg/act inhaler   No No   Sig: Inhale 2 puffs every 4 (four) hours as needed for wheezing   Patient not taking: Reported on 5/17/2022   dicyclomine (BENTYL) 20 mg tablet   No " No   Sig: Take 1 tablet (20 mg total) by mouth 2 (two) times a day   Patient not taking: Reported on 5/17/2022   dicyclomine (BENTYL) 20 mg tablet   No No   Sig: Take 1 tablet (20 mg total) by mouth 2 (two) times a day as needed (Abdominal cramping)   Patient not taking: Reported on 5/17/2022   escitalopram (LEXAPRO) 10 mg tablet   No No   Sig: Take 1 tablet (10 mg total) by mouth daily   Patient not taking: Reported on 5/17/2022   famotidine (PEPCID) 20 mg tablet   No No   Sig: Take 1 tablet (20 mg total) by mouth 2 (two) times a day for 7 days   fenofibrate (TRICOR) 48 mg tablet   No No   Sig: Take 1 tablet (48 mg total) by mouth daily   Patient not taking: Reported on 5/17/2022   fluticasone (FLOVENT HFA) 110 MCG/ACT inhaler   No No   Sig: Inhale 1 puff every 12 (twelve) hours Rinse mouth after use.   Patient not taking: Reported on 5/17/2022   insulin lispro (HumaLOG KwikPen) 100 units/mL injection pen   No No   Sig: Inject 15 Units under the skin 3 (three) times a day with meals   metFORMIN (GLUCOPHAGE) 1000 MG tablet   No No   Sig: Take 1 tablet (1,000 mg total) by mouth 2 (two) times a day with meals   methocarbamol (ROBAXIN) 750 mg tablet   No No   Sig: Take 1 tablet (750 mg total) by mouth 2 (two) times a day   naproxen (NAPROSYN) 500 mg tablet   No No   Sig: Take 1 tablet (500 mg total) by mouth 2 (two) times a day as needed for mild pain or moderate pain   Patient not taking: Reported on 5/17/2022   neomycin-bacitracin-polymyxin b (NEOSPORIN) ointment   No No   Sig: Apply topically 2 (two) times a day   Patient not taking: Reported on 5/17/2022   traZODone (DESYREL) 100 mg tablet   No No   Sig: Take 1 tablet (100 mg total) by mouth daily at bedtime   Patient not taking: Reported on 5/17/2022      Facility-Administered Medications: None       Past Medical History:   Diagnosis Date    Anxiety     Asthma     Bipolar 1 disorder (HCC)     Depression     Diabetes mellitus (HCC)     Psychiatric disorder         Past Surgical History:   Procedure Laterality Date    APPENDECTOMY      DENTAL SURGERY         Family History   Problem Relation Age of Onset    Liver disease Mother     Scoliosis Daughter     Bipolar disorder Daughter      I have reviewed and agree with the history as documented.    E-Cigarette/Vaping    E-Cigarette Use Never User      E-Cigarette/Vaping Substances     Social History     Tobacco Use    Smoking status: Former     Current packs/day: 0.00     Average packs/day: 0.2 packs/day for 1 year (0.2 ttl pk-yrs)     Types: Cigarettes     Start date:      Quit date:      Years since quittin.1    Smokeless tobacco: Never   Vaping Use    Vaping status: Never Used   Substance Use Topics    Alcohol use: Not Currently     Comment: States has had no alcohol in at least a year    Drug use: No       Review of Systems   Constitutional:  Negative for chills and fever.   HENT:  Negative for ear pain and sore throat.    Eyes:  Negative for visual disturbance.   Respiratory:  Negative for cough and shortness of breath.    Cardiovascular:  Negative for chest pain and palpitations.   Gastrointestinal:  Negative for abdominal pain, nausea and vomiting.   Genitourinary:  Negative for dysuria and hematuria.   Musculoskeletal:  Positive for myalgias. Negative for arthralgias and back pain.   Skin:  Negative for color change and rash.   Neurological:  Negative for syncope.   All other systems reviewed and are negative.      Physical Exam  Physical Exam  Vitals and nursing note reviewed.   Constitutional:       General: He is not in acute distress.     Appearance: He is well-developed.   HENT:      Head: Normocephalic and atraumatic.   Eyes:      Conjunctiva/sclera: Conjunctivae normal.   Cardiovascular:      Rate and Rhythm: Normal rate and regular rhythm.      Heart sounds: No murmur heard.  Pulmonary:      Effort: Pulmonary effort is normal. No respiratory distress.      Breath sounds: Normal breath sounds.    Abdominal:      Palpations: Abdomen is soft.      Tenderness: There is no abdominal tenderness.   Musculoskeletal:         General: No swelling.      Cervical back: Neck supple.   Skin:     General: Skin is warm and dry.      Capillary Refill: Capillary refill takes less than 2 seconds.   Neurological:      General: No focal deficit present.      Mental Status: He is alert and oriented to person, place, and time.   Psychiatric:         Mood and Affect: Mood normal.         Vital Signs  ED Triage Vitals   Temperature Pulse Respirations Blood Pressure SpO2   01/28/24 0749 01/28/24 0749 01/28/24 0749 01/28/24 0749 01/28/24 0749   97.8 °F (36.6 °C) 99 16 125/69 99 %      Temp Source Heart Rate Source Patient Position - Orthostatic VS BP Location FiO2 (%)   01/28/24 0749 01/28/24 0749 01/28/24 0915 01/28/24 0915 --   Oral Monitor Lying Left arm       Pain Score       01/28/24 0840       2           Vitals:    01/28/24 0915 01/28/24 0930 01/28/24 1030 01/28/24 1100   BP: 137/83 134/85 125/73 127/75   Pulse: 78 79 80 83   Patient Position - Orthostatic VS: Lying Lying Lying Lying         Visual Acuity      ED Medications  Medications   ibuprofen (MOTRIN) tablet 600 mg (600 mg Oral Given 1/28/24 0840)   sodium chloride 0.9 % bolus 1,000 mL (0 mL Intravenous Stopped 1/28/24 0954)   insulin regular (HumuLIN R,NovoLIN R) injection 10 Units (10 Units Subcutaneous Given 1/28/24 0900)   sodium chloride 0.9 % bolus 1,000 mL (0 mL Intravenous Stopped 1/28/24 1108)       Diagnostic Studies  Results Reviewed       Procedure Component Value Units Date/Time    Fingerstick Glucose (POCT) [[id number]]  (Abnormal) Collected: 01/28/24 1115    Lab Status: Final result Updated: 01/28/24 1115     POC Glucose 235 mg/dl     Comprehensive metabolic panel [[id number]]  (Abnormal) Collected: 01/28/24 0848    Lab Status: Final result Specimen: Blood from Arm, Right Updated: 01/28/24 0924     Sodium 130 mmol/L      Potassium 4.4 mmol/L      Chloride  96 mmol/L      CO2 27 mmol/L      ANION GAP 7 mmol/L      BUN 19 mg/dL      Creatinine 0.96 mg/dL      Glucose 627 mg/dL      Calcium 8.7 mg/dL      AST 8 U/L      ALT 10 U/L      Alkaline Phosphatase 82 U/L      Total Protein 7.1 g/dL      Albumin 3.9 g/dL      Total Bilirubin 0.38 mg/dL      eGFR 90 ml/min/1.73sq m     Narrative:      National Kid[employer] guidelines for Chronic Kidney Disease (CKD):     Stage 1 with normal or high GFR (GFR > 90 mL/min/1.73 square meters)    Stage 2 Mild CKD (GFR = 60-89 mL/min/1.73 square meters)    Stage 3A Moderate CKD (GFR = 45-59 mL/min/1.73 square meters)    Stage 3B Moderate CKD (GFR = 30-44 mL/min/1.73 square meters)    Stage 4 Severe CKD (GFR = 15-29 mL/min/1.73 square meters)    Stage 5 End Stage CKD (GFR <15 mL/min/1.73 square meters)  Note: GFR calculation is accurate only with a steady state creatinine    HS Troponin 0hr (reflex protocol) [[id number]]  (Normal) Collected: 01/28/24 0848    Lab Status: Final result Specimen: Blood from Arm, Right Updated: 01/28/24 0924     hs TnI 0hr 2 ng/L     Beta Hydroxybutyrate [[id number]]  (Normal) Collected: 01/28/24 0848    Lab Status: Final result Specimen: Blood from Arm, Right Updated: 01/28/24 0919     BETA-HYDROXYBUTYRATE 0.2 mmol/L     Blood gas, venous [[id number]]  (Abnormal) Collected: 01/28/24 0848    Lab Status: Final result Specimen: Blood from Arm, Right Updated: 01/28/24 0905     pH, Yvon 7.372     pCO2, Yvon 44.8 mm Hg      pO2, Yvon 48.0 mm Hg      HCO3, Yvon 25.4 mmol/L      Base Excess, Yvon -0.1 mmol/L      O2 Content, Yvon 15.9 ml/dL      O2 HGB, VENOUS 82.1 %     Urine Macroscopic, POC [[id number]]  (Abnormal) Collected: 01/28/24 0902    Lab Status: Final result Specimen: Urine Updated: 01/28/24 0904     Color, UA Yellow     Clarity, UA Clear     pH, UA 6.0     Leukocytes, UA Negative     Nitrite, UA Negative     Protein, UA Negative mg/dl      Glucose,  (1/2%) mg/dl      Ketones, UA Negative mg/dl       Urobilinogen, UA 0.2 E.U./dl      Bilirubin, UA Negative     Occult Blood, UA Negative     Specific Gravity, UA 1.010    Narrative:      CLINITEK RESULT    CBC and differential [[id number]] Collected: 01/28/24 0848    Lab Status: Final result Specimen: Blood from Arm, Right Updated: 01/28/24 0902     WBC 5.68 Thousand/uL      RBC 4.54 Million/uL      Hemoglobin 13.1 g/dL      Hematocrit 38.0 %      MCV 84 fL      MCH 28.9 pg      MCHC 34.5 g/dL      RDW 12.1 %      MPV 10.7 fL      Platelets 211 Thousands/uL      nRBC 0 /100 WBCs      Neutrophils Relative 64 %      Immat GRANS % 0 %      Lymphocytes Relative 26 %      Monocytes Relative 7 %      Eosinophils Relative 2 %      Basophils Relative 1 %      Neutrophils Absolute 3.64 Thousands/µL      Immature Grans Absolute 0.01 Thousand/uL      Lymphocytes Absolute 1.49 Thousands/µL      Monocytes Absolute 0.42 Thousand/µL      Eosinophils Absolute 0.09 Thousand/µL      Basophils Absolute 0.03 Thousands/µL     Fingerstick Glucose (POCT) [[id number]]  (Abnormal) Collected: 01/28/24 0842    Lab Status: Final result Updated: 01/28/24 0850     POC Glucose >500 mg/dl                    No orders to display              Procedures  Procedures         ED Course  ED Course as of 01/28/24 1143   Sun Jan 28, 2024   0905 EKG independently interpreted by me: Normal sinus rhythm at rate of 72, normal axis, normal intervals, no significant ST elevation or depression to suggest cardiac ischemia.                               SBIRT 20yo+      Flowsheet Row Most Recent Value   Initial Alcohol Screen: US AUDIT-C     1. How often do you have a drink containing alcohol? 0 Filed at: 01/28/2024 0754   2. How many drinks containing alcohol do you have on a typical day you are drinking?  0 Filed at: 01/28/2024 0754   3a. Male UNDER 65: How often do you have five or more drinks on one occasion? 0 Filed at: 01/28/2024 0754   Audit-C Score 0 Filed at: 01/28/2024 0754   PADMINI: How many times in  the past year have you...    Used an illegal drug or used a prescription medication for non-medical reasons? Never Filed at: 01/28/2024 0754                      Medical Decision Making  53-year-old male complaining of bilateral leg pain for 2 days.  Is diabetic, Accu-Chek was noted to be over 500 so laboratory studies were performed to assess for DKA or HHNK.     Laboratory studies are reviewed.  Did have marked hyperglycemia but without evidence of DKA or other metabolic abnormalities.  Was treated simply with 10 units subcu insulin and intravenous fluids repeat glucose 235.  Patient feels significantly better.  He continues to have normal vital signs and a reassuring physical exam.  Will plan to discharge with close outpatient follow-up and return precautions.    Amount and/or Complexity of Data Reviewed  Labs: ordered. Decision-making details documented in ED Course.    Risk  OTC drugs.  Prescription drug management.             Disposition  Final diagnoses:   Hyperglycemia   Myalgia     Time reflects when diagnosis was documented in both MDM as applicable and the Disposition within this note       Time User Action Codes Description Comment    1/28/2024 11:17 AM Rex Puente [R73.9] Hyperglycemia     1/28/2024 11:17 AM Rex Puente [M79.10] Myalgia           ED Disposition       ED Disposition   Discharge    Condition   Stable    Date/Time   Sun Jan 28, 2024 1116    Comment   Mark Quick discharge to home/self care.                   Follow-up Information       Follow up With Specialties Details Why Contact Info    Beatrice Rodrigez MD Family Medicine   [address]  [facility] [address]  [phone]      Callum Cramer PA-C    [address] [address]  [phone]              Discharge Medication List as of 1/28/2024 11:20 AM        CONTINUE these medications which have NOT CHANGED    Details   acetaminophen (TYLENOL) 500 mg tablet  Take 2 tablets (1,000 mg total) by mouth every 6 (six) hours as needed for mild pain, Starting Sat 1/27/2024, Normal      albuterol (PROVENTIL HFA,VENTOLIN HFA) 90 mcg/act inhaler Inhale 2 puffs every 4 (four) hours as needed for wheezing, Starting Fri 2/4/2022, Normal      ARIPiprazole (ABILIFY) 20 MG tablet Take 1 tablet (20 mg total) by mouth daily, Starting Fri 2/4/2022, Until Tue 5/17/2022, Normal      Diclofenac Sodium (VOLTAREN) 1 % Apply 2 g topically in the morning and 2 g at noon and 2 g in the evening and 2 g before bedtime. Do all this for 7 days., Starting Mon 5/16/2022, Until Mon 5/23/2022, Normal      !! dicyclomine (BENTYL) 20 mg tablet Take 1 tablet (20 mg total) by mouth 2 (two) times a day, Starting Fri 2/4/2022, Normal      !! dicyclomine (BENTYL) 20 mg tablet Take 1 tablet (20 mg total) by mouth 2 (two) times a day as needed (Abdominal cramping), Starting Sat 3/19/2022, Normal      escitalopram (LEXAPRO) 10 mg tablet Take 1 tablet (10 mg total) by mouth daily, Starting Fri 2/4/2022, Until Tue 5/17/2022, Normal      famotidine (PEPCID) 20 mg tablet Take 1 tablet (20 mg total) by mouth 2 (two) times a day for 7 days, Starting Sat 1/27/2024, Until Sat 2/3/2024, Normal      fenofibrate (TRICOR) 48 mg tablet Take 1 tablet (48 mg total) by mouth daily, Starting Fri 2/4/2022, Until Tue 5/17/2022, Normal      fluticasone (FLOVENT HFA) 110 MCG/ACT inhaler Inhale 1 puff every 12 (twelve) hours Rinse mouth after use., Starting Fri 2/4/2022, Normal      Insulin Glargine Solostar (Lantus SoloStar) 100 UNIT/ML SOPN Inject 0.4 mL (40 Units total) under the skin daily at bedtime Every morning., Starting Sun 12/31/2023, Until Tue 1/30/2024, Normal      insulin lispro (HumaLOG KwikPen) 100 units/mL injection pen Inject 15 Units under the skin 3 (three) times a day with meals, Starting Sun 12/31/2023, Until Tue 1/30/2024, Normal      metFORMIN (GLUCOPHAGE) 1000 MG tablet Take 1 tablet (1,000 mg total) by mouth 2  "(two) times a day with meals, Starting Sun 12/31/2023, Until Tue 1/30/2024, Normal      methocarbamol (ROBAXIN) 750 mg tablet Take 1 tablet (750 mg total) by mouth 2 (two) times a day, Starting Sat 1/27/2024, Normal      naproxen (NAPROSYN) 500 mg tablet Take 1 tablet (500 mg total) by mouth 2 (two) times a day as needed for mild pain or moderate pain, Starting Sat 3/19/2022, Normal      !! Needle, Disp, 31G X 5/16\" MISC Use 3 (three) times a day, Starting Fri 2/4/2022, Normal      !! Needle, Disp, 31G X 5/16\" MISC Use in the morning, Starting Sun 12/31/2023, Normal      neomycin-bacitracin-polymyxin b (NEOSPORIN) ointment Apply topically 2 (two) times a day, Starting Sun 3/6/2022, Print      traZODone (DESYREL) 100 mg tablet Take 1 tablet (100 mg total) by mouth daily at bedtime, Starting Fri 2/4/2022, Until Tue 5/17/2022, Normal       !! - Potential duplicate medications found. Please discuss with provider.          No discharge procedures on file.    PDMP Review       None            ED Provider  Electronically Signed by             Rex Puente MD  01/28/24 1143    "

## 2024-02-11 ENCOUNTER — APPOINTMENT (EMERGENCY)
Dept: RADIOLOGY | Facility: HOSPITAL | Age: 54
End: 2024-02-11
Payer: COMMERCIAL

## 2024-02-11 ENCOUNTER — HOSPITAL ENCOUNTER (EMERGENCY)
Facility: HOSPITAL | Age: 54
Discharge: HOME | End: 2024-02-11
Attending: EMERGENCY MEDICINE | Admitting: EMERGENCY MEDICINE
Payer: COMMERCIAL

## 2024-02-11 VITALS
HEART RATE: 88 BPM | OXYGEN SATURATION: 98 % | WEIGHT: 133.16 LBS | TEMPERATURE: 97.3 F | SYSTOLIC BLOOD PRESSURE: 137 MMHG | HEIGHT: 66 IN | BODY MASS INDEX: 21.4 KG/M2 | DIASTOLIC BLOOD PRESSURE: 82 MMHG | RESPIRATION RATE: 18 BRPM

## 2024-02-11 DIAGNOSIS — R07.9 CHEST PAIN: Primary | ICD-10-CM

## 2024-02-11 LAB
ATRIAL RATE: 91 BPM
CARDIAC TROPONIN I PNL SERPL HS: 3 NG/L
P AXIS: 77 DEGREES
PR INTERVAL: 118 MS
QRS AXIS: 74 DEGREES
QRSD INTERVAL: 80 MS
QT INTERVAL: 358 MS
QTC INTERVAL: 440 MS
T WAVE AXIS: 73 DEGREES
VENTRICULAR RATE: 91 BPM

## 2024-02-11 PROCEDURE — 99285 EMERGENCY DEPT VISIT HI MDM: CPT | Performed by: EMERGENCY MEDICINE

## 2024-02-11 PROCEDURE — 93005 ELECTROCARDIOGRAM TRACING: CPT

## 2024-02-11 PROCEDURE — 99285 EMERGENCY DEPT VISIT HI MDM: CPT

## 2024-02-11 PROCEDURE — 84484 ASSAY OF TROPONIN QUANT: CPT | Performed by: EMERGENCY MEDICINE

## 2024-02-11 PROCEDURE — 36415 COLL VENOUS BLD VENIPUNCTURE: CPT | Performed by: EMERGENCY MEDICINE

## 2024-02-11 PROCEDURE — 71045 X-RAY EXAM CHEST 1 VIEW: CPT

## 2024-02-11 NOTE — ED PROVIDER NOTES
History  Chief Complaint   Patient presents with    Chest Pain     Pt came in via EMS from home. Pt reports chest pain starting this morning that radiates to both arms.     53-year-old male with history of asthma, diabetes, bipolar depression, anxiety presents to the emergency department complaining of chest tightness.  Patient was here yesterday with his significant other but was not seen as a patient.  He states that [facility] and was walking when he developed chest tightness that radiated down both of his arms.  This was associated with shortness of breath.  They stopped the call EMS and he and his significant other were both brought to the emergency department.  Patient has no known history of CAD.      History provided by:  Patient  History limited by:  Psychiatric disorder  Chest Pain  Chest pain location: Entire precordium.  Pain quality: tightness    Pain radiates to:  R arm and L arm  Pain radiates to the back: no    Onset quality:  Sudden  Duration:  30 minutes  Timing:  Constant  Progression:  Unchanged  Chronicity:  Recurrent  Context comment:  Walking uphill  Relieved by:  None tried  Worsened by:  Nothing tried  Ineffective treatments:  None tried  Associated symptoms: shortness of breath    Associated symptoms: no altered mental status, no back pain, no cough, no diaphoresis, no dizziness, no fever, no headache, no lower extremity edema, no nausea, no near-syncope, no numbness, no palpitations, no PND, no syncope, not vomiting and no weakness    Risk factors: diabetes mellitus    Risk factors: no coronary artery disease, no hypertension, no immobilization, not obese, no prior DVT/PE and no smoking        Prior to Admission Medications   Prescriptions Last Dose Informant Patient Reported? Taking?   ARIPiprazole (ABILIFY) 20 MG tablet   No No   Sig: Take 1 tablet (20 mg total) by mouth daily   Patient not taking: Reported on 5/17/2022   Diclofenac Sodium (VOLTAREN)  "1 %   No No   Sig: Apply 2 g topically in the morning and 2 g at noon and 2 g in the evening and 2 g before bedtime. Do all this for 7 days.   Patient not taking: Reported on 5/17/2022   Insulin Glargine Solostar (Lantus SoloStar) 100 UNIT/ML SOPN   No No   Sig: Inject 0.4 mL (40 Units total) under the skin daily at bedtime Every morning.   Needle, Disp, 31G X 5/16\" MISC   No No   Sig: Use 3 (three) times a day   Patient not taking: Reported on 5/17/2022   Needle, Disp, 31G X 5/16\" MISC   No No   Sig: Use in the morning   acetaminophen (TYLENOL) 500 mg tablet   No No   Sig: Take 2 tablets (1,000 mg total) by mouth every 6 (six) hours as needed for mild pain   albuterol (PROVENTIL HFA,VENTOLIN HFA) 90 mcg/act inhaler   No No   Sig: Inhale 2 puffs every 4 (four) hours as needed for wheezing   Patient not taking: Reported on 5/17/2022   dicyclomine (BENTYL) 20 mg tablet   No No   Sig: Take 1 tablet (20 mg total) by mouth 2 (two) times a day   Patient not taking: Reported on 5/17/2022   dicyclomine (BENTYL) 20 mg tablet   No No   Sig: Take 1 tablet (20 mg total) by mouth 2 (two) times a day as needed (Abdominal cramping)   Patient not taking: Reported on 5/17/2022   escitalopram (LEXAPRO) 10 mg tablet   No No   Sig: Take 1 tablet (10 mg total) by mouth daily   Patient not taking: Reported on 5/17/2022   famotidine (PEPCID) 20 mg tablet   No No   Sig: Take 1 tablet (20 mg total) by mouth 2 (two) times a day for 7 days   fenofibrate (TRICOR) 48 mg tablet   No No   Sig: Take 1 tablet (48 mg total) by mouth daily   Patient not taking: Reported on 5/17/2022   fluticasone (FLOVENT HFA) 110 MCG/ACT inhaler   No No   Sig: Inhale 1 puff every 12 (twelve) hours Rinse mouth after use.   Patient not taking: Reported on 5/17/2022   insulin lispro (HumaLOG KwikPen) 100 units/mL injection pen   No No   Sig: Inject 15 Units under the skin 3 (three) times a day with meals   metFORMIN (GLUCOPHAGE) 1000 MG tablet   No No   Sig: Take 1 " tablet (1,000 mg total) by mouth 2 (two) times a day with meals   methocarbamol (ROBAXIN) 750 mg tablet   No No   Sig: Take 1 tablet (750 mg total) by mouth 2 (two) times a day   naproxen (NAPROSYN) 500 mg tablet   No No   Sig: Take 1 tablet (500 mg total) by mouth 2 (two) times a day as needed for mild pain or moderate pain   Patient not taking: Reported on 2022   neomycin-bacitracin-polymyxin b (NEOSPORIN) ointment   No No   Sig: Apply topically 2 (two) times a day   Patient not taking: Reported on 2022   traZODone (DESYREL) 100 mg tablet   No No   Sig: Take 1 tablet (100 mg total) by mouth daily at bedtime   Patient not taking: Reported on 2022      Facility-Administered Medications: None       Past Medical History:   Diagnosis Date    Anxiety     Asthma     Bipolar 1 disorder (HCC)     Depression     Diabetes mellitus (HCC)     Psychiatric disorder        Past Surgical History:   Procedure Laterality Date    APPENDECTOMY      DENTAL SURGERY         Family History   Problem Relation Age of Onset    Liver disease Mother     Scoliosis Daughter     Bipolar disorder Daughter      I have reviewed and agree with the history as documented.    E-Cigarette/Vaping    E-Cigarette Use Never User      E-Cigarette/Vaping Substances     Social History     Tobacco Use    Smoking status: Former     Current packs/day: 0.00     Average packs/day: 0.2 packs/day for 1 year (0.2 ttl pk-yrs)     Types: Cigarettes     Start date:      Quit date: 1981     Years since quittin.1    Smokeless tobacco: Never   Vaping Use    Vaping status: Never Used   Substance Use Topics    Alcohol use: Not Currently     Comment: States has had no alcohol in at least a year    Drug use: No       Review of Systems   Constitutional: Negative.  Negative for activity change, appetite change, chills, diaphoresis and fever.   HENT: Negative.     Eyes: Negative.    Respiratory:  Positive for shortness of breath. Negative for cough.     Cardiovascular:  Positive for chest pain. Negative for palpitations, leg swelling, syncope, PND and near-syncope.   Gastrointestinal: Negative.  Negative for nausea and vomiting.   Genitourinary: Negative.    Musculoskeletal:  Negative for back pain and neck pain.   Skin: Negative.    Allergic/Immunologic: Negative.    Neurological: Negative.  Negative for dizziness, weakness, numbness and headaches.   Hematological: Negative.    Psychiatric/Behavioral: Negative.     All other systems reviewed and are negative.      Physical Exam  Physical Exam  Vitals and nursing note reviewed.   Constitutional:       General: He is awake. He is not in acute distress.     Appearance: He is well-developed and normal weight. He is not ill-appearing, toxic-appearing or diaphoretic.      Comments: Disheveled appearance   HENT:      Head: Normocephalic and atraumatic.      Right Ear: External ear normal.      Left Ear: External ear normal.      Nose: Nose normal.   Eyes:      General: No scleral icterus.     Conjunctiva/sclera: Conjunctivae normal.   Neck:      Thyroid: No thyromegaly.      Vascular: No JVD.   Cardiovascular:      Rate and Rhythm: Normal rate and regular rhythm.      Heart sounds: Normal heart sounds. No murmur heard.     No gallop.   Pulmonary:      Effort: Pulmonary effort is normal. No tachypnea, accessory muscle usage or respiratory distress.      Breath sounds: Normal breath sounds. No stridor. No wheezing, rhonchi or rales.   Abdominal:      General: Bowel sounds are normal. There is no distension.      Palpations: Abdomen is soft. There is no mass.      Tenderness: There is no abdominal tenderness.      Hernia: No hernia is present.   Musculoskeletal:      Right lower leg: No edema.      Left lower leg: No edema.   Skin:     General: Skin is warm and dry.      Coloration: Skin is not jaundiced or pale.      Findings: No bruising, erythema, lesion or rash.   Neurological:      General: No focal deficit present.       Mental Status: He is alert and oriented to person, place, and time.      Motor: No weakness.      Deep Tendon Reflexes: Reflexes are normal and symmetric.   Psychiatric:         Mood and Affect: Mood normal.         Behavior: Behavior is cooperative.         Vital Signs  ED Triage Vitals   Temperature Pulse Respirations Blood Pressure SpO2   02/11/24 0716 02/11/24 0713 02/11/24 0713 02/11/24 0713 02/11/24 0713   (!) 97.3 °F (36.3 °C) 88 18 137/82 98 %      Temp Source Heart Rate Source Patient Position - Orthostatic VS BP Location FiO2 (%)   02/11/24 0716 02/11/24 0713 02/11/24 0713 02/11/24 0713 --   Oral Monitor Lying Right arm       Pain Score       02/11/24 0713       10 - Worst Possible Pain           Vitals:    02/11/24 0713   BP: 137/82   Pulse: 88   Patient Position - Orthostatic VS: Lying         Visual Acuity      ED Medications  Medications - No data to display    Diagnostic Studies  Results Reviewed       Procedure Component Value Units Date/Time    HS Troponin 0hr (reflex protocol) [[id number]] Collected: 02/11/24 0718    Lab Status: No result Specimen: Blood from Arm, Left                    XR chest 1 view portable    (Results Pending)              Procedures  ECG 12 Lead Documentation Only    Date/Time: 2/11/2024 7:28 AM    Performed by: Gertrude Campos DO  Authorized by: Gertrude Campos DO    Indications / Diagnosis:  Cp  ECG reviewed by me, the ED Provider: yes    Patient location:  ED  Interpretation:     Interpretation: normal    Rate:     ECG rate:  90    ECG rate assessment: normal    Rhythm:     Rhythm: sinus rhythm    Ectopy:     Ectopy: none    Conduction:     Conduction: normal    ST segments:     ST segments:  Normal  T waves:     T waves: normal             ED Course                               SBIRT 22yo+      Flowsheet Row Most Recent Value   Initial Alcohol Screen: US AUDIT-C     1. How often do you have a drink containing alcohol? 0 Filed at: 02/11/2024 0710   2. How  many drinks containing alcohol do you have on a typical day you are drinking?  0 Filed at: 02/11/2024 0710   3a. Male UNDER 65: How often do you have five or more drinks on one occasion? 0 Filed at: 02/11/2024 0710   Audit-C Score 0 Filed at: 02/11/2024 0710   PADMINI: How many times in the past year have you...    Used an illegal drug or used a prescription medication for non-medical reasons? Never Filed at: 02/11/2024 0710                      Medical Decision Making  53-year-old male well-known to the ED with multiple ED visits in the past presents with chest pain that started [facility] with his significant other.  He states he was walking uphill when he started to have chest discomfort radiating down both arms.  No diaphoresis but states he felt short of breath.  No known cardiac disease.  On exam he looks well he is in no distress.  Physical exam is normal.  Patient does have risk factors for ACS.  Will check EKG/troponin/chest x-ray.    Amount and/or Complexity of Data Reviewed  Labs: ordered. Decision-making details documented in ED Course.  Radiology: ordered and independent interpretation performed.  ECG/medicine tests: ordered and independent interpretation performed.     Details: Normal sinus rhythm rate 90.  Occasional PAC.  Normal conduction.  Normal ST-T waves.  No ischemia.  No STEMI.             Disposition  Final diagnoses:   None     ED Disposition       None          Follow-up Information    None         Patient's Medications   Discharge Prescriptions    No medications on file       No discharge procedures on file.    PDMP Review       None            ED Provider  Electronically Signed by             Gertrude Campos DO  02/11/24 0728

## 2024-02-13 ENCOUNTER — HOSPITAL ENCOUNTER (EMERGENCY)
Facility: HOSPITAL | Age: 54
Discharge: HOME | End: 2024-02-13
Attending: EMERGENCY MEDICINE
Payer: COMMERCIAL

## 2024-02-13 VITALS
DIASTOLIC BLOOD PRESSURE: 73 MMHG | HEART RATE: 98 BPM | RESPIRATION RATE: 17 BRPM | SYSTOLIC BLOOD PRESSURE: 132 MMHG | OXYGEN SATURATION: 97 % | TEMPERATURE: 97.6 F

## 2024-02-13 DIAGNOSIS — R44.3 HALLUCINATIONS: Primary | ICD-10-CM

## 2024-02-13 PROCEDURE — 99284 EMERGENCY DEPT VISIT MOD MDM: CPT

## 2024-02-13 PROCEDURE — 99285 EMERGENCY DEPT VISIT HI MDM: CPT | Performed by: PHYSICIAN ASSISTANT

## 2024-02-13 NOTE — DISCHARGE INSTRUCTIONS
DISCHARGE INSTRUCTIONS:    FOLLOW UP WITH YOUR PRIMARY CARE PROVIDER OR THE RECOMMENDED HEALTH CLINIC. MAKE AN APPOINTMENT TO BE SEEN.     IF SYMPTOMS WORSEN OR NEW SYMPTOMS ARISE, RETURN TO THE ER TO BE SEEN.       This writer discussed the patients current presentation and recommended discharge plan with Dr. Barnhart    They agree with the patient being discharged at this time with referrals and/or information about: [facility] and outpatient providers for therapy and psychiatry.      The patient was Instructed to follow up with: [facility] [phone]       This writer and the patient completed a safety plan.  The patient was provided with a copy of their safety plan with encouragement to utilize the plan following discharge.               SAFETY PLAN  Warning Signs (thoughts, images, mood, behavior, situations) increased anxiety and depression, thoughts of suicide        Coping Skills (what can I do to take my mind off the problem, or to keep myself safe): Speak with street medicine and follow with your  for potential housing                   National Suicide Prevention Hotline:  1-981.784.2929     [facility] [phone] - Crisis   [facility] [phone] - LVF Crisis/Mobile Crisis   [phone] - [facility] Crisis   [facility]: [phone]  [facility]: [phone]   [facility] [phone] - Crisis   [facility] [phone] - Crisis      [facility] [phone] - Crisis   [facility] [phone] - Crisis   [phone] - Peer Support Talk Line (1-9pm daily)  [phone] - Teen Support Talk Line (1-9pm daily)  [phone] - [facility]   [facility] [phone]- Crisis    [facility] [phone] - Crisis   [facility] [phone] - Crisis    [facility]) [phone] - [facility] Crisis

## 2024-02-13 NOTE — ED NOTES
Patient presents to the emergency department with auditory hallucinations. He says he has been staying at the [facility] and they kick them out at 7am and they dont have anywhere to go. He says its very treacherous outside and hes seeing shadows. Patient says he is cold and needs a place to stay. He says he has been following with street medicine and has a  trying to help them find a place to stay. He presents with his fiance who they both work together to stay to formulate a plan to stay in the hospital together. They have been told they will not go to the same unit as a couple.. They both present with similar stories so they can be admitted together. Patient will be discharged back to the [facility] after having some time to warm up in the lobby.

## 2024-02-13 NOTE — ED PROVIDER NOTES
"History  Chief Complaint   Patient presents with    Psychiatric Evaluation     Pt states he is here because he is seeing shadows. States they are talking to him telling him to kill himself and run away. When asked if he wants to kill himself he says \"no\". Pt is here with spouse     53y.o male with PMH of anxiety, asthma, bipolar, depression and DM presents to the ER for psychiatric evaluation. Patient reports auditory hallucinations and visual hallucinations. Patient reports seeing shadows. Patient states this is an ongoing issue for him. He currently is staying at the [facility] but they make them leave at 0700am and they cannot return until 1900. Patient states he has nowhere else to go during the day. Patient states he is looking for a place to stay because of the bad weather. He follows with [facility] and is also following with a  that is in the process of getting him and his wife a place to stay. He sees his  on Thursday in regards to housing. Patient denies any other complaints currently.       History provided by:  Patient   used: No        Prior to Admission Medications   Prescriptions Last Dose Informant Patient Reported? Taking?   ARIPiprazole (ABILIFY) 20 MG tablet   No No   Sig: Take 1 tablet (20 mg total) by mouth daily   Patient not taking: Reported on 5/17/2022   Diclofenac Sodium (VOLTAREN) 1 %   No No   Sig: Apply 2 g topically in the morning and 2 g at noon and 2 g in the evening and 2 g before bedtime. Do all this for 7 days.   Patient not taking: Reported on 5/17/2022   Insulin Glargine Solostar (Lantus SoloStar) 100 UNIT/ML SOPN   No No   Sig: Inject 0.4 mL (40 Units total) under the skin daily at bedtime Every morning.   Needle, Disp, 31G X 5/16\" MISC   No No   Sig: Use 3 (three) times a day   Patient not taking: Reported on 5/17/2022   Needle, Disp, 31G X 5/16\" MISC   No No   Sig: Use in the morning   acetaminophen (TYLENOL) 500 mg tablet   No No   Sig: " Take 2 tablets (1,000 mg total) by mouth every 6 (six) hours as needed for mild pain   albuterol (PROVENTIL HFA,VENTOLIN HFA) 90 mcg/act inhaler   No No   Sig: Inhale 2 puffs every 4 (four) hours as needed for wheezing   Patient not taking: Reported on 5/17/2022   dicyclomine (BENTYL) 20 mg tablet   No No   Sig: Take 1 tablet (20 mg total) by mouth 2 (two) times a day   Patient not taking: Reported on 5/17/2022   dicyclomine (BENTYL) 20 mg tablet   No No   Sig: Take 1 tablet (20 mg total) by mouth 2 (two) times a day as needed (Abdominal cramping)   Patient not taking: Reported on 5/17/2022   escitalopram (LEXAPRO) 10 mg tablet   No No   Sig: Take 1 tablet (10 mg total) by mouth daily   Patient not taking: Reported on 5/17/2022   famotidine (PEPCID) 20 mg tablet   No No   Sig: Take 1 tablet (20 mg total) by mouth 2 (two) times a day for 7 days   fenofibrate (TRICOR) 48 mg tablet   No No   Sig: Take 1 tablet (48 mg total) by mouth daily   Patient not taking: Reported on 5/17/2022   fluticasone (FLOVENT HFA) 110 MCG/ACT inhaler   No No   Sig: Inhale 1 puff every 12 (twelve) hours Rinse mouth after use.   Patient not taking: Reported on 5/17/2022   insulin lispro (HumaLOG KwikPen) 100 units/mL injection pen   No No   Sig: Inject 15 Units under the skin 3 (three) times a day with meals   metFORMIN (GLUCOPHAGE) 1000 MG tablet   No No   Sig: Take 1 tablet (1,000 mg total) by mouth 2 (two) times a day with meals   methocarbamol (ROBAXIN) 750 mg tablet   No No   Sig: Take 1 tablet (750 mg total) by mouth 2 (two) times a day   naproxen (NAPROSYN) 500 mg tablet   No No   Sig: Take 1 tablet (500 mg total) by mouth 2 (two) times a day as needed for mild pain or moderate pain   Patient not taking: Reported on 5/17/2022   neomycin-bacitracin-polymyxin b (NEOSPORIN) ointment   No No   Sig: Apply topically 2 (two) times a day   Patient not taking: Reported on 5/17/2022   traZODone (DESYREL) 100 mg tablet   No No   Sig: Take 1  tablet (100 mg total) by mouth daily at bedtime   Patient not taking: Reported on 2022      Facility-Administered Medications: None       Past Medical History:   Diagnosis Date    Anxiety     Asthma     Bipolar 1 disorder (HCC)     Depression     Diabetes mellitus (HCC)     Psychiatric disorder        Past Surgical History:   Procedure Laterality Date    APPENDECTOMY      DENTAL SURGERY         Family History   Problem Relation Age of Onset    Liver disease Mother     Scoliosis Daughter     Bipolar disorder Daughter      I have reviewed and agree with the history as documented.    E-Cigarette/Vaping    E-Cigarette Use Never User      E-Cigarette/Vaping Substances     Social History     Tobacco Use    Smoking status: Former     Current packs/day: 0.00     Average packs/day: 0.2 packs/day for 1 year (0.2 ttl pk-yrs)     Types: Cigarettes     Start date:      Quit date:      Years since quittin.1    Smokeless tobacco: Never   Vaping Use    Vaping status: Never Used   Substance Use Topics    Alcohol use: Not Currently     Comment: States has had no alcohol in at least a year    Drug use: No       Review of Systems   Psychiatric/Behavioral:  Positive for hallucinations.    All other systems reviewed and are negative.      Physical Exam  Physical Exam  Vitals and nursing note reviewed.   Constitutional:       General: He is not in acute distress.     Appearance: He is not toxic-appearing.   HENT:      Head: Normocephalic and atraumatic.   Eyes:      Conjunctiva/sclera: Conjunctivae normal.   Neck:      Trachea: No tracheal deviation.   Cardiovascular:      Rate and Rhythm: Normal rate.   Pulmonary:      Effort: Pulmonary effort is normal. No respiratory distress.   Abdominal:      General: There is no distension.   Musculoskeletal:      Cervical back: Normal range of motion and neck supple.   Skin:     General: Skin is warm and dry.      Findings: No rash.   Neurological:      Mental Status: He is alert.       GCS: GCS eye subscore is 4. GCS verbal subscore is 5. GCS motor subscore is 6.   Psychiatric:         Attention and Perception: He perceives auditory and visual hallucinations.         Mood and Affect: Affect is flat.         Speech: Speech normal.         Behavior: Behavior is cooperative.         Vital Signs  ED Triage Vitals [02/13/24 1014]   Temperature Pulse Respirations Blood Pressure SpO2   97.6 °F (36.4 °C) 98 17 132/73 97 %      Temp Source Heart Rate Source Patient Position - Orthostatic VS BP Location FiO2 (%)   Oral Monitor Sitting Left arm --      Pain Score       No Pain           Vitals:    02/13/24 1014   BP: 132/73   Pulse: 98   Patient Position - Orthostatic VS: Sitting         Visual Acuity      ED Medications  Medications - No data to display    Diagnostic Studies  Results Reviewed       None                   No orders to display              Procedures  Procedures         ED Course                               SBIRT 22yo+      Flowsheet Row Most Recent Value   Initial Alcohol Screen: US AUDIT-C     1. How often do you have a drink containing alcohol? 0 Filed at: 02/13/2024 1013   2. How many drinks containing alcohol do you have on a typical day you are drinking?  0 Filed at: 02/13/2024 1013   3a. Male UNDER 65: How often do you have five or more drinks on one occasion? 0 Filed at: 02/13/2024 1013   Audit-C Score 0 Filed at: 02/13/2024 1013   PADMINI: How many times in the past year have you...    Used an illegal drug or used a prescription medication for non-medical reasons? Never Filed at: 02/13/2024 1013                      Medical Decision Making  53y.o male presents to the ER for psychiatric evaluation. Vitals are stable. Patient is in no acute distress. On exam, breathing is non-labored. No tachypnea or accessory muscle use. Heart is regular rate. Abdomen is not distended. Patient reports AH and VH. He has a flat affect but is calm and cooperative. Will have Crisis see the patient.      1040 - Patient seen by Crisis. Patient is at his baseline and is not meeting inpatient criteria. Will discharge. Patient agreeable to plan and plans to return to the [facility].     The management plan was discussed in detail with the patient at bedside and all questions were answered.  Prior to discharge, we provided both verbal and written instructions.  We discussed with the patient the signs and symptoms for which to return to the emergency department.  All questions were answered and patient was comfortable with the plan of care and discharged to home.  Instructed the patient to follow up with the primary care provider and/or specialist provided and their written instructions.  The patient verbalized understanding of our discussion and plan of care, and agrees to return to the Emergency Department for concerns and progression of illness.    At discharge, I instructed the patient to:  -follow up with pcp  -return to the ER if symptoms worsened or new symptoms arose  Patient agreed to this plan and was stable at time of discharge.     Problems Addressed:  Hallucinations: chronic illness or injury    Amount and/or Complexity of Data Reviewed  Independent Historian:      Details: Patient is historian             Disposition  Final diagnoses:   Hallucinations     Time reflects when diagnosis was documented in both MDM as applicable and the Disposition within this note       Time User Action Codes Description Comment    2/13/2024 10:44 AM Miracle Oropeza Add [R44.0] Auditory hallucinations     2/13/2024 10:44 AM Miracle Oropeza Remove [R44.0] Auditory hallucinations     2/13/2024 10:44 AM Miracle Oropeza Add [R44.3] Hallucinations           ED Disposition       ED Disposition   Discharge    Condition   Stable    Date/Time   Tue Feb 13, 2024 1044    Comment   Mark Quick discharge to home/self care.                   MD Documentation      Flowsheet Row Most Recent Value   Sending MD Dr Barnhart          Follow-up  Information       Follow up With Specialties Details Why Contact Info    Beatrice Rodrigez MD Family Medicine Schedule an appointment as soon as possible for a visit   [address] [place] [address]  [phone]              Discharge Medication List as of 2/13/2024 10:53 AM        CONTINUE these medications which have NOT CHANGED    Details   acetaminophen (TYLENOL) 500 mg tablet Take 2 tablets (1,000 mg total) by mouth every 6 (six) hours as needed for mild pain, Starting Sat 1/27/2024, Normal      albuterol (PROVENTIL HFA,VENTOLIN HFA) 90 mcg/act inhaler Inhale 2 puffs every 4 (four) hours as needed for wheezing, Starting Fri 2/4/2022, Normal      ARIPiprazole (ABILIFY) 20 MG tablet Take 1 tablet (20 mg total) by mouth daily, Starting Fri 2/4/2022, Until Tue 5/17/2022, Normal      Diclofenac Sodium (VOLTAREN) 1 % Apply 2 g topically in the morning and 2 g at noon and 2 g in the evening and 2 g before bedtime. Do all this for 7 days., Starting Mon 5/16/2022, Until Mon 5/23/2022, Normal      !! dicyclomine (BENTYL) 20 mg tablet Take 1 tablet (20 mg total) by mouth 2 (two) times a day, Starting Fri 2/4/2022, Normal      !! dicyclomine (BENTYL) 20 mg tablet Take 1 tablet (20 mg total) by mouth 2 (two) times a day as needed (Abdominal cramping), Starting Sat 3/19/2022, Normal      escitalopram (LEXAPRO) 10 mg tablet Take 1 tablet (10 mg total) by mouth daily, Starting Fri 2/4/2022, Until Tue 5/17/2022, Normal      famotidine (PEPCID) 20 mg tablet Take 1 tablet (20 mg total) by mouth 2 (two) times a day for 7 days, Starting Sat 1/27/2024, Until Sat 2/3/2024, Normal      fenofibrate (TRICOR) 48 mg tablet Take 1 tablet (48 mg total) by mouth daily, Starting Fri 2/4/2022, Until Tue 5/17/2022, Normal      fluticasone (FLOVENT HFA) 110 MCG/ACT inhaler Inhale 1 puff every 12 (twelve) hours Rinse mouth after use., Starting Fri 2/4/2022, Normal      Insulin Glargine Solostar (Lantus SoloStar) 100 UNIT/ML  "SOPN Inject 0.4 mL (40 Units total) under the skin daily at bedtime Every morning., Starting Sun 12/31/2023, Until Tue 1/30/2024, Normal      insulin lispro (HumaLOG KwikPen) 100 units/mL injection pen Inject 15 Units under the skin 3 (three) times a day with meals, Starting Sun 12/31/2023, Until Tue 1/30/2024, Normal      metFORMIN (GLUCOPHAGE) 1000 MG tablet Take 1 tablet (1,000 mg total) by mouth 2 (two) times a day with meals, Starting Sun 12/31/2023, Until Tue 1/30/2024, Normal      methocarbamol (ROBAXIN) 750 mg tablet Take 1 tablet (750 mg total) by mouth 2 (two) times a day, Starting Sat 1/27/2024, Normal      naproxen (NAPROSYN) 500 mg tablet Take 1 tablet (500 mg total) by mouth 2 (two) times a day as needed for mild pain or moderate pain, Starting Sat 3/19/2022, Normal      !! Needle, Disp, 31G X 5/16\" MISC Use 3 (three) times a day, Starting Fri 2/4/2022, Normal      !! Needle, Disp, 31G X 5/16\" MISC Use in the morning, Starting Sun 12/31/2023, Normal      neomycin-bacitracin-polymyxin b (NEOSPORIN) ointment Apply topically 2 (two) times a day, Starting Sun 3/6/2022, Print      traZODone (DESYREL) 100 mg tablet Take 1 tablet (100 mg total) by mouth daily at bedtime, Starting Fri 2/4/2022, Until Tue 5/17/2022, Normal       !! - Potential duplicate medications found. Please discuss with provider.          No discharge procedures on file.    PDMP Review       None            ED Provider  Electronically Signed by             Miracle Oropeza PA-C  02/13/24 4869    "

## 2024-02-14 NOTE — ED PROVIDER NOTES
"History  Chief Complaint   Patient presents with    Medical Problem     Pt reports \"my legs feel like rubber and I have a sour taste in my mouth especially when I burp\".     Don is a 53-year-old male well-known to me in our emergency department due to multiple previous ED visits.  He has a past medical history of diabetes, GERD, radiculopathy/sciatica, and homelessness.  He is complaining of acid reflux symptoms reporting a \"acid taste in my mouth\" as well as bilateral leg pain described as originating from the hip or buttocks and radiating down the back of his legs.  Denies localized numbness, weakness, tingling.  Reports symptoms similar to previous.  Tolerating p.o., no nausea no vomiting, no diarrhea.  No changes in bowel or bladder habits.  No saddle anesthesia or fevers.  Denies IV drug use.      Medical Problem  Associated symptoms: myalgias    Associated symptoms: no abdominal pain, no chest pain, no cough, no ear pain, no fever, no nausea, no rash, no shortness of breath, no sore throat and no vomiting        Prior to Admission Medications   Prescriptions Last Dose Informant Patient Reported? Taking?   ARIPiprazole (ABILIFY) 20 MG tablet   No No   Sig: Take 1 tablet (20 mg total) by mouth daily   Patient not taking: Reported on 5/17/2022   Diclofenac Sodium (VOLTAREN) 1 %   No No   Sig: Apply 2 g topically in the morning and 2 g at noon and 2 g in the evening and 2 g before bedtime. Do all this for 7 days.   Patient not taking: Reported on 5/17/2022   Insulin Glargine Solostar (Lantus SoloStar) 100 UNIT/ML SOPN   No No   Sig: Inject 0.4 mL (40 Units total) under the skin daily at bedtime Every morning.   Needle, Disp, 31G X 5/16\" MISC   No No   Sig: Use 3 (three) times a day   Patient not taking: Reported on 5/17/2022   Needle, Disp, 31G X 5/16\" MISC   No No   Sig: Use in the morning   albuterol (PROVENTIL HFA,VENTOLIN HFA) 90 mcg/act inhaler   No No   Sig: Inhale 2 puffs every 4 (four) hours as needed " for wheezing   Patient not taking: Reported on 5/17/2022   dicyclomine (BENTYL) 20 mg tablet   No No   Sig: Take 1 tablet (20 mg total) by mouth 2 (two) times a day   Patient not taking: Reported on 5/17/2022   dicyclomine (BENTYL) 20 mg tablet   No No   Sig: Take 1 tablet (20 mg total) by mouth 2 (two) times a day as needed (Abdominal cramping)   Patient not taking: Reported on 5/17/2022   escitalopram (LEXAPRO) 10 mg tablet   No No   Sig: Take 1 tablet (10 mg total) by mouth daily   Patient not taking: Reported on 5/17/2022   fenofibrate (TRICOR) 48 mg tablet   No No   Sig: Take 1 tablet (48 mg total) by mouth daily   Patient not taking: Reported on 5/17/2022   fluticasone (FLOVENT HFA) 110 MCG/ACT inhaler   No No   Sig: Inhale 1 puff every 12 (twelve) hours Rinse mouth after use.   Patient not taking: Reported on 5/17/2022   insulin lispro (HumaLOG KwikPen) 100 units/mL injection pen   No No   Sig: Inject 15 Units under the skin 3 (three) times a day with meals   metFORMIN (GLUCOPHAGE) 1000 MG tablet   No No   Sig: Take 1 tablet (1,000 mg total) by mouth 2 (two) times a day with meals   naproxen (NAPROSYN) 500 mg tablet   No No   Sig: Take 1 tablet (500 mg total) by mouth 2 (two) times a day as needed for mild pain or moderate pain   Patient not taking: Reported on 5/17/2022   neomycin-bacitracin-polymyxin b (NEOSPORIN) ointment   No No   Sig: Apply topically 2 (two) times a day   Patient not taking: Reported on 5/17/2022   traZODone (DESYREL) 100 mg tablet   No No   Sig: Take 1 tablet (100 mg total) by mouth daily at bedtime   Patient not taking: Reported on 5/17/2022      Facility-Administered Medications: None       Past Medical History:   Diagnosis Date    Anxiety     Asthma     Bipolar 1 disorder (HCC)     Depression     Diabetes mellitus (HCC)     Psychiatric disorder        Past Surgical History:   Procedure Laterality Date    APPENDECTOMY      DENTAL SURGERY         Family History   Problem Relation Age  of Onset    Liver disease Mother     Scoliosis Daughter     Bipolar disorder Daughter      I have reviewed and agree with the history as documented.    E-Cigarette/Vaping    E-Cigarette Use Never User      E-Cigarette/Vaping Substances     Social History     Tobacco Use    Smoking status: Former     Current packs/day: 0.00     Average packs/day: 0.2 packs/day for 1 year (0.2 ttl pk-yrs)     Types: Cigarettes     Start date:      Quit date:      Years since quittin.1    Smokeless tobacco: Never   Vaping Use    Vaping status: Never Used   Substance Use Topics    Alcohol use: Not Currently     Comment: States has had no alcohol in at least a year    Drug use: No       Review of Systems   Constitutional:  Negative for chills and fever.   HENT:  Negative for ear pain and sore throat.    Eyes:  Negative for visual disturbance.   Respiratory:  Negative for cough and shortness of breath.    Cardiovascular:  Negative for chest pain and palpitations.   Gastrointestinal:  Negative for abdominal pain, nausea and vomiting.   Genitourinary:  Negative for dysuria and hematuria.   Musculoskeletal:  Positive for myalgias. Negative for arthralgias and back pain.   Skin:  Negative for color change and rash.   Neurological:  Negative for seizures and syncope.   All other systems reviewed and are negative.      Physical Exam  Physical Exam  Vitals and nursing note reviewed.   Constitutional:       General: He is not in acute distress.     Appearance: He is well-developed.   HENT:      Head: Normocephalic and atraumatic.   Eyes:      Conjunctiva/sclera: Conjunctivae normal.   Cardiovascular:      Rate and Rhythm: Normal rate and regular rhythm.      Heart sounds: No murmur heard.  Pulmonary:      Effort: Pulmonary effort is normal. No respiratory distress.      Breath sounds: Normal breath sounds.   Abdominal:      Palpations: Abdomen is soft.      Tenderness: There is no abdominal tenderness.   Musculoskeletal:          General: No swelling.      Cervical back: Neck supple.      Comments: Lower extremities with normal neurovascular exam distally.   Skin:     General: Skin is warm and dry.      Capillary Refill: Capillary refill takes less than 2 seconds.      Comments: No rashes.   Neurological:      General: No focal deficit present.      Mental Status: He is alert and oriented to person, place, and time.      Motor: No weakness.      Gait: Gait normal.   Psychiatric:         Mood and Affect: Mood normal.         Vital Signs  ED Triage Vitals   Temperature Pulse Respirations Blood Pressure SpO2   01/27/24 0733 01/27/24 0733 01/27/24 0733 01/27/24 0733 01/27/24 0733   98.1 °F (36.7 °C) 103 18 127/71 97 %      Temp Source Heart Rate Source Patient Position - Orthostatic VS BP Location FiO2 (%)   01/27/24 0733 01/27/24 0733 -- -- --   Oral Monitor         Pain Score       01/27/24 0959       2           Vitals:    01/27/24 0733   BP: 127/71   Pulse: 103         Visual Acuity      ED Medications  Medications   aluminum-magnesium hydroxide-simethicone (MAALOX) oral suspension 30 mL (30 mL Oral Given 1/27/24 0833)   famotidine (PEPCID) tablet 20 mg (20 mg Oral Given 1/27/24 0833)   acetaminophen (TYLENOL) tablet 975 mg (975 mg Oral Given 1/27/24 0959)   methocarbamol (ROBAXIN) tablet 1,000 mg (1,000 mg Oral Given 1/27/24 0959)       Diagnostic Studies  Results Reviewed       None                   No orders to display              Procedures  Procedures         ED Course                               SBIRT 20yo+      Flowsheet Row Most Recent Value   Initial Alcohol Screen: US AUDIT-C     1. How often do you have a drink containing alcohol? 0 Filed at: 01/27/2024 0834   2. How many drinks containing alcohol do you have on a typical day you are drinking?  0 Filed at: 01/27/2024 0834   3a. Male UNDER 65: How often do you have five or more drinks on one occasion? 0 Filed at: 01/27/2024 0834   3b. FEMALE Any Age, or MALE 65+: How often do  you have 4 or more drinks on one occassion? 0 Filed at: 01/27/2024 0834   Audit-C Score 0 Filed at: 01/27/2024 0834   PADMINI: How many times in the past year have you...    Used an illegal drug or used a prescription medication for non-medical reasons? Never Filed at: 01/27/2024 0834                      Medical Decision Making  Risk  OTC drugs.  Prescription drug management.             Disposition  Final diagnoses:   Acid reflux   Radiculopathy     Time reflects when diagnosis was documented in both MDM as applicable and the Disposition within this note       Time User Action Codes Description Comment    1/27/2024  9:29 AM Rex Puente [K21.9] Acid reflux     1/27/2024  9:30 AM Rex Puente [M54.10] Radiculopathy           ED Disposition       ED Disposition   Discharge    Condition   Stable    Date/Time   Sat Jan 27, 2024 0929    Comment   Mark Quick discharge to home/self care.                   Follow-up Information       Follow up With Specialties Details Why Contact Info    Beatrice Rodrigez MD Family Medicine   [address] [place] [address]  [phone]              Discharge Medication List as of 1/27/2024  9:33 AM        START taking these medications    Details   acetaminophen (TYLENOL) 500 mg tablet Take 2 tablets (1,000 mg total) by mouth every 6 (six) hours as needed for mild pain, Starting Sat 1/27/2024, Normal      famotidine (PEPCID) 20 mg tablet Take 1 tablet (20 mg total) by mouth 2 (two) times a day for 7 days, Starting Sat 1/27/2024, Until Sat 2/3/2024, Normal      methocarbamol (ROBAXIN) 750 mg tablet Take 1 tablet (750 mg total) by mouth 2 (two) times a day, Starting Sat 1/27/2024, Normal           CONTINUE these medications which have NOT CHANGED    Details   albuterol (PROVENTIL HFA,VENTOLIN HFA) 90 mcg/act inhaler Inhale 2 puffs every 4 (four) hours as needed for wheezing, Starting Fri 2/4/2022, Normal      ARIPiprazole (ABILIFY) 20 MG tablet Take 1  "tablet (20 mg total) by mouth daily, Starting Fri 2/4/2022, Until Tue 5/17/2022, Normal      Diclofenac Sodium (VOLTAREN) 1 % Apply 2 g topically in the morning and 2 g at noon and 2 g in the evening and 2 g before bedtime. Do all this for 7 days., Starting Mon 5/16/2022, Until Mon 5/23/2022, Normal      !! dicyclomine (BENTYL) 20 mg tablet Take 1 tablet (20 mg total) by mouth 2 (two) times a day, Starting Fri 2/4/2022, Normal      !! dicyclomine (BENTYL) 20 mg tablet Take 1 tablet (20 mg total) by mouth 2 (two) times a day as needed (Abdominal cramping), Starting Sat 3/19/2022, Normal      escitalopram (LEXAPRO) 10 mg tablet Take 1 tablet (10 mg total) by mouth daily, Starting Fri 2/4/2022, Until Tue 5/17/2022, Normal      fenofibrate (TRICOR) 48 mg tablet Take 1 tablet (48 mg total) by mouth daily, Starting Fri 2/4/2022, Until Tue 5/17/2022, Normal      fluticasone (FLOVENT HFA) 110 MCG/ACT inhaler Inhale 1 puff every 12 (twelve) hours Rinse mouth after use., Starting Fri 2/4/2022, Normal      Insulin Glargine Solostar (Lantus SoloStar) 100 UNIT/ML SOPN Inject 0.4 mL (40 Units total) under the skin daily at bedtime Every morning., Starting Sun 12/31/2023, Until Tue 1/30/2024, Normal      insulin lispro (HumaLOG KwikPen) 100 units/mL injection pen Inject 15 Units under the skin 3 (three) times a day with meals, Starting Sun 12/31/2023, Until Tue 1/30/2024, Normal      metFORMIN (GLUCOPHAGE) 1000 MG tablet Take 1 tablet (1,000 mg total) by mouth 2 (two) times a day with meals, Starting Sun 12/31/2023, Until Tue 1/30/2024, Normal      naproxen (NAPROSYN) 500 mg tablet Take 1 tablet (500 mg total) by mouth 2 (two) times a day as needed for mild pain or moderate pain, Starting Sat 3/19/2022, Normal      !! Needle, Disp, 31G X 5/16\" MISC Use 3 (three) times a day, Starting Fri 2/4/2022, Normal      !! Needle, Disp, 31G X 5/16\" MISC Use in the morning, Starting Sun 12/31/2023, Normal      neomycin-bacitracin-polymyxin b " (NEOSPORIN) ointment Apply topically 2 (two) times a day, Starting Sun 3/6/2022, Print      traZODone (DESYREL) 100 mg tablet Take 1 tablet (100 mg total) by mouth daily at bedtime, Starting Fri 2/4/2022, Until Tue 5/17/2022, Normal       !! - Potential duplicate medications found. Please discuss with provider.          No discharge procedures on file.    PDMP Review       None            ED Provider  Electronically Signed by             Rex Puente MD  02/14/24 2205

## 2024-02-18 ENCOUNTER — HOSPITAL ENCOUNTER (EMERGENCY)
Facility: HOSPITAL | Age: 54
Discharge: HOME | End: 2024-02-18
Attending: EMERGENCY MEDICINE
Payer: COMMERCIAL

## 2024-02-18 VITALS
OXYGEN SATURATION: 100 % | SYSTOLIC BLOOD PRESSURE: 138 MMHG | TEMPERATURE: 97 F | HEART RATE: 89 BPM | RESPIRATION RATE: 16 BRPM | BODY MASS INDEX: 21.39 KG/M2 | DIASTOLIC BLOOD PRESSURE: 65 MMHG | WEIGHT: 132.5 LBS

## 2024-02-18 DIAGNOSIS — Z59.00 HOMELESSNESS: Primary | ICD-10-CM

## 2024-02-18 PROCEDURE — 99282 EMERGENCY DEPT VISIT SF MDM: CPT | Performed by: EMERGENCY MEDICINE

## 2024-02-18 PROCEDURE — 99282 EMERGENCY DEPT VISIT SF MDM: CPT

## 2024-02-18 SDOH — ECONOMIC STABILITY - HOUSING INSECURITY: HOMELESSNESS UNSPECIFIED: Z59.00

## 2024-02-18 NOTE — ED PROVIDER NOTES
"History  Chief Complaint   Patient presents with    Medical Problem     Pt denies any complaints currently. States needs note from provider for Social Security stating pt can have money     53-year-old male, presents requesting note for Social Security so he can receive money.  Patient has no acute complaints, has frequent visits to ED for various complaints.      History provided by:  Patient   used: No    Medical Problem      Prior to Admission Medications   Prescriptions Last Dose Informant Patient Reported? Taking?   ARIPiprazole (ABILIFY) 20 MG tablet   No No   Sig: Take 1 tablet (20 mg total) by mouth daily   Patient not taking: Reported on 5/17/2022   Diclofenac Sodium (VOLTAREN) 1 %   No No   Sig: Apply 2 g topically in the morning and 2 g at noon and 2 g in the evening and 2 g before bedtime. Do all this for 7 days.   Patient not taking: Reported on 5/17/2022   Insulin Glargine Solostar (Lantus SoloStar) 100 UNIT/ML SOPN   No No   Sig: Inject 0.4 mL (40 Units total) under the skin daily at bedtime Every morning.   Needle, Disp, 31G X 5/16\" MISC   No No   Sig: Use 3 (three) times a day   Patient not taking: Reported on 5/17/2022   Needle, Disp, 31G X 5/16\" MISC   No No   Sig: Use in the morning   acetaminophen (TYLENOL) 500 mg tablet   No No   Sig: Take 2 tablets (1,000 mg total) by mouth every 6 (six) hours as needed for mild pain   albuterol (PROVENTIL HFA,VENTOLIN HFA) 90 mcg/act inhaler   No No   Sig: Inhale 2 puffs every 4 (four) hours as needed for wheezing   Patient not taking: Reported on 5/17/2022   dicyclomine (BENTYL) 20 mg tablet   No No   Sig: Take 1 tablet (20 mg total) by mouth 2 (two) times a day   Patient not taking: Reported on 5/17/2022   dicyclomine (BENTYL) 20 mg tablet   No No   Sig: Take 1 tablet (20 mg total) by mouth 2 (two) times a day as needed (Abdominal cramping)   Patient not taking: Reported on 5/17/2022   escitalopram (LEXAPRO) 10 mg tablet   No No   Sig: " Take 1 tablet (10 mg total) by mouth daily   Patient not taking: Reported on 5/17/2022   famotidine (PEPCID) 20 mg tablet   No No   Sig: Take 1 tablet (20 mg total) by mouth 2 (two) times a day for 7 days   fenofibrate (TRICOR) 48 mg tablet   No No   Sig: Take 1 tablet (48 mg total) by mouth daily   Patient not taking: Reported on 5/17/2022   fluticasone (FLOVENT HFA) 110 MCG/ACT inhaler   No No   Sig: Inhale 1 puff every 12 (twelve) hours Rinse mouth after use.   Patient not taking: Reported on 5/17/2022   insulin lispro (HumaLOG KwikPen) 100 units/mL injection pen   No No   Sig: Inject 15 Units under the skin 3 (three) times a day with meals   metFORMIN (GLUCOPHAGE) 1000 MG tablet   No No   Sig: Take 1 tablet (1,000 mg total) by mouth 2 (two) times a day with meals   methocarbamol (ROBAXIN) 750 mg tablet   No No   Sig: Take 1 tablet (750 mg total) by mouth 2 (two) times a day   naproxen (NAPROSYN) 500 mg tablet   No No   Sig: Take 1 tablet (500 mg total) by mouth 2 (two) times a day as needed for mild pain or moderate pain   Patient not taking: Reported on 5/17/2022   neomycin-bacitracin-polymyxin b (NEOSPORIN) ointment   No No   Sig: Apply topically 2 (two) times a day   Patient not taking: Reported on 5/17/2022   traZODone (DESYREL) 100 mg tablet   No No   Sig: Take 1 tablet (100 mg total) by mouth daily at bedtime   Patient not taking: Reported on 5/17/2022      Facility-Administered Medications: None       Past Medical History:   Diagnosis Date    Anxiety     Asthma     Bipolar 1 disorder (HCC)     Depression     Diabetes mellitus (HCC)     Psychiatric disorder        Past Surgical History:   Procedure Laterality Date    APPENDECTOMY      DENTAL SURGERY         Family History   Problem Relation Age of Onset    Liver disease Mother     Scoliosis Daughter     Bipolar disorder Daughter      I have reviewed and agree with the history as documented.    E-Cigarette/Vaping    E-Cigarette Use Never User       E-Cigarette/Vaping Substances     Social History     Tobacco Use    Smoking status: Former     Current packs/day: 0.00     Average packs/day: 0.2 packs/day for 1 year (0.2 ttl pk-yrs)     Types: Cigarettes     Start date:      Quit date:      Years since quittin.1    Smokeless tobacco: Never   Vaping Use    Vaping status: Never Used   Substance Use Topics    Alcohol use: Not Currently     Comment: States has had no alcohol in at least a year    Drug use: No       Review of Systems   Constitutional: Negative.    Respiratory: Negative.     Cardiovascular: Negative.        Physical Exam  Physical Exam  Vitals and nursing note reviewed.   Constitutional:       General: He is not in acute distress.  HENT:      Head: Normocephalic and atraumatic.   Cardiovascular:      Rate and Rhythm: Normal rate.   Pulmonary:      Effort: Pulmonary effort is normal.   Neurological:      General: No focal deficit present.      Mental Status: He is alert and oriented to person, place, and time.         Vital Signs  ED Triage Vitals [24 0833]   Temperature Pulse Respirations Blood Pressure SpO2   (!) 97 °F (36.1 °C) 89 16 138/65 100 %      Temp Source Heart Rate Source Patient Position - Orthostatic VS BP Location FiO2 (%)   Oral Monitor Sitting Right arm --      Pain Score       --           Vitals:    24 0833   BP: 138/65   Pulse: 89   Patient Position - Orthostatic VS: Sitting         Visual Acuity      ED Medications  Medications - No data to display    Diagnostic Studies  Results Reviewed       None                   No orders to display              Procedures  Procedures         ED Course                               SBIRT 22yo+      Flowsheet Row Most Recent Value   Initial Alcohol Screen: US AUDIT-C     1. How often do you have a drink containing alcohol? 0 Filed at: 2024   2. How many drinks containing alcohol do you have on a typical day you are drinking?  0 Filed at: 2024    3a. Male UNDER 65: How often do you have five or more drinks on one occasion? 0 Filed at: 02/18/2024 0841   3b. FEMALE Any Age, or MALE 65+: How often do you have 4 or more drinks on one occassion? 0 Filed at: 02/18/2024 0841   Audit-C Score 0 Filed at: 02/18/2024 0841   PADMINI: How many times in the past year have you...    Used an illegal drug or used a prescription medication for non-medical reasons? Never Filed at: 02/18/2024 0841                      Medical Decision Making  53-year-old male, presents requesting paperwork so he can receive Social Security.  Patient has no acute complaints, looks well in no distress, able to ambulate without difficulty.  Discussed with patient that emergency department does not do this and he would need to follow-up with primary care doctor.    Amount and/or Complexity of Data Reviewed  External Data Reviewed: notes.     Details: Previous medical records reviewed, patient with numerous ED visits for various complaints.             Disposition  Final diagnoses:   Homelessness     Time reflects when diagnosis was documented in both MDM as applicable and the Disposition within this note       Time User Action Codes Description Comment    2/18/2024  8:38 AM Moises Nathan Add [Z59.00] Homelessness           ED Disposition       ED Disposition   Discharge    Condition   Stable    Date/Time   Sun Feb 18, 2024 0838    Comment   Mark Ynes discharge to home/self care.                   Follow-up Information       Follow up With Specialties Details Why Contact Info    Beatrice Rodrigez MD Family Medicine   [address] [place] [address]  [phone]              Patient's Medications   Discharge Prescriptions    No medications on file       No discharge procedures on file.    PDMP Review       None            ED Provider  Electronically Signed by             Moises Nathan MD  02/18/24 0845

## 2024-02-22 ENCOUNTER — DOCUMENTATION (OUTPATIENT)
Dept: CASE MANAGEMENT | Facility: HOSPITAL | Age: 54
End: 2024-02-22

## 2024-02-22 NOTE — BEHAVIORAL HEALTH HIGH UTILIZER
Patient Name:Mark Quick MRN:  [id number]         : 1970     Age: 53 y.o.    Sex: male   Utilization History:  (# of ED visits & IP admits; reasons)  Pt had 11 [facility]-ED visits from 2023-2024. ED presentations were related to homelessness (especially during winter months), wanting to be placed in a group home, non-compliance with medications or community providers, needing a medication refill, malingering, SI with no plan or with a plan to overdose on medications or walk into traffic, auditory or visual hallucinations, wanted documentation for disability.        Medical presentations were related to congestion, hyperglycemia bilateral foot pain, ulcer right foot, headache, abdominal or back pain, chest pain, dizziness, hyperglycemia, urinary incontinence, weak legs or neuropathy.   Treatment Recommendations & Presentation:  Presentation in the ED: Pt typically presents self or is accompaned by police/EMS to ED's. ED visits were related to homelessness (especially during winter months), wanting to be placed in a group home, non-compliance with medications or community providers, needing a medication refill, malingering, SI with no plan or with a plan to overdose on medications or walk into traffic, auditory or visual hallucinations, wanted documentation for disability.        Medical visits were related to congestion, hyperglycemia bilateral foot pain, ulcer right foot, headache, abdominal or back pain, chest pain, dizziness, hyperglycemia, urinary incontinence, weak legs or neuropathy.       ED Recommendations: Following medical evaluation and treatment, provide pt some time for de-escalation and for Provider to establish acute risk versus pt's chronic baseline behavioral disturbances.  Direct pt to [facility] Medicine [phone] or [phone] and [facility] nurses [phone] to assist pt in making appointments for mental health and medical care. If pt remains in the [facility], they  need to call or go to the [facility] Assistance office at [address] [place] [address] [phone] to obtain [facility] medicaid (the mental health carve out of medicaid) to be able to obtain a mental health provider.  Pt can go to [facility] at [address] [facility] for food and some case management assistance, pt should obtain a [place] [facility] and [facility] as well as go to [facility] for housing issues. Pt can also utilize the [place] drop in center at [address].  Pt should go to [facility] at [address] for medical needs or [facility] at [address] for medical needs.        Home Medication Regimen:  see most recent documentation in Epic, look at what [facility] is prescribing pt.        Recent Medical Work Ups:  (include Psych testing or ECT)     Labs - 2023, 2024  ECG - 2023, 2024 Inpatient Recommendations:  Develop outpt community and mental health resources and community providers and [facility] services for pt for a comprehensive discharge plan.           Outpatient recommendations: Pt should continue with his community medical and mental health providers and take his medication as prescribed.      Situation/Relevant Background Info: Pt is a 53 year old male with a diagnosis of Bipolar 1 disorder, anxiety, depression and an extensive history of ED appearances in the [place] and [facility] areas with numerous behavioral health and medical hospitalizations.   Pt appears to be chronically homeless with unstable housing and comes to ED's due to housing issues, especially during winter months. Pt will  typically go to the ED's with his fiance as they attempt to be hospitalized at the same time and they often present with similar symptoms.  Pt does not appear to be compliant with his medical care or mental health care in the community or with taking his medications as prescribed.   Pt goes between the [place] and [facility]  [facility] and should have the mental health medicaid carve-out in the county in which he chooses to reside so he can obtain a mental health provider.   Pt was supposed to have an appointment with [facility] as their ED referred him to [facility] and they attempted to set up an appointment for pt.  Pt has been seeing [facility] regularly and states they are trying to help him with his housing issues.           Diagnoses/Significant Problems (Medical & Psychiatric):    Asthma       Bipolar 1 disorder, Anxiety      Depression      Diabetes mellitus (HCC)      HTN               Drivers of repeated utilization:  homelessness/unstable housing, diabetes care needed/poor self-care, personality disordered behaviors, malingering, non-compliance with medical and mental health community care, non-compliance with medications.                                                Existing Community Resources & Supports:                 [facility] - [phone]     Patient Medical & Psychiatric Care Team:  Pt should contact  [facility] [phone]  [facility] [phone] or [phone]    Care plan date: 02/22/24                   Author:  Rosi Jones RN                 Date reviewed with patient:

## 2024-03-08 ENCOUNTER — HOSPITAL ENCOUNTER (EMERGENCY)
Facility: HOSPITAL | Age: 54
Discharge: HOME | End: 2024-03-08
Attending: EMERGENCY MEDICINE
Payer: COMMERCIAL

## 2024-03-08 VITALS
WEIGHT: 138.45 LBS | HEART RATE: 107 BPM | TEMPERATURE: 97.5 F | SYSTOLIC BLOOD PRESSURE: 120 MMHG | RESPIRATION RATE: 16 BRPM | BODY MASS INDEX: 22.35 KG/M2 | DIASTOLIC BLOOD PRESSURE: 59 MMHG | OXYGEN SATURATION: 98 %

## 2024-03-08 DIAGNOSIS — R44.3 HALLUCINATIONS: ICD-10-CM

## 2024-03-08 DIAGNOSIS — Z00.8 ENCOUNTER FOR PSYCHOLOGICAL EVALUATION: Primary | ICD-10-CM

## 2024-03-08 PROCEDURE — 99284 EMERGENCY DEPT VISIT MOD MDM: CPT

## 2024-03-09 NOTE — DISCHARGE INSTRUCTIONS
[facility] Medicine [phone] or [phone] and [facility] nurses [phone] to assist pt in making appointments for mental health and medical care. If pt remains in the [facility], they need to call or go to the [facility] Assistance office at [address] [place] [address] [phone] to obtain [facility] medicaid (the mental health carve out of medicaid) to be able to obtain a mental health provider.  Pt can go to [facility] at [address] [place] [address] for food and some case management assistance, pt should obtain a [place] [employer] [facility] and can also talk to the  at [facility] about that as well as go to [facility] for housing issues. Pt can also utilize the [place] drop in center at [address].  Pt should go to [facility] at [address] for medical needs or [facility] center at [address] for medical needs.

## 2024-03-09 NOTE — ED NOTES
"CIS met with patient at the request of ED physician and JASVIR. Mr. Quick is very well known to this department, and presents tonight with his baseline complaints and reports. He came in with his girlfriend, as is typical for them, and both are reporting the same complaints and requesting admission to an inpatient behavioral health unit. Mr. Quick reports experiencing auditory hallucinations, but denies SI and any plans to actually try to kill himself. He states he is taking his prescribed medications daily as directed. During the conversation he asked repeatedly if [facility] had already spoken with his girlfriend and if she was being admitted, and to where. He reports that they are currently homeless, and both are hoping to \"check into the hospital\". He states he wants \"to go where she goes\". Patient has a high behavioral health utilizer plan due to extremely excessive ED visits and inpatient admissions with this same presentation. The plan states to assess the patient to determine if presentation is baseline for him, or more acute. If at baseline, he is to be provided with community contact numbers for agencies to assist him with obtaining medication, scheduling outpatient mental health appointments, access to food, and housing programs and shelters. Patient's presentation and reported mental health concerns are consistent with his baseline behaviors and reported symptoms. He will provide another copy of the community resources developed for him from his high utilizer plan.  "

## 2024-03-09 NOTE — ED NOTES
Behavioral Health High Utilizer Plan Reviewed:    Patient Name:Mark Quick MRN:  [id number]         : 1970     Age: 53 y.o.    Sex: male   Utilization History:  (# of ED visits & IP admits; reasons)  Pt had 11 [facility]-ED visits from 2023-2024. ED presentations were related to homelessness (especially during winter months), wanting to be placed in a group home, non-compliance with medications or community providers, needing a medication refill, malingering, SI with no plan or with a plan to overdose on medications or walk into traffic, auditory or visual hallucinations, wanted documentation for disability.        Medical presentations were related to congestion, hyperglycemia bilateral foot pain, ulcer right foot, headache, abdominal or back pain, chest pain, dizziness, hyperglycemia, urinary incontinence, weak legs or neuropathy.    Treatment Recommendations & Presentation:  Presentation in the ED: Pt typically presents self or is accompaned by police/EMS to ED's. ED visits were related to homelessness (especially during winter months), wanting to be placed in a group home, non-compliance with medications or community providers, needing a medication refill, malingering, SI with no plan or with a plan to overdose on medications or walk into traffic, auditory or visual hallucinations, wanted documentation for disability.        Medical visits were related to congestion, hyperglycemia bilateral foot pain, ulcer right foot, headache, abdominal or back pain, chest pain, dizziness, hyperglycemia, urinary incontinence, weak legs or neuropathy.        ED Recommendations: Following medical evaluation and treatment, provide pt some time for de-escalation and for Provider to establish acute risk versus pt's chronic baseline behavioral disturbances.  Direct pt to [facility] Medicine [phone] or [phone] and [facility] nurses [phone] to assist pt in making appointments for mental health and  medical care. If pt remains in the [facility], they need to call or go to the [facility] Assistance office at [address] [place] [address] [phone] to obtain [facility] medicaid (the mental health carve out of medicaid) to be able to obtain a mental health provider.  Pt can go to [facility] at [address] [facility] for food and some case management assistance, pt should obtain a [place] [facility] and can also talk to the  at [facility] about that as well as go to [facility] for housing issues. Pt can also utilize the [place] drop in center at [address] [facility].  Pt should go to [facility] at [address] for medical needs or [facility] at [address] for medical needs.         Home Medication Regimen:  see most recent documentation in Epic, look at what [facility] is prescribing pt.         Recent Medical Work Ups:  (include Psych testing or ECT)     Labs - 2023, 2024  ECG - 2023, 2024 Inpatient Recommendations:  Develop outpt community and mental health resources and community providers and [facility] services for pt for a comprehensive discharge plan.              Outpatient recommendations: Pt should continue with his community medical and mental health providers and take his medication as prescribed.       Situation/Relevant Background Info: Pt is a 53 year old male with a diagnosis of Bipolar 1 disorder, anxiety, depression and an extensive history of ED appearances in the [place] and [facility] areas with numerous behavioral health and medical hospitalizations.   Pt appears to be chronically homeless with unstable housing and comes to ED's due to housing issues, especially during winter months. Pt will  typically go to the ED's with his fiance as they attempt to be hospitalized at the same time and they often present with similar symptoms.  Pt does not appear to be compliant with his medical care or mental health care in the community or with taking his medications as  prescribed.   Pt goes between the [place] and [place] region and should have the mental health medicaid carve-out in the county in which he chooses to reside so he can obtain a mental health provider.   Pt was supposed to have an appointment with [facility] as their ED referred him to [facility] and they attempted to set up an appointment for pt.  Pt has been seeing [facility] regularly and states they are trying to help him with his housing issues.              Diagnoses/Significant Problems (Medical & Psychiatric):     Asthma        Bipolar 1 disorder, Anxiety      Depression      Diabetes mellitus (HCC)      HTN                  Drivers of repeated utilization:  homelessness/unstable housing, diabetes care needed/poor self-care, personality disordered behaviors, malingering, non-compliance with medical and mental health community care, non-compliance with medications.                                                   Existing Community Resources & Supports:                 St[facility] Thompson - [phone]      Patient Medical & Psychiatric Care Team:  Pt should contact [facility] Nurses [phone]  [facility] [phone] or [phone]

## 2024-03-09 NOTE — ED PROVIDER NOTES
"History  Chief Complaint   Patient presents with    Psychiatric Evaluation     Pt reports seeing shadows and hearing voices telling him to burn and cut himself and run away from home, states he's been compliant with medications. +SI, plans on taking all his medications.      The patient is a 53 YOM who is well known to this ER, with PMH anxiety, asthma, bipolar 1 and DM presents today due to auditory hallucinations. Patient reports he is hearing voices telling him to cut himself, to burn himself, and to take all of his pills to kill himself. He reports the voices asked him if he wanted to die, and he answered yes, however when asked if he wants to kill himself he says he does not.  He reports he is taking his Risperidone, which he was started on during psychiatric admission at [facility] in January, daily.  Denies HI, VH. Denies drug or alcohol use.      Of note, patient presented to the ED with his girlfriend, who has the same chief complaint. Per chart review, patient and his girlfriend typically present to the ED together for various complaints including housing problems, hallucinations.  I have read the patient's high utilizer plan.        Prior to Admission Medications   Prescriptions Last Dose Informant Patient Reported? Taking?   ARIPiprazole (ABILIFY) 20 MG tablet   No No   Sig: Take 1 tablet (20 mg total) by mouth daily   Patient not taking: Reported on 5/17/2022   Diclofenac Sodium (VOLTAREN) 1 %   No No   Sig: Apply 2 g topically in the morning and 2 g at noon and 2 g in the evening and 2 g before bedtime. Do all this for 7 days.   Patient not taking: Reported on 5/17/2022   Insulin Glargine Solostar (Lantus SoloStar) 100 UNIT/ML SOPN   No No   Sig: Inject 0.4 mL (40 Units total) under the skin daily at bedtime Every morning.   Needle, Disp, 31G X 5/16\" MISC   No No   Sig: Use 3 (three) times a day   Patient not taking: Reported on 5/17/2022   Needle, Disp, 31G X 5/16\" MISC   No No   Sig: Use in the morning "   acetaminophen (TYLENOL) 500 mg tablet   No No   Sig: Take 2 tablets (1,000 mg total) by mouth every 6 (six) hours as needed for mild pain   albuterol (PROVENTIL HFA,VENTOLIN HFA) 90 mcg/act inhaler   No No   Sig: Inhale 2 puffs every 4 (four) hours as needed for wheezing   Patient not taking: Reported on 5/17/2022   dicyclomine (BENTYL) 20 mg tablet   No No   Sig: Take 1 tablet (20 mg total) by mouth 2 (two) times a day   Patient not taking: Reported on 5/17/2022   dicyclomine (BENTYL) 20 mg tablet   No No   Sig: Take 1 tablet (20 mg total) by mouth 2 (two) times a day as needed (Abdominal cramping)   Patient not taking: Reported on 5/17/2022   escitalopram (LEXAPRO) 10 mg tablet   No No   Sig: Take 1 tablet (10 mg total) by mouth daily   Patient not taking: Reported on 5/17/2022   famotidine (PEPCID) 20 mg tablet   No No   Sig: Take 1 tablet (20 mg total) by mouth 2 (two) times a day for 7 days   fenofibrate (TRICOR) 48 mg tablet   No No   Sig: Take 1 tablet (48 mg total) by mouth daily   Patient not taking: Reported on 5/17/2022   fluticasone (FLOVENT HFA) 110 MCG/ACT inhaler   No No   Sig: Inhale 1 puff every 12 (twelve) hours Rinse mouth after use.   Patient not taking: Reported on 5/17/2022   insulin lispro (HumaLOG KwikPen) 100 units/mL injection pen   No No   Sig: Inject 15 Units under the skin 3 (three) times a day with meals   metFORMIN (GLUCOPHAGE) 1000 MG tablet   No No   Sig: Take 1 tablet (1,000 mg total) by mouth 2 (two) times a day with meals   methocarbamol (ROBAXIN) 750 mg tablet   No No   Sig: Take 1 tablet (750 mg total) by mouth 2 (two) times a day   naproxen (NAPROSYN) 500 mg tablet   No No   Sig: Take 1 tablet (500 mg total) by mouth 2 (two) times a day as needed for mild pain or moderate pain   Patient not taking: Reported on 5/17/2022   neomycin-bacitracin-polymyxin b (NEOSPORIN) ointment   No No   Sig: Apply topically 2 (two) times a day   Patient not taking: Reported on 5/17/2022    traZODone (DESYREL) 100 mg tablet   No No   Sig: Take 1 tablet (100 mg total) by mouth daily at bedtime   Patient not taking: Reported on 2022      Facility-Administered Medications: None       Past Medical History:   Diagnosis Date    Anxiety     Asthma     Bipolar 1 disorder (HCC)     Depression     Diabetes mellitus (HCC)     Psychiatric disorder        Past Surgical History:   Procedure Laterality Date    APPENDECTOMY      DENTAL SURGERY         Family History   Problem Relation Age of Onset    Liver disease Mother     Scoliosis Daughter     Bipolar disorder Daughter      I have reviewed and agree with the history as documented.    E-Cigarette/Vaping    E-Cigarette Use Never User      E-Cigarette/Vaping Substances     Social History     Tobacco Use    Smoking status: Former     Current packs/day: 0.00     Average packs/day: 0.2 packs/day for 1 year (0.2 ttl pk-yrs)     Types: Cigarettes     Start date:      Quit date:      Years since quittin.2    Smokeless tobacco: Never   Vaping Use    Vaping status: Never Used   Substance Use Topics    Alcohol use: Not Currently     Comment: States has had no alcohol in at least a year    Drug use: No       Review of Systems   Constitutional:  Negative for fever.   Respiratory:  Negative for shortness of breath.    Cardiovascular:  Negative for chest pain.   Gastrointestinal:  Negative for abdominal pain.   Neurological:  Negative for headaches.   Psychiatric/Behavioral:  Positive for behavioral problems.        Physical Exam  Physical Exam  Vitals and nursing note reviewed.   Constitutional:       General: He is not in acute distress.     Appearance: He is well-developed.   HENT:      Head: Normocephalic and atraumatic.   Eyes:      Conjunctiva/sclera: Conjunctivae normal.   Cardiovascular:      Rate and Rhythm: Normal rate and regular rhythm.   Pulmonary:      Effort: Pulmonary effort is normal. No respiratory distress.   Abdominal:      General: Abdomen  "is flat.   Musculoskeletal:         General: Normal range of motion.      Cervical back: Neck supple.   Skin:     General: Skin is warm and dry.      Capillary Refill: Capillary refill takes less than 2 seconds.   Neurological:      Mental Status: He is alert.   Psychiatric:         Mood and Affect: Mood normal.         Speech: Speech normal.         Behavior: Behavior normal. Behavior is not agitated. Behavior is cooperative.         Vital Signs  ED Triage Vitals   Temperature Pulse Respirations Blood Pressure SpO2   03/08/24 2151 03/08/24 2150 03/08/24 2150 03/08/24 2151 03/08/24 2150   97.5 °F (36.4 °C) (!) 107 16 120/59 98 %      Temp Source Heart Rate Source Patient Position - Orthostatic VS BP Location FiO2 (%)   03/08/24 2151 03/08/24 2150 03/08/24 2150 03/08/24 2150 --   Oral Monitor Sitting Right arm       Pain Score       03/08/24 2150       No Pain           Vitals:    03/08/24 2150 03/08/24 2151   BP:  120/59   Pulse: (!) 107    Patient Position - Orthostatic VS: Sitting          Visual Acuity      ED Medications  Medications - No data to display    Diagnostic Studies  Results Reviewed       None                   No orders to display              Procedures  Procedures         ED Course         Medical Decision Making  Patient presents to the ED for evaluation of auditory hallucinations. Crisis saw patient at bedside; he reports that he and his girlfriend are currently homeless, and both are hoping to \"check into the hospital\". Patient's presentation and reported mental health concerns are consistent with his baseline behaviors and reported symptoms. At this time, the patient is not meeting any criteria for inpatient psychiatric admission.      At the time of discharge, the patient is in no acute distress. I discussed with the patient the diagnosis, treatment plan, follow-up, return precautions, and discharge instructions; they were given the opportunity to ask questions and verbalized " understanding.    Problems Addressed:  Encounter for psychological evaluation: acute illness or injury  Hallucinations: acute illness or injury    Amount and/or Complexity of Data Reviewed  External Data Reviewed: labs and notes.             Disposition  Final diagnoses:   Encounter for psychological evaluation   Hallucinations     Time reflects when diagnosis was documented in both MDM as applicable and the Disposition within this note       Time User Action Codes Description Comment    3/8/2024 11:29 PM Heather Marquez Add [Z00.8] Encounter for psychological evaluation     3/8/2024 11:29 PM Heather Marquez Add [R44.3] Hallucinations           ED Disposition       ED Disposition   Discharge    Condition   Stable    Date/Time   Fri Mar 8, 2024 11:29 PM    Comment   Mark Quick discharge to home/self care.                   Follow-up Information    None         Discharge Medication List as of 3/8/2024 11:29 PM        CONTINUE these medications which have NOT CHANGED    Details   acetaminophen (TYLENOL) 500 mg tablet Take 2 tablets (1,000 mg total) by mouth every 6 (six) hours as needed for mild pain, Starting Sat 1/27/2024, Normal      albuterol (PROVENTIL HFA,VENTOLIN HFA) 90 mcg/act inhaler Inhale 2 puffs every 4 (four) hours as needed for wheezing, Starting Fri 2/4/2022, Normal      ARIPiprazole (ABILIFY) 20 MG tablet Take 1 tablet (20 mg total) by mouth daily, Starting Fri 2/4/2022, Until Tue 5/17/2022, Normal      Diclofenac Sodium (VOLTAREN) 1 % Apply 2 g topically in the morning and 2 g at noon and 2 g in the evening and 2 g before bedtime. Do all this for 7 days., Starting Mon 5/16/2022, Until Mon 5/23/2022, Normal      !! dicyclomine (BENTYL) 20 mg tablet Take 1 tablet (20 mg total) by mouth 2 (two) times a day, Starting Fri 2/4/2022, Normal      !! dicyclomine (BENTYL) 20 mg tablet Take 1 tablet (20 mg total) by mouth 2 (two) times a day as needed (Abdominal cramping), Starting Sat 3/19/2022, Normal  "     escitalopram (LEXAPRO) 10 mg tablet Take 1 tablet (10 mg total) by mouth daily, Starting Fri 2/4/2022, Until Tue 5/17/2022, Normal      famotidine (PEPCID) 20 mg tablet Take 1 tablet (20 mg total) by mouth 2 (two) times a day for 7 days, Starting Sat 1/27/2024, Until Sat 2/3/2024, Normal      fenofibrate (TRICOR) 48 mg tablet Take 1 tablet (48 mg total) by mouth daily, Starting Fri 2/4/2022, Until Tue 5/17/2022, Normal      fluticasone (FLOVENT HFA) 110 MCG/ACT inhaler Inhale 1 puff every 12 (twelve) hours Rinse mouth after use., Starting Fri 2/4/2022, Normal      Insulin Glargine Solostar (Lantus SoloStar) 100 UNIT/ML SOPN Inject 0.4 mL (40 Units total) under the skin daily at bedtime Every morning., Starting Sun 12/31/2023, Until Tue 1/30/2024, Normal      insulin lispro (HumaLOG KwikPen) 100 units/mL injection pen Inject 15 Units under the skin 3 (three) times a day with meals, Starting Sun 12/31/2023, Until Tue 1/30/2024, Normal      metFORMIN (GLUCOPHAGE) 1000 MG tablet Take 1 tablet (1,000 mg total) by mouth 2 (two) times a day with meals, Starting Sun 12/31/2023, Until Tue 1/30/2024, Normal      methocarbamol (ROBAXIN) 750 mg tablet Take 1 tablet (750 mg total) by mouth 2 (two) times a day, Starting Sat 1/27/2024, Normal      naproxen (NAPROSYN) 500 mg tablet Take 1 tablet (500 mg total) by mouth 2 (two) times a day as needed for mild pain or moderate pain, Starting Sat 3/19/2022, Normal      !! Needle, Disp, 31G X 5/16\" MISC Use 3 (three) times a day, Starting Fri 2/4/2022, Normal      !! Needle, Disp, 31G X 5/16\" MISC Use in the morning, Starting Sun 12/31/2023, Normal      neomycin-bacitracin-polymyxin b (NEOSPORIN) ointment Apply topically 2 (two) times a day, Starting Sun 3/6/2022, Print      traZODone (DESYREL) 100 mg tablet Take 1 tablet (100 mg total) by mouth daily at bedtime, Starting Fri 2/4/2022, Until Tue 5/17/2022, Normal       !! - Potential duplicate medications found. Please discuss with " provider.          No discharge procedures on file.    PDMP Review       None            ED Provider  Electronically Signed by             Heather Marquez PA-C  03/08/24 1753

## 2024-04-08 ENCOUNTER — DOCUMENTATION (OUTPATIENT)
Dept: BEHAVIORAL HEALTH UNIT | Facility: HOSPITAL | Age: 54
End: 2024-04-08

## 2024-04-08 NOTE — BEHAVIORAL HEALTH HIGH UTILIZER
Patient Name:Mark Quick MRN:  [id number]         : 1970     Age: 53 y.o.    Sex: male   Utilization History:  (# of ED visits & IP admits; reasons)  Pt had 6 [facility]-ED visits from 2024-3/2024. ED presentations were related to homelessness (especially during winter months), wanting to be placed in a group home, non-compliance with medications or community providers, needing a medication refill, malingering, SI with no plan or with a plan to overdose on medications or walk into traffic, cut or burn self, auditory or visual hallucinations, wanted documentation for disability.        Medical presentations were related to congestion, hyperglycemia, bilateral foot pain, ulcer right foot, headache, abdominal or back pain, chest pain, dizziness, hyperglycemia, urinary incontinence, weak legs or neuropathy.    Treatment Recommendations & Presentation:  Presentation in the ED: Pt typically presents self or is accompanied by EMS/police to ED's. ED visits were related to homelessness (especially during winter months), wanting to be placed in a group home, non-compliance with medications or community providers, needing a medication refill, malingering, SI with no plan or with a plan to overdose on medications or walk into traffic, cut or burn self, auditory or visual hallucinations, wanted documentation for disability.        Medical visits were related to congestion, hyperglycemia, bilateral foot pain, ulcer right foot, headache, abdominal or back pain, chest pain, dizziness, hyperglycemia, urinary incontinence, weak legs or neuropathy.       ED Recommendations:  Following medical evaluation and treatment, establish acute risk versus pt's chronic baseline behavioral disturbances. Direct pt to contact provider Callum Cramer from [facility] [phone] for an appointment as pt has been seeing him for medication needs.  Direct pt to [facility] [phone] or [phone] or JUDD Garcia  nurses [phone] to assist pt in making appointments for mental health and medical care. If pt remains in the [facility], he should call or go to the [facility] Assistance office at [address] [place] [address] [phone] to obtain [facility] medicaid (the mental health carve out of medicaid) to be able to obtain a mental health provider.  Pt can go to [facility] at [address] for food and some case management assistance, pt should obtain a [facility] and [facility] also [facility] for [facility] issues. Pt can also utilize the [place] drop in center at [address].  Pt should go to [facility] medical Clinic at [address] for medical needs or [facility] center at [address] for medical needs.        Home Medication Regimen:  see most recent documentation in Epic, look at what [facility] is prescribing pt.        Recent Medical Work Ups:  (include Psych testing or ECT)     Labs - 2023, 2024  ECG - 2023, 2024 Inpatient Recommendations:  Develop outpt community and mental health resources and community providers and [facility] services for pt for a comprehensive discharge plan. Pt was utilizing [facility]           Outpatient recommendations:  Pt should continue with his community medical and mental health providers and take his medication as prescribed.         Situation/Relevant Background Info:  Pt is a 53 year old male with a diagnosis of Bipolar 1 disorder, anxiety, depression and an extensive history of ED appearances in the [place] and [facility] areas with numerous behavioral health and medical hospitalizations.   Pt appears to be chronically homeless with unstable housing and comes to ED's due to housing issues, especially during winter months. Pt will  typically go to the ED's with his fiance as they attempt to be hospitalized at the same time and they often present with similar symptoms.  Pt does not appear to be compliant with  his medical care or mental health care in the community or with taking his medications as prescribed. Pt often experiences issues related to his diabetes and stated in 3/2024 that he lost his glucometer.  Pt goes between the [place] and [place] region and should have the mental health medicaid carve-out in the county in which he chooses to reside so he can obtain a mental health provider.   Pt has had appointments with [facility] as their ED referred [facility] to [facility] and they attempt to set up an appointments for pt when he comes in.    Pt has been seeing [facility] provider Callum Cramer PA-C more regularly and states they are trying to also help him with his housing issues.             Diagnoses/Significant Problems (Medical & Psychiatric):      Asthma        Bipolar 1 disorder, Anxiety      Depression      Diabetes mellitus (HCC)      HTN                Drivers of repeated utilization: homelessness/unstable housing, diabetes care needed/poor self-care, personality disordered behaviors, malingering, non-compliance with medical and mental health community care, non-compliance with medications.                                                  Existing Community Resources & Supports:                 St[facility] Thompson - [phone]      Patient Medical & Psychiatric Care Team:  [facility] [phone]  [facility] and provider Callum Cramer [phone] or [phone]    Care plan date: 04/08/24                   Author:  Rosi Jones RN                 Date reviewed with patient:

## 2024-06-26 ENCOUNTER — DOCUMENTATION (OUTPATIENT)
Dept: CASE MANAGEMENT | Facility: HOSPITAL | Age: 54
End: 2024-06-26

## 2024-06-26 NOTE — BEHAVIORAL HEALTH HIGH UTILIZER
Patient Name:Mark Quick MRN:  [id number]         : 1970     Age: 53 y.o.    Sex: male   Utilization History:  (# of ED visits & IP admits; reasons)  Pt had 6 [facility]-ED visits from 2024-3/2024. ED presentations were related to homelessness (especially during winter months), wanting to be placed in a group home, non-compliance with medications or community providers, needing a medication refill, malingering, SI with no plan or with a plan to overdose on medications or walk into traffic, cut or burn self, auditory or visual hallucinations, wanted documentation for disability.        Medical presentations were related to congestion, hyperglycemia, bilateral foot pain, ulcer right foot, headache, abdominal or back pain, chest pain, dizziness, hyperglycemia, urinary incontinence, weak legs or neuropathy.    Treatment Recommendations & Presentation:  Presentation in the ED: Pt typically presents self or is accompanied by EMS/police to ED's. ED visits were related to homelessness (especially during winter months), wanting to be placed in a group home, non-compliance with medications or community providers, needing a medication refill, malingering, SI with no plan or with a plan to overdose on medications or walk into traffic, cut or burn self, auditory or visual hallucinations, wanted documentation for disability.        Medical visits were related to congestion, hyperglycemia, bilateral foot pain, ulcer right foot, headache, abdominal or back pain, chest pain, dizziness, hyperglycemia, urinary incontinence, weak legs or neuropathy.       ED Recommendations: Following medical evaluation and treatment, establish acute risk versus pt's chronic baseline behavioral disturbances. Direct pt to contact provider Callum Cramer from [facility] [phone] for an appointment as pt has been seeing him for medication needs.  Direct pt to [facility] [phone] or [phone] or JUDD Garcia  nurses [phone] to assist pt in making appointments for mental health and medical care. If pt remains in the [facility], he should call or go to the [facility] Assistance office at [address] [place] [address] [phone] to obtain [facility] medicaid (the mental health carve out of medicaid) to be able to obtain a mental health provider.  Pt can go to [facility] at [address] [facility] for food and some case management assistance, pt should obtain a [facility] of [facility] and can also [facility] for [facility] issues. Pt can also utilize the [place] drop in center at [address].  Pt should go to [facility] medical Clinic at [address] for medical needs or [facility] center at [address] for medical needs.        Home Medication Regimen: see most recent documentation in Epic, look at what [facility] is prescribing pt.           Recent Medical Work Ups:  (include Psych testing or ECT)     Labs - 2023, 2024  ECG - 2023, 2024 Inpatient Recommendations: Develop outpt community and mental health resources and community providers and [facility] services for pt for a comprehensive discharge plan. Pt was utilizing [facility].           Outpatient recommendations:  Pt should continue with his community medical and mental health providers and take his medication as prescribed.         Situation/Relevant Background Info: Pt is a 53 year old male with a diagnosis of Bipolar 1 disorder, anxiety, depression and an extensive history of ED appearances in the [place] and [facility] areas with numerous behavioral health and medical hospitalizations.   Pt appears to be chronically homeless with unstable housing and comes to ED's due to housing issues, especially during winter months. Pt will  typically go to the ED's with his fiance as they attempt to be hospitalized at the same time and they often present with similar symptoms.  Pt does not appear to be compliant with  his medical care or mental health care in the community or with taking his medications as prescribed. Pt often experiences issues related to his diabetes and stated in 3/2024 that he lost his glucometer.  Pt goes between the [place] region and should have the mental health medicaid carve-out in the county in which he chooses to reside so he can obtain a mental health provider.   Pt has had appointments with [facility] as their ED referred him to [facility] and they attempt to set up an appointments for pt when he comes in.    Pt has been seeing [facility] provider Callum Cramer PA-C more regularly and states they are trying to also help him with his housing issues.                    Diagnoses/Significant Problems (Medical & Psychiatric):      Asthma        Bipolar 1 disorder, Anxiety      Depression      Diabetes mellitus (HCC)      HTN                Drivers of repeated utilization: homelessness/unstable housing, diabetes care needed/poor self-care, personality disordered behaviors, malingering, non-compliance with medical and mental health community care, non-compliance with medications.                                                    Existing Community Resources & Supports:                 [facility] Thompson - [phone]       Patient Medical & Psychiatric Care Team:  [facility] [phone]  [facility] and provider Callum Cramer [phone] or [phone]    Care plan date: 06/26/24                   Author:  Rosi Jones RN                 Date reviewed with patient:

## 2024-07-24 ENCOUNTER — TELEPHONE (OUTPATIENT)
Age: 54
End: 2024-07-24

## 2024-08-22 ENCOUNTER — DOCUMENTATION (OUTPATIENT)
Dept: CASE MANAGEMENT | Facility: HOSPITAL | Age: 54
End: 2024-08-22

## 2024-08-22 NOTE — BEHAVIORAL HEALTH HIGH UTILIZER
Patient Name:Mark Quick MRN:  [id number]         : 1970     Age: 53 y.o.    Sex: male   Utilization History:  (# of ED visits & IP admits; reasons)  Pt had 6 [facility]-ED visits from 2024-3/2024. ED presentations were related to homelessness (especially during winter months), wanting to be placed in a group home, non-compliance with medications or community providers, needing a medication refill, malingering, SI with no plan or with a plan to overdose on medications or walk into traffic, cut or burn self, auditory or visual hallucinations, wanted documentation for disability.        Medical presentations were related to congestion, hyperglycemia, bilateral foot pain, ulcer right foot, headache, abdominal or back pain, chest pain, dizziness, hyperglycemia, urinary incontinence, weak legs or neuropathy.     Treatment Recommendations & Presentation:  Presentation in the ED: Pt typically presents self or is accompanied by EMS/police to ED's. ED visits were related to homelessness (especially during winter months), wanting to be placed in a group home, non-compliance with medications or community providers, needing a medication refill, malingering, SI with no plan or with a plan to overdose on medications or walk into traffic, cut or burn self, auditory or visual hallucinations, wanted documentation for disability.        Medical visits were related to congestion, hyperglycemia, bilateral foot pain, ulcer right foot, headache, abdominal or back pain, chest pain, dizziness, hyperglycemia, urinary incontinence, weak legs or neuropathy.       ED Recommendations:Following medical evaluation and treatment, establish acute risk versus pt's chronic baseline behavioral disturbances. Direct pt to contact provider Callum Cramer from [facility] [phone] for an appointment as pt has been seeing him for medication needs.   Direct pt to [facility] [phone] or [phone] or JUDD Garcia  nurses [phone] to assist pt in making appointments for mental health and medical care. If pt remains in the [facility], he should call or go to the [facility] Assistance office at [address] [place] [address] [phone] to obtain [facility] medicaid (the mental health carve out of medicaid) to be able to obtain a mental health provider.  Pt can go to [facility] at [address] for food and some case management assistance, pt should obtain a [facility] and can also talk to the  at [facility] and [facility] for housing issues. Pt can also utilize the [place] drop in center at [address].  Pt should go to [facility] Clinic at [address] for medical needs or [facility] center at [address] for medical needs.     Home Medication Regimen: see most recent documentation in Epic, look at what [facility] is prescribing pt.      Recent Medical Work Ups:  (include Psych testing or ECT)       Labs - 2023, 2024  ECG - 2023, 2024 Inpatient Recommendations:  Develop outpt community and mental health resources and community providers and [facility] services for pt for a comprehensive discharge plan. Pt was utilizing [facility].         Outpatient recommendations: Pt should continue with his community medical and mental health providers and take his medication as prescribed.       Situation/Relevant Background Info:  Pt is a 53 year old male with a diagnosis of Bipolar 1 disorder, anxiety, depression and an extensive history of ED appearances in the [place] and [facility] areas with numerous behavioral health and medical hospitalizations.   Pt appears to be chronically homeless with unstable housing and comes to ED's due to housing issues, especially during winter months. Pt will  typically go to the ED's with his fiance as they attempt to be hospitalized at the same time and they often present with similar symptoms.  Pt does not appear to be compliant with his  medical care or mental health care in the community or with taking his medications as prescribed. Pt often experiences issues related to his diabetes and stated in 3/2024 that he lost his glucometer.  Pt goes between the [place] and [place] region and should have the mental health medicaid carve-out in the county in which he chooses to reside so he can obtain a mental health provider.   Pt has had appointments with [facility] as their ED referred [facility] to [facility] and they attempt to set up an appointments for pt when he comes in.    Pt has been seeing [facility] provider Callum Cramer PA-C more regularly and states they are trying to also help him with his housing issues.          Diagnoses/Significant Problems (Medical & Psychiatric):    Asthma        Bipolar 1 disorder, Anxiety      Depression      Diabetes mellitus (HCC)      HTN             Drivers of repeated utilization:   homelessness/unstable housing, diabetes care needed/poor self-care, personality disordered behaviors, malingering, non-compliance with medical and mental health community care, non-compliance with medications.                                             Existing Community Resources & Supports:                 Stas Thompson - [phone]       Patient Medical & Psychiatric Care Team:  [facility] Nurses [phone]  [facility] and provider Callum Cramer [phone] or [phone]    Care plan date: 08/22/24                   Author:  Tabitha Alcantara RN                 Date reviewed with patient:

## 2024-10-10 NOTE — PROGRESS NOTES
1) Today I applied marathon Skin Protectant to the scratches. This will form a barrier to friction and injury and allow air to the wounds . You can get ti wet - do not scrub it or apply lotions or petroleum over it.     2) Reapply in about a week or if you see it cracking or flaking.     3) They do have it on [employer] under the following description:    Medline Marathon Liquid Skin Protectant, 0.5 g Applicator (Box of 5)  Price: $46.36 ($9.27 / Count)   [facility]   Brand Medline  Item Form Liquid  Item Weight 14.06 Grams  Global Trade Identification Number [id number], [id number]  [facility] [phone]    Call if we can help you in any way!    Vonnie Thomas MD,[facility]  (P) [phone]  (F) [phone]     Daily rounds  Per shift report, pt awake several times overnight but otherwise slept, denying symptoms, childlike

## 2024-10-29 ENCOUNTER — DOCUMENTATION (OUTPATIENT)
Dept: CASE MANAGEMENT | Facility: HOSPITAL | Age: 54
End: 2024-10-29

## 2024-10-29 NOTE — BEHAVIORAL HEALTH HIGH UTILIZER
Patient Name:Mark Quick MRN:  [id number]         : 1970     Age: 53 y.o.    Sex: male   Utilization History:  (# of ED visits & IP admits; reasons)  Pt had 6 [facility]-ED visits from 2024-3/2024. ED presentations were related to homelessness (especially during winter months), wanting to be placed in a group home, non-compliance with medications or community providers, needing a medication refill, malingering, SI with no plan or with a plan to overdose on medications or walk into traffic, cut or burn self, auditory or visual hallucinations, wanted documentation for disability.        Medical presentations were related to congestion, hyperglycemia, bilateral foot pain, ulcer right foot, headache, abdominal or back pain, chest pain, dizziness, hyperglycemia, urinary incontinence, weak legs or neuropathy.   Treatment Recommendations & Presentation:  Presentation in the ED: Pt typically presents self or is accompanied by EMS/police to ED's. ED visits were related to homelessness (especially during winter months), wanting to be placed in a group home, non-compliance with medications or community providers, needing a medication refill, malingering, SI with no plan or with a plan to overdose on medications or walk into traffic, cut or burn self, auditory or visual hallucinations, wanted documentation for disability.        Medical visits were related to congestion, hyperglycemia, bilateral foot pain, ulcer right foot, headache, abdominal or back pain, chest pain, dizziness, hyperglycemia, urinary incontinence, weak legs or neuropathy.        ED Recommendations:Following medical evaluation and treatment, establish acute risk versus pt's chronic baseline behavioral disturbances. Direct pt to contact provider Callum Cramer from [facility] [phone] for an appointment as pt has been seeing him for medication needs.   Direct pt to [facility] [phone] or [phone] or JUDD Garcia  nurses [phone] to assist pt in making appointments for mental health and medical care. If pt remains in the [facility], he should call or go to the [facility] Assistance office at [address] [place] [address] [phone] to obtain [facility] medicaid (the mental health carve out of medicaid) to be able to obtain a mental health provider.  Pt can go to [facility] at [address] [facility] for food and some case management assistance, pt should obtain a [facility] and can also talk to the  at [facility] for housing issues. Pt can also utilize the [place] drop in center at [address] [facility].  Pt should go to [facility] medical Clinic at [address] for medical needs or [facility] center at [address] for medical needs.      Home Medication Regimen: see most recent documentation in Epic, look at what [facility] is prescribing pt.      Recent Medical Work Ups:  (include Psych testing or ECT)       Labs - 2023, 2024  ECG - 2023, 2024 Inpatient Recommendations:   Develop outpt community and mental health resources and community providers and [facility] services for pt for a comprehensive discharge plan. Pt was utilizing [facility].       Outpatient recommendations:   Pt should continue with his community medical and mental health providers and take his medication as prescribed.         Situation/Relevant Background Info:    Pt is a 53 year old male with a diagnosis of Bipolar 1 disorder, anxiety, depression and an extensive history of ED appearances in the [place] and [facility] areas with numerous behavioral health and medical hospitalizations.   Pt appears to be chronically homeless with unstable housing and comes to ED's due to housing issues, especially during winter months. Pt will  typically go to the ED's with his fiance as they attempt to be hospitalized at the same time and they often present with similar symptoms.  Pt does not appear to be compliant with his  medical care or mental health care in the community or with taking his medications as prescribed. Pt often experiences issues related to his diabetes and stated in 3/2024 that he lost his glucometer.  Pt goes between the [place] region and should have the mental health medicaid carve-out in the county in which he chooses to reside so he can obtain a mental health provider.   Pt has had appointments with [facility] as their ED referred him to [facility] and they attempt to set up an appointments for pt when he comes in.    Pt has been seeing [facility] provider Callum Cramer PA-C more regularly and states they are trying to also help him with his housing issues.          Diagnoses/Significant Problems (Medical & Psychiatric):    Asthma       Bipolar 1 disorder, Anxiety     Depression     Diabetes mellitus (HCC)     HTN              Drivers of repeated utilization:      Homelessness/unstable housing, diabetes care needed/poor self-care, personality disordered behaviors, malingering, non-compliance with medical and mental health community care, non-compliance with medications.                                          Existing Community Resources & Supports:                 Stas Thompson - [phone]       Patient Medical & Psychiatric Care Team:  [facility] Nurses [phone]  [facility] Clinics and provider Callum Cramer [phone] or [phone]    Care plan date: 10/29/24                   Author:  Tabitha Alcantara RN                 Date reviewed with patient:

## 2024-12-07 NOTE — ED PROVIDER NOTES
History  Chief Complaint   Patient presents with    Psychiatric Evaluation     patient seen in ED yesterday for abdominal pain  Patient states that after going home he began to have suicidal thoughts while he was sleeping  Patient states that he does not wish to be dead and he is not having these thoughts at the moment  Patient jessica having any sort of plan to harm himself  Patient is a 50-year-old male that presents for depression and suicidal thoughts  Patient has been evaluated multiple times this month  States that he was seen here earlier for different complaints, went home and was unable to sleep  He states that the suicidal thoughts came back  He does not have a plan  He tells me that he is being followed by a therapist and saw them yesterday  When I asked what their plan was he told me to just not do anything stupid    He cannot tell me medications that he supposed to be on  He denies any drugs, alcohol, hallucinations, delusions or homicidal ideation, intent or plan  History provided by:  Patient   used: No    Psychiatric Evaluation   Presenting symptoms: suicidal thoughts    Presenting symptoms: no agitation, no hallucinations and no self-mutilation    Degree of incapacity (severity): Unable to specify  Onset quality:  Unable to specify  Timing:  Intermittent  Progression:  Unchanged  Chronicity:  Chronic  Context: not alcohol use and not drug abuse    Treatment compliance:  Unable to specify  Relieved by:  Nothing  Worsened by:  Nothing  Associated symptoms: insomnia    Associated symptoms: no anxiety    Risk factors: hx of mental illness        Prior to Admission Medications   Prescriptions Last Dose Informant Patient Reported? Taking?    B-D INS SYRINGE 0 5CC/31GX5/16 31G X 5/16" 0 5 ML MISC   No No   Sig: Inject under the skin 2 (two) times a day   insulin glargine (LANTUS SOLOSTAR) 100 units/mL injection pen   Yes No   Sig: Inject 40 Units under the skin No indicators present lisinopril (ZESTRIL) 10 mg tablet   Yes No   Sig: Take 10 mg by mouth daily   metFORMIN (GLUCOPHAGE) 1000 MG tablet   Yes No   Sig: Take 1,000 mg by mouth   polyethylene glycol (MIRALAX) 17 g packet   No No   Sig: Take 17 g by mouth daily      Facility-Administered Medications: None       Past Medical History:   Diagnosis Date    Asthma     Bipolar 1 disorder ([facility] )     Diabetes mellitus ([place] [address] )     Psychiatric disorder        History reviewed  No pertinent surgical history  History reviewed  No pertinent family history  I have reviewed and agree with the history as documented  Social History     Tobacco Use    Smoking status: Former Smoker    Smokeless tobacco: Never Used   Substance Use Topics    Alcohol use: No    Drug use: No        Review of Systems   Skin: Negative for wound  Psychiatric/Behavioral: Positive for sleep disturbance and suicidal ideas  Negative for agitation, behavioral problems, confusion, hallucinations and self-injury  The patient has insomnia  The patient is not nervous/anxious  All other systems reviewed and are negative  Physical Exam  Physical Exam   Constitutional: He is oriented to person, place, and time  He appears well-developed and well-nourished  No distress  HENT:   Head: Normocephalic and atraumatic  Mouth/Throat: Oropharynx is clear and moist    Eyes: Pupils are equal, round, and reactive to light  Conjunctivae and EOM are normal  Right eye exhibits no discharge  Left eye exhibits no discharge  No scleral icterus  Cardiovascular: Normal rate, regular rhythm, normal heart sounds and intact distal pulses  Exam reveals no friction rub  No murmur heard  Pulmonary/Chest: Effort normal and breath sounds normal  No stridor  No respiratory distress  He has no wheezes  He has no rales  Musculoskeletal: He exhibits no edema, tenderness or deformity  Neurological: He is alert and oriented to person, place, and time  Skin: Skin is warm and dry   He is not diaphoretic  Psychiatric: He has a normal mood and affect  His behavior is normal  His speech is delayed  He is not actively hallucinating  Thought content is not paranoid and not delusional  Cognition and memory are not impaired  He expresses suicidal ideation  He expresses no homicidal ideation  He expresses no suicidal plans and no homicidal plans  He is attentive  Nursing note and vitals reviewed  Vital Signs  ED Triage Vitals [06/30/19 0317]   Temperature Pulse Respirations Blood Pressure SpO2   97 8 °F (36 6 °C) 64 20 95/51 99 %      Temp Source Heart Rate Source Patient Position - Orthostatic VS BP Location FiO2 (%)   Oral Monitor Sitting Right arm --      Pain Score       No Pain           Vitals:    06/30/19 0317   BP: 95/51   Pulse: 64   Patient Position - Orthostatic VS: Sitting         Visual Acuity      ED Medications  Medications - No data to display    Diagnostic Studies  Results Reviewed     Procedure Component Value Units Date/Time    Rapid drug screen, urine [[id number]]     Lab Status:  No result Specimen:  Urine     POCT alcohol breath test [[id number]]  (Normal) Resulted:  06/30/19 0323    Lab Status:  Final result Updated:  06/30/19 0323     EXTBreath Alcohol 0 000                 No orders to display              Procedures  Procedures       ED Course                               MDM  Number of Diagnoses or Management Options  Bipolar disorder [facility]): new and requires workup  Diagnosis management comments: Patient presents for depression and suicidal ideation  He is requesting a crisis eval   He was medically worked up a few hours ago  No need to repeat blood work  Will add a Breathalyzer alcohol, urine drug screen and have him speak with crisis as he is requesting  Patient has vague suicidal thoughts with no plan  4:32 AM  Had a long conversation with the crisis worker on call  She evaluated the patient    Patient is now stating that he does not have suicidal ideations and wants to go home  Patient has numbers to follow up with his independent  with Fredy Bernheim  I placed an inpatient consult for case management to make the patient a high utilizer  Our charge nurses also leaving a message for case management to read the chart, follow up with the numbers that crisis is placing in the chart for his independent , try to get the patient into a group home and better outpatient resources  Patient will be discharged at this time  Amount and/or Complexity of Data Reviewed  Clinical lab tests: reviewed and ordered  Tests in the medicine section of CPT®: ordered and reviewed  Review and summarize past medical records: yes    Patient Progress  Patient progress: stable      Disposition  Final diagnoses:   Bipolar disorder ([facility] [address] )     Time reflects when diagnosis was documented in both MDM as applicable and the Disposition within this note     Time User Action Codes Description Comment    6/30/2019  4:30 AM Khalida Poster Add [F31 9] Bipolar disorder [facility])       ED Disposition     ED Disposition Condition Date/Time Comment    Discharge Stable Sun Jun 30, 2019  4:30 AM Diana Sandoval discharge to home/self care  Follow-up Information     Follow up With Specialties Details Why Contact Info    ICM Team  Call today To schedule an appointment as soon as you can           Patient's Medications   Discharge Prescriptions    No medications on file     No discharge procedures on file      ED Provider  Electronically Signed by           Stanley Knight DO  06/30/19 5446

## 2025-01-07 ENCOUNTER — DOCUMENTATION (OUTPATIENT)
Dept: CASE MANAGEMENT | Facility: HOSPITAL | Age: 55
End: 2025-01-07

## 2025-01-07 NOTE — BEHAVIORAL HEALTH HIGH UTILIZER
Patient Name:Mark Quick MRN:  [id number]         : 1970     Age: 54 y.o.    Sex: male   Utilization History:  (# of ED visits & IP admits; reasons)  Pt had 6 [facility]-ED visits from 2024-3/2024. ED presentations were related to homelessness (especially during winter months), wanting to be placed in a group home, non-compliance with medications or community providers, needing a medication refill, malingering, SI with no plan or with a plan to overdose on medications or walk into traffic, cut or burn self, auditory or visual hallucinations, wanted documentation for disability.        Medical presentations were related to congestion, hyperglycemia, bilateral foot pain, ulcer right foot, headache, abdominal or back pain, chest pain, dizziness, hyperglycemia, urinary incontinence, weak legs or neuropathy.     Treatment Recommendations & Presentation:  Presentation in the ED: Pt typically presents self or is accompanied by EMS/police to ED's. ED visits were related to homelessness (especially during winter months), wanting to be placed in a group home, non-compliance with medications or community providers, needing a medication refill, malingering, SI with no plan or with a plan to overdose on medications or walk into traffic, cut or burn self, auditory or visual hallucinations, wanted documentation for disability.        Medical visits were related to congestion, hyperglycemia, bilateral foot pain, ulcer right foot, headache, abdominal or back pain, chest pain, dizziness, hyperglycemia, urinary incontinence, weak legs or neuropathy.        ED Recommendations:Following medical evaluation and treatment, establish acute risk versus pt's chronic baseline behavioral disturbances. Direct pt to contact provider Callum Cramer from [facility] [phone] for an appointment as pt has been seeing him for medication needs.   Direct pt to [facility] [phone] or [phone] or [facility]  nurses [phone] to assist pt in making appointments for mental health and medical care. If pt remains in the [facility], he should call or go to the [facility] Assistance office at [address] [place] [address] [phone] to obtain [facility] medicaid (the mental health carve out of medicaid) to be able to obtain a mental health provider.  Pt can go to [facility] [address] for food and some case management assistance, pt should obtain a [facility] and can also talk to the  at [facility] for housing issues. Pt can also utilize the [place] drop in center at [address] [facility] [address].  Pt should go to [facility] medical Clinic at [address] [facility] for medical needs or [facility] center at [address] [facility] [facility] [address] for medical needs.      Home Medication Regimen: see most recent documentation in Epic, look at what [facility] is prescribing pt.      Recent Medical Work Ups:  (include Psych testing or ECT)       Labs - 2023, 2024  ECG - 2023, 2024 Inpatient Recommendations:   Develop outpt community and mental health resources and community providers and [facility] services for pt for a comprehensive discharge plan. Pt was utilizing [facility].       Outpatient recommendations:   Pt should continue with his community medical and mental health providers and take his medication as prescribed.       Situation/Relevant Background Info:      Pt is a 53 year old male with a diagnosis of Bipolar 1 disorder, anxiety, depression and an extensive history of ED appearances in the [place] and [facility] areas with numerous behavioral health and medical hospitalizations.   Pt appears to be chronically homeless with unstable housing and comes to ED's due to housing issues, especially during winter months. Pt will  typically go to the ED's with his fiance as they attempt to be hospitalized at the same time and they often present with similar symptoms.  Pt does not appear to be compliant with his  medical care or mental health care in the community or with taking his medications as prescribed. Pt often experiences issues related to his diabetes and stated in 3/2024 that he lost his glucometer.  Pt goes between the [place] and [place] region and should have the mental health medicaid carve-out in the county in which he chooses to reside so he can obtain a mental health provider.   Pt has had appointments with [facility] as their ED [facility] and they attempt to set up an appointments for pt when he comes in.    Pt has been seeing [facility] provider Callum Cramer PA-C more regularly and states they are trying to also help him with his housing issues.          Diagnoses/Significant Problems (Medical & Psychiatric):  Asthma                          Bipolar 1 disorder, Anxiety                       Depression                             Diabetes mellitus (HCC)      HTN               Drivers of repeated utilization:      Homelessness/unstable housing, diabetes care needed/poor self-care, personality disordered behaviors, malingering, non-compliance with medical and mental health community care, non-compliance with medications.                                          Existing Community Resources & Supports:                 [facility] - [phone]       Patient Medical & Psychiatric Care Team:  [facility] [phone]  [facility] Clinics and provider Callum Cramer [phone] or [phone]    Care plan date: 01/07/25                   Author:  Tabitha Alcantara RN                 Date reviewed with patient:

## 2025-04-10 ENCOUNTER — DOCUMENTATION (OUTPATIENT)
Dept: CASE MANAGEMENT | Facility: HOSPITAL | Age: 55
End: 2025-04-10

## 2025-04-10 NOTE — BEHAVIORAL HEALTH HIGH UTILIZER
Patient Name:Mark Quick MRN:  [id number]         : 1970     Age: 54 y.o.    Sex: male   Utilization History:  (# of ED visits & IP admits; reasons)  Pt had 6 [facility]-ED visits from 2024-3/2024. ED presentations were related to homelessness (especially during winter months), wanting to be placed in a group home, non-compliance with medications or community providers, needing a medication refill, malingering, SI with no plan or with a plan to overdose on medications or walk into traffic, cut or burn self, auditory or visual hallucinations, wanted documentation for disability.        Medical presentations were related to congestion, hyperglycemia, bilateral foot pain, ulcer right foot, headache, abdominal or back pain, chest pain, dizziness, hyperglycemia, urinary incontinence, weak legs or neuropathy.       Treatment Recommendations & Presentation:  Presentation in the ED: Pt typically presents self or is accompanied by EMS/police to ED's. ED visits were related to homelessness (especially during winter months), wanting to be placed in a group home, non-compliance with medications or community providers, needing a medication refill, malingering, SI with no plan or with a plan to overdose on medications or walk into traffic, cut or burn self, auditory or visual hallucinations, wanted documentation for disability.        Medical visits were related to congestion, hyperglycemia, bilateral foot pain, ulcer right foot, headache, abdominal or back pain, chest pain, dizziness, hyperglycemia, urinary incontinence, weak legs or neuropathy.          ED Recommendations: :Following medical evaluation and treatment, establish acute risk versus pt's chronic baseline behavioral disturbances. Direct pt to contact provider Callum Cramer from [facility] [phone] for an appointment as pt has been seeing him for medication needs.   Direct pt to [facility] [phone] or [phone] or [facility]  [place] nurses [phone] to assist pt in making appointments for mental health and medical care. If pt remains in the [facility], he should call or go to the [facility] Assistance office at [address] [place] [address] [phone] to obtain [facility] medicaid (the mental health carve out of medicaid) to be able to obtain a mental health provider.  Pt can go to [facility] at [address] [facility] for food and some case management assistance, pt should obtain a [facility] and can also talk to the  at [facility] and [facility] for housing issues. Pt can also utilize the [place] drop in center at [address] [facility].  Pt should go to [facility] medical Clinic at [address] [facility] for medical needs or [facility] at [address] [facility] for medical needs.     Home Medication Regimen: see most recent documentation in Epic, look at what [facility] is prescribing pt.         Recent Medical Work Ups:  (include Psych testing or ECT)     Labs -  2024  ECG - 2024 Inpatient Recommendations: Develop outpt community and mental health resources and community providers and [facility] services for pt for a comprehensive discharge plan. Pt was utilizing [facility].         Outpatient recommendations:Pt should continue with his community medical and mental health providers and take his medication as prescribed.           Situation/Relevant Background Info: Pt is a 54 year old male with a diagnosis of Bipolar 1 disorder, anxiety, depression and an extensive history of ED appearances in the [place] and [facility] areas with numerous behavioral health and medical hospitalizations.   Pt appears to be chronically homeless with unstable housing and comes to ED's due to housing issues, especially during winter months. Pt will  typically go to the ED's with his fiance as they attempt to be hospitalized at the same time and they often present with similar symptoms.  Pt does not appear to be compliant with his medical  care or mental health care in the community or with taking his medications as prescribed. Pt often experiences issues related to his diabetes and stated in 3/2024 that he lost his glucometer.  Pt goes between the [place] and [facility] and should have the mental health medicaid carve-out in the county in which he chooses to reside so he can obtain a mental health provider.   Pt has had appointments with [facility] as their ED referred him to [facility] and they attempt to set up an appointments for pt when he comes in.    Pt has been seeing [facility] provider Callum Cramer PA-C more regularly and states they are trying to also help him with his housing issues.                    Diagnoses/Significant Problems (Medical & Psychiatric):      Asthma                          Bipolar 1 disorder, Anxiety                       Depression                             Diabetes mellitus (HCC)      HTN               Drivers of repeated utilization:   Homelessness/unstable housing, diabetes care needed/poor self-care, personality disordered behaviors, malingering, non-compliance with medical and mental health community care, non-compliance with medications.                                             Existing Community Resources & Supports:                 [facility] - [phone]      Patient Medical & Psychiatric Care Team:  [facility] [phone]  [facility] Clinics and provider Callum Cramer [phone] or [phone]    Care plan date: 04/10/25                   Author:  Rosi Jones RN                 Date reviewed with patient:

## 2025-05-06 NOTE — DISCHARGE SUMMARY
[address]  Discharge- Nathalie Devaughnes 1970, 49 y o  male MRN: [id number]  Unit/Bed#: -01 Encounter: [id number]  Primary Care Provider: No primary care provider on file     Date and time admitted to hospital: 2/2/2022 11:46 AM    * Type 2 diabetes mellitus with hyperglycemia, with long-term current use of insulin [facility])  Assessment & Plan  Lab Results   Component Value Date    HGBA1C 13 5 (H) 02/02/2022       Recent Labs     02/03/22  1617 02/03/22  2036 02/04/22  0539 02/04/22  1138   POCGLU 130 244* 90 190*       Blood Sugar Average: Last 72 hrs:  (P) 511 [id number]     · Glucose initially 628   · Received 2 L normal saline  · He has been out of his meds includeing Lantus 40 units at bedtime, with meal- coverage, and metformin  · Hemoglobin A1c- 13 5- poor controlled   · BG much improved while in the hospital   · Outpatient referral to endocrinology     Poor social situation  Assessment & Plan  · Patient arrived here yesterday from [place]-he is homeless  · His fiancee is also currently in a long-term psychiatric facility   · CM obtained meds-to-bed and able to provide a ride for patient     Hypomagnesemia  Assessment & Plan  · Replaced     Mild intermittent asthma without complication  Assessment & Plan  · Currently controlled  · Continue inhalers       Auditory hallucinations  Assessment & Plan  · Patient says he hears voices telling him to do things, but states "I would never do anything" referring to self-harm or suicidal gestures or attempts   · Psychiatric input appreciated   · No need for inpatient psychiatry admission   · Supportive care    Bipolar 1 disorder [facility])  Assessment & Plan  · He has been out of his meds including trazodone, Lexapro, and Abilify- restarted on home medications   · Psychiatric input appreciated   · No need for inpatient psychiatry admission         Discharging Physician / Practitioner: Nay Lucero  PCP: No primary care provider on Patient called to provide fax number for the lab work. Patient provided fax number of [phone]. Patient advised this is for [facility] [employer]. I did a google search and confirmed the address with patient and faxed to the number provided by patient as well as the lab draw station found online. Patient is aware.     [facility] Lab Draw Station  [address] [place] [address]  Phone [phone]  Fax [phone]   file   Admission Date:   Admission Orders (From admission, onward)     Ordered        02/02/22 1407  INPATIENT ADMISSION  Once                      Discharge Date: 02/04/22    Medical Problems             Resolved Problems  Date Reviewed: 2/4/2022    None                Consultations During Hospital Stay:  · Psychiatric    Procedures Performed:   · None    Significant Findings / Test Results:   · None     Incidental Findings:   · None      Test Results Pending at Discharge (will require follow up): · None     Outpatient Tests Requested:  · Establish care with a PCP  · Endocrinology    Complications:     None    Reason for Admission:  Hyperglycemia    Hospital Course:     Kwame Lim is a 49 y o  male patient who originally presented to the hospital on 2/2/2022 due to hyperglycemia  The patient who has diabetes mellitus type 2 insulin-dependent however ran out of his medications and presented in the ED found to have blood glucose greater than 500  He was started his home regimen with much improvement of his blood glucose  Additionally the patient with psychiatric disorders and psychiatry evaluation done  At this time there is no indication for inpatient psychiatric admission  His previous medications were resumed  Prior to discharge the patient received medication provided  CM arranged transportation  Recommend to follow-up with endocrinology as his blood glucose has been poorly controlled  Please see above list of diagnoses and related plan for additional information  Condition at Discharge: good     Discharge Day Visit / Exam:     Subjective:  Feeling better     Vitals: Blood Pressure: 112/62 (02/04/22 0742)  Pulse: 79 (02/04/22 0742)  Temperature: 97 7 °F (36 5 °C) (02/04/22 0742)  Temp Source: Tympanic (02/02/22 2300)  Respirations: 18 (02/04/22 0742)  Height: 5' 6" (167 6 cm) (02/02/22 1943)  Weight - Scale: 58 kg (127 lb 13 9 oz) (02/02/22 1943)  SpO2: 95 % (02/04/22 0742)  Exam:   Physical Exam  Vitals and nursing note reviewed  Constitutional:       General: He is not in acute distress  Appearance: Normal appearance  HENT:      Head: Normocephalic and atraumatic  Right Ear: External ear normal       Left Ear: External ear normal       Nose: Nose normal       Mouth/Throat:      Mouth: Mucous membranes are moist       Pharynx: Oropharynx is clear  Eyes:      General:         Right eye: No discharge  Left eye: No discharge  Extraocular Movements: Extraocular movements intact  Pupils: Pupils are equal, round, and reactive to light  Cardiovascular:      Rate and Rhythm: Normal rate and regular rhythm  Pulses: Normal pulses  Heart sounds: Normal heart sounds  No murmur heard  Pulmonary:      Effort: Pulmonary effort is normal  No respiratory distress  Breath sounds: Normal breath sounds  No wheezing or rales  Abdominal:      General: Bowel sounds are normal  There is no distension  Palpations: Abdomen is soft  There is no mass  Tenderness: There is no abdominal tenderness  Musculoskeletal:         General: No swelling, tenderness or deformity  Normal range of motion  Cervical back: Normal range of motion and neck supple  No rigidity  Skin:     General: Skin is warm and dry  Capillary Refill: Capillary refill takes less than 2 seconds  Coloration: Skin is not pale  Findings: No erythema  Neurological:      General: No focal deficit present  Mental Status: He is alert and oriented to person, place, and time  Mental status is at baseline  Psychiatric:         Mood and Affect: Mood normal          Behavior: Behavior normal          Thought Content: Thought content normal          Judgment: Judgment normal          Discussion with Family: none present during exam     Discharge instructions/Information to patient and family:   See after visit summary for information provided to patient and family        Provisions for Follow-Up Care:  See after visit summary for information related to follow-up care and any pertinent home health orders  Disposition:     Home      Planned Readmission:    No      Discharge Statement:  I spent 38 minutes discharging the patient  This time was spent on the day of discharge  I had direct contact with the patient on the day of discharge  Greater than 50% of the total time was spent examining patient, answering all patient questions, arranging and discussing plan of care with patient as well as directly providing post-discharge instructions  Additional time then spent on discharge activities  Discharge Medications:  See after visit summary for reconciled discharge medications provided to patient and family        ** Please Note: This note has been constructed using a voice recognition system **